# Patient Record
Sex: MALE | Race: BLACK OR AFRICAN AMERICAN | Employment: OTHER | URBAN - METROPOLITAN AREA
[De-identification: names, ages, dates, MRNs, and addresses within clinical notes are randomized per-mention and may not be internally consistent; named-entity substitution may affect disease eponyms.]

---

## 2017-03-23 ENCOUNTER — APPOINTMENT (OUTPATIENT)
Dept: CT IMAGING | Age: 57
DRG: 042 | End: 2017-03-23
Attending: EMERGENCY MEDICINE
Payer: COMMERCIAL

## 2017-03-23 ENCOUNTER — APPOINTMENT (OUTPATIENT)
Dept: GENERAL RADIOLOGY | Age: 57
DRG: 042 | End: 2017-03-23
Attending: EMERGENCY MEDICINE
Payer: COMMERCIAL

## 2017-03-23 ENCOUNTER — HOSPITAL ENCOUNTER (INPATIENT)
Age: 57
LOS: 4 days | Discharge: HOME | DRG: 042 | End: 2017-03-27
Attending: EMERGENCY MEDICINE | Admitting: HOSPITALIST
Payer: COMMERCIAL

## 2017-03-23 DIAGNOSIS — G45.9 TRANSIENT CEREBRAL ISCHEMIA, UNSPECIFIED TYPE: Primary | ICD-10-CM

## 2017-03-23 LAB
ANION GAP BLD CALC-SCNC: 10 MMOL/L (ref 3–18)
APPEARANCE UR: CLEAR
APTT PPP: 28.1 SEC (ref 23–36.4)
BASOPHILS # BLD AUTO: 0 K/UL (ref 0–0.06)
BASOPHILS # BLD: 1 % (ref 0–2)
BILIRUB UR QL: NEGATIVE
BUN SERPL-MCNC: 18 MG/DL (ref 7–18)
BUN/CREAT SERPL: 13 (ref 12–20)
CALCIUM SERPL-MCNC: 7.4 MG/DL (ref 8.5–10.1)
CHLORIDE SERPL-SCNC: 106 MMOL/L (ref 100–108)
CK MB CFR SERPL CALC: 0.7 % (ref 0–4)
CK MB SERPL-MCNC: 1.1 NG/ML (ref 5–25)
CK SERPL-CCNC: 162 U/L (ref 39–308)
CO2 SERPL-SCNC: 26 MMOL/L (ref 21–32)
COLOR UR: YELLOW
CREAT SERPL-MCNC: 1.4 MG/DL (ref 0.6–1.3)
DIFFERENTIAL METHOD BLD: ABNORMAL
EOSINOPHIL # BLD: 0.4 K/UL (ref 0–0.4)
EOSINOPHIL NFR BLD: 6 % (ref 0–5)
ERYTHROCYTE [DISTWIDTH] IN BLOOD BY AUTOMATED COUNT: 13.7 % (ref 11.6–14.5)
GLUCOSE BLD STRIP.AUTO-MCNC: 89 MG/DL (ref 70–110)
GLUCOSE SERPL-MCNC: 106 MG/DL (ref 74–99)
GLUCOSE UR STRIP.AUTO-MCNC: NEGATIVE MG/DL
HCT VFR BLD AUTO: 39.4 % (ref 36–48)
HGB BLD-MCNC: 12.4 G/DL (ref 13–16)
HGB UR QL STRIP: NEGATIVE
INR PPP: 1 (ref 0.8–1.2)
KETONES UR QL STRIP.AUTO: NEGATIVE MG/DL
LEUKOCYTE ESTERASE UR QL STRIP.AUTO: NEGATIVE
LYMPHOCYTES # BLD AUTO: 44 % (ref 21–52)
LYMPHOCYTES # BLD: 2.9 K/UL (ref 0.9–3.6)
MAGNESIUM SERPL-MCNC: 1.5 MG/DL (ref 1.8–2.4)
MCH RBC QN AUTO: 27.6 PG (ref 24–34)
MCHC RBC AUTO-ENTMCNC: 31.5 G/DL (ref 31–37)
MCV RBC AUTO: 87.6 FL (ref 74–97)
MONOCYTES # BLD: 0.5 K/UL (ref 0.05–1.2)
MONOCYTES NFR BLD AUTO: 8 % (ref 3–10)
NEUTS SEG # BLD: 2.7 K/UL (ref 1.8–8)
NEUTS SEG NFR BLD AUTO: 41 % (ref 40–73)
NITRITE UR QL STRIP.AUTO: NEGATIVE
PH UR STRIP: 5 [PH] (ref 5–8)
PLATELET # BLD AUTO: 205 K/UL (ref 135–420)
PMV BLD AUTO: 12.6 FL (ref 9.2–11.8)
POTASSIUM SERPL-SCNC: 3.8 MMOL/L (ref 3.5–5.5)
PROT UR STRIP-MCNC: NEGATIVE MG/DL
PROTHROMBIN TIME: 12.5 SEC (ref 11.5–15.2)
RBC # BLD AUTO: 4.5 M/UL (ref 4.7–5.5)
SODIUM SERPL-SCNC: 142 MMOL/L (ref 136–145)
SP GR UR REFRACTOMETRY: 1.02 (ref 1–1.03)
TROPONIN I SERPL-MCNC: <0.02 NG/ML (ref 0–0.06)
UROBILINOGEN UR QL STRIP.AUTO: 1 EU/DL (ref 0.2–1)
WBC # BLD AUTO: 6.4 K/UL (ref 4.6–13.2)

## 2017-03-23 PROCEDURE — 74011250637 HC RX REV CODE- 250/637: Performed by: EMERGENCY MEDICINE

## 2017-03-23 PROCEDURE — 99285 EMERGENCY DEPT VISIT HI MDM: CPT

## 2017-03-23 PROCEDURE — 80048 BASIC METABOLIC PNL TOTAL CA: CPT | Performed by: EMERGENCY MEDICINE

## 2017-03-23 PROCEDURE — 82550 ASSAY OF CK (CPK): CPT | Performed by: EMERGENCY MEDICINE

## 2017-03-23 PROCEDURE — 93005 ELECTROCARDIOGRAM TRACING: CPT

## 2017-03-23 PROCEDURE — 70450 CT HEAD/BRAIN W/O DYE: CPT

## 2017-03-23 PROCEDURE — 85025 COMPLETE CBC W/AUTO DIFF WBC: CPT | Performed by: EMERGENCY MEDICINE

## 2017-03-23 PROCEDURE — 82962 GLUCOSE BLOOD TEST: CPT

## 2017-03-23 PROCEDURE — 65660000000 HC RM CCU STEPDOWN

## 2017-03-23 PROCEDURE — 85730 THROMBOPLASTIN TIME PARTIAL: CPT | Performed by: EMERGENCY MEDICINE

## 2017-03-23 PROCEDURE — 81003 URINALYSIS AUTO W/O SCOPE: CPT | Performed by: EMERGENCY MEDICINE

## 2017-03-23 PROCEDURE — 85610 PROTHROMBIN TIME: CPT | Performed by: EMERGENCY MEDICINE

## 2017-03-23 PROCEDURE — 71010 XR CHEST SNGL V: CPT

## 2017-03-23 PROCEDURE — 83735 ASSAY OF MAGNESIUM: CPT | Performed by: EMERGENCY MEDICINE

## 2017-03-23 RX ORDER — ACETAMINOPHEN 325 MG/1
650 TABLET ORAL
Status: DISCONTINUED | OUTPATIENT
Start: 2017-03-23 | End: 2017-03-27 | Stop reason: HOSPADM

## 2017-03-23 RX ORDER — ATORVASTATIN CALCIUM 40 MG/1
40 TABLET, FILM COATED ORAL
Status: DISCONTINUED | OUTPATIENT
Start: 2017-03-23 | End: 2017-03-27 | Stop reason: HOSPADM

## 2017-03-23 RX ORDER — ENOXAPARIN SODIUM 100 MG/ML
40 INJECTION SUBCUTANEOUS EVERY 24 HOURS
Status: DISCONTINUED | OUTPATIENT
Start: 2017-03-23 | End: 2017-03-27 | Stop reason: HOSPADM

## 2017-03-23 RX ORDER — ASPIRIN 325 MG
325 TABLET ORAL
Status: COMPLETED | OUTPATIENT
Start: 2017-03-23 | End: 2017-03-23

## 2017-03-23 RX ORDER — GUAIFENESIN 100 MG/5ML
81 LIQUID (ML) ORAL DAILY
Status: DISCONTINUED | OUTPATIENT
Start: 2017-03-24 | End: 2017-03-27 | Stop reason: HOSPADM

## 2017-03-23 RX ADMIN — ASPIRIN 325 MG ORAL TABLET 325 MG: 325 PILL ORAL at 20:30

## 2017-03-23 NOTE — ED PROVIDER NOTES
HPI Comments: 5:10 PM Flakita Matias is a 61 y.o. male with a history of HTN, pulmonary nodule, head injury, and depression who presents to the emergency department complaining of right arm numbness onset at 10:30 AM. Patient also c/o weakness and dizziness. Patient states that he initially experienced an episode of right arm numbness similar to this episode four days ago while at the grocery store. He states that this episode lasted thirty minutes. Patient mentions that he experienced another episode of right arm numbness three days ago while sitting on the couch. He notes that when he got up, he was dizzy and fell onto his side. Patient denies LOC or any other complaints. There are no other concerns at this time. The history is provided by the patient. Past Medical History:   Diagnosis Date    Depression     Head injury     Hypertension     Pulmonary nodule        Past Surgical History:   Procedure Laterality Date    HX OTHER SURGICAL      Torn Cartiledge Left Knee    HX TONSILLECTOMY           Family History:   Problem Relation Age of Onset    Arthritis-osteo Mother        Social History     Social History    Marital status:      Spouse name: N/A    Number of children: N/A    Years of education: N/A     Occupational History    Not on file. Social History Main Topics    Smoking status: Never Smoker    Smokeless tobacco: Not on file    Alcohol use No    Drug use: No    Sexual activity: Not on file     Other Topics Concern    Not on file     Social History Narrative         ALLERGIES: Review of patient's allergies indicates no known allergies. Review of Systems   HENT: Negative. Eyes: Negative. Respiratory: Negative. Cardiovascular: Negative. Gastrointestinal: Negative. Endocrine: Negative. Genitourinary: Negative. Musculoskeletal: Negative. Skin: Negative. Allergic/Immunologic: Negative.     Neurological: Positive for dizziness, weakness and numbness (right arm). Negative for syncope. Hematological: Negative. Psychiatric/Behavioral: Negative. All other systems reviewed and are negative. Vitals:    03/23/17 1730 03/23/17 1850 03/23/17 1853 03/23/17 1900   BP: 135/88 (!) 138/95  (!) 133/93   Pulse: 76  73 71   Resp: 17  16 19   Temp:       SpO2: 100%  100% 99%   Weight:       Height:                Physical Exam   Constitutional: He is oriented to person, place, and time. He appears well-developed and well-nourished. No distress. HENT:   Head: Normocephalic. Mouth/Throat: Oropharynx is clear and moist.   Eyes: Conjunctivae and EOM are normal. Pupils are equal, round, and reactive to light. Neck: Normal range of motion. Neck supple. Cardiovascular: Normal rate, regular rhythm, normal heart sounds and intact distal pulses. No murmur heard. Pulmonary/Chest: Effort normal and breath sounds normal. No respiratory distress. He has no wheezes. He has no rales. He exhibits no tenderness. Abdominal: Soft. Bowel sounds are normal. He exhibits no distension. There is no tenderness. There is no rebound. Musculoskeletal: Normal range of motion. He exhibits no edema or tenderness. Neurological: He is alert and oriented to person, place, and time. No cranial nerve deficit. He exhibits normal muscle tone. Coordination normal.   Numbness in right arm. Normal strength. Skin: Skin is warm and dry. No rash noted. Psychiatric: He has a normal mood and affect. His behavior is normal. Judgment and thought content normal.   Nursing note and vitals reviewed.        MDM  Number of Diagnoses or Management Options     Amount and/or Complexity of Data Reviewed  Clinical lab tests: ordered and reviewed  Tests in the radiology section of CPT®: ordered and reviewed    Risk of Complications, Morbidity, and/or Mortality  Presenting problems: moderate  Diagnostic procedures: high  Management options: moderate      ED Course Procedures    Vitals:  Patient Vitals for the past 12 hrs:   Temp Pulse Resp BP SpO2   03/23/17 1900 - 71 19 (!) 133/93 99 %   03/23/17 1853 - 73 16 - 100 %   03/23/17 1850 - - - (!) 138/95 -   03/23/17 1730 - 76 17 135/88 100 %   03/23/17 1700 - 77 18 148/85 100 %   03/23/17 1630 - 84 19 140/82 99 %   03/23/17 1612 - 90 21 151/89 100 %   03/23/17 1611 98.3 °F (36.8 °C) 91 18 151/89 100 %   100% on RA, indicating adequate oxygenation. Medications ordered:   Medications - No data to display      Lab findings:  Recent Results (from the past 12 hour(s))   GLUCOSE, POC    Collection Time: 03/23/17  4:11 PM   Result Value Ref Range    Glucose (POC) 89 70 - 110 mg/dL   EKG, 12 LEAD, INITIAL    Collection Time: 03/23/17  4:13 PM   Result Value Ref Range    Ventricular Rate 88 BPM    Atrial Rate 88 BPM    P-R Interval 140 ms    QRS Duration 96 ms    Q-T Interval 380 ms    QTC Calculation (Bezet) 459 ms    Calculated P Axis 49 degrees    Calculated R Axis 51 degrees    Calculated T Axis 45 degrees    Diagnosis       Sinus rhythm with occasional premature ventricular complexes  Otherwise normal ECG  When compared with ECG of 28-FEB-2016 19:22,  T wave inversion no longer evident in Anterior leads     CBC WITH AUTOMATED DIFF    Collection Time: 03/23/17  4:15 PM   Result Value Ref Range    WBC 6.4 4.6 - 13.2 K/uL    RBC 4.50 (L) 4.70 - 5.50 M/uL    HGB 12.4 (L) 13.0 - 16.0 g/dL    HCT 39.4 36.0 - 48.0 %    MCV 87.6 74.0 - 97.0 FL    MCH 27.6 24.0 - 34.0 PG    MCHC 31.5 31.0 - 37.0 g/dL    RDW 13.7 11.6 - 14.5 %    PLATELET 208 611 - 430 K/uL    MPV 12.6 (H) 9.2 - 11.8 FL    NEUTROPHILS 41 40 - 73 %    LYMPHOCYTES 44 21 - 52 %    MONOCYTES 8 3 - 10 %    EOSINOPHILS 6 (H) 0 - 5 %    BASOPHILS 1 0 - 2 %    ABS. NEUTROPHILS 2.7 1.8 - 8.0 K/UL    ABS. LYMPHOCYTES 2.9 0.9 - 3.6 K/UL    ABS. MONOCYTES 0.5 0.05 - 1.2 K/UL    ABS. EOSINOPHILS 0.4 0.0 - 0.4 K/UL    ABS.  BASOPHILS 0.0 0.0 - 0.06 K/UL    DF AUTOMATED PROTHROMBIN TIME + INR    Collection Time: 03/23/17  4:15 PM   Result Value Ref Range    Prothrombin time 12.5 11.5 - 15.2 sec    INR 1.0 0.8 - 1.2     PTT    Collection Time: 03/23/17  4:15 PM   Result Value Ref Range    aPTT 28.1 23.0 - 34.0 SEC   METABOLIC PANEL, BASIC    Collection Time: 03/23/17  4:15 PM   Result Value Ref Range    Sodium 142 136 - 145 mmol/L    Potassium 3.8 3.5 - 5.5 mmol/L    Chloride 106 100 - 108 mmol/L    CO2 26 21 - 32 mmol/L    Anion gap 10 3.0 - 18 mmol/L    Glucose 106 (H) 74 - 99 mg/dL    BUN 18 7.0 - 18 MG/DL    Creatinine 1.40 (H) 0.6 - 1.3 MG/DL    BUN/Creatinine ratio 13 12 - 20      GFR est AA >60 >60 ml/min/1.73m2    GFR est non-AA 52 (L) >60 ml/min/1.73m2    Calcium 7.4 (L) 8.5 - 10.1 MG/DL   MAGNESIUM    Collection Time: 03/23/17  4:15 PM   Result Value Ref Range    Magnesium 1.5 (L) 1.8 - 2.4 mg/dL   CARDIAC PANEL,(CK, CKMB & TROPONIN)    Collection Time: 03/23/17  4:15 PM   Result Value Ref Range     39 - 308 U/L    CK - MB 1.1 <3.6 ng/ml    CK-MB Index 0.7 0.0 - 4.0 %    Troponin-I, Qt. <0.02 0.00 - 0.06 NG/ML   URINALYSIS W/ RFLX MICROSCOPIC    Collection Time: 03/23/17  6:51 PM   Result Value Ref Range    Color YELLOW      Appearance CLEAR      Specific gravity 1.024 1.005 - 1.030      pH (UA) 5.0 5.0 - 8.0      Protein NEGATIVE  NEG mg/dL    Glucose NEGATIVE  NEG mg/dL    Ketone NEGATIVE  NEG mg/dL    Bilirubin NEGATIVE  NEG      Blood NEGATIVE  NEG      Urobilinogen 1.0 0.2 - 1.0 EU/dL    Nitrites NEGATIVE  NEG      Leukocyte Esterase NEGATIVE  NEG         X-Ray, CT or other radiology findings or impressions:  XR CHEST SNGL V         6:23 PM No acute process. Interpreted by Dr. Jeronimo Vargas. CT HEAD WO CONT         7:00 PM Interpreted by radiologist.     IMPRESSION:     1. 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 There is no hemorrhage, mass,  nor acute infarct.     Progress notes, Consult notes or additional Procedure notes:     7:57 PM Consult: Discussed care with Dr. Joann Johnson, Neurology. Standard discussion; including history of patients chief complaint, available diagnostic results, and treatment course. Recommends admission for TIA.     8:19 PM Consult:  Discussed care with Dr. Ese Hernández, [facility]. Standard discussion; including history of patients chief complaint, available diagnostic results, and treatment course. Instructs to admit to resident. 8:22 PM Consult:  Discussed care with Dr. Richard Mckeon, resident. Standard discussion; including history of patients chief complaint, available diagnostic results, and treatment course. Agrees to admit. Disposition:  Diagnosis: TIA    Disposition: Admitted in stable condition. Follow-up Information     None           Patient's Medications   Start Taking    No medications on file   Continue Taking    No medications on file   These Medications have changed    No medications on file   Stop Taking    MAGNESIUM OXIDE (MAG-OX) 400 MG TABLET    Take 1 Tab by mouth daily. NAPROXEN (NAPROSYN) 375 MG TABLET    Take 1 Tab by mouth two (2) times daily (with meals). Scribe Attestation:    Nancy Peter, scribing for and in the presence of Giselle Gipson MD March 23, 2017 at 5:22 PM     Physician Attestation:   I personally performed the services described in this documentation, reviewed and edited the documentation which was dictated to the scribe in my presence, and it accurately records my words and actions.  Giselle Gipson MD  March 23, 2017 at 5:22 PM     Signed by: Nacho Welch, March 23, 2017,  5:22 PM

## 2017-03-23 NOTE — Clinical Note
Status[de-identified] Inpatient [101] Type of Bed: Telemetry [19] Inpatient Hospitalization Certified Necessary for the Following Reasons: 3. Patient receiving treatment that can only be provided in an inpatient setting (further clarification in H&P documentation) Admitting Diagnosis: TIA (transient ischemic attack) [[id number]] Admitting Physician: Mamie Tran [id number] Attending Physician: Tamie Bah Estimated Length of Stay: 3-4 Midnights Discharge Plan[de-identified] Home with Office Follow-up

## 2017-03-23 NOTE — IP AVS SNAPSHOT
[address] [address] Mary Acharya Patient: Caio Hemphill MRN: [id number] :1960 You are allergic to the following No active allergies Recent Documentation Height Weight BMI Smoking Status 1.829 m 95.5 kg 28.55 kg/m2 Never Smoker Emergency Contacts Name Discharge Info Relation Home Work Mobile Laly Altman DISCHARGE CAREGIVER [3] Spouse [3] [phone] About your hospitalization You were admitted on:  2017 You last received care in the:  [facility] [id number] [place] [address]. You were discharged on:  2017 Unit phone number:  [phone] Why you were hospitalized Your primary diagnosis was:  Weakness Due To Cerebrovascular Accident (Cva) (Hcc) Your diagnoses also included:  Tia (Transient Ischemic Attack), Hypertension, Hld (Hyperlipidemia) Providers Seen During Your Hospitalizations Provider Role Specialty Primary office phone Mary Jane Bah MD Attending Provider Emergency Medicine [phone] Ameya Mares MD Attending Provider Internal Medicine [phone] Louis Venegas MD Attending Provider [facility] [phone] Your Primary Care Physician (PCP) Primary Care Physician Office Phone Office Fax Rosaria Oppenheim [phone] Follow-up Information Follow up With Details Comments Contact Info Santana Duval MD Go on 3/31/2017 follow up @4:20pm with Joslyn Son 3 [facility] [address] 
[phone] Danyelanselmo Jones DO Go on 2017 follow up @9:26am 27 Yaneth Casas [facility] [address] [facility] 
[phone] Alexandru Rosas MD On 2017 @ 9:30 am [address] [facility] [address] [phone] I spoke with pt about his appt on 3/29 @ 12:45 [email or web address] Your Appointments Wednesday April 26, 2017  9:40 AM EDT Follow Up with Sabrina Redman MD  
[facility] ([facility]) [place] [address] [address] [address] [phone] Current Discharge Medication List  
  
START taking these medications Dose & Instructions Dispensing Information Comments Morning Noon Evening Bedtime  
 amLODIPine 5 mg tablet Commonly known as:  Wandra Lisa Your last dose was: Your next dose is:    
   
   
 Dose:  5 mg Take 1 Tab by mouth daily. Quantity:  30 Tab Refills:  0  
     
   
   
   
  
 aspirin 81 mg chewable tablet Your last dose was: Your next dose is:    
   
   
 Dose:  81 mg Take 1 Tab by mouth daily. Quantity:  30 Tab Refills:  0  
     
   
   
   
  
 atorvastatin 40 mg tablet Commonly known as:  LIPITOR Your last dose was: Your next dose is:    
   
   
 Dose:  40 mg Take 1 Tab by mouth nightly. Quantity:  30 Tab Refills:  0  
     
   
   
   
  
 clopidogrel 75 mg Tab Commonly known as:  PLAVIX Your last dose was: Your next dose is:    
   
   
 Dose:  75 mg Take 1 Tab by mouth daily. Quantity:  30 Tab Refills:  0  
     
   
   
   
  
 famotidine 20 mg tablet Commonly known as:  PEPCID Your last dose was: Your next dose is:    
   
   
 Dose:  20 mg Take 1 Tab by mouth two (2) times a day. Quantity:  60 Tab Refills:  0  
     
   
   
   
  
 metoprolol tartrate 25 mg tablet Commonly known as:  LOPRESSOR Your last dose was: Your next dose is:    
   
   
 Dose:  12.5 mg Take 0.5 Tabs by mouth every twelve (12) hours. Quantity:  60 Tab Refills:  0 Where to Get Your Medications Information on where to get these meds will be given to you by the nurse or doctor. ! Ask your nurse or doctor about these medications amLODIPine 5 mg tablet  
 aspirin 81 mg chewable tablet  
 atorvastatin 40 mg tablet  
 clopidogrel 75 mg Tab  
 famotidine 20 mg tablet  
 metoprolol tartrate 25 mg tablet Discharge Instructions Patient armband removed and shredded MyChart Activation Thank you for requesting access to [employer]. Please follow the instructions below to securely access and download your online medical record. [employer] allows you to send messages to your doctor, view your test results, renew your prescriptions, schedule appointments, and more. How Do I Sign Up? 1. In your internet browser, go to [email or web address] 
2. Click on the First Time User? Click Here link in the Sign In box. You will be redirect to the New Member Sign Up page. 3. Enter your [employer] Access Code exactly as it appears below. You will not need to use this code after youve completed the sign-up process. If you do not sign up before the expiration date, you must request a new code. [employer] Access Code: [email or web address] Expires: 2017  4:06 PM (This is the date your [employer] access code will ) 4. Enter the last four digits of your Social Security Number (xxxx) and Date of Birth (mm/dd/yyyy) as indicated and click Submit. You will be taken to the next sign-up page. 5. Create a Sat[employer] ID. This will be your [employer] login ID and cannot be changed, so think of one that is secure and easy to remember. 6. Create a [employer] password. You can change your password at any time. 7. Enter your Password Reset Question and Answer. This can be used at a later time if you forget your password. 8. Enter your e-mail address. You will receive e-mail notification when new information is available in [address] 9. Click Sign Up. You can now view and download portions of your medical record. 10. Click the Download Summary menu link to download a portable copy of your medical information. Additional Information If you have questions, please visit the Frequently Asked Questions section of the [email or web address] website at [email or web address] Remember, MyChart is NOT to be used for urgent needs. For medical emergencies, dial 911. DISCHARGE SUMMARY from Nurse The following personal items are in your possession at time of discharge: 
 
Dental Appliances: None Visual Aid: None Home Medications: None Jewelry: Jo-Ann Juan Clothing: At bedside, Footwear, Pajamas, Pants, Shirt, Shorts, Socks, Undergarments Other Valuables: Cell Phone, Other (comment) (battery charger) PATIENT INSTRUCTIONS: 
 
After general anesthesia or intravenous sedation, for 24 hours or while taking prescription Narcotics: · Limit your activities · Do not drive and operate hazardous machinery · Do not make important personal or business decisions · Do  not drink alcoholic beverages · If you have not urinated within 8 hours after discharge, please contact your surgeon on call. Report the following to your surgeon: 
· Excessive pain, swelling, redness or odor of or around the surgical area · Temperature over 100.5 · Nausea and vomiting lasting longer than 4 hours or if unable to take medications · Any signs of decreased circulation or nerve impairment to extremity: change in color, persistent  numbness, tingling, coldness or increase pain · Any questions What to do at Home: 
Recommended activity: Activity as tolerated, If you experience any of the following symptoms shortness of breath, chest pain, please follow up with ED or Primary MD. 
 
 
*  Please give a list of your current medications to your Primary Care Provider. *  Please update this list whenever your medications are discontinued, doses are 
    changed, or new medications (including over-the-counter products) are added. *  Please carry medication information at all times in case of emergency situations. These are general instructions for a healthy lifestyle: No smoking/ No tobacco products/ Avoid exposure to second hand smoke Surgeon General's Warning:  Quitting smoking now greatly reduces serious risk to your health. Obesity, smoking, and sedentary lifestyle greatly increases your risk for illness A healthy diet, regular physical exercise & weight monitoring are important for maintaining a healthy lifestyle You may be retaining fluid if you have a history of heart failure or if you experience any of the following symptoms:  Weight gain of 3 pounds or more overnight or 5 pounds in a week, increased swelling in our hands or feet or shortness of breath while lying flat in bed. Please call your doctor as soon as you notice any of these symptoms; do not wait until your next office visit. Recognize signs and symptoms of STROKE: 
 
F-face looks uneven A-arms unable to move or move unevenly S-speech slurred or non-existent T-time-call 911 as soon as signs and symptoms begin-DO NOT go Back to bed or wait to see if you get better-TIME IS BRAIN. Warning Signs of HEART ATTACK Call 911 if you have these symptoms: 
? Chest discomfort. Most heart attacks involve discomfort in the center of the chest that lasts more than a few minutes, or that goes away and comes back. It can feel like uncomfortable pressure, squeezing, fullness, or pain. ? Discomfort in other areas of the upper body. Symptoms can include pain or discomfort in one or both arms, the back, neck, jaw, or stomach. ? Shortness of breath with or without chest discomfort. ? Other signs may include breaking out in a cold sweat, nausea, or lightheadedness. Don't wait more than five minutes to call [address]! Fast action can save your life. Calling 911 is almost always the fastest way to get lifesaving treatment.  Emergency Medical Services staff can begin treatment when they arrive  up to an hour sooner than if someone gets to the hospital by car. The discharge information has been reviewed with the patient. The patient verbalized understanding. Discharge medications reviewed with the patient and appropriate educational materials and side effects teaching were provided. Taking Aspirin to Prevent Heart Attack and Stroke: Care Instructions Your Care Instructions Aspirin acts as a \"blood thinner. \" It prevents blood clots from forming. When taken during and after a heart attack, it can reduce your chance of dying. And it's used if you have a stent in your coronary artery. Also, aspirin helps certain people lower their risk of a heart attack or stroke. Be sure you know what dose of aspirin to take and how often to take it. Low-dose aspirin is typically 81 mg. But the dose for daily aspirin can range from 81 mg to 325 mg. Taking aspirin every day can cause bleeding. It may not be safe if you have stomach ulcers. And it may not be safe if you have high blood pressure that is not controlled. If you take aspirin pills every day, do not take ones that have other ingredients such as caffeine or sodium. Before you start to take aspirin, tell your doctor all the medicines, vitamins, herbal products, and supplements you take. Follow-up care is a key part of your treatment and safety. Be sure to make and go to all appointments, and call your doctor if you are having problems. It's also a good idea to know your test results and keep a list of the medicines you take. How can you care for yourself at home? · Take aspirin with a full glass of water unless your doctor tells you not to. Do not lie down right after you take it. · If you have a stent in your coronary artery, take your aspirin as your heart doctor says to. If another doctor says to stop taking the aspirin for any reason, talk to your heart doctor before you stop. · Do not chew or crush the coated or sustained-release forms of aspirin. · Ask your doctor if you can drink alcohol while you take aspirin. And ask how much you can drink. Too much alcohol with aspirin can cause stomach bleeding. · Do not take aspirin if you are pregnant, unless your doctor says it is okay. · Keep all aspirin out of children's reach. · Throw aspirin away if it starts to smell like vinegar. · Do not take aspirin if you have gout or if you take prescription blood thinners, unless your doctor has told you to. · Do not take prescription or over-the-counter medicines, vitamins, herbal products, or supplements without talking to your doctor first. Anna Lesch the label before you take another over-the-counter medicine. Many contain aspirin. So they could cause you to take too much aspirin. · Talk with your doctor before you take a pain medicine. Ask which type of medicine you can take and how to take it safely with aspirin. · Tell your doctor or dentist before a surgery or procedure that you take aspirin. He or she will tell you if you should stop taking aspirin before your surgery or procedure. Make sure that you understand exactly what your doctor wants you to do. Where can you learn more? Go to http://anayeli-ezequiel.info/. Enter Y919 in the search box to learn more about \"Taking Aspirin to Prevent Heart Attack and Stroke: Care Instructions. \" Current as of: January 27, 2016 Content Version: 11.1 © 4371-9589 Healthwise, Incorporated. Care instructions adapted under license by [employer] (which disclaims liability or warranty for this information). If you have questions about a medical condition or this instruction, always ask your healthcare professional. [address] [address] any warranty or liability for your use of this information. Learning About Antiplatelet Medicines After a Stroke Introduction If you have had a stroke, you may have concerns about having another one. You want to do all you can do to avoid this. If your stroke was caused by a blood clot, one of the best things you can do is to take antiplatelet medicines. They can help prevent another stroke. In most cases, you don't take them if you had a stroke caused by a leak in an artery. These medicines are often called blood thinners. But they don't thin your blood. They work to keep platelets from sticking together and forming blood clots. (A platelet is a type of blood cell.) Blood clots can cause a stroke if they block a blood vessel in the brain. So by preventing blood clots, you are helping to prevent a stroke. Examples · Aspirin (Ascencion, Bufferin, Ecotrin) · Aspirin with dipyridamole (Aggrenox) · Clopidogrel (Plavix) Possible side effects These medicines make your blood take longer than normal to clot. This can cause bleeding, and you may bruise easily. In rare cases, they can cause you to bleed inside your body without an injury. If you have an injury, you might have bleeding that is hard to control. These medicines may have other side effects. Depending on which one you take, you may: 
· Have diarrhea. · Feel sick to your stomach. · Have a headache. · Have some mild belly pain. You may have other side effects or reactions not listed here. Check the information that comes with your medicine. What to know about taking this medicine · Be sure you get instructions about how to take your medicine safely. Blood thinners can cause serious bleeding problems. · Be safe with medicines. Take your medicines exactly as prescribed. Call your doctor if you think you are having a problem with your medicine. · Check with your doctor or pharmacist before you use any other medicines, including over-the-counter medicines. Make sure your doctor knows all of the medicines, vitamins, herbal products, and supplements you take.  Taking some medicines together can cause problems. Where can you learn more? Go to http://anayeli-ezequiel.info/. Enter P220 in the search box to learn more about \"Learning About Antiplatelet Medicines After a Stroke. \" Current as of: January 27, 2016 Content Version: 11.1 © 8789-5293 [employer]. Care instructions adapted under license by PHARMAJET (which disclaims liability or warranty for this information). If you have questions about a medical condition or this instruction, always ask your healthcare professional. [address] [address] any warranty or liability for your use of this information. DASH Diet: Care Instructions Your Care Instructions The DASH diet is an eating plan that can help lower your blood pressure. DASH stands for Dietary Approaches to Stop Hypertension. Hypertension is high blood pressure. The DASH diet focuses on eating foods that are high in calcium, potassium, and magnesium. These nutrients can lower blood pressure. The foods that are highest in these nutrients are fruits, vegetables, low-fat dairy products, nuts, seeds, and legumes. But taking calcium, potassium, and magnesium supplements instead of eating foods that are high in those nutrients does not have the same effect. The DASH diet also includes whole grains, fish, and poultry. The DASH diet is one of several lifestyle changes your doctor may recommend to lower your high blood pressure. Your doctor may also want you to decrease the amount of sodium in your diet. Lowering sodium while following the DASH diet can lower blood pressure even further than just the DASH diet alone. Follow-up care is a key part of your treatment and safety. Be sure to make and go to all appointments, and call your doctor if you are having problems. It's also a good idea to know your test results and keep a list of the medicines you take. How can you care for yourself at home? Following the DASH diet · Eat 4 to 5 servings of fruit each day. A serving is 1 medium-sized piece of fruit, ½ cup chopped or canned fruit, 1/4 cup dried fruit, or 4 ounces (½ cup) of fruit juice. Choose fruit more often than fruit juice. · Eat 4 to 5 servings of vegetables each day. A serving is 1 cup of lettuce or raw leafy vegetables, ½ cup of chopped or cooked vegetables, or 4 ounces (½ cup) of vegetable juice. Choose vegetables more often than vegetable juice. · Get 2 to 3 servings of low-fat and fat-free dairy each day. A serving is 8 ounces of milk, 1 cup of yogurt, or 1 ½ ounces of cheese. · Eat 6 to 8 servings of grains each day. A serving is 1 slice of bread, 1 ounce of dry cereal, or ½ cup of cooked rice, pasta, or cooked cereal. Try to choose whole-grain products as much as possible. · Limit lean meat, poultry, and fish to 2 servings each day. A serving is 3 ounces, about the size of a deck of cards. · Eat 4 to 5 servings of nuts, seeds, and legumes (cooked dried beans, lentils, and split peas) each week. A serving is 1/3 cup of nuts, 2 tablespoons of seeds, or ½ cup of cooked beans or peas. · Limit fats and oils to 2 to 3 servings each day. A serving is 1 teaspoon of vegetable oil or 2 tablespoons of salad dressing. · Limit sweets and added sugars to 5 servings or less a week. A serving is 1 tablespoon jelly or jam, ½ cup sorbet, or 1 cup of lemonade. · Eat less than 2,300 milligrams (mg) of sodium a day. If you limit your sodium to 1,500 mg a day, you can lower your blood pressure even more. Tips for success · Start small. Do not try to make dramatic changes to your diet all at once. You might feel that you are missing out on your favorite foods and then be more likely to not follow the plan. Make small changes, and stick with them. Once those changes become habit, add a few more changes. · Try some of the following: ¨ Make it a goal to eat a fruit or vegetable at every meal and at snacks. This will make it easy to get the recommended amount of fruits and vegetables each day. ¨ Try yogurt topped with fruit and nuts for a snack or healthy dessert. ¨ Add lettuce, tomato, cucumber, and onion to sandwiches. ¨ Combine a ready-made pizza crust with low-fat mozzarella cheese and lots of vegetable toppings. Try using tomatoes, squash, spinach, broccoli, carrots, cauliflower, and onions. ¨ Have a variety of cut-up vegetables with a low-fat dip as an appetizer instead of chips and dip. ¨ Sprinkle sunflower seeds or chopped almonds over salads. Or try adding chopped walnuts or almonds to cooked vegetables. ¨ Try some vegetarian meals using beans and peas. Add garbanzo or kidney beans to salads. Make burritos and tacos with mashed durbin beans or black beans. Where can you learn more? Go to http://anayeli-ezequiel.info/. Enter U898 in the search box to learn more about \"DASH Diet: Care Instructions. \" Current as of: March 23, 2016 Content Version: 11.1 © 0124-0512 Cryptopay. Care instructions adapted under license by Optyn (which disclaims liability or warranty for this information). If you have questions about a medical condition or this instruction, always ask your healthcare professional. [address] [address] any warranty or liability for your use of this information. Taking Aspirin to Prevent Heart Attack and Stroke: Care Instructions Your Care Instructions Aspirin acts as a \"blood thinner. \" It prevents blood clots from forming. When taken during and after a heart attack, it can reduce your chance of dying. And it's used if you have a stent in your coronary artery. Also, aspirin helps certain people lower their risk of a heart attack or stroke. Be sure you know what dose of aspirin to take and how often to take it. Low-dose aspirin is typically 81 mg. But the dose for daily aspirin can range from 81 mg to 325 mg. Taking aspirin every day can cause bleeding. It may not be safe if you have stomach ulcers. And it may not be safe if you have high blood pressure that is not controlled. If you take aspirin pills every day, do not take ones that have other ingredients such as caffeine or sodium. Before you start to take aspirin, tell your doctor all the medicines, vitamins, herbal products, and supplements you take. Follow-up care is a key part of your treatment and safety. Be sure to make and go to all appointments, and call your doctor if you are having problems. It's also a good idea to know your test results and keep a list of the medicines you take. How can you care for yourself at home? · Take aspirin with a full glass of water unless your doctor tells you not to. Do not lie down right after you take it. · If you have a stent in your coronary artery, take your aspirin as your heart doctor says to. If another doctor says to stop taking the aspirin for any reason, talk to your heart doctor before you stop. · Do not chew or crush the coated or sustained-release forms of aspirin. · Ask your doctor if you can drink alcohol while you take aspirin. And ask how much you can drink. Too much alcohol with aspirin can cause stomach bleeding. · Do not take aspirin if you are pregnant, unless your doctor says it is okay. · Keep all aspirin out of children's reach. · Throw aspirin away if it starts to smell like vinegar. · Do not take aspirin if you have gout or if you take prescription blood thinners, unless your doctor has told you to. · Do not take prescription or over-the-counter medicines, vitamins, herbal products, or supplements without talking to your doctor first. Gilmar Ye the label before you take another over-the-counter medicine. Many contain aspirin. So they could cause you to take too much aspirin. · Talk with your doctor before you take a pain medicine.  Ask which type of medicine you can take and how to take it safely with aspirin. · Tell your doctor or dentist before a surgery or procedure that you take aspirin. He or she will tell you if you should stop taking aspirin before your surgery or procedure. Make sure that you understand exactly what your doctor wants you to do. Where can you learn more? Go to http://anayeli-ezequiel.info/. Enter U498 in the search box to learn more about \"Taking Aspirin to Prevent Heart Attack and Stroke: Care Instructions. \" Current as of: January 27, 2016 Content Version: 11.1 © 9858-2368 Language Learning Class. Care instructions adapted under license by [employer] (which disclaims liability or warranty for this information). If you have questions about a medical condition or this instruction, always ask your healthcare professional. [address] any warranty or liability for your use of this information. Discharge Instructions Attachments/References METOPROLOL (BY MOUTH) (ENGLISH) FAMOTIDINE (BY MOUTH) (ENGLISH) CLOPIDOGREL (BY MOUTH) (ENGLISH) AMLODIPINE (BY MOUTH) (ENGLISH) ATORVASTATIN (BY MOUTH) (ENGLISH) Discharge Orders None [employer] Announcement We are excited to announce that we are making your provider's discharge notes available to you in [employer]. You will see these notes when they are completed and signed by the physician that discharged you from your recent hospital stay. If you have any questions or concerns about any information you see in [employer], please call the Health Information Department where you were seen or reach out to your Primary Care Provider for more information about your plan of care. Introducing [facility]! Ros Miller introduces [employer] patient portal. Now you can access parts of your medical record, email your doctor's office, and request medication refills online.    
 
1. In your internet browser, go to [email or web address] 2. Click on the First Time User? Click Here link in the Sign In box. You will see the New Member Sign Up page. 3. Enter your [employer] Access Code exactly as it appears below. You will not need to use this code after youve completed the sign-up process. If you do not sign up before the expiration date, you must request a new code. · [employer] Access Code: [email or web address] Expires: 6/21/2017  4:06 PM 
 
4. Enter the last four digits of your Social Security Number (xxxx) and Date of Birth (mm/dd/yyyy) as indicated and click Submit. You will be taken to the next sign-up page. 5. Create a [employer] ID. This will be your [employer] login ID and cannot be changed, so think of one that is secure and easy to remember. 6. Create a [employer] password. You can change your password at any time. 7. Enter your Password Reset Question and Answer. This can be used at a later time if you forget your password. 8. Enter your e-mail address. You will receive e-mail notification when new information is available in [address] 9. Click Sign Up. You can now view and download portions of your medical record. 10. Click the Download Summary menu link to download a portable copy of your medical information. If you have questions, please visit the Frequently Asked Questions section of the [employer] website. Remember, [employer]. is NOT to be used for urgent needs. For medical emergencies, dial 911. Now available from your iPhone and Android! General Information Please provide this summary of care documentation to your next provider. Patient Signature:  ____________________________________________________________ Date:  ____________________________________________________________  
  
Althia Kras Provider Signature:  ____________________________________________________________ Date:  ____________________________________________________________ More Information Metoprolol (By mouth) Metoprolol (met-oh-PROE-lol) Treats high blood pressure, angina (chest pain), and heart failure. May lower the risk of death after a heart attack. This medicine is a beta-blocker. Brand Name(s):Lopressor, Toprol XL There may be other brand names for this medicine. When This Medicine Should Not Be Used: This medicine is not right for everyone. Do not use if you had an allergic reaction to metoprolol or similar medicines. Do not use this medicine if you have certain blood circulation or heart problems. Ask your doctor about these problems. How to Use This Medicine:  
Tablet, Long Acting Tablet · Take your medicine as directed. Your dose may need to be changed several times to find what works best for you. · Take this medicine with a meal or right after a meal. Take this medicine the same way every day, at the same time. · Swallow the tablet whole with a glass of water. You may break the extended-release tablet in half, but do not chew or crush it. · Missed dose: Take a dose as soon as you remember. If it is almost time for your next dose, wait until then and take a regular dose. Do not take extra medicine to make up for a missed dose. · Store the medicine in a closed container at room temperature, away from heat, moisture, and direct light. Drugs and Foods to Avoid: Ask your doctor or pharmacist before using any other medicine, including over-the-counter medicines, vitamins, and herbal products. · Some medicines can affect how metoprolol works. Tell your doctor if you are taking any of the following: ¨ Digoxin, dipyridamole, hydralazine, hydroxychloroquine, methyldopa, quinidine ¨ Medicine to treat depression (such as bupropion, clomipramine, desipramine, fluoxetine, fluvoxamine, paroxetine, sertraline), medicine to treat mental illness (such as chlorpromazine, fluphenazine, haloperidol, thioridazine), medicine for heart rhythm problems (such as propafenone), HIV/AIDS medicine (such as ritonavir), medicine to treat a fungus infection (such as terbinafine), a monoamine oxidase inhibitor (MAOI), an ergot medicine for headaches, a calcium channel blocker (such as amlodipine, diltiazem, verapamil), or an alpha blocker (such as clonidine, prazosin, reserpine, guanethidine) Warnings While Using This Medicine: · Tell your doctor if you are pregnant or breastfeeding, or if you have blood vessel, heart, or circulation problems (such as heart failure, rhythm problems, or slow heartbeat). Tell your doctor if you have kidney disease, liver disease, diabetes, lung disease (such as asthma), an overactive thyroid, or a history of allergies. · This medicine may cause worse symptoms of heart failure while the dose is being adjusted. · Do not stop using this medicine suddenly. Your doctor will need to slowly decrease your dose before you stop it completely. · Tell any doctor or dentist who treats you that you are using this medicine. You may need to stop using this medicine several days before you have surgery or medical tests. · This medicine could lower your blood pressure too much, especially when you first use it or if you are dehydrated. Stand or sit up slowly if you feel lightheaded or dizzy. · This medicine may make you dizzy or drowsy. Do not drive or do anything else that could be dangerous until you know how this medicine affects you. · Your doctor will check your progress and the effects of this medicine at regular visits. Keep all appointments. · Keep all medicine out of the reach of children. Never share your medicine with anyone. Possible Side Effects While Using This Medicine:  
Call your doctor right away if you notice any of these side effects: · Allergic reaction: Itching or hives, swelling in your face or hands, swelling or tingling in your mouth or throat, chest tightness, trouble breathing · Lightheadedness, dizziness, or fainting · Slow heartbeat · Swelling in your hands, ankles, or feet, trouble breathing, tiredness · Worsening chest pain If you notice these less serious side effects, talk with your doctor: · Diarrhea · Mild dizziness or tiredness If you notice other side effects that you think are caused by this medicine, tell your doctor. Call your doctor for medical advice about side effects. You may report side effects to FDA at 6-178-JRD-4833 © 2016 [address] is for End User's use only and may not be sold, redistributed or otherwise used for commercial purposes. The above information is an  only. It is not intended as medical advice for individual conditions or treatments. Talk to your doctor, nurse or pharmacist before following any medical regimen to see if it is safe and effective for you. Famotidine (By mouth) Famotidine (srd-QM-rl-jaqueline) Treats ulcers, gastroesophageal reflux disease (GERD), and conditions that cause the stomach to produce too much stomach acid. Also treats heartburn caused by acid indigestion. Brand Name(s):Acid Controller, Acid Reducer, [facility] Acid Reducer, Good Sense Acid Reducer, Heartburn Relief, Leader Acid Reducer, Pepcid, Pepcid AC, Quality Choice Acid Controller, [facility] Acid Reducer, [facility] Famotidine Acid Reducer, TopCare Acid Reducer There may be other brand names for this medicine. When This Medicine Should Not Be Used: You should not use this medicine if you have had an allergic reaction to famotidine or to similar medicines such as ranitidine (Zantac®), cimetidine (Tagamet®), or nizatidine (Axid®). How to Use This Medicine:  
Tablet, Chewable Tablet, Dissolving Tablet, Liquid · Your doctor will tell you how much medicine to use. Do not use more than directed.  
· Follow the instructions on the medicine label if you are using this medicine without a prescription. · The chewable tablet must be chewed completely before you swallow it. · If you are using the disintegrating tablet, make sure your hands are dry before you handle the tablet. Do not open the blister pack that contains the tablet until you are ready to take it. Remove the tablet from the blister pack by peeling back the foil, then taking the tablet out. Do not push the tablet through the foil. Place the tablet in your mouth. It should melt within 2 minutes. Swallow after the tablet has melted. · Shake the oral liquid medicine for 5 to 10 seconds before each use. Measure the medicine with a marked measuring spoon or medicine cup. If a dose is missed: · Take a dose as soon as you remember. If it is almost time for your next dose, wait until then and take a regular dose. Do not take extra medicine to make up for a missed dose. How to Store and Dispose of This Medicine: · Store the medicine in a closed container at room temperature, away from heat, moisture, and direct light. Do not freeze the oral liquid. · Ask your pharmacist, doctor, or health caregiver about the best way to dispose of any outdated medicine or medicine no longer needed. Throw away any unused oral liquid that is more than 3month old. · Keep all medicine out of the reach of children. Never share your medicine with anyone. Drugs and Foods to Avoid: Ask your doctor or pharmacist before using any other medicine, including over-the-counter medicines, vitamins, and herbal products. Warnings While Using This Medicine: · Make sure your doctor knows if you are pregnant or breastfeeding, or if you have kidney disease or liver disease. · This medicine might contain phenylalanine (aspartame). This is only a concern if you have a disorder called phenylketonuria (a problem with amino acids). If you have this condition, talk to your doctor before using this medicine. · Call your doctor if your symptoms do not improve or if they get worse. Possible Side Effects While Using This Medicine:  
Call your doctor right away if you notice any of these side effects: · Allergic reaction: Itching or hives, swelling in your face or hands, swelling or tingling in your mouth or throat, chest tightness, trouble breathing · Blistering, peeling, or red skin rash. · Dark-colored urine or pale stools. · Fast, pounding, or uneven heartbeat. · Fever, chills, cough, sore throat, and body aches. · Seizures. · Unusual bleeding, bruising, or weakness. · Yellowing of your skin or the whites of your eyes. If you notice these less serious side effects, talk with your doctor: · Constipation, diarrhea, or upset stomach. · Headache or dizziness. · Nausea or vomiting. If you notice other side effects that you think are caused by this medicine, tell your doctor. Call your doctor for medical advice about side effects. You may report side effects to FDA at 3-133-FDA-9901 © [address] is for End User's use only and may not be sold, redistributed or otherwise used for commercial purposes. The above information is an  only. It is not intended as medical advice for individual conditions or treatments. Talk to your doctor, nurse or pharmacist before following any medical regimen to see if it is safe and effective for you. Clopidogrel (By mouth) Clopidogrel (mnbs-XHL-yn-grel) Helps prevent stroke, heart attack, and other heart problems. This medicine is a platelet inhibitor. Brand Name(s):Plavix There may be other brand names for this medicine. When This Medicine Should Not Be Used: This medicine is not right for everyone. Do not use it if you had an allergic reaction to clopidogrel, or have current bleeding problems, such as a bleeding stomach ulcer. How to Use This Medicine:  
Tablet · Your doctor will tell you how much medicine to use. Do not use more than directed. · This medicine should come with a Medication Guide. Ask your pharmacist for a copy if you do not have one. · Missed dose: Take a dose as soon as you remember. If it is almost time for your next dose, wait until then and take a regular dose. Do not take extra medicine to make up for a missed dose. · Store the medicine in a closed container at room temperature, away from heat, moisture, and direct light. Drugs and Foods to Avoid: Ask your doctor or pharmacist before using any other medicine, including over-the-counter medicines, vitamins, and herbal products. · Some medicines can affect how clopidogrel works. Tell your doctor if you are using any of the following: ¨ Warfarin ¨ An NSAID medicine, such as celecoxib, diclofenac, ibuprofen, or naproxen ¨ Medicine to treat depression ¨ Stomach medicine, such as esomeprazole or omeprazole Warnings While Using This Medicine: · Tell your doctor if you are pregnant or breastfeeding, had a recent stroke, or have a history of bleeding problems. · This medicine can cause you to bleed and bruise more easily. Take precautions to avoid injury. Brush and floss your teeth gently, do not play rough sports, and be careful with sharp objects. Severe bleeding can be life-threatening. · This medicine may cause a rare but serious blood clotting condition called thrombotic thrombocytopenic purpura. · Make sure any doctor or dentist who treats you knows that you are using this medicine. Tell your doctor if you plan to have surgery or a dental procedure. · Do not stop using this medicine suddenly. Your doctor will need to slowly decrease your dose before you stop it completely. · Your doctor will check your progress and the effects of this medicine at regular visits. Keep all appointments. · Keep all medicine out of the reach of children. Never share your medicine with anyone. Possible Side Effects While Using This Medicine:  
Call your doctor right away if you notice any of these side effects: · Allergic reaction: Itching or hives, swelling in your face or hands, swelling or tingling in your mouth or throat, chest tightness, trouble breathing · Bloody or black, tarry stools · Nosebleeds · Pinpoint red or purple spots on your skin or in your mouth · Problems with vision, speech, or walking · Red or dark brown urine · Seizures · Severe stomach pain · Trouble breathing, tiredness, fast heartbeat, yellow skin or eyes · Unusual bleeding, bruising, or weakness · Vomiting of blood or vomit that looks like coffee grounds If you notice other side effects that you think are caused by this medicine, tell your doctor. Call your doctor for medical advice about side effects. You may report side effects to FDA at 1-865-FDA-4462 © [address] is for End User's use only and may not be sold, redistributed or otherwise used for commercial purposes. The above information is an  only. It is not intended as medical advice for individual conditions or treatments. Talk to your doctor, nurse or pharmacist before following any medical regimen to see if it is safe and effective for you. Amlodipine (By mouth) Amlodipine (si-NYG-bd-peen) Treats high blood pressure and angina (chest pain). This medicine is a calcium channel blocker. Brand Name(s):Norvasc There may be other brand names for this medicine. When This Medicine Should Not Be Used: This medicine is not right for everyone. Do not use it if you had an allergic reaction to amlodipine. How to Use This Medicine:  
Tablet, Dissolving Tablet · Take your medicine as directed. Your dose may need to be changed several times to find what works best for you. Take this medicine at the same time each day. · Read and follow the patient instructions that come with this medicine. Talk to your doctor or pharmacist if you have any questions. · Missed dose: Take a dose as soon as you remember. If it has been more than 12 hours since you were supposed to take your dose, skip the missed dose and take your next regular dose at the regular time. · Store the medicine in a closed container at room temperature, away from heat, moisture, and direct light. Drugs and Foods to Avoid: Ask your doctor or pharmacist before using any other medicine, including over-the-counter medicines, vitamins, and herbal products. · Some medicines can affect how amlodipine works. Tell your doctor if you are also using any of the following: ¨ Clarithromycin, cyclosporine, diltiazem, itraconazole, ritonavir, sildenafil, simvastatin, tacrolimus Warnings While Using This Medicine: · Tell your doctor if you are pregnant or breastfeeding, or if you have liver disease, heart disease, coronary artery disease, or aortic stenosis. · This medicine could lower your blood pressure too much, especially when you first use it or if you are dehydrated. Stand or sit up slowly if you feel lightheaded or dizzy. · Your doctor will check your progress and the effects of this medicine at regular visits. Keep all appointments. · Do not stop using this medicine without asking your doctor, even if you feel well. This medicine will not cure high blood pressure, but it will help keep it in normal range. You may have to take blood pressure medicine for the rest of your life. · Keep all medicine out of the reach of children. Never share your medicine with anyone. Possible Side Effects While Using This Medicine:  
Call your doctor right away if you notice any of these side effects: · Allergic reaction: Itching or hives, swelling in your face or hands, swelling or tingling in your mouth or throat, chest tightness, trouble breathing · Lightheadedness, dizziness · New or worsening chest pain · Swelling in your hands, ankles, or legs · Trouble breathing, nausea, unusual sweating, fainting If you notice other side effects that you think are caused by this medicine, tell your doctor. Call your doctor for medical advice about side effects. You may report side effects to FDA at 0-206-HWL-5622 © [address] is for End User's use only and may not be sold, redistributed or otherwise used for commercial purposes. The above information is an  only. It is not intended as medical advice for individual conditions or treatments. Talk to your doctor, nurse or pharmacist before following any medical regimen to see if it is safe and effective for you. Atorvastatin (By mouth) Atorvastatin (a-tor-va-STAT-in) Treats high cholesterol and triglyceride levels. Reduces the risk of angina, stroke, heart attack, or certain heart and blood vessel problems. This medicine is a statin. Brand Name(s):Lipitor There may be other brand names for this medicine. When This Medicine Should Not Be Used: This medicine is not right for everyone. Do not use it if you had an allergic reaction to atorvastatin, if you have active liver disease, or if you are pregnant or breastfeeding. How to Use This Medicine:  
Tablet · Take your medicine as directed. Your dose may need to be changed several times to find what works best for you. · Take this medicine at the same time each day. · Swallow the tablet whole. Do not break, crush, or chew it. · Missed dose: Take a dose as soon as you remember. If it is less than 12 hours until your next dose, skip the missed dose and take the next dose at the regular time. Do not take 2 doses of this medicine within 12 hours. · Read and follow the patient instructions that come with this medicine. Talk to your doctor or pharmacist if you have any questions. · Store the medicine in a closed container at room temperature, away from heat, moisture, and direct light. Drugs and Foods to Avoid: Ask your doctor or pharmacist before using any other medicine, including over-the-counter medicines, vitamins, and herbal products. · Some medicines can affect how atorvastatin works. Tell your doctor if you also use birth control pills, boceprevir, cimetidine, colchicine, cyclosporine, digoxin, niacin, rifampin, spironolactone, telaprevir, medicine to treat an infection, or medicine to treat HIV/AIDS. Warnings While Using This Medicine: · It is not safe to take this medicine during pregnancy. It could harm an unborn baby. Tell your doctor right away if you become pregnant. · Tell your doctor if you have kidney disease, diabetes, muscle pain or weakness, thyroid problems, have recently had a stroke or TIA (transient ischemic attack), or have a history of liver disease. Tell your doctor if you drink grapefruit juice or drink alcohol regularly. · This medicine can cause muscle problems, which can lead to kidney problems. · Tell any doctor or dentist who treats you that you use this medicine. You may need to stop using it if you have surgery, have an injury, or develop serious health problems. · Your doctor will do lab tests at regular visits to check on the effects of this medicine. Keep all appointments. · Keep all medicine out of the reach of children. Never share your medicine with anyone. Possible Side Effects While Using This Medicine:  
Call your doctor right away if you notice any of these side effects: · Allergic reaction: Itching or hives, swelling in your face or hands, swelling or tingling in your mouth or throat, chest tightness, trouble breathing · Blistering, peeling, red skin rash · Change in how much or how often you urinate · Dark urine or pale stools, nausea, vomiting, loss of appetite, stomach pain, yellow skin or eyes · Fever · Muscle pain, tenderness, or weakness · Unusual tiredness If you notice these less serious side effects, talk with your doctor: · Diarrhea · Joint pain If you notice other side effects that you think are caused by this medicine, tell your doctor. Call your doctor for medical advice about side effects. You may report side effects to FDA at 2-126-NLM-8422 © [address] is for End User's use only and may not be sold, redistributed or otherwise used for commercial purposes. The above information is an  only. It is not intended as medical advice for individual conditions or treatments. Talk to your doctor, nurse or pharmacist before following any medical regimen to see if it is safe and effective for you.

## 2017-03-24 ENCOUNTER — APPOINTMENT (OUTPATIENT)
Dept: MRI IMAGING | Age: 57
DRG: 042 | End: 2017-03-24
Attending: HOSPITALIST
Payer: COMMERCIAL

## 2017-03-24 ENCOUNTER — APPOINTMENT (OUTPATIENT)
Dept: CT IMAGING | Age: 57
DRG: 042 | End: 2017-03-24
Attending: FAMILY MEDICINE
Payer: COMMERCIAL

## 2017-03-24 LAB
ANION GAP BLD CALC-SCNC: 8 MMOL/L (ref 3–18)
ATRIAL RATE: 88 BPM
BASOPHILS # BLD AUTO: 0 K/UL (ref 0–0.1)
BASOPHILS # BLD: 0 % (ref 0–2)
BUN SERPL-MCNC: 14 MG/DL (ref 7–18)
BUN/CREAT SERPL: 10 (ref 12–20)
CALCIUM SERPL-MCNC: 6.9 MG/DL (ref 8.5–10.1)
CALCULATED P AXIS, ECG09: 49 DEGREES
CALCULATED R AXIS, ECG10: 51 DEGREES
CALCULATED T AXIS, ECG11: 45 DEGREES
CHLORIDE SERPL-SCNC: 109 MMOL/L (ref 100–108)
CHOLEST SERPL-MCNC: 152 MG/DL
CO2 SERPL-SCNC: 26 MMOL/L (ref 21–32)
CREAT SERPL-MCNC: 1.37 MG/DL (ref 0.6–1.3)
DIAGNOSIS, 93000: NORMAL
DIFFERENTIAL METHOD BLD: ABNORMAL
EOSINOPHIL # BLD: 0.4 K/UL (ref 0–0.4)
EOSINOPHIL NFR BLD: 8 % (ref 0–5)
ERYTHROCYTE [DISTWIDTH] IN BLOOD BY AUTOMATED COUNT: 13.5 % (ref 11.6–14.5)
GLUCOSE BLD STRIP.AUTO-MCNC: 104 MG/DL (ref 70–110)
GLUCOSE BLD STRIP.AUTO-MCNC: 88 MG/DL (ref 70–110)
GLUCOSE SERPL-MCNC: 96 MG/DL (ref 74–99)
HBA1C MFR BLD: 4.6 % (ref 4.2–5.6)
HCT VFR BLD AUTO: 34.3 % (ref 36–48)
HDLC SERPL-MCNC: 31 MG/DL (ref 40–60)
HDLC SERPL: 4.9 {RATIO} (ref 0–5)
HGB BLD-MCNC: 11.4 G/DL (ref 13–16)
LDLC SERPL CALC-MCNC: 87 MG/DL (ref 0–100)
LIPID PROFILE,FLP: ABNORMAL
LYMPHOCYTES # BLD AUTO: 49 % (ref 21–52)
LYMPHOCYTES # BLD: 2.7 K/UL (ref 0.9–3.6)
MCH RBC QN AUTO: 28.8 PG (ref 24–34)
MCHC RBC AUTO-ENTMCNC: 33.2 G/DL (ref 31–37)
MCV RBC AUTO: 86.6 FL (ref 74–97)
MONOCYTES # BLD: 0.4 K/UL (ref 0.05–1.2)
MONOCYTES NFR BLD AUTO: 8 % (ref 3–10)
NEUTS SEG # BLD: 1.9 K/UL (ref 1.8–8)
NEUTS SEG NFR BLD AUTO: 35 % (ref 40–73)
P-R INTERVAL, ECG05: 140 MS
PLATELET # BLD AUTO: 196 K/UL (ref 135–420)
PMV BLD AUTO: 13.2 FL (ref 9.2–11.8)
POTASSIUM SERPL-SCNC: 3.7 MMOL/L (ref 3.5–5.5)
Q-T INTERVAL, ECG07: 380 MS
QRS DURATION, ECG06: 96 MS
QTC CALCULATION (BEZET), ECG08: 459 MS
RBC # BLD AUTO: 3.96 M/UL (ref 4.7–5.5)
SODIUM SERPL-SCNC: 143 MMOL/L (ref 136–145)
TRIGL SERPL-MCNC: 170 MG/DL (ref ?–150)
VENTRICULAR RATE, ECG03: 88 BPM
VLDLC SERPL CALC-MCNC: 34 MG/DL
WBC # BLD AUTO: 5.5 K/UL (ref 4.6–13.2)

## 2017-03-24 PROCEDURE — 80061 LIPID PANEL: CPT | Performed by: FAMILY MEDICINE

## 2017-03-24 PROCEDURE — 65660000000 HC RM CCU STEPDOWN

## 2017-03-24 PROCEDURE — 74011000250 HC RX REV CODE- 250: Performed by: HOSPITALIST

## 2017-03-24 PROCEDURE — 70496 CT ANGIOGRAPHY HEAD: CPT

## 2017-03-24 PROCEDURE — 83036 HEMOGLOBIN GLYCOSYLATED A1C: CPT | Performed by: FAMILY MEDICINE

## 2017-03-24 PROCEDURE — 93306 TTE W/DOPPLER COMPLETE: CPT

## 2017-03-24 PROCEDURE — 74011250636 HC RX REV CODE- 250/636: Performed by: FAMILY MEDICINE

## 2017-03-24 PROCEDURE — 97161 PT EVAL LOW COMPLEX 20 MIN: CPT

## 2017-03-24 PROCEDURE — 92610 EVALUATE SWALLOWING FUNCTION: CPT

## 2017-03-24 PROCEDURE — 74011636320 HC RX REV CODE- 636/320: Performed by: HOSPITALIST

## 2017-03-24 PROCEDURE — 36415 COLL VENOUS BLD VENIPUNCTURE: CPT | Performed by: FAMILY MEDICINE

## 2017-03-24 PROCEDURE — 97165 OT EVAL LOW COMPLEX 30 MIN: CPT

## 2017-03-24 PROCEDURE — 70551 MRI BRAIN STEM W/O DYE: CPT

## 2017-03-24 PROCEDURE — 80048 BASIC METABOLIC PNL TOTAL CA: CPT | Performed by: FAMILY MEDICINE

## 2017-03-24 PROCEDURE — 74011250637 HC RX REV CODE- 250/637: Performed by: FAMILY MEDICINE

## 2017-03-24 PROCEDURE — 85025 COMPLETE CBC W/AUTO DIFF WBC: CPT | Performed by: FAMILY MEDICINE

## 2017-03-24 PROCEDURE — 82962 GLUCOSE BLOOD TEST: CPT

## 2017-03-24 PROCEDURE — 97112 NEUROMUSCULAR REEDUCATION: CPT

## 2017-03-24 RX ORDER — SODIUM CHLORIDE 0.9 % (FLUSH) 0.9 %
5-10 SYRINGE (ML) INJECTION EVERY 8 HOURS
Status: DISCONTINUED | OUTPATIENT
Start: 2017-03-24 | End: 2017-03-27 | Stop reason: HOSPADM

## 2017-03-24 RX ORDER — HYDRALAZINE HYDROCHLORIDE 20 MG/ML
10 INJECTION INTRAMUSCULAR; INTRAVENOUS
Status: DISCONTINUED | OUTPATIENT
Start: 2017-03-24 | End: 2017-03-27 | Stop reason: HOSPADM

## 2017-03-24 RX ORDER — SODIUM CHLORIDE 0.9 % (FLUSH) 0.9 %
5-10 SYRINGE (ML) INJECTION AS NEEDED
Status: DISCONTINUED | OUTPATIENT
Start: 2017-03-24 | End: 2017-03-27 | Stop reason: HOSPADM

## 2017-03-24 RX ORDER — SODIUM CHLORIDE 9 MG/ML
10 INJECTION INTRAMUSCULAR; INTRAVENOUS; SUBCUTANEOUS
Status: COMPLETED | OUTPATIENT
Start: 2017-03-24 | End: 2017-03-24

## 2017-03-24 RX ADMIN — ENOXAPARIN SODIUM 40 MG: 40 INJECTION SUBCUTANEOUS at 20:56

## 2017-03-24 RX ADMIN — ENOXAPARIN SODIUM 40 MG: 40 INJECTION SUBCUTANEOUS at 01:13

## 2017-03-24 RX ADMIN — ASPIRIN 81 MG CHEWABLE TABLET 81 MG: 81 TABLET CHEWABLE at 10:39

## 2017-03-24 RX ADMIN — SODIUM CHLORIDE 10 ML: 9 INJECTION INTRAMUSCULAR; INTRAVENOUS; SUBCUTANEOUS at 09:00

## 2017-03-24 RX ADMIN — ATORVASTATIN CALCIUM 40 MG: 40 TABLET, FILM COATED ORAL at 21:01

## 2017-03-24 RX ADMIN — IOPAMIDOL 96 ML: 755 INJECTION, SOLUTION INTRAVENOUS at 09:41

## 2017-03-24 RX ADMIN — ATORVASTATIN CALCIUM 40 MG: 40 TABLET, FILM COATED ORAL at 01:13

## 2017-03-24 RX ADMIN — Medication 10 ML: at 21:07
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
## 2017-03-24 NOTE — PROGRESS NOTES
Complete 2D echocardiogram study was performed on patient with bubble study. Report to follow. Patient went back to room with armband still in place.
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
## 2017-03-24 NOTE — ROUTINE PROCESS
Bedside and Verbal shift change report given to [employer] (oncoming nurse) by Kanchan Bahena (offgoing nurse). Report included the following information SBAR and Kardex.
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
## 2017-03-24 NOTE — ROUTINE PROCESS
TRANSFER - OUT REPORT:    Verbal report given to Kt Meade RN(name) on Yuni Zavala  being transferred to DR. [address](unit) for routine progression of care       Report consisted of patients Situation, Background, Assessment and   Recommendations(SBAR). Information from the following report(s) SBAR, Kardex, ED Summary, Procedure Summary, Intake/Output, MAR, Accordion, Recent Results, Med Rec Status, Cardiac Rhythm NSR and Alarm Parameters  was reviewed with the receiving nurse. Lines:   Peripheral IV 03/23/17 Left Wrist (Active)   Site Assessment Clean, dry, & intact 3/23/2017  4:24 PM   Phlebitis Assessment 0 3/23/2017  4:24 PM   Infiltration Assessment 0 3/23/2017  4:24 PM   Dressing Status Clean, dry, & intact 3/23/2017  4:24 PM   Dressing Type Transparent;Tape 3/23/2017  4:24 PM   Hub Color/Line Status Flushed 3/23/2017  4:24 PM        Opportunity for questions and clarification was provided.       Patient transported with:   Monitor
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
## 2017-03-24 NOTE — ROUTINE PROCESS
Stroke Education provided to patient and the following topics were discussed    1. Patients personal risk factors for stroke are none    2. Warning signs of Stroke:        * Sudden numbness or weakness of the face, arm or leg, especially on one side of          The body            * Sudden confusion, trouble speaking or understanding        * Sudden trouble seeing in one or both eyes        * Sudden trouble walking, dizziness, loss of balance or coordination        * Sudden severe headache with no known cause      3. Importance of activation Emergency Medical Services ( 9-1-1 ) immediately if experience any warning signs of stroke. 4. Be sure and schedule a follow-up appointment with your primary care doctor or any specialists as instructed. 5. You must take medicine every day to treat your risk factors for stroke. Be sure to take your medicines exactly as your doctor tells you: no more, no less. Know what your medicines are for , what they do. Anti-thrombotics /anticoagulants can help prevent strokes. You are taking the following medicine(s)  lovenox     6. Smoking and second-hand smoke greatly increase your risk of stroke, cardiovascular disease and death. Smoking history never    7. Information provided was [facility] Stroke Education Binder    8. Documentation of teaching completed in Patient Education Activity and on Care Plan with teaching response noted?   yes

## 2017-03-24 NOTE — PROGRESS NOTES
conducted an initial consultation and Spiritual Assessment for Wilver Pal, who is a 61 y. o.,male. Patients Primary Language is: [place]. According to the patients EMR Jew Affiliation is: [place]. Assessment:  When I entered the room the patient was watching a movie on his cell phone. The TV was loud, the room was bright and warm with the sun coming from the window. There was no sign of visitors. Patient appeared to be alert and aware of his surroundings. Patient talked about what he enjoys in life, hospitalization, and shobha life. Patient loves to cook, has a strong shobha and a  who knows about patient's hospitalization. Patient also talked about his wife several times, her name is Mor Lover [phone]. The patient spiritual coping is talking to God. Patient says, \"I believe in my father in heaven and I know I will be ok. \"      The  provided the following Interventions:  Initiated a relationship of care and support. Listened empathically and provided chaplaincy education. Patient asked for continued daily prayers. Chart reviewed. The following outcomes where achieved:  Patient shared limited information about both his medical narrative and spiritual beliefs; and his beliefs will not hinder his medical treatment. I sensed patient to be curious bout his prognosis because he mentioned he is waiting on tests to know if he can go to the mall tomorrow. I sensed patient to have a sweet and warm spirit as he shared about himself with laughs and smiles. Patient processed feeling about current hospitalization. Patient feels he is getting a good care, \"I just wanted to cook for myself. \"  Patient expressed gratitude for 's visit. Plan:  Chaplains will continue to follow and will provide pastoral care as needed.     [id number]les  [phone]
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
## 2017-03-24 NOTE — ROUTINE PROCESS
Primary Nurse Dariela Pathak RN and Alexy Finch RN performed a dual skin assessment on this patient No impairment noted  Glenn score is 23

## 2017-03-24 NOTE — ROUTINE PROCESS
TRANSFER - IN REPORT:    Verbal report received from uyen(name) on Sandra Castorena  being received from er(unit) for routine progression of care      Report consisted of patients Situation, Background, Assessment and   Recommendations(SBAR). Information from the following report(s) SBAR and Kardex was reviewed with the receiving nurse. Opportunity for questions and clarification was provided. Assessment completed upon patients arrival to unit and care assumed.

## 2017-03-25 LAB
ANION GAP BLD CALC-SCNC: 8 MMOL/L (ref 3–18)
BASOPHILS # BLD AUTO: 0 K/UL (ref 0–0.06)
BASOPHILS # BLD: 0 % (ref 0–2)
BUN SERPL-MCNC: 14 MG/DL (ref 7–18)
BUN/CREAT SERPL: 10 (ref 12–20)
CALCIUM SERPL-MCNC: 7.1 MG/DL (ref 8.5–10.1)
CHLORIDE SERPL-SCNC: 105 MMOL/L (ref 100–108)
CHOLEST SERPL-MCNC: 149 MG/DL
CO2 SERPL-SCNC: 27 MMOL/L (ref 21–32)
CREAT SERPL-MCNC: 1.47 MG/DL (ref 0.6–1.3)
DIFFERENTIAL METHOD BLD: ABNORMAL
EOSINOPHIL # BLD: 0.3 K/UL (ref 0–0.4)
EOSINOPHIL NFR BLD: 6 % (ref 0–5)
ERYTHROCYTE [DISTWIDTH] IN BLOOD BY AUTOMATED COUNT: 13.2 % (ref 11.6–14.5)
EST. AVERAGE GLUCOSE BLD GHB EST-MCNC: NORMAL MG/DL
GLUCOSE SERPL-MCNC: 91 MG/DL (ref 74–99)
HBA1C MFR BLD: 4.5 % (ref 4.2–5.6)
HCT VFR BLD AUTO: 36 % (ref 36–48)
HDLC SERPL-MCNC: 38 MG/DL (ref 40–60)
HDLC SERPL: 3.9 {RATIO} (ref 0–5)
HGB BLD-MCNC: 12.1 G/DL (ref 13–16)
LDLC SERPL CALC-MCNC: 88.4 MG/DL (ref 0–100)
LIPID PROFILE,FLP: ABNORMAL
LYMPHOCYTES # BLD AUTO: 48 % (ref 21–52)
LYMPHOCYTES # BLD: 2.5 K/UL (ref 0.9–3.6)
MAGNESIUM SERPL-MCNC: 1.9 MG/DL (ref 1.8–2.4)
MCH RBC QN AUTO: 29.1 PG (ref 24–34)
MCHC RBC AUTO-ENTMCNC: 33.6 G/DL (ref 31–37)
MCV RBC AUTO: 86.5 FL (ref 74–97)
MONOCYTES # BLD: 0.4 K/UL (ref 0.05–1.2)
MONOCYTES NFR BLD AUTO: 7 % (ref 3–10)
NEUTS SEG # BLD: 2.1 K/UL (ref 1.8–8)
NEUTS SEG NFR BLD AUTO: 39 % (ref 40–73)
PLATELET # BLD AUTO: 188 K/UL (ref 135–420)
PMV BLD AUTO: 13 FL (ref 9.2–11.8)
POTASSIUM SERPL-SCNC: 3.9 MMOL/L (ref 3.5–5.5)
RBC # BLD AUTO: 4.16 M/UL (ref 4.7–5.5)
SODIUM SERPL-SCNC: 140 MMOL/L (ref 136–145)
TRIGL SERPL-MCNC: 113 MG/DL (ref ?–150)
VLDLC SERPL CALC-MCNC: 22.6 MG/DL
WBC # BLD AUTO: 5.3 K/UL (ref 4.6–13.2)

## 2017-03-25 PROCEDURE — 65660000000 HC RM CCU STEPDOWN

## 2017-03-25 PROCEDURE — 80061 LIPID PANEL: CPT | Performed by: FAMILY MEDICINE

## 2017-03-25 PROCEDURE — 83036 HEMOGLOBIN GLYCOSYLATED A1C: CPT | Performed by: FAMILY MEDICINE

## 2017-03-25 PROCEDURE — 74011250637 HC RX REV CODE- 250/637: Performed by: FAMILY MEDICINE

## 2017-03-25 PROCEDURE — 36415 COLL VENOUS BLD VENIPUNCTURE: CPT | Performed by: FAMILY MEDICINE

## 2017-03-25 PROCEDURE — 80048 BASIC METABOLIC PNL TOTAL CA: CPT | Performed by: FAMILY MEDICINE

## 2017-03-25 PROCEDURE — 97530 THERAPEUTIC ACTIVITIES: CPT

## 2017-03-25 PROCEDURE — 85025 COMPLETE CBC W/AUTO DIFF WBC: CPT | Performed by: FAMILY MEDICINE

## 2017-03-25 PROCEDURE — 74011250636 HC RX REV CODE- 250/636: Performed by: FAMILY MEDICINE

## 2017-03-25 PROCEDURE — 83735 ASSAY OF MAGNESIUM: CPT | Performed by: INTERNAL MEDICINE

## 2017-03-25 RX ORDER — METOPROLOL TARTRATE 25 MG/1
12.5 TABLET, FILM COATED ORAL EVERY 12 HOURS
Status: DISCONTINUED | OUTPATIENT
Start: 2017-03-25 | End: 2017-03-27 | Stop reason: HOSPADM

## 2017-03-25 RX ADMIN — ENOXAPARIN SODIUM 40 MG: 40 INJECTION SUBCUTANEOUS at 21:38

## 2017-03-25 RX ADMIN — Medication 10 ML: at 05:53

## 2017-03-25 RX ADMIN — METOPROLOL TARTRATE 12.5 MG: 25 TABLET ORAL at 17:54

## 2017-03-25 RX ADMIN — ASPIRIN 81 MG CHEWABLE TABLET 81 MG: 81 TABLET CHEWABLE at 09:12

## 2017-03-25 RX ADMIN — ATORVASTATIN CALCIUM 40 MG: 40 TABLET, FILM COATED ORAL at 21:36

## 2017-03-25 RX ADMIN — Medication 10 ML: at 21:39
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
## 2017-03-25 NOTE — PROGRESS NOTES
Re:  Angela Zamorano up visit     3/25/2017 11:35 AM    SSN: [id number]    Subjective:   Loly Love is seen in follow up, in bed watching TV.       Medications:    Current Facility-Administered Medications   Medication Dose Route Frequency Provider Last Rate Last Dose    sodium chloride (NS) flush 5-10 mL  5-10 mL IntraVENous Q8H Philipp Ha MD   10 mL at 03/25/17 0553    sodium chloride (NS) flush 5-10 mL  5-10 mL IntraVENous PRN Philipp Ha MD        hydrALAZINE (APRESOLINE) 20 mg/mL injection 10 mg  10 mg IntraVENous Q4H PRN Philipp Ha MD        acetaminophen (TYLENOL) tablet 650 mg  650 mg Oral Q4H PRN Ml Nunez MD        enoxaparin (LOVENOX) injection 40 mg  40 mg SubCUTAneous Q24H Allegra Dinora Samuel MD   40 mg at 03/24/17 2056    aspirin chewable tablet 81 mg  81 mg Oral DAILY Ml Nunez MD   81 mg at 03/25/17 0912    atorvastatin (LIPITOR) tablet 40 mg  40 mg Oral QHS Ml Nunez MD   40 mg at 03/24/17 2101       Vital signs:    Visit Vitals    BP (!) 149/97 (BP 1 Location: Left arm, BP Patient Position: At rest)    Pulse 71    Temp 97.8 °F (36.6 °C)    Resp 20    Ht 6' (1.829 m)    Wt 95.5 kg (210 lb 8 oz)    SpO2 99%    BMI 28.55 kg/m2       Review of Systems:   As above otherwise 11 point review of systems negative including;   Constitutional no fever or chills  Skin denies rash or itching  HEENT  Denies tinnitus, hearing lose  Eyes denies diplopia vision lose  Respiratory denies sortness of breath  Cardiovascular denies chest pain, dyspnea on exertion  Gastrointestinal denies nausea, vomiting, diarrhea, constipation  Genitourinary denies incontinence  Musculoskeletal denies joint pain or swelling  Endocrine denies weight change  Hematology denies easy bruising or bleeding   Neurological as above in HPI      Patient Active Problem List   Diagnosis Code    Cough R05    Corn of foot L84    Hypomagnesemia E83.42    TIA (transient ischemic attack) G45.9    Hypertension I10    Pulmonary nodule R91.1    Head injury S09.90XA    Depression F32.9    SAH [facility]arachnoid hemorrhage) ([facility]) I60.9    HLD (hyperlipidemia) E78.5    Weakness due to cerebrovascular accident (CVA) ([facility]) I63.9, R53.1         Objective: The patient is awake, alert, and oriented x 4. Fund of knowledge is adequate. Speech is fluent and memory is intact. Cranial Nerves: II  Visual fields are full to confrontation. III, IV, VI  Extraocular movements are intact. There is no nystagmus. V  Facial sensation is intact to pinprick. VII  Face is symmetrical.  VIII - Hearing is present. IX, X, XII  Palate is symmetrical.   XI - Shoulder shrugging and head turning intact  Motor: The patient moves all four limbs fairly well and symmetrically. Tone is normal. Reflexes are 2+ and symmetrical. Plantars are down going. Gait is normal.    Final result (Exam End: 3/24/2017  4:55 PM) Open    Study Result   MR Brain Without Contrast     CPT CODE: 74867     HISTORY: cva. Right-sided numbness     COMPARISON: CT head 5/23/2017. No prior brain MRI.     TECHNIQUE: Brain scanned with axial and sagittal T1W scans, axial T2W scans,  GRE, axial FLAIR, axial diffusion weighted images.     FINDINGS:      Diffusion sequence shows scattered small hyperintense and restricted diffusion  signal foci within the left cerebral hemisphere. Multiple vascular distributions  raises possibility of an embolic process. There are 2 small infarcts in the left  occipital pole. Additional infarcts in the left caudate head, left caudate body,  adjacent corona radiata, and left frontal white matter. A small cluster at the  left frontoparietal vertex on DWI image 25. No associated hemorrhage. No right  hemisphere, brainstem, or cerebellar infarct. Grossly patent flow in the basilar  artery and both internal carotid arteries. No abnormal extra-axial fluid  collections over the convexities. Normal brain volumes.  No hydrocephalus. There is a small T2 hyperintense defect in the region of the genu of the right  internal capsule that appears to be a tiny chronic lacunar infarct. Some T2  signal along the right caudate body on axial T2 image 17 that could be related  to chronic infarct, but not clearly seen on other sequences to confirm. No mass  lesions. Some mild mucosal thickening and layering fluid in the right sphenoid sinus. Other sinuses are clear.     IMPRESSION  IMPRESSION:     1. Multiple acute/early subacute infarcts in the left cerebrum, involves  multiple vascular distributions suggesting an embolic process. -Infarcts in the left deep brain, left frontal white matter, and at the left  frontoparietal vertex. A few tiny foci in the left occipital lobe.     2. Rounded well-defined chronic appearing defect in the genu of the right  internal capsule. Favor a chronic lacunar infarct, but some uncertainty versus  dilated perivascular space given the lack of surrounding gliosis on T2 FLAIR.     3. Small amount of right sphenoid sinus fluid. Final result (Exam End: 3/24/2017  9:41 AM) Open    Study Result   CTA NECK AND BRAIN     CPT CODE: [id number],[id number]     CLINICAL INDICATIONS: Right-sided neuro deficits. Recurrent episodes of right  arm numbness . COMPARISON: CT head without contrast 3/23/2017      TECHNIQUE: Helical CT scan of the brain and neck were performed during rapid IV  bolus contrast administration. These data were reconstructed for vascular  analysis. 3-D postprocessed images, including surface shaded displays, were  produced on a dedicated workstation, permanently archived, and interpreted.      CONTRAST: 96 ml Isovue 370 iodinated contrast IV.        CTA SOFT TISSUE ANALYSIS: The mucosal surfaces of the aerodigestive tract are  unremarkable. The salivary glands are normal. The thyroid gland is  unremarkable. Superior mediastinum unremarkable. No adenopathy seen. Lung  apices are clear.  No focal osseous pathology seen. No significant cervical  central canal stenosis seen. Facet DJD at multiple levels in the cervical spine. [ Some mucosal thickening in the right maxillary sinus. Chronic dental disease  involving bilateral maxillary molar teeth.     Small defect in the left caudate head consistent with chronic lacunar infarct. Brain attenuations are otherwise normal. No enhancing brain lesions. No mass  effect or midline shift.      CTA NECK VASCULAR ANALYSIS: There is a moderate amount of artifact from residual  contrast in the right subclavian vein. And SVC. Projects over the upper aspect  of the mediastinum affecting images of the aortic arch, innominate artery, and  right CCA origin.     Aortic arch: Typical arch anatomy. No arch stenosis or aneurysm. Innominate artery: Patent, no stenosis. Right subclavian artery: Poorly seen secondary to the contrast in the subclavian  vein  Left subclavian artery: Patent, no stenosis.     Right carotid:  Right CCA origin: Poorly seen  Right CCA: Above the origin right CCA is patent  Right ICA origin: No stenosis. 0% diameter stenosis [place] by NASCET criteria. Right ICA: Patent to the skull base.     Left carotid:  Left CCA origin: No stenosis   Left CCA: No stenosis  Left ICA origin: No stenosis. 0% diameter stenosis LICA by NASCET criteria. Left ICA: Patent to the skull base.     Right vertebral artery origin: Poorly seen due to the contrast in the subclavian  vein and refluxing along the right side of the thoracic inlet. Right vertebral artery: Above the origin the right vertebral artery is patent to  the skull base     Left vertebral artery origin: No stenosis  Left vertebral artery: Patent. No stenosis.     CTA BRAIN VASCULAR ANALYSIS:      RIGHT ANTERIOR CIRCULATION: Patent   Right ICA through the skull base: No stenosis. Distal ICA: No stenosis. PCOMM: Patent  MCA: No stenosis.   KATE: No stenosis.     AComA: Patent     LEFT ANTERIOR CIRCULATION: Patent  Left ICA through the skull base: No stenosis. Distal ICA: No stenosis. At the posterior wall of the supraclinoid LICA there is  a 2 mm cone-shaped outpouching at the expected location of the posterior  communicating artery origin. On axial source image 362 there appears to be a  tiny vessel arising from the apex of this outpouching consistent with  infundibulum. The origin of the A1 segment left KATE is relatively lateral, appears to be a  developmental variant. The distal supraclinoid ICA shows mild to moderate focal  narrowing prior to the KATE origin. PCOMM: Tiny, appears patent  MCA: No stenosis. KATE: No stenosis.     POSTERIOR CIRCULATION:   RVA: Patent. No stenosis. LVA: Patent. No stenosis. Basilar: Patent. No stenosis. Major cerebellar branches: Normal  Right PCA:   P1 segment: Patent  Remainder right PCA: Patent. No stenosis. Left PCA:  P1 segment: Patent  Remainder left PCA: Patent. No stenosis.       IMPRESSION  IMPRESSION:      Neck Arteries:  No significant stenosis seen at the cervical ICA origins are 0% NASCET ICA  narrowing bilaterally. Significant artifact from residual contrast in the right subclavian vein and SVC  limits details at the right CCA origin and right vertebral artery origin. Otherwise, the vertebral arteries are normal.     Brain Arteries:     No large vessel occlusion. Mild to moderate narrowing in the distal supraclinoid left ICA otherwise normal  left anterior circulation caliber. 2 mm outpouching at the left P-comm origin is consistent with infundibulum with  tiny vessel arising from the apex.     Nonvascular findings:  Bilateral chronic appearing dental disease in the maxillary molar teeth. Cervical spine degenerative changes.               I have reviewed the above imagines myself.     CBC:   Lab Results   Component Value Date/Time    WBC 5.3 03/25/2017 02:55 AM    RBC 4.16 03/25/2017 02:55 AM    HGB 12.1 03/25/2017 02:55 AM    HCT 36.0 03/25/2017 02:55 AM    PLATELET 231 03/25/2017 02:55 AM     BMP:   Lab Results   Component Value Date/Time    Glucose 91 03/25/2017 02:55 AM    Sodium 140 03/25/2017 02:55 AM    Potassium 3.9 03/25/2017 02:55 AM    Chloride 105 03/25/2017 02:55 AM    CO2 27 03/25/2017 02:55 AM    BUN 14 03/25/2017 02:55 AM    Creatinine 1.47 03/25/2017 02:55 AM    Calcium 7.1 03/25/2017 02:55 AM     CMP:   Lab Results   Component Value Date/Time    Glucose 91 03/25/2017 02:55 AM    Sodium 140 03/25/2017 02:55 AM    Potassium 3.9 03/25/2017 02:55 AM    Chloride 105 03/25/2017 02:55 AM    CO2 27 03/25/2017 02:55 AM    BUN 14 03/25/2017 02:55 AM    Creatinine 1.47 03/25/2017 02:55 AM    Calcium 7.1 03/25/2017 02:55 AM    Anion gap 8 03/25/2017 02:55 AM    BUN/Creatinine ratio 10 03/25/2017 02:55 AM    Alk. phosphatase 90 08/21/2016 02:28 PM    Protein, total 6.8 08/21/2016 02:28 PM    Albumin 3.1 08/21/2016 02:28 PM    Globulin 3.7 08/21/2016 02:28 PM    A-G Ratio 0.8 08/21/2016 02:28 PM     Coagulation:   Lab Results   Component Value Date/Time    Prothrombin time 12.5 03/23/2017 04:15 PM    INR 1.0 03/23/2017 04:15 PM    aPTT 28.1 03/23/2017 04:15 PM     Cardiac markers:   Lab Results   Component Value Date/Time     03/23/2017 04:15 PM    CK-MB Index 0.7 03/23/2017 04:15 PM       Assessment:  New multiple vascualr distribution strokes in this man who has risk factors including mild hypertension. He's in NSR now but has had some PVCs. Was this a cardiac embolic event? Does he have intermittent A-Fib? Plan:  Needs both a ADRI and implantable loop recorder if the telemetry is negative for atrial fibrillation. Will follow. Sincerely,        Alecia Peñaloza.  Kiera Ryan M.D.

## 2017-03-25 NOTE — ROUTINE PROCESS
Cyrus from [facility] called to inform that Pt had 16 beats of Ventricular ectopy with a HR range of . Pt assesssed. Pt denies discomfort, denies SOB, denies lightheadedness and dizziness. Will continue to monitor. Dr Ml Negrete notified. Orders for Potassium to be drawn, which has been drawn in [facility] and orders for Mg+ to be drawn. Orders entered.
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
## 2017-03-25 NOTE — PROGRESS NOTES
Problem: Self Care Deficits Care Plan (Adult)  Goal: *Acute Goals and Plan of Care (Insert Text)  Occupational Therapy Goals  Initiated 3/24/2017 within 7 day(s). 1. Patient will perform self care routine using RUE efficiently and no c/o numbness  2. Patient will demonstrate independence with sensory stim/functional activity cycle in preparation for his efficient functional use of his RUE during his ADLs and IADLs  3. Patient will demonstrate independence with driving regulations in [facility] following a change in his physical status   Outcome: Resolved/Met Date Met:  03/25/17  OCCUPATIONAL THERAPY TREATMENT/DISCHARGE     Patient: Jess Hoyt (1 y.o. male)  Date: 3/25/2017  Diagnosis: TIA (transient ischemic attack)  TIA (transient ischemic attack)  TIA (transient ischemic attack) Weakness due to cerebrovascular accident (CVA) ([facility] [address]       Precautions:    Chart, occupational therapy assessment, plan of care, and goals were reviewed. ASSESSMENT:  Pt is up in the chair upon entry. Pt drawing pictures w/pencing on the paper w/RUE. Pt reports consistently following w/sensory stim/functional activity cycle throughout a day. Pt performed sensory stim/functional activity cycle w/Ind followed by ADl grooming task w/RUE w/Ind. Pt w/normal sensation in R hand/shd, reports tingling in R elbow w/touch, however no c/o numbness. Following education pt verbalizes understanding of driving regulations in [place] following change in physical status. Dr. Juana Randall is present at the end of the session, reviewed driving regulations w/pt. Pt verbalized understanding that he will need to be cleared for driving prior to returning to it. Pt has met all his goals. We will D/C from acute OT at this time.   Progression toward goals:  [X]          Improving appropriately and progressing toward goals  [ ]          Improving slowly and progressing toward goals  [ ]          Not making progress toward goals and plan of care will be adjusted       PLAN:  Patient will be discharged from occupational therapy at this time. Rationale for discharge:  [X] Goals Achieved  [ ] Zhou Sherman  [ ] Patient not participating in therapy  [ ] Other:  Discharge Recommendations:  None  Further Equipment Recommendations for Discharge:  N/A         G-CODES:      Self Care  Current  CH= 0%   Goal  CH= 0%   D/C  CH= 0%. The severity rating is based on the Other Levels of Assistance for ADLs and functional mobility      SUBJECTIVE:   Patient stated I thought I could go shopping to [place] today.       OBJECTIVE DATA SUMMARY:   Cognitive/Behavioral Status:  Neurologic State: Alert, Appropriate for age  Orientation Level: Oriented X4  Cognition: Follows commands, Appropriate decision making, Appropriate for age attention/concentration, Appropriate safety awareness      ADL Intervention:   Feeding  Feeding Assistance: Independent     Grooming  Grooming Assistance: Independent  Washing Face: Independent  Washing Hands: Independent  Brushing Teeth: Independent  Neuro Re-Education:    Pt reports consistently following w/sensory stim/functional activity cycle throughout a day. Pt performed sensory stim/functional activity cycle w/Ind followed by ADl grooming task w/RUE. Pain:  Pt reports 0/10 pain or discomfort prior to treatment. Pt reports 0/10 pain or discomfort post treatment. Activity Tolerance:    Good  Please refer to the flowsheet for vital signs taken during this treatment.   After treatment:   [X]  Patient left in no apparent distress sitting up in chair  [ ]  Patient left in no apparent distress in bed  [X]  Call bell left within reach  [X]  Nursing notified  [ ]  Caregiver present  [ ]  Bed alarm activated     Maximino Butts, ALBERT/L  Time Calculation: 11 mins
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
## 2017-03-26 LAB
ANION GAP BLD CALC-SCNC: 9 MMOL/L (ref 3–18)
BASOPHILS # BLD AUTO: 0 K/UL (ref 0–0.1)
BASOPHILS # BLD: 0 % (ref 0–2)
BUN SERPL-MCNC: 15 MG/DL (ref 7–18)
BUN/CREAT SERPL: 11 (ref 12–20)
CALCIUM SERPL-MCNC: 7.3 MG/DL (ref 8.5–10.1)
CHLORIDE SERPL-SCNC: 106 MMOL/L (ref 100–108)
CO2 SERPL-SCNC: 25 MMOL/L (ref 21–32)
CREAT SERPL-MCNC: 1.41 MG/DL (ref 0.6–1.3)
DIFFERENTIAL METHOD BLD: ABNORMAL
EOSINOPHIL # BLD: 0.3 K/UL (ref 0–0.4)
EOSINOPHIL NFR BLD: 5 % (ref 0–5)
ERYTHROCYTE [DISTWIDTH] IN BLOOD BY AUTOMATED COUNT: 13.2 % (ref 11.6–14.5)
GLUCOSE SERPL-MCNC: 96 MG/DL (ref 74–99)
HCT VFR BLD AUTO: 35.8 % (ref 36–48)
HGB BLD-MCNC: 12.1 G/DL (ref 13–16)
LYMPHOCYTES # BLD AUTO: 41 % (ref 21–52)
LYMPHOCYTES # BLD: 2.4 K/UL (ref 0.9–3.6)
MCH RBC QN AUTO: 28.8 PG (ref 24–34)
MCHC RBC AUTO-ENTMCNC: 33.8 G/DL (ref 31–37)
MCV RBC AUTO: 85.2 FL (ref 74–97)
MONOCYTES # BLD: 0.5 K/UL (ref 0.05–1.2)
MONOCYTES NFR BLD AUTO: 9 % (ref 3–10)
NEUTS SEG # BLD: 2.6 K/UL (ref 1.8–8)
NEUTS SEG NFR BLD AUTO: 45 % (ref 40–73)
PLATELET # BLD AUTO: 219 K/UL (ref 135–420)
PMV BLD AUTO: 12.6 FL (ref 9.2–11.8)
POTASSIUM SERPL-SCNC: 4 MMOL/L (ref 3.5–5.5)
RBC # BLD AUTO: 4.2 M/UL (ref 4.7–5.5)
SODIUM SERPL-SCNC: 140 MMOL/L (ref 136–145)
WBC # BLD AUTO: 5.7 K/UL (ref 4.6–13.2)

## 2017-03-26 PROCEDURE — 65660000000 HC RM CCU STEPDOWN

## 2017-03-26 PROCEDURE — 74011250636 HC RX REV CODE- 250/636: Performed by: FAMILY MEDICINE

## 2017-03-26 PROCEDURE — 74011250637 HC RX REV CODE- 250/637: Performed by: FAMILY MEDICINE

## 2017-03-26 PROCEDURE — 36415 COLL VENOUS BLD VENIPUNCTURE: CPT | Performed by: FAMILY MEDICINE

## 2017-03-26 PROCEDURE — 85025 COMPLETE CBC W/AUTO DIFF WBC: CPT | Performed by: FAMILY MEDICINE

## 2017-03-26 PROCEDURE — 80048 BASIC METABOLIC PNL TOTAL CA: CPT | Performed by: FAMILY MEDICINE

## 2017-03-26 RX ORDER — CLOPIDOGREL BISULFATE 75 MG/1
75 TABLET ORAL DAILY
Status: DISCONTINUED | OUTPATIENT
Start: 2017-03-26 | End: 2017-03-27 | Stop reason: HOSPADM

## 2017-03-26 RX ORDER — FAMOTIDINE 20 MG/1
20 TABLET, FILM COATED ORAL 2 TIMES DAILY
Status: DISCONTINUED | OUTPATIENT
Start: 2017-03-26 | End: 2017-03-27 | Stop reason: HOSPADM

## 2017-03-26 RX ADMIN — ATORVASTATIN CALCIUM 40 MG: 40 TABLET, FILM COATED ORAL at 22:00

## 2017-03-26 RX ADMIN — ASPIRIN 81 MG CHEWABLE TABLET 81 MG: 81 TABLET CHEWABLE at 09:06

## 2017-03-26 RX ADMIN — METOPROLOL TARTRATE 12.5 MG: 25 TABLET ORAL at 09:06

## 2017-03-26 RX ADMIN — CLOPIDOGREL BISULFATE 75 MG: 75 TABLET ORAL at 12:33

## 2017-03-26 RX ADMIN — FAMOTIDINE 20 MG: 20 TABLET ORAL at 17:13

## 2017-03-26 RX ADMIN — ENOXAPARIN SODIUM 40 MG: 40 INJECTION SUBCUTANEOUS at 21:54

## 2017-03-26 RX ADMIN — Medication 10 ML: at 09:06

## 2017-03-26 RX ADMIN — METOPROLOL TARTRATE 12.5 MG: 25 TABLET ORAL at 22:01

## 2017-03-26 RX ADMIN — Medication 10 ML: at 22:01

## 2017-03-26 NOTE — PROGRESS NOTES
Re:  Marc Card up visit     3/26/2017 11:35 AM    SSN: [id number]    Subjective:   Teja Motley is seen in follow up, in bed watching TV.       Medications:    Current Facility-Administered Medications   Medication Dose Route Frequency Provider Last Rate Last Dose    clopidogrel (PLAVIX) tablet 75 mg  75 mg Oral DAILY Rufino Polanco MD        metoprolol tartrate (LOPRESSOR) tablet 12.5 mg  12.5 mg Oral Q12H Dylan Aguirre MD   12.5 mg at 03/26/17 7036    sodium chloride (NS) flush 5-10 mL  5-10 mL IntraVENous Q8H Dylan Aguirre MD   10 mL at 03/26/17 0906    sodium chloride (NS) flush 5-10 mL  5-10 mL IntraVENous PRN Dylan Aguirre MD        hydrALAZINE (APRESOLINE) 20 mg/mL injection 10 mg  10 mg IntraVENous Q4H PRN Dylan Aguirre MD        acetaminophen (TYLENOL) tablet 650 mg  650 mg Oral Q4H PRN Jenna Hopkins MD        enoxaparin (LOVENOX) injection 40 mg  40 mg SubCUTAneous Q24H Kacy Samuel MD   40 mg at 03/25/17 2138    aspirin chewable tablet 81 mg  81 mg Oral DAILY Kacy Samuel MD   81 mg at 03/26/17 2491    atorvastatin (LIPITOR) tablet 40 mg  40 mg Oral QHS Jenna Hopkins MD   40 mg at 03/25/17 2136       Vital signs:    Visit Vitals    /87 (BP 1 Location: Left arm, BP Patient Position: Sitting)    Pulse 91    Temp 97.2 °F (36.2 °C)    Resp 18    Ht 6' (1.829 m)    Wt 95.5 kg (210 lb 8 oz)    SpO2 99%    BMI 28.55 kg/m2       Review of Systems:   As above otherwise 11 point review of systems negative including;   Constitutional no fever or chills  Skin denies rash or itching  HEENT  Denies tinnitus, hearing lose  Eyes denies diplopia vision lose  Respiratory denies sortness of breath  Cardiovascular denies chest pain, dyspnea on exertion  Gastrointestinal denies nausea, vomiting, diarrhea, constipation  Genitourinary denies incontinence  Musculoskeletal denies joint pain or swelling  Endocrine denies weight change  Hematology denies easy bruising or bleeding   Neurological as above in HPI      Patient Active Problem List   Diagnosis Code    Cough R05    Corn of foot L84    Hypomagnesemia E83.42    TIA (transient ischemic attack) G45.9    Hypertension I10    Pulmonary nodule R91.1    Head injury S09.90XA    Depression F32.9    SAH [facility]arach[facility]oid hemorrhage) ([facility]) I60.9    HLD (hyperlipidemia) E78.5    Weakness due to cerebrovascular accident (CVA) ([facility]) I63.9, R53.1         Objective: The patient is awake, alert, and oriented x 4. Fund of knowledge is adequate. Speech is fluent and memory is intact. Cranial Nerves: II  Visual fields are full to confrontation. III, IV, VI  Extraocular movements are intact. There is no nystagmus. V  Facial sensation is intact to pinprick. VII  Face is symmetrical.  VIII - Hearing is present. IX, X, XII  Palate is symmetrical.   XI - Shoulder shrugging and head turning intact  Motor: The patient moves all four limbs fairly well and symmetrically. Tone is normal. Reflexes are 2+ and symmetrical. Plantars are down going.  Gait is normal.    CBC:   Lab Results   Component Value Date/Time    WBC 5.7 03/26/2017 01:50 AM    RBC 4.20 03/26/2017 01:50 AM    HGB 12.1 03/26/2017 01:50 AM    HCT 35.8 03/26/2017 01:50 AM    PLATELET 433 42/47/0296 01:50 AM     BMP:   Lab Results   Component Value Date/Time    Glucose 96 03/26/2017 01:50 AM    Sodium 140 03/26/2017 01:50 AM    Potassium 4.0 03/26/2017 01:50 AM    Chloride 106 03/26/2017 01:50 AM    CO2 25 03/26/2017 01:50 AM    BUN 15 03/26/2017 01:50 AM    Creatinine 1.41 03/26/2017 01:50 AM    Calcium 7.3 03/26/2017 01:50 AM     CMP:   Lab Results   Component Value Date/Time    Glucose 96 03/26/2017 01:50 AM    Sodium 140 03/26/2017 01:50 AM    Potassium 4.0 03/26/2017 01:50 AM    Chloride 106 03/26/2017 01:50 AM    CO2 25 03/26/2017 01:50 AM    BUN 15 03/26/2017 01:50 AM    Creatinine 1.41 03/26/2017 01:50 AM    Calcium 7.3 03/26/2017 01:50 AM Anion gap 9 03/26/2017 01:50 AM    BUN/Creatinine ratio 11 03/26/2017 01:50 AM    Alk. phosphatase 90 08/21/2016 02:28 PM    Protein, total 6.8 08/21/2016 02:28 PM    Albumin 3.1 08/21/2016 02:28 PM    Globulin 3.7 08/21/2016 02:28 PM    A-G Ratio 0.8 08/21/2016 02:28 PM     Coagulation:   Lab Results   Component Value Date/Time    Prothrombin time 12.5 03/23/2017 04:15 PM    INR 1.0 03/23/2017 04:15 PM    aPTT 28.1 03/23/2017 04:15 PM     Cardiac markers:   Lab Results   Component Value Date/Time     03/23/2017 04:15 PM    CK-MB Index 0.7 03/23/2017 04:15 PM       Assessment:  New multiple vascualr distribution strokes in this man who has risk factors including mild hypertension. He's in NSR now but has had some PVCs. Was this a cardiac embolic event? Does he have intermittent A-Fib? Plan:  Needs both a ADRI and implantable loop recorder as the telemetry is negative so far for atrial fibrillation. Will follow. Sincerely,        Dahiana Morse.  Yris Bellamy M.D.
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
## 2017-03-27 ENCOUNTER — APPOINTMENT (OUTPATIENT)
Dept: NON INVASIVE DIAGNOSTICS | Age: 57
DRG: 042 | End: 2017-03-27
Payer: COMMERCIAL

## 2017-03-27 ENCOUNTER — ANESTHESIA EVENT (OUTPATIENT)
Dept: NON INVASIVE DIAGNOSTICS | Age: 57
DRG: 042 | End: 2017-03-27
Payer: COMMERCIAL

## 2017-03-27 ENCOUNTER — ANESTHESIA (OUTPATIENT)
Dept: NON INVASIVE DIAGNOSTICS | Age: 57
DRG: 042 | End: 2017-03-27
Payer: COMMERCIAL

## 2017-03-27 VITALS
RESPIRATION RATE: 20 BRPM | SYSTOLIC BLOOD PRESSURE: 174 MMHG | BODY MASS INDEX: 28.51 KG/M2 | DIASTOLIC BLOOD PRESSURE: 107 MMHG | WEIGHT: 210.5 LBS | HEIGHT: 72 IN | HEART RATE: 97 BPM | OXYGEN SATURATION: 100 % | TEMPERATURE: 97.6 F

## 2017-03-27 LAB
ANION GAP BLD CALC-SCNC: 8 MMOL/L (ref 3–18)
BASOPHILS # BLD AUTO: 0 K/UL (ref 0–0.1)
BASOPHILS # BLD: 0 % (ref 0–2)
BUN SERPL-MCNC: 11 MG/DL (ref 7–18)
BUN/CREAT SERPL: 8 (ref 12–20)
CALCIUM SERPL-MCNC: 7.2 MG/DL (ref 8.5–10.1)
CHLORIDE SERPL-SCNC: 106 MMOL/L (ref 100–108)
CO2 SERPL-SCNC: 26 MMOL/L (ref 21–32)
CREAT SERPL-MCNC: 1.45 MG/DL (ref 0.6–1.3)
DIFFERENTIAL METHOD BLD: ABNORMAL
EOSINOPHIL # BLD: 0.3 K/UL (ref 0–0.4)
EOSINOPHIL NFR BLD: 5 % (ref 0–5)
ERYTHROCYTE [DISTWIDTH] IN BLOOD BY AUTOMATED COUNT: 13.1 % (ref 11.6–14.5)
GLUCOSE SERPL-MCNC: 94 MG/DL (ref 74–99)
HCT VFR BLD AUTO: 36 % (ref 36–48)
HGB BLD-MCNC: 12.2 G/DL (ref 13–16)
LYMPHOCYTES # BLD AUTO: 43 % (ref 21–52)
LYMPHOCYTES # BLD: 2.6 K/UL (ref 0.9–3.6)
MCH RBC QN AUTO: 29.1 PG (ref 24–34)
MCHC RBC AUTO-ENTMCNC: 33.9 G/DL (ref 31–37)
MCV RBC AUTO: 85.9 FL (ref 74–97)
MONOCYTES # BLD: 0.5 K/UL (ref 0.05–1.2)
MONOCYTES NFR BLD AUTO: 9 % (ref 3–10)
NEUTS SEG # BLD: 2.6 K/UL (ref 1.8–8)
NEUTS SEG NFR BLD AUTO: 43 % (ref 40–73)
PLATELET # BLD AUTO: 212 K/UL (ref 135–420)
PMV BLD AUTO: 13 FL (ref 9.2–11.8)
POTASSIUM SERPL-SCNC: 3.7 MMOL/L (ref 3.5–5.5)
RBC # BLD AUTO: 4.19 M/UL (ref 4.7–5.5)
SODIUM SERPL-SCNC: 140 MMOL/L (ref 136–145)
WBC # BLD AUTO: 6 K/UL (ref 4.6–13.2)

## 2017-03-27 PROCEDURE — 85025 COMPLETE CBC W/AUTO DIFF WBC: CPT | Performed by: FAMILY MEDICINE

## 2017-03-27 PROCEDURE — 0JH602Z INSERTION OF MONITORING DEVICE INTO CHEST SUBCUTANEOUS TISSUE AND FASCIA, OPEN APPROACH: ICD-10-PCS | Performed by: INTERNAL MEDICINE

## 2017-03-27 PROCEDURE — 93312 ECHO TRANSESOPHAGEAL: CPT

## 2017-03-27 PROCEDURE — 74011250637 HC RX REV CODE- 250/637: Performed by: FAMILY MEDICINE

## 2017-03-27 PROCEDURE — 74011250636 HC RX REV CODE- 250/636

## 2017-03-27 PROCEDURE — B24BZZ4 ULTRASONOGRAPHY OF HEART WITH AORTA, TRANSESOPHAGEAL: ICD-10-PCS | Performed by: INTERNAL MEDICINE

## 2017-03-27 PROCEDURE — 76060000031 HC ANESTHESIA FIRST 0.5 HR

## 2017-03-27 PROCEDURE — 74011000250 HC RX REV CODE- 250

## 2017-03-27 PROCEDURE — 74011250636 HC RX REV CODE- 250/636: Performed by: INTERNAL MEDICINE

## 2017-03-27 PROCEDURE — 80048 BASIC METABOLIC PNL TOTAL CA: CPT | Performed by: FAMILY MEDICINE

## 2017-03-27 PROCEDURE — 74011000250 HC RX REV CODE- 250: Performed by: INTERNAL MEDICINE

## 2017-03-27 PROCEDURE — C1764 EVENT RECORDER, CARDIAC: HCPCS

## 2017-03-27 PROCEDURE — 36415 COLL VENOUS BLD VENIPUNCTURE: CPT | Performed by: FAMILY MEDICINE

## 2017-03-27 RX ORDER — SODIUM CHLORIDE 9 MG/ML
10 INJECTION INTRAMUSCULAR; INTRAVENOUS; SUBCUTANEOUS ONCE
Status: COMPLETED | OUTPATIENT
Start: 2017-03-27 | End: 2017-03-27

## 2017-03-27 RX ORDER — ATORVASTATIN CALCIUM 40 MG/1
40 TABLET, FILM COATED ORAL
Qty: 30 TAB | Refills: 0 | Status: SHIPPED | OUTPATIENT
Start: 2017-03-27 | End: 2018-02-15 | Stop reason: SDUPTHER

## 2017-03-27 RX ORDER — GUAIFENESIN 100 MG/5ML
81 LIQUID (ML) ORAL DAILY
Qty: 30 TAB | Refills: 0 | OUTPATIENT
Start: 2017-03-27 | End: 2017-03-27

## 2017-03-27 RX ORDER — SODIUM CHLORIDE 0.9 % (FLUSH) 0.9 %
5-10 SYRINGE (ML) INJECTION AS NEEDED
Status: CANCELLED | OUTPATIENT
Start: 2017-03-27

## 2017-03-27 RX ORDER — ONDANSETRON 2 MG/ML
4 INJECTION INTRAMUSCULAR; INTRAVENOUS ONCE
Status: CANCELLED | OUTPATIENT
Start: 2017-03-27 | End: 2017-03-27

## 2017-03-27 RX ORDER — CLOPIDOGREL BISULFATE 75 MG/1
75 TABLET ORAL DAILY
Qty: 30 TAB | Refills: 0 | OUTPATIENT
Start: 2017-03-27 | End: 2017-03-27

## 2017-03-27 RX ORDER — ATORVASTATIN CALCIUM 40 MG/1
40 TABLET, FILM COATED ORAL
Qty: 30 TAB | Refills: 0 | OUTPATIENT
Start: 2017-03-27 | End: 2017-03-27

## 2017-03-27 RX ORDER — AMLODIPINE BESYLATE 5 MG/1
5 TABLET ORAL DAILY
Qty: 30 TAB | Refills: 0 | Status: SHIPPED | OUTPATIENT
Start: 2017-03-27 | End: 2018-02-15 | Stop reason: SDUPTHER

## 2017-03-27 RX ORDER — SODIUM CHLORIDE 9 MG/ML
INJECTION, SOLUTION INTRAVENOUS
Status: DISCONTINUED | OUTPATIENT
Start: 2017-03-27 | End: 2017-03-27 | Stop reason: HOSPADM

## 2017-03-27 RX ORDER — CEFAZOLIN SODIUM 2 G/50ML
2 SOLUTION INTRAVENOUS ONCE
Status: COMPLETED | OUTPATIENT
Start: 2017-03-27 | End: 2017-03-27

## 2017-03-27 RX ORDER — LIDOCAINE HYDROCHLORIDE AND EPINEPHRINE 10; 10 MG/ML; UG/ML
INJECTION, SOLUTION INFILTRATION; PERINEURAL
Status: COMPLETED
Start: 2017-03-27 | End: 2017-03-27

## 2017-03-27 RX ORDER — PROPOFOL 10 MG/ML
INJECTION, EMULSION INTRAVENOUS AS NEEDED
Status: DISCONTINUED | OUTPATIENT
Start: 2017-03-27 | End: 2017-03-27 | Stop reason: HOSPADM

## 2017-03-27 RX ORDER — OXYCODONE AND ACETAMINOPHEN 5; 325 MG/1; MG/1
1 TABLET ORAL
Status: CANCELLED | OUTPATIENT
Start: 2017-03-27

## 2017-03-27 RX ORDER — GUAIFENESIN 100 MG/5ML
81 LIQUID (ML) ORAL DAILY
Qty: 30 TAB | Refills: 0 | Status: SHIPPED | OUTPATIENT
Start: 2017-03-27 | End: 2020-11-11

## 2017-03-27 RX ORDER — METOPROLOL TARTRATE 25 MG/1
12.5 TABLET, FILM COATED ORAL EVERY 12 HOURS
Qty: 60 TAB | Refills: 0 | Status: SHIPPED | OUTPATIENT
Start: 2017-03-27 | End: 2018-02-15 | Stop reason: SDUPTHER

## 2017-03-27 RX ORDER — FAMOTIDINE 20 MG/1
20 TABLET, FILM COATED ORAL 2 TIMES DAILY
Qty: 60 TAB | Refills: 0 | Status: SHIPPED | OUTPATIENT
Start: 2017-03-27 | End: 2020-11-11

## 2017-03-27 RX ORDER — AMLODIPINE BESYLATE 5 MG/1
5 TABLET ORAL DAILY
Qty: 30 TAB | Refills: 0 | OUTPATIENT
Start: 2017-03-27 | End: 2017-03-27

## 2017-03-27 RX ORDER — CLOPIDOGREL BISULFATE 75 MG/1
75 TABLET ORAL DAILY
Qty: 30 TAB | Refills: 0 | Status: SHIPPED | OUTPATIENT
Start: 2017-03-27 | End: 2018-02-15 | Stop reason: SDUPTHER

## 2017-03-27 RX ORDER — METOPROLOL TARTRATE 25 MG/1
12.5 TABLET, FILM COATED ORAL EVERY 12 HOURS
Qty: 60 TAB | Refills: 0 | OUTPATIENT
Start: 2017-03-27 | End: 2017-03-27

## 2017-03-27 RX ORDER — FAMOTIDINE 20 MG/1
20 TABLET, FILM COATED ORAL 2 TIMES DAILY
Qty: 60 TAB | Refills: 0 | OUTPATIENT
Start: 2017-03-27 | End: 2017-03-27

## 2017-03-27 RX ADMIN — PROPOFOL 20 MG: 10 INJECTION, EMULSION INTRAVENOUS at 14:22

## 2017-03-27 RX ADMIN — CLOPIDOGREL BISULFATE 75 MG: 75 TABLET ORAL at 10:25

## 2017-03-27 RX ADMIN — CEFAZOLIN SODIUM 2 G: 2 SOLUTION INTRAVENOUS at 14:27

## 2017-03-27 RX ADMIN — METOPROLOL TARTRATE 12.5 MG: 25 TABLET ORAL at 10:25

## 2017-03-27 RX ADMIN — SODIUM CHLORIDE 10 ML: 9 INJECTION INTRAMUSCULAR; INTRAVENOUS; SUBCUTANEOUS at 14:22

## 2017-03-27 RX ADMIN — PROPOFOL 30 MG: 10 INJECTION, EMULSION INTRAVENOUS at 14:18

## 2017-03-27 RX ADMIN — PROPOFOL 50 MG: 10 INJECTION, EMULSION INTRAVENOUS at 14:19

## 2017-03-27 RX ADMIN — FAMOTIDINE 20 MG: 20 TABLET ORAL at 10:25

## 2017-03-27 RX ADMIN — SODIUM CHLORIDE: 9 INJECTION, SOLUTION INTRAVENOUS at 14:16

## 2017-03-27 RX ADMIN — PROPOFOL 50 MG: 10 INJECTION, EMULSION INTRAVENOUS at 14:16

## 2017-03-27 RX ADMIN — ASPIRIN 81 MG CHEWABLE TABLET 81 MG: 81 TABLET CHEWABLE at 10:25

## 2017-03-27 RX ADMIN — LIDOCAINE HYDROCHLORIDE AND EPINEPHRINE 20 ML: 10; 10 INJECTION, SOLUTION INFILTRATION; PERINEURAL at 15:00

## 2017-03-27 NOTE — PROGRESS NOTES
Pt arrived to cath holding bed 19 via hospital bed by transporter; pt placed on cm, chart reviewed, room/pt prepped per protocol

## 2017-03-27 NOTE — DISCHARGE INSTRUCTIONS
Patient armband removed and shredded  MyChart Activation    Thank you for requesting access to [facility]. Please follow the instructions below to securely access and download your online medical record. [facility] allows you to send messages to your doctor, view your test results, renew your prescriptions, schedule appointments, and more. How Do I Sign Up? 1. In your internet browser, go to [email or web address]  2. Click on the First Time User? Click Here link in the Sign In box. You will be redirect to the New Member Sign Up page. 3. Enter your [facility] Access Code exactly as it appears below. You will not need to use this code after youve completed the sign-up process. If you do not sign up before the expiration date, you must request a new code. [facility] Access Code: [email or web address]  Expires: 2017  4:06 PM (This is the date your [facility] access code will )    4. Enter the last four digits of your Social Security Number (xxxx) and Date of Birth (mm/dd/yyyy) as indicated and click Submit. You will be taken to the next sign-up page. 5. Create a [facility] ID. This will be your [facility] login ID and cannot be changed, so think of one that is secure and easy to remember. 6. Create a [facility] password. You can change your password at any time. 7. Enter your Password Reset Question and Answer. This can be used at a later time if you forget your password. 8. Enter your e-mail address. You will receive e-mail notification when new information is available in [address]. 9. Click Sign Up. You can now view and download portions of your medical record. 10. Click the Download Summary menu link to download a portable copy of your medical information. Additional Information    If you have questions, please visit the Frequently Asked Questions section of the [facility] website at [email or web address] Remember, [facility] is NOT to be used for urgent needs.  For medical emergencies, dial 911.        DISCHARGE SUMMARY from Nurse    The following personal items are in your possession at time of discharge:    Dental Appliances: None  Visual Aid: None     Home Medications: None  Jewelry: Earrings, Ring  Clothing: At bedside, Footwear, Pajamas, Pants, Shirt, Shorts, Socks, Undergarments  Other Valuables: Cell Phone, Other (comment) (battery charger)             PATIENT INSTRUCTIONS:    After general anesthesia or intravenous sedation, for 24 hours or while taking prescription Narcotics:  · Limit your activities  · Do not drive and operate hazardous machinery  · Do not make important personal or business decisions  · Do  not drink alcoholic beverages  · If you have not urinated within 8 hours after discharge, please contact your surgeon on call. Report the following to your surgeon:  · Excessive pain, swelling, redness or odor of or around the surgical area  · Temperature over 100.5  · Nausea and vomiting lasting longer than 4 hours or if unable to take medications  · Any signs of decreased circulation or nerve impairment to extremity: change in color, persistent  numbness, tingling, coldness or increase pain  · Any questions        What to do at Home:  Recommended activity: Activity as tolerated,     If you experience any of the following symptoms shortness of breath, chest pain, please follow up with ED or Primary MD.      *  Please give a list of your current medications to your Primary Care Provider. *  Please update this list whenever your medications are discontinued, doses are      changed, or new medications (including over-the-counter products) are added. *  Please carry medication information at all times in case of emergency situations. These are general instructions for a healthy lifestyle:    No smoking/ No tobacco products/ Avoid exposure to second hand smoke    Surgeon General's Warning:  Quitting smoking now greatly reduces serious risk to your health.     Obesity, smoking, and sedentary lifestyle greatly increases your risk for illness    A healthy diet, regular physical exercise & weight monitoring are important for maintaining a healthy lifestyle    You may be retaining fluid if you have a history of heart failure or if you experience any of the following symptoms:  Weight gain of 3 pounds or more overnight or 5 pounds in a week, increased swelling in our hands or feet or shortness of breath while lying flat in bed. Please call your doctor as soon as you notice any of these symptoms; do not wait until your next office visit. Recognize signs and symptoms of STROKE:    F-face looks uneven    A-arms unable to move or move unevenly    S-speech slurred or non-existent    T-time-call 911 as soon as signs and symptoms begin-DO NOT go       Back to bed or wait to see if you get better-TIME IS BRAIN. Warning Signs of HEART ATTACK     Call 911 if you have these symptoms:   Chest discomfort. Most heart attacks involve discomfort in the center of the chest that lasts more than a few minutes, or that goes away and comes back. It can feel like uncomfortable pressure, squeezing, fullness, or pain.  Discomfort in other areas of the upper body. Symptoms can include pain or discomfort in one or both arms, the back, neck, jaw, or stomach.  Shortness of breath with or without chest discomfort.  Other signs may include breaking out in a cold sweat, nausea, or lightheadedness. Don't wait more than five minutes to call 911 - MINUTES MATTER! Fast action can save your life. Calling 911 is almost always the fastest way to get lifesaving treatment. Emergency Medical Services staff can begin treatment when they arrive -- up to an hour sooner than if someone gets to the hospital by car. The discharge information has been reviewed with the patient. The patient verbalized understanding.     Discharge medications reviewed with the patient and appropriate educational materials and side effects teaching were provided. Taking Aspirin to Prevent Heart Attack and Stroke: Care Instructions  Your Care Instructions  Aspirin acts as a \"blood thinner. \" It prevents blood clots from forming. When taken during and after a heart attack, it can reduce your chance of dying. And it's used if you have a stent in your coronary artery. Also, aspirin helps certain people lower their risk of a heart attack or stroke. Be sure you know what dose of aspirin to take and how often to take it. Low-dose aspirin is typically 81 mg. But the dose for daily aspirin can range from 81 mg to 325 mg. Taking aspirin every day can cause bleeding. It may not be safe if you have stomach ulcers. And it may not be safe if you have high blood pressure that is not controlled. If you take aspirin pills every day, do not take ones that have other ingredients such as caffeine or sodium. Before you start to take aspirin, tell your doctor all the medicines, vitamins, herbal products, and supplements you take. Follow-up care is a key part of your treatment and safety. Be sure to make and go to all appointments, and call your doctor if you are having problems. It's also a good idea to know your test results and keep a list of the medicines you take. How can you care for yourself at home? · Take aspirin with a full glass of water unless your doctor tells you not to. Do not lie down right after you take it. · If you have a stent in your coronary artery, take your aspirin as your heart doctor says to. If another doctor says to stop taking the aspirin for any reason, talk to your heart doctor before you stop. · Do not chew or crush the coated or sustained-release forms of aspirin. · Ask your doctor if you can drink alcohol while you take aspirin. And ask how much you can drink. Too much alcohol with aspirin can cause stomach bleeding. · Do not take aspirin if you are pregnant, unless your doctor says it is okay.   · Keep all aspirin out of children's reach. · Throw aspirin away if it starts to smell like vinegar. · Do not take aspirin if you have gout or if you take prescription blood thinners, unless your doctor has told you to. · Do not take prescription or over-the-counter medicines, vitamins, herbal products, or supplements without talking to your doctor first. Cornelia Archie the label before you take another over-the-counter medicine. Many contain aspirin. So they could cause you to take too much aspirin. · Talk with your doctor before you take a pain medicine. Ask which type of medicine you can take and how to take it safely with aspirin. · Tell your doctor or dentist before a surgery or procedure that you take aspirin. He or she will tell you if you should stop taking aspirin before your surgery or procedure. Make sure that you understand exactly what your doctor wants you to do. Where can you learn more? Go to http://anayeli-ezequiel.info/. Enter X673 in the search box to learn more about \"Taking Aspirin to Prevent Heart Attack and Stroke: Care Instructions. \"  Current as of: January 27, 2016  Content Version: 11.1  © 4388-9349 [employer]. Care instructions adapted under license by [employer] (which disclaims liability or warranty for this information). If you have questions about a medical condition or this instruction, always ask your healthcare professional. [address] any warranty or liability for your use of this information. Learning About Antiplatelet Medicines After a Stroke  Introduction  If you have had a stroke, you may have concerns about having another one. You want to do all you can do to avoid this. If your stroke was caused by a blood clot, one of the best things you can do is to take antiplatelet medicines. They can help prevent another stroke. In most cases, you don't take them if you had a stroke caused by a leak in an artery.   These medicines are often called blood thinners. But they don't thin your blood. They work to keep platelets from sticking together and forming blood clots. (A platelet is a type of blood cell.) Blood clots can cause a stroke if they block a blood vessel in the brain. So by preventing blood clots, you are helping to prevent a stroke. Examples  · Aspirin (Ascencion, Bufferin, Ecotrin)  · Aspirin with dipyridamole (Aggrenox)  · Clopidogrel (Plavix)  Possible side effects  These medicines make your blood take longer than normal to clot. This can cause bleeding, and you may bruise easily. In rare cases, they can cause you to bleed inside your body without an injury. If you have an injury, you might have bleeding that is hard to control. These medicines may have other side effects. Depending on which one you take, you may:  · Have diarrhea. · Feel sick to your stomach. · Have a headache. · Have some mild belly pain. You may have other side effects or reactions not listed here. Check the information that comes with your medicine. What to know about taking this medicine  · Be sure you get instructions about how to take your medicine safely. Blood thinners can cause serious bleeding problems. · Be safe with medicines. Take your medicines exactly as prescribed. Call your doctor if you think you are having a problem with your medicine. · Check with your doctor or pharmacist before you use any other medicines, including over-the-counter medicines. Make sure your doctor knows all of the medicines, vitamins, herbal products, and supplements you take. Taking some medicines together can cause problems. Where can you learn more? Go to http://anayeli-ezequiel.info/. Enter R439 in the search box to learn more about \"Learning About Antiplatelet Medicines After a Stroke. \"  Current as of: January 27, 2016  Content Version: 11.1  © 5899-6109 [employer].  Care instructions adapted under license by [facility] (which disclaims liability or warranty for this information). If you have questions about a medical condition or this instruction, always ask your healthcare professional. [address] any warranty or liability for your use of this information. DASH Diet: Care Instructions  Your Care Instructions  The DASH diet is an eating plan that can help lower your blood pressure. DASH stands for Dietary Approaches to Stop Hypertension. Hypertension is high blood pressure. The DASH diet focuses on eating foods that are high in calcium, potassium, and magnesium. These nutrients can lower blood pressure. The foods that are highest in these nutrients are fruits, vegetables, low-fat dairy products, nuts, seeds, and legumes. But taking calcium, potassium, and magnesium supplements instead of eating foods that are high in those nutrients does not have the same effect. The DASH diet also includes whole grains, fish, and poultry. The DASH diet is one of several lifestyle changes your doctor may recommend to lower your high blood pressure. Your doctor may also want you to decrease the amount of sodium in your diet. Lowering sodium while following the DASH diet can lower blood pressure even further than just the DASH diet alone. Follow-up care is a key part of your treatment and safety. Be sure to make and go to all appointments, and call your doctor if you are having problems. It's also a good idea to know your test results and keep a list of the medicines you take. How can you care for yourself at home? Following the DASH diet  · Eat 4 to 5 servings of fruit each day. A serving is 1 medium-sized piece of fruit, ½ cup chopped or canned fruit, 1/4 cup dried fruit, or 4 ounces (½ cup) of fruit juice. Choose fruit more often than fruit juice. · Eat 4 to 5 servings of vegetables each day.  A serving is 1 cup of lettuce or raw leafy vegetables, ½ cup of chopped or cooked vegetables, or 4 ounces (½ cup) of vegetable juice. Choose vegetables more often than vegetable juice. · Get 2 to 3 servings of low-fat and fat-free dairy each day. A serving is 8 ounces of milk, 1 cup of yogurt, or 1 ½ ounces of cheese. · Eat 6 to 8 servings of grains each day. A serving is 1 slice of bread, 1 ounce of dry cereal, or ½ cup of cooked rice, pasta, or cooked cereal. Try to choose whole-grain products as much as possible. · Limit lean meat, poultry, and fish to 2 servings each day. A serving is 3 ounces, about the size of a deck of cards. · Eat 4 to 5 servings of nuts, seeds, and legumes (cooked dried beans, lentils, and split peas) each week. A serving is 1/3 cup of nuts, 2 tablespoons of seeds, or ½ cup of cooked beans or peas. · Limit fats and oils to 2 to 3 servings each day. A serving is 1 teaspoon of vegetable oil or 2 tablespoons of salad dressing. · Limit sweets and added sugars to 5 servings or less a week. A serving is 1 tablespoon jelly or jam, ½ cup sorbet, or 1 cup of lemonade. · Eat less than 2,300 milligrams (mg) of sodium a day. If you limit your sodium to 1,500 mg a day, you can lower your blood pressure even more. Tips for success  · Start small. Do not try to make dramatic changes to your diet all at once. You might feel that you are missing out on your favorite foods and then be more likely to not follow the plan. Make small changes, and stick with them. Once those changes become habit, add a few more changes. · Try some of the following:  ¨ Make it a goal to eat a fruit or vegetable at every meal and at snacks. This will make it easy to get the recommended amount of fruits and vegetables each day. ¨ Try yogurt topped with fruit and nuts for a snack or healthy dessert. ¨ Add lettuce, tomato, cucumber, and onion to sandwiches. ¨ Combine a ready-made pizza crust with low-fat mozzarella cheese and lots of vegetable toppings.  Try using tomatoes, squash, spinach, broccoli, carrots, cauliflower, and onions. ¨ Have a variety of cut-up vegetables with a low-fat dip as an appetizer instead of chips and dip. ¨ Sprinkle sunflower seeds or chopped almonds over salads. Or try adding chopped walnuts or almonds to cooked vegetables. ¨ Try some vegetarian meals using beans and peas. Add garbanzo or kidney beans to salads. Make burritos and tacos with mashed durbin beans or black beans. Where can you learn more? Go to http://anayeli-ezequiel.info/. Enter B117 in the search box to learn more about \"DASH Diet: Care Instructions. \"  Current as of: March 23, 2016  Content Version: 11.1  © 6512-2796 [employer]. Care instructions adapted under license by [employer] (which disclaims liability or warranty for this information). If you have questions about a medical condition or this instruction, always ask your healthcare professional. [place] [address] any warranty or liability for your use of this information. Taking Aspirin to Prevent Heart Attack and Stroke: Care Instructions  Your Care Instructions  Aspirin acts as a \"blood thinner. \" It prevents blood clots from forming. When taken during and after a heart attack, it can reduce your chance of dying. And it's used if you have a stent in your coronary artery. Also, aspirin helps certain people lower their risk of a heart attack or stroke. Be sure you know what dose of aspirin to take and how often to take it. Low-dose aspirin is typically 81 mg. But the dose for daily aspirin can range from 81 mg to 325 mg. Taking aspirin every day can cause bleeding. It may not be safe if you have stomach ulcers. And it may not be safe if you have high blood pressure that is not controlled. If you take aspirin pills every day, do not take ones that have other ingredients such as caffeine or sodium.   Before you start to take aspirin, tell your doctor all the medicines, vitamins, herbal products, and supplements you take.  Follow-up care is a key part of your treatment and safety. Be sure to make and go to all appointments, and call your doctor if you are having problems. It's also a good idea to know your test results and keep a list of the medicines you take. How can you care for yourself at home? · Take aspirin with a full glass of water unless your doctor tells you not to. Do not lie down right after you take it. · If you have a stent in your coronary artery, take your aspirin as your heart doctor says to. If another doctor says to stop taking the aspirin for any reason, talk to your heart doctor before you stop. · Do not chew or crush the coated or sustained-release forms of aspirin. · Ask your doctor if you can drink alcohol while you take aspirin. And ask how much you can drink. Too much alcohol with aspirin can cause stomach bleeding. · Do not take aspirin if you are pregnant, unless your doctor says it is okay. · Keep all aspirin out of children's reach. · Throw aspirin away if it starts to smell like vinegar. · Do not take aspirin if you have gout or if you take prescription blood thinners, unless your doctor has told you to. · Do not take prescription or over-the-counter medicines, vitamins, herbal products, or supplements without talking to your doctor first. [employer] the label before you take another over-the-counter medicine. Many contain aspirin. So they could cause you to take too much aspirin. · Talk with your doctor before you take a pain medicine. Ask which type of medicine you can take and how to take it safely with aspirin. · Tell your doctor or dentist before a surgery or procedure that you take aspirin. He or she will tell you if you should stop taking aspirin before your surgery or procedure. Make sure that you understand exactly what your doctor wants you to do. Where can you learn more? Go to http://anayeli-ezequiel.info/.   Enter I376 in the search box to learn more about \"Taking Aspirin to Prevent Heart Attack and Stroke: Care Instructions. \"  Current as of: January 27, 2016  Content Version: 11.1  © 7244-0459 [employer]. Care instructions adapted under license by [employer] (which disclaims liability or warranty for this information). If you have questions about a medical condition or this instruction, always ask your healthcare professional. [address] any warranty or liability for your use of this information.
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
## 2017-03-27 NOTE — PROGRESS NOTES
Pt transferred back to room 403 post procedure, via bed by transporter; pt in nad, alert oriented, vss, no pain; dressing to left chest wall C/D/I, dated today; pt aware to report s/s of bleeding, swelling, pain, etc to nurse/staff members; equipment from [facility] x 2 boxes and pt chart sent with pt on the bed;

## 2017-03-27 NOTE — PROGRESS NOTES
Care Management Interventions  PCP Verified by CM: Yes  Transition of Care Consult (CM Consult): Discharge Planning  Physical Therapy Consult: Yes  Occupational Therapy Consult: Yes  Speech Therapy Consult: Yes  Current Support Network: Other, Relative's Home  Confirm Follow Up Transport: Family  Plan discussed with Pt/Family/Caregiver: Yes  Discharge Location  Discharge Placement: Home     Pt is a 61year old male admitted for TIA. Pt is alert and oriented and alone in room. Pt resides with his mother and family will be assisting him with transportation home. Pt is independent with ADLS, IADLs and ambulation prior to admission. Pt is being discharged home today. Pt declines any need for DMEs at this time. Pt has no questions or concerns.
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
## 2017-03-27 NOTE — PROGRESS NOTES
1417-time out for ADRI procedure, Dr. Mckeon Daily at bedside, pt consented; CRNA present, and Ralph Sher echo tech;   3886- ADRI procedure completed  1425-setting up for Loop recorder procedure  1427- Abx infusing  1430-pt awake and talkative, in nad, no pain, vss  1447-loop procedure completed, Jim with [employer] at bedside discussing loop recorder with pt

## 2017-03-27 NOTE — PROGRESS NOTES
PM Progress Note    S: Went to visit patient for post-procedure check. The patient was sitting in the chair comfortably and did not appear to be in pain. All questions were answered to the patient's satisfaction. On brief review of systems, the patient denied cp, sob, n/v, and headache. O:  Visit Vitals    /80    Pulse 62    Temp 97.6 °F (36.4 °C)    Resp 14    Ht 6' (1.829 m)    Wt 95.5 kg (210 lb 8 oz)    SpO2 97%    BMI 28.55 kg/m2       Gen: resting comfortably, NAD  CV: rrr, no mrg  Resp: CTAB, no wrr  Neuro: 5/5 strength bilateral upper and lower extremities. Minimally decreased sensation over right elbow. A/P:    -- Discussed importance of following up with PCP and specialist visits. Patient scheduled for appt with me on Friday at 4:20 PM.  -- Discussed with cardiology and neurology, who agreed patient stable for discharge.   See Discharge summary for medication list.     Dima Cardoza MD  03/27/17    4:45 PM

## 2017-03-27 NOTE — ANESTHESIA PREPROCEDURE EVALUATION
Anesthetic History   No history of anesthetic complications            Review of Systems / Medical History  Patient summary reviewed and pertinent labs reviewed    Pulmonary  Within defined limits                 Neuro/Psych         TIA and psychiatric history     Cardiovascular    Hypertension: well controlled              Exercise tolerance: >4 METS     GI/Hepatic/Renal  Within defined limits              Endo/Other  Within defined limits           Other Findings   Comments:   Risk Factors for Postoperative nausea/vomiting:       History of postoperative nausea/vomiting? NO       Female? NO       Motion sickness? NO       Intended opioid administration for postoperative analgesia?   NO         Physical Exam    Airway  Mallampati: I  TM Distance: > 6 cm  Neck ROM: normal range of motion   Mouth opening: Normal     Cardiovascular  Regular rate and rhythm,  S1 and S2 normal,  no murmur, click, rub, or gallop             Dental  No notable dental hx       Pulmonary  Breath sounds clear to auscultation               Abdominal  GI exam deferred       Other Findings            Anesthetic Plan    ASA: 3  Anesthesia type: MAC      Post-op pain plan if not by surgeon: IV PCA    Induction: Intravenous  Anesthetic plan and risks discussed with: Patient

## 2017-03-27 NOTE — ANESTHESIA POSTPROCEDURE EVALUATION
Post-Anesthesia Evaluation and Assessment    Patient: Julian Stein MRN: [id number]  SSN: [id number]    YOB: 1960  Age: 61 y.o. Sex: male       Cardiovascular Function/Vital Signs  Visit Vitals    /76    Pulse 64    Temp 36.4 °C (97.6 °F)    Resp 11    Ht 6' (1.829 m)    Wt 95.5 kg (210 lb 8 oz)    SpO2 97%    BMI 28.55 kg/m2       Patient is status post MAC anesthesia for * No procedures listed *. Nausea/Vomiting: None    Postoperative hydration reviewed and adequate. Pain:  Pain Scale 1: Numeric (0 - 10) (03/27/17 1345)  Pain Intensity 1: 0 (03/27/17 1345)   Managed    Neurological Status:   Neuro  Neurologic State: Alert (03/27/17 0727)  Orientation Level: Oriented X4 (03/27/17 0727)  Cognition: Follows commands (03/27/17 0727)  Speech: Clear (03/27/17 0727)  Assessment L Pupil: Brisk;Round (03/27/17 0727)  Size L Pupil (mm): 3 (03/27/17 0727)  Assessment R Pupil: Brisk;Round (03/27/17 0727)  Size R Pupil (mm): 3 (03/27/17 0727)  LUE Motor Response: Purposeful;Spontaneous  (03/27/17 1345)  LLE Motor Response: Purposeful (03/27/17 0727)  RUE Motor Response: Spontaneous ; Purposeful (03/27/17 1345)  RLE Motor Response: Purposeful (03/27/17 0727)   At baseline    Mental Status and Level of Consciousness: Arousable    Pulmonary Status:   O2 Device: Nasal cannula (03/27/17 1430)   Adequate oxygenation and airway patent    Complications related to anesthesia: None    Post-anesthesia assessment completed.  No concerns    Signed By: Sandra Tran MD     March 27, 2017
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
## 2017-04-11 ENCOUNTER — OFFICE VISIT (OUTPATIENT)
Dept: NEUROLOGY | Age: 57
End: 2017-04-11

## 2017-04-11 VITALS
TEMPERATURE: 98 F | WEIGHT: 209 LBS | BODY MASS INDEX: 28.31 KG/M2 | SYSTOLIC BLOOD PRESSURE: 130 MMHG | RESPIRATION RATE: 14 BRPM | HEIGHT: 72 IN | HEART RATE: 73 BPM | OXYGEN SATURATION: 98 % | DIASTOLIC BLOOD PRESSURE: 82 MMHG

## 2017-04-11 DIAGNOSIS — Z86.73 HISTORY OF CVA (CEREBROVASCULAR ACCIDENT): Primary | ICD-10-CM

## 2017-04-11 NOTE — PROGRESS NOTES
Re:  Irene Kwok up visit     4/11/2017 10:12 AM    SSN: [id number]    Subjective:   Torres Santos is seen in follow up, after the stroke he had affecting the right side. He does complain of residual weakness on the right, but not bad. He has no further numbness or tingling of the right side. he does complain of some headaches. He has the implantable loop recorder in at this time. He's due to be seen next week by cardiology. .      Medications:    Current Outpatient Prescriptions   Medication Sig Dispense Refill    amLODIPine (NORVASC) 5 mg tablet Take 1 Tab by mouth daily. 30 Tab 0    aspirin 81 mg chewable tablet Take 1 Tab by mouth daily. 30 Tab 0    atorvastatin (LIPITOR) 40 mg tablet Take 1 Tab by mouth nightly. 30 Tab 0    clopidogrel (PLAVIX) 75 mg tab Take 1 Tab by mouth daily. 30 Tab 0    metoprolol tartrate (LOPRESSOR) 25 mg tablet Take 0.5 Tabs by mouth every twelve (12) hours. 60 Tab 0    famotidine (PEPCID) 20 mg tablet Take 1 Tab by mouth two (2) times a day.  60 Tab 0       Vital signs:    Visit Vitals    /82 (BP 1 Location: Left arm, BP Patient Position: Sitting)    Pulse 73    Temp 98 °F (36.7 °C) (Oral)    Resp 14    Ht 6' (1.829 m)    Wt 94.8 kg (209 lb)    SpO2 98%    BMI 28.35 kg/m2       Review of Systems:   As above otherwise 11 point review of systems negative including;   Constitutional no fever or chills  Skin denies rash or itching  HEENT  Denies tinnitus, hearing lose  Eyes denies diplopia vision lose  Respiratory denies sortness of breath  Cardiovascular denies chest pain, dyspnea on exertion  Gastrointestinal denies nausea, vomiting, diarrhea, constipation  Genitourinary denies incontinence  Musculoskeletal denies joint pain or swelling  Endocrine denies weight change  Hematology denies easy bruising or bleeding   Neurological as above in HPI      Patient Active Problem List   Diagnosis Code    Cough R05    Corn of foot L84    Hypomagnesemia E83.42    TIA (transient ischemic attack) G45.9    Hypertension I10    Pulmonary nodule R91.1    Head injury S09.90XA    Depression F32.9    SAH (subarachnoid hemorrhage) ([facility]) I60.9    HLD (hyperlipidemia) E78.5    Weakness due to cerebrovascular accident (CVA) ([facility]) I63.9, R53.1         Objective: The patient is awake, alert, and oriented x 4. Fund of knowledge is adequate. Speech is fluent and memory is intact. Cranial Nerves: II - Visual fields are full to confrontation. III, IV, VI - Extraocular movements are intact. There is no nystagmus. V - Facial sensation is intact to pinprick. VII - Face is symmetrical.  VIII - Hearing is present. IX, X, XII - Palate is symmetrical.   XI - Shoulder shrugging and head turning intact  Motor: The patient moves all four limbs fairly well and symmetrically. Tone is normal. Reflexes are 2+ and symmetrical. Plantars are down going. Gait is mild limp off of the right foot. CBC:   Lab Results   Component Value Date/Time    WBC 6.0 03/27/2017 02:00 AM    RBC 4.19 03/27/2017 02:00 AM    HGB 12.2 03/27/2017 02:00 AM    HCT 36.0 03/27/2017 02:00 AM    PLATELET 562 25/66/1987 02:00 AM     BMP:   Lab Results   Component Value Date/Time    Glucose 94 03/27/2017 02:00 AM    Sodium 140 03/27/2017 02:00 AM    Potassium 3.7 03/27/2017 02:00 AM    Chloride 106 03/27/2017 02:00 AM    CO2 26 03/27/2017 02:00 AM    BUN 11 03/27/2017 02:00 AM    Creatinine 1.45 03/27/2017 02:00 AM    Calcium 7.2 03/27/2017 02:00 AM     CMP:   Lab Results   Component Value Date/Time    Glucose 94 03/27/2017 02:00 AM    Sodium 140 03/27/2017 02:00 AM    Potassium 3.7 03/27/2017 02:00 AM    Chloride 106 03/27/2017 02:00 AM    CO2 26 03/27/2017 02:00 AM    BUN 11 03/27/2017 02:00 AM    Creatinine 1.45 03/27/2017 02:00 AM    Calcium 7.2 03/27/2017 02:00 AM    Anion gap 8 03/27/2017 02:00 AM    BUN/Creatinine ratio 8 03/27/2017 02:00 AM    Alk.  phosphatase 90 08/21/2016 02:28 PM Protein, total 6.8 08/21/2016 02:28 PM    Albumin 3.1 08/21/2016 02:28 PM    Globulin 3.7 08/21/2016 02:28 PM    A-G Ratio 0.8 08/21/2016 02:28 PM     Coagulation:   Lab Results   Component Value Date/Time    Prothrombin time 12.5 03/23/2017 04:15 PM    INR 1.0 03/23/2017 04:15 PM    aPTT 28.1 03/23/2017 04:15 PM     Cardiac markers:   Lab Results   Component Value Date/Time     03/23/2017 04:15 PM    CK-MB Index 0.7 03/23/2017 04:15 PM       Assessment:  New multiple vascualr distribution strokes in this man who has risk factors including mild hypertension. Was this a cardiac embolic event? Does he have intermittent A-Fib? Plan:  Continue current medications. Await final word on loop recorder for potential atrial fibrillation. Will see back here in about 4 weeks. Sincerely,        Jermaine Rowland.  Guzman Villegas M.D.

## 2017-04-11 NOTE — COMMUNICATION BODY
Re:  Madalynn Rinne up visit     4/11/2017 10:12 AM    SSN: [id number]    Subjective:   Awa Long is seen in follow up, after the stroke he had affecting the right side. He does complain of residual weakness on the right, but not bad. He has no further numbness or tingling of the right side. he does complain of some headaches. He has the implantable loop recorder in at this time. He's due to be seen next week by cardiology. .      Medications:    Current Outpatient Prescriptions   Medication Sig Dispense Refill    amLODIPine (NORVASC) 5 mg tablet Take 1 Tab by mouth daily. 30 Tab 0    aspirin 81 mg chewable tablet Take 1 Tab by mouth daily. 30 Tab 0    atorvastatin (LIPITOR) 40 mg tablet Take 1 Tab by mouth nightly. 30 Tab 0    clopidogrel (PLAVIX) 75 mg tab Take 1 Tab by mouth daily. 30 Tab 0    metoprolol tartrate (LOPRESSOR) 25 mg tablet Take 0.5 Tabs by mouth every twelve (12) hours. 60 Tab 0    famotidine (PEPCID) 20 mg tablet Take 1 Tab by mouth two (2) times a day.  60 Tab 0       Vital signs:    Visit Vitals    /82 (BP 1 Location: Left arm, BP Patient Position: Sitting)    Pulse 73    Temp 98 °F (36.7 °C) (Oral)    Resp 14    Ht 6' (1.829 m)    Wt 94.8 kg (209 lb)    SpO2 98%    BMI 28.35 kg/m2       Review of Systems:   As above otherwise 11 point review of systems negative including;   Constitutional no fever or chills  Skin denies rash or itching  HEENT  Denies tinnitus, hearing lose  Eyes denies diplopia vision lose  Respiratory denies sortness of breath  Cardiovascular denies chest pain, dyspnea on exertion  Gastrointestinal denies nausea, vomiting, diarrhea, constipation  Genitourinary denies incontinence  Musculoskeletal denies joint pain or swelling  Endocrine denies weight change  Hematology denies easy bruising or bleeding   Neurological as above in HPI      Patient Active Problem List   Diagnosis Code    Cough R05    Corn of foot L84    Hypomagnesemia E83.42    TIA (transient ischemic attack) G45.9    Hypertension I10    Pulmonary nodule R91.1    Head injury S09.90XA    Depression F32.9    SAH (subarachnoid hemorrhage) ([facility]) I60.9    HLD (hyperlipidemia) E78.5    Weakness due to cerebrovascular accident (CVA) ([facility]) I63.9, R53.1         Objective: The patient is awake, alert, and oriented x 4. Fund of knowledge is adequate. Speech is fluent and memory is intact. Cranial Nerves: II - Visual fields are full to confrontation. III, IV, VI - Extraocular movements are intact. There is no nystagmus. V - Facial sensation is intact to pinprick. VII - Face is symmetrical.  VIII - Hearing is present. IX, X, XII - Palate is symmetrical.   XI - Shoulder shrugging and head turning intact  Motor: The patient moves all four limbs fairly well and symmetrically. Tone is normal. Reflexes are 2+ and symmetrical. Plantars are down going. Gait is mild limp off of the right foot. CBC:   Lab Results   Component Value Date/Time    WBC 6.0 03/27/2017 02:00 AM    RBC 4.19 03/27/2017 02:00 AM    HGB 12.2 03/27/2017 02:00 AM    HCT 36.0 03/27/2017 02:00 AM    PLATELET 172 22/16/2734 02:00 AM     BMP:   Lab Results   Component Value Date/Time    Glucose 94 03/27/2017 02:00 AM    Sodium 140 03/27/2017 02:00 AM    Potassium 3.7 03/27/2017 02:00 AM    Chloride 106 03/27/2017 02:00 AM    CO2 26 03/27/2017 02:00 AM    BUN 11 03/27/2017 02:00 AM    Creatinine 1.45 03/27/2017 02:00 AM    Calcium 7.2 03/27/2017 02:00 AM     CMP:   Lab Results   Component Value Date/Time    Glucose 94 03/27/2017 02:00 AM    Sodium 140 03/27/2017 02:00 AM    Potassium 3.7 03/27/2017 02:00 AM    Chloride 106 03/27/2017 02:00 AM    CO2 26 03/27/2017 02:00 AM    BUN 11 03/27/2017 02:00 AM    Creatinine 1.45 03/27/2017 02:00 AM    Calcium 7.2 03/27/2017 02:00 AM    Anion gap 8 03/27/2017 02:00 AM    BUN/Creatinine ratio 8 03/27/2017 02:00 AM    Alk.  phosphatase 90 08/21/2016 02:28 PM Protein, total 6.8 08/21/2016 02:28 PM    Albumin 3.1 08/21/2016 02:28 PM    Globulin 3.7 08/21/2016 02:28 PM    A-G Ratio 0.8 08/21/2016 02:28 PM     Coagulation:   Lab Results   Component Value Date/Time    Prothrombin time 12.5 03/23/2017 04:15 PM    INR 1.0 03/23/2017 04:15 PM    aPTT 28.1 03/23/2017 04:15 PM     Cardiac markers:   Lab Results   Component Value Date/Time     03/23/2017 04:15 PM    CK-MB Index 0.7 03/23/2017 04:15 PM       Assessment:  New multiple vascualr distribution strokes in this man who has risk factors including mild hypertension. Was this a cardiac embolic event? Does he have intermittent A-Fib? Plan:  Continue current medications. Await final word on loop recorder for potential atrial fibrillation. Will see back here in about 4 weeks. Sincerely,        Rolando Spaulding.  Quiana Lou M.D.

## 2017-04-11 NOTE — MR AVS SNAPSHOT
Visit Information Date & Time Provider Department Dept. Phone Encounter #  
 4/11/2017 10:00 AM [facility] [phone] [id number] Follow-up Instructions Return in about 4 weeks (around 5/9/2017). Follow-up and Disposition History Your Appointments 4/26/2017  9:40 AM  
Follow Up with Kerri Ny MD  
[facility] ([facility] Appt Note: Ph -4 week Dandy Stevens [id number] [phone] [address] Upcoming Health Maintenance Date Due Hepatitis C Screening 1960 DTaP/Tdap/Td series (1 - Tdap) 10/11/1980 FOBT Q 1 YEAR AGE 50-75 10/11/2009 INFLUENZA AGE 9 TO ADULT 8/1/2016 Allergies as of 4/11/2017  Review Complete On: 4/11/2017 By: Nikos Love LPN No Known Allergies Current Immunizations  Never Reviewed No immunizations on file. Not reviewed this visit You Were Diagnosed With   
  
 Codes Comments History of CVA (cerebrovascular accident)    -  Primary ICD-10-CM: Z86.73 
ICD-9-CM: V12.54 Vitals BP Pulse Temp Resp Height(growth percentile) Weight(growth percentile) 130/82 (BP 1 Location: Left arm, BP Patient Position: Sitting) 73 98 °F (36.7 °C) (Oral) 14 6' (1.829 m) 209 lb (94.8 kg) SpO2 BMI Smoking Status 98% 28.35 kg/m2 Never Smoker Vitals History BMI and BSA Data Body Mass Index Body Surface Area  
 28.35 kg/m 2 2.19 m 2 Preferred Pharmacy Pharmacy Name Phone [facility] [address] [facility] [address] [facility] [phone] Your Updated Medication List  
  
   
This list is accurate as of: 4/11/17 10:24 AM.  Always use your most recent med list. amLODIPine 5 mg tablet Commonly known as:  Olive Medici Take 1 Tab by mouth daily. aspirin 81 mg chewable tablet Take 1 Tab by mouth daily. atorvastatin 40 mg tablet Commonly known as:  LIPITOR Take 1 Tab by mouth nightly. clopidogrel 75 mg Tab Commonly known as:  PLAVIX Take 1 Tab by mouth daily. famotidine 20 mg tablet Commonly known as:  PEPCID Take 1 Tab by mouth two (2) times a day. metoprolol tartrate 25 mg tablet Commonly known as:  LOPRESSOR Take 0.5 Tabs by mouth every twelve (12) hours. Follow-up Instructions Return in about 4 weeks (around 5/9/2017). Introducing [facility]! [facility] introduces [employer] patient portal. Now you can access parts of your medical record, email your doctor's office, and request medication refills online. 1. In your internet browser, go to [email or web address] 2. Click on the First Time User? Click Here link in the Sign In box. You will see the New Member Sign Up page. 3. Enter your [employer] Access Code exactly as it appears below. You will not need to use this code after youve completed the sign-up process. If you do not sign up before the expiration date, you must request a new code. · [employer] Access Code: [email or web address] Expires: 6/21/2017  4:06 PM 
 
4. Enter the last four digits of your Social Security Number (xxxx) and Date of Birth (mm/dd/yyyy) as indicated and click Submit. You will be taken to the next sign-up page. 5. Create a [employer] ID. This will be your [employer] login ID and cannot be changed, so think of one that is secure and easy to remember. 6. Create a [employer] password. You can change your password at any time. 7. Enter your Password Reset Question and Answer. This can be used at a later time if you forget your password. 8. Enter your e-mail address. You will receive e-mail notification when new information is available in [address]. 9. Click Sign Up. You can now view and download portions of your medical record. 10. Click the Download Summary menu link to download a portable copy of your medical information. If you have questions, please visit the Frequently Asked Questions section of the [employer] website. Remember, [employer] is NOT to be used for urgent needs. For medical emergencies, dial 911. Now available from your iPhone and Android! Please provide this summary of care documentation to your next provider. Your primary care clinician is listed as Annalisa Gomes. If you have any questions after today's visit, please call [phone].

## 2017-04-11 NOTE — LETTER
4/11/2017 10:17 AM 
 
Patient:  Miguel Ángel Pina YOB: 1960 Date of Visit: 4/11/2017 Dear Doretha Messer MD 
[address] [address] VIA Facsimile: [phone] 
 : Thank you for referring Mr. Anne Mcnally to me for evaluation/treatment. Below are the relevant portions of my assessment and plan of care. Re:  Linard Charles up visit     4/11/2017 10:12 AM 
 
SSN: [id number] Subjective:   Miguel Ángel Pina is seen in follow up, after the stroke he had affecting the right side. He does complain of residual weakness on the right, but not bad. He has no further numbness or tingling of the right side. he does complain of some headaches. He has the implantable loop recorder in at this time. He's due to be seen next week by cardiology. .   
 
Medications:   
Current Outpatient Prescriptions Medication Sig Dispense Refill  amLODIPine (NORVASC) 5 mg tablet Take 1 Tab by mouth daily. 30 Tab 0  
 aspirin 81 mg chewable tablet Take 1 Tab by mouth daily. 30 Tab 0  
 atorvastatin (LIPITOR) 40 mg tablet Take 1 Tab by mouth nightly. 30 Tab 0  clopidogrel (PLAVIX) 75 mg tab Take 1 Tab by mouth daily. 30 Tab 0  
 metoprolol tartrate (LOPRESSOR) 25 mg tablet Take 0.5 Tabs by mouth every twelve (12) hours. 60 Tab 0  
 famotidine (PEPCID) 20 mg tablet Take 1 Tab by mouth two (2) times a day. 60 Tab 0 Vital signs:   
Visit Vitals  /82 (BP 1 Location: Left arm, BP Patient Position: Sitting)  Pulse 73  Temp 98 °F (36.7 °C) (Oral)  Resp 14  
 Ht 6' (1.829 m)  Wt 94.8 kg (209 lb)  SpO2 98%  BMI 28.35 kg/m2 Review of Systems: As above otherwise 11 point review of systems negative including;  
Constitutional no fever or chills Skin denies rash or itching HEENT  Denies tinnitus, hearing lose Eyes denies diplopia vision lose Respiratory denies sortness of breath Cardiovascular denies chest pain, dyspnea on exertion Gastrointestinal denies nausea, vomiting, diarrhea, constipation Genitourinary denies incontinence Musculoskeletal denies joint pain or swelling Endocrine denies weight change Hematology denies easy bruising or bleeding Neurological as above in HPI Patient Active Problem List  
Diagnosis Code  Cough R05  Corn of foot L84  
 Hypomagnesemia E83.42  
 TIA (transient ischemic attack) G45.9  Hypertension I10  
 Pulmonary nodule R91.1  Head injury S09.90XA  Depression F32.9  
 SAH (subarachnoid hemorrhage) (HCC) I60.9  
 HLD (hyperlipidemia) E78.5  Weakness due to cerebrovascular accident (CVA) ([facility] [address] I63.9, R53.1 Objective: The patient is awake, alert, and oriented x 4. Fund of knowledge is adequate. Speech is fluent and memory is intact. Cranial Nerves: II  Visual fields are full to confrontation. III, IV, VI  Extraocular movements are intact. There is no nystagmus. V  Facial sensation is intact to pinprick. VII  Face is symmetrical.  VIII - Hearing is present. IX, X, XII  Palate is symmetrical.   XI - Shoulder shrugging and head turning intact Motor: The patient moves all four limbs fairly well and symmetrically. Tone is normal. Reflexes are 2+ and symmetrical. Plantars are down going. Gait is mild limp off of the right foot. CBC:  
Lab Results Component Value Date/Time WBC 6.0 03/27/2017 02:00 AM  
 RBC 4.19 03/27/2017 02:00 AM  
 HGB 12.2 03/27/2017 02:00 AM  
 HCT 36.0 03/27/2017 02:00 AM  
 PLATELET 658 70/83/8707 02:00 AM  
 
BMP:  
Lab Results Component Value Date/Time Glucose 94 03/27/2017 02:00 AM  
 Sodium 140 03/27/2017 02:00 AM  
 Potassium 3.7 03/27/2017 02:00 AM  
 Chloride 106 03/27/2017 02:00 AM  
 CO2 26 03/27/2017 02:00 AM  
 BUN 11 03/27/2017 02:00 AM  
 Creatinine 1.45 03/27/2017 02:00 AM  
 Calcium 7.2 03/27/2017 02:00 AM  
 
CMP:  
Lab Results Component Value Date/Time Glucose 94 03/27/2017 02:00 AM  
 Sodium 140 03/27/2017 02:00 AM  
 Potassium 3.7 03/27/2017 02:00 AM  
 Chloride 106 03/27/2017 02:00 AM  
 CO2 26 03/27/2017 02:00 AM  
 BUN 11 03/27/2017 02:00 AM  
 Creatinine 1.45 03/27/2017 02:00 AM  
 Calcium 7.2 03/27/2017 02:00 AM  
 Anion gap 8 03/27/2017 02:00 AM  
 BUN/Creatinine ratio 8 03/27/2017 02:00 AM  
 Alk. phosphatase 90 08/21/2016 02:28 PM  
 Protein, total 6.8 08/21/2016 02:28 PM  
 Albumin 3.1 08/21/2016 02:28 PM  
 Globulin 3.7 08/21/2016 02:28 PM  
 A-G Ratio 0.8 08/21/2016 02:28 PM  
 
Coagulation:  
Lab Results Component Value Date/Time Prothrombin time 12.5 03/23/2017 04:15 PM  
 INR 1.0 03/23/2017 04:15 PM  
 aPTT 28.1 03/23/2017 04:15 PM  
 
Cardiac markers:  
Lab Results Component Value Date/Time  03/23/2017 04:15 PM  
 CK-MB Index 0.7 03/23/2017 04:15 PM  
 
 
Assessment:  New multiple vascualr distribution strokes in this man who has risk factors including mild hypertension. Was this a cardiac embolic event? Does he have intermittent A-Fib? Plan:  Continue current medications. Await final word on loop recorder for potential atrial fibrillation. Will see back here in about 4 weeks. Sincerely, 
 
 
 
Nas Conrad. Sedrick Metz M.D. If you have questions, please do not hesitate to call me. I look forward to following Mr. Simi Atkinson along with you.  
 
 
 
Sincerely, 
 
 
Booker Gomes MD

## 2017-05-11 ENCOUNTER — OFFICE VISIT (OUTPATIENT)
Dept: CARDIOLOGY CLINIC | Age: 57
End: 2017-05-11

## 2017-05-11 VITALS — HEIGHT: 72 IN

## 2017-08-02 ENCOUNTER — TELEPHONE (OUTPATIENT)
Dept: CARDIOLOGY CLINIC | Age: 57
End: 2017-08-02

## 2017-08-02 NOTE — TELEPHONE ENCOUNTER
Called patient to schedule appt since he has not been since since loop recorder was implanted in the hospital on 3/27/17. Patient was not willing to schedule because he states he has to call his wife so she can get off in time to bring him to the office. He states he will discuss with her and then call us to schedule.

## 2017-08-31 ENCOUNTER — OFFICE VISIT (OUTPATIENT)
Dept: CARDIOLOGY CLINIC | Age: 57
End: 2017-08-31

## 2017-08-31 DIAGNOSIS — G45.9 TRANSIENT CEREBRAL ISCHEMIA, UNSPECIFIED TYPE: Primary | ICD-10-CM

## 2017-08-31 DIAGNOSIS — Z45.09 ENCOUNTER FOR LOOP RECORDER CHECK: ICD-10-CM

## 2017-08-31 NOTE — MR AVS SNAPSHOT
Visit Information Date & Time Provider Department Dept. Phone Encounter #  
 8/31/2017  4:00  [facility] [phone] [id number] Your Appointments 9/19/2017  4:00 PM  
Follow Up with Fernanda Mascorro DO [facility] ([facility] Appt Note: ok'd per Jason Hamlin, Requested by Dr. Avelino Dorman [address] [address] [address]  
[phone] Kranthi Read  
  
    
 11/29/2017  1:20 PM  
CARELINK with Pacer Hv Csi [facility] ([facility]) Appt Note: 3 month carelink [facility] [address] [address] [phone] [facility] [address]  
  
    
 12/6/2017  2:40 PM  
CARELINK with Pacer Hv Csi [facility] ([facility]) Appt Note: 3 month carelink [facility] [address] [address] [phone] Upcoming Health Maintenance Date Due Hepatitis C Screening 1960 DTaP/Tdap/Td series (1 - Tdap) 10/11/1980 FOBT Q 1 YEAR AGE 50-75 10/11/2009 INFLUENZA AGE 9 TO ADULT 8/1/2017 Allergies as of 8/31/2017  Review Complete On: 4/11/2017 By: Mone Abrams LPN No Known Allergies Current Immunizations  Never Reviewed No immunizations on file. Not reviewed this visit Vitals Smoking Status Never Smoker Preferred Pharmacy Pharmacy Name Phone [facility] [address] [facility], [facility] [phone] Your Updated Medication List  
  
   
This list is accurate as of: 8/31/17  4:45 PM.  Always use your most recent med list. amLODIPine 5 mg tablet Commonly known as:  Carlotta Patel Take 1 Tab by mouth daily. aspirin 81 mg chewable tablet Take 1 Tab by mouth daily. atorvastatin 40 mg tablet Commonly known as:  LIPITOR Take 1 Tab by mouth nightly. clopidogrel 75 mg Tab Commonly known as:  PLAVIX Take 1 Tab by mouth daily. famotidine 20 mg tablet Commonly known as:  PEPCID Take 1 Tab by mouth two (2) times a day. metoprolol tartrate 25 mg tablet Commonly known as:  LOPRESSOR Take 0.5 Tabs by mouth every twelve (12) hours. Introducing [facility] & HEALTH SERVICES! William Mckeon introduces [employer] patient portal. Now you can access parts of your medical record, email your doctor's office, and request medication refills online. 1. In your internet browser, go to [email or web address] 2. Click on the First Time User? Click Here link in the Sign In box. You will see the New Member Sign Up page. 3. Enter your [employer] Access Code exactly as it appears below. You will not need to use this code after youve completed the sign-up process. If you do not sign up before the expiration date, you must request a new code. · [employer] Access Code: [email or web address] Expires: 11/29/2017  4:21 PM 
 
4. Enter the last four digits of your Social Security Number (xxxx) and Date of Birth (mm/dd/yyyy) as indicated and click Submit. You will be taken to the next sign-up page. 5. Create a [employer] ID. This will be your [employer] login ID and cannot be changed, so think of one that is secure and easy to remember. 6. Create a [employer] password. You can change your password at any time. 7. Enter your Password Reset Question and Answer. This can be used at a later time if you forget your password. 8. Enter your e-mail address. You will receive e-mail notification when new information is available in [address] 9. Click Sign Up. You can now view and download portions of your medical record. 10. Click the Download Summary menu link to download a portable copy of your medical information. If you have questions, please visit the Frequently Asked Questions section of the Byte[employer] website. Remember, [employer] is NOT to be used for urgent needs. For medical emergencies, dial 911. Now available from your iPhone and Android! Please provide this summary of care documentation to your next provider. Your primary care clinician is listed as Jeanie Dunham. If you have any questions after today's visit, please call [phone].

## 2017-09-01 NOTE — PROGRESS NOTES
I have personally seen and evaluated the device findings. Interrogation reviewed and I agree with assessment.     Jonelle Conley

## 2017-09-07 ENCOUNTER — TELEPHONE (OUTPATIENT)
Dept: CARDIOLOGY CLINIC | Age: 57
End: 2017-09-07

## 2017-09-07 NOTE — TELEPHONE ENCOUNTER
Patient is currently taking:  Metaxlone 800mg daily  Metoprolol 25mg daily  Atorvastation 40mg  Amlodipine 5mg  Aspirin 81 mg

## 2017-09-19 ENCOUNTER — OFFICE VISIT (OUTPATIENT)
Dept: CARDIOLOGY CLINIC | Age: 57
End: 2017-09-19

## 2017-09-19 VITALS
SYSTOLIC BLOOD PRESSURE: 172 MMHG | HEIGHT: 72 IN | DIASTOLIC BLOOD PRESSURE: 88 MMHG | HEART RATE: 111 BPM | BODY MASS INDEX: 26.14 KG/M2 | WEIGHT: 193 LBS | OXYGEN SATURATION: 98 %

## 2017-09-19 DIAGNOSIS — R00.2 PALPITATIONS: Primary | ICD-10-CM

## 2017-09-19 DIAGNOSIS — R00.0 SINUS TACHYCARDIA: ICD-10-CM

## 2017-09-19 DIAGNOSIS — I47.1 PSVT (PAROXYSMAL SUPRAVENTRICULAR TACHYCARDIA) (HCC): ICD-10-CM

## 2017-09-19 DIAGNOSIS — E78.5 HYPERLIPIDEMIA, UNSPECIFIED HYPERLIPIDEMIA TYPE: ICD-10-CM

## 2017-09-19 DIAGNOSIS — I10 ESSENTIAL HYPERTENSION: ICD-10-CM

## 2017-09-19 NOTE — MR AVS SNAPSHOT
Visit Information Date & Time Provider Department Dept. Phone Encounter #  
 9/19/2017  4:00 PM Aurora Dykes, [address] [facility] [address] [id number] Follow-up Instructions Return in about 6 months (around 3/19/2018), or if symptoms worsen or fail to improve. Your Appointments 12/6/2017  2:40 PM  
CARELINK with Deepthi Leija Csi [facility] ([address]) Appt Note: 3 month carelink Dandy Restrepo [id number]  
[phone] [address] Upcoming Health Maintenance Date Due Hepatitis C Screening 1960 DTaP/Tdap/Td series (1 - Tdap) 10/11/1980 FOBT Q 1 YEAR AGE 50-75 10/11/2009 INFLUENZA AGE 9 TO ADULT 8/1/2017 Allergies as of 9/19/2017  Review Complete On: 4/11/2017 By: Felisha Longoria LPN No Known Allergies Current Immunizations  Never Reviewed No immunizations on file. Not reviewed this visit You Were Diagnosed With   
  
 Codes Comments Weakness due to cerebrovascular accident (CVA) ([facility] [address]    -  Primary ICD-10-CM: I63.9, R53.1 ICD-9-CM: 434.91, 780.79 Hyperlipidemia, unspecified hyperlipidemia type     ICD-10-CM: E78.5 ICD-9-CM: 272.4 Secondary hypertension     ICD-10-CM: I15.9 ICD-9-CM: 405.99 Vitals BP Pulse Height(growth percentile) Weight(growth percentile) SpO2 BMI  
 172/88 (!) 111 6' (1.829 m) 193 lb (87.5 kg) 98% 26.18 kg/m2 Smoking Status Never Smoker Vitals History BMI and BSA Data Body Mass Index Body Surface Area  
 26.18 kg/m 2 2.11 m 2 Preferred Pharmacy Pharmacy Name Phone [facility] [address] [facility] [address], [address] [phone] Your Updated Medication List  
  
   
This list is accurate as of: 9/19/17  5:11 PM.  Always use your most recent med list.  
  
 amLODIPine 5 mg tablet Commonly known as:  Erenest Bobby Take 1 Tab by mouth daily. aspirin 81 mg chewable tablet Take 1 Tab by mouth daily. atorvastatin 40 mg tablet Commonly known as:  LIPITOR Take 1 Tab by mouth nightly. clopidogrel 75 mg Tab Commonly known as:  PLAVIX Take 1 Tab by mouth daily. famotidine 20 mg tablet Commonly known as:  PEPCID Take 1 Tab by mouth two (2) times a day. metoprolol tartrate 25 mg tablet Commonly known as:  LOPRESSOR Take 0.5 Tabs by mouth every twelve (12) hours. We Performed the Following AMB POC EKG ROUTINE W/ 12 LEADS, INTER & REP [93554 CPT(R)] Follow-up Instructions Return in about 6 months (around 3/19/2018), or if symptoms worsen or fail to improve. Introducing \[facility]\"" & HEALTH SERVICES! [facility] introduces [employer] patient portal. Now you can access parts of your medical record, email your doctor's office, and request medication refills online. 1. In your internet browser, go to [email or web address] 2. Click on the First Time User? Click Here link in the Sign In box. You will see the New Member Sign Up page. 3. Enter your [employer] Access Code exactly as it appears below. You will not need to use this code after youve completed the sign-up process. If you do not sign up before the expiration date, you must request a new code. · [employer] Access Code: [email or web address] Expires: 11/29/2017  4:21 PM 
 
4. Enter the last four digits of your Social Security Number (xxxx) and Date of Birth (mm/dd/yyyy) as indicated and click Submit. You will be taken to the next sign-up page. 5. Create a [employer] ID. This will be your [employer] login ID and cannot be changed, so think of one that is secure and easy to remember. 6. Create a [employer] password. You can change your password at any time. 7. Enter your Password Reset Question and Answer.  This can be used at a later time if you forget your password. 8. Enter your e-mail address. You will receive e-mail notification when new information is available in [address] 9. Click Sign Up. You can now view and download portions of your medical record. 10. Click the Download Summary menu link to download a portable copy of your medical information. If you have questions, please visit the Frequently Asked Questions section of the [employer] website. Remember, [employer] is NOT to be used for urgent needs. For medical emergencies, dial 911. Now available from your iPhone and Android! Please provide this summary of care documentation to your next provider. Your primary care clinician is listed as Marck Lynne. If you have any questions after today's visit, please call [phone].

## 2017-09-19 NOTE — PROGRESS NOTES
1. Have you been to the ER, urgent care clinic since your last visit? Hospitalized since your last visit? Yes, 2/23-2/27 CVA    2. Have you seen or consulted any other health care providers outside of the [address] [place] [address] since your last visit? Include any pap smears or colon screening.  no
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
## 2017-09-19 NOTE — PROGRESS NOTES
Review of Systems   Constitutional: Positive for diaphoresis. Negative for chills, fever, malaise/fatigue and weight loss. Respiratory: Negative. Cardiovascular: Positive for chest pain. Negative for palpitations, orthopnea, claudication, leg swelling and PND. Gastrointestinal: Negative. Musculoskeletal: Positive for back pain. Negative for falls, joint pain, myalgias and neck pain. Neurological: Negative for weakness.

## 2017-10-05 ENCOUNTER — TELEPHONE (OUTPATIENT)
Dept: CARDIOLOGY CLINIC | Age: 57
End: 2017-10-05

## 2017-10-05 RX ORDER — METAXALONE 800 MG/1
TABLET ORAL
COMMUNITY
End: 2018-07-28

## 2017-10-05 NOTE — TELEPHONE ENCOUNTER
Patient calling to report medication he is taking. He is taking metaxalone 800mg daily and metoprolol tartrate 25mg BID.    Dr Becky Rosado wanted to review his medications

## 2017-10-07 NOTE — TELEPHONE ENCOUNTER
This gentleman is taking (Skelaxin) metaxalone 800 mg daily and metoprolol tartrate 25 mg twice daily.  ES

## 2017-12-06 ENCOUNTER — OFFICE VISIT (OUTPATIENT)
Dept: CARDIOLOGY CLINIC | Age: 57
End: 2017-12-06

## 2017-12-06 DIAGNOSIS — Z45.09 ENCOUNTER FOR LOOP RECORDER CHECK: ICD-10-CM

## 2017-12-06 DIAGNOSIS — G45.9 TRANSIENT CEREBRAL ISCHEMIA, UNSPECIFIED TYPE: Primary | ICD-10-CM

## 2017-12-21 ENCOUNTER — TELEPHONE (OUTPATIENT)
Dept: CARDIOLOGY CLINIC | Age: 57
End: 2017-12-21

## 2017-12-21 NOTE — TELEPHONE ENCOUNTER
Phu Doll RN discussed form with Dr. Harrison Or. Form faxed. Confirmation received.   Samantha Urbano

## 2017-12-21 NOTE — TELEPHONE ENCOUNTER
Patient called states he faxed a form to our office yesterday needs Dr. Tom Bhandari to complete for to have respirator at work. Patient needs to start work at 8:00 am tomorrow morning and needs to  his badge today. Advised patient we did receive the form and Dr. Tom Bhandari will be in the office this afternoon to complete form fax back to fax # on form.   Leola Geller

## 2018-01-11 NOTE — PROGRESS NOTES
I have personally seen and evaluated the device findings. Interrogation reviewed and I agree with assessment.     Jaret Marie

## 2018-02-15 ENCOUNTER — CLINICAL SUPPORT (OUTPATIENT)
Dept: CARDIOLOGY CLINIC | Age: 58
End: 2018-02-15

## 2018-02-15 ENCOUNTER — TELEPHONE (OUTPATIENT)
Dept: CARDIOLOGY CLINIC | Age: 58
End: 2018-02-15

## 2018-02-15 ENCOUNTER — OFFICE VISIT (OUTPATIENT)
Dept: CARDIOLOGY CLINIC | Age: 58
End: 2018-02-15

## 2018-02-15 VITALS
SYSTOLIC BLOOD PRESSURE: 200 MMHG | OXYGEN SATURATION: 97 % | WEIGHT: 198 LBS | DIASTOLIC BLOOD PRESSURE: 90 MMHG | HEIGHT: 72 IN | HEART RATE: 126 BPM | BODY MASS INDEX: 26.82 KG/M2

## 2018-02-15 DIAGNOSIS — R07.89 CHEST PAIN, ATYPICAL: Primary | ICD-10-CM

## 2018-02-15 DIAGNOSIS — Z95.9 CARDIAC DEVICE IN SITU: Primary | ICD-10-CM

## 2018-02-15 DIAGNOSIS — R00.2 PALPITATIONS: ICD-10-CM

## 2018-02-15 DIAGNOSIS — I15.9 SECONDARY HYPERTENSION: ICD-10-CM

## 2018-02-15 DIAGNOSIS — I47.1 PSVT (PAROXYSMAL SUPRAVENTRICULAR TACHYCARDIA) (HCC): ICD-10-CM

## 2018-02-15 DIAGNOSIS — G45.9 TRANSIENT CEREBRAL ISCHEMIA, UNSPECIFIED TYPE: ICD-10-CM

## 2018-02-15 RX ORDER — CLOPIDOGREL BISULFATE 75 MG/1
75 TABLET ORAL DAILY
Qty: 90 TAB | Refills: 3 | Status: SHIPPED | OUTPATIENT
Start: 2018-02-15 | End: 2020-11-11

## 2018-02-15 RX ORDER — AMLODIPINE BESYLATE 5 MG/1
5 TABLET ORAL DAILY
Qty: 90 TAB | Refills: 3 | Status: SHIPPED | OUTPATIENT
Start: 2018-02-15 | End: 2020-11-11

## 2018-02-15 RX ORDER — ATORVASTATIN CALCIUM 40 MG/1
40 TABLET, FILM COATED ORAL
Qty: 90 TAB | Refills: 3 | Status: SHIPPED | OUTPATIENT
Start: 2018-02-15 | End: 2020-11-11

## 2018-02-15 RX ORDER — METOPROLOL TARTRATE 25 MG/1
12.5 TABLET, FILM COATED ORAL EVERY 12 HOURS
Qty: 45 TAB | Refills: 3 | Status: SHIPPED | OUTPATIENT
Start: 2018-02-15 | End: 2020-11-11
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
## 2018-02-15 NOTE — PROGRESS NOTES
1. Have you been to the ER, urgent care clinic since your last visit? Hospitalized since your last visit? No     2. Have you seen or consulted any other health care providers outside of the [address] [place] [address] since your last visit? Include any pap smears or colon screening.  No

## 2018-02-16 ENCOUNTER — TELEPHONE (OUTPATIENT)
Dept: CARDIOLOGY CLINIC | Age: 58
End: 2018-02-16

## 2018-02-16 NOTE — TELEPHONE ENCOUNTER
LMOM for patient to contact me . .. I need to get him scheduled for an EP Study/Possible SVT Ablation with Dr. Ananda Terry. Julianna Cherry A   Male, 61 y. o., 1960  Weight:   89.8 kg (198 lb)  Phone:   [phone]  PCP:   Yordan Hill MD  MRN:   [id number]  MyChart:   Pending  Next Appt (With Me)  None  Next Appt (Any Provider)  None    Patient Calls            Augusto Persaud routed conversation to You 16 hours ago (4:56 PM)                     Lisa Gale 16 hours ago (4:55 PM)                 Patient needs EP study with Possible SVT ablation, please call patient.                   Documentation

## 2018-02-16 NOTE — PROGRESS NOTES
I have personally seen and evaluated the device findings. Interrogation reviewed and I agree with assessment.     Jamaal Torres

## 2018-03-05 ENCOUNTER — OFFICE VISIT (OUTPATIENT)
Dept: CARDIOLOGY CLINIC | Age: 58
End: 2018-03-05

## 2018-03-05 NOTE — PATIENT INSTRUCTIONS
[facility]          Patient  EP Instructions                  1. You are scheduled to have a EP Study Possible SVT Ablation on  March 12, 2018 , at 0800 am.    Please check in at 0700 am.    2. Please go to [facility] and kaylene in the outpatient parking lot that is located around to the back of the hospital and enter through the [facility]. Once you enter through the [facility] check in with the  there. The  will either give you directions or assist you in getting to the cath holding area. 3.  [x]       You are not to eat or drink anything after midnight the morning of the               procedure. 4. Please continue to take your medications with a small sip of water on the morning of the procedure with the following exceptions:       5. If you are diabetic, do not take your insulin/sugar pill the morning of the procedure. 6. We encourage families to wait in the waiting room on the first floor while the procedure is being done. The Doctor will come out and talk with you as soon as the procedure is over. 7. There is the possibility that you may spend the night in the hospital, depending on the results of the procedure. This will be determined after the procedure is done. 8.   If you or your family have any questions, please call our office Monday-Friday 9:00am         -4:30 pm , at [phone], and ask to speak to one of the nurses.

## 2018-03-05 NOTE — MR AVS SNAPSHOT
[address] [place] [address] [address] [place] [address] 
[phone] Patient: Carson Dorantes MRN: [id number] :1960 Visit Information Date & Time Provider Department Dept. Phone Encounter #  
 3/5/2018  3:30  [facility] [address] [id number] Your Appointments 3/5/2018  3:30 PM  
PRE PROCEDURE with [facility] ([address]) Appt Note: EP Study/Possible SVT Ablation on 3/12  
 [address] [facility] [address] [facility] [place] [address]  
[phone] [address] [place] [address] [address] Upcoming Health Maintenance Date Due Hepatitis C Screening 1960 DTaP/Tdap/Td series (1 - Tdap) 10/11/1980 FOBT Q 1 YEAR AGE 50-75 10/11/2009 Influenza Age 5 to Adult 2017 Allergies as of 3/5/2018  Review Complete On: 2/15/2018 By: Alva Murray MD  
 No Known Allergies Current Immunizations  Never Reviewed No immunizations on file. Not reviewed this visit You Were Diagnosed With   
  
 Codes Comments Weakness due to cerebrovascular accident (CVA) ([facility] [address]    -  Primary ICD-10-CM: I63.9, R53.1 ICD-9-CM: 434.91, 780.79 Vitals Smoking Status Never Smoker Preferred Pharmacy Pharmacy Name Phone [address] [facility] [address], [address] Your Updated Medication List  
  
   
This list is accurate as of 3/5/18  7:27 AM.  Always use your most recent med list. amLODIPine 5 mg tablet Commonly known as:  Danae Dionte Take 1 Tab by mouth daily. aspirin 81 mg chewable tablet Take 1 Tab by mouth daily. atorvastatin 40 mg tablet Commonly known as:  LIPITOR Take 1 Tab by mouth nightly. clopidogrel 75 mg Tab Commonly known as:  PLAVIX Take 1 Tab by mouth daily. famotidine 20 mg tablet Commonly known as:  PEPCID Take 1 Tab by mouth two (2) times a day. metaxalone 800 mg tablet Commonly known as:  SKELAXIN Take  by mouth.  
  
 metoprolol tartrate 25 mg tablet Commonly known as:  LOPRESSOR Take 0.5 Tabs by mouth every twelve (12) hours. To-Do List   
 03/05/2018 Lab:  CBC WITH AUTOMATED DIFF   
  
 03/05/2018 Lab:  METABOLIC PANEL, COMPREHENSIVE   
  
 03/05/2018 Lab:  PROTHROMBIN TIME + INR   
  
 03/05/2018 Imaging:  XR CHEST PA LAT   
  
 03/12/2018 8:00 AM  
  Appointment with [facility] ANESTHESIA 2; [facility] EP LAB; [facility] CATH HOLDING at [address] [facility] LAB ([phone]) Patient Instructions [facility] Patient  EP Instructions 1. You are scheduled to have a EP Study Possible SVT Ablation on  March 12, 2018 , at 0800 am.    Please check in at 0700 am. 
 
2. Please go to [facility] and park in the outpatient parking lot that is located around to the back of the hospital and enter through the [employer]. Once you enter through the [facility] check in with the  there. The  will either give you directions or assist you in getting to the cath holding area. 3.  [x]       You are not to eat or drink anything after midnight the morning of the  
            procedure. 4. Please continue to take your medications with a small sip of water on the morning of the procedure with the following exceptions:    
 
5. If you are diabetic, do not take your insulin/sugar pill the morning of the procedure. 6. We encourage families to wait in the waiting room on the first floor while the procedure is being done. The Doctor will come out and talk with you as soon as the procedure is over.  
 
7. There is the possibility that you may spend the night in the hospital, depending on the results of the procedure. This will be determined after the procedure is done. 8.   If you or your family have any questions, please call our office Monday-Friday 9:00am  
      -4:30 pm , at [phone], and ask to speak to one of the nurses. Introducing [facility]! [employer] introduces [employer] patient portal. Now you can access parts of your medical record, email your doctor's office, and request medication refills online. 1. In your internet browser, go to [email or web address] 2. Click on the First Time User? Click Here link in the Sign In box. You will see the New Member Sign Up page. 3. Enter your [employer] Access Code exactly as it appears below. You will not need to use this code after youve completed the sign-up process. If you do not sign up before the expiration date, you must request a new code. · [employer] Access Code: [email or web address] Expires: 5/16/2018  4:49 PM 
 
4. Enter the last four digits of your Social Security Number (xxxx) and Date of Birth (mm/dd/yyyy) as indicated and click Submit. You will be taken to the next sign-up page. 5. Create a Un[employer]istry ID. This will be your Un[employer] login ID and cannot be changed, so think of one that is secure and easy to remember. 6. Create a [employer] password. You can change your password at any time. 7. Enter your Password Reset Question and Answer. This can be used at a later time if you forget your password. 8. Enter your e-mail address. You will receive e-mail notification when new information is available in [address] 9. Click Sign Up. You can now view and download portions of your medical record. 10. Click the Download Summary menu link to download a portable copy of your medical information. If you have questions, please visit the Frequently Asked Questions section of the [employer] website. Remember, [employer] is NOT to be used for urgent needs. For medical emergencies, dial 911. Now available from your iPhone and Android! Please provide this summary of care documentation to your next provider. Your primary care clinician is listed as Yordan Hill. If you have any questions after today's visit, please call [phone].

## 2018-03-06 ENCOUNTER — HOSPITAL ENCOUNTER (OUTPATIENT)
Dept: GENERAL RADIOLOGY | Age: 58
Discharge: HOME | End: 2018-03-06
Payer: COMMERCIAL

## 2018-03-06 ENCOUNTER — HOSPITAL ENCOUNTER (OUTPATIENT)
Dept: LAB | Age: 58
Discharge: HOME | End: 2018-03-06
Payer: COMMERCIAL

## 2018-03-06 LAB
ALBUMIN SERPL-MCNC: 3.5 G/DL (ref 3.4–5)
ALBUMIN/GLOB SERPL: 1 {RATIO} (ref 0.8–1.7)
ALP SERPL-CCNC: 88 U/L (ref 45–117)
ALT SERPL-CCNC: 17 U/L (ref 16–61)
ANION GAP SERPL CALC-SCNC: 8 MMOL/L (ref 3–18)
AST SERPL-CCNC: 16 U/L (ref 15–37)
BASOPHILS # BLD: 0 K/UL (ref 0–0.06)
BASOPHILS NFR BLD: 0 % (ref 0–2)
BILIRUB SERPL-MCNC: 1 MG/DL (ref 0.2–1)
BUN SERPL-MCNC: 16 MG/DL (ref 7–18)
BUN/CREAT SERPL: 11 (ref 12–20)
CALCIUM SERPL-MCNC: 7.6 MG/DL (ref 8.5–10.1)
CHLORIDE SERPL-SCNC: 102 MMOL/L (ref 100–108)
CO2 SERPL-SCNC: 27 MMOL/L (ref 21–32)
CREAT SERPL-MCNC: 1.42 MG/DL (ref 0.6–1.3)
DIFFERENTIAL METHOD BLD: ABNORMAL
EOSINOPHIL # BLD: 0.2 K/UL (ref 0–0.4)
EOSINOPHIL NFR BLD: 4 % (ref 0–5)
ERYTHROCYTE [DISTWIDTH] IN BLOOD BY AUTOMATED COUNT: 15.3 % (ref 11.6–14.5)
GLOBULIN SER CALC-MCNC: 3.4 G/DL (ref 2–4)
GLUCOSE SERPL-MCNC: 101 MG/DL (ref 74–99)
HCT VFR BLD AUTO: 36.9 % (ref 36–48)
HGB BLD-MCNC: 12.2 G/DL (ref 13–16)
INR PPP: 1.1 (ref 0.8–1.2)
LYMPHOCYTES # BLD: 2.6 K/UL (ref 0.9–3.6)
LYMPHOCYTES NFR BLD: 40 % (ref 21–52)
MCH RBC QN AUTO: 26.6 PG (ref 24–34)
MCHC RBC AUTO-ENTMCNC: 33.1 G/DL (ref 31–37)
MCV RBC AUTO: 80.6 FL (ref 74–97)
MONOCYTES # BLD: 0.6 K/UL (ref 0.05–1.2)
MONOCYTES NFR BLD: 9 % (ref 3–10)
NEUTS SEG # BLD: 3 K/UL (ref 1.8–8)
NEUTS SEG NFR BLD: 47 % (ref 40–73)
PLATELET # BLD AUTO: 252 K/UL (ref 135–420)
PMV BLD AUTO: 12.4 FL (ref 9.2–11.8)
POTASSIUM SERPL-SCNC: 3.8 MMOL/L (ref 3.5–5.5)
PROT SERPL-MCNC: 6.9 G/DL (ref 6.4–8.2)
PROTHROMBIN TIME: 13.3 SEC (ref 11.5–15.2)
RBC # BLD AUTO: 4.58 M/UL (ref 4.7–5.5)
SODIUM SERPL-SCNC: 137 MMOL/L (ref 136–145)
WBC # BLD AUTO: 6.4 K/UL (ref 4.6–13.2)

## 2018-03-06 PROCEDURE — 36415 COLL VENOUS BLD VENIPUNCTURE: CPT | Performed by: INTERNAL MEDICINE

## 2018-03-06 PROCEDURE — 85610 PROTHROMBIN TIME: CPT | Performed by: INTERNAL MEDICINE

## 2018-03-06 PROCEDURE — 71046 X-RAY EXAM CHEST 2 VIEWS: CPT

## 2018-03-06 PROCEDURE — 85025 COMPLETE CBC W/AUTO DIFF WBC: CPT | Performed by: INTERNAL MEDICINE

## 2018-03-06 PROCEDURE — 80053 COMPREHEN METABOLIC PANEL: CPT | Performed by: INTERNAL MEDICINE

## 2018-03-12 ENCOUNTER — ANESTHESIA EVENT (OUTPATIENT)
Dept: CARDIAC CATH/INVASIVE PROCEDURES | Age: 58
End: 2018-03-12
Payer: COMMERCIAL

## 2018-03-12 ENCOUNTER — HOSPITAL ENCOUNTER (OUTPATIENT)
Dept: CARDIAC CATH/INVASIVE PROCEDURES | Age: 58
Discharge: HOME | End: 2018-03-12
Attending: INTERNAL MEDICINE | Admitting: INTERNAL MEDICINE
Payer: COMMERCIAL

## 2018-03-12 ENCOUNTER — ANESTHESIA (OUTPATIENT)
Dept: CARDIAC CATH/INVASIVE PROCEDURES | Age: 58
End: 2018-03-12
Payer: COMMERCIAL

## 2018-03-12 VITALS
DIASTOLIC BLOOD PRESSURE: 100 MMHG | HEIGHT: 72 IN | OXYGEN SATURATION: 97 % | SYSTOLIC BLOOD PRESSURE: 170 MMHG | BODY MASS INDEX: 26.41 KG/M2 | WEIGHT: 195 LBS | HEART RATE: 58 BPM | RESPIRATION RATE: 12 BRPM | TEMPERATURE: 98.2 F

## 2018-03-12 DIAGNOSIS — Z98.890 S/P ABLATION OPERATION FOR ARRHYTHMIA: Primary | ICD-10-CM

## 2018-03-12 DIAGNOSIS — Z86.79 S/P ABLATION OPERATION FOR ARRHYTHMIA: Primary | ICD-10-CM

## 2018-03-12 PROCEDURE — 74011000250 HC RX REV CODE- 250: Performed by: INTERNAL MEDICINE

## 2018-03-12 PROCEDURE — 76060000034 HC ANESTHESIA 1.5 TO 2 HR

## 2018-03-12 PROCEDURE — 74011250636 HC RX REV CODE- 250/636: Performed by: INTERNAL MEDICINE

## 2018-03-12 PROCEDURE — 74011250636 HC RX REV CODE- 250/636

## 2018-03-12 PROCEDURE — 74011250637 HC RX REV CODE- 250/637: Performed by: INTERNAL MEDICINE

## 2018-03-12 PROCEDURE — C1730 CATH, EP, 19 OR FEW ELECT: HCPCS

## 2018-03-12 RX ORDER — PROPOFOL 10 MG/ML
INJECTION, EMULSION INTRAVENOUS
Status: DISCONTINUED | OUTPATIENT
Start: 2018-03-12 | End: 2018-03-12 | Stop reason: HOSPADM

## 2018-03-12 RX ORDER — AMLODIPINE BESYLATE 5 MG/1
5 TABLET ORAL DAILY
Status: DISCONTINUED | OUTPATIENT
Start: 2018-03-13 | End: 2018-03-12

## 2018-03-12 RX ORDER — METOPROLOL TARTRATE 25 MG/1
25 TABLET, FILM COATED ORAL ONCE
Status: COMPLETED | OUTPATIENT
Start: 2018-03-12 | End: 2018-03-12

## 2018-03-12 RX ORDER — MIDAZOLAM HYDROCHLORIDE 1 MG/ML
INJECTION, SOLUTION INTRAMUSCULAR; INTRAVENOUS AS NEEDED
Status: DISCONTINUED | OUTPATIENT
Start: 2018-03-12 | End: 2018-03-12 | Stop reason: HOSPADM

## 2018-03-12 RX ORDER — OXYCODONE AND ACETAMINOPHEN 5; 325 MG/1; MG/1
1 TABLET ORAL
Qty: 15 TAB | Refills: 0 | Status: SHIPPED | OUTPATIENT
Start: 2018-03-12 | End: 2018-07-28

## 2018-03-12 RX ORDER — HEPARIN SODIUM 200 [USP'U]/100ML
2 INJECTION, SOLUTION INTRAVENOUS ONCE
Status: COMPLETED | OUTPATIENT
Start: 2018-03-12 | End: 2018-03-12

## 2018-03-12 RX ORDER — FENTANYL CITRATE 50 UG/ML
INJECTION, SOLUTION INTRAMUSCULAR; INTRAVENOUS AS NEEDED
Status: DISCONTINUED | OUTPATIENT
Start: 2018-03-12 | End: 2018-03-12 | Stop reason: HOSPADM

## 2018-03-12 RX ORDER — ISOPROTERENOL HYDROCHLORIDE 0.2 MG/ML
0-100 INJECTION, SOLUTION INTRAMUSCULAR; INTRAVENOUS ONCE
Status: DISCONTINUED | OUTPATIENT
Start: 2018-03-12 | End: 2018-03-12 | Stop reason: HOSPADM

## 2018-03-12 RX ORDER — LIDOCAINE HYDROCHLORIDE 10 MG/ML
1-90 INJECTION, SOLUTION EPIDURAL; INFILTRATION; INTRACAUDAL; PERINEURAL
Status: DISCONTINUED | OUTPATIENT
Start: 2018-03-12 | End: 2018-03-12 | Stop reason: HOSPADM

## 2018-03-12 RX ORDER — AMLODIPINE BESYLATE 5 MG/1
5 TABLET ORAL
Status: DISCONTINUED | OUTPATIENT
Start: 2018-03-12 | End: 2018-03-12

## 2018-03-12 RX ORDER — AMLODIPINE BESYLATE 5 MG/1
5 TABLET ORAL
Status: COMPLETED | OUTPATIENT
Start: 2018-03-12 | End: 2018-03-12

## 2018-03-12 RX ADMIN — MIDAZOLAM HYDROCHLORIDE 2 MG: 1 INJECTION, SOLUTION INTRAMUSCULAR; INTRAVENOUS at 08:30

## 2018-03-12 RX ADMIN — LIDOCAINE HYDROCHLORIDE 20 ML: 10 INJECTION, SOLUTION EPIDURAL; INFILTRATION; INTRACAUDAL; PERINEURAL at 09:09

## 2018-03-12 RX ADMIN — HEPARIN SODIUM 2000 UNITS: 200 INJECTION, SOLUTION INTRAVENOUS at 09:09

## 2018-03-12 RX ADMIN — FENTANYL CITRATE 50 MCG: 50 INJECTION, SOLUTION INTRAMUSCULAR; INTRAVENOUS at 08:30

## 2018-03-12 RX ADMIN — AMLODIPINE BESYLATE 5 MG: 5 TABLET ORAL at 12:11

## 2018-03-12 RX ADMIN — PROPOFOL 50 MCG/KG/MIN: 10 INJECTION, EMULSION INTRAVENOUS at 08:30

## 2018-03-12 RX ADMIN — METOPROLOL TARTRATE 25 MG: 25 TABLET ORAL at 12:11

## 2018-03-12 RX ADMIN — FENTANYL CITRATE 50 MCG: 50 INJECTION, SOLUTION INTRAMUSCULAR; INTRAVENOUS at 08:59

## 2018-03-12 NOTE — ANESTHESIA PREPROCEDURE EVALUATION
Anesthetic History   No history of anesthetic complications            Review of Systems / Medical History  Patient summary reviewed and pertinent labs reviewed    Pulmonary                Comments: Lung noodules   Neuro/Psych       CVA  TIA and psychiatric history    Comments: H/o SAH Cardiovascular    Hypertension: poorly controlled        Dysrhythmias   Hyperlipidemia    Exercise tolerance: >4 METS     GI/Hepatic/Renal  Within defined limits              Endo/Other             Other Findings   Comments: Documentation of current medication  Current medications obtained, documented and obtained? YES      Risk Factors for Postoperative nausea/vomiting:       History of postoperative nausea/vomiting? NO       Female? NO       Motion sickness? NO       Intended opioid administration for postoperative analgesia? NO      Smoking Abstinence:  Current Smoker? NO  Elective Surgery? YES  Seen preoperatively by anesthesiologist or proxy prior to day of surgery? YES  Pt abstained from smoking 24 hours prior to anesthesia?  YES    Preventive care/screening for High Blood Pressure:  Aged 18 years and older: YES  Screened for high blood pressure: YES  Patients with high blood pressure referred to primary care provider   for BP management: YES                 Physical Exam    Airway  Mallampati: I  TM Distance: > 6 cm  Neck ROM: normal range of motion   Mouth opening: Normal     Cardiovascular  Regular rate and rhythm,  S1 and S2 normal,  no murmur, click, rub, or gallop             Dental  No notable dental hx       Pulmonary  Breath sounds clear to auscultation               Abdominal  GI exam deferred       Other Findings            Anesthetic Plan    ASA: 3  Anesthesia type: MAC          Induction: Intravenous  Anesthetic plan and risks discussed with: Patient

## 2018-03-12 NOTE — ROUTINE PROCESS
TRANSFER - OUT REPORT:    Verbal report given to GINA SANTOS RN (name) on Milton Backbone  being transferred to [facility] ([facility]) for routine progression of care       Report consisted of patients Situation, Background, Assessment and   Recommendations(SBAR). Information from the following report(s) SBAR, Kardex, Procedure Summary and MAR was reviewed with the receiving nurse. Lines:   Peripheral IV 03/12/18 Left Antecubital (Active)   Site Assessment Clean, dry, & intact 3/12/2018  7:00 AM   Phlebitis Assessment 0 3/12/2018  7:00 AM   Infiltration Assessment 0 3/12/2018  7:00 AM   Dressing Status Clean, dry, & intact 3/12/2018  7:00 AM   Dressing Type Transparent 3/12/2018  7:00 AM   Hub Color/Line Status Pink;Flushed 3/12/2018  7:00 AM       Peripheral IV 03/12/18 Right Forearm (Active)   Site Assessment Clean, dry, & intact 3/12/2018  7:00 AM   Phlebitis Assessment 0 3/12/2018  7:00 AM   Infiltration Assessment 0 3/12/2018  7:00 AM   Dressing Status Clean, dry, & intact 3/12/2018  7:00 AM   Dressing Type Transparent 3/12/2018  7:00 AM   Hub Color/Line Status Pink;Flushed 3/12/2018  7:00 AM        Opportunity for questions and clarification was provided.       Patient transported with:   [employer]
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
## 2018-03-12 NOTE — IP AVS SNAPSHOT
[address] [id number] [address] Patient: Gaudencio Michel MRN: [id number] :1960 About your hospitalization You were admitted on:  2018 You last received care in the:  [facility] 1 CATH HOLDING You were discharged on:  2018 Why you were hospitalized Your primary diagnosis was:  S/P Ablation Operation For Arrhythmia Follow-up Information Follow up With Details Comments Contact Info Brandt Joseph MD   [address] [facility] [address] [facility] [address] 
[phone] Sera Reynoso DO Schedule an appointment as soon as possible for a visit in 2 month   [facility] [address] [facility] 
[phone] Discharge Orders None A check berhane indicates which time of day the medication should be taken. My Medications START taking these medications Instructions Each Dose to Equal  
 Morning Noon Evening Bedtime  
 oxyCODONE-acetaminophen 5-325 mg per tablet Commonly known as:  PERCOCET Your last dose was: Your next dose is: Take 1 Tab by mouth every six (6) hours as needed for Pain. Max Daily Amount: 4 Tabs. 1 Tab CHANGE how you take these medications Instructions Each Dose to Equal  
 Morning Noon Evening Bedtime  
 metoprolol tartrate 25 mg tablet Commonly known as:  LOPRESSOR What changed:  how much to take Your last dose was: Your next dose is: Take 0.5 Tabs by mouth every twelve (12) hours. 12.5 mg  
    
   
   
   
  
  
CONTINUE taking these medications Instructions Each Dose to Equal  
 Morning Noon Evening Bedtime  
 amLODIPine 5 mg tablet Commonly known as:  Raven Yan Your last dose was: Your next dose is: Take 1 Tab by mouth daily. 5 mg  
    
   
   
   
  
 aspirin 81 mg chewable tablet Your last dose was: Your next dose is: Take 1 Tab by mouth daily. 81 mg  
    
   
   
   
  
 atorvastatin 40 mg tablet Commonly known as:  LIPITOR Your last dose was: Your next dose is: Take 1 Tab by mouth nightly. 40 mg  
    
   
   
   
  
 clopidogrel 75 mg Tab Commonly known as:  PLAVIX Your last dose was: Your next dose is: Take 1 Tab by mouth daily. 75 mg  
    
   
   
   
  
 famotidine 20 mg tablet Commonly known as:  PEPCID Your last dose was: Your next dose is: Take 1 Tab by mouth two (2) times a day. 20 mg  
    
   
   
   
  
 metaxalone 800 mg tablet Commonly known as:  SKELAXIN Your last dose was: Your next dose is: Take  by mouth. Where to Get Your Medications Information on where to get these meds will be given to you by the nurse or doctor. ! Ask your nurse or doctor about these medications  
  oxyCODONE-acetaminophen 5-325 mg per tablet Discharge Instructions Disposition: When discharged to home will need follow-up in my office in 4 weeks with Dr. Maribel Ng. Please call my office at [phone] if any questions or concerns. Restrictions: 
No driving for at least 24 hours after surgery. No heavy lifting > 10 lbs or prolonged standing, walking, or running x 1 week. OK to shower today over incision and remove dressing. Monitor groin for any signs of bleeding and please contact office at [phone] if any issues. There may be some mild bruising which is not unusual. 
If any new symptoms develop, such as chest pain, dyspnea, dizziness, please contact office at [phone] immediately. ABLATION DISCHARGE INSTRUCTIONS It is normal to feel tired the first couple days. Take it easy and follow the physicians instructions.    
 
CHECK THE CATHETER INSERTION SITE DAILY: 
 You may shower 24 hours after the procedure, remove the bandage during showering. Wash with soap and water and pat dry. Gentle cleaning of the site with soap and water is sufficient, cover with a dry clean dressing or bandage. Do not apply creams or powders to the area. Do not sit in a bathtub or pool of water for 7 days or until wound has completely healed. Temporary bruising and discomfort is normal and may last a few weeks. You may have a  formation of a small lump at the site which may last up to 6 weeks. CALL THE PHYSICIAN: If the site becomes red, swollen or feels warm to the touch If there is bleeding or drainage or if there is unusual pain at the groin or down the leg. If there is any bleeding, lie down, apply pressure or have someone apply pressure with a clean cloth until the bleeding stops. If the bleeding continues, call 911 to be transported to the hospital. 
DO  [facility] [address]. Activity: For the first 24-48 hours or as instructed by the physician: No lifting, pushing or pulling over 10 pounds and no straining the insertion site. Do not life grocery bags or the garbage can, do not run the vacuum  or  for 7 days. Start with short walks as in the hospital and gradually increase as tolerated each day. It is recommended to walk 30 minutes 5-7 days per week. Follow your physicians instructions on activity. Avoid walking outside in extremes of heat or cold. Walk inside when it is cold and windy or hot and humid. Things to keep in mind: 
No driving for at least 24 hours, or as designated by your physician. Limit the number of times you go up and down the stairs Take rests and pace yourself with activity. Be careful and do not strain with bowel movements. Medications: Take all medications as prescribed Call your physician if you have any questions Keep an updated list of your medications with you at all times and give a list to your physician and pharmacist 
 
Signs and Symptoms: 
Be cautious of symptoms of angina or recurrent symptoms such as chest discomfort, unusual shortness of breath or fatigue, palpitations. After Care: Follow up with your physician as instructed. Follow a heart healthy diet with proper portion control, daily stress management, daily exercise, blood pressure and cholesterol control , and smoking cessation. Introducing [facility]! Mao Mathis introduces TYMR patient portal. Now you can access parts of your medical record, email your doctor's office, and request medication refills online. 1. In your internet browser, go to [email or web address] 2. Click on the First Time User? Click Here link in the Sign In box. You will see the New Member Sign Up page. 3. Enter your TYMR Access Code exactly as it appears below. You will not need to use this code after youve completed the sign-up process. If you do not sign up before the expiration date, you must request a new code. · TYMR Access Code: [email or web address] Expires: 5/16/2018  5:49 PM 
 
4. Enter the last four digits of your Social Security Number (xxxx) and Date of Birth (mm/dd/yyyy) as indicated and click Submit. You will be taken to the next sign-up page. 5. Create a TYMR ID. This will be your TYMR login ID and cannot be changed, so think of one that is secure and easy to remember. 6. Create a TYMR password. You can change your password at any time. 7. Enter your Password Reset Question and Answer. This can be used at a later time if you forget your password. 8. Enter your e-mail address. You will receive e-mail notification when new information is available in [address] 9. Click Sign Up. You can now view and download portions of your medical record. 10. Click the Download Summary menu link to download a portable copy of your medical information. If you have questions, please visit the Frequently Asked Questions section of the [employer] website. Remember, [employer] is NOT to be used for urgent needs. For medical emergencies, dial 911. Now available from your iPhone and Android! Providers Seen During Your Hospitalization Provider Specialty Primary office phone Shiloh Keita MD Cardiology [phone] Your Primary Care Physician (PCP) Primary Care Physician Office Phone Office Fax Kaylah Wood [phone] You are allergic to the following No active allergies Recent Documentation Height Weight BMI Smoking Status 1.829 m 88.5 kg 26.45 kg/m2 Never Smoker Emergency Contacts Name Discharge Info Relation Home Work Mobile Laly Altman DISCHARGE CAREGIVER [3] Spouse [3] [phone] Patient Belongings The following personal items are in your possession at time of discharge: 
     Visual Aid: None Please provide this summary of care documentation to your next provider. Signatures-by signing, you are acknowledging that this After Visit Summary has been reviewed with you and you have received a copy. Patient Signature:  ____________________________________________________________ Date:  ____________________________________________________________  
  
Serge Officer Provider Signature:  ____________________________________________________________ Date:  ____________________________________________________________

## 2018-03-12 NOTE — PROGRESS NOTES
Cath holding summary    0745:Patient escorted to cath holding from waiting area ambulatory, alert and oriented x 4, voicing no complaints. Changed into gown and placed on monitor. NSR noted. NPO since MN. Lab results, med rec and H&P reviewed on chart. PIV x 2 inserted without difficulty. Family to bedside. 1030: Patient resting comfortably post procedure. Right groin intact with no bleeding or hematoma noted. Will monitor. 1300: Pt up to ambulate without difficulty. Remains asymptomatic with stable groin site after progressive ambulation. Dressed at bedside and PIV's removed in good condition. DC instructions reviewed with verbalized understanding and copy provided. Released ambulatory to daughter at vehicle in no pain.

## 2018-03-12 NOTE — DISCHARGE INSTRUCTIONS
Disposition:  When discharged to home will need follow-up in my office in 4 weeks with Dr. Monique Rowland. Please call my office at [phone] if any questions or concerns. Restrictions:  No driving for at least 24 hours after surgery. No heavy lifting > 10 lbs or prolonged standing, walking, or running x 1 week. OK to shower today over incision and remove dressing. Monitor groin for any signs of bleeding and please contact office at [phone] if any issues. There may be some mild bruising which is not unusual.  If any new symptoms develop, such as chest pain, dyspnea, dizziness, please contact office at [phone] immediately. ABLATION DISCHARGE INSTRUCTIONS    It is normal to feel tired the first couple days. Take it easy and follow the physicians instructions. CHECK THE CATHETER INSERTION SITE DAILY:  You may shower 24 hours after the procedure, remove the bandage during showering. Wash with soap and water and pat dry. Gentle cleaning of the site with soap and water is sufficient, cover with a dry clean dressing or bandage. Do not apply creams or powders to the area. Do not sit in a bathtub or pool of water for 7 days or until wound has completely healed. Temporary bruising and discomfort is normal and may last a few weeks. You may have a  formation of a small lump at the site which may last up to 6 weeks. CALL THE PHYSICIAN:  If the site becomes red, swollen or feels warm to the touch  If there is bleeding or drainage or if there is unusual pain at the groin or down the leg. If there is any bleeding, lie down, apply pressure or have someone apply pressure with a clean cloth until the bleeding stops. If the bleeding continues, call 911 to be transported to the hospital.  DO NOT DRIVE YOURSELF, [place] [address]. Activity:      For the first 24-48 hours or as instructed by the physician:  No lifting, pushing or pulling over 10 pounds and no straining the insertion site. Do not life grocery bags or the garbage can, do not run the vacuum  or  for 7 days. Start with short walks as in the hospital and gradually increase as tolerated each day. It is recommended to walk 30 minutes 5-7 days per week. Follow your physicians instructions on activity. Avoid walking outside in extremes of heat or cold. Walk inside when it is cold and windy or hot and humid. Things to keep in mind:  No driving for at least 24 hours, or as designated by your physician. Limit the number of times you go up and down the stairs  Take rests and pace yourself with activity. Be careful and do not strain with bowel movements. Medications: Take all medications as prescribed  Call your physician if you have any questions  Keep an updated list of your medications with you at all times and give a list to your physician and pharmacist    Signs and Symptoms:  Be cautious of symptoms of angina or recurrent symptoms such as chest discomfort, unusual shortness of breath or fatigue, palpitations. After Care: Follow up with your physician as instructed. Follow a heart healthy diet with proper portion control, daily stress management, daily exercise, blood pressure and cholesterol control , and smoking cessation.
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
## 2018-03-12 NOTE — ROUTINE PROCESS
TRANSFER - IN REPORT:    Verbal report received from Jerardo Rader RN (name) on Esteban Esqueda  being received from [facility] [place] of [place]      Report consisted of patients Situation, Background, Assessment and   Recommendations(SBAR). Information from the following report(s) SBAR, Kardex, Procedure Summary and MAR was reviewed with the receiving nurse. Opportunity for questions and clarification was provided. Assessment completed upon patients arrival to unit and care assumed.

## 2018-03-13 ENCOUNTER — TELEPHONE (OUTPATIENT)
Dept: CARDIOLOGY CLINIC | Age: 58
End: 2018-03-13

## 2018-03-13 NOTE — TELEPHONE ENCOUNTER
LMOM for patient to contact office to get appointment scheduled. He can see Maribell Tena in 4 weeks . John Will As Dr. Forte Sessions has nothing available. Also . John Will Remind patient to contact Bryant Marie about scheduling his testing . John Will She has already tried to reach him and left him a message. Hamzah Brewster A   Male, 61 y. o., 1960  Weight:   88.5 kg (195 lb)  Phone:   N:[phone]  PCP:   Simin Dawson MD  MRN:   [id number]  MyChart:   Pending  Next Appt (With Me)  None  Next Appt (Any Provider)  None    Message  Received: Yesterday       Alva Murray MD  [address]                     Patient s/p svt ablatin today.     Should see skillen 4 weeks. Also needs to schedule his echo and pharm nuc that were never done, as far as I could tell.    jesse Jean-Baptiste

## 2018-03-13 NOTE — TELEPHONE ENCOUNTER
Patient called back sched 4 week follow up w/ Lorenzo Mendoza on 4/9/18. Patient aware Edmund Johnson has tried to get in touch with him to schedule his pharm nuc & echo. I gave patient Amanda's # to get testing scheduled. Patient verbalized understanding.  Carla Walton

## 2018-06-27 ENCOUNTER — OFFICE VISIT (OUTPATIENT)
Dept: CARDIOLOGY CLINIC | Age: 58
End: 2018-06-27

## 2018-06-27 DIAGNOSIS — G45.9 TRANSIENT CEREBRAL ISCHEMIA, UNSPECIFIED TYPE: Primary | ICD-10-CM

## 2018-06-27 DIAGNOSIS — Z45.09 ENCOUNTER FOR LOOP RECORDER CHECK: ICD-10-CM

## 2018-07-02 NOTE — PROGRESS NOTES
I have personally seen and evaluated the device findings. Interrogation reviewed and I agree with assessment.     Ana Dunbar

## 2018-07-28 ENCOUNTER — HOSPITAL ENCOUNTER (EMERGENCY)
Age: 58
Discharge: HOME | End: 2018-07-28
Attending: EMERGENCY MEDICINE
Payer: COMMERCIAL

## 2018-07-28 VITALS
TEMPERATURE: 97.3 F | SYSTOLIC BLOOD PRESSURE: 174 MMHG | OXYGEN SATURATION: 100 % | WEIGHT: 185 LBS | DIASTOLIC BLOOD PRESSURE: 113 MMHG | HEIGHT: 72 IN | BODY MASS INDEX: 25.06 KG/M2 | HEART RATE: 78 BPM | RESPIRATION RATE: 16 BRPM

## 2018-07-28 DIAGNOSIS — I10 UNCONTROLLED HYPERTENSION: ICD-10-CM
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
## 2018-07-28 DIAGNOSIS — S16.1XXA STRAIN OF NECK MUSCLE, INITIAL ENCOUNTER: ICD-10-CM

## 2018-07-28 DIAGNOSIS — M54.50 ACUTE BILATERAL LOW BACK PAIN WITHOUT SCIATICA: ICD-10-CM

## 2018-07-28 PROCEDURE — 99282 EMERGENCY DEPT VISIT SF MDM: CPT

## 2018-07-28 RX ORDER — NAPROXEN 375 MG/1
375 TABLET ORAL 2 TIMES DAILY WITH MEALS
Qty: 20 TAB | Refills: 0 | OUTPATIENT
Start: 2018-07-28 | End: 2020-08-12

## 2018-07-28 RX ORDER — CYCLOBENZAPRINE HCL 5 MG
5 TABLET ORAL
Qty: 30 TAB | Refills: 0 | Status: SHIPPED | OUTPATIENT
Start: 2018-07-28 | End: 2018-09-24 | Stop reason: ALTCHOICE

## 2018-07-28 NOTE — DISCHARGE INSTRUCTIONS
Learning About How to Have a Healthy Back  What causes back pain? Back pain is often caused by overuse, strain, or injury. For example, people often hurt their backs playing sports or working in the yard, being jolted in a car accident, or lifting something too heavy. Aging plays a part too. Your bones and muscles tend to lose strength as you age, which makes injury more likely. The spongy discs between the bones of the spine (vertebrae) may suffer from wear and tear and no longer provide enough cushion between the bones. A disc that bulges or breaks open (herniated disc) can press on nerves, causing back pain. In some people, back pain is the result of arthritis, broken vertebrae caused by bone loss (osteoporosis), illness, or a spine problem. Although most people have back pain at one time or another, there are steps you can take to make it less likely. How can you have a healthy back? Reduce stress on your back through good posture  Slumping or slouching alone may not cause low back pain. But after the back has been strained or injured, bad posture can make pain worse. · Sleep in a position that maintains your back's normal curves and on a mattress that feels comfortable. Sleep on your side with a pillow between your knees, or sleep on your back with a pillow under your knees. These positions can reduce strain on your back. · Stand and sit up straight. \"Good posture\" generally means your ears, shoulders, and hips are in a straight line. · If you must stand for a long time, put one foot on a stool, ledge, or box. Switch feet every now and then. · Sit in a chair that is low enough to let you place both feet flat on the floor with both knees nearly level with your hips. If your chair or desk is too high, use a footrest to raise your knees. Place a small pillow, a rolled-up towel, or a lumbar roll in the curve of your back if you need extra support.   · Try a kneeling chair, which helps tilt your hips forward. This takes pressure off your lower back. · Try sitting on an exercise ball. It can rock from side to side, which helps keep your back loose. · When driving, keep your knees nearly level with your hips. Sit straight, and drive with both hands on the steering wheel. Your arms should be in a slightly bent position. Reduce stress on your back through careful lifting  · Squat down, bending at the hips and knees only. If you need to, put one knee to the floor and extend your other knee in front of you, bent at a right angle (half kneeling). · Press your chest straight forward. This helps keep your upper back straight while keeping a slight arch in your low back. · Hold the load as close to your body as possible, at the level of your belly button (navel). · Use your feet to change direction, taking small steps. · Lead with your hips as you change direction. Keep your shoulders in line with your hips as you move. · Set down your load carefully, squatting with your knees and hips only. Exercise and stretch your back  · Do some exercise on most days of the week, if your doctor says it is okay. You can walk, run, swim, or cycle. · Stretch your back muscles. Here are a few exercises to try:  Melvi Needle on your back, and gently pull one bent knee to your chest. Put that foot back on the floor, and then pull the other knee to your chest.  ¨ Do pelvic tilts. Lie on your back with your knees bent. Tighten your stomach muscles. Pull your belly button (navel) in and up toward your ribs. You should feel like your back is pressing to the floor and your hips and pelvis are slightly lifting off the floor. Hold for 6 seconds while breathing smoothly. ¨ Sit with your back flat against a wall. · Keep your core muscles strong. The muscles of your back, belly (abdomen), and buttocks support your spine. ¨ Pull in your belly and imagine pulling your navel toward your spine. Hold this for 6 seconds, then relax.  Remember to keep breathing normally as you tense your muscles. ¨ Do curl-ups. Always do them with your knees bent. Keep your low back on the floor, and curl your shoulders toward your knees using a smooth, slow motion. Keep your arms folded across your chest. If this bothers your neck, try putting your hands behind your neck (not your head), with your elbows spread apart. ¨ Lie on your back with your knees bent and your feet flat on the floor. Tighten your belly muscles, and then push with your feet and raise your buttocks up a few inches. Hold this position 6 seconds as you continue to breathe normally, then lower yourself slowly to the floor. Repeat 8 to 12 times. ¨ If you like group exercise, try Pilates or yoga. These classes have poses that strengthen the core muscles. Lead a healthy lifestyle  · Stay at a healthy weight to avoid strain on your back. · Do not smoke. Smoking increases the risk of osteoporosis, which weakens the spine. If you need help quitting, talk to your doctor about stop-smoking programs and medicines. These can increase your chances of quitting for good. Where can you learn more? Go to http://anayeliStruqezequiel.info/. Enter L315 in the search box to learn more about \"Learning About How to Have a Healthy Back. \"  Current as of: November 29, 2017  Content Version: 11.7  © 7608-5480 [employer]. Care instructions adapted under license by [employer] (which disclaims liability or warranty for this information). If you have questions about a medical condition or this instruction, always ask your healthcare professional. [place] [address] any warranty or liability for your use of this information. Neck Strain or Sprain: Rehab Exercises  Your Care Instructions  Here are some examples of typical rehabilitation exercises for your condition. Start each exercise slowly. Ease off the exercise if you start to have pain.   Your doctor or physical therapist will tell you when you can start these exercises and which ones will work best for you. How to do the exercises  Neck rotation    1. Sit in a firm chair, or stand up straight. 2. Keeping your chin level, turn your head to the right, and hold for 15 to 30 seconds. 3. Turn your head to the left and hold for 15 to 30 seconds. 4. Repeat 2 to 4 times to each side. Neck stretches    1. Look straight ahead, and tip your right ear to your right shoulder. Do not let your left shoulder rise up as you tip your head to the right. 2. Hold for 15 to 30 seconds. 3. Tilt your head to the left. Do not let your right shoulder rise up as you tip your head to the left. 4. Hold for 15 to 30 seconds. 5. Repeat 2 to 4 times to each side. Forward neck flexion    1. Sit in a firm chair, or stand up straight. 2. Bend your head forward. 3. Hold for 15 to 30 seconds. 4. Repeat 2 to 4 times. Lateral (side) bend strengthening    1. With your right hand, place your first two fingers on your right temple. 2. Start to bend your head to the side while using gentle pressure from your fingers to keep your head from bending. 3. Hold for about 6 seconds. 4. Repeat 8 to 12 times. 5. Switch hands and repeat the same exercise on your left side. Forward bend strengthening    1. Place your first two fingers of either hand on your forehead. 2. Start to bend your head forward while using gentle pressure from your fingers to keep your head from bending. 3. Hold for about 6 seconds. 4. Repeat 8 to 12 times. Neutral position strengthening    1. Using one hand, place your fingertips on the back of your head at the top of your neck. 2. Start to bend your head backward while using gentle pressure from your fingers to keep your head from bending. 3. Hold for about 6 seconds. 4. Repeat 8 to 12 times. Chin tuck    1. Lie on the floor with a rolled-up towel under your neck. Your head should be touching the floor.   2. Slowly bring your chin toward your chest.  3. Hold for a count of 6, and then relax for up to 10 seconds. 4. Repeat 8 to 12 times. Follow-up care is a key part of your treatment and safety. Be sure to make and go to all appointments, and call your doctor if you are having problems. It's also a good idea to know your test results and keep a list of the medicines you take. Where can you learn more? Go to http://anayeli-ezequiel.info/. Enter M679 in the search box to learn more about \"Neck Strain or Sprain: Rehab Exercises. \"  Current as of: November 29, 2017  Content Version: 11.7  © 4427-2400 [employer]. Care instructions adapted under license by [employer] (which disclaims liability or warranty for this information). If you have questions about a medical condition or this instruction, always ask your healthcare professional. [address] any warranty or liability for your use of this information.
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
## 2018-07-28 NOTE — Clinical Note
As discussed, follow up with your family doctor this week to see if you can get in to physical therapy which may help you get better faster.

## 2018-09-24 ENCOUNTER — APPOINTMENT (OUTPATIENT)
Dept: GENERAL RADIOLOGY | Age: 58
End: 2018-09-24
Attending: PHYSICIAN ASSISTANT
Payer: COMMERCIAL

## 2018-09-24 ENCOUNTER — HOSPITAL ENCOUNTER (EMERGENCY)
Age: 58
Discharge: HOME | End: 2018-09-24
Attending: EMERGENCY MEDICINE
Payer: COMMERCIAL

## 2018-09-24 VITALS
OXYGEN SATURATION: 100 % | HEART RATE: 81 BPM | BODY MASS INDEX: 25.6 KG/M2 | SYSTOLIC BLOOD PRESSURE: 165 MMHG | HEIGHT: 72 IN | DIASTOLIC BLOOD PRESSURE: 97 MMHG | TEMPERATURE: 98.7 F | WEIGHT: 189 LBS | RESPIRATION RATE: 22 BRPM

## 2018-09-24 DIAGNOSIS — M54.9 BACK PAIN, UNSPECIFIED BACK LOCATION, UNSPECIFIED BACK PAIN LATERALITY, UNSPECIFIED CHRONICITY: ICD-10-CM
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
## 2018-09-24 DIAGNOSIS — R03.0 ELEVATED BLOOD PRESSURE READING: ICD-10-CM

## 2018-09-24 DIAGNOSIS — M54.2 NECK PAIN: ICD-10-CM

## 2018-09-24 DIAGNOSIS — M25.562 ACUTE PAIN OF LEFT KNEE: ICD-10-CM

## 2018-09-24 PROCEDURE — 99283 EMERGENCY DEPT VISIT LOW MDM: CPT

## 2018-09-24 PROCEDURE — 73564 X-RAY EXAM KNEE 4 OR MORE: CPT

## 2018-09-24 PROCEDURE — 74011250637 HC RX REV CODE- 250/637: Performed by: PHYSICIAN ASSISTANT

## 2018-09-24 RX ORDER — ACETAMINOPHEN 325 MG/1
650 TABLET ORAL
Qty: 20 TAB | Refills: 0 | Status: SHIPPED | OUTPATIENT
Start: 2018-09-24 | End: 2020-11-11

## 2018-09-24 RX ORDER — LIDOCAINE 40 MG/G
CREAM TOPICAL
Qty: 15 G | Refills: 0 | Status: SHIPPED | OUTPATIENT
Start: 2018-09-24 | End: 2020-11-11

## 2018-09-24 RX ORDER — METHOCARBAMOL 500 MG/1
500 TABLET, FILM COATED ORAL 3 TIMES DAILY
Qty: 12 TAB | Refills: 0 | Status: SHIPPED | OUTPATIENT
Start: 2018-09-24 | End: 2020-11-11

## 2018-09-24 RX ORDER — IBUPROFEN 600 MG/1
600 TABLET ORAL
Status: COMPLETED | OUTPATIENT
Start: 2018-09-24 | End: 2018-09-24

## 2018-09-24 RX ADMIN — IBUPROFEN 600 MG: 600 TABLET ORAL at 21:19

## 2018-09-24 NOTE — LETTER
[address] [place] [address] [facility] 
[address] [facility] [id number] [phone] Work/School Note Date: 9/24/2018 To Whom It May concern: 
 
My Shields was seen and treated today in the emergency room by the following provider(s): 
Attending Provider: Guzman Valentine MD 
Physician Assistant: Anjum Skinner PA-C. My Shields may return to work on 9/26/18. Sincerely, Anjum Skinner PA-C

## 2018-09-25 NOTE — DISCHARGE INSTRUCTIONS
Back Pain: Care Instructions  Your Care Instructions    Back pain has many possible causes. It is often related to problems with muscles and ligaments of the back. It may also be related to problems with the nerves, discs, or bones of the back. Moving, lifting, standing, sitting, or sleeping in an awkward way can strain the back. Sometimes you don't notice the injury until later. Arthritis is another common cause of back pain. Although it may hurt a lot, back pain usually improves on its own within several weeks. Most people recover in 12 weeks or less. Using good home treatment and being careful not to stress your back can help you feel better sooner. Follow-up care is a key part of your treatment and safety. Be sure to make and go to all appointments, and call your doctor if you are having problems. It's also a good idea to know your test results and keep a list of the medicines you take. How can you care for yourself at home? · Sit or lie in positions that are most comfortable and reduce your pain. Try one of these positions when you lie down:  ¨ Lie on your back with your knees bent and supported by large pillows. ¨ Lie on the floor with your legs on the seat of a sofa or chair. Terrence Running on your side with your knees and hips bent and a pillow between your legs. ¨ Lie on your stomach if it does not make pain worse. · Do not sit up in bed, and avoid soft couches and twisted positions. Bed rest can help relieve pain at first, but it delays healing. Avoid bed rest after the first day of back pain. · Change positions every 30 minutes. If you must sit for long periods of time, take breaks from sitting. Get up and walk around, or lie in a comfortable position. · Try using a heating pad on a low or medium setting for 15 to 20 minutes every 2 or 3 hours. Try a warm shower in place of one session with the heating pad. · You can also try an ice pack for 10 to 15 minutes every 2 to 3 hours.  Put a thin cloth between the ice pack and your skin. · Take pain medicines exactly as directed. ¨ If the doctor gave you a prescription medicine for pain, take it as prescribed. ¨ If you are not taking a prescription pain medicine, ask your doctor if you can take an over-the-counter medicine. · Take short walks several times a day. You can start with 5 to 10 minutes, 3 or 4 times a day, and work up to longer walks. Walk on level surfaces and avoid hills and stairs until your back is better. · Return to work and other activities as soon as you can. Continued rest without activity is usually not good for your back. · To prevent future back pain, do exercises to stretch and strengthen your back and stomach. Learn how to use good posture, safe lifting techniques, and proper body mechanics. When should you call for help? Call your doctor now or seek immediate medical care if:    · You have new or worsening numbness in your legs.     · You have new or worsening weakness in your legs. (This could make it hard to stand up.)     · You lose control of your bladder or bowels.    Watch closely for changes in your health, and be sure to contact your doctor if:    · You have a fever, lose weight, or don't feel well.     · You do not get better as expected. Where can you learn more? Go to http://anayeli-ezequiel.info/. Enter F605 in the search box to learn more about \"Back Pain: Care Instructions. \"  Current as of: November 29, 2017  Content Version: 11.7  © 7636-6433 [employer]. Care instructions adapted under license by [employer] (which disclaims liability or warranty for this information). If you have questions about a medical condition or this instruction, always ask your healthcare professional. [place] [address] any warranty or liability for your use of this information.

## 2018-09-25 NOTE — ED NOTES
9:21 PM 
09/24/18 Discharge instructions given to Saroj Johnson (name) with verbalization of understanding. Patient accompanied by self. Patient discharged with the following prescriptions robaxin, tylenol, & lidocaine. Patient discharged to home (destination). Tess Howard
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
## 2018-10-03 ENCOUNTER — OFFICE VISIT (OUTPATIENT)
Dept: CARDIOLOGY CLINIC | Age: 58
End: 2018-10-03

## 2018-10-03 DIAGNOSIS — G45.9 TIA (TRANSIENT ISCHEMIC ATTACK): Primary | ICD-10-CM

## 2018-10-03 DIAGNOSIS — Z45.09 ENCOUNTER FOR LOOP RECORDER CHECK: ICD-10-CM

## 2018-10-10 ENCOUNTER — HOSPITAL ENCOUNTER (OUTPATIENT)
Dept: PHYSICAL THERAPY | Age: 58
Discharge: HOME | End: 2018-10-10
Payer: COMMERCIAL

## 2018-10-10 PROCEDURE — 97162 PT EVAL MOD COMPLEX 30 MIN: CPT

## 2018-10-10 PROCEDURE — 97530 THERAPEUTIC ACTIVITIES: CPT

## 2018-10-10 PROCEDURE — 97110 THERAPEUTIC EXERCISES: CPT
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
## 2018-10-16 NOTE — PROGRESS NOTES
I have personally seen and evaluated the device findings. Interrogation reviewed and I agree with assessment.     Chris Trinidad

## 2018-10-18 ENCOUNTER — TELEPHONE (OUTPATIENT)
Dept: CARDIOLOGY CLINIC | Age: 58
End: 2018-10-18

## 2018-10-18 NOTE — TELEPHONE ENCOUNTER
----- Message from Aleena Kenney MD sent at 10/16/2018  8:09 AM EDT -----  Patient needs to stop plavix, treat with asa and start eliquis 5 mg po bid for a.fib. Can call in if like or it looks like sees skillen usually and he can see or [place] to discuss if needed.  thx    ----- Message -----     From: Janessa Conway     Sent: 10/15/2018   3:49 PM       To: Aleena Kenney MD

## 2018-10-22 ENCOUNTER — APPOINTMENT (OUTPATIENT)
Dept: PHYSICAL THERAPY | Age: 58
End: 2018-10-22
Payer: COMMERCIAL

## 2018-10-31 NOTE — PROGRESS NOTES
[facility] [address] [facility] 
[phone] fax Physical Therapy Discharge Summary Patient name: Alejandro Briones Start of Care: 10/10/18 Referral source: Demi Perez MD : 1960 Medical/Treatment Diagnosis: Neck pain [M54.2] Back pain [M54.9] Right knee pain [M25.561] Payor: Purvi Lawson / Plan: [facility] PPO / Product Type: Commerical /  Onset Date:2 weeks ago Prior Hospitalization: see medical history Provider#: [id number] Medications: Verified on Patient Summary List   
Comorbidities: HTN, depression, head injury, CVA with right hemiparesis per subjective reports Prior Level of Function: lives with wife at times and with mother at times, , right-handed Visits from Start of Care: 1    Missed Visits: 5 Reporting Period : 10/10/18 to 10/10/18 Summary of Care: 
 
Short Term Goals: To be accomplished in 1 weeks: 1. Pt will be compliant with initial HEP to improve therapy outcomes Long Term Goals: To be accomplished in 6 weeks: 1. Pt will improve FOTO by 5 points in order to demonstrate functional improvement 2. Pt will perform full, pain-free lumbar AROM in order to perform movement requirements for job tasks 3. Pt will report 50% improvement in sx in order to improve QOL 
  
 
No goals met as pt did not return to therapy following initial evaluation ASSESSMENT/RECOMMENDATIONS: 
 
Pt did not return to therapy after initial evaluation. He has NS 5 appointments and is DC at this time d/t noncompliance and clinic's CX/NS policy. [x]Discontinue therapy: []Patient has reached or is progressing toward set goals [x]Patient is non-compliant or has abdicated 
    []Due to lack of appreciable progress towards set goals Monty Jon, PT 10/31/2018 2:50 PM

## 2018-11-23 ENCOUNTER — HOSPITAL ENCOUNTER (EMERGENCY)
Age: 58
Discharge: HOME | End: 2018-11-24
Attending: EMERGENCY MEDICINE
Payer: COMMERCIAL

## 2018-11-23 ENCOUNTER — APPOINTMENT (OUTPATIENT)
Dept: GENERAL RADIOLOGY | Age: 58
End: 2018-11-23
Attending: PHYSICIAN ASSISTANT
Payer: COMMERCIAL

## 2018-11-23 ENCOUNTER — APPOINTMENT (OUTPATIENT)
Dept: VASCULAR SURGERY | Age: 58
End: 2018-11-23
Attending: PHYSICIAN ASSISTANT
Payer: COMMERCIAL

## 2018-11-23 ENCOUNTER — APPOINTMENT (OUTPATIENT)
Dept: CT IMAGING | Age: 58
End: 2018-11-23
Attending: PHYSICIAN ASSISTANT
Payer: COMMERCIAL

## 2018-11-23 VITALS
HEART RATE: 68 BPM | SYSTOLIC BLOOD PRESSURE: 171 MMHG | RESPIRATION RATE: 28 BRPM | DIASTOLIC BLOOD PRESSURE: 98 MMHG | TEMPERATURE: 98.3 F | OXYGEN SATURATION: 100 %

## 2018-11-23 DIAGNOSIS — R79.89 ELEVATED LACTIC ACID LEVEL: ICD-10-CM

## 2018-11-23 DIAGNOSIS — M25.531 ACUTE PAIN OF RIGHT WRIST: Primary | ICD-10-CM

## 2018-11-23 LAB
ANION GAP BLD CALC-SCNC: 18 MMOL/L (ref 10–20)
ANION GAP SERPL CALC-SCNC: 8 MMOL/L (ref 3–18)
APTT PPP: 24.9 SEC (ref 23–36.4)
BASOPHILS # BLD: 0 K/UL (ref 0–0.1)
BASOPHILS NFR BLD: 0 % (ref 0–2)
BUN BLD-MCNC: 17 MG/DL (ref 7–18)
BUN SERPL-MCNC: 17 MG/DL (ref 7–18)
BUN/CREAT SERPL: 12 (ref 12–20)
CA-I BLD-MCNC: 0.94 MMOL/L (ref 1.12–1.32)
CALCIUM SERPL-MCNC: 7.3 MG/DL (ref 8.5–10.1)
CHLORIDE BLD-SCNC: 99 MMOL/L (ref 100–108)
CHLORIDE SERPL-SCNC: 104 MMOL/L (ref 100–108)
CO2 BLD-SCNC: 24 MMOL/L (ref 19–24)
CO2 SERPL-SCNC: 25 MMOL/L (ref 21–32)
CREAT SERPL-MCNC: 1.44 MG/DL (ref 0.6–1.3)
CREAT UR-MCNC: 1.3 MG/DL (ref 0.6–1.3)
DIFFERENTIAL METHOD BLD: ABNORMAL
EOSINOPHIL # BLD: 0.1 K/UL (ref 0–0.4)
EOSINOPHIL NFR BLD: 1 % (ref 0–5)
ERYTHROCYTE [DISTWIDTH] IN BLOOD BY AUTOMATED COUNT: 12.2 % (ref 11.6–14.5)
ERYTHROCYTE [SEDIMENTATION RATE] IN BLOOD: 5 MM/HR (ref 0–20)
GLUCOSE BLD STRIP.AUTO-MCNC: 125 MG/DL (ref 74–106)
GLUCOSE SERPL-MCNC: 114 MG/DL (ref 74–99)
HCT VFR BLD AUTO: 45.7 % (ref 36–48)
HCT VFR BLD CALC: 51 % (ref 36–49)
HGB BLD-MCNC: 15.5 G/DL (ref 13–16)
HGB BLD-MCNC: 17.3 G/DL (ref 12–16)
INR PPP: 1 (ref 0.8–1.2)
LACTATE BLD-SCNC: 3.22 MMOL/L (ref 0.4–2)
LYMPHOCYTES # BLD: 1.6 K/UL (ref 0.9–3.6)
LYMPHOCYTES NFR BLD: 17 % (ref 21–52)
MCH RBC QN AUTO: 30.3 PG (ref 24–34)
MCHC RBC AUTO-ENTMCNC: 33.9 G/DL (ref 31–37)
MCV RBC AUTO: 89.3 FL (ref 74–97)
MONOCYTES # BLD: 0.7 K/UL (ref 0.05–1.2)
MONOCYTES NFR BLD: 8 % (ref 3–10)
NEUTS SEG # BLD: 7 K/UL (ref 1.8–8)
NEUTS SEG NFR BLD: 74 % (ref 40–73)
PLATELET # BLD AUTO: 236 K/UL (ref 135–420)
PMV BLD AUTO: 12.9 FL (ref 9.2–11.8)
POTASSIUM BLD-SCNC: 3.9 MMOL/L (ref 3.5–5.5)
POTASSIUM SERPL-SCNC: 4.2 MMOL/L (ref 3.5–5.5)
PROTHROMBIN TIME: 12.4 SEC (ref 11.5–15.2)
RBC # BLD AUTO: 5.12 M/UL (ref 4.7–5.5)
SODIUM BLD-SCNC: 137 MMOL/L (ref 136–145)
SODIUM SERPL-SCNC: 137 MMOL/L (ref 136–145)
URATE SERPL-MCNC: 3.8 MG/DL (ref 2.6–7.2)
WBC # BLD AUTO: 9.4 K/UL (ref 4.6–13.2)

## 2018-11-23 PROCEDURE — 74011250637 HC RX REV CODE- 250/637: Performed by: PHYSICIAN ASSISTANT

## 2018-11-23 PROCEDURE — 80048 BASIC METABOLIC PNL TOTAL CA: CPT

## 2018-11-23 PROCEDURE — 99284 EMERGENCY DEPT VISIT MOD MDM: CPT

## 2018-11-23 PROCEDURE — 85610 PROTHROMBIN TIME: CPT

## 2018-11-23 PROCEDURE — 73701 CT LOWER EXTREMITY W/DYE: CPT

## 2018-11-23 PROCEDURE — 74011636637 HC RX REV CODE- 636/637: Performed by: PHYSICIAN ASSISTANT

## 2018-11-23 PROCEDURE — 83605 ASSAY OF LACTIC ACID: CPT

## 2018-11-23 PROCEDURE — 85652 RBC SED RATE AUTOMATED: CPT

## 2018-11-23 PROCEDURE — 74011636320 HC RX REV CODE- 636/320: Performed by: EMERGENCY MEDICINE

## 2018-11-23 PROCEDURE — 96374 THER/PROPH/DIAG INJ IV PUSH: CPT

## 2018-11-23 PROCEDURE — 80047 BASIC METABLC PNL IONIZED CA: CPT

## 2018-11-23 PROCEDURE — 96375 TX/PRO/DX INJ NEW DRUG ADDON: CPT

## 2018-11-23 PROCEDURE — 84550 ASSAY OF BLOOD/URIC ACID: CPT

## 2018-11-23 PROCEDURE — 74011250636 HC RX REV CODE- 250/636: Performed by: PHYSICIAN ASSISTANT

## 2018-11-23 PROCEDURE — 73201 CT UPPER EXTREMITY W/DYE: CPT

## 2018-11-23 PROCEDURE — 85025 COMPLETE CBC W/AUTO DIFF WBC: CPT

## 2018-11-23 PROCEDURE — 96361 HYDRATE IV INFUSION ADD-ON: CPT

## 2018-11-23 PROCEDURE — 96376 TX/PRO/DX INJ SAME DRUG ADON: CPT

## 2018-11-23 PROCEDURE — 73110 X-RAY EXAM OF WRIST: CPT

## 2018-11-23 PROCEDURE — 85730 THROMBOPLASTIN TIME PARTIAL: CPT

## 2018-11-23 RX ORDER — KETOROLAC TROMETHAMINE 30 MG/ML
15 INJECTION, SOLUTION INTRAMUSCULAR; INTRAVENOUS
Status: COMPLETED | OUTPATIENT
Start: 2018-11-23 | End: 2018-11-23

## 2018-11-23 RX ORDER — MORPHINE SULFATE 4 MG/ML
4 INJECTION INTRAVENOUS
Status: COMPLETED | OUTPATIENT
Start: 2018-11-23 | End: 2018-11-23

## 2018-11-23 RX ORDER — OXYCODONE AND ACETAMINOPHEN 5; 325 MG/1; MG/1
1 TABLET ORAL
Qty: 20 TAB | Refills: 0 | Status: SHIPPED | OUTPATIENT
Start: 2018-11-23 | End: 2021-04-29 | Stop reason: ALTCHOICE

## 2018-11-23 RX ORDER — ONDANSETRON 2 MG/ML
4 INJECTION INTRAMUSCULAR; INTRAVENOUS
Status: COMPLETED | OUTPATIENT
Start: 2018-11-23 | End: 2018-11-23

## 2018-11-23 RX ORDER — PREDNISONE 50 MG/1
50 TABLET ORAL DAILY
Qty: 5 TAB | Refills: 0 | Status: SHIPPED | OUTPATIENT
Start: 2018-11-23 | End: 2018-11-28

## 2018-11-23 RX ORDER — MORPHINE SULFATE 4 MG/ML
4 INJECTION INTRAVENOUS ONCE
Status: COMPLETED | OUTPATIENT
Start: 2018-11-23 | End: 2018-11-23

## 2018-11-23 RX ORDER — OXYCODONE AND ACETAMINOPHEN 5; 325 MG/1; MG/1
2 TABLET ORAL
Status: COMPLETED | OUTPATIENT
Start: 2018-11-23 | End: 2018-11-23

## 2018-11-23 RX ORDER — PREDNISONE 20 MG/1
60 TABLET ORAL
Status: COMPLETED | OUTPATIENT
Start: 2018-11-23 | End: 2018-11-23

## 2018-11-23 RX ADMIN — OXYCODONE HYDROCHLORIDE AND ACETAMINOPHEN 2 TABLET: 5; 325 TABLET ORAL at 21:50

## 2018-11-23 RX ADMIN — PREDNISONE 60 MG: 20 TABLET ORAL at 13:45

## 2018-11-23 RX ADMIN — KETOROLAC TROMETHAMINE 15 MG: 30 INJECTION, SOLUTION INTRAMUSCULAR at 17:16

## 2018-11-23 RX ADMIN — MORPHINE SULFATE 4 MG: 4 INJECTION INTRAVENOUS at 14:38

## 2018-11-23 RX ADMIN — MORPHINE SULFATE 4 MG: 4 INJECTION INTRAVENOUS at 18:56

## 2018-11-23 RX ADMIN — ONDANSETRON 4 MG: 2 INJECTION INTRAMUSCULAR; INTRAVENOUS at 13:45

## 2018-11-23 RX ADMIN — MORPHINE SULFATE 4 MG: 4 INJECTION INTRAVENOUS at 13:45

## 2018-11-23 RX ADMIN — IOPAMIDOL 80 ML: 612 INJECTION, SOLUTION INTRAVENOUS at 20:24

## 2018-11-23 RX ADMIN — SODIUM CHLORIDE 1000 ML: 900 INJECTION, SOLUTION INTRAVENOUS at 18:55

## 2018-11-23 NOTE — PROGRESS NOTES
1429 Verbal orders with readback from LEOBARDO Smith \"4mg IV push morphine one time now. \" Orders placed, see MAR.
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
## 2018-11-24 NOTE — DISCHARGE INSTRUCTIONS
Joint Pain: Care Instructions  Your Care Instructions    Many people have small aches and pains from overuse or injury to muscles and joints. Joint injuries often happen during sports or recreation, work tasks, or projects around the home. An overuse injury can happen when you put too much stress on a joint or when you do an activity that stresses the joint over and over, such as using the computer or rowing a boat. You can take action at home to help your muscles and joints get better. You should feel better in 1 to 2 weeks, but it can take 3 months or more to heal completely. Follow-up care is a key part of your treatment and safety. Be sure to make and go to all appointments, and call your doctor if you are having problems. It's also a good idea to know your test results and keep a list of the medicines you take. How can you care for yourself at home? · Do not put weight on the injured joint for at least a day or two. · For the first day or two after an injury, do not take hot showers or baths, and do not use hot packs. The heat could make swelling worse. · Put ice or a cold pack on the sore joint for 10 to 20 minutes at a time. Try to do this every 1 to 2 hours for the next 3 days (when you are awake) or until the swelling goes down. Put a thin cloth between the ice and your skin. · Wrap the injury in an elastic bandage. Do not wrap it too tightly because this can cause more swelling. · Prop up the sore joint on a pillow when you ice it or anytime you sit or lie down during the next 3 days. Try to keep it above the level of your heart. This will help reduce swelling. · Take an over-the-counter pain medicine, such as acetaminophen (Tylenol), ibuprofen (Advil, Motrin), or naproxen (Aleve). Read and follow all instructions on the label. · After 1 or 2 days of rest, begin moving the joint gently.  While the joint is still healing, you can begin to exercise using activities that do not strain or hurt the painful joint. When should you call for help? Call your doctor now or seek immediate medical care if:    · You have signs of infection, such as:  ? Increased pain, swelling, warmth, and redness. ? Red streaks leading from the joint. ? A fever.    Watch closely for changes in your health, and be sure to contact your doctor if:    · Your movement or symptoms are not getting better after 1 to 2 weeks of home treatment. Where can you learn more? Go to http://anayeli-ezequiel.info/. Enter P205 in the search box to learn more about \"Joint Pain: Care Instructions. \"  Current as of: November 29, 2017  Content Version: 11.8  © 6310-2874 [employer]. Care instructions adapted under license by [employer] (which disclaims liability or warranty for this information). If you have questions about a medical condition or this instruction, always ask your healthcare professional. [place] [address] any warranty or liability for your use of this information. Gout: Care Instructions  Your Care Instructions    Gout is a form of arthritis caused by a buildup of uric acid crystals in a joint. It causes sudden attacks of pain, swelling, redness, and stiffness, usually in one joint, especially the big toe. Gout usually comes on without a cause. But it can be brought on by drinking alcohol (especially beer) or eating seafood and red meat. Taking certain medicines, such as diuretics or aspirin, also can bring on an attack of gout. Taking your medicines as prescribed and following up with your doctor regularly can help you avoid gout attacks in the future. Follow-up care is a key part of your treatment and safety. Be sure to make and go to all appointments, and call your doctor if you are having problems. It's also a good idea to know your test results and keep a list of the medicines you take. How can you care for yourself at home?   · If the joint is swollen, put ice or a cold pack on the area for 10 to 20 minutes at a time. Put a thin cloth between the ice and your skin. · Prop up the sore limb on a pillow when you ice it or anytime you sit or lie down during the next 3 days. Try to keep it above the level of your heart. This will help reduce swelling. · Rest sore joints. Avoid activities that put weight or strain on the joints for a few days. Take short rest breaks from your regular activities during the day. · Take your medicines exactly as prescribed. Call your doctor if you think you are having a problem with your medicine. · Take pain medicines exactly as directed. ? If the doctor gave you a prescription medicine for pain, take it as prescribed. ? If you are not taking a prescription pain medicine, ask your doctor if you can take an over-the-counter medicine. · Eat less seafood and red meat. · Check with your doctor before drinking alcohol. · Losing weight, if you are overweight, may help reduce attacks of gout. But do not go on a [employer]. \" Losing a lot of weight in a short amount of time can cause a gout attack. When should you call for help? Call your doctor now or seek immediate medical care if:    · You have a fever.     · The joint is so painful you cannot use it.     · You have sudden, unexplained swelling, redness, warmth, or severe pain in one or more joints.    Watch closely for changes in your health, and be sure to contact your doctor if:    · You have joint pain.     · Your symptoms get worse or are not improving after 2 or 3 days. Where can you learn more? Go to http://anayeli-ezequiel.info/. Enter N192 in the search box to learn more about \"Gout: Care Instructions. \"  Current as of: June 11, 2018  Content Version: 11.8  © 3935-6449 [employer]. Care instructions adapted under license by [employer] (which disclaims liability or warranty for this information).  If you have questions about a medical condition or this instruction, always ask your healthcare professional. [place] [address] any warranty or liability for your use of this information.

## 2018-11-24 NOTE — ED NOTES
Patient laying in bed alert and oriented x3, patient is c/o of being in pain since last night. He states he doesn't know what he did. The pain is unbearable according to the patient.

## 2018-11-24 NOTE — ED NOTES
Patient laying in bed watching television, female visitor at bedside. No signs of acute distress noted at this time. Will continue to monitor.

## 2018-11-24 NOTE — ED NOTES
Patient tolerated medication administration without difficulty. He is alert and oriented x3, His pain appears to be somewhat under control compared to when he came in. Will continue to monitor.

## 2018-11-24 NOTE — ED TRIAGE NOTES
Per EMS, Patient is complaining of right wrist pain since last night.  Patient denies any trauma to the wrist.

## 2019-01-16 ENCOUNTER — OFFICE VISIT (OUTPATIENT)
Dept: CARDIOLOGY CLINIC | Age: 59
End: 2019-01-16

## 2019-01-16 DIAGNOSIS — G45.9 TIA (TRANSIENT ISCHEMIC ATTACK): Primary | ICD-10-CM

## 2019-01-16 DIAGNOSIS — Z95.9 CARDIAC DEVICE IN SITU: ICD-10-CM

## 2019-02-06 ENCOUNTER — TELEPHONE (OUTPATIENT)
Dept: CARDIOLOGY CLINIC | Age: 59
End: 2019-02-06

## 2019-02-06 NOTE — PROGRESS NOTES
I have personally seen and evaluated the device findings. Interrogation reviewed and I agree with assessment.     Davey Kim

## 2019-02-06 NOTE — TELEPHONE ENCOUNTER
Attempted to reach patient to schedule f/u for arrhythmia found on loop recorder. Both listed numbers are disconnected. Emergency contact number rings busy repeatedly. Finally reached patient at work number. Appt scheduled for tomorrow with Dr. Mirtha Jackson.

## 2020-08-12 ENCOUNTER — APPOINTMENT (OUTPATIENT)
Dept: GENERAL RADIOLOGY | Age: 60
End: 2020-08-12
Attending: NURSE PRACTITIONER
Payer: MEDICARE

## 2020-08-12 ENCOUNTER — APPOINTMENT (OUTPATIENT)
Dept: VASCULAR SURGERY | Age: 60
End: 2020-08-12
Attending: NURSE PRACTITIONER
Payer: MEDICARE

## 2020-08-12 ENCOUNTER — HOSPITAL ENCOUNTER (EMERGENCY)
Age: 60
Discharge: HOME | End: 2020-08-12
Attending: EMERGENCY MEDICINE
Payer: MEDICARE

## 2020-08-12 VITALS
HEART RATE: 94 BPM | RESPIRATION RATE: 12 BRPM | HEIGHT: 72 IN | SYSTOLIC BLOOD PRESSURE: 148 MMHG | OXYGEN SATURATION: 99 % | WEIGHT: 189 LBS | DIASTOLIC BLOOD PRESSURE: 104 MMHG | BODY MASS INDEX: 25.6 KG/M2 | TEMPERATURE: 99.1 F

## 2020-08-12 DIAGNOSIS — M62.838 MUSCLE SPASM: ICD-10-CM

## 2020-08-12 DIAGNOSIS — M79.605 LEG PAIN, ANTERIOR, LEFT: Primary | ICD-10-CM

## 2020-08-12 DIAGNOSIS — S76.212A GROIN STRAIN, LEFT, INITIAL ENCOUNTER: ICD-10-CM

## 2020-08-12 LAB
ALBUMIN SERPL-MCNC: 3.5 G/DL (ref 3.4–5)
ALBUMIN/GLOB SERPL: 0.8 {RATIO} (ref 0.8–1.7)
ALP SERPL-CCNC: 126 U/L (ref 45–117)
ALT SERPL-CCNC: 60 U/L (ref 16–61)
ANION GAP SERPL CALC-SCNC: 5 MMOL/L (ref 3–18)
AST SERPL-CCNC: 44 U/L (ref 10–38)
BASOPHILS # BLD: 0 K/UL (ref 0–0.1)
BASOPHILS NFR BLD: 0 % (ref 0–2)
BILIRUB SERPL-MCNC: 1.2 MG/DL (ref 0.2–1)
BUN SERPL-MCNC: 16 MG/DL (ref 7–18)
BUN/CREAT SERPL: 12 (ref 12–20)
CALCIUM SERPL-MCNC: 7.8 MG/DL (ref 8.5–10.1)
CHLORIDE SERPL-SCNC: 105 MMOL/L (ref 100–111)
CO2 SERPL-SCNC: 26 MMOL/L (ref 21–32)
CREAT SERPL-MCNC: 1.36 MG/DL (ref 0.6–1.3)
D DIMER PPP FEU-MCNC: 2.36 UG/ML(FEU)
DIFFERENTIAL METHOD BLD: ABNORMAL
EOSINOPHIL # BLD: 0.2 K/UL (ref 0–0.4)
EOSINOPHIL NFR BLD: 3 % (ref 0–5)
ERYTHROCYTE [DISTWIDTH] IN BLOOD BY AUTOMATED COUNT: 12.8 % (ref 11.6–14.5)
GLOBULIN SER CALC-MCNC: 4.3 G/DL (ref 2–4)
GLUCOSE SERPL-MCNC: 99 MG/DL (ref 74–99)
HCT VFR BLD AUTO: 46.9 % (ref 36–48)
HGB BLD-MCNC: 16.1 G/DL (ref 13–16)
INR PPP: 1 (ref 0.8–1.2)
LYMPHOCYTES # BLD: 1.3 K/UL (ref 0.9–3.6)
LYMPHOCYTES NFR BLD: 20 % (ref 21–52)
MCH RBC QN AUTO: 30.8 PG (ref 24–34)
MCHC RBC AUTO-ENTMCNC: 34.3 G/DL (ref 31–37)
MCV RBC AUTO: 89.8 FL (ref 74–97)
MONOCYTES # BLD: 0.5 K/UL (ref 0.05–1.2)
MONOCYTES NFR BLD: 7 % (ref 3–10)
NEUTS SEG # BLD: 4.5 K/UL (ref 1.8–8)
NEUTS SEG NFR BLD: 70 % (ref 40–73)
PLATELET # BLD AUTO: 229 K/UL (ref 135–420)
PLATELET COMMENTS,PCOM: ABNORMAL
PMV BLD AUTO: 14.5 FL (ref 9.2–11.8)
POTASSIUM SERPL-SCNC: 4.5 MMOL/L (ref 3.5–5.5)
PROT SERPL-MCNC: 7.8 G/DL (ref 6.4–8.2)
PROTHROMBIN TIME: 13.3 SEC (ref 11.5–15.2)
RBC # BLD AUTO: 5.22 M/UL (ref 4.7–5.5)
RBC MORPH BLD: ABNORMAL
SODIUM SERPL-SCNC: 136 MMOL/L (ref 136–145)
WBC # BLD AUTO: 6.5 K/UL (ref 4.6–13.2)

## 2020-08-12 PROCEDURE — 74011250636 HC RX REV CODE- 250/636: Performed by: NURSE PRACTITIONER

## 2020-08-12 PROCEDURE — 85610 PROTHROMBIN TIME: CPT

## 2020-08-12 PROCEDURE — 85025 COMPLETE CBC W/AUTO DIFF WBC: CPT

## 2020-08-12 PROCEDURE — 93971 EXTREMITY STUDY: CPT

## 2020-08-12 PROCEDURE — 80053 COMPREHEN METABOLIC PANEL: CPT

## 2020-08-12 PROCEDURE — 74011250637 HC RX REV CODE- 250/637: Performed by: NURSE PRACTITIONER

## 2020-08-12 PROCEDURE — 99283 EMERGENCY DEPT VISIT LOW MDM: CPT

## 2020-08-12 PROCEDURE — 73552 X-RAY EXAM OF FEMUR 2/>: CPT

## 2020-08-12 PROCEDURE — 73521 X-RAY EXAM HIPS BI 2 VIEWS: CPT

## 2020-08-12 PROCEDURE — 85379 FIBRIN DEGRADATION QUANT: CPT

## 2020-08-12 RX ORDER — DICLOFENAC SODIUM 10 MG/G
2 GEL TOPICAL 4 TIMES DAILY
Qty: 1 EACH | Refills: 0 | Status: SHIPPED | OUTPATIENT
Start: 2020-08-12 | End: 2020-08-19

## 2020-08-12 RX ORDER — DIAZEPAM 10 MG/2ML
5 INJECTION INTRAMUSCULAR ONCE
Status: DISCONTINUED | OUTPATIENT
Start: 2020-08-12 | End: 2020-08-12

## 2020-08-12 RX ORDER — ACETAMINOPHEN 500 MG
1000 TABLET ORAL ONCE
Status: COMPLETED | OUTPATIENT
Start: 2020-08-12 | End: 2020-08-12

## 2020-08-12 RX ORDER — CYCLOBENZAPRINE HCL 5 MG
5 TABLET ORAL
Qty: 7 TAB | Refills: 0 | Status: SHIPPED | OUTPATIENT
Start: 2020-08-12 | End: 2020-08-19

## 2020-08-12 RX ORDER — CYCLOBENZAPRINE HCL 10 MG
10 TABLET ORAL
Status: COMPLETED | OUTPATIENT
Start: 2020-08-12 | End: 2020-08-12

## 2020-08-12 RX ORDER — OXYCODONE AND ACETAMINOPHEN 5; 325 MG/1; MG/1
1 TABLET ORAL
Status: DISCONTINUED | OUTPATIENT
Start: 2020-08-12 | End: 2020-08-12

## 2020-08-12 RX ORDER — NAPROXEN 375 MG/1
375 TABLET ORAL 2 TIMES DAILY WITH MEALS
Qty: 14 TAB | Refills: 0 | Status: SHIPPED | OUTPATIENT
Start: 2020-08-12 | End: 2020-08-19

## 2020-08-12 RX ORDER — IBUPROFEN 400 MG/1
800 TABLET ORAL ONCE
Status: COMPLETED | OUTPATIENT
Start: 2020-08-12 | End: 2020-08-12

## 2020-08-12 RX ADMIN — IBUPROFEN 800 MG: 400 TABLET, FILM COATED ORAL at 10:08

## 2020-08-12 RX ADMIN — CYCLOBENZAPRINE 10 MG: 10 TABLET, FILM COATED ORAL at 10:08

## 2020-08-12 RX ADMIN — SODIUM CHLORIDE 1000 ML: 900 INJECTION, SOLUTION INTRAVENOUS at 13:30

## 2020-08-12 RX ADMIN — ACETAMINOPHEN 1000 MG: 500 TABLET ORAL at 10:08
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
## 2020-08-12 NOTE — DISCHARGE INSTRUCTIONS
Patient Education        Groin Strain: Care Instructions  Your Care Instructions  A groin strain is an injury that happens when you tear or overstretch (pull) a groin muscle. The groin muscles are in the area on either side of the body in the folds where the belly joins the legs. You can strain a groin muscle during exercise, such as running, skating, kicking in soccer, or playing basketball. It can happen when you lift, push, or pull heavy objects. You might pull a groin muscle when you fall. The injury can range from a minor pull to a more serious tear of the muscle. You may feel pain and tenderness that's worse when you squeeze your legs together. You may also have pain when you raise the knee of the injured side. There may be swelling or bruising in the groin area or inner thigh. If you have a bad strain, you may walk with a limp while it heals. Rest and other home care can help the muscle heal. Healing can take up to 3 weeks or more. Your doctor may want to see you again in 2 to 3 weeks. Follow-up care is a key part of your treatment and safety. Be sure to make and go to all appointments, and call your doctor if you are having problems. It's also a good idea to know your test results and keep a list of the medicines you take. How can you care for yourself at home? · Be safe with medicines. Read and follow all instructions on the label. ? If the doctor gave you a prescription medicine for pain, take it as prescribed. ? If you are not taking a prescription pain medicine, ask your doctor if you can take an over-the-counter medicine. · Rest and protect your injured or sore groin area for 1 to 2 weeks. Stop, change, or take a break from any activity that may be causing your pain or soreness. Do not do intense activities while you still have pain. · Put ice or a cold pack on your groin area for 10 to 20 minutes at a time.  Try to do this every 1 to 2 hours for the next 3 days (when you are awake) or until the swelling goes down. Put a thin cloth between the ice and your skin. · After 2 or 3 days, if your swelling is gone, apply heat. Put a warm water bottle, a heating pad set on low, or a warm cloth on your groin area. Do not go to sleep with a heating pad on your skin. · If your doctor gave you crutches, make sure you use them as directed. · Wear snug shorts or underwear that support the injured area. When should you call for help? Call your doctor now or seek immediate medical care if:  · You have new or severe pain or swelling in the groin area. · Your groin or upper thigh is cool or pale or changes color. · You have tingling, weakness, or numbness in your groin or leg. · You cannot move your leg. · You cannot put weight on your leg. Watch closely for changes in your health, and be sure to contact your doctor if:  · You do not get better as expected. Where can you learn more? Go to http://anayeli-ezequiel.info/  Enter U924 in the search box to learn more about \"Groin Strain: Care Instructions. \"  Current as of: March 2, 2020               Content Version: 12.5  © 2962-3614 [employer]. Care instructions adapted under license by [employer] (which disclaims liability or warranty for this information). If you have questions about a medical condition or this instruction, always ask your healthcare professional. [place] [address] any warranty or liability for your use of this information. Patient Education        Muscle Cramps: Care Instructions  Your Care Instructions     A muscle cramp occurs when a muscle tightens up suddenly. A cramp often happens in the legs. A muscle cramp is also called a muscle spasm or a charley horse. Muscle cramps usually last less than a minute. However, the pain may last for several minutes. Leg cramps that occur at night may wake you up.   Heavy exercise, dehydration, and being overweight can increase your risk of getting cramps. An imbalance of certain chemicals in your blood, called electrolytes, can also lead to muscle cramps. Pregnant women sometimes get muscle cramps during sleep. Muscle cramps can be treated by stretching and massaging the muscle. If cramps keep coming back, your doctor may prescribe medicine that relaxes your muscles. Follow-up care is a key part of your treatment and safety. Be sure to make and go to all appointments, and call your doctor if you are having problems. It's also a good idea to know your test results and keep a list of the medicines you take. How can you care for yourself at home? · Drink plenty of fluids to prevent dehydration. Choose water and other caffeine-free clear liquids until you feel better. If you have kidney, heart, or liver disease and have to limit fluids, talk with your doctor before you increase the amount of fluids you drink. · Stretch your muscles every day, especially before and after exercise and at bedtime. Regular stretching can relax your muscles and may prevent cramps. · Do not suddenly increase the amount of exercise you get. Increase your exercise a little each week. · When you get a cramp, stretch and massage the muscle. You can also take a warm shower or bath to relax the muscle. A heating pad placed on the muscle can also help. · Take a daily multivitamin supplement. · Ask your doctor if you can take an over-the-counter pain medicine, such as acetaminophen (Tylenol), ibuprofen (Advil, Motrin), or naproxen (Aleve). Be safe with medicines. Read and follow all instructions on the label. When should you call for help? Watch closely for changes in your health, and be sure to contact your doctor if:  · You get muscle cramps often that do not go away after home treatment. · Your muscle cramps often wake you up at night. · You do not get better as expected. Where can you learn more?   Go to http://anayeli-ezequiel.info/  Enter I565 in the search box to learn more about \"Muscle Cramps: Care Instructions. \"  Current as of: March 2, 2020               Content Version: 12.5  © 2006-2020 [employer]. Care instructions adapted under license by [employer] (which disclaims liability or warranty for this information). If you have questions about a medical condition or this instruction, always ask your healthcare professional. [address] any warranty or liability for your use of this information. Patient Education        Leg Pain in Children: Care Instructions  Your Care Instructions  Many things can cause leg pain. Too much exercise or overuse can cause a muscle cramp (or charley horse). Your child can get leg cramps from not eating a balanced diet that has enough potassium, calcium, and other minerals. If your child does not drink enough fluids or is taking certain medicines, he or she may get leg cramps. Other causes of leg pain include injuries, blood flow problems, and nerve damage. You can usually ease your child's pain at home. Your doctor may recommend that your child rest the leg and keep it elevated. Follow-up care is a key part of your child's treatment and safety. Be sure to make and go to all appointments, and call your doctor if your child is having problems. It's also a good idea to know your child's test results and keep a list of the medicines your child takes. How can you care for your child at home? · Give pain medicines exactly as directed. ? If the doctor prescribed medicine for your child's pain, use it as prescribed. ? If your child is not taking a prescription pain medicine, ask your doctor if he or she can take an over-the-counter medicine. · Give your child any other medicines exactly as prescribed. Call your doctor if you think your child is having a problem with his or her medicine. · Have your child rest the leg while he or she has pain.  Your child should not stand for long periods of time. · Prop up your child's leg at or above the level of his or her heart when possible. · Make sure your child is eating a balanced diet that is rich in calcium, potassium, and magnesium. · If directed by your doctor, put ice or a cold pack on the area for 10 to 20 minutes at a time. Put a thin cloth between the ice and your child's skin. · Your child's leg may be in a splint, a brace, or an elastic bandage, and he or she may have crutches to help with walking. Follow your doctor's directions about how long your child needs to wear supports and how to use the crutches. When should you call for help? [id number] anytime you think your child may need emergency care. For example, call if:  · Your child has sudden chest pain and shortness of breath, or your child coughs up blood. · Your child's leg is cool or pale or changes color. Call your doctor now or seek immediate medical care if:  · Your child has increasing or severe pain. · Your child's leg suddenly feels weak and he or she cannot move it. · Your child has signs of infection, such as:  ? Increased pain, swelling, warmth, or redness. ? Red streaks leading from the sore area. ? Pus draining from a place on the leg. ? A fever. · Your child cannot bear weight on the leg. Watch closely for changes in your child's health, and be sure to contact your doctor if:  · Your child does not get better as expected. Where can you learn more? Go to http://www.gray.com/  Enter M760 in the search box to learn more about \"Leg Pain in Children: Care Instructions. \"  Current as of: June 26, 2019               Content Version: 12.5  © 7629-7044 [employer]. Care instructions adapted under license by [employer] (which disclaims liability or warranty for this information).  If you have questions about a medical condition or this instruction, always ask your healthcare professional. [employer] disclaims any warranty or liability for your use of this information. Take medication as prescribed. Return to ED if symptoms worsen. Follow up with PCP in 2 days.

## 2020-08-16 ENCOUNTER — APPOINTMENT (OUTPATIENT)
Dept: VASCULAR SURGERY | Age: 60
End: 2020-08-16
Attending: NURSE PRACTITIONER
Payer: MEDICARE

## 2020-08-16 ENCOUNTER — HOSPITAL ENCOUNTER (EMERGENCY)
Age: 60
Discharge: HOME | End: 2020-08-16
Attending: EMERGENCY MEDICINE | Admitting: EMERGENCY MEDICINE
Payer: MEDICARE

## 2020-08-16 VITALS
OXYGEN SATURATION: 98 % | DIASTOLIC BLOOD PRESSURE: 97 MMHG | HEART RATE: 84 BPM | RESPIRATION RATE: 16 BRPM | BODY MASS INDEX: 25.6 KG/M2 | WEIGHT: 189 LBS | TEMPERATURE: 97.8 F | HEIGHT: 72 IN | SYSTOLIC BLOOD PRESSURE: 149 MMHG

## 2020-08-16 DIAGNOSIS — M79.662 PAIN OF LEFT CALF: Primary | ICD-10-CM

## 2020-08-16 PROCEDURE — 93971 EXTREMITY STUDY: CPT

## 2020-08-16 PROCEDURE — 99281 EMR DPT VST MAYX REQ PHY/QHP: CPT

## 2020-08-16 RX ORDER — CYCLOBENZAPRINE HCL 10 MG
5 TABLET ORAL
Status: DISCONTINUED | OUTPATIENT
Start: 2020-08-16 | End: 2020-08-16 | Stop reason: HOSPADM

## 2020-08-16 NOTE — DISCHARGE INSTRUCTIONS
Patient Education        Leg Pain: Care Instructions  Your Care Instructions  Many things can cause leg pain. Too much exercise or overuse can cause a muscle cramp (or charley horse). You can get leg cramps from not eating a balanced diet that has enough potassium, calcium, and other minerals. If you do not drink enough fluids or are taking certain medicines, you may develop leg cramps. Other causes of leg pain include injuries, blood flow problems, nerve damage, and twisted and enlarged veins (varicose veins). You can usually ease pain with self-care. Your doctor may recommend that you rest your leg and keep it elevated. Follow-up care is a key part of your treatment and safety. Be sure to make and go to all appointments, and call your doctor if you are having problems. It's also a good idea to know your test results and keep a list of the medicines you take. How can you care for yourself at home? · Take pain medicines exactly as directed. ? If the doctor gave you a prescription medicine for pain, take it as prescribed. ? If you are not taking a prescription pain medicine, ask your doctor if you can take an over-the-counter medicine. · Take any other medicines exactly as prescribed. Call your doctor if you think you are having a problem with your medicine. · Rest your leg while you have pain, and avoid standing for long periods of time. · Prop up your leg at or above the level of your heart when possible. · Make sure you are eating a balanced diet that is rich in calcium, potassium, and magnesium, especially if you are pregnant. · If directed by your doctor, put ice or a cold pack on the area for 10 to 20 minutes at a time. Put a thin cloth between the ice and your skin. · Your leg may be in a splint, a brace, or an elastic bandage, and you may have crutches to help you walk. Follow your doctor's directions about how long to wear supports and how to use the crutches. When should you call for help? [id number] anytime you think you may need emergency care. For example, call if:  · You have sudden chest pain and shortness of breath, or you cough up blood. · Your leg is cool or pale or changes color. Call your doctor now or seek immediate medical care if:  · You have increasing or severe pain. · Your leg suddenly feels weak and you cannot move it. · You have signs of a blood clot, such as:  ? Pain in your calf, back of the knee, thigh, or groin. ? Redness and swelling in your leg or groin. · You have signs of infection, such as:  ? Increased pain, swelling, warmth, or redness. ? Red streaks leading from the sore area. ? Pus draining from a place on your leg. ? A fever. · You cannot bear weight on your leg. Watch closely for changes in your health, and be sure to contact your doctor if:  · You do not get better as expected. Where can you learn more? Go to http://anayeli-ezequiel.info/  Enter M257 in the search box to learn more about \"Leg Pain: Care Instructions. \"  Current as of: June 26, 2019               Content Version: 12.5  © 3875-4555 [employer]. Care instructions adapted under license by [employer] (which disclaims liability or warranty for this information). If you have questions about a medical condition or this instruction, always ask your healthcare professional. [place] [address] any warranty or liability for your use of this information.
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
## 2020-11-11 ENCOUNTER — OFFICE VISIT (OUTPATIENT)
Dept: CARDIOLOGY CLINIC | Age: 60
End: 2020-11-11
Payer: MEDICARE

## 2020-11-11 VITALS
OXYGEN SATURATION: 96 % | BODY MASS INDEX: 23.3 KG/M2 | TEMPERATURE: 96.1 F | HEART RATE: 85 BPM | DIASTOLIC BLOOD PRESSURE: 59 MMHG | SYSTOLIC BLOOD PRESSURE: 98 MMHG | HEIGHT: 72 IN | WEIGHT: 172 LBS

## 2020-11-11 DIAGNOSIS — I25.10 CORONARY ARTERY DISEASE INVOLVING NATIVE CORONARY ARTERY OF NATIVE HEART WITHOUT ANGINA PECTORIS: ICD-10-CM

## 2020-11-11 DIAGNOSIS — I47.1 SVT (SUPRAVENTRICULAR TACHYCARDIA) (HCC): ICD-10-CM

## 2020-11-11 DIAGNOSIS — I50.22 SYSTOLIC CHF, CHRONIC (HCC): Primary | ICD-10-CM

## 2020-11-11 DIAGNOSIS — Z98.890 HISTORY OF ATRIOVENTRICULAR NODAL ABLATION: ICD-10-CM

## 2020-11-11 DIAGNOSIS — I42.9 CARDIOMYOPATHY, UNSPECIFIED TYPE (HCC): ICD-10-CM

## 2020-11-11 PROCEDURE — G8427 DOCREV CUR MEDS BY ELIG CLIN: HCPCS | Performed by: INTERNAL MEDICINE

## 2020-11-11 PROCEDURE — G8754 DIAS BP LESS 90: HCPCS | Performed by: INTERNAL MEDICINE

## 2020-11-11 PROCEDURE — G9717 DOC PT DX DEP/BP F/U NT REQ: HCPCS | Performed by: INTERNAL MEDICINE

## 2020-11-11 PROCEDURE — G8752 SYS BP LESS 140: HCPCS | Performed by: INTERNAL MEDICINE

## 2020-11-11 PROCEDURE — 99204 OFFICE O/P NEW MOD 45 MIN: CPT | Performed by: INTERNAL MEDICINE

## 2020-11-11 PROCEDURE — 3017F COLORECTAL CA SCREEN DOC REV: CPT | Performed by: INTERNAL MEDICINE

## 2020-11-11 PROCEDURE — G8420 CALC BMI NORM PARAMETERS: HCPCS | Performed by: INTERNAL MEDICINE

## 2020-11-11 RX ORDER — CARVEDILOL 12.5 MG/1
TABLET ORAL 2 TIMES DAILY WITH MEALS
COMMUNITY
End: 2021-04-29 | Stop reason: SDUPTHER

## 2020-11-11 RX ORDER — ROSUVASTATIN CALCIUM 20 MG/1
20 TABLET, COATED ORAL
COMMUNITY
End: 2021-04-29 | Stop reason: SDUPTHER

## 2020-11-11 RX ORDER — SACUBITRIL AND VALSARTAN 24; 26 MG/1; MG/1
1 TABLET, FILM COATED ORAL 2 TIMES DAILY
Qty: 60 TAB | Refills: 5 | Status: SHIPPED | OUTPATIENT
Start: 2020-11-11 | End: 2021-04-29 | Stop reason: SDUPTHER

## 2020-11-11 RX ORDER — SPIRONOLACTONE 25 MG/1
TABLET ORAL DAILY
COMMUNITY
End: 2021-04-29 | Stop reason: SDUPTHER

## 2020-11-11 RX ORDER — ASPIRIN 81 MG/1
81 TABLET ORAL DAILY
Qty: 100 TAB | Refills: 3 | Status: SHIPPED | OUTPATIENT
Start: 2020-11-11 | End: 2021-04-29 | Stop reason: SDUPTHER

## 2020-11-11 RX ORDER — NITROGLYCERIN 0.4 MG/1
0.4 TABLET SUBLINGUAL
COMMUNITY
End: 2021-12-01 | Stop reason: SDUPTHER

## 2020-11-11 RX ORDER — LOSARTAN POTASSIUM 25 MG/1
TABLET ORAL DAILY
COMMUNITY
End: 2020-11-11 | Stop reason: ALTCHOICE

## 2020-11-11 RX ORDER — FUROSEMIDE 20 MG/1
TABLET ORAL DAILY
COMMUNITY
End: 2020-11-11 | Stop reason: ALTCHOICE
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
## 2020-11-18 DIAGNOSIS — I50.22 SYSTOLIC CHF, CHRONIC (HCC): ICD-10-CM

## 2020-11-18 DIAGNOSIS — I42.9 CARDIOMYOPATHY, UNSPECIFIED TYPE (HCC): ICD-10-CM

## 2020-12-29 ENCOUNTER — APPOINTMENT (OUTPATIENT)
Dept: GENERAL RADIOLOGY | Age: 60
End: 2020-12-29
Attending: EMERGENCY MEDICINE
Payer: MEDICARE

## 2020-12-29 ENCOUNTER — HOSPITAL ENCOUNTER (EMERGENCY)
Age: 60
Discharge: HOME | End: 2020-12-30
Attending: EMERGENCY MEDICINE
Payer: MEDICARE

## 2020-12-29 DIAGNOSIS — R07.89 ATYPICAL CHEST PAIN: Primary | ICD-10-CM

## 2020-12-29 PROCEDURE — 85025 COMPLETE CBC W/AUTO DIFF WBC: CPT

## 2020-12-29 PROCEDURE — 71045 X-RAY EXAM CHEST 1 VIEW: CPT

## 2020-12-29 PROCEDURE — 82550 ASSAY OF CK (CPK): CPT

## 2020-12-29 PROCEDURE — 93005 ELECTROCARDIOGRAM TRACING: CPT

## 2020-12-29 PROCEDURE — 80053 COMPREHEN METABOLIC PANEL: CPT

## 2020-12-29 PROCEDURE — 99285 EMERGENCY DEPT VISIT HI MDM: CPT

## 2020-12-30 VITALS
RESPIRATION RATE: 17 BRPM | DIASTOLIC BLOOD PRESSURE: 67 MMHG | OXYGEN SATURATION: 97 % | HEART RATE: 68 BPM | SYSTOLIC BLOOD PRESSURE: 127 MMHG | TEMPERATURE: 98.2 F

## 2020-12-30 LAB
ALBUMIN SERPL-MCNC: 3.4 G/DL (ref 3.4–5)
ALBUMIN/GLOB SERPL: 0.9 {RATIO} (ref 0.8–1.7)
ALP SERPL-CCNC: 93 U/L (ref 45–117)
ALT SERPL-CCNC: 31 U/L (ref 16–61)
ANION GAP SERPL CALC-SCNC: 8 MMOL/L (ref 3–18)
AST SERPL-CCNC: 32 U/L (ref 10–38)
ATRIAL RATE: 75 BPM
ATRIAL RATE: 84 BPM
BASOPHILS # BLD: 0 K/UL (ref 0–0.1)
BASOPHILS NFR BLD: 0 % (ref 0–2)
BILIRUB SERPL-MCNC: 1.5 MG/DL (ref 0.2–1)
BUN SERPL-MCNC: 25 MG/DL (ref 7–18)
BUN/CREAT SERPL: 15 (ref 12–20)
CALCIUM SERPL-MCNC: 7.7 MG/DL (ref 8.5–10.1)
CALCULATED P AXIS, ECG09: 142 DEGREES
CALCULATED P AXIS, ECG09: 69 DEGREES
CALCULATED R AXIS, ECG10: 119 DEGREES
CALCULATED R AXIS, ECG10: 67 DEGREES
CALCULATED T AXIS, ECG11: 147 DEGREES
CALCULATED T AXIS, ECG11: 69 DEGREES
CHLORIDE SERPL-SCNC: 106 MMOL/L (ref 100–111)
CK MB CFR SERPL CALC: 1.2 % (ref 0–4)
CK MB CFR SERPL CALC: 1.4 % (ref 0–4)
CK MB SERPL-MCNC: 4.1 NG/ML (ref 5–25)
CK MB SERPL-MCNC: 4.4 NG/ML (ref 5–25)
CK SERPL-CCNC: 294 U/L (ref 39–308)
CK SERPL-CCNC: 364 U/L (ref 39–308)
CO2 SERPL-SCNC: 25 MMOL/L (ref 21–32)
CREAT SERPL-MCNC: 1.66 MG/DL (ref 0.6–1.3)
DIAGNOSIS, 93000: NORMAL
DIAGNOSIS, 93000: NORMAL
DIFFERENTIAL METHOD BLD: ABNORMAL
EOSINOPHIL # BLD: 0.2 K/UL (ref 0–0.4)
EOSINOPHIL NFR BLD: 2 % (ref 0–5)
ERYTHROCYTE [DISTWIDTH] IN BLOOD BY AUTOMATED COUNT: 12.9 % (ref 11.6–14.5)
GLOBULIN SER CALC-MCNC: 3.8 G/DL (ref 2–4)
GLUCOSE SERPL-MCNC: 87 MG/DL (ref 74–99)
HCT VFR BLD AUTO: 45.4 % (ref 36–48)
HGB BLD-MCNC: 15.5 G/DL (ref 13–16)
LYMPHOCYTES # BLD: 2.1 K/UL (ref 0.9–3.6)
LYMPHOCYTES NFR BLD: 27 % (ref 21–52)
MCH RBC QN AUTO: 30.9 PG (ref 24–34)
MCHC RBC AUTO-ENTMCNC: 34.1 G/DL (ref 31–37)
MCV RBC AUTO: 90.4 FL (ref 74–97)
MONOCYTES # BLD: 0.6 K/UL (ref 0.05–1.2)
MONOCYTES NFR BLD: 8 % (ref 3–10)
NEUTS SEG # BLD: 4.7 K/UL (ref 1.8–8)
NEUTS SEG NFR BLD: 63 % (ref 40–73)
P-R INTERVAL, ECG05: 158 MS
P-R INTERVAL, ECG05: 160 MS
PLATELET # BLD AUTO: 201 K/UL (ref 135–420)
PLATELET COMMENTS,PCOM: ABNORMAL
PMV BLD AUTO: 13.4 FL (ref 9.2–11.8)
POTASSIUM SERPL-SCNC: 4.3 MMOL/L (ref 3.5–5.5)
PROT SERPL-MCNC: 7.2 G/DL (ref 6.4–8.2)
Q-T INTERVAL, ECG07: 434 MS
Q-T INTERVAL, ECG07: 474 MS
QRS DURATION, ECG06: 82 MS
QRS DURATION, ECG06: 92 MS
QTC CALCULATION (BEZET), ECG08: 512 MS
QTC CALCULATION (BEZET), ECG08: 529 MS
RBC # BLD AUTO: 5.02 M/UL (ref 4.7–5.5)
RBC MORPH BLD: ABNORMAL
SODIUM SERPL-SCNC: 139 MMOL/L (ref 136–145)
TROPONIN I SERPL-MCNC: 0.02 NG/ML (ref 0–0.04)
TROPONIN I SERPL-MCNC: 0.02 NG/ML (ref 0–0.04)
VENTRICULAR RATE, ECG03: 75 BPM
VENTRICULAR RATE, ECG03: 84 BPM
WBC # BLD AUTO: 7.6 K/UL (ref 4.6–13.2)

## 2020-12-30 PROCEDURE — 82550 ASSAY OF CK (CPK): CPT

## 2020-12-30 PROCEDURE — 93005 ELECTROCARDIOGRAM TRACING: CPT
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
## 2020-12-30 NOTE — ED TRIAGE NOTES
Pt arrived from group home via EMS complaining of chest pain that started one hour previous. Pt states he has a headache, left arm pain, and chest pain. Pt A&Ox4. Pt states his pain is a 10/10. Pt is afebrile.

## 2020-12-30 NOTE — ED NOTES
I have reviewed discharge instructions with the patient. The patient verbalized understanding. Pt A&Ox4. Respirations even and unlabored. Discharge paperwork given to police. Pt released into police custody. No current signs or symptoms of distress.

## 2021-04-29 ENCOUNTER — OFFICE VISIT (OUTPATIENT)
Dept: CARDIOLOGY CLINIC | Age: 61
End: 2021-04-29
Payer: MEDICARE

## 2021-04-29 VITALS
DIASTOLIC BLOOD PRESSURE: 102 MMHG | OXYGEN SATURATION: 98 % | TEMPERATURE: 97 F | HEART RATE: 101 BPM | SYSTOLIC BLOOD PRESSURE: 156 MMHG | HEIGHT: 72 IN | WEIGHT: 176 LBS | BODY MASS INDEX: 23.84 KG/M2

## 2021-04-29 DIAGNOSIS — I25.10 CORONARY ARTERY DISEASE INVOLVING NATIVE CORONARY ARTERY OF NATIVE HEART WITHOUT ANGINA PECTORIS: ICD-10-CM

## 2021-04-29 DIAGNOSIS — I50.22 SYSTOLIC CHF, CHRONIC (HCC): ICD-10-CM

## 2021-04-29 DIAGNOSIS — Z98.890 HISTORY OF LOOP RECORDER: ICD-10-CM

## 2021-04-29 DIAGNOSIS — Z98.890 HISTORY OF ATRIOVENTRICULAR NODAL ABLATION: ICD-10-CM

## 2021-04-29 DIAGNOSIS — I47.1 SVT (SUPRAVENTRICULAR TACHYCARDIA) (HCC): ICD-10-CM

## 2021-04-29 DIAGNOSIS — I42.9 CARDIOMYOPATHY, UNSPECIFIED TYPE (HCC): Primary | ICD-10-CM

## 2021-04-29 PROCEDURE — G8420 CALC BMI NORM PARAMETERS: HCPCS | Performed by: NURSE PRACTITIONER

## 2021-04-29 PROCEDURE — 99214 OFFICE O/P EST MOD 30 MIN: CPT | Performed by: NURSE PRACTITIONER

## 2021-04-29 PROCEDURE — G9717 DOC PT DX DEP/BP F/U NT REQ: HCPCS | Performed by: NURSE PRACTITIONER

## 2021-04-29 PROCEDURE — G8755 DIAS BP > OR = 90: HCPCS | Performed by: NURSE PRACTITIONER

## 2021-04-29 PROCEDURE — G8753 SYS BP > OR = 140: HCPCS | Performed by: NURSE PRACTITIONER

## 2021-04-29 PROCEDURE — 3017F COLORECTAL CA SCREEN DOC REV: CPT | Performed by: NURSE PRACTITIONER

## 2021-04-29 PROCEDURE — G8427 DOCREV CUR MEDS BY ELIG CLIN: HCPCS | Performed by: NURSE PRACTITIONER

## 2021-04-29 RX ORDER — SACUBITRIL AND VALSARTAN 24; 26 MG/1; MG/1
1 TABLET, FILM COATED ORAL 2 TIMES DAILY
Qty: 60 TAB | Refills: 5 | Status: SHIPPED | OUTPATIENT
Start: 2021-04-29 | End: 2021-05-03

## 2021-04-29 RX ORDER — ASPIRIN 81 MG/1
81 TABLET ORAL DAILY
Qty: 100 TAB | Refills: 3 | Status: SHIPPED | OUTPATIENT
Start: 2021-04-29 | End: 2021-12-01 | Stop reason: SDUPTHER

## 2021-04-29 RX ORDER — SPIRONOLACTONE 25 MG/1
25 TABLET ORAL DAILY
Qty: 90 TAB | Refills: 2 | Status: SHIPPED | OUTPATIENT
Start: 2021-04-29 | End: 2021-05-17

## 2021-04-29 RX ORDER — CARVEDILOL 12.5 MG/1
12.5 TABLET ORAL 2 TIMES DAILY WITH MEALS
Qty: 180 TAB | Refills: 2 | Status: SHIPPED | OUTPATIENT
Start: 2021-04-29 | End: 2021-08-03 | Stop reason: SDUPTHER

## 2021-04-29 RX ORDER — ROSUVASTATIN CALCIUM 20 MG/1
20 TABLET, COATED ORAL
Qty: 90 TAB | Refills: 2 | Status: SHIPPED | OUTPATIENT
Start: 2021-04-29 | End: 2021-08-03 | Stop reason: SDUPTHER

## 2021-04-29 RX ORDER — LOSARTAN POTASSIUM 25 MG/1
TABLET ORAL
COMMUNITY
Start: 2020-10-21 | End: 2021-04-29

## 2021-04-29 RX ORDER — ACETAMINOPHEN 325 MG/1
650 TABLET ORAL
Status: ON HOLD | COMMUNITY
Start: 2020-10-21 | End: 2022-04-16 | Stop reason: SDUPTHER
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
## 2021-04-29 NOTE — PROGRESS NOTES
1. Have you been to the ER, urgent care clinic since your last visit? Hospitalized since your last visit? Yes When: 12/2020 Where: [facility] Reason for visit: Chest Pain    2. Have you seen or consulted any other health care providers outside of the [address] [place] [address] since your last visit? Include any pap smears or colon screening.  Yes Where: PCP

## 2021-05-03 RX ORDER — LOSARTAN POTASSIUM 25 MG/1
25 TABLET ORAL DAILY
Qty: 90 TAB | Refills: 3 | Status: SHIPPED | OUTPATIENT
Start: 2021-05-03 | End: 2021-05-17

## 2021-05-03 RX ORDER — LOSARTAN POTASSIUM 25 MG/1
TABLET ORAL DAILY
COMMUNITY
End: 2021-05-03 | Stop reason: SDUPTHER

## 2021-05-03 NOTE — TELEPHONE ENCOUNTER
Called patient and let know per Dr. Teddy Ruiz if cant afford [employer] then can go back on Losartan 25 mg a day. Patient verbalized understanding and had no further questions. Requested Prescriptions     Pending Prescriptions Disp Refills    losartan (COZAAR) 25 mg tablet 90 Tab 3     Sig: Take 1 Tab by mouth daily.

## 2021-05-06 DIAGNOSIS — I50.22 SYSTOLIC CHF, CHRONIC (HCC): ICD-10-CM

## 2021-05-06 DIAGNOSIS — I42.9 CARDIOMYOPATHY, UNSPECIFIED TYPE (HCC): ICD-10-CM

## 2021-05-11 ENCOUNTER — TELEPHONE (OUTPATIENT)
Dept: CARDIOLOGY CLINIC | Age: 61
End: 2021-05-11

## 2021-05-11 DIAGNOSIS — I50.22 SYSTOLIC CHF, CHRONIC (HCC): Primary | ICD-10-CM

## 2021-05-11 RX ORDER — TORSEMIDE 20 MG/1
20 TABLET ORAL 2 TIMES DAILY
Qty: 60 TAB | Refills: 3 | Status: SHIPPED | OUTPATIENT
Start: 2021-05-11 | End: 2021-08-03 | Stop reason: SDUPTHER

## 2021-05-11 NOTE — TELEPHONE ENCOUNTER
You saw pt on 4/29/21 in office. Started him on fluid pills for leg swelling. Pt called stating his legs are getting tighter, the swelling is now moving up his legs and he has no increase in urinating since starting the fluid pill.  Please advise

## 2021-05-11 NOTE — TELEPHONE ENCOUNTER
Called patient to let know Dr. Brian Looney has prescribed torsemide 20 mg BID and will need to get BMP in 1 wk. Is also to monitor BP. Patient did not answer, left message to call back when possible.

## 2021-05-13 ENCOUNTER — APPOINTMENT (OUTPATIENT)
Dept: ULTRASOUND IMAGING | Age: 61
DRG: 291 | End: 2021-05-13
Attending: STUDENT IN AN ORGANIZED HEALTH CARE EDUCATION/TRAINING PROGRAM
Payer: MEDICARE

## 2021-05-13 ENCOUNTER — APPOINTMENT (OUTPATIENT)
Dept: CT IMAGING | Age: 61
DRG: 291 | End: 2021-05-13
Attending: STUDENT IN AN ORGANIZED HEALTH CARE EDUCATION/TRAINING PROGRAM
Payer: MEDICARE

## 2021-05-13 ENCOUNTER — APPOINTMENT (OUTPATIENT)
Dept: GENERAL RADIOLOGY | Age: 61
DRG: 291 | End: 2021-05-13
Attending: STUDENT IN AN ORGANIZED HEALTH CARE EDUCATION/TRAINING PROGRAM
Payer: MEDICARE

## 2021-05-13 ENCOUNTER — HOSPITAL ENCOUNTER (INPATIENT)
Age: 61
LOS: 4 days | Discharge: HOME | DRG: 291 | End: 2021-05-17
Attending: EMERGENCY MEDICINE | Admitting: INTERNAL MEDICINE
Payer: MEDICARE

## 2021-05-13 DIAGNOSIS — I50.23 ACUTE ON CHRONIC SYSTOLIC CONGESTIVE HEART FAILURE (HCC): ICD-10-CM

## 2021-05-13 DIAGNOSIS — N50.812 PAIN IN LEFT TESTICLE: Primary | ICD-10-CM

## 2021-05-13 DIAGNOSIS — I42.9 CARDIOMYOPATHY, UNSPECIFIED TYPE (HCC): ICD-10-CM

## 2021-05-13 DIAGNOSIS — I50.22 SYSTOLIC CHF, CHRONIC (HCC): ICD-10-CM

## 2021-05-13 PROBLEM — I50.9 CHF EXACERBATION (HCC): Status: ACTIVE | Noted: 2021-05-13

## 2021-05-13 PROBLEM — N50.819 TESTICULAR PAIN: Status: ACTIVE | Noted: 2021-05-13

## 2021-05-13 LAB
ANION GAP SERPL CALC-SCNC: 5 MMOL/L (ref 3–18)
BASOPHILS # BLD: 0.1 K/UL (ref 0–0.1)
BASOPHILS NFR BLD: 0 % (ref 0–2)
BNP SERPL-MCNC: 3676 PG/ML (ref 0–900)
BUN SERPL-MCNC: 24 MG/DL (ref 7–18)
BUN/CREAT SERPL: 15 (ref 12–20)
CALCIUM SERPL-MCNC: 7.2 MG/DL (ref 8.5–10.1)
CHLORIDE SERPL-SCNC: 103 MMOL/L (ref 100–111)
CO2 SERPL-SCNC: 26 MMOL/L (ref 21–32)
CREAT SERPL-MCNC: 1.58 MG/DL (ref 0.6–1.3)
DIFFERENTIAL METHOD BLD: ABNORMAL
EOSINOPHIL # BLD: 0.1 K/UL (ref 0–0.4)
EOSINOPHIL NFR BLD: 1 % (ref 0–5)
ERYTHROCYTE [DISTWIDTH] IN BLOOD BY AUTOMATED COUNT: 13.5 % (ref 11.6–14.5)
GLUCOSE SERPL-MCNC: 104 MG/DL (ref 74–99)
HCT VFR BLD AUTO: 41.3 % (ref 36–48)
HGB BLD-MCNC: 13.3 G/DL (ref 13–16)
LACTATE BLD-SCNC: 1.33 MMOL/L (ref 0.4–2)
LYMPHOCYTES # BLD: 1 K/UL (ref 0.9–3.6)
LYMPHOCYTES NFR BLD: 7 % (ref 21–52)
MCH RBC QN AUTO: 30 PG (ref 24–34)
MCHC RBC AUTO-ENTMCNC: 32.2 G/DL (ref 31–37)
MCV RBC AUTO: 93 FL (ref 74–97)
MONOCYTES # BLD: 1.1 K/UL (ref 0.05–1.2)
MONOCYTES NFR BLD: 8 % (ref 3–10)
NEUTS SEG # BLD: 11.5 K/UL (ref 1.8–8)
NEUTS SEG NFR BLD: 84 % (ref 40–73)
PLATELET # BLD AUTO: 202 K/UL (ref 135–420)
PMV BLD AUTO: 13.3 FL (ref 9.2–11.8)
POTASSIUM SERPL-SCNC: 5.2 MMOL/L (ref 3.5–5.5)
RBC # BLD AUTO: 4.44 M/UL (ref 4.35–5.65)
SODIUM SERPL-SCNC: 134 MMOL/L (ref 136–145)
TROPONIN I SERPL-MCNC: 0.03 NG/ML (ref 0–0.04)
WBC # BLD AUTO: 13.7 K/UL (ref 4.6–13.2)

## 2021-05-13 PROCEDURE — 80048 BASIC METABOLIC PNL TOTAL CA: CPT

## 2021-05-13 PROCEDURE — 81003 URINALYSIS AUTO W/O SCOPE: CPT

## 2021-05-13 PROCEDURE — 83880 ASSAY OF NATRIURETIC PEPTIDE: CPT

## 2021-05-13 PROCEDURE — 74177 CT ABD & PELVIS W/CONTRAST: CPT

## 2021-05-13 PROCEDURE — 99284 EMERGENCY DEPT VISIT MOD MDM: CPT

## 2021-05-13 PROCEDURE — 93005 ELECTROCARDIOGRAM TRACING: CPT

## 2021-05-13 PROCEDURE — 93975 VASCULAR STUDY: CPT

## 2021-05-13 PROCEDURE — 96375 TX/PRO/DX INJ NEW DRUG ADDON: CPT

## 2021-05-13 PROCEDURE — 84484 ASSAY OF TROPONIN QUANT: CPT

## 2021-05-13 PROCEDURE — 74011250636 HC RX REV CODE- 250/636: Performed by: STUDENT IN AN ORGANIZED HEALTH CARE EDUCATION/TRAINING PROGRAM

## 2021-05-13 PROCEDURE — 85025 COMPLETE CBC W/AUTO DIFF WBC: CPT

## 2021-05-13 PROCEDURE — 71046 X-RAY EXAM CHEST 2 VIEWS: CPT

## 2021-05-13 PROCEDURE — 87491 CHLMYD TRACH DNA AMP PROBE: CPT

## 2021-05-13 PROCEDURE — 74011250637 HC RX REV CODE- 250/637: Performed by: STUDENT IN AN ORGANIZED HEALTH CARE EDUCATION/TRAINING PROGRAM

## 2021-05-13 PROCEDURE — 83605 ASSAY OF LACTIC ACID: CPT

## 2021-05-13 PROCEDURE — 96374 THER/PROPH/DIAG INJ IV PUSH: CPT

## 2021-05-13 PROCEDURE — 87086 URINE CULTURE/COLONY COUNT: CPT

## 2021-05-13 PROCEDURE — 65660000000 HC RM CCU STEPDOWN

## 2021-05-13 PROCEDURE — 74011000636 HC RX REV CODE- 636: Performed by: EMERGENCY MEDICINE

## 2021-05-13 RX ORDER — VANCOMYCIN HYDROCHLORIDE
1250
Status: DISCONTINUED | OUTPATIENT
Start: 2021-05-14 | End: 2021-05-13

## 2021-05-13 RX ORDER — NITROGLYCERIN 0.4 MG/1
0.4 TABLET SUBLINGUAL
Status: DISCONTINUED | OUTPATIENT
Start: 2021-05-13 | End: 2021-05-17 | Stop reason: HOSPADM

## 2021-05-13 RX ORDER — SODIUM CHLORIDE 0.9 % (FLUSH) 0.9 %
5-40 SYRINGE (ML) INJECTION AS NEEDED
Status: DISCONTINUED | OUTPATIENT
Start: 2021-05-13 | End: 2021-05-17 | Stop reason: HOSPADM

## 2021-05-13 RX ORDER — SODIUM CHLORIDE 0.9 % (FLUSH) 0.9 %
5-40 SYRINGE (ML) INJECTION EVERY 8 HOURS
Status: DISCONTINUED | OUTPATIENT
Start: 2021-05-13 | End: 2021-05-17 | Stop reason: HOSPADM

## 2021-05-13 RX ORDER — ASPIRIN 81 MG/1
81 TABLET ORAL DAILY
Status: DISCONTINUED | OUTPATIENT
Start: 2021-05-14 | End: 2021-05-17 | Stop reason: HOSPADM

## 2021-05-13 RX ORDER — SACUBITRIL AND VALSARTAN 24; 26 MG/1; MG/1
1 TABLET, FILM COATED ORAL 2 TIMES DAILY
COMMUNITY
End: 2021-06-02

## 2021-05-13 RX ORDER — LEVOFLOXACIN 5 MG/ML
500 INJECTION, SOLUTION INTRAVENOUS EVERY 24 HOURS
Status: DISCONTINUED | OUTPATIENT
Start: 2021-05-13 | End: 2021-05-17 | Stop reason: HOSPADM

## 2021-05-13 RX ORDER — ONDANSETRON 2 MG/ML
4 INJECTION INTRAMUSCULAR; INTRAVENOUS
Status: COMPLETED | OUTPATIENT
Start: 2021-05-13 | End: 2021-05-13

## 2021-05-13 RX ORDER — CARVEDILOL 12.5 MG/1
12.5 TABLET ORAL 2 TIMES DAILY WITH MEALS
Status: DISCONTINUED | OUTPATIENT
Start: 2021-05-13 | End: 2021-05-17 | Stop reason: HOSPADM

## 2021-05-13 RX ORDER — FUROSEMIDE 10 MG/ML
40 INJECTION INTRAMUSCULAR; INTRAVENOUS ONCE
Status: COMPLETED | OUTPATIENT
Start: 2021-05-13 | End: 2021-05-13

## 2021-05-13 RX ORDER — MORPHINE SULFATE 4 MG/ML
4 INJECTION INTRAVENOUS
Status: COMPLETED | OUTPATIENT
Start: 2021-05-13 | End: 2021-05-13

## 2021-05-13 RX ORDER — SPIRONOLACTONE 25 MG/1
25 TABLET ORAL DAILY
Status: DISCONTINUED | OUTPATIENT
Start: 2021-05-14 | End: 2021-05-17 | Stop reason: HOSPADM

## 2021-05-13 RX ORDER — MORPHINE SULFATE 2 MG/ML
2 INJECTION, SOLUTION INTRAMUSCULAR; INTRAVENOUS
Status: DISCONTINUED | OUTPATIENT
Start: 2021-05-13 | End: 2021-05-14

## 2021-05-13 RX ORDER — HEPARIN SODIUM 5000 [USP'U]/ML
5000 INJECTION, SOLUTION INTRAVENOUS; SUBCUTANEOUS EVERY 12 HOURS
Status: DISCONTINUED | OUTPATIENT
Start: 2021-05-13 | End: 2021-05-17 | Stop reason: HOSPADM

## 2021-05-13 RX ORDER — TORSEMIDE 20 MG/1
20 TABLET ORAL 2 TIMES DAILY
Status: DISCONTINUED | OUTPATIENT
Start: 2021-05-13 | End: 2021-05-13

## 2021-05-13 RX ORDER — MORPHINE SULFATE 4 MG/ML
4 INJECTION INTRAVENOUS
Status: ACTIVE | OUTPATIENT
Start: 2021-05-13 | End: 2021-05-14

## 2021-05-13 RX ORDER — ACETAMINOPHEN 500 MG
1000 TABLET ORAL 2 TIMES DAILY
Status: DISCONTINUED | OUTPATIENT
Start: 2021-05-13 | End: 2021-05-17 | Stop reason: HOSPADM

## 2021-05-13 RX ORDER — LOSARTAN POTASSIUM 25 MG/1
25 TABLET ORAL DAILY
Status: DISCONTINUED | OUTPATIENT
Start: 2021-05-14 | End: 2021-05-13

## 2021-05-13 RX ORDER — VANCOMYCIN/0.9 % SOD CHLORIDE 1.5G/250ML
1500 PLASTIC BAG, INJECTION (ML) INTRAVENOUS ONCE
Status: COMPLETED | OUTPATIENT
Start: 2021-05-13 | End: 2021-05-14

## 2021-05-13 RX ORDER — TORSEMIDE 20 MG/1
20 TABLET ORAL 2 TIMES DAILY
Status: DISCONTINUED | OUTPATIENT
Start: 2021-05-14 | End: 2021-05-14

## 2021-05-13 RX ORDER — VANCOMYCIN HYDROCHLORIDE
1250
Status: DISCONTINUED | OUTPATIENT
Start: 2021-05-14 | End: 2021-05-16

## 2021-05-13 RX ORDER — ROSUVASTATIN CALCIUM 20 MG/1
20 TABLET, COATED ORAL
Status: DISCONTINUED | OUTPATIENT
Start: 2021-05-13 | End: 2021-05-17 | Stop reason: HOSPADM

## 2021-05-13 RX ADMIN — MORPHINE SULFATE 4 MG: 4 INJECTION, SOLUTION INTRAMUSCULAR; INTRAVENOUS at 11:54

## 2021-05-13 RX ADMIN — SACUBITRIL AND VALSARTAN 1 TABLET: 24; 26 TABLET, FILM COATED ORAL at 22:37

## 2021-05-13 RX ADMIN — ONDANSETRON 4 MG: 2 INJECTION INTRAMUSCULAR; INTRAVENOUS at 11:54

## 2021-05-13 RX ADMIN — FUROSEMIDE 40 MG: 10 INJECTION, SOLUTION INTRAMUSCULAR; INTRAVENOUS at 22:37

## 2021-05-13 RX ADMIN — Medication 10 ML: at 22:37

## 2021-05-13 RX ADMIN — LEVOFLOXACIN 500 MG: 5 INJECTION, SOLUTION INTRAVENOUS at 15:17

## 2021-05-13 RX ADMIN — ACETAMINOPHEN 1000 MG: 500 TABLET, FILM COATED ORAL at 20:03

## 2021-05-13 RX ADMIN — IOPAMIDOL 80 ML: 612 INJECTION, SOLUTION INTRAVENOUS at 15:51

## 2021-05-13 RX ADMIN — ROSUVASTATIN CALCIUM 20 MG: 20 TABLET, COATED ORAL at 22:37

## 2021-05-13 RX ADMIN — CARVEDILOL 12.5 MG: 12.5 TABLET, FILM COATED ORAL at 22:37

## 2021-05-13 RX ADMIN — VANCOMYCIN HYDROCHLORIDE 1500 MG: 10 INJECTION, POWDER, LYOPHILIZED, FOR SOLUTION INTRAVENOUS at 23:06
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
## 2021-05-13 NOTE — Clinical Note
Status[de-identified] INPATIENT [101]   Type of Bed: Telemetry [19]   Cardiac Monitoring Required?: Yes   Inpatient Hospitalization Certified Necessary for the Following Reasons: 3.  Patient receiving treatment that can only be provided in an inpatient setting (further clarification in H&P documentation)   Admitting Diagnosis: CHF exacerbation [facility]) [[id number]]   Admitting Physician: Cherri Doherty [[id number]]   Attending Physician: Cherri Doherty [[id number]]   Estimated Length of Stay: 2 Midnights   Discharge Plan[de-identified] Home with Office Follow-up
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
## 2021-05-14 LAB
ANION GAP SERPL CALC-SCNC: 5 MMOL/L (ref 3–18)
ANION GAP SERPL CALC-SCNC: 7 MMOL/L (ref 3–18)
APPEARANCE UR: CLEAR
ATRIAL RATE: 71 BPM
BASOPHILS # BLD: 0.4 K/UL (ref 0–0.1)
BASOPHILS NFR BLD: 2 % (ref 0–2)
BILIRUB UR QL: NEGATIVE
BUN SERPL-MCNC: 21 MG/DL (ref 7–18)
BUN SERPL-MCNC: 22 MG/DL (ref 7–18)
BUN/CREAT SERPL: 12 (ref 12–20)
BUN/CREAT SERPL: 14 (ref 12–20)
CALCIUM SERPL-MCNC: 6.7 MG/DL (ref 8.5–10.1)
CALCIUM SERPL-MCNC: 7.3 MG/DL (ref 8.5–10.1)
CALCULATED P AXIS, ECG09: 59 DEGREES
CALCULATED R AXIS, ECG10: 75 DEGREES
CALCULATED T AXIS, ECG11: 94 DEGREES
CHLORIDE SERPL-SCNC: 105 MMOL/L (ref 100–111)
CHLORIDE SERPL-SCNC: 98 MMOL/L (ref 100–111)
CO2 SERPL-SCNC: 25 MMOL/L (ref 21–32)
CO2 SERPL-SCNC: 33 MMOL/L (ref 21–32)
COLOR UR: YELLOW
CREAT SERPL-MCNC: 1.49 MG/DL (ref 0.6–1.3)
CREAT SERPL-MCNC: 1.77 MG/DL (ref 0.6–1.3)
DIAGNOSIS, 93000: NORMAL
DIFFERENTIAL METHOD BLD: ABNORMAL
EOSINOPHIL # BLD: 0 K/UL (ref 0–0.4)
EOSINOPHIL NFR BLD: 0 % (ref 0–5)
ERYTHROCYTE [DISTWIDTH] IN BLOOD BY AUTOMATED COUNT: 13.4 % (ref 11.6–14.5)
GLUCOSE SERPL-MCNC: 101 MG/DL (ref 74–99)
GLUCOSE SERPL-MCNC: 84 MG/DL (ref 74–99)
GLUCOSE UR STRIP.AUTO-MCNC: NEGATIVE MG/DL
HCT VFR BLD AUTO: 39.9 % (ref 36–48)
HGB BLD-MCNC: 13.3 G/DL (ref 13–16)
HGB UR QL STRIP: NEGATIVE
KETONES UR QL STRIP.AUTO: NEGATIVE MG/DL
LEUKOCYTE ESTERASE UR QL STRIP.AUTO: NEGATIVE
LYMPHOCYTES # BLD: 2.5 K/UL (ref 0.9–3.6)
LYMPHOCYTES NFR BLD: 12 % (ref 21–52)
MAGNESIUM SERPL-MCNC: 1.6 MG/DL (ref 1.6–2.6)
MAGNESIUM SERPL-MCNC: 2 MG/DL (ref 1.6–2.6)
MCH RBC QN AUTO: 30.2 PG (ref 24–34)
MCHC RBC AUTO-ENTMCNC: 33.3 G/DL (ref 31–37)
MCV RBC AUTO: 90.5 FL (ref 74–97)
MONOCYTES # BLD: 0.4 K/UL (ref 0.05–1.2)
MONOCYTES NFR BLD: 2 % (ref 3–10)
NEUTS SEG # BLD: 17.4 K/UL (ref 1.8–8)
NEUTS SEG NFR BLD: 84 % (ref 40–73)
NITRITE UR QL STRIP.AUTO: NEGATIVE
P-R INTERVAL, ECG05: 160 MS
PH UR STRIP: 5 [PH] (ref 5–8)
PLATELET # BLD AUTO: 179 K/UL (ref 135–420)
PLATELET COMMENTS,PCOM: ABNORMAL
PMV BLD AUTO: 13.3 FL (ref 9.2–11.8)
POTASSIUM SERPL-SCNC: 3.4 MMOL/L (ref 3.5–5.5)
POTASSIUM SERPL-SCNC: 3.9 MMOL/L (ref 3.5–5.5)
PROT UR STRIP-MCNC: NEGATIVE MG/DL
Q-T INTERVAL, ECG07: 420 MS
QRS DURATION, ECG06: 86 MS
QTC CALCULATION (BEZET), ECG08: 456 MS
RBC # BLD AUTO: 4.41 M/UL (ref 4.35–5.65)
RBC MORPH BLD: ABNORMAL
SODIUM SERPL-SCNC: 136 MMOL/L (ref 136–145)
SODIUM SERPL-SCNC: 137 MMOL/L (ref 136–145)
SP GR UR REFRACTOMETRY: 1.01 (ref 1–1.03)
UROBILINOGEN UR QL STRIP.AUTO: 1 EU/DL (ref 0.2–1)
VENTRICULAR RATE, ECG03: 71 BPM
WBC # BLD AUTO: 20.7 K/UL (ref 4.6–13.2)

## 2021-05-14 PROCEDURE — 74011250636 HC RX REV CODE- 250/636: Performed by: STUDENT IN AN ORGANIZED HEALTH CARE EDUCATION/TRAINING PROGRAM

## 2021-05-14 PROCEDURE — 36415 COLL VENOUS BLD VENIPUNCTURE: CPT

## 2021-05-14 PROCEDURE — 74011250636 HC RX REV CODE- 250/636: Performed by: PHYSICIAN ASSISTANT

## 2021-05-14 PROCEDURE — 85025 COMPLETE CBC W/AUTO DIFF WBC: CPT

## 2021-05-14 PROCEDURE — 99223 1ST HOSP IP/OBS HIGH 75: CPT | Performed by: INTERNAL MEDICINE

## 2021-05-14 PROCEDURE — 83735 ASSAY OF MAGNESIUM: CPT

## 2021-05-14 PROCEDURE — 80048 BASIC METABOLIC PNL TOTAL CA: CPT

## 2021-05-14 PROCEDURE — 65660000000 HC RM CCU STEPDOWN

## 2021-05-14 PROCEDURE — 2709999900 HC NON-CHARGEABLE SUPPLY

## 2021-05-14 PROCEDURE — 97161 PT EVAL LOW COMPLEX 20 MIN: CPT

## 2021-05-14 PROCEDURE — 74011250637 HC RX REV CODE- 250/637: Performed by: STUDENT IN AN ORGANIZED HEALTH CARE EDUCATION/TRAINING PROGRAM

## 2021-05-14 PROCEDURE — 51798 US URINE CAPACITY MEASURE: CPT

## 2021-05-14 PROCEDURE — 97165 OT EVAL LOW COMPLEX 30 MIN: CPT

## 2021-05-14 RX ORDER — FUROSEMIDE 10 MG/ML
40 INJECTION INTRAMUSCULAR; INTRAVENOUS 2 TIMES DAILY
Status: DISCONTINUED | OUTPATIENT
Start: 2021-05-14 | End: 2021-05-15

## 2021-05-14 RX ORDER — MAGNESIUM SULFATE HEPTAHYDRATE 40 MG/ML
2 INJECTION, SOLUTION INTRAVENOUS ONCE
Status: COMPLETED | OUTPATIENT
Start: 2021-05-14 | End: 2021-05-14

## 2021-05-14 RX ADMIN — FUROSEMIDE 40 MG: 10 INJECTION, SOLUTION INTRAMUSCULAR; INTRAVENOUS at 13:12

## 2021-05-14 RX ADMIN — SACUBITRIL AND VALSARTAN 1 TABLET: 24; 26 TABLET, FILM COATED ORAL at 09:02

## 2021-05-14 RX ADMIN — SACUBITRIL AND VALSARTAN 1 TABLET: 24; 26 TABLET, FILM COATED ORAL at 22:31

## 2021-05-14 RX ADMIN — CARVEDILOL 12.5 MG: 12.5 TABLET, FILM COATED ORAL at 17:15

## 2021-05-14 RX ADMIN — ACETAMINOPHEN 1000 MG: 500 TABLET, FILM COATED ORAL at 09:02

## 2021-05-14 RX ADMIN — LEVOFLOXACIN 500 MG: 5 INJECTION, SOLUTION INTRAVENOUS at 15:44

## 2021-05-14 RX ADMIN — ROSUVASTATIN CALCIUM 20 MG: 20 TABLET, COATED ORAL at 22:31

## 2021-05-14 RX ADMIN — Medication 10 ML: at 22:00

## 2021-05-14 RX ADMIN — TORSEMIDE 20 MG: 20 TABLET ORAL at 09:01

## 2021-05-14 RX ADMIN — MAGNESIUM SULFATE HEPTAHYDRATE 2 G: 40 INJECTION, SOLUTION INTRAVENOUS at 13:13

## 2021-05-14 RX ADMIN — ACETAMINOPHEN 1000 MG: 500 TABLET, FILM COATED ORAL at 17:15

## 2021-05-14 RX ADMIN — HEPARIN SODIUM 5000 UNITS: 5000 INJECTION INTRAVENOUS; SUBCUTANEOUS at 05:49

## 2021-05-14 RX ADMIN — CARVEDILOL 12.5 MG: 12.5 TABLET, FILM COATED ORAL at 09:01

## 2021-05-14 RX ADMIN — SPIRONOLACTONE 25 MG: 25 TABLET, FILM COATED ORAL at 09:01

## 2021-05-14 RX ADMIN — Medication 10 ML: at 05:49

## 2021-05-14 RX ADMIN — VANCOMYCIN HYDROCHLORIDE 1250 MG: 10 INJECTION, POWDER, LYOPHILIZED, FOR SOLUTION INTRAVENOUS at 17:13

## 2021-05-14 RX ADMIN — HEPARIN SODIUM 5000 UNITS: 5000 INJECTION INTRAVENOUS; SUBCUTANEOUS at 17:16

## 2021-05-14 RX ADMIN — FUROSEMIDE 40 MG: 10 INJECTION, SOLUTION INTRAMUSCULAR; INTRAVENOUS at 17:15

## 2021-05-14 RX ADMIN — ASPIRIN 81 MG: 81 TABLET, COATED ORAL at 09:01

## 2021-05-14 RX ADMIN — Medication 10 ML: at 13:13
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
## 2021-05-14 NOTE — ROUTINE PROCESS
Bedside shift change report given to [facility] (oncoming nurse) by Jesica Henson (offgoing nurse). Report included the following information SBAR, Procedure Summary, MAR and Recent Results.

## 2021-05-14 NOTE — PROGRESS NOTES
Urology Progress Note        Assessment/Plan:     Patient Active Problem List   Diagnosis Code    Cough R05    Corn of foot L84    Hypomagnesemia E83.42    TIA (transient ischemic attack) G45.9    Hypertension I10    Pulmonary nodule R91.1    Head injury S09.90XA    Depression F32.9    SAH (subarachnoid hemorrhage) (HCC) I60.9    HLD (hyperlipidemia) E78.5    Weakness due to cerebrovascular accident (CVA) [id number]    S/P ablation operation for arrhythmia Z98.890, Z03.91    Systolic CHF, chronic (HCC) I50.22    Cardiomyopathy ([address] I42.9    Coronary artery disease involving native coronary artery of native heart without angina pectoris I25.10    Testicular pain N50.819    CHF exacerbation (HCC) I50.9       ASSESSMENT:   Left Epididymoorchitis on CT and US              WBC 20.7>13.7              Creat: 1.49<1.58   UA negative   UCX pending   UCX Gh/Chlamydia pending   Tmax 101. 2     Bilateral Hydroceles, L>R on US     CHF    BNP 3676  HTN     HLD        PLAN:    Appreciate overall management per medicine. Continue ABX per medicine, Levaquin and Vanc  Recommend scrotal support- elevation. Recommend NSAIDS PRN for pain. CX pending  Urine culture pending  Recommend PVR x1 obtained to make sure patient is not retaining. If PVR >350 cc please place elias catheter. Will see again Monday, please call with any questions prior     Follow up arranged? NO      VBrugh        Subjective:     Daily Progress Note: 2021 5:04 PM    Robin Guerra reports his scrotal pain is slightly improved this PM.  No nausea/vomiting. No fevers or chills. He denies voiding c/o and flank pain.   No urethral discharge    Objective:     Visit Vitals  /74   Pulse 67   Temp 98.2 °F (36.8 °C)   Resp 19   Ht 6' (1.829 m)   Wt 81.6 kg (179 lb 14.3 oz)   SpO2 98%   BMI 24.40 kg/m²        Temp (24hrs), Av °F (37.2 °C), Min:98.2 °F (36.8 °C), Max:101.2 °F (38.4 °C)      Intake and Output:  1901 -  0700  In: -   Out: 3500 [Urine:3500]  05/14 0701 - 05/14 1900  In: 240 [P.O.:240]  Out: 5450 [Urine:5450]    PHYSICAL EXAMINATION:   Visit Vitals  /74   Pulse 67   Temp 98.2 °F (36.8 °C)   Resp 19   Ht 6' (1.829 m)   Wt 81.6 kg (179 lb 14.3 oz)   SpO2 98%   BMI 24.40 kg/m²     Constitutional: Well developed, well nourished male. No acute distress. HEENT: Normocephalic, Atraumatic, EOM's intact   CV:  BLE, +1 pitting edema  Respiratory: No respiratory distress or difficulties breathing   Abdomen:  soft and non tender    Male:    SCROTUM:  No scrotal rash or lesions noticed. Left testicle and epididymis were indurated and TTP though not fixed to the scrotum. No crepitus or fluctuance noted. Right testicle lower than left, normal.  PENIS: Urethral meatus normal in location and size. No urethral discharge. Circumsized   Skin: No evidence of jaundice. Normal color  Neuro/Psych:  Alert and oriented. Affect appropriate. Lab/Data Review: All lab results for the last 24 hours reviewed. Labs:     Labs: Results:   Chemistry    Recent Labs     05/14/21  0453 05/13/21  1342   GLU 84 104*    134*   K 3.9 5.2    103   CO2 25 26   BUN 21* 24*   CREA 1.49* 1.58*   CA 6.7* 7.2*   AGAP 7 5   BUCR 14 15      CBC w/Diff Recent Labs     05/14/21  0453 05/13/21  1017   WBC 20.7* 13.7*   RBC 4.41 4.44   HGB 13.3 13.3   HCT 39.9 41.3    202   GRANS 84* 84*   LYMPH 12* 7*   EOS 0 1      Cultures No results for input(s): CULT in the last 72 hours.   All Micro Results     Procedure Component Value Units Date/Time    CULTURE, URINE [[id number]] Collected: 05/13/21 4085    Order Status: Completed Specimen: Urine from Clean catch Updated: 05/14/21 1512            Urinalysis Color   Date Value Ref Range Status   05/13/2021 YELLOW   Final     Appearance   Date Value Ref Range Status   05/13/2021 CLEAR   Final     Specific gravity   Date Value Ref Range Status   05/13/2021 1.012 1.005 - 1.030   Final     pH (UA)   Date Value Ref Range Status   05/13/2021 5.0 5.0 - 8.0   Final     Protein   Date Value Ref Range Status   05/13/2021 Negative NEG mg/dL Final     Ketone   Date Value Ref Range Status   05/13/2021 Negative NEG mg/dL Final     Bilirubin   Date Value Ref Range Status   05/13/2021 Negative NEG   Final     Blood   Date Value Ref Range Status   05/13/2021 Negative NEG   Final     Urobilinogen   Date Value Ref Range Status   05/13/2021 1.0 0.2 - 1.0 EU/dL Final     Nitrites   Date Value Ref Range Status   05/13/2021 Negative NEG   Final     Leukocyte Esterase   Date Value Ref Range Status   05/13/2021 Negative NEG   Final     Potassium   Date Value Ref Range Status   05/14/2021 3.9 3.5 - 5.5 mmol/L Final     Creatinine   Date Value Ref Range Status   05/14/2021 1.49 (H) 0.6 - 1.3 MG/DL Final     BUN   Date Value Ref Range Status   05/14/2021 21 (H) 7.0 - 18 MG/DL Final      PSA No results for input(s): PSA in the last 72 hours.    Coagulation Lab Results   Component Value Date/Time    Prothrombin time 13.3 08/12/2020 10:19 AM    Prothrombin time 12.4 11/23/2018 02:20 PM    INR 1.0 08/12/2020 10:19 AM    INR 1.0 11/23/2018 02:20 PM    aPTT 24.9 11/23/2018 02:20 PM    aPTT 28.1 03/23/2017 04:15 PM

## 2021-05-14 NOTE — PROGRESS NOTES
conducted an initial consultation and Spiritual Assessment for Kiet James, who is a 63 y.o.,male. Patients Primary Language is: [place]. According to the patients EMR Confucianism Affiliation is: [place]. The reason the Patient came to the hospital is:   Patient Active Problem List    Diagnosis Date Noted    Testicular pain 05/13/2021    CHF exacerbation ([address] 25/28/1332    Systolic CHF, chronic ([address] 11/11/2020    Cardiomyopathy ([facility] [address] 11/11/2020    Coronary artery disease involving native coronary artery of native heart without angina pectoris 11/11/2020    S/P ablation operation for arrhythmia 03/12/2018    Weakness due to cerebrovascular accident (CVA) 03/24/2017    TIA (transient ischemic attack) 03/23/2017    Hypertension     Pulmonary nodule     Head injury     Depression     SAH (subarachnoid hemorrhage) (HCC)     HLD (hyperlipidemia)     Corn of foot 08/21/2016    Hypomagnesemia 08/21/2016    Cough 04/01/2014        The  provided the following Interventions:  Initiated a relationship of care and support. Listened empathically. Provided information about Spiritual Care Services. Offered prayer and assurance of continued prayers on patient's behalf. Chart reviewed. The following outcomes where achieved:  Patient expressed gratitude for 's visit. Assessment:  Patient does not have any Tenriism/cultural needs that will affect patients preferences in health care. There are no spiritual or Tenriism issues which require intervention at this time. Plan:  Chaplains will continue to follow and will provide pastoral care on an as needed/requested basis.  recommends bedside caregivers page  on duty if patient shows signs of acute spiritual or emotional distress.       [address] [facility]   [phone]

## 2021-05-14 NOTE — PROGRESS NOTES
New OT orders received and chart reviewed. Unable to see pt for OT evaluation at this time due to:    Pt is refusing to participate in evaluation, reports he would like to sleep, requesting OT to return later. Will follow up later as pt's schedule allows.  Thank you for this referral.    Colleen Gutierrez MS, OTR/L

## 2021-05-14 NOTE — ROUTINE PROCESS
TRANSFER - OUT REPORT:    Verbal report given Yamini RN(name) on Georgi Heard  being transferred to [address] [place] [address](unit) for routine progression of care       Report consisted of patients Situation, Background, Assessment and   Recommendations(SBAR). Information from the following report(s) SBAR and ED Summary was reviewed with the receiving nurse. Lines:   Peripheral IV 05/13/21 (Active)        Opportunity for questions and clarification was provided.       Patient transported with:   [employer]

## 2021-05-14 NOTE — PROGRESS NOTES
Problem: Falls - Risk of  Goal: *Absence of Falls  Description: Document Jose Ball Fall Risk and appropriate interventions in the flowsheet. Outcome: Progressing Towards Goal  Note: Fall Risk Interventions:            Medication Interventions: Teach patient to arise slowly                   Problem: Pain  Goal: *Control of Pain  Outcome: Progressing Towards Goal     Problem: Pressure Injury - Risk of  Goal: *Prevention of pressure injury  Description: Document Glenn Scale and appropriate interventions in the flowsheet. Outcome: Progressing Towards Goal  Note: Pressure Injury Interventions:             Activity Interventions: Increase time out of bed, Pressure redistribution bed/mattress(bed type)         Nutrition Interventions: Document food/fluid/supplement intake                     Problem: Fluid Volume - Risk of, Imbalanced  Goal: *Balanced intake and output  Outcome: Progressing Towards Goal     Problem: Heart Failure: Day 1  Goal: Off Pathway (Use only if patient is Off Pathway)  Outcome: Progressing Towards Goal  Goal: Activity/Safety  Outcome: Progressing Towards Goal  Goal: Consults, if ordered  Outcome: Progressing Towards Goal  Goal: Diagnostic Test/Procedures  Outcome: Progressing Towards Goal  Goal: Nutrition/Diet  Outcome: Progressing Towards Goal  Goal: Discharge Planning  Outcome: Progressing Towards Goal  Goal: Medications  Outcome: Progressing Towards Goal  Goal: Respiratory  Outcome: Progressing Towards Goal  Goal: Treatments/Interventions/Procedures  Outcome: Progressing Towards Goal  Goal: Psychosocial  Outcome: Progressing Towards Goal  Goal: *Oxygen saturation within defined limits  Outcome: Progressing Towards Goal  Goal: *Hemodynamically stable  Outcome: Progressing Towards Goal  Goal: *Optimal pain control at patient's stated goal  Outcome: Progressing Towards Goal  Goal: *Anxiety reduced or absent  Outcome: Progressing Towards Goal

## 2021-05-14 NOTE — PROGRESS NOTES
[facility]  Progress Note    Patient: Ran Chen MRN: [id number]   SSN: [id number]  YOB: 1960   Age: 63 y.o. Sex: male      Admit Date: 5/13/2021    LOS: 1 day   Chief Complaint   Patient presents with    Testicle Swelling    Shortness of Breath       Subjective:     No acute events overnight. Patient slept well, has no complaints today. He states he has no testicular pain currently, but it is tender to touch still. Patient asked if he can eat food today. Denies SOB, CP, abdominal pain, nausea, fever, chills, headaches. ROS as above    Objective:     Visit Vitals  /70   Pulse 82   Temp 98.4 °F (36.9 °C)   Resp 19   Ht 6' (1.829 m)   Wt 81.6 kg (179 lb 14.3 oz)   SpO2 98%   BMI 24.40 kg/m²       Physical Exam:   General:  AAOx3, NAD   HEENT: Conjunctiva pink, sclera anicteric. PERRL. EOMI. Pharynx moist, nonerythematous. Moist mucous membranes. Thyroid not enlarged, no nodules. No cervical, supraclavicular, occipital or submandibular lymphadenopathy. No other gross abnormalities present. CV:  RRR, no murmurs. No visible pulsations or thrills. RESP:  Unlabored breathing. Lungs clear to auscultation without adventitious breath sounds. Equal expansion bilaterally. ABD:  Soft, nontender, nondistended. BS (+). No hepatosplenomegaly. No suprapubic tenderness. RECTAL:  Deferred. Genito-Urinary: Deferred today. MS:  No joint deformity or instability. No atrophy. Neuro:  CN II-XII grossly intact. 5/5 strength bilateral upper extremities and lower extremities. Ext:  2+ pitting edema in LE bilaterally up to above the ankles. 2+ radial and dp pulses bilaterally. Skin:  No rashes, lesions, or ulcers. Good turgor    Intake and Output:  Current Shift: No intake/output data recorded.   Last three shifts: 05/12 1901 - 05/14 0700  In: -   Out: 3500 [Urine:3500]    Lab/Data Review:  Recent Results (from the past 12 hour(s))   URINALYSIS W/ RFLX MICROSCOPIC Collection Time: 05/13/21 11:22 PM   Result Value Ref Range    Color YELLOW      Appearance CLEAR      Specific gravity 1.012 1.005 - 1.030      pH (UA) 5.0 5.0 - 8.0      Protein Negative NEG mg/dL    Glucose Negative NEG mg/dL    Ketone Negative NEG mg/dL    Bilirubin Negative NEG      Blood Negative NEG      Urobilinogen 1.0 0.2 - 1.0 EU/dL    Nitrites Negative NEG      Leukocyte Esterase Negative NEG     METABOLIC PANEL, BASIC    Collection Time: 05/14/21  4:53 AM   Result Value Ref Range    Sodium 137 136 - 145 mmol/L    Potassium 3.9 3.5 - 5.5 mmol/L    Chloride 105 100 - 111 mmol/L    CO2 25 21 - 32 mmol/L    Anion gap 7 3.0 - 18 mmol/L    Glucose 84 74 - 99 mg/dL    BUN 21 (H) 7.0 - 18 MG/DL    Creatinine 1.49 (H) 0.6 - 1.3 MG/DL    BUN/Creatinine ratio 14 12 - 20      GFR est AA 58 (L) >60 ml/min/1.73m2    GFR est non-AA 48 (L) >60 ml/min/1.73m2    Calcium 6.7 (L) 8.5 - 10.1 MG/DL   CBC WITH AUTOMATED DIFF    Collection Time: 05/14/21  4:53 AM   Result Value Ref Range    WBC 20.7 (H) 4.6 - 13.2 K/uL    RBC 4.41 4.35 - 5.65 M/uL    HGB 13.3 13.0 - 16.0 g/dL    HCT 39.9 36.0 - 48.0 %    MCV 90.5 74.0 - 97.0 FL    MCH 30.2 24.0 - 34.0 PG    MCHC 33.3 31.0 - 37.0 g/dL    RDW 13.4 11.6 - 14.5 %    PLATELET 360 292 - 780 K/uL    MPV 13.3 (H) 9.2 - 11.8 FL    NEUTROPHILS 84 (H) 40 - 73 %    LYMPHOCYTES 12 (L) 21 - 52 %    MONOCYTES 2 (L) 3 - 10 %    EOSINOPHILS 0 0 - 5 %    BASOPHILS 2 0 - 2 %    ABS. NEUTROPHILS 17.4 (H) 1.8 - 8.0 K/UL    ABS. LYMPHOCYTES 2.5 0.9 - 3.6 K/UL    ABS. MONOCYTES 0.4 0.05 - 1.2 K/UL    ABS. EOSINOPHILS 0.0 0.0 - 0.4 K/UL    ABS.  BASOPHILS 0.4 (H) 0.0 - 0.1 K/UL    DF MANUAL      PLATELET COMMENTS ADEQUATE PLATELETS      RBC COMMENTS NORMOCYTIC, NORMOCHROMIC     MAGNESIUM    Collection Time: 05/14/21  4:53 AM   Result Value Ref Range    Magnesium 1.6 1.6 - 2.6 mg/dL       RECENT RESULTS  MODALITY IMPRESSION   XR Results from [place] encounter on 05/13/21   XR CHEST PA LAT Narrative EXAM: XR CHEST PA LAT    INDICATION: CHF    COMPARISON: 12/29/2020. FINDINGS: PA and lateral radiographs of the chest demonstrate question minimal  pulmonary venous congestion and hazy left basilar opacity. . Mildly prominent  cardiac silhouette, decreased since prior exam. Loop recorder unchanged. . The  bones and soft tissues are within normal limits. Impression Mildly prominent cardiac silhouette, decreased since prior exam.  -question mild pulmonary venous congestion and hazy left basilar opacity. CT Results from [place] encounter on 05/13/21   CT ABD PELV W CONT    Narrative CT ABDOMEN AND PELVIS WITH CONTRAST    COMPARISON: Testicular ultrasound, plain films of the chest,. INDICATIONS: Scrotal swelling and pain, shortness of breath. TECHNIQUE:  Following the uneventful administration of 100cc of Isovue 300  intravenous contrast , volumetric data acquisition was performed of the abdomen  and pelvis on a multislice scanner and reconstructed in axial coronal and  sagittal planes    CT ABDOMEN FINDINGS:     Lung Bases: There is some mild groundglass density and smooth interlobular  septal thickening. There is cardiac enlargement. Liver/Gallbladder/Biliary: Fatty infiltration without focal lesion. Unremarkable  biliary tree. Spleen: Negative. Adrenal Glands:  Negative    Kidneys: Negative. Pancreas: Normal.    Stomach, Small Bowel,  and Colon: The stomach is unremarkable. There are a few  mildly dilated loops of small bowel with gas and fluid content in a  nonobstructed pattern. The appendix is normal. The colon is unremarkable. Lymph Nodes: Normal in size and number. The abdominal aorta is unremarkable. The IVC is unremarkable. Peritoneal Spaces: No free fluid or free air is present. Abdominal wall: No hernia or mass is evident    Bladder: Unremarkable.     There is edema of the left spermatic cord and significantly increased prominence  of the left scrotal vasculature. Correlation with ultrasound earlier same date  reveals left epididymal and testicular hyperemia correlates with this finding. Osseous Structures Of Abdomen And Pelvis: Unremarkable for age. Impression Left epididymoorchitis  Mild groundglass haziness in the lung bases with smooth interlobular septal  thickening-the findings suggest pulmonary edema likely from CHF versus fluid  overload. All CT scans at this facility are performed using dose optimization technique as  appropriate to the performed exam, to include automated exposure control,  adjustment of the mA and/or kV according to patient's size (Including  appropriate matching for site-specific examinations), or use of iterative  reconstruction technique. MRI Results from [place] encounter on 03/23/17   MRI BRAIN WO CONT    Narrative MR Brain Without Contrast    CPT CODE: 06321    HISTORY:  cva. Right-sided numbness    COMPARISON: CT head 5/23/2017. No prior brain MRI. TECHNIQUE: Brain scanned with axial and sagittal T1W scans, axial T2W scans,  GRE, axial FLAIR, axial diffusion weighted images. FINDINGS:     Diffusion sequence shows scattered small hyperintense and restricted diffusion  signal foci within the left cerebral hemisphere. Multiple vascular distributions  raises possibility of an embolic process. There are 2 small infarcts in the left  occipital pole. Additional infarcts in the left caudate head, left caudate body,  adjacent corona radiata, and left frontal white matter. A small cluster at the  left frontoparietal vertex on DWI image 25. No associated hemorrhage. No right  hemisphere, brainstem, or cerebellar infarct. Grossly patent flow in the basilar  artery and both internal carotid arteries. No abnormal extra-axial fluid  collections over the convexities. Normal brain volumes. No hydrocephalus.   There is a small T2 hyperintense defect in the region of the genu of the right  internal capsule that appears to be a tiny chronic lacunar infarct. Some T2  signal along the right caudate body on axial T2 image 17 that could be related  to chronic infarct, but not clearly seen on other sequences to confirm. No mass  lesions. Some mild mucosal thickening and layering fluid in the right sphenoid sinus. Other sinuses are clear. Impression IMPRESSION:    1. Multiple acute/early subacute infarcts in the left cerebrum, involves  multiple vascular distributions suggesting an embolic process. -Infarcts in the left deep brain, left frontal white matter, and at the left  frontoparietal vertex. A few tiny foci in the left occipital lobe. 2. Rounded well-defined chronic appearing defect in the genu of the right  internal capsule. Favor a chronic lacunar infarct, but some uncertainty versus  dilated perivascular space given the lack of surrounding gliosis on T2 FLAIR. 3. Small amount of right sphenoid sinus fluid. ULTRASOUND Results from Hospital Encounter encounter on 05/13/21   US SCROTUM/TESTICLES W DOPPLER    Impression Enlarged hypervascular left epididymis. This may reflect epididymitis. There is  an indeterminate complex nodule within the epididymis. Differential is broad. Infectious/inflammatory etiologies could be considered amongst others. Correlate  clinically. Follow-up can be obtained. Nonspecific bilateral hydroceles, left greater than right. Left scrotal wall thickening. Please see report for additional details.         Cardiology Procedures/Testing:  MODALITY RESULTS   EKG Results for orders placed or performed during the hospital encounter of 05/13/21   EKG, 12 LEAD, INITIAL   Result Value Ref Range    Ventricular Rate 71 BPM    Atrial Rate 71 BPM    P-R Interval 160 ms    QRS Duration 86 ms    Q-T Interval 420 ms    QTC Calculation (Bezet) 456 ms    Calculated P Axis 59 degrees    Calculated R Axis 75 degrees    Calculated T Axis 94 degrees    Diagnosis Normal sinus rhythm  Possible Left atrial enlargement  Septal infarct (cited on or before 29-DEC-2020)  T wave abnormality, consider lateral ischemia  Abnormal ECG  When compared with ECG of 30-DEC-2020 01:35,  Sinus rhythm has replaced Ectopic atrial rhythm  QRS axis shifted left  Nonspecific T wave abnormality has replaced inverted T waves in Anterior   leads  QT has shortened     Results for orders placed or performed in visit on 02/15/18   AMB POC EKG ROUTINE W/ 12 LEADS, INTER & REP    Impression    Sinus tachycardia at 126 bpm.  No acute changes. ECHO       Special Testing/Procedures:  MODALITY RESULTS   MICRO All Micro Results     Procedure Component Value Units Date/Time    CULTURE, URINE [[id number]] Collected: 05/13/21 2329    Order Status: Completed Specimen: Urine from Clean catch Updated: 05/14/21 0026         UA No results found for this or any previous visit. Assessment and Plan:   63 y.o. male with PMHx of CHF, HLD, HTN, Hypoparathyroidism, now admitted with Left Epididymoorchitis, CHF Exacerbation.     Left Epididymoorchitis  Acute onset testicular pain. Also found to have an indeterminate complex nodule within the epididymis. Nonspecific bilateral hydroceles, left greater than right. Patient with leukocytosis worsening. CT abdomen shows L epididymoorchitis. Mild groundglass haziness in lung bases w/smooth interlobular septal thickening-the findings suggest pulmonary edema likely from CHF versus fluid overload. UA was negative. - Admit to Tele-medicine  - Vitals per Unit Routine  - Urology following, appreciate recs  - Daily CBC, CMP, Mg  - FU GC/Chlamydia  - Post void residual today.  If PVR >350 cc, place Ta  - CM/PT/OT  - continue Levaquin 500mg IV qD  - Renal diet after confirming with Urology that no procedures are planned for today  - For Pain: Tylenol and Meloxicam for mild pain scheduled     CHF exacerbation with HTN, CAD, Pulm HTN  Home medications: Carvedilol 12.5 BID, Furosemide 20mg daily, Entresto 24-26 daily, Nitoglycerin 0.4 PRN, Spirinolactone 25mg daily, Aspirin  Hx of non compliance, Cardiology note states he ran out of his medications and did not take them for 1 month  Right heart Cath done 10/2020, showing RV 47/0mmHg, PA 49/20mmHG, PCWP 20mmHg. ECHO done 10/19/2020, showing EF 15%, Grade III (severe) diastolic dysfunction, mild mitral regurg, moderate tricuspid regurg, Pulm Artery Pressure 53mmHg. In the ED, Pro-BNP elevated to 3676, Trop-I neg. EKG showed NSR, with Septal infarct, T wave abnormality concerning for lateral ischemia   - Cardiology consulted, appreciate recs.  Consider repeat ECHO  - continue Carvedilol, Spirinolactone, Aspirin, Entresto 24-26, Nitroglycerin PRN  - strict I/Os  - Start Torsemide 20mg BID today     HLD  At home takes Rosuvastatin 20mg daily  - continue Rosuvastatin     Mild Elevation on on CKDIII  Mildly elevated Cr to 1.58, up from his baseline of 1.4, improving  - daily CMP    Diet Renal   DVT Prophylaxis SCDs, SQH   GI Prophylaxis Famotidine   Code status FULL   Disposition >2MNs, Home     Point of Contact [place]  Relationship: Sister  [phone]     Roman Lin MD, PGY-1   [address]   Intern Pager: [phone]   May 14, 2021, 7:49 AM

## 2021-05-14 NOTE — REMOTE MONITORING
Spoke with Tunde Romero regarding recommendation for sepsis orders: LA/BC/IVF    Thanks  Hakeem Gan, [address]  [phone]

## 2021-05-14 NOTE — ROUTINE PROCESS
0725  -- Bedside, Verbal and Written shift change report received by Cristofer Howell (oncoming nurse) by Gerhardt Salts (offgoing nurse). Allergy band placed on pt's wrist. Report included the following information SBAR, Kardex, Intake/Output, MAR and Recent Results. 0830-Assessment completed, call bell within reach, no distress noted. 0900  -- AM  medications administered, pt tolerated with ease, will continue to monitor. 1150 -- Shift reassessment, pt condition unchanged, will continue to monitor. Patient voided 300 ml of urine, PVR was 279 ml.    1600 --  Shift reassessment, pt condition unchanged, will continue to monitor. 1845-patient complained of severe cramps to bilat legs and hands, paged Doctor on call. 1920-informed Dr. Avel Angelucci of patients complaints, no orders given. 1930 -- Bedside, Verbal and Written shift change report given to Ahmet(oncoming nurse) by Cristofer Howell (offgoing nurse). Report included the following information SBAR, Kardex, Intake/Output, MAR and Recent Results. Skin assessment completed.

## 2021-05-14 NOTE — ROUTINE PROCESS
TRANSFER - IN REPORT:    Verbal report received from [address] (name) on Kiet James  being received from ED (unit) for routine progression of care      Report consisted of patients Situation, Background, Assessment and   Recommendations(SBAR). Information from the following report(s) SBAR, ED Summary, Procedure Summary, MAR and Recent Results was reviewed with the receiving nurse. Opportunity for questions and clarification was provided. Assessment completed upon patients arrival to unit and care assumed. Following concerns addressed during report:  Patient following medications not given: Vancomycin (due 1900), Coreg (Due 1700), Lasix (due 1713), Heparin (due 1800). As per Nursing supervisor Amparo Guaman, \"ED does not give scheduled medications, they only give STAT/NOW\". Patient has a MEWS score of 4 @ 1808 due to , Temp 101.2, /82 (later changed to 140/82). Tylenol given @ 2003. RN spoke with Kulwant Gaspar RN in regards to recommendation for sepsis orders. Lactic Acid is 1.33 @1947. No antibiotics or fluids given at this time. Upon arrival:  Patient AO x4, Alert, lethargic, cooperative,   9/10 pain in gonads. MEWS score of 1. IV patent.
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
## 2021-05-14 NOTE — PROGRESS NOTES
Problem: Falls - Risk of  Goal: *Absence of Falls  Description: Document Martha Fall Risk and appropriate interventions in the flowsheet. Outcome: Progressing Towards Goal  Note: Fall Risk Interventions:            Medication Interventions: Teach patient to arise slowly, Patient to call before getting OOB, Evaluate medications/consider consulting pharmacy, Bed/chair exit alarm                   Problem: Pain  Goal: *Control of Pain  Outcome: Progressing Towards Goal     Problem: Pressure Injury - Risk of  Goal: *Prevention of pressure injury  Description: Document Glenn Scale and appropriate interventions in the flowsheet. Outcome: Progressing Towards Goal  Note: Pressure Injury Interventions:             Activity Interventions: Increase time out of bed, Pressure redistribution bed/mattress(bed type)         Nutrition Interventions: Document food/fluid/supplement intake, Offer support with meals,snacks and hydration                     Problem: Injury - Risk of, Adverse Drug Event  Goal: *Absence of adverse drug events  Outcome: Progressing Towards Goal  Goal: *Absence of medication errors  Outcome: Progressing Towards Goal  Goal: *Knowledge of prescribed medications  Outcome: Progressing Towards Goal

## 2021-05-15 ENCOUNTER — APPOINTMENT (OUTPATIENT)
Dept: NON INVASIVE DIAGNOSTICS | Age: 61
DRG: 291 | End: 2021-05-15
Attending: PHYSICIAN ASSISTANT
Payer: MEDICARE

## 2021-05-15 LAB
ANION GAP SERPL CALC-SCNC: 9 MMOL/L (ref 3–18)
BACTERIA SPEC CULT: NORMAL
BASOPHILS # BLD: 0 K/UL (ref 0–0.1)
BASOPHILS NFR BLD: 0 % (ref 0–2)
BUN SERPL-MCNC: 22 MG/DL (ref 7–18)
BUN/CREAT SERPL: 14 (ref 12–20)
CALCIUM SERPL-MCNC: 7 MG/DL (ref 8.5–10.1)
CHLORIDE SERPL-SCNC: 97 MMOL/L (ref 100–111)
CO2 SERPL-SCNC: 30 MMOL/L (ref 21–32)
CREAT SERPL-MCNC: 1.56 MG/DL (ref 0.6–1.3)
DATE LAST DOSE: NORMAL
DIFFERENTIAL METHOD BLD: ABNORMAL
ECHO AO ASC DIAM: 3.24 CM
ECHO AO ROOT DIAM: 3.29 CM
ECHO LA AREA 4C: 23.31 CM2
ECHO LA MAJOR AXIS: 3.8 CM
ECHO LA MINOR AXIS: 1.87 CM
ECHO LA VOL 2C: 74.35 ML (ref 18–58)
ECHO LA VOL 4C: 78.87 ML (ref 18–58)
ECHO LA VOL BP: 80.67 ML (ref 18–58)
ECHO LA VOL/BSA BIPLANE: 39.69 ML/M2 (ref 16–28)
ECHO LA VOLUME INDEX A2C: 36.58 ML/M2 (ref 16–28)
ECHO LA VOLUME INDEX A4C: 38.81 ML/M2 (ref 16–28)
ECHO LV INTERNAL DIMENSION DIASTOLIC: 4.62 CM (ref 4.2–5.9)
ECHO LV INTERNAL DIMENSION SYSTOLIC: 4.48 CM
ECHO LV IVSD: 1.13 CM (ref 0.6–1)
ECHO LV MASS 2D: 230.4 G (ref 88–224)
ECHO LV MASS INDEX 2D: 113.4 G/M2 (ref 49–115)
ECHO LV POSTERIOR WALL DIASTOLIC: 1.46 CM (ref 0.6–1)
ECHO MV A VELOCITY: 82.22 CM/S
ECHO MV E DECELERATION TIME (DT): 278.02 MS
ECHO MV E VELOCITY: 66.84 CM/S
ECHO MV E/A RATIO: 0.81
EOSINOPHIL # BLD: 0 K/UL (ref 0–0.4)
EOSINOPHIL NFR BLD: 0 % (ref 0–5)
ERYTHROCYTE [DISTWIDTH] IN BLOOD BY AUTOMATED COUNT: 13.3 % (ref 11.6–14.5)
GLUCOSE SERPL-MCNC: 80 MG/DL (ref 74–99)
HCT VFR BLD AUTO: 41.9 % (ref 36–48)
HGB BLD-MCNC: 14 G/DL (ref 13–16)
LYMPHOCYTES # BLD: 1.2 K/UL (ref 0.9–3.6)
LYMPHOCYTES NFR BLD: 8 % (ref 21–52)
MCH RBC QN AUTO: 30 PG (ref 24–34)
MCHC RBC AUTO-ENTMCNC: 33.4 G/DL (ref 31–37)
MCV RBC AUTO: 89.7 FL (ref 74–97)
MONOCYTES # BLD: 1.2 K/UL (ref 0.05–1.2)
MONOCYTES NFR BLD: 8 % (ref 3–10)
NEUTS SEG # BLD: 12.2 K/UL (ref 1.8–8)
NEUTS SEG NFR BLD: 84 % (ref 40–73)
PLATELET # BLD AUTO: 212 K/UL (ref 135–420)
PMV BLD AUTO: 13.5 FL (ref 9.2–11.8)
POTASSIUM SERPL-SCNC: 3.3 MMOL/L (ref 3.5–5.5)
RBC # BLD AUTO: 4.67 M/UL (ref 4.35–5.65)
REPORTED DOSE,DOSE: NORMAL UNITS
REPORTED DOSE/TIME,TMG: 1700
SERVICE CMNT-IMP: NORMAL
SODIUM SERPL-SCNC: 136 MMOL/L (ref 136–145)
VANCOMYCIN TROUGH SERPL-MCNC: 10.6 UG/ML (ref 10–20)
WBC # BLD AUTO: 14.5 K/UL (ref 4.6–13.2)

## 2021-05-15 PROCEDURE — 74011250637 HC RX REV CODE- 250/637: Performed by: STUDENT IN AN ORGANIZED HEALTH CARE EDUCATION/TRAINING PROGRAM

## 2021-05-15 PROCEDURE — 36415 COLL VENOUS BLD VENIPUNCTURE: CPT

## 2021-05-15 PROCEDURE — 74011250636 HC RX REV CODE- 250/636: Performed by: STUDENT IN AN ORGANIZED HEALTH CARE EDUCATION/TRAINING PROGRAM

## 2021-05-15 PROCEDURE — 80048 BASIC METABOLIC PNL TOTAL CA: CPT

## 2021-05-15 PROCEDURE — 80202 ASSAY OF VANCOMYCIN: CPT

## 2021-05-15 PROCEDURE — 74011250636 HC RX REV CODE- 250/636: Performed by: INTERNAL MEDICINE

## 2021-05-15 PROCEDURE — 93321 DOPPLER ECHO F-UP/LMTD STD: CPT

## 2021-05-15 PROCEDURE — 74011250636 HC RX REV CODE- 250/636: Performed by: PHYSICIAN ASSISTANT

## 2021-05-15 PROCEDURE — 65660000000 HC RM CCU STEPDOWN

## 2021-05-15 PROCEDURE — 51798 US URINE CAPACITY MEASURE: CPT

## 2021-05-15 PROCEDURE — 74011250637 HC RX REV CODE- 250/637: Performed by: FAMILY MEDICINE

## 2021-05-15 PROCEDURE — 2709999900 HC NON-CHARGEABLE SUPPLY

## 2021-05-15 PROCEDURE — 85025 COMPLETE CBC W/AUTO DIFF WBC: CPT

## 2021-05-15 PROCEDURE — 99232 SBSQ HOSP IP/OBS MODERATE 35: CPT | Performed by: INTERNAL MEDICINE

## 2021-05-15 RX ORDER — FUROSEMIDE 10 MG/ML
20 INJECTION INTRAMUSCULAR; INTRAVENOUS 2 TIMES DAILY
Status: DISCONTINUED | OUTPATIENT
Start: 2021-05-15 | End: 2021-05-16

## 2021-05-15 RX ORDER — POTASSIUM CHLORIDE 20 MEQ/1
40 TABLET, EXTENDED RELEASE ORAL
Status: DISPENSED | OUTPATIENT
Start: 2021-05-15 | End: 2021-05-15

## 2021-05-15 RX ORDER — POTASSIUM CHLORIDE 20 MEQ/1
40 TABLET, EXTENDED RELEASE ORAL
Status: COMPLETED | OUTPATIENT
Start: 2021-05-15 | End: 2021-05-15

## 2021-05-15 RX ADMIN — Medication 10 ML: at 21:48

## 2021-05-15 RX ADMIN — Medication 10 ML: at 06:00

## 2021-05-15 RX ADMIN — ACETAMINOPHEN 1000 MG: 500 TABLET, FILM COATED ORAL at 17:42

## 2021-05-15 RX ADMIN — FUROSEMIDE 40 MG: 10 INJECTION, SOLUTION INTRAMUSCULAR; INTRAVENOUS at 09:10

## 2021-05-15 RX ADMIN — ASPIRIN 81 MG: 81 TABLET, COATED ORAL at 09:08

## 2021-05-15 RX ADMIN — FUROSEMIDE 20 MG: 10 INJECTION, SOLUTION INTRAMUSCULAR; INTRAVENOUS at 17:39

## 2021-05-15 RX ADMIN — LEVOFLOXACIN 500 MG: 5 INJECTION, SOLUTION INTRAVENOUS at 15:32

## 2021-05-15 RX ADMIN — ACETAMINOPHEN 1000 MG: 500 TABLET, FILM COATED ORAL at 09:07

## 2021-05-15 RX ADMIN — SACUBITRIL AND VALSARTAN 1 TABLET: 24; 26 TABLET, FILM COATED ORAL at 21:47

## 2021-05-15 RX ADMIN — CARVEDILOL 12.5 MG: 12.5 TABLET, FILM COATED ORAL at 17:42

## 2021-05-15 RX ADMIN — CARVEDILOL 12.5 MG: 12.5 TABLET, FILM COATED ORAL at 09:09

## 2021-05-15 RX ADMIN — HEPARIN SODIUM 5000 UNITS: 5000 INJECTION INTRAVENOUS; SUBCUTANEOUS at 06:44

## 2021-05-15 RX ADMIN — ROSUVASTATIN CALCIUM 20 MG: 20 TABLET, COATED ORAL at 21:47

## 2021-05-15 RX ADMIN — VANCOMYCIN HYDROCHLORIDE 1250 MG: 10 INJECTION, POWDER, LYOPHILIZED, FOR SOLUTION INTRAVENOUS at 11:40

## 2021-05-15 RX ADMIN — POTASSIUM CHLORIDE 40 MEQ: 1500 TABLET, EXTENDED RELEASE ORAL at 09:09

## 2021-05-15 RX ADMIN — HEPARIN SODIUM 5000 UNITS: 5000 INJECTION INTRAVENOUS; SUBCUTANEOUS at 17:39

## 2021-05-15 RX ADMIN — SACUBITRIL AND VALSARTAN 1 TABLET: 24; 26 TABLET, FILM COATED ORAL at 09:07

## 2021-05-15 RX ADMIN — Medication 10 ML: at 15:32

## 2021-05-15 RX ADMIN — POTASSIUM CHLORIDE 40 MEQ: 1500 TABLET, EXTENDED RELEASE ORAL at 11:39

## 2021-05-15 NOTE — ROUTINE PROCESS
0720  -- Bedside, Verbal and Written shift change report received by Sasha Eagle (oncoming nurse) by Ahmet(offgoing nurse). Allergy band placed on pt's wrist. Report included the following information SBAR, Kardex, Intake/Output, MAR and Recent Results. 0730-Assessment completed, call bell within reach, no distress noted. 0900-Am due medications given. 1200 -- Shift reassessment, pt condition unchanged, will continue to monitor. 1440-patient voided 300 ml of urine, PVR was 8 ml.    1600 --  Shift reassessment, pt condition unchanged, will continue to monitor. 1915 -- Bedside, Verbal and Written shift change report given to Juan Luis(oncoming nurse) by Sasha Eagle (offgoing nurse). Report included the following information SBAR, Kardex, Intake/Output, MAR and Recent Results. Skin assessment completed.

## 2021-05-15 NOTE — PROGRESS NOTES
Problem: Falls - Risk of  Goal: *Absence of Falls  Description: Document Aylin Crawford Fall Risk and appropriate interventions in the flowsheet. Outcome: Progressing Towards Goal  Note: Fall Risk Interventions:            Medication Interventions: Evaluate medications/consider consulting pharmacy         History of Falls Interventions: Evaluate medications/consider consulting pharmacy         Problem: Pain  Goal: *Control of Pain  Outcome: Progressing Towards Goal     Problem: Pressure Injury - Risk of  Goal: *Prevention of pressure injury  Description: Document Glenn Scale and appropriate interventions in the flowsheet. Outcome: Progressing Towards Goal  Note: Pressure Injury Interventions:             Activity Interventions: Increase time out of bed, Pressure redistribution bed/mattress(bed type)         Nutrition Interventions: Document food/fluid/supplement intake                     Problem: Fluid Volume - Risk of, Imbalanced  Goal: *Balanced intake and output  Outcome: Progressing Towards Goal     Problem: Patient Education: Go to Patient Education Activity  Goal: Patient/Family Education  Outcome: Progressing Towards Goal     Problem: Heart Failure: Day 2  Goal: Off Pathway (Use only if patient is Off Pathway)  Outcome: Progressing Towards Goal  Goal: Activity/Safety  Outcome: Progressing Towards Goal  Goal: Consults, if ordered  Outcome: Progressing Towards Goal  Goal: Diagnostic Test/Procedures  Outcome: Progressing Towards Goal  Goal: Nutrition/Diet  Outcome: Progressing Towards Goal  Goal: Discharge Planning  Outcome: Progressing Towards Goal  Goal: Medications  Outcome: Progressing Towards Goal  Goal: Respiratory  Outcome: Progressing Towards Goal  Goal: Treatments/Interventions/Procedures  Outcome: Progressing Towards Goal  Goal: Psychosocial  Outcome: Progressing Towards Goal  Goal: *Oxygen saturation within defined limits  Outcome: Progressing Towards Goal  Goal: *Hemodynamically stable  Outcome: Progressing Towards Goal  Goal: *Optimal pain control at patient's stated goal  Outcome: Progressing Towards Goal  Goal: *Anxiety reduced or absent  Outcome: Progressing Towards Goal  Goal: *Demonstrates progressive activity  Outcome: Progressing Towards Goal

## 2021-05-15 NOTE — PROGRESS NOTES
[facility]  Progress Note     Patient: Abdelrahman Owusu MRN: [id number]   SSN: [id number]  YOB: 1960   Age: 63 y.o. Sex: male       Admit Date: 5/13/2021    LOS: 1 day       Chief Complaint   Patient presents with    Testicle Swelling    Shortness of Breath         Subjective:      No acute events overnight. Patient slept well, has no complaints today. He states he has no testicular pain currently, but it is tender to touch still. Swelling has drastically improved.      Denies SOB, CP, abdominal pain, nausea, fever, chills, headaches.     ROS as above     Objective:      Visit Vitals  /70   Pulse 82   Temp 98.4 °F (36.9 °C)   Resp 19   Ht 6' (1.829 m)   Wt 81.6 kg (179 lb 14.3 oz)   SpO2 98%   BMI 24.40 kg/m²         Physical Exam:   General:  AAOx3, NAD   HEENT: Conjunctiva pink, sclera anicteric. PERRL. EOMI.  Pharynx moist, nonerythematous.  Moist mucous membranes.  Thyroid not enlarged, no nodules.  No cervical, supraclavicular, occipital or submandibular lymphadenopathy.  No other gross abnormalities present. CV:  RRR, no murmurs. No visible pulsations or thrills.    RESP:  Unlabored breathing.  Lungs clear to auscultation without adventitious breath sounds. Equal expansion bilaterally.    ABD:  Soft, nontender, nondistended. BS (+).  No hepatosplenomegaly.  No suprapubic tenderness. RECTAL:  Deferred. Genito-Urinary: L testicle larger that R, minimal pain to palpation, scrotal edema improved  MS:  No joint deformity or instability.  No atrophy. Neuro:  CN II-XII grossly intact. 5/5 strength bilateral upper extremities and lower extremities. Ext:  1+ pitting edema in LE bilaterally up to above the ankles.  2+ radial and dp pulses bilaterally. Skin:  No rashes, lesions, or ulcers.  Good turgor     Intake and Output:  Current Shift: No intake/output data recorded.   Last three shifts: 05/12 1901 - 05/14 0700  In: -   Out: 3500 [Urine:3500]     Lab/Data Review:  Recent Results         Recent Results (from the past 12 hour(s))   URINALYSIS W/ RFLX MICROSCOPIC     Collection Time: 05/13/21 11:22 PM   Result Value Ref Range     Color YELLOW       Appearance CLEAR       Specific gravity 1.012 1.005 - 1.030       pH (UA) 5.0 5.0 - 8.0       Protein Negative NEG mg/dL     Glucose Negative NEG mg/dL     Ketone Negative NEG mg/dL     Bilirubin Negative NEG       Blood Negative NEG       Urobilinogen 1.0 0.2 - 1.0 EU/dL     Nitrites Negative NEG       Leukocyte Esterase Negative NEG     METABOLIC PANEL, BASIC     Collection Time: 05/14/21  4:53 AM   Result Value Ref Range     Sodium 137 136 - 145 mmol/L     Potassium 3.9 3.5 - 5.5 mmol/L     Chloride 105 100 - 111 mmol/L     CO2 25 21 - 32 mmol/L     Anion gap 7 3.0 - 18 mmol/L     Glucose 84 74 - 99 mg/dL     BUN 21 (H) 7.0 - 18 MG/DL     Creatinine 1.49 (H) 0.6 - 1.3 MG/DL     BUN/Creatinine ratio 14 12 - 20       GFR est AA 58 (L) >60 ml/min/1.73m2     GFR est non-AA 48 (L) >60 ml/min/1.73m2     Calcium 6.7 (L) 8.5 - 10.1 MG/DL   CBC WITH AUTOMATED DIFF     Collection Time: 05/14/21  4:53 AM   Result Value Ref Range     WBC 20.7 (H) 4.6 - 13.2 K/uL     RBC 4.41 4.35 - 5.65 M/uL     HGB 13.3 13.0 - 16.0 g/dL     HCT 39.9 36.0 - 48.0 %     MCV 90.5 74.0 - 97.0 FL     MCH 30.2 24.0 - 34.0 PG     MCHC 33.3 31.0 - 37.0 g/dL     RDW 13.4 11.6 - 14.5 %     PLATELET 930 662 - 063 K/uL     MPV 13.3 (H) 9.2 - 11.8 FL     NEUTROPHILS 84 (H) 40 - 73 %     LYMPHOCYTES 12 (L) 21 - 52 %     MONOCYTES 2 (L) 3 - 10 %     EOSINOPHILS 0 0 - 5 %     BASOPHILS 2 0 - 2 %     ABS. NEUTROPHILS 17.4 (H) 1.8 - 8.0 K/UL     ABS. LYMPHOCYTES 2.5 0.9 - 3.6 K/UL     ABS. MONOCYTES 0.4 0.05 - 1.2 K/UL     ABS. EOSINOPHILS 0.0 0.0 - 0.4 K/UL     ABS.  BASOPHILS 0.4 (H) 0.0 - 0.1 K/UL     DF MANUAL       PLATELET COMMENTS ADEQUATE PLATELETS       RBC COMMENTS NORMOCYTIC, NORMOCHROMIC     MAGNESIUM     Collection Time: 05/14/21  4:53 AM   Result Value Ref Range     Magnesium 1.6 1.6 - 2.6 mg/dL            RECENT RESULTS  MODALITY IMPRESSION   XR      Results from [facility] encounter on 05/13/21   XR CHEST PA LAT     Narrative EXAM: XR CHEST PA LAT     INDICATION: CHF     COMPARISON: 12/29/2020.     FINDINGS: PA and lateral radiographs of the chest demonstrate question minimal  pulmonary venous congestion and hazy left basilar opacity. . Mildly prominent  cardiac silhouette, decreased since prior exam. Loop recorder unchanged. . The  bones and soft tissues are within normal limits.         Impression Mildly prominent cardiac silhouette, decreased since prior exam.  -question mild pulmonary venous congestion and hazy left basilar opacity.        CT      Results from Hospital Encounter encounter on 05/13/21   CT ABD PELV W CONT     Narrative CT ABDOMEN AND PELVIS WITH CONTRAST     COMPARISON: Testicular ultrasound, plain films of the chest,.     INDICATIONS: Scrotal swelling and pain, shortness of breath.     TECHNIQUE:  Following the uneventful administration of 100cc of Isovue 300  intravenous contrast , volumetric data acquisition was performed of the abdomen  and pelvis on a multislice scanner and reconstructed in axial coronal and  sagittal planes     CT ABDOMEN FINDINGS:      Lung Bases: There is some mild groundglass density and smooth interlobular  septal thickening. There is cardiac enlargement.     Liver/Gallbladder/Biliary: Fatty infiltration without focal lesion. Unremarkable  biliary tree.     Spleen: Negative.     Adrenal Glands:  Negative     Kidneys: Negative.     Pancreas: Normal.     Stomach, Small Bowel,  and Colon: The stomach is unremarkable. There are a few  mildly dilated loops of small bowel with gas and fluid content in a  nonobstructed pattern. The appendix is normal. The colon is unremarkable.     Lymph Nodes: Normal in size and number.      The abdominal aorta is unremarkable.  The IVC is unremarkable.     Peritoneal Spaces: No free fluid or free air is present.     Abdominal wall: No hernia or mass is evident     Bladder: Unremarkable.     There is edema of the left spermatic cord and significantly increased prominence  of the left scrotal vasculature. Correlation with ultrasound earlier same date  reveals left epididymal and testicular hyperemia correlates with this finding.        Osseous Structures Of Abdomen And Pelvis: Unremarkable for age.        Impression Left epididymoorchitis  Mild groundglass haziness in the lung bases with smooth interlobular septal  thickening-the findings suggest pulmonary edema likely from CHF versus fluid  overload.              All CT scans at this facility are performed using dose optimization technique as  appropriate to the performed exam, to include automated exposure control,  adjustment of the mA and/or kV according to patient's size (Including  appropriate matching for site-specific examinations), or use of iterative  reconstruction technique.       MRI      Results from [place] encounter on 03/23/17   MRI BRAIN WO CONT     Narrative MR Brain Without Contrast     CPT CODE: 36517     HISTORY:  cva. Right-sided numbness     COMPARISON: CT head 5/23/2017. No prior brain MRI.     TECHNIQUE: Brain scanned with axial and sagittal T1W scans, axial T2W scans,  GRE, axial FLAIR, axial diffusion weighted images.     FINDINGS:      Diffusion sequence shows scattered small hyperintense and restricted diffusion  signal foci within the left cerebral hemisphere. Multiple vascular distributions  raises possibility of an embolic process. There are 2 small infarcts in the left  occipital pole. Additional infarcts in the left caudate head, left caudate body,  adjacent corona radiata, and left frontal white matter. A small cluster at the  left frontoparietal vertex on DWI image 25. No associated hemorrhage. No right  hemisphere, brainstem, or cerebellar infarct.  Grossly patent flow in the basilar  artery and both internal carotid arteries. No abnormal extra-axial fluid  collections over the convexities. Normal brain volumes. No hydrocephalus. There is a small T2 hyperintense defect in the region of the genu of the right  internal capsule that appears to be a tiny chronic lacunar infarct. Some T2  signal along the right caudate body on axial T2 image 17 that could be related  to chronic infarct, but not clearly seen on other sequences to confirm. No mass  lesions. Some mild mucosal thickening and layering fluid in the right sphenoid sinus. Other sinuses are clear.        Impression IMPRESSION:     1. Multiple acute/early subacute infarcts in the left cerebrum, involves  multiple vascular distributions suggesting an embolic process. -Infarcts in the left deep brain, left frontal white matter, and at the left  frontoparietal vertex. A few tiny foci in the left occipital lobe.     2. Rounded well-defined chronic appearing defect in the genu of the right  internal capsule. Favor a chronic lacunar infarct, but some uncertainty versus  dilated perivascular space given the lack of surrounding gliosis on T2 FLAIR.     3. Small amount of right sphenoid sinus fluid.       ULTRASOUND      Results from Hospital Encounter encounter on 05/13/21   US SCROTUM/TESTICLES W DOPPLER     Impression Enlarged hypervascular left epididymis. This may reflect epididymitis. There is  an indeterminate complex nodule within the epididymis. Differential is broad. Infectious/inflammatory etiologies could be considered amongst others. Correlate  clinically.  Follow-up can be obtained.     Nonspecific bilateral hydroceles, left greater than right.     Left scrotal wall thickening.     Please see report for additional details.          Cardiology Procedures/Testing:  MODALITY RESULTS   EKG        Results for orders placed or performed during the hospital encounter of 05/13/21   EKG, 12 LEAD, INITIAL   Result Value Ref Range     Ventricular Rate 71 BPM     Atrial Rate 71 BPM     P-R Interval 160 ms     QRS Duration 86 ms     Q-T Interval 420 ms     QTC Calculation (Bezet) 456 ms     Calculated P Axis 59 degrees     Calculated R Axis 75 degrees     Calculated T Axis 94 degrees     Diagnosis           Normal sinus rhythm  Possible Left atrial enlargement  Septal infarct (cited on or before 29-DEC-2020)  T wave abnormality, consider lateral ischemia  Abnormal ECG  When compared with ECG of 30-DEC-2020 01:35,  Sinus rhythm has replaced Ectopic atrial rhythm  QRS axis shifted left  Nonspecific T wave abnormality has replaced inverted T waves in Anterior   leads  QT has shortened      Results for orders placed or performed in visit on 02/15/18   AMB POC EKG ROUTINE W/ 12 LEADS, INTER & REP     Impression     Sinus tachycardia at 126 bpm.  No acute changes. ECHO        Special Testing/Procedures:  MODALITY RESULTS   MICRO         All Micro Results      Procedure Component Value Units Date/Time     CULTURE, URINE [[id number]] Collected: 05/13/21 2322     Order Status: Completed Specimen: Urine from Clean catch Updated: 05/14/21 0026           UA No results found for this or any previous visit.         Assessment and Plan:   63 y. o. male with PMHx of CHF, HLD, HTN, Hypoparathyroidism, now admitted with Left Epididymoorchitis, CHF Exacerbation.     Left Epididymoorchitis  Acute onset testicular pain. Also found to have an indeterminate complex nodule within the epididymis. Nonspecific bilateral hydroceles, left greater than right. Patient with leukocytosis worsening. CT abdomen shows L epididymoorchitis.  Mild groundglass haziness in lung bases w/smooth interlobular septal thickening-the findings suggest pulmonary edema likely from CHF versus fluid overload. UA was negative. - Admit to Tele-medicine  - Vitals per Unit Routine  - Urology following, appreciate recs  - Daily CBC, CMP, Mg  - FU GC/Chlamydia  - Post void residual today.  If PVR >350 cc, place Ta  - CM/PT/OT  - continue Levaquin 500mg IV qD  - For Pain: Tylenol      CHF exacerbation with HTN, CAD, Pulm HTN  Home medications: Carvedilol 12.5 BID, Furosemide 20mg daily, Entresto 24-26 daily, Nitoglycerin 0.4 PRN, Spirinolactone 25mg daily, Aspirin  Hx of non compliance, Cardiology note states he ran out of his medications and did not take them for 1 month  Right heart Cath done 10/2020, showing RV 47/0mmHg, PA 49/20mmHG, PCWP 20mmHg. ECHO done 10/19/2020, showing EF 15%, Grade III (severe) diastolic dysfunction, mild mitral regurg, moderate tricuspid regurg, Pulm Artery Pressure 53mmHg. In the ED, Pro-BNP elevated to 3676, Trop-I neg.  EKG showed NSR, with Septal infarct, T wave abnormality concerning for lateral ischemia   Repeat ECHO: EF 15-20%  - Cardiology consulted, appreciate recs  - continue Carvedilol, Spirinolactone, Aspirin, Entresto 24-26, Nitroglycerin PRN  - Patient will need medication other than Entresto on DC if cost-prohibitive  -Patient will likely need lifevest on dc  - strict I/Os  - Start Torsemide 20mg BID today     HLD  At home takes Rosuvastatin 20mg daily  - continue Rosuvastatin     Mild Elevation on on CKDIII  Mildly elevated Cr to 1.56, up from his baseline of 1.4, improving  - daily CMP, may worsen in the setting of increased lasix     Diet Renal   DVT Prophylaxis SCDs, SQH   GI Prophylaxis Famotidine   Code status FULL   Disposition >2MNs, Home      Point of Contact Yareli Patel  Relationship: Sister  [phone]     Signed By: Stef Gan,      May 15, 2021

## 2021-05-15 NOTE — PROGRESS NOTES
1935: Assumed patient care from [address] [place] [address]. Patient is alert and oriented to person, place, time and situation. Respiratory status is stable on room air. Vital signs are stable. MEWS score is a one. Patient denies any pain, discomfort, nausea vomiting dizziness or anxiety. White board and fall card is updated. Bed is locked and in lowest position. Call bell, water and personal belongings are within reach. Patient has no questions, comments or concerns after bedside shift report. 0700: Patient had an uneventful shift. Respiratory status, vital signs and MEWS score remained stable. Patient was resting quietly with no signs of distress noted. Bed locked and in lowest position. Call bell water and personal belongings were within reach. Patient  had no questions, comments or concerns after bedside shift report.  Bedside report given to [facility].

## 2021-05-15 NOTE — PROGRESS NOTES
Cardiology Progress Note    Admit Date: 5/13/2021  Attending Cardiologist: Dr. Fausto Peters:     -Acute on chronic HFrEF, admitted with scrotal swelling and B/L LE, in the setting of increased fluid intake, elevated pro-BNP (3676). CXR mild pulmonary venous congestion   -Non-Ischemic Cardiomyopathy, ECHO (10/2020) EF 15%, Grade III LV DD, on entresto, aldactone and coreg as outpatient  -Testcular swelling/pain, likely multifactorial with CHF and Left Epididymoorchitis, as seen on CT.  -Moderate TR by ECHO (10/2020)   -Moderate Pulmonary HTN by ECHO (10/2020) PASP 53 mmHg   -Implantable loop recorder placement  -CAD, s/p LHC (10/2020)   ? 1) Left main coronary artery:    40-50 % distal left MAIN  ? 2) Left anterior descending coronary artery:  50-60 mid LAD following large septal, 40 % distal, long bridging segment as well in distal LAD   ? 3) Left circumflex coronary artery:  non dominant, large obtuse marginal branches, Mild-moderate diffuse disease  ? 4) Right coronary artery:  dominant, feeds a posterior descending artery and smaller posterolateral branch  20-30 % mid vessel  ? 5) Ramus intermediate:   NA  -Hx SVT  -Leukocytosis   -Non-compliance with medications         Primary Cardiologist: Dr. Lawerence Sicard:     Stable and making improvement with less scrotal swelling   Will continue with an additional day of IV Lasix and monitor  Have discussed with patient about LIfeVest and possible ICD placement. Adherence has been an issue in the past.  Continue with ASA, BB, magnesium, Entresto. Will consider restarting aldactone tomorrow. Staff addendum:  Excellent diuresis with IV lasix, decrease from 40 to 20 BID. Replace K for cramps. Noted to have epididymitis by CT, on antbx. I will followup echo, pending EF, will discuss possible Lifevest vs AICD. I saw, examined, and evaluated the patient.   I personally reviewed the patient's labs, tests, vitals, orders, medications, updated history, and other providers assessments. I personally agree with the findings as stated and the plan as documented. Janie Mckeon MD      Subjective:     No new complaints. He is feeling slowly better.      Objective:      Patient Vitals for the past 8 hrs:   Temp Pulse Resp BP SpO2   05/15/21 0849 98 °F (36.7 °C) 68 21 124/72 97 %   05/15/21 0725    (!) 144/89    05/15/21 0400 98.4 °F (36.9 °C) 74 19 (!) 144/89 95 %         Patient Vitals for the past 96 hrs:   Weight   05/15/21 0725 81.2 kg (179 lb)   05/14/21 1935 81.6 kg (179 lb 14.3 oz)   05/13/21 2052 81.6 kg (179 lb 14.3 oz)   05/13/21 1008 81.6 kg (180 lb)       TELE: normal sinus rhythm               Current Facility-Administered Medications   Medication Dose Route Frequency Last Admin    furosemide (LASIX) injection 40 mg  40 mg IntraVENous BID 40 mg at 05/15/21 0910    VANCOMYCIN TROUGH DUE   Other Daily      levoFLOXacin (LEVAQUIN) 500 mg in D5W IVPB  500 mg IntraVENous Q24H 500 mg at 05/14/21 1544    sodium chloride (NS) flush 5-40 mL  5-40 mL IntraVENous Q8H 10 mL at 05/15/21 0600    sodium chloride (NS) flush 5-40 mL  5-40 mL IntraVENous PRN      aspirin delayed-release tablet 81 mg  81 mg Oral DAILY 81 mg at 05/15/21 0908    carvediloL (COREG) tablet 12.5 mg  12.5 mg Oral BID WITH MEALS 12.5 mg at 05/15/21 0909    nitroglycerin (NITROSTAT) tablet 0.4 mg  0.4 mg SubLINGual Q5MIN PRN      rosuvastatin (CRESTOR) tablet 20 mg  20 mg Oral QHS 20 mg at 05/14/21 2231    [Held by provider] spironolactone (ALDACTONE) tablet 25 mg  25 mg Oral DAILY 25 mg at 05/14/21 0901    sacubitriL-valsartan (ENTRESTO) 24-26 mg tablet 1 Tab  1 Tab Oral BID 1 Tab at 05/15/21 0357    acetaminophen (TYLENOL) tablet 1,000 mg  1,000 mg Oral BID 1,000 mg at 05/15/21 0907    heparin (porcine) injection 5,000 Units  5,000 Units SubCUTAneous Q12H 5,000 Units at 05/15/21 0644    vancomycin (VANCOCIN) 1250 mg in  ml infusion  1,250 mg IntraVENous Q18H 1,250 mg at 05/14/21 1713         Intake/Output Summary (Last 24 hours) at 5/15/2021 0932  Last data filed at 5/15/2021 0850  Gross per 24 hour   Intake 1580 ml   Output 8350 ml   Net -6770 ml       Physical Exam:  General:  alert, cooperative, no distress, appears stated age  Neck:  nontender, no JVD  Lungs:  clear to auscultation bilaterally  Heart:  regular rate and rhythm, S1, S2 normal, no murmur, click, rub or gallop  Abdomen:  abdomen is soft without significant tenderness, masses, organomegaly or guarding  Extremities:  edema 2-3+ of lower legs, thighs normal and no scrotal swelling noted    Visit Vitals  /72   Pulse 68   Temp 98 °F (36.7 °C)   Resp 21   Ht 6' (1.829 m)   Wt 81.2 kg (179 lb)   SpO2 97%   BMI 24.28 kg/m²       Data Review:     Labs: Results:       Chemistry Recent Labs     05/15/21  0218 05/14/21  2058 05/14/21  0453   GLU 80 101* 84    136 137   K 3.3* 3.4* 3.9   CL 97* 98* 105   CO2 30 33* 25   BUN 22* 22* 21*   CREA 1.56* 1.77* 1.49*   CA 7.0* 7.3* 6.7*   MG  --  2.0 1.6   AGAP 9 5 7   BUCR 14 12 14      CBC w/Diff Recent Labs     05/15/21  0218 05/14/21  0453 05/13/21  1017   WBC 14.5* 20.7* 13.7*   RBC 4.67 4.41 4.44   HGB 14.0 13.3 13.3   HCT 41.9 39.9 41.3    179 202   GRANS 84* 84* 84*   LYMPH 8* 12* 7*   EOS 0 0 1      Cardiac Enzymes No results found for: CPK, CK, CKMMB, CKMB, RCK3, CKMBT, CKNDX, CKND1, SEBASTIÁN, TROPT, TROIQ, YANETH, TROPT, TNIPOC, BNP, BNPP   Coagulation No results for input(s): PTP, INR, APTT, INREXT in the last 72 hours.     Lipid Panel Lab Results   Component Value Date/Time    Cholesterol, total 149 03/25/2017 02:55 AM    HDL Cholesterol 38 (L) 03/25/2017 02:55 AM    LDL, calculated 88.4 03/25/2017 02:55 AM    VLDL, calculated 22.6 03/25/2017 02:55 AM    Triglyceride 113 03/25/2017 02:55 AM    CHOL/HDL Ratio 3.9 03/25/2017 02:55 AM      BNP No results found for: BNP, BNPP, XBNPT   Liver Enzymes No results for input(s): TP, ALB, TBIL, AP in the last 72 hours.    No lab exists for component: SGOT, GPT, DBIL   Thyroid Studies Lab Results   Component Value Date/Time    TSH 0.72 02/28/2016 07:15 PM          Signed By: LEOBARDO Montoya     May 15, 2021

## 2021-05-16 LAB
ANION GAP SERPL CALC-SCNC: 7 MMOL/L (ref 3–18)
BASOPHILS # BLD: 0 K/UL (ref 0–0.1)
BASOPHILS NFR BLD: 0 % (ref 0–2)
BUN SERPL-MCNC: 19 MG/DL (ref 7–18)
BUN/CREAT SERPL: 13 (ref 12–20)
C TRACH RRNA SPEC QL NAA+PROBE: NEGATIVE
CALCIUM SERPL-MCNC: 7.1 MG/DL (ref 8.5–10.1)
CHLORIDE SERPL-SCNC: 102 MMOL/L (ref 100–111)
CO2 SERPL-SCNC: 28 MMOL/L (ref 21–32)
CREAT SERPL-MCNC: 1.42 MG/DL (ref 0.6–1.3)
DIFFERENTIAL METHOD BLD: ABNORMAL
EOSINOPHIL # BLD: 0.1 K/UL (ref 0–0.4)
EOSINOPHIL NFR BLD: 1 % (ref 0–5)
ERYTHROCYTE [DISTWIDTH] IN BLOOD BY AUTOMATED COUNT: 12.9 % (ref 11.6–14.5)
GLUCOSE SERPL-MCNC: 81 MG/DL (ref 74–99)
HCT VFR BLD AUTO: 44.9 % (ref 36–48)
HGB BLD-MCNC: 14.5 G/DL (ref 13–16)
LYMPHOCYTES # BLD: 1.9 K/UL (ref 0.9–3.6)
LYMPHOCYTES NFR BLD: 20 % (ref 21–52)
MAGNESIUM SERPL-MCNC: 1.7 MG/DL (ref 1.6–2.6)
MCH RBC QN AUTO: 29.3 PG (ref 24–34)
MCHC RBC AUTO-ENTMCNC: 32.3 G/DL (ref 31–37)
MCV RBC AUTO: 90.7 FL (ref 74–97)
MONOCYTES # BLD: 0.8 K/UL (ref 0.05–1.2)
MONOCYTES NFR BLD: 8 % (ref 3–10)
N GONORRHOEA RRNA SPEC QL NAA+PROBE: NEGATIVE
NEUTS SEG # BLD: 6.7 K/UL (ref 1.8–8)
NEUTS SEG NFR BLD: 71 % (ref 40–73)
PLATELET # BLD AUTO: 222 K/UL (ref 135–420)
PLATELET COMMENTS,PCOM: ABNORMAL
PLEASE NOTE:, 188601: NORMAL
PMV BLD AUTO: 13.3 FL (ref 9.2–11.8)
POTASSIUM SERPL-SCNC: 3.7 MMOL/L (ref 3.5–5.5)
RBC # BLD AUTO: 4.95 M/UL (ref 4.35–5.65)
RBC MORPH BLD: ABNORMAL
SODIUM SERPL-SCNC: 137 MMOL/L (ref 136–145)
SPECIMEN SOURCE: NORMAL
WBC # BLD AUTO: 9.5 K/UL (ref 4.6–13.2)

## 2021-05-16 PROCEDURE — 85025 COMPLETE CBC W/AUTO DIFF WBC: CPT

## 2021-05-16 PROCEDURE — 74011250636 HC RX REV CODE- 250/636: Performed by: STUDENT IN AN ORGANIZED HEALTH CARE EDUCATION/TRAINING PROGRAM

## 2021-05-16 PROCEDURE — 99232 SBSQ HOSP IP/OBS MODERATE 35: CPT | Performed by: INTERNAL MEDICINE

## 2021-05-16 PROCEDURE — 36415 COLL VENOUS BLD VENIPUNCTURE: CPT

## 2021-05-16 PROCEDURE — 2709999900 HC NON-CHARGEABLE SUPPLY

## 2021-05-16 PROCEDURE — 80048 BASIC METABOLIC PNL TOTAL CA: CPT

## 2021-05-16 PROCEDURE — 65660000000 HC RM CCU STEPDOWN

## 2021-05-16 PROCEDURE — 83735 ASSAY OF MAGNESIUM: CPT

## 2021-05-16 PROCEDURE — 74011250636 HC RX REV CODE- 250/636: Performed by: INTERNAL MEDICINE

## 2021-05-16 PROCEDURE — 74011250637 HC RX REV CODE- 250/637: Performed by: STUDENT IN AN ORGANIZED HEALTH CARE EDUCATION/TRAINING PROGRAM

## 2021-05-16 RX ORDER — FUROSEMIDE 40 MG/1
40 TABLET ORAL DAILY
Status: DISCONTINUED | OUTPATIENT
Start: 2021-05-17 | End: 2021-05-17

## 2021-05-16 RX ADMIN — HEPARIN SODIUM 5000 UNITS: 5000 INJECTION INTRAVENOUS; SUBCUTANEOUS at 05:38

## 2021-05-16 RX ADMIN — ASPIRIN 81 MG: 81 TABLET, COATED ORAL at 09:37

## 2021-05-16 RX ADMIN — SACUBITRIL AND VALSARTAN 1 TABLET: 24; 26 TABLET, FILM COATED ORAL at 09:37

## 2021-05-16 RX ADMIN — ROSUVASTATIN CALCIUM 20 MG: 20 TABLET, COATED ORAL at 22:24

## 2021-05-16 RX ADMIN — VANCOMYCIN HYDROCHLORIDE 1250 MG: 10 INJECTION, POWDER, LYOPHILIZED, FOR SOLUTION INTRAVENOUS at 04:29

## 2021-05-16 RX ADMIN — HEPARIN SODIUM 5000 UNITS: 5000 INJECTION INTRAVENOUS; SUBCUTANEOUS at 18:04

## 2021-05-16 RX ADMIN — Medication 10 ML: at 05:39

## 2021-05-16 RX ADMIN — Medication 10 ML: at 22:24

## 2021-05-16 RX ADMIN — FUROSEMIDE 20 MG: 10 INJECTION, SOLUTION INTRAMUSCULAR; INTRAVENOUS at 09:37

## 2021-05-16 RX ADMIN — SACUBITRIL AND VALSARTAN 1 TABLET: 24; 26 TABLET, FILM COATED ORAL at 22:24

## 2021-05-16 RX ADMIN — CARVEDILOL 12.5 MG: 12.5 TABLET, FILM COATED ORAL at 18:04

## 2021-05-16 RX ADMIN — ACETAMINOPHEN 1000 MG: 500 TABLET, FILM COATED ORAL at 09:37

## 2021-05-16 RX ADMIN — ACETAMINOPHEN 1000 MG: 500 TABLET, FILM COATED ORAL at 18:04

## 2021-05-16 RX ADMIN — LEVOFLOXACIN 500 MG: 5 INJECTION, SOLUTION INTRAVENOUS at 15:01

## 2021-05-16 RX ADMIN — Medication 10 ML: at 15:02

## 2021-05-16 RX ADMIN — CARVEDILOL 12.5 MG: 12.5 TABLET, FILM COATED ORAL at 09:37

## 2021-05-16 NOTE — PROGRESS NOTES
[facility]  Progress Note     Patient: Ran Chen MRN: [id number]   SSN: [id number]  YOB: 1960   Age: 63 y.o. Sex: male       Admit Date: 5/13/2021    LOS: 1 day       Chief Complaint   Patient presents with    Testicle Swelling    Shortness of Breath         Subjective:      No acute events overnight. PVR done yesterday, 8mL. Patient slept well, has no complaints today. He states he has no testicular pain currently, but it is tender to touch still. Swelling continues to improve.      Denies SOB, CP, abdominal pain, nausea, fever, chills, headaches.     ROS as above     Objective:      Visit Vitals  /70   Pulse 82   Temp 98.4 °F (36.9 °C)   Resp 19   Ht 6' (1.829 m)   Wt 81.6 kg (179 lb 14.3 oz)   SpO2 98%   BMI 24.40 kg/m²         Physical Exam:   General:  AAOx3, NAD   HEENT: Conjunctiva pink, sclera anicteric. PERRL. EOMI.  Pharynx moist, nonerythematous.  Moist mucous membranes.  Thyroid not enlarged, no nodules.  No cervical, supraclavicular, occipital or submandibular lymphadenopathy.  No other gross abnormalities present. CV:  RRR, no murmurs. No visible pulsations or thrills.    RESP:  Unlabored breathing.  Lungs clear to auscultation without adventitious breath sounds. Equal expansion bilaterally.    ABD:  Soft, nontender, nondistended. BS (+).  No hepatosplenomegaly.  No suprapubic tenderness. RECTAL:  Deferred. Genito-Urinary: L testicle larger that R, minimal pain to palpation, scrotal edema improved. MS:  No joint deformity or instability.  No atrophy. Neuro:  CN II-XII grossly intact. 5/5 strength bilateral upper extremities and lower extremities. Ext:  1+ pitting edema in LE bilaterally up to above the ankles.  2+ radial and dp pulses bilaterally. Skin:  No rashes, lesions, or ulcers.  Good turgor.     Intake and Output:  Current Shift: No intake/output data recorded.   Last three shifts: 05/12 1901 - 05/14 0700  In: -   Out: 3500 [Urine:3500]     Lab/Data Review:  Recent Results         Recent Results (from the past 12 hour(s))   URINALYSIS W/ RFLX MICROSCOPIC     Collection Time: 05/13/21 11:22 PM   Result Value Ref Range     Color YELLOW       Appearance CLEAR       Specific gravity 1.012 1.005 - 1.030       pH (UA) 5.0 5.0 - 8.0       Protein Negative NEG mg/dL     Glucose Negative NEG mg/dL     Ketone Negative NEG mg/dL     Bilirubin Negative NEG       Blood Negative NEG       Urobilinogen 1.0 0.2 - 1.0 EU/dL     Nitrites Negative NEG       Leukocyte Esterase Negative NEG     METABOLIC PANEL, BASIC     Collection Time: 05/14/21  4:53 AM   Result Value Ref Range     Sodium 137 136 - 145 mmol/L     Potassium 3.9 3.5 - 5.5 mmol/L     Chloride 105 100 - 111 mmol/L     CO2 25 21 - 32 mmol/L     Anion gap 7 3.0 - 18 mmol/L     Glucose 84 74 - 99 mg/dL     BUN 21 (H) 7.0 - 18 MG/DL     Creatinine 1.49 (H) 0.6 - 1.3 MG/DL     BUN/Creatinine ratio 14 12 - 20       GFR est AA 58 (L) >60 ml/min/1.73m2     GFR est non-AA 48 (L) >60 ml/min/1.73m2     Calcium 6.7 (L) 8.5 - 10.1 MG/DL   CBC WITH AUTOMATED DIFF     Collection Time: 05/14/21  4:53 AM   Result Value Ref Range     WBC 20.7 (H) 4.6 - 13.2 K/uL     RBC 4.41 4.35 - 5.65 M/uL     HGB 13.3 13.0 - 16.0 g/dL     HCT 39.9 36.0 - 48.0 %     MCV 90.5 74.0 - 97.0 FL     MCH 30.2 24.0 - 34.0 PG     MCHC 33.3 31.0 - 37.0 g/dL     RDW 13.4 11.6 - 14.5 %     PLATELET 166 048 - 853 K/uL     MPV 13.3 (H) 9.2 - 11.8 FL     NEUTROPHILS 84 (H) 40 - 73 %     LYMPHOCYTES 12 (L) 21 - 52 %     MONOCYTES 2 (L) 3 - 10 %     EOSINOPHILS 0 0 - 5 %     BASOPHILS 2 0 - 2 %     ABS. NEUTROPHILS 17.4 (H) 1.8 - 8.0 K/UL     ABS. LYMPHOCYTES 2.5 0.9 - 3.6 K/UL     ABS. MONOCYTES 0.4 0.05 - 1.2 K/UL     ABS. EOSINOPHILS 0.0 0.0 - 0.4 K/UL     ABS.  BASOPHILS 0.4 (H) 0.0 - 0.1 K/UL     DF MANUAL       PLATELET COMMENTS ADEQUATE PLATELETS       RBC COMMENTS NORMOCYTIC, NORMOCHROMIC     MAGNESIUM     Collection Time: 05/14/21 4:53 AM   Result Value Ref Range     Magnesium 1.6 1.6 - 2.6 mg/dL            RECENT RESULTS  MODALITY IMPRESSION   XR      Results from Hospital Encounter encounter on 05/13/21   XR CHEST PA LAT     Narrative EXAM: XR CHEST PA LAT     INDICATION: CHF     COMPARISON: 12/29/2020.     FINDINGS: PA and lateral radiographs of the chest demonstrate question minimal  pulmonary venous congestion and hazy left basilar opacity. . Mildly prominent  cardiac silhouette, decreased since prior exam. Loop recorder unchanged. . The  bones and soft tissues are within normal limits.         Impression Mildly prominent cardiac silhouette, decreased since prior exam.  -question mild pulmonary venous congestion and hazy left basilar opacity.        CT      Results from Hospital Encounter encounter on 05/13/21   CT ABD PELV W CONT     Narrative CT ABDOMEN AND PELVIS WITH CONTRAST     COMPARISON: Testicular ultrasound, plain films of the chest,.     INDICATIONS: Scrotal swelling and pain, shortness of breath.     TECHNIQUE:  Following the uneventful administration of 100cc of Isovue 300  intravenous contrast , volumetric data acquisition was performed of the abdomen  and pelvis on a multislice scanner and reconstructed in axial coronal and  sagittal planes     CT ABDOMEN FINDINGS:      Lung Bases: There is some mild groundglass density and smooth interlobular  septal thickening. There is cardiac enlargement.     Liver/Gallbladder/Biliary: Fatty infiltration without focal lesion. Unremarkable  biliary tree.     Spleen: Negative.     Adrenal Glands:  Negative     Kidneys: Negative.     Pancreas: Normal.     Stomach, Small Bowel,  and Colon: The stomach is unremarkable. There are a few  mildly dilated loops of small bowel with gas and fluid content in a  nonobstructed pattern. The appendix is normal. The colon is unremarkable.     Lymph Nodes: Normal in size and number.      The abdominal aorta is unremarkable.  The IVC is unremarkable.     Peritoneal Spaces: No free fluid or free air is present.     Abdominal wall: No hernia or mass is evident     Bladder: Unremarkable.     There is edema of the left spermatic cord and significantly increased prominence  of the left scrotal vasculature. Correlation with ultrasound earlier same date  reveals left epididymal and testicular hyperemia correlates with this finding.        Osseous Structures Of Abdomen And Pelvis: Unremarkable for age.        Impression Left epididymoorchitis  Mild groundglass haziness in the lung bases with smooth interlobular septal  thickening-the findings suggest pulmonary edema likely from CHF versus fluid  overload.              All CT scans at this facility are performed using dose optimization technique as  appropriate to the performed exam, to include automated exposure control,  adjustment of the mA and/or kV according to patient's size (Including  appropriate matching for site-specific examinations), or use of iterative  reconstruction technique.       MRI      Results from [place] encounter on 03/23/17   MRI BRAIN WO CONT     Narrative MR Brain Without Contrast     CPT CODE: 44497     HISTORY:  cva. Right-sided numbness     COMPARISON: CT head 5/23/2017. No prior brain MRI.     TECHNIQUE: Brain scanned with axial and sagittal T1W scans, axial T2W scans,  GRE, axial FLAIR, axial diffusion weighted images.     FINDINGS:      Diffusion sequence shows scattered small hyperintense and restricted diffusion  signal foci within the left cerebral hemisphere. Multiple vascular distributions  raises possibility of an embolic process. There are 2 small infarcts in the left  occipital pole. Additional infarcts in the left caudate head, left caudate body,  adjacent corona radiata, and left frontal white matter. A small cluster at the  left frontoparietal vertex on DWI image 25. No associated hemorrhage. No right  hemisphere, brainstem, or cerebellar infarct. Grossly patent flow in the basilar  artery and both internal carotid arteries. No abnormal extra-axial fluid  collections over the convexities. Normal brain volumes. No hydrocephalus. There is a small T2 hyperintense defect in the region of the genu of the right  internal capsule that appears to be a tiny chronic lacunar infarct. Some T2  signal along the right caudate body on axial T2 image 17 that could be related  to chronic infarct, but not clearly seen on other sequences to confirm. No mass  lesions. Some mild mucosal thickening and layering fluid in the right sphenoid sinus. Other sinuses are clear.        Impression IMPRESSION:     1. Multiple acute/early subacute infarcts in the left cerebrum, involves  multiple vascular distributions suggesting an embolic process. -Infarcts in the left deep brain, left frontal white matter, and at the left  frontoparietal vertex. A few tiny foci in the left occipital lobe.     2. Rounded well-defined chronic appearing defect in the genu of the right  internal capsule. Favor a chronic lacunar infarct, but some uncertainty versus  dilated perivascular space given the lack of surrounding gliosis on T2 FLAIR.     3. Small amount of right sphenoid sinus fluid.       ULTRASOUND      Results from Hospital Encounter encounter on 05/13/21   US SCROTUM/TESTICLES W DOPPLER     Impression Enlarged hypervascular left epididymis. This may reflect epididymitis. There is  an indeterminate complex nodule within the epididymis. Differential is broad. Infectious/inflammatory etiologies could be considered amongst others. Correlate  clinically.  Follow-up can be obtained.     Nonspecific bilateral hydroceles, left greater than right.     Left scrotal wall thickening.     Please see report for additional details.          Cardiology Procedures/Testing:  MODALITY RESULTS   EKG        Results for orders placed or performed during the hospital encounter of 05/13/21   EKG, 12 LEAD, INITIAL Result Value Ref Range     Ventricular Rate 71 BPM     Atrial Rate 71 BPM     P-R Interval 160 ms     QRS Duration 86 ms     Q-T Interval 420 ms     QTC Calculation (Bezet) 456 ms     Calculated P Axis 59 degrees     Calculated R Axis 75 degrees     Calculated T Axis 94 degrees     Diagnosis           Normal sinus rhythm  Possible Left atrial enlargement  Septal infarct (cited on or before 29-DEC-2020)  T wave abnormality, consider lateral ischemia  Abnormal ECG  When compared with ECG of 30-DEC-2020 01:35,  Sinus rhythm has replaced Ectopic atrial rhythm  QRS axis shifted left  Nonspecific T wave abnormality has replaced inverted T waves in Anterior   leads  QT has shortened      Results for orders placed or performed in visit on 02/15/18   AMB POC EKG ROUTINE W/ 12 LEADS, INTER & REP     Impression     Sinus tachycardia at 126 bpm.  No acute changes. ECHO        Special Testing/Procedures:  MODALITY RESULTS   MICRO         All Micro Results      Procedure Component Value Units Date/Time     CULTURE, URINE [[id number]] Collected: 05/13/21 2322     Order Status: Completed Specimen: Urine from Clean catch Updated: 05/14/21 0026           UA No results found for this or any previous visit.         Assessment and Plan:   63 y. o. male with PMHx of CHF, HLD, HTN, Hypoparathyroidism, now admitted with Left Epididymoorchitis, CHF Exacerbation.     Left Epididymoorchitis  Acute onset testicular pain. Also found to have an indeterminate complex nodule within the epididymis. Nonspecific bilateral hydroceles, left greater than right. Patient with leukocytosis worsening. CT abdomen shows L epididymoorchitis.  Mild groundglass haziness in lung bases w/smooth interlobular septal thickening-the findings suggest pulmonary edema likely from CHF versus fluid overload. UA was negative. GC/Chlam negative.   - Admit to Tele-medicine  - Vitals per Unit Routine  - Urology following, appreciate recs  - Daily CBC, CMP, Mg  - CM/PT/OT  - continue Levaquin 500mg IV qD  - For Pain: Tylenol scheduled     CHF exacerbation with HTN, CAD, Pulm HTN  Home medications: Carvedilol 12.5 BID, Furosemide 20mg daily, Entresto 24-26 daily, Nitoglycerin 0.4 PRN, Spirinolactone 25mg daily, Aspirin  Hx of non compliance, Cardiology note states he ran out of his medications and did not take them for 1 month  Right heart Cath done 10/2020, showing RV 47/0mmHg, PA 49/20mmHG, PCWP 20mmHg. ECHO done 10/19/2020, showing EF 15%, Grade III (severe) diastolic dysfunction, mild mitral regurg, moderate tricuspid regurg, Pulm Artery Pressure 53mmHg. In the ED, Pro-BNP elevated to 3676, Trop-I neg. EKG showed NSR, with Septal infarct, T wave abnormality concerning for lateral ischemia   Repeat ECHO: EF 15-20%, overall improvement: TV regurgitation decreased. Pulmonary hypertension has decreased.  Moderate TR previously seen no longer appreciated  - Cardiology following, appreciate recs  - continue Carvedilol, Spirinolactone, Aspirin, Entresto 24-26, Nitroglycerin PRN  - Patient will need medication other than Entresto on DC if cost-prohibitive  -Patient will likely need lifevest on dc  - strict I/Os  - changed IV Lasix      HLD  At home takes Rosuvastatin 20mg daily  - continue Rosuvastatin     Mild Elevation on on CKDIII  Mildly elevated Cr to 1.56, up from his baseline of 1.4, improving  - daily CMP     Diet Renal   DVT Prophylaxis SCDs, SQH   GI Prophylaxis Famotidine   Code status FULL   Disposition >2MNs, Home      Point of Contact Carrie Lawman  Relationship: Sister  [phone]     Signed By: Talat Escoto MD     May 16, 2021

## 2021-05-16 NOTE — PROGRESS NOTES
Cardiology Progress Note    Admit Date: 5/13/2021  Attending Cardiologist: Dr. Flor Jackson:     -Acute on chronic HFrEF, admitted with scrotal swelling and B/L LE, in the setting of increased fluid intake, elevated pro-BNP (3676). CXR mild pulmonary venous congestion   -Non-Ischemic Cardiomyopathy, ECHO (10/2020) EF 15%, Grade III LV DD, on entresto, aldactone and coreg as outpatient  -Testcular swelling/pain, likely multifactorial with CHF and Left Epididymoorchitis, as seen on CT.  -Moderate TR by ECHO (10/2020)   -Moderate Pulmonary HTN by ECHO (10/2020) PASP 53 mmHg   -Implantable loop recorder placement  -CAD, s/p LHC (10/2020)   ? 1) Left main coronary artery:    40-50 % distal left MAIN  ? 2) Left anterior descending coronary artery:  50-60 mid LAD following large septal, 40 % distal, long bridging segment as well in distal LAD   ? 3) Left circumflex coronary artery:  non dominant, large obtuse marginal branches, Mild-moderate diffuse disease  ? 4) Right coronary artery:  dominant, feeds a posterior descending artery and smaller posterolateral branch  20-30 % mid vessel  ? 5) Ramus intermediate:   NA  -Hx SVT  -Leukocytosis   -Non-compliance with medications       Primary Cardiologist: Dr. Tonie Kuhn:     His volume status looks improved, and he is without symptoms. Will take him off IV Lasix and switch to once daily oral Lasix. Have ordered LifeVest and discussed at length with patient about importance of wearing as indicated. He understands and wishes to proceed. All questions answered. Would continue with all other meds and monitor. Hopefully home soon with close office follow up. Staff addendum:  Doing well, switch to oral lasix today, LIfevest ordered as EF still 20% by echo yesterday. Given recent infection, I would prefer permanent AICD as outpatient in 2-3 weeks after infection clear. He will follow-up with Dr. Anabell Vega and see him as outpatient.   If stable, we could schedule directly as outpatient since I have seen patient already. I saw, examined, and evaluated the patient. I personally reviewed the patient's labs, tests, vitals, orders, medications, updated history, and other providers assessments. I personally agree with the findings as stated and the plan as documented. Dickson Brown MD      Subjective:     No new complaints.  States he is feeling well    Objective:      Patient Vitals for the past 8 hrs:   Temp Pulse Resp BP SpO2   05/16/21 0934 98.7 °F (37.1 °C) 81 20 107/60 95 %   05/16/21 0418 98.5 °F (36.9 °C) 70 20 128/76 99 %   05/16/21 0400  79            Patient Vitals for the past 96 hrs:   Weight   05/16/21 0014 79.4 kg (175 lb)   05/15/21 0725 81.2 kg (179 lb)   05/14/21 1935 81.6 kg (179 lb 14.3 oz)   05/13/21 2052 81.6 kg (179 lb 14.3 oz)   05/13/21 1008 81.6 kg (180 lb)       TELE: normal sinus rhythm               Current Facility-Administered Medications   Medication Dose Route Frequency Last Admin    VANCOMYCIN TROUGH DUE  1 Each Other Once per day on Sun      [START ON 5/17/2021] furosemide (LASIX) tablet 40 mg  40 mg Oral DAILY      levoFLOXacin (LEVAQUIN) 500 mg in D5W IVPB  500 mg IntraVENous Q24H 500 mg at 05/15/21 1532    sodium chloride (NS) flush 5-40 mL  5-40 mL IntraVENous Q8H 10 mL at 05/16/21 0539    sodium chloride (NS) flush 5-40 mL  5-40 mL IntraVENous PRN      aspirin delayed-release tablet 81 mg  81 mg Oral DAILY 81 mg at 05/16/21 0937    carvediloL (COREG) tablet 12.5 mg  12.5 mg Oral BID WITH MEALS 12.5 mg at 05/16/21 7660    nitroglycerin (NITROSTAT) tablet 0.4 mg  0.4 mg SubLINGual Q5MIN PRN      rosuvastatin (CRESTOR) tablet 20 mg  20 mg Oral QHS 20 mg at 05/15/21 2147    [Held by provider] spironolactone (ALDACTONE) tablet 25 mg  25 mg Oral DAILY 25 mg at 05/14/21 0901    sacubitriL-valsartan (ENTRESTO) 24-26 mg tablet 1 Tab  1 Tab Oral BID 1 Tab at 05/16/21 1088    acetaminophen (TYLENOL) tablet 1,000 mg 1,000 mg Oral BID 1,000 mg at 05/16/21 3077    heparin (porcine) injection 5,000 Units  5,000 Units SubCUTAneous Q12H 5,000 Units at 05/16/21 0538    vancomycin (VANCOCIN) 1250 mg in  ml infusion  1,250 mg IntraVENous Q18H 1,250 mg at 05/16/21 0429         Intake/Output Summary (Last 24 hours) at 5/16/2021 1016  Last data filed at 5/16/2021 4682  Gross per 24 hour   Intake 1670 ml   Output 5175 ml   Net -3505 ml       Physical Exam:  General:  alert, cooperative, no distress, appears stated age  Neck:  nontender, no JVD  Lungs:  clear to auscultation bilaterally  Heart:  regular rate and rhythm, S1, S2 normal, no murmur, click, rub or gallop  Abdomen:  abdomen is soft without significant tenderness, masses, organomegaly or guarding  Extremities:  extremities normal, atraumatic, no cyanosis, 1-2+    Visit Vitals  /60 (BP 1 Location: Right upper arm, BP Patient Position: At rest)   Pulse 81   Temp 98.7 °F (37.1 °C)   Resp 20   Ht 6' (1.829 m)   Wt 79.4 kg (175 lb)   SpO2 95%   BMI 23.73 kg/m²       Data Review:     Labs: Results:       Chemistry Recent Labs     05/16/21  0126 05/15/21  0218 05/14/21  2058 05/14/21  0453   GLU 81 80 101* 84    136 136 137   K 3.7 3.3* 3.4* 3.9    97* 98* 105   CO2 28 30 33* 25   BUN 19* 22* 22* 21*   CREA 1.42* 1.56* 1.77* 1.49*   CA 7.1* 7.0* 7.3* 6.7*   MG 1.7  --  2.0 1.6   AGAP 7 9 5 7   BUCR 13 14 12 14      CBC w/Diff Recent Labs     05/16/21  0126 05/15/21  0218 05/14/21  0453   WBC 9.5 14.5* 20.7*   RBC 4.95 4.67 4.41   HGB 14.5 14.0 13.3   HCT 44.9 41.9 39.9    212 179   GRANS 71 84* 84*   LYMPH 20* 8* 12*   EOS 1 0 0      Cardiac Enzymes No results found for: CPK, CK, CKMMB, CKMB, RCK3, CKMBT, CKNDX, CKND1, SEBASTIÁN, TROPT, TROIQ, YANETH, TROPT, TNIPOC, BNP, BNPP   Coagulation No results for input(s): PTP, INR, APTT, INREXT in the last 72 hours.     Lipid Panel Lab Results   Component Value Date/Time    Cholesterol, total 149 03/25/2017 02:55 AM HDL Cholesterol 38 (L) 03/25/2017 02:55 AM    LDL, calculated 88.4 03/25/2017 02:55 AM    VLDL, calculated 22.6 03/25/2017 02:55 AM    Triglyceride 113 03/25/2017 02:55 AM    CHOL/HDL Ratio 3.9 03/25/2017 02:55 AM      BNP No results found for: BNP, BNPP, XBNPT   Liver Enzymes No results for input(s): TP, ALB, TBIL, AP in the last 72 hours.     No lab exists for component: SGOT, GPT, DBIL   Thyroid Studies Lab Results   Component Value Date/Time    TSH 0.72 02/28/2016 07:15 PM          Signed By: LEOBARDO Maldonado     May 16, 2021

## 2021-05-16 NOTE — PROGRESS NOTES
Urology Progress Note        Assessment/Plan:     Patient Active Problem List   Diagnosis Code    Cough R05    Corn of foot L84    Hypomagnesemia E83.42    TIA (transient ischemic attack) G45.9    Hypertension I10    Pulmonary nodule R91.1    Head injury S09.90XA    Depression F32.9    SAH (subarachnoid hemorrhage) (HCC) I60.9    HLD (hyperlipidemia) E78.5    Weakness due to cerebrovascular accident (CVA) [id number]    S/P ablation operation for arrhythmia Z98.890, O03.71    Systolic CHF, chronic (HCC) I50.22    Cardiomyopathy ([address] I42.9    Coronary artery disease involving native coronary artery of native heart without angina pectoris I25.10    Testicular pain N50.819    CHF exacerbation (HCC) I50.9     Left Epididymoorchitis on CT and US              WBC 13.7              Creat:1.58   Urine culture no growth     Bilateral Hydroceles, L>R on US     CHF     HTN     HLD       Plan:  Patient much improved  Antibiotics per primary  Will sign off. Please reconsult with any concerns. Follow up arranged? YES  Note sent to Melecio Leon MD    [phone]      Subjective:     Daily Progress Note: 2021 9:18 AM    Ricki Grayson is doing good. He reports pain is well controlled. He has no complaints. He is tolerating a solid diet and ambulating without assistance. Patient is voiding without difficulty. Objective:     Visit Vitals  /76 (BP 1 Location: Right arm, BP Patient Position: At rest)   Pulse 70   Temp 98.5 °F (36.9 °C)   Resp 20   Ht 6' (1.829 m)   Wt 175 lb (79.4 kg)   SpO2 99%   BMI 23.73 kg/m²        Temp (24hrs), Av.2 °F (36.8 °C), Min:97.5 °F (36.4 °C), Max:98.5 °F (36.9 °C)      Intake and Output:   1901 -  0700  In: 5977 [P.O.:2140; I.V.:350]  Out: 6225 [Urine:6225]  No intake/output data recorded.     PHYSICAL EXAMINATION:   Visit Vitals  /76 (BP 1 Location: Right arm, BP Patient Position: At rest)   Pulse 70   Temp 98.5 °F (36.9 °C)   Resp 20   Ht 6' (1.829 m)   Wt 175 lb (79.4 kg)   SpO2 99%   BMI 23.73 kg/m²     Constitutional: Well developed, well nourished. No acute distress. HEENT: Normocephalic, Atraumatic, EOM's intact   CV:  no edema  Respiratory: No respiratory distress or difficulties breathing   Abdomen:  Soft and non-tender   Male:   CVA tenderness: none   Skin: No evidence of jaundice. Normal color  Neuro/Psych:  Alert and oriented. Affect appropriate. Lymphatic:   No enlarged inguinal lymph nodes. Much improved edema    Lab/Data Review: All lab results for the last 24 hours reviewed.     Labs:     Labs: Results:   Chemistry    Recent Labs     05/16/21  0126 05/15/21  0218 05/14/21  2058   GLU 81 80 101*    136 136   K 3.7 3.3* 3.4*    97* 98*   CO2 28 30 33*   BUN 19* 22* 22*   CREA 1.42* 1.56* 1.77*   CA 7.1* 7.0* 7.3*   AGAP 7 9 5   BUCR 13 14 12      CBC w/Diff Recent Labs     05/16/21  0126 05/15/21  0218 05/14/21  0453   WBC 9.5 14.5* 20.7*   RBC 4.95 4.67 4.41   HGB 14.5 14.0 13.3   HCT 44.9 41.9 39.9    212 179   GRANS 71 84* 84*   LYMPH 20* 8* 12*   EOS 1 0 0      Cultures Recent Labs     05/13/21 2322   CULT No growth (<1,000 CFU/ML)     All Micro Results     Procedure Component Value Units Date/Time    CULTURE, URINE [[id number]] Collected: 05/13/21 2322    Order Status: Completed Specimen: Urine from Clean catch Updated: 05/15/21 8392     Special Requests: NO SPECIAL REQUESTS        Culture result: No growth (<1,000 CFU/ML)               Urinalysis Color   Date Value Ref Range Status   05/13/2021 YELLOW   Final     Appearance   Date Value Ref Range Status   05/13/2021 CLEAR   Final     Specific gravity   Date Value Ref Range Status   05/13/2021 1.012 1.005 - 1.030   Final     pH (UA)   Date Value Ref Range Status   05/13/2021 5.0 5.0 - 8.0   Final     Protein   Date Value Ref Range Status   05/13/2021 Negative NEG mg/dL Final     Ketone   Date Value Ref Range Status   05/13/2021 Negative NEG mg/dL Final     Bilirubin   Date Value Ref Range Status   05/13/2021 Negative NEG   Final     Blood   Date Value Ref Range Status   05/13/2021 Negative NEG   Final     Urobilinogen   Date Value Ref Range Status   05/13/2021 1.0 0.2 - 1.0 EU/dL Final     Nitrites   Date Value Ref Range Status   05/13/2021 Negative NEG   Final     Leukocyte Esterase   Date Value Ref Range Status   05/13/2021 Negative NEG   Final     Potassium   Date Value Ref Range Status   05/16/2021 3.7 3.5 - 5.5 mmol/L Final     Creatinine   Date Value Ref Range Status   05/16/2021 1.42 (H) 0.6 - 1.3 MG/DL Final     BUN   Date Value Ref Range Status   05/16/2021 19 (H) 7.0 - 18 MG/DL Final      PSA No results for input(s): PSA in the last 72 hours.    Coagulation Lab Results   Component Value Date/Time    Prothrombin time 13.3 08/12/2020 10:19 AM    Prothrombin time 12.4 11/23/2018 02:20 PM    INR 1.0 08/12/2020 10:19 AM    INR 1.0 11/23/2018 02:20 PM    aPTT 24.9 11/23/2018 02:20 PM    aPTT 28.1 03/23/2017 04:15 PM

## 2021-05-16 NOTE — PROGRESS NOTES
Problem: Falls - Risk of  Goal: *Absence of Falls  Description: Document Brendan Diandra Fall Risk and appropriate interventions in the flowsheet. Outcome: Progressing Towards Goal  Note: Fall Risk Interventions:            Medication Interventions: Assess postural VS orthostatic hypotension, Bed/chair exit alarm, Evaluate medications/consider consulting pharmacy, Patient to call before getting OOB, Teach patient to arise slowly         History of Falls Interventions: Bed/chair exit alarm         Problem: Patient Education: Go to Patient Education Activity  Goal: Patient/Family Education  Outcome: Progressing Towards Goal     Problem: Pain  Goal: *Control of Pain  Outcome: Progressing Towards Goal     Problem: Patient Education: Go to Patient Education Activity  Goal: Patient/Family Education  Outcome: Progressing Towards Goal     Problem: Pressure Injury - Risk of  Goal: *Prevention of pressure injury  Description: Document Glenn Scale and appropriate interventions in the flowsheet. Outcome: Progressing Towards Goal  Note: Pressure Injury Interventions:             Activity Interventions: Assess need for specialty bed, Increase time out of bed, Chair cushion, Pressure redistribution bed/mattress(bed type)         Nutrition Interventions: Document food/fluid/supplement intake                     Problem: Patient Education: Go to Patient Education Activity  Goal: Patient/Family Education  Outcome: Progressing Towards Goal     Problem: Injury - Risk of, Adverse Drug Event  Goal: *Absence of adverse drug events  Outcome: Progressing Towards Goal  Goal: *Absence of medication errors  Outcome: Progressing Towards Goal  Goal: *Knowledge of prescribed medications  Outcome: Progressing Towards Goal     Problem: Patient Education: Go to Patient Education Activity  Goal: Patient/Family Education  Outcome: Progressing Towards Goal     Problem: Fluid Volume - Risk of, Imbalanced  Goal: *Balanced intake and output  Outcome: Progressing Towards Goal     Problem: Patient Education: Go to Patient Education Activity  Goal: Patient/Family Education  Outcome: Progressing Towards Goal     Problem: Patient Education: Go to Patient Education Activity  Goal: Patient/Family Education  Outcome: Progressing Towards Goal     Problem: Heart Failure: Day 2  Goal: Off Pathway (Use only if patient is Off Pathway)  Outcome: Progressing Towards Goal  Goal: Activity/Safety  Outcome: Progressing Towards Goal  Goal: Consults, if ordered  Outcome: Progressing Towards Goal  Goal: Diagnostic Test/Procedures  Outcome: Progressing Towards Goal  Goal: Nutrition/Diet  Outcome: Progressing Towards Goal  Goal: Discharge Planning  Outcome: Progressing Towards Goal  Goal: Medications  Outcome: Progressing Towards Goal  Goal: Respiratory  Outcome: Progressing Towards Goal  Goal: Treatments/Interventions/Procedures  Outcome: Progressing Towards Goal  Goal: Psychosocial  Outcome: Progressing Towards Goal  Goal: *Oxygen saturation within defined limits  Outcome: Progressing Towards Goal  Goal: *Hemodynamically stable  Outcome: Progressing Towards Goal  Goal: *Optimal pain control at patient's stated goal  Outcome: Progressing Towards Goal  Goal: *Anxiety reduced or absent  Outcome: Progressing Towards Goal  Goal: *Demonstrates progressive activity  Outcome: Progressing Towards Goal     Problem: Heart Failure: Day 3  Goal: Off Pathway (Use only if patient is Off Pathway)  Outcome: Progressing Towards Goal  Goal: Activity/Safety  Outcome: Progressing Towards Goal  Goal: Diagnostic Test/Procedures  Outcome: Progressing Towards Goal  Goal: Nutrition/Diet  Outcome: Progressing Towards Goal  Goal: Discharge Planning  Outcome: Progressing Towards Goal  Goal: Medications  Outcome: Progressing Towards Goal  Goal: Respiratory  Outcome: Progressing Towards Goal  Goal: Treatments/Interventions/Procedures  Outcome: Progressing Towards Goal  Goal: Psychosocial  Outcome: Progressing Towards Goal  Goal: *Oxygen saturation within defined limits  Outcome: Progressing Towards Goal  Goal: *Hemodynamically stable  Outcome: Progressing Towards Goal  Goal: *Optimal pain control at patient's stated goal  Outcome: Progressing Towards Goal  Goal: *Anxiety reduced or absent  Outcome: Progressing Towards Goal  Goal: *Demonstrates progressive activity  Outcome: Progressing Towards Goal     Problem: Heart Failure: Day 4  Goal: Off Pathway (Use only if patient is Off Pathway)  Outcome: Progressing Towards Goal  Goal: Activity/Safety  Outcome: Progressing Towards Goal  Goal: Diagnostic Test/Procedures  Outcome: Progressing Towards Goal  Goal: Nutrition/Diet  Outcome: Progressing Towards Goal  Goal: Discharge Planning  Outcome: Progressing Towards Goal  Goal: Medications  Outcome: Progressing Towards Goal  Goal: Respiratory  Outcome: Progressing Towards Goal  Goal: Treatments/Interventions/Procedures  Outcome: Progressing Towards Goal  Goal: Psychosocial  Outcome: Progressing Towards Goal  Goal: *Oxygen saturation within defined limits  Outcome: Progressing Towards Goal  Goal: *Hemodynamically stable  Outcome: Progressing Towards Goal  Goal: *Optimal pain control at patient's stated goal  Outcome: Progressing Towards Goal  Goal: *Anxiety reduced or absent  Outcome: Progressing Towards Goal  Goal: *Demonstrates progressive activity  Outcome: Progressing Towards Goal     Problem: Heart Failure: Day 5  Goal: Off Pathway (Use only if patient is Off Pathway)  Outcome: Progressing Towards Goal  Goal: Activity/Safety  Outcome: Progressing Towards Goal  Goal: Diagnostic Test/Procedures  Outcome: Progressing Towards Goal  Goal: Nutrition/Diet  Outcome: Progressing Towards Goal  Goal: Discharge Planning  Outcome: Progressing Towards Goal  Goal: Medications  Outcome: Progressing Towards Goal  Goal: Respiratory  Outcome: Progressing Towards Goal  Goal: Treatments/Interventions/Procedures  Outcome: Progressing Towards Goal  Goal: Psychosocial  Outcome: Progressing Towards Goal     Problem: Heart Failure: Discharge Outcomes  Goal: *Demonstrates ability to perform prescribed activity without shortness of breath or discomfort  Outcome: Progressing Towards Goal  Goal: *Left ventricular function assessment completed prior to or during stay, or planned for post-discharge  Outcome: Progressing Towards Goal  Goal: *ACEI prescribed if LVEF less than 40% and no contraindications or ARB prescribed  Outcome: Progressing Towards Goal  Goal: *Verbalizes understanding and describes prescribed diet  Outcome: Progressing Towards Goal  Goal: *Verbalizes understanding/describes prescribed medications  Outcome: Progressing Towards Goal  Goal: *Describes available resources and support systems  Description: (eg: Home Health, Palliative Care, Advanced Medical Directive)  Outcome: Progressing Towards Goal  Goal: *Describes smoking cessation resources  Outcome: Progressing Towards Goal  Goal: *Understands and describes signs and symptoms to report to providers(Stroke Metric)  Outcome: Progressing Towards Goal  Goal: *Describes/verbalizes understanding of follow-up/return appt  Description: (eg: to physicians, diabetes treatment coordinator, and other resources  Outcome: Progressing Towards Goal  Goal: *Describes importance of continuing daily weights and changes to report to physician  Outcome: Progressing Towards Goal
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
## 2021-05-17 VITALS
SYSTOLIC BLOOD PRESSURE: 131 MMHG | WEIGHT: 178 LBS | TEMPERATURE: 98.1 F | RESPIRATION RATE: 19 BRPM | OXYGEN SATURATION: 98 % | DIASTOLIC BLOOD PRESSURE: 85 MMHG | HEIGHT: 72 IN | BODY MASS INDEX: 24.11 KG/M2 | HEART RATE: 78 BPM

## 2021-05-17 LAB
ANION GAP SERPL CALC-SCNC: 7 MMOL/L (ref 3–18)
BASOPHILS # BLD: 0.1 K/UL (ref 0–0.1)
BASOPHILS NFR BLD: 1 % (ref 0–2)
BUN SERPL-MCNC: 19 MG/DL (ref 7–18)
BUN/CREAT SERPL: 14 (ref 12–20)
CALCIUM SERPL-MCNC: 7.1 MG/DL (ref 8.5–10.1)
CHLORIDE SERPL-SCNC: 103 MMOL/L (ref 100–111)
CO2 SERPL-SCNC: 26 MMOL/L (ref 21–32)
CREAT SERPL-MCNC: 1.38 MG/DL (ref 0.6–1.3)
DIFFERENTIAL METHOD BLD: ABNORMAL
EOSINOPHIL # BLD: 0.1 K/UL (ref 0–0.4)
EOSINOPHIL NFR BLD: 2 % (ref 0–5)
ERYTHROCYTE [DISTWIDTH] IN BLOOD BY AUTOMATED COUNT: 12.7 % (ref 11.6–14.5)
GLUCOSE SERPL-MCNC: 74 MG/DL (ref 74–99)
HCT VFR BLD AUTO: 44 % (ref 36–48)
HGB BLD-MCNC: 14.4 G/DL (ref 13–16)
LYMPHOCYTES # BLD: 1.8 K/UL (ref 0.9–3.6)
LYMPHOCYTES NFR BLD: 27 % (ref 21–52)
MAGNESIUM SERPL-MCNC: 1.7 MG/DL (ref 1.6–2.6)
MCH RBC QN AUTO: 30 PG (ref 24–34)
MCHC RBC AUTO-ENTMCNC: 32.7 G/DL (ref 31–37)
MCV RBC AUTO: 91.7 FL (ref 74–97)
MONOCYTES # BLD: 0.6 K/UL (ref 0.05–1.2)
MONOCYTES NFR BLD: 9 % (ref 3–10)
NEUTS SEG # BLD: 4.1 K/UL (ref 1.8–8)
NEUTS SEG NFR BLD: 61 % (ref 40–73)
PLATELET # BLD AUTO: 255 K/UL (ref 135–420)
PMV BLD AUTO: 12.7 FL (ref 9.2–11.8)
POTASSIUM SERPL-SCNC: 4 MMOL/L (ref 3.5–5.5)
RBC # BLD AUTO: 4.8 M/UL (ref 4.35–5.65)
SODIUM SERPL-SCNC: 136 MMOL/L (ref 136–145)
WBC # BLD AUTO: 6.7 K/UL (ref 4.6–13.2)

## 2021-05-17 PROCEDURE — 83735 ASSAY OF MAGNESIUM: CPT

## 2021-05-17 PROCEDURE — 74011250637 HC RX REV CODE- 250/637: Performed by: PHYSICIAN ASSISTANT

## 2021-05-17 PROCEDURE — 80048 BASIC METABOLIC PNL TOTAL CA: CPT

## 2021-05-17 PROCEDURE — 85025 COMPLETE CBC W/AUTO DIFF WBC: CPT

## 2021-05-17 PROCEDURE — 36415 COLL VENOUS BLD VENIPUNCTURE: CPT

## 2021-05-17 PROCEDURE — 74011250637 HC RX REV CODE- 250/637: Performed by: STUDENT IN AN ORGANIZED HEALTH CARE EDUCATION/TRAINING PROGRAM

## 2021-05-17 PROCEDURE — 74011250636 HC RX REV CODE- 250/636: Performed by: STUDENT IN AN ORGANIZED HEALTH CARE EDUCATION/TRAINING PROGRAM

## 2021-05-17 RX ORDER — TORSEMIDE 20 MG/1
20 TABLET ORAL 2 TIMES DAILY
Status: DISCONTINUED | OUTPATIENT
Start: 2021-05-17 | End: 2021-05-17 | Stop reason: HOSPADM

## 2021-05-17 RX ORDER — LEVOFLOXACIN 500 MG/1
500 TABLET, FILM COATED ORAL DAILY
Qty: 6 TAB | Refills: 0 | Status: SHIPPED | OUTPATIENT
Start: 2021-05-18 | End: 2021-05-24

## 2021-05-17 RX ADMIN — CARVEDILOL 12.5 MG: 12.5 TABLET, FILM COATED ORAL at 18:24

## 2021-05-17 RX ADMIN — Medication 10 ML: at 06:33

## 2021-05-17 RX ADMIN — TORSEMIDE 20 MG: 20 TABLET ORAL at 18:24

## 2021-05-17 RX ADMIN — HEPARIN SODIUM 5000 UNITS: 5000 INJECTION INTRAVENOUS; SUBCUTANEOUS at 06:32

## 2021-05-17 RX ADMIN — ACETAMINOPHEN 1000 MG: 500 TABLET, FILM COATED ORAL at 09:12

## 2021-05-17 RX ADMIN — ACETAMINOPHEN 1000 MG: 500 TABLET, FILM COATED ORAL at 18:24

## 2021-05-17 RX ADMIN — ASPIRIN 81 MG: 81 TABLET, COATED ORAL at 09:12

## 2021-05-17 RX ADMIN — SACUBITRIL AND VALSARTAN 1 TABLET: 24; 26 TABLET, FILM COATED ORAL at 09:12

## 2021-05-17 RX ADMIN — FUROSEMIDE 40 MG: 40 TABLET ORAL at 09:12

## 2021-05-17 RX ADMIN — CARVEDILOL 12.5 MG: 12.5 TABLET, FILM COATED ORAL at 09:12

## 2021-05-17 RX ADMIN — LEVOFLOXACIN 500 MG: 5 INJECTION, SOLUTION INTRAVENOUS at 15:09

## 2021-05-17 RX ADMIN — Medication 10 ML: at 15:18

## 2021-05-17 NOTE — PROGRESS NOTES
Problem: Falls - Risk of  Goal: *Absence of Falls  Description: Document Jennifer Patino Fall Risk and appropriate interventions in the flowsheet. Outcome: Progressing Towards Goal  Note: Fall Risk Interventions:            Medication Interventions: Teach patient to arise slowly         History of Falls Interventions: Door open when patient unattended, Room close to nurse's station         Problem: Patient Education: Go to Patient Education Activity  Goal: Patient/Family Education  Outcome: Progressing Towards Goal     Problem: Pain  Goal: *Control of Pain  Outcome: Progressing Towards Goal     Problem: Patient Education: Go to Patient Education Activity  Goal: Patient/Family Education  Outcome: Progressing Towards Goal     Problem: Pressure Injury - Risk of  Goal: *Prevention of pressure injury  Description: Document Glenn Scale and appropriate interventions in the flowsheet. Outcome: Progressing Towards Goal  Note: Pressure Injury Interventions:             Activity Interventions: Pressure redistribution bed/mattress(bed type)         Nutrition Interventions: Document food/fluid/supplement intake                     Problem: Patient Education: Go to Patient Education Activity  Goal: Patient/Family Education  Outcome: Progressing Towards Goal     Problem: Fluid Volume - Risk of, Imbalanced  Goal: *Balanced intake and output  Outcome: Progressing Towards Goal     Problem: Patient Education: Go to Patient Education Activity  Goal: Patient/Family Education  Outcome: Progressing Towards Goal     Problem: Heart Failure: Day 4  Goal: Activity/Safety  Outcome: Progressing Towards Goal  Goal: Diagnostic Test/Procedures  Outcome: Progressing Towards Goal  Goal: Nutrition/Diet  Outcome: Progressing Towards Goal  Goal: Discharge Planning  Outcome: Progressing Towards Goal  Goal: Medications  Outcome: Progressing Towards Goal  Goal: Respiratory  Outcome: Progressing Towards Goal  Goal: Treatments/Interventions/Procedures  Outcome: Progressing Towards Goal  Goal: Psychosocial  Outcome: Progressing Towards Goal  Goal: *Oxygen saturation within defined limits  Outcome: Progressing Towards Goal  Goal: *Hemodynamically stable  Outcome: Progressing Towards Goal  Goal: *Optimal pain control at patient's stated goal  Outcome: Progressing Towards Goal  Goal: *Anxiety reduced or absent  Outcome: Progressing Towards Goal  Goal: *Demonstrates progressive activity  Outcome: Progressing Towards Goal

## 2021-05-17 NOTE — PROGRESS NOTES
Cardiology Progress Note    Admit Date: 5/13/2021  Attending Cardiologist: Dr. Hardy Economy:     -Acute on chronic HFrEF, admitted with scrotal swelling and B/L LE, in the setting of increased fluid intake, elevated pro-BNP (3676). CXR mild pulmonary venous congestion   -Non-Ischemic Cardiomyopathy, ECHO (10/2020) EF 15%, Grade III LV DD, on entresto, aldactone and coreg as outpatient  -Testcular swelling/pain, likely multifactorial with CHF and Left Epididymoorchitis, as seen on CT.  -Moderate TR by ECHO (10/2020)   -Moderate Pulmonary HTN by ECHO (10/2020) PASP 53 mmHg   -Implantable loop recorder placement  -CAD, s/p LHC (10/2020)   ? 1) Left main coronary artery:    40-50 % distal left MAIN  ? 2) Left anterior descending coronary artery:  50-60 mid LAD following large septal, 40 % distal, long bridging segment as well in distal LAD   ? 3) Left circumflex coronary artery:  non dominant, large obtuse marginal branches, Mild-moderate diffuse disease  ? 4) Right coronary artery:  dominant, feeds a posterior descending artery and smaller posterolateral branch  20-30 % mid vessel  ? 5) Ramus intermediate:   NA  -Hx SVT  -Leukocytosis   -Non-compliance with medications       Primary Cardiologist: Dr. Alline Lefort:     -LifeVest ordered, fitting pending. LifeVest rep to reach out to  today. -NSVT noted on tele, patient asymptomatic, recommend K+ maintained at 4.0 and Mg at 2.0. Will check serum Mg.     -Continue ASA, coreg, Entresto   -Patient recently started on Torsemide by his primary cardiologist. Will d/c po lasix and restart outpatient Torsemide. Will restart outpatient aldactone.   -Plan to follow up with primary cardiologist Dr. Kisha Manzo in the office in 2-3 weeks. Staff [address] today, then discharge and followup Dr. Bridgett Corbin. Once infection clears, can setup outpatient AICD, can be scheduled directly. I saw, examined, and evaluated the patient.   I personally reviewed the patient's labs, tests, vitals, orders, medications, updated history, and other providers assessments. I personally agree with the findings as stated and the plan as documented. Sandra Cheatham MD      Subjective:     Edema improved. No events overnight. Ready to go home.      Objective:      Patient Vitals for the past 8 hrs:   Temp Pulse Resp BP SpO2   05/17/21 0813 98.4 °F (36.9 °C) 82 19 112/71 99 %   05/17/21 0257 98.1 °F (36.7 °C) 68 19 118/78 97 %         Patient Vitals for the past 96 hrs:   Weight   05/17/21 0012 80.7 kg (178 lb)   05/16/21 0014 79.4 kg (175 lb)   05/15/21 0725 81.2 kg (179 lb)   05/14/21 1935 81.6 kg (179 lb 14.3 oz)   05/13/21 2052 81.6 kg (179 lb 14.3 oz)       TELE: normal sinus rhythm               Current Facility-Administered Medications   Medication Dose Route Frequency Last Admin    furosemide (LASIX) tablet 40 mg  40 mg Oral DAILY 40 mg at 05/17/21 0912    levoFLOXacin (LEVAQUIN) 500 mg in D5W IVPB  500 mg IntraVENous Q24H 500 mg at 05/16/21 1501    sodium chloride (NS) flush 5-40 mL  5-40 mL IntraVENous Q8H 10 mL at 05/17/21 2148    sodium chloride (NS) flush 5-40 mL  5-40 mL IntraVENous PRN      aspirin delayed-release tablet 81 mg  81 mg Oral DAILY 81 mg at 05/17/21 0912    carvediloL (COREG) tablet 12.5 mg  12.5 mg Oral BID WITH MEALS 12.5 mg at 05/17/21 0912    nitroglycerin (NITROSTAT) tablet 0.4 mg  0.4 mg SubLINGual Q5MIN PRN      rosuvastatin (CRESTOR) tablet 20 mg  20 mg Oral QHS 20 mg at 05/16/21 2224    [Held by provider] spironolactone (ALDACTONE) tablet 25 mg  25 mg Oral DAILY 25 mg at 05/14/21 0901    sacubitriL-valsartan (ENTRESTO) 24-26 mg tablet 1 Tab  1 Tab Oral BID 1 Tab at 05/17/21 0912    acetaminophen (TYLENOL) tablet 1,000 mg  1,000 mg Oral BID 1,000 mg at 05/17/21 0912    heparin (porcine) injection 5,000 Units  5,000 Units SubCUTAneous Q12H 5,000 Units at 05/17/21 6818         Intake/Output Summary (Last 24 hours) at 5/17/2021 1022  Last data filed at 5/17/2021 0858  Gross per 24 hour   Intake 1380 ml   Output 2775 ml   Net -1395 ml       Physical Exam:  General:  alert, cooperative, no distress, appears stated age  Neck:  nontender, no JVD  Lungs:  clear to auscultation bilaterally  Heart:  regular rate and rhythm, S1, S2 normal, no murmur, click, rub or gallop  Abdomen:  abdomen is soft without significant tenderness, masses, organomegaly or guarding  Extremities:  extremities normal, atraumatic, no cyanosis,  No LE edema B/L     Visit Vitals  /71 (BP 1 Location: Left lower arm, BP Patient Position: At rest)   Pulse 82   Temp 98.4 °F (36.9 °C)   Resp 19   Ht 6' (1.829 m)   Wt 80.7 kg (178 lb)   SpO2 99%   BMI 24.14 kg/m²       Data Review:     Labs: Results:       Chemistry Recent Labs     05/17/21  0418 05/16/21  0126 05/15/21  0218 05/14/21  2058   GLU 74 81 80 101*    137 136 136   K 4.0 3.7 3.3* 3.4*    102 97* 98*   CO2 26 28 30 33*   BUN 19* 19* 22* 22*   CREA 1.38* 1.42* 1.56* 1.77*   CA 7.1* 7.1* 7.0* 7.3*   MG  --  1.7  --  2.0   AGAP 7 7 9 5   BUCR 14 13 14 12      CBC w/Diff Recent Labs     05/17/21 0418 05/16/21  0126 05/15/21  0218   WBC 6.7 9.5 14.5*   RBC 4.80 4.95 4.67   HGB 14.4 14.5 14.0   HCT 44.0 44.9 41.9    222 212   GRANS 61 71 84*   LYMPH 27 20* 8*   EOS 2 1 0      Cardiac Enzymes No results found for: CPK, CK, CKMMB, CKMB, RCK3, CKMBT, CKNDX, CKND1, SEBASTIÁN, TROPT, TROIQ, YANETH, TROPT, TNIPOC, BNP, BNPP   Coagulation No results for input(s): PTP, INR, APTT, INREXT, INREXT in the last 72 hours.     Lipid Panel Lab Results   Component Value Date/Time    Cholesterol, total 149 03/25/2017 02:55 AM    HDL Cholesterol 38 (L) 03/25/2017 02:55 AM    LDL, calculated 88.4 03/25/2017 02:55 AM    VLDL, calculated 22.6 03/25/2017 02:55 AM    Triglyceride 113 03/25/2017 02:55 AM    CHOL/HDL Ratio 3.9 03/25/2017 02:55 AM      BNP No results found for: BNP, BNPP, XBNPT   Liver Enzymes No results for input(s): TP, ALB, TBIL, AP in the last 72 hours.     No lab exists for component: SGOT, GPT, DBIL   Thyroid Studies Lab Results   Component Value Date/Time    TSH 0.72 02/28/2016 07:15 PM          Signed By: Simón Burkett PA-C     May 17, 2021

## 2021-05-17 NOTE — PROGRESS NOTES
Discharge instructions and prescriptions given to patient. IV, armband and telebox removed. Cardiac monitoring device fitted to patient, educated on use prior to discharge.

## 2021-05-17 NOTE — PROGRESS NOTES
Spoke with Khalif with [facility]. Sahnika Marie has been approved and fitted to pt later this evening between 5:30-6:30. Updated PFM resident, Dr. Demi Zhong.  Susie Haley, MSW, [facility]- [phone]
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
## 2021-05-17 NOTE — DISCHARGE INSTRUCTIONS
Patient armband removed and shredded  MyChart Activation    Thank you for requesting access to [employer]. Please follow the instructions below to securely access and download your online medical record. [employer] allows you to send messages to your doctor, view your test results, renew your prescriptions, schedule appointments, and more. How Do I Sign Up? 1. In your internet browser, go to [email or web address]  2. Click on the First Time User? Click Here link in the Sign In box. You will be redirect to the New Member Sign Up page. 3. Enter your [employer] Access Code exactly as it appears below. You will not need to use this code after youve completed the sign-up process. If you do not sign up before the expiration date, you must request a new code. [employer] Access Code: [email or web address]  Expires: 6/10/2021  2:35 PM (This is the date your M[employer]y access code will )    4. Enter the last four digits of your Social Security Number (xxxx) and Date of Birth (mm/dd/yyyy) as indicated and click Submit. You will be taken to the next sign-up page. 5. Create a Mpayy ID. This will be your M[employer] login ID and cannot be changed, so think of one that is secure and easy to remember. 6. Create a M[employer]y password. You can change your password at any time. 7. Enter your Password Reset Question and Answer. This can be used at a later time if you forget your password. 8. Enter your e-mail address. You will receive e-mail notification when new information is available in [address]. 9. Click Sign Up. You can now view and download portions of your medical record. 10. Click the Download Summary menu link to download a portable copy of your medical information. Additional Information    If you have questions, please visit the Frequently Asked Questions section of the [employer] website at [email or web address] Remember, [employer] is NOT to be used for urgent needs.  For medical emergencies, dial [address] from Nurse    PATIENT INSTRUCTIONS:    After general anesthesia or intravenous sedation, for 24 hours or while taking prescription Narcotics:  · Limit your activities  · Do not drive and operate hazardous machinery  · Do not make important personal or business decisions  · Do  not drink alcoholic beverages  · If you have not urinated within 8 hours after discharge, please contact your surgeon on call. Report the following to your surgeon:  · Excessive pain, swelling, redness or odor of or around the surgical area  · Temperature over 100.5  · Nausea and vomiting lasting longer than 4 hours or if unable to take medications  · Any signs of decreased circulation or nerve impairment to extremity: change in color, persistent  numbness, tingling, coldness or increase pain  · Any questions    What to do at Home:  Recommended activity: Activity as tolerated, life vest as instructed    If you experience any of the following symptoms shortness of breath, chest pain, weight gain of more than 3 lbs overnight or 5 lbs in 3 days, please follow up with primary care physician or nearest emergency room. *  Please give a list of your current medications to your Primary Care Provider. *  Please update this list whenever your medications are discontinued, doses are      changed, or new medications (including over-the-counter products) are added. *  Please carry medication information at all times in case of emergency situations. These are general instructions for a healthy lifestyle:    No smoking/ No tobacco products/ Avoid exposure to second hand smoke  Surgeon General's Warning:  Quitting smoking now greatly reduces serious risk to your health.     Obesity, smoking, and sedentary lifestyle greatly increases your risk for illness    A healthy diet, regular physical exercise & weight monitoring are important for maintaining a healthy lifestyle    You may be retaining fluid if you have a history of heart failure or if you experience any of the following symptoms:  Weight gain of 3 pounds or more overnight or 5 pounds in a week, increased swelling in our hands or feet or shortness of breath while lying flat in bed. Please call your doctor as soon as you notice any of these symptoms; do not wait until your next office visit. The discharge information has been reviewed with the patient. The patient verbalized understanding. Discharge medications reviewed with the patient and appropriate educational materials and side effects teaching were provided.   ___________________________________________________________________________________________________________________________________

## 2021-05-17 NOTE — PROGRESS NOTES
Cardiopulmonary Navigator Recommendations    The following indications were identified in this patient chart:    -Acute on chronic HFrEF, admitted with scrotal swelling and B/L LE, in the setting of increased fluid intake, elevated pro-BNP (3676). CXR mild pulmonary venous congestion   -Non-Ischemic Cardiomyopathy, ECHO (10/2020) EF 15%, Grade III LV DD, on entresto, aldactone and coreg as outpatient  -Testcular swelling/pain, likely multifactorial with CHF and Left Epididymoorchitis, as seen on CT.  -Moderate TR by ECHO (10/2020)   -Moderate Pulmonary HTN by ECHO (10/2020) PASP 53 mmHg   -Implantable loop recorder placement  -CAD, s/p LHC (10/2020)   ? 1) Left main coronary artery:    40-50 % distal left MAIN  ? 2) Left anterior descending coronary artery:  50-60 mid LAD following large septal, 40 % distal, long bridging segment as well in distal LAD   ? 3) Left circumflex coronary artery:  non dominant, large obtuse marginal branches, Mild-moderate diffuse disease  ? 4) Right coronary artery:  dominant, feeds a posterior descending artery and smaller posterolateral branch  20-30 % mid vessel      The following recommendations are based on chart review and patient interview. They are intended to help patient transition home when appropriate, and to help reduce risk of re-admission. Discussed with patient benefits of Cardiac/Pulmonary Rehabilitation after discharge. Educational Material provided. Reinforced importance of good nutrition, low sodium diet and monitoring fluid intake, strict medication compliance, daily weights and blood pressure check. Rehabilitation:  Please consider referring patient for evaluation at [address] [place] [address] for:    Cardiac Rehabilitation      Will continue to follow patient during this admission, and update recommendations as pt progresses.

## 2021-05-17 NOTE — PROGRESS NOTES
Reason for Admission:  CHF exacerbation ([facility] [address] [I50.9]                 RUR Score:    14%            Plan for utilizing home health:    No, pt istill owkring not home bound                      Likelihood of Readmission:   LOW                         Transition of Care Plan:              Initial assessment completed with patient. Cognitive status of patient: oriented to time, place, person and situation. Face sheet information confirmed:  yes. The patient designates sister, Kalani Watts,  to participate in his discharge plan and to receive any needed information. This patient lives in a single family home with patient and mother. Patient is able to navigate steps as needed. Prior to hospitalization, patient was considered to be independent with ADLs/IADLS : yes . Patient has a current ACP document on file: no      Healthcare Decision Maker:     Click here to complete [address] including selection of the Healthcare Decision Maker Relationship (ie \"Primary\")    The patient and sister will be available to transport patient home upon discharge. The patient already has none reported, and  medical equipment available in the home. Patient is not currently active with home health. Patient has not stayed in a skilled nursing facility or rehab. This patient is on dialysis :no    Freedom of choice signed: no. Currently, the discharge plan is Home. Pt still working and is not home bound    The patient states that he can obtain his medications from the pharmacy, and take his medications as directed.     Patient's current insurance is [employer] medicare       Care Management Interventions  PCP Verified by CM: Yes([facility])  Mode of Transport at Discharge: Self  Transition of Care Consult (CM Consult): Discharge Planning  Discharge Durable Medical Equipment: No  Physical Therapy Consult: No  Occupational Therapy Consult: No  Current Support Network: Lives with Caregiver, Family Lives Nearby  Confirm Follow Up Transport: Family  Discharge Location  Discharge Placement: [facility], [place], [facility] [phone]

## 2021-05-17 NOTE — ROUTINE PROCESS
Bedside shift change report given to Shiela Post RN (oncoming nurse) by [address], RN (offgoing nurse). Report included the following information SBAR, Kardex, MAR and Cardiac Rhythm NSR.

## 2021-05-17 NOTE — DISCHARGE SUMMARY
[address] Medicine  Discharge Summary    Patient: Georgi Heard MRN: [id number]  CSN: [id number]    YOB: 1960  Age: 63 y.o. Sex: male      Admission Date: 5/13/2021 Discharge Date: 5/17/2021   Attending: Rani Rebolledo MD PCP: Vito Holly MD     ===================================================================    Reason for Admission: CHF exacerbation [facility]) [I50.9]    Discharge Diagnoses:   Acute on Chronic HFrEF  Non ischemic Cardiomyopathy  Left Epididymoorchitis  Mod Pulm HTN  Hx of SVT  CAD      Important notes to PCP/ follow-up studies and evaluations   Patient needs to follow up with Urology for Epididymoorchitis, Urology is aware. Patient on 10 day course of Levaquin, sent home on 6 days (4 days received in hospital). Check BMP on follow up, K goal of 4.0, Mg of 2.0. Patient needs to follow up with Cardiology in 3 weeks. He will need to start Torsemide as instructed by his primary cardiologist. He is to wear the life vest as instructed on discharge. Pending labs and studies:  None    Operative Procedures:   None    Discharge Medications:     Current Discharge Medication List      START taking these medications    Details   levoFLOXacin (LEVAQUIN) 500 mg tablet Take 1 Tab by mouth daily for 6 days. Qty: 6 Tab, Refills: 0  Start date: 5/18/2021, End date: 5/24/2021         CONTINUE these medications which have NOT CHANGED    Details   sacubitriL-valsartan (Entresto) 24-26 mg tablet Take 1 Tab by mouth two (2) times a day. torsemide (DEMADEX) 20 mg tablet Take 1 Tab by mouth two (2) times a day. Qty: 60 Tab, Refills: 3      carvediloL (COREG) 12.5 mg tablet Take 1 Tab by mouth two (2) times daily (with meals). Qty: 180 Tab, Refills: 2    Associated Diagnoses: Cardiomyopathy, unspecified type ([address] Systolic CHF, chronic (HCC)      aspirin delayed-release 81 mg tablet Take 1 Tab by mouth daily.   Qty: 100 Tab, Refills: 3    Associated Diagnoses: Coronary artery disease involving native coronary artery of native heart without angina pectoris      rosuvastatin (CRESTOR) 20 mg tablet Take 1 Tab by mouth nightly. Qty: 80 Tab, Refills: 2    Associated Diagnoses: Coronary artery disease involving native coronary artery of native heart without angina pectoris      acetaminophen (TYLENOL) 325 mg tablet Take 650 mg by mouth every four (4) hours as needed. nitroglycerin (NITROSTAT) 0.4 mg SL tablet 0.4 mg by SubLINGual route every five (5) minutes as needed for Chest Pain. Up to 3 doses. STOP taking these medications       spironolactone (ALDACTONE) 25 mg tablet Comments:   Reason for Stopping:         losartan (COZAAR) 25 mg tablet Comments:   Reason for Stopping:               Disposition: Home    Consultants:    Cardiology, Urology    Brief Hospital Course (including pertinent history and physical findings)  63year old  with PMHx of CHF, HLD, HTN, Hypoparathyroidism who presented with worsening leg swelling and acute left testicular pain and swelling. No hx of STDs. Patient sees Dr. Shelly Vazquez of Cardiology, last seen 4/29/2021. At baseline he is able to exercise and go for runs and has pitting edema in his legs. For the past week prior to ED visit, he noticed worsening swelling in his legs up to above the knees bilaterally and he would easily become out of breath with daily tasks. He was getting short of breath with a few steps around the house. Patient contacted Dr. Shiloh Laboy 5/11, plan was to change his Lasix to Torsemide 20mg BID, but the patient never had a chance to  the medication. Last ECHO was done 10/19/2020, showing EF 15%, Grade III (severe) diastolic dysfunction, mild mitral regurg, moderate tricuspid regurg. For his leg swelling, patient was put on IV lasix with excellent diuresis response, his swelling improved. Patient is being discharged on Torsemide PO BID and will follow up with Dr. Shiloh Laboy of Cardiology in 3 weeks. Patient was given a life vest prior to discharge with instructions, Cardiology will setup outpatient AICD. Of note, NSVT was intermittently noted on tele, patient has been asymptomatic though. Cardiology is aware, recommended K+ to be maintained at 4.0 and Mg at 2.0. For his Left testicular swelling, he was found to have Epididymoorchitis on imaging. He was started on 10 day course of antibiotics and his pain and swelling have both improved. Patient to complete his antibiotics in outpatient setting, and follow with Urology (who sent note to their , Bob Chuckie). CURRENT ADMISSION IMAGING RESULTS   Xr Chest Pa Lat    Result Date: 5/13/2021  Mildly prominent cardiac silhouette, decreased since prior exam. -question mild pulmonary venous congestion and hazy left basilar opacity. Ct Abd Pelv W Cont    Result Date: 5/13/2021  Left epididymoorchitis Mild groundglass haziness in the lung bases with smooth interlobular septal thickening-the findings suggest pulmonary edema likely from CHF versus fluid overload. All CT scans at this facility are performed using dose optimization technique as appropriate to the performed exam, to include automated exposure control, adjustment of the mA and/or kV according to patient's size (Including appropriate matching for site-specific examinations), or use of iterative reconstruction technique. Us Scrotum/testicles W Doppler    Result Date: 5/13/2021  Enlarged hypervascular left epididymis. This may reflect epididymitis. There is an indeterminate complex nodule within the epididymis. Differential is broad. Infectious/inflammatory etiologies could be considered amongst others. Correlate clinically. Follow-up can be obtained. Nonspecific bilateral hydroceles, left greater than right. Left scrotal wall thickening. Please see report for additional details.         Cardiology Procedures/Testing:  MODALITY RESULTS   EKG Results for orders placed or performed during the hospital encounter of 05/13/21   EKG, 12 LEAD, INITIAL   Result Value Ref Range    Ventricular Rate 71 BPM    Atrial Rate 71 BPM    P-R Interval 160 ms    QRS Duration 86 ms    Q-T Interval 420 ms    QTC Calculation (Bezet) 456 ms    Calculated P Axis 59 degrees    Calculated R Axis 75 degrees    Calculated T Axis 94 degrees    Diagnosis       Normal sinus rhythm  Possible Left atrial enlargement  Septal infarct (cited on or before 29-DEC-2020)  T wave abnormality, consider lateral ischemia  Abnormal ECG  When compared with ECG of 30-DEC-2020 01:35,  Sinus rhythm has replaced Ectopic atrial rhythm  QRS axis shifted left  Nonspecific T wave abnormality has replaced inverted T waves in Anterior   leads  QT has shortened  Confirmed by Chris Tomas ([phone]) on 5/14/2021 8:31:39 AM     Results for orders placed or performed in visit on 02/15/18   AMB POC EKG ROUTINE W/ 12 LEADS, INTER & REP    Impression    Sinus tachycardia at 126 bpm.  No acute changes. ECHO 05/13/21   ECHO ADULT FOLLOW-UP OR LIMITED 05/15/2021 5/15/2021    Narrative · LV: Estimated LVEF is 15 - 20%. Visually measured ejection fraction. Normal cavity size. Mild concentric hypertrophy. Severely and globally   reduced systolic function. Mild (grade 1) left ventricular diastolic   dysfunction. · LA: Mildly dilated left atrium. Left Atrium volume index is 40 mL/m2. · Normal right ventricular size and function. · No significant valvular pathology. Signed by: Leola Salas MD      Nuclear Medicine No results found for this or any previous visit. IR No results found for this or any previous visit.    CATH       Special Testing/Procedures:  MODALITY RESULTS   MICRO All Micro Results     Procedure Component Value Units Date/Time    CULTURE, URINE [[id number]] Collected: 05/13/21 2322    Order Status: Completed Specimen: Urine from Clean catch Updated: 05/15/21 2934     Special Requests: NO SPECIAL REQUESTS        Culture result: No growth (<1,000 CFU/ML)            ABG No results found for: PH, PHI, PCO2, PCO2I, PO2, PO2I, HCO3, HCO3I, FIO2, FIO2I   UA No results found for this or any previous visit. Laboratory Results:  LABORATORY RESULTS   HEMATOLOGY Lab Results   Component Value Date/Time    WBC 6.7 05/17/2021 04:18 AM    Hemoglobin, POC 17.3 (H) 11/23/2018 06:26 PM    HGB 14.4 05/17/2021 04:18 AM    Hematocrit, POC 51 (H) 11/23/2018 06:26 PM    HCT 44.0 05/17/2021 04:18 AM    PLATELET 716 70/24/6794 04:18 AM    MCV 91.7 05/17/2021 04:18 AM       CHEMISTRIES Lab Results   Component Value Date/Time    Sodium 136 05/17/2021 04:18 AM    Potassium 4.0 05/17/2021 04:18 AM    Chloride 103 05/17/2021 04:18 AM    CO2 26 05/17/2021 04:18 AM    Anion gap 7 05/17/2021 04:18 AM    Glucose 74 05/17/2021 04:18 AM    BUN 19 (H) 05/17/2021 04:18 AM    Creatinine 1.38 (H) 05/17/2021 04:18 AM    BUN/Creatinine ratio 14 05/17/2021 04:18 AM    GFR est AA >60 05/17/2021 04:18 AM    GFR est non-AA 52 (L) 05/17/2021 04:18 AM    Calcium 7.1 (L) 05/17/2021 04:18 AM      HEPATIC FUNCTION Lab Results   Component Value Date/Time    Albumin 3.4 12/29/2020 11:45 PM    Bilirubin, total 1.5 (H) 12/29/2020 11:45 PM    Protein, total 7.2 12/29/2020 11:45 PM    Globulin 3.8 12/29/2020 11:45 PM    A-G Ratio 0.9 12/29/2020 11:45 PM    ALT (SGPT) 31 12/29/2020 11:45 PM    Alk.  phosphatase 93 12/29/2020 11:45 PM       LACTIC ACID Lab Results   Component Value Date/Time    Lactic acid 0.7 02/29/2016 12:56 AM    Lactic acid 2.2 (HH) 02/28/2016 09:45 PM    Lactic acid 2.1 (HH) 02/28/2016 07:37 PM      CARDIAC PANEL Lab Results   Component Value Date/Time     12/30/2020 03:30 AM    CK - MB 4.1 (H) 12/30/2020 03:30 AM    CK-MB Index 1.4 12/30/2020 03:30 AM    Troponin-I, QT 0.03 05/13/2021 01:42 PM      NT-proBNP Lab Results   Component Value Date/Time    NT pro-BNP 3,676 (H) 05/13/2021 01:42 PM      THYROID Lab Results   Component Value Date/Time    TSH 0.72 02/28/2016 07:15 PM      LIPID PANEL Lab Results   Component Value Date/Time    Cholesterol, total 149 03/25/2017 02:55 AM    HDL Cholesterol 38 (L) 03/25/2017 02:55 AM    LDL, calculated 88.4 03/25/2017 02:55 AM    VLDL, calculated 22.6 03/25/2017 02:55 AM    Triglyceride 113 03/25/2017 02:55 AM    CHOL/HDL Ratio 3.9 03/25/2017 02:55 AM         RISK CALCULATORS:  SCORE RESULT   ASCVD The ASCVD Risk score (Geeta Correia, et al., 2013) failed to calculate for the following reasons: The patient has a prior MI or stroke diagnosis    [id number]     HAS-BLED    READMISSION RISK SCORE Low Risk            11       Total Score        5 Pt. Coverage (Medicare=5 , Medicaid, or Self-Pay=4)    6 Charlson Comorbidity Score (Age + Comorbid Conditions)        Criteria that do not apply:    Has Seen PCP in Last 6 Months (Yes=3, No=0)    . Living with Significant Other. Assisted Living. LTAC. SNF. or   Rehab    Patient Length of Stay (>5 days = 3)    IP Visits Last 12 Months (1-3=4, 4=9, >4=11)           Functional status and cognitive function:    Ambulates with:  Independently  Status: alert, cooperative, no distress, appears stated age  Condition: STABLE  Disposition: Home    Diet: Cardiac Diet    Code status and advanced care plan: Full    Point of Contact Janet Yanes  [phone]     Patient Education:  Patient was educated on the following topics prior to discharge: Epididymitis, Orchitis    Follow-up:   Follow-up Information     Follow up With Specialties Details Why Contact Kimani Valentine  On 5/21/2021  [address] [place] [id number]      [facility]  Schedule an appointment as soon as possible for a visit in 2 weeks  [address] [place] [address]    Sienna Ortega MD Cardiology, Internal Medicine Schedule an appointment as soon as possible for a visit in 3 weeks Hospital follow up Jennifer [address]man [place] [phone]  [phone]            =================================================================  Piedad Cowden MD, PGY-1   [facility] Medicine   Intern Pager: [phone]   May 17, 2021, 6:26 PM

## 2021-05-17 NOTE — PROGRESS NOTES
[facility]  Progress Note     Patient: Chencho Rain MRN: [id number]   SSN: [id number]  YOB: 1960   Age: 63 y.o. Sex: male       Admit Date: 5/13/2021    LOS: 1 day       Chief Complaint   Patient presents with    Testicle Swelling    Shortness of Breath         Subjective:      No acute events overnight. Patient slept well, has no complaints today. He states he has no testicular pain currently, Tenderness improved. Swelling continues to improve.      Denies SOB, CP, abdominal pain, nausea, fever, chills, headaches.     ROS as above     Objective:      Visit Vitals  /70   Pulse 82   Temp 98.4 °F (36.9 °C)   Resp 19   Ht 6' (1.829 m)   Wt 81.6 kg (179 lb 14.3 oz)   SpO2 98%   BMI 24.40 kg/m²         Physical Exam:   General:  AAOx3, NAD   HEENT: Conjunctiva pink, sclera anicteric. PERRL. EOMI.  Pharynx moist, nonerythematous.  Moist mucous membranes.  Thyroid not enlarged, no nodules.  No cervical, supraclavicular, occipital or submandibular lymphadenopathy.  No other gross abnormalities present. CV:  RRR, no murmurs. No visible pulsations or thrills.    RESP:  Unlabored breathing.  Lungs clear to auscultation without adventitious breath sounds. Equal expansion bilaterally.    ABD:  Soft, nontender, nondistended. BS (+).  No hepatosplenomegaly.  No suprapubic tenderness. RECTAL:  Deferred. Genito-Urinary: L testicle larger that R, minimal pain to palpation, scrotal edema improved. MS:  No joint deformity or instability.  No atrophy. Neuro:  CN II-XII grossly intact. 5/5 strength bilateral upper extremities and lower extremities. Ext:  1+ pitting edema in LE bilaterally up to above the ankles.  2+ radial and dp pulses bilaterally. Skin:  No rashes, lesions, or ulcers.  Good turgor.     Intake and Output:  Current Shift: No intake/output data recorded.   Last three shifts: 05/12 1901 - 05/14 0700  In: -   Out: 3500 [Urine:3500]     Lab/Data Review:  Recent Results         Recent Results (from the past 12 hour(s))   URINALYSIS W/ RFLX MICROSCOPIC     Collection Time: 05/13/21 11:22 PM   Result Value Ref Range     Color YELLOW       Appearance CLEAR       Specific gravity 1.012 1.005 - 1.030       pH (UA) 5.0 5.0 - 8.0       Protein Negative NEG mg/dL     Glucose Negative NEG mg/dL     Ketone Negative NEG mg/dL     Bilirubin Negative NEG       Blood Negative NEG       Urobilinogen 1.0 0.2 - 1.0 EU/dL     Nitrites Negative NEG       Leukocyte Esterase Negative NEG     METABOLIC PANEL, BASIC     Collection Time: 05/14/21  4:53 AM   Result Value Ref Range     Sodium 137 136 - 145 mmol/L     Potassium 3.9 3.5 - 5.5 mmol/L     Chloride 105 100 - 111 mmol/L     CO2 25 21 - 32 mmol/L     Anion gap 7 3.0 - 18 mmol/L     Glucose 84 74 - 99 mg/dL     BUN 21 (H) 7.0 - 18 MG/DL     Creatinine 1.49 (H) 0.6 - 1.3 MG/DL     BUN/Creatinine ratio 14 12 - 20       GFR est AA 58 (L) >60 ml/min/1.73m2     GFR est non-AA 48 (L) >60 ml/min/1.73m2     Calcium 6.7 (L) 8.5 - 10.1 MG/DL   CBC WITH AUTOMATED DIFF     Collection Time: 05/14/21  4:53 AM   Result Value Ref Range     WBC 20.7 (H) 4.6 - 13.2 K/uL     RBC 4.41 4.35 - 5.65 M/uL     HGB 13.3 13.0 - 16.0 g/dL     HCT 39.9 36.0 - 48.0 %     MCV 90.5 74.0 - 97.0 FL     MCH 30.2 24.0 - 34.0 PG     MCHC 33.3 31.0 - 37.0 g/dL     RDW 13.4 11.6 - 14.5 %     PLATELET 037 945 - 072 K/uL     MPV 13.3 (H) 9.2 - 11.8 FL     NEUTROPHILS 84 (H) 40 - 73 %     LYMPHOCYTES 12 (L) 21 - 52 %     MONOCYTES 2 (L) 3 - 10 %     EOSINOPHILS 0 0 - 5 %     BASOPHILS 2 0 - 2 %     ABS. NEUTROPHILS 17.4 (H) 1.8 - 8.0 K/UL     ABS. LYMPHOCYTES 2.5 0.9 - 3.6 K/UL     ABS. MONOCYTES 0.4 0.05 - 1.2 K/UL     ABS. EOSINOPHILS 0.0 0.0 - 0.4 K/UL     ABS.  BASOPHILS 0.4 (H) 0.0 - 0.1 K/UL     DF MANUAL       PLATELET COMMENTS ADEQUATE PLATELETS       RBC COMMENTS NORMOCYTIC, NORMOCHROMIC     MAGNESIUM     Collection Time: 05/14/21  4:53 AM   Result Value Ref Range     Magnesium 1.6 1.6 - 2.6 mg/dL            RECENT RESULTS  MODALITY IMPRESSION   XR      Results from [place] encounter on 05/13/21   XR CHEST PA LAT     Narrative EXAM: XR CHEST PA LAT     INDICATION: CHF     COMPARISON: 12/29/2020.     FINDINGS: PA and lateral radiographs of the chest demonstrate question minimal  pulmonary venous congestion and hazy left basilar opacity. . Mildly prominent  cardiac silhouette, decreased since prior exam. Loop recorder unchanged. . The  bones and soft tissues are within normal limits.         Impression Mildly prominent cardiac silhouette, decreased since prior exam.  -question mild pulmonary venous congestion and hazy left basilar opacity.        CT      Results from Hospital Encounter encounter on 05/13/21   CT ABD PELV W CONT     Narrative CT ABDOMEN AND PELVIS WITH CONTRAST     COMPARISON: Testicular ultrasound, plain films of the chest,.     INDICATIONS: Scrotal swelling and pain, shortness of breath.     TECHNIQUE:  Following the uneventful administration of 100cc of Isovue 300  intravenous contrast , volumetric data acquisition was performed of the abdomen  and pelvis on a multislice scanner and reconstructed in axial coronal and  sagittal planes     CT ABDOMEN FINDINGS:      Lung Bases: There is some mild groundglass density and smooth interlobular  septal thickening. There is cardiac enlargement.     Liver/Gallbladder/Biliary: Fatty infiltration without focal lesion. Unremarkable  biliary tree.     Spleen: Negative.     Adrenal Glands:  Negative     Kidneys: Negative.     Pancreas: Normal.     Stomach, Small Bowel,  and Colon: The stomach is unremarkable. There are a few  mildly dilated loops of small bowel with gas and fluid content in a  nonobstructed pattern. The appendix is normal. The colon is unremarkable.     Lymph Nodes: Normal in size and number.      The abdominal aorta is unremarkable.  The IVC is unremarkable.     Peritoneal Spaces: No free fluid or free air is present.     Abdominal wall: No hernia or mass is evident     Bladder: Unremarkable.     There is edema of the left spermatic cord and significantly increased prominence  of the left scrotal vasculature. Correlation with ultrasound earlier same date  reveals left epididymal and testicular hyperemia correlates with this finding.        Osseous Structures Of Abdomen And Pelvis: Unremarkable for age.        Impression Left epididymoorchitis  Mild groundglass haziness in the lung bases with smooth interlobular septal  thickening-the findings suggest pulmonary edema likely from CHF versus fluid  overload.              All CT scans at this facility are performed using dose optimization technique as  appropriate to the performed exam, to include automated exposure control,  adjustment of the mA and/or kV according to patient's size (Including  appropriate matching for site-specific examinations), or use of iterative  reconstruction technique.       MRI      Results from [place] encounter on 03/23/17   MRI BRAIN WO CONT     Narrative MR Brain Without Contrast     CPT CODE: 51474     HISTORY:  cva. Right-sided numbness     COMPARISON: CT head 5/23/2017. No prior brain MRI.     TECHNIQUE: Brain scanned with axial and sagittal T1W scans, axial T2W scans,  GRE, axial FLAIR, axial diffusion weighted images.     FINDINGS:      Diffusion sequence shows scattered small hyperintense and restricted diffusion  signal foci within the left cerebral hemisphere. Multiple vascular distributions  raises possibility of an embolic process. There are 2 small infarcts in the left  occipital pole. Additional infarcts in the left caudate head, left caudate body,  adjacent corona radiata, and left frontal white matter. A small cluster at the  left frontoparietal vertex on DWI image 25. No associated hemorrhage. No right  hemisphere, brainstem, or cerebellar infarct.  Grossly patent flow in the basilar  artery and both internal carotid arteries. No abnormal extra-axial fluid  collections over the convexities. Normal brain volumes. No hydrocephalus. There is a small T2 hyperintense defect in the region of the genu of the right  internal capsule that appears to be a tiny chronic lacunar infarct. Some T2  signal along the right caudate body on axial T2 image 17 that could be related  to chronic infarct, but not clearly seen on other sequences to confirm. No mass  lesions. Some mild mucosal thickening and layering fluid in the right sphenoid sinus. Other sinuses are clear.        Impression IMPRESSION:     1. Multiple acute/early subacute infarcts in the left cerebrum, involves  multiple vascular distributions suggesting an embolic process. -Infarcts in the left deep brain, left frontal white matter, and at the left  frontoparietal vertex. A few tiny foci in the left occipital lobe.     2. Rounded well-defined chronic appearing defect in the genu of the right  internal capsule. Favor a chronic lacunar infarct, but some uncertainty versus  dilated perivascular space given the lack of surrounding gliosis on T2 FLAIR.     3. Small amount of right sphenoid sinus fluid.       ULTRASOUND      Results from Hospital Encounter encounter on 05/13/21   US SCROTUM/TESTICLES W DOPPLER     Impression Enlarged hypervascular left epididymis. This may reflect epididymitis. There is  an indeterminate complex nodule within the epididymis. Differential is broad. Infectious/inflammatory etiologies could be considered amongst others. Correlate  clinically.  Follow-up can be obtained.     Nonspecific bilateral hydroceles, left greater than right.     Left scrotal wall thickening.     Please see report for additional details.          Cardiology Procedures/Testing:  MODALITY RESULTS   EKG        Results for orders placed or performed during the hospital encounter of 05/13/21   EKG, 12 LEAD, INITIAL   Result Value Ref Range     Ventricular Rate 71 BPM     Atrial Rate 71 BPM     P-R Interval 160 ms     QRS Duration 86 ms     Q-T Interval 420 ms     QTC Calculation (Bezet) 456 ms     Calculated P Axis 59 degrees     Calculated R Axis 75 degrees     Calculated T Axis 94 degrees     Diagnosis           Normal sinus rhythm  Possible Left atrial enlargement  Septal infarct (cited on or before 29-DEC-2020)  T wave abnormality, consider lateral ischemia  Abnormal ECG  When compared with ECG of 30-DEC-2020 01:35,  Sinus rhythm has replaced Ectopic atrial rhythm  QRS axis shifted left  Nonspecific T wave abnormality has replaced inverted T waves in Anterior   leads  QT has shortened      Results for orders placed or performed in visit on 02/15/18   AMB POC EKG ROUTINE W/ 12 LEADS, INTER & REP     Impression     Sinus tachycardia at 126 bpm.  No acute changes. ECHO        Special Testing/Procedures:  MODALITY RESULTS   MICRO         All Micro Results      Procedure Component Value Units Date/Time     CULTURE, URINE [[id number]] Collected: 05/13/21 2322     Order Status: Completed Specimen: Urine from Clean catch Updated: 05/14/21 0026           UA No results found for this or any previous visit.         Assessment and Plan:   63 y. o. male with PMHx of CHF, HLD, HTN, Hypoparathyroidism, now admitted with Left Epididymoorchitis, CHF Exacerbation.     Left Epididymoorchitis  Acute onset testicular pain. Also found to have an indeterminate complex nodule within the epididymis. Nonspecific bilateral hydroceles, left greater than right. Patient with leukocytosis worsening. CT abdomen shows L epididymoorchitis.  Mild groundglass haziness in lung bases w/smooth interlobular septal thickening-the findings suggest pulmonary edema likely from CHF versus fluid overload. UA was negative. GC/Chlam negative.   - Admit to Tele-medicine  - Vitals per Unit Routine  - Urology following, appreciate recs  - Daily CBC, CMP, Mg  - CM/PT/OT  - continue Levaquin 500mg IV qD  - For Pain: Tylenol scheduled     CHF exacerbation with HTN, CAD, Pulm HTN  Home medications: Carvedilol 12.5 BID, Furosemide 20mg daily, Entresto 24-26 daily, Nitoglycerin 0.4 PRN, Spirinolactone 25mg daily, Aspirin  Hx of non compliance, Cardiology note states he ran out of his medications and did not take them for 1 month  Right heart Cath done 10/2020, showing RV 47/0mmHg, PA 49/20mmHG, PCWP 20mmHg. ECHO done 10/19/2020, showing EF 15%, Grade III (severe) diastolic dysfunction, mild mitral regurg, moderate tricuspid regurg, Pulm Artery Pressure 53mmHg. In the ED, Pro-BNP elevated to 3676, Trop-I neg. EKG showed NSR, with Septal infarct, T wave abnormality concerning for lateral ischemia   Repeat ECHO: EF 15-20%, overall improvement: TV regurgitation decreased. Pulmonary hypertension has decreased. Moderate TR previously seen no longer appreciated  - Cardiology following, appreciate recs.  Needs Life Vest prior to discharge  - continue Carvedilol, Spirinolactone, Aspirin, Entresto 24-26, Nitroglycerin PRN  - Patient will need medication other than Entresto on DC if cost-prohibitive  -Patient will likely need lifevest on dc  - strict I/Os  - Continue PO Lasix      HLD  At home takes Rosuvastatin 20mg daily  - continue Rosuvastatin     Mild Elevation on on CKDIII  Mildly elevated Cr to 1.56, up from his baseline of 1.4, resolved  - daily CMP     Diet Renal   DVT Prophylaxis SCDs, SQH   GI Prophylaxis Famotidine   Code status FULL   Disposition >2MNs, Home      Point of Contact [place] Tran  Relationship: Sister  [phone]     Signed By: Garcia Schultz MD     May 17, 2021

## 2021-05-17 NOTE — ROUTINE PROCESS
2006: Assumed care. Quietly resting in bed. No SOB on RA. Denies any pain or discomfort at this time. Call light within reach. 2224: Due meds given. HS snack provided per patient request.     0012: No change from previous assessment. 0257: No change from previous assessment. 0500: Comfortably sleeping.     0623: Slept good thru night. Needs attended. Due meds given. 1882: Bedside and Verbal shift change report given to Desire Daley (oncoming nurse) by me (offgoing nurse).  Report included the following information SBAR, Kardex, Intake/Output, MAR, Recent Results and Cardiac Rhythm SR.

## 2021-05-18 DIAGNOSIS — I50.22 SYSTOLIC CHF, CHRONIC (HCC): ICD-10-CM

## 2021-05-29 DIAGNOSIS — I25.10 CORONARY ARTERY DISEASE INVOLVING NATIVE CORONARY ARTERY OF NATIVE HEART WITHOUT ANGINA PECTORIS: ICD-10-CM

## 2021-06-02 ENCOUNTER — OFFICE VISIT (OUTPATIENT)
Dept: CARDIOLOGY CLINIC | Age: 61
End: 2021-06-02
Payer: MEDICARE

## 2021-06-02 VITALS
HEART RATE: 96 BPM | BODY MASS INDEX: 24.52 KG/M2 | DIASTOLIC BLOOD PRESSURE: 62 MMHG | HEIGHT: 72 IN | OXYGEN SATURATION: 96 % | WEIGHT: 181 LBS | SYSTOLIC BLOOD PRESSURE: 118 MMHG

## 2021-06-02 DIAGNOSIS — Z98.890 HISTORY OF ATRIOVENTRICULAR NODAL ABLATION: ICD-10-CM

## 2021-06-02 DIAGNOSIS — I42.9 CARDIOMYOPATHY, UNSPECIFIED TYPE (HCC): ICD-10-CM

## 2021-06-02 DIAGNOSIS — I25.10 CORONARY ARTERY DISEASE INVOLVING NATIVE CORONARY ARTERY OF NATIVE HEART WITHOUT ANGINA PECTORIS: ICD-10-CM

## 2021-06-02 DIAGNOSIS — Z98.890 HISTORY OF LOOP RECORDER: ICD-10-CM

## 2021-06-02 DIAGNOSIS — Z09 HOSPITAL DISCHARGE FOLLOW-UP: ICD-10-CM

## 2021-06-02 DIAGNOSIS — I50.22 SYSTOLIC CHF, CHRONIC (HCC): Primary | ICD-10-CM

## 2021-06-02 DIAGNOSIS — I47.1 SVT (SUPRAVENTRICULAR TACHYCARDIA) (HCC): ICD-10-CM

## 2021-06-02 PROCEDURE — 99214 OFFICE O/P EST MOD 30 MIN: CPT | Performed by: NURSE PRACTITIONER

## 2021-06-02 PROCEDURE — 1111F DSCHRG MED/CURRENT MED MERGE: CPT | Performed by: NURSE PRACTITIONER

## 2021-06-02 RX ORDER — SPIRONOLACTONE 25 MG/1
25 TABLET ORAL DAILY
Qty: 30 TABLET | Refills: 1 | Status: SHIPPED | OUTPATIENT
Start: 2021-06-02 | End: 2021-08-03 | Stop reason: SDUPTHER

## 2021-06-02 RX ORDER — LISINOPRIL 5 MG/1
5 TABLET ORAL DAILY
Qty: 30 TABLET | Refills: 1 | Status: SHIPPED | OUTPATIENT
Start: 2021-06-02 | End: 2021-08-03 | Stop reason: SDUPTHER

## 2021-06-02 NOTE — PATIENT INSTRUCTIONS
After the recommended changes have been made in blood pressure medicines, patient advised to keep BP/HR(pulse rate) chart twice daily and bring us results in next 4 to 5 days. Patient may send the results via \"My Chart\" if desired. Please rest for 5-10 minutes before checking blood pressure. Sit on a comfortable chair without crossing the legs and put your arm on a table. We recommend that you use an upper arm cuff. Check the blood pressure 3 times each time you check the blood pressure and record the lowest reading. If you check the blood pressure in both arms, use the higher reading. Have blood work drawn in 1 week Begin aldactone 25 mg / day and lisinopril 5 mg / day Low Sodium Diet (2,000 Milligram): Care Instructions Overview Limiting sodium can be an important part of managing some health problems. The most common source of sodium is salt. People get most of the salt in their diet from canned, prepared, and packaged foods. Fast food and restaurant meals also are very high in sodium. Your doctor will probably limit your sodium to less than 2,000 milligrams (mg) a day. This limit counts all the sodium in prepared and packaged foods and any salt you add to your food. Follow-up care is a key part of your treatment and safety. Be sure to make and go to all appointments, and call your doctor if you are having problems. It's also a good idea to know your test results and keep a list of the medicines you take. How can you care for yourself at home? Read food labels · Read labels on cans and food packages. The labels tell you how much sodium is in each serving. Make sure that you look at the serving size. If you eat more than the serving size, you have eaten more sodium. · Food labels also tell you the Percent Daily Value for sodium. Choose products with low Percent Daily Values for sodium.  
· Be aware that sodium can come in forms other than salt, including monosodium glutamate (MSG), sodium citrate, and sodium bicarbonate (baking soda). MSG is often added to Asian food. When you eat out, you can sometimes ask for food without MSG or added salt. Buy low-sodium foods · Buy foods that are labeled \"unsalted\" (no salt added), \"sodium-free\" (less than 5 mg of sodium per serving), or \"low-sodium\" (140 mg or less of sodium per serving). Foods labeled \"reduced-sodium\" and \"light sodium\" may still have too much sodium. Be sure to read the label to see how much sodium you are getting. · Buy fresh vegetables, or frozen vegetables without added sauces. Buy low-sodium versions of canned vegetables, soups, and other canned goods. Prepare low-sodium meals · Cut back on the amount of salt you use in cooking. This will help you adjust to the taste. Do not add salt after cooking. One teaspoon of salt has about 2,300 mg of sodium. · Take the salt shaker off the table. · Flavor your food with garlic, lemon juice, onion, vinegar, herbs, and spices. Do not use soy sauce, lite soy sauce, steak sauce, onion salt, garlic salt, celery salt, or ketchup on your food. · Use low-sodium salad dressings, sauces, and ketchup. Or make your own salad dressings and sauces without adding salt. · Use less salt (or none) when recipes call for it. You can often use half the salt a recipe calls for without losing flavor. Other foods such as rice, pasta, and grains do not need added salt. · Rinse canned vegetables, and cook them in fresh water. This removes somebut not allof the salt. · Avoid water that is naturally high in sodium or that has been treated with water softeners, which add sodium. If you buy bottled water, read the label and choose a sodium-free brand. Avoid high-sodium foods · Avoid eating: 
? Smoked, cured, salted, and canned meat, fish, and poultry. ? Ham, mann, hot dogs, and luncheon meats. ? Regular, hard, and processed cheese and regular peanut butter.  
? Crackers with salted tops, and other salted snack foods such as pretzels, chips, and salted popcorn. ? Frozen prepared meals, unless labeled low-sodium. ? Canned and dried soups, broths, and bouillon, unless labeled sodium-free or low-sodium. ? Canned vegetables, unless labeled sodium-free or low-sodium. ? [place] fries, pizza, tacos, and other fast foods. ? Pickles, olives, ketchup, and other condiments, especially soy sauce, unless labeled sodium-free or low-sodium. Where can you learn more? Go to http://www.gray.com/ Enter Y674 in the search box to learn more about \"Low Sodium Diet (2,000 Milligram): Care Instructions. \" Current as of: December 17, 2020               Content Version: 12.8 © 6740-4418 [employer]. Care instructions adapted under license by [employer] (which disclaims liability or warranty for this information). If you have questions about a medical condition or this instruction, always ask your healthcare professional. [place] [address] any warranty or liability for your use of this information.
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
## 2021-06-09 DIAGNOSIS — I50.22 SYSTOLIC CHF, CHRONIC (HCC): ICD-10-CM

## 2021-08-03 DIAGNOSIS — I50.22 SYSTOLIC CHF, CHRONIC (HCC): ICD-10-CM

## 2021-08-03 DIAGNOSIS — I25.10 CORONARY ARTERY DISEASE INVOLVING NATIVE CORONARY ARTERY OF NATIVE HEART WITHOUT ANGINA PECTORIS: ICD-10-CM

## 2021-08-03 DIAGNOSIS — I42.9 CARDIOMYOPATHY, UNSPECIFIED TYPE (HCC): ICD-10-CM

## 2021-08-03 RX ORDER — TORSEMIDE 20 MG/1
20 TABLET ORAL 2 TIMES DAILY
Qty: 60 TABLET | Refills: 3 | Status: SHIPPED | OUTPATIENT
Start: 2021-08-03 | End: 2021-12-01 | Stop reason: SDUPTHER

## 2021-08-03 RX ORDER — SPIRONOLACTONE 25 MG/1
25 TABLET ORAL DAILY
Qty: 30 TABLET | Refills: 1 | Status: SHIPPED | OUTPATIENT
Start: 2021-08-03 | End: 2021-11-23

## 2021-08-03 RX ORDER — CARVEDILOL 12.5 MG/1
12.5 TABLET ORAL 2 TIMES DAILY WITH MEALS
Qty: 180 TABLET | Refills: 2 | Status: SHIPPED | OUTPATIENT
Start: 2021-08-03 | End: 2021-12-01 | Stop reason: SDUPTHER

## 2021-08-03 RX ORDER — LISINOPRIL 5 MG/1
5 TABLET ORAL DAILY
Qty: 30 TABLET | Refills: 1 | Status: SHIPPED | OUTPATIENT
Start: 2021-08-03 | End: 2021-12-01 | Stop reason: SINTOL

## 2021-08-03 RX ORDER — ROSUVASTATIN CALCIUM 20 MG/1
20 TABLET, COATED ORAL
Qty: 90 TABLET | Refills: 2 | Status: SHIPPED | OUTPATIENT
Start: 2021-08-03 | End: 2021-12-01 | Stop reason: SDUPTHER

## 2021-08-03 NOTE — TELEPHONE ENCOUNTER
Refills coreg 12.5mg twice daily # 60, lisinopril 5 mg one daily # 30, crestor 20 mg daily # 30, spironolactone 25 mg one daily # 30, torsemide 20 mg one twice daily # 60 to marta

## 2021-08-03 NOTE — TELEPHONE ENCOUNTER
Requested Prescriptions     Pending Prescriptions Disp Refills    carvediloL (COREG) 12.5 mg tablet 180 Tablet 2     Sig: Take 1 Tablet by mouth two (2) times daily (with meals).  lisinopriL (PRINIVIL, ZESTRIL) 5 mg tablet 30 Tablet 1     Sig: Take 1 Tablet by mouth daily.  rosuvastatin (CRESTOR) 20 mg tablet 90 Tablet 2     Sig: Take 1 Tablet by mouth nightly.  spironolactone (ALDACTONE) 25 mg tablet 30 Tablet 1     Sig: Take 1 Tablet by mouth daily.  torsemide (DEMADEX) 20 mg tablet 60 Tablet 3     Sig: Take 1 Tablet by mouth two (2) times a day.

## 2021-11-17 ENCOUNTER — APPOINTMENT (OUTPATIENT)
Dept: GENERAL RADIOLOGY | Age: 61
DRG: 226 | End: 2021-11-17
Attending: PHYSICIAN ASSISTANT
Payer: MEDICARE

## 2021-11-17 ENCOUNTER — HOSPITAL ENCOUNTER (INPATIENT)
Age: 61
LOS: 6 days | Discharge: HOME | DRG: 226 | End: 2021-11-23
Attending: STUDENT IN AN ORGANIZED HEALTH CARE EDUCATION/TRAINING PROGRAM | Admitting: FAMILY MEDICINE
Payer: MEDICARE

## 2021-11-17 DIAGNOSIS — Z86.79 S/P ABLATION OPERATION FOR ARRHYTHMIA: ICD-10-CM

## 2021-11-17 DIAGNOSIS — I42.9 CARDIOMYOPATHY, UNSPECIFIED TYPE (HCC): ICD-10-CM

## 2021-11-17 DIAGNOSIS — I42.8 NON-ISCHEMIC CARDIOMYOPATHY (HCC): ICD-10-CM

## 2021-11-17 DIAGNOSIS — I21.4 NSTEMI (NON-ST ELEVATED MYOCARDIAL INFARCTION) (HCC): ICD-10-CM

## 2021-11-17 DIAGNOSIS — Z98.890 S/P ABLATION OPERATION FOR ARRHYTHMIA: ICD-10-CM

## 2021-11-17 DIAGNOSIS — I50.23 ACUTE ON CHRONIC SYSTOLIC CONGESTIVE HEART FAILURE (HCC): Primary | ICD-10-CM

## 2021-11-17 DIAGNOSIS — I42.0 DILATED CARDIOMYOPATHY (HCC): ICD-10-CM

## 2021-11-17 PROBLEM — I50.9 CHF (CONGESTIVE HEART FAILURE) (HCC): Status: ACTIVE | Noted: 2021-11-17

## 2021-11-17 PROBLEM — R06.02 SHORTNESS OF BREATH: Status: ACTIVE | Noted: 2021-11-17

## 2021-11-17 LAB
ALBUMIN SERPL-MCNC: 2.8 G/DL (ref 3.4–5)
ALBUMIN/GLOB SERPL: 0.7 {RATIO} (ref 0.8–1.7)
ALP SERPL-CCNC: 77 U/L (ref 45–117)
ALT SERPL-CCNC: 27 U/L (ref 16–61)
AMPHET UR QL SCN: NEGATIVE
ANION GAP SERPL CALC-SCNC: 8 MMOL/L (ref 3–18)
APTT PPP: 27.2 SEC (ref 23–36.4)
ARTERIAL PATENCY WRIST A: POSITIVE
AST SERPL-CCNC: 30 U/L (ref 10–38)
BARBITURATES UR QL SCN: NEGATIVE
BASE EXCESS BLD CALC-SCNC: 1.6 MMOL/L
BASOPHILS # BLD: 0 K/UL (ref 0–0.1)
BASOPHILS # BLD: 0 K/UL (ref 0–0.1)
BASOPHILS NFR BLD: 0 % (ref 0–2)
BASOPHILS NFR BLD: 0 % (ref 0–2)
BDY SITE: ABNORMAL
BENZODIAZ UR QL: NEGATIVE
BILIRUB SERPL-MCNC: 0.6 MG/DL (ref 0.2–1)
BUN SERPL-MCNC: 22 MG/DL (ref 7–18)
BUN/CREAT SERPL: 13 (ref 12–20)
CALCIUM SERPL-MCNC: 7.2 MG/DL (ref 8.5–10.1)
CANNABINOIDS UR QL SCN: NEGATIVE
CHLORIDE SERPL-SCNC: 103 MMOL/L (ref 100–111)
CK MB CFR SERPL CALC: 1 % (ref 0–4)
CK MB SERPL-MCNC: 2.1 NG/ML (ref 5–25)
CK SERPL-CCNC: 212 U/L (ref 39–308)
CO2 SERPL-SCNC: 28 MMOL/L (ref 21–32)
COCAINE UR QL SCN: POSITIVE
CREAT SERPL-MCNC: 1.73 MG/DL (ref 0.6–1.3)
DIFFERENTIAL METHOD BLD: ABNORMAL
DIFFERENTIAL METHOD BLD: ABNORMAL
EOSINOPHIL # BLD: 0.1 K/UL (ref 0–0.4)
EOSINOPHIL # BLD: 0.1 K/UL (ref 0–0.4)
EOSINOPHIL NFR BLD: 3 % (ref 0–5)
EOSINOPHIL NFR BLD: 3 % (ref 0–5)
ERYTHROCYTE [DISTWIDTH] IN BLOOD BY AUTOMATED COUNT: 11.9 % (ref 11.6–14.5)
ERYTHROCYTE [DISTWIDTH] IN BLOOD BY AUTOMATED COUNT: 12 % (ref 11.6–14.5)
GAS FLOW.O2 O2 DELIVERY SYS: ABNORMAL L/MIN
GLOBULIN SER CALC-MCNC: 3.8 G/DL (ref 2–4)
GLUCOSE SERPL-MCNC: 128 MG/DL (ref 74–99)
HCO3 BLD-SCNC: 26.2 MMOL/L (ref 22–26)
HCT VFR BLD AUTO: 43.8 % (ref 36–48)
HCT VFR BLD AUTO: 44.3 % (ref 36–48)
HDSCOM,HDSCOM: ABNORMAL
HGB BLD-MCNC: 14.1 G/DL (ref 13–16)
HGB BLD-MCNC: 14.4 G/DL (ref 13–16)
IMM GRANULOCYTES # BLD AUTO: 0 K/UL
IMM GRANULOCYTES # BLD AUTO: 0 K/UL (ref 0–0.04)
IMM GRANULOCYTES NFR BLD AUTO: 0 %
IMM GRANULOCYTES NFR BLD AUTO: 0 %
INR PPP: 1.2 (ref 0.8–1.2)
LYMPHOCYTES # BLD: 1.1 K/UL (ref 0.9–3.6)
LYMPHOCYTES # BLD: 1.3 K/UL (ref 0.9–3.6)
LYMPHOCYTES NFR BLD: 34 % (ref 21–52)
LYMPHOCYTES NFR BLD: 36 % (ref 21–52)
MCH RBC QN AUTO: 29.7 PG (ref 24–34)
MCH RBC QN AUTO: 29.9 PG (ref 24–34)
MCHC RBC AUTO-ENTMCNC: 32.2 G/DL (ref 31–37)
MCHC RBC AUTO-ENTMCNC: 32.5 G/DL (ref 31–37)
MCV RBC AUTO: 91.9 FL (ref 78–100)
MCV RBC AUTO: 92.2 FL (ref 78–100)
METHADONE UR QL: NEGATIVE
MONOCYTES # BLD: 0.2 K/UL (ref 0.05–1.2)
MONOCYTES # BLD: 0.5 K/UL (ref 0.05–1.2)
MONOCYTES NFR BLD: 14 % (ref 3–10)
MONOCYTES NFR BLD: 5 % (ref 3–10)
NEUTS SEG # BLD: 1.6 K/UL (ref 1.8–8)
NEUTS SEG # BLD: 2 K/UL (ref 1.8–8)
NEUTS SEG NFR BLD: 49 % (ref 40–73)
NEUTS SEG NFR BLD: 56 % (ref 40–73)
NRBC # BLD: 0 K/UL (ref 0–0.01)
NRBC # BLD: 0 K/UL (ref 0–0.01)
NRBC BLD-RTO: 0 PER 100 WBC
NRBC BLD-RTO: 0 PER 100 WBC
OPIATES UR QL: NEGATIVE
PCO2 BLD: 40.4 MMHG (ref 35–45)
PCP UR QL: NEGATIVE
PH BLD: 7.42 [PH] (ref 7.35–7.45)
PLATELET # BLD AUTO: 149 K/UL (ref 135–420)
PLATELET # BLD AUTO: 162 K/UL (ref 135–420)
PLATELET COMMENTS,PCOM: ABNORMAL
PMV BLD AUTO: 13.2 FL (ref 9.2–11.8)
PMV BLD AUTO: 13.3 FL (ref 9.2–11.8)
PO2 BLD: 87 MMHG (ref 80–100)
POTASSIUM SERPL-SCNC: 3.8 MMOL/L (ref 3.5–5.5)
PROT SERPL-MCNC: 6.6 G/DL (ref 6.4–8.2)
PROTHROMBIN TIME: 15.4 SEC (ref 11.5–15.2)
RBC # BLD AUTO: 4.75 M/UL (ref 4.35–5.65)
RBC # BLD AUTO: 4.82 M/UL (ref 4.35–5.65)
RBC MORPH BLD: ABNORMAL
SAO2 % BLD: 96.8 % (ref 92–97)
SERVICE CMNT-IMP: ABNORMAL
SODIUM SERPL-SCNC: 139 MMOL/L (ref 136–145)
SPECIMEN TYPE: ABNORMAL
TOTAL RESP. RATE, ITRR: 18
TROPONIN I SERPL-MCNC: 0.38 NG/ML (ref 0–0.04)
WBC # BLD AUTO: 3.3 K/UL (ref 4.6–13.2)
WBC # BLD AUTO: 3.6 K/UL (ref 4.6–13.2)

## 2021-11-17 PROCEDURE — 84484 ASSAY OF TROPONIN QUANT: CPT

## 2021-11-17 PROCEDURE — 93005 ELECTROCARDIOGRAM TRACING: CPT

## 2021-11-17 PROCEDURE — 71045 X-RAY EXAM CHEST 1 VIEW: CPT

## 2021-11-17 PROCEDURE — 80307 DRUG TEST PRSMV CHEM ANLYZR: CPT

## 2021-11-17 PROCEDURE — 99285 EMERGENCY DEPT VISIT HI MDM: CPT

## 2021-11-17 PROCEDURE — 85025 COMPLETE CBC W/AUTO DIFF WBC: CPT

## 2021-11-17 PROCEDURE — 74011250636 HC RX REV CODE- 250/636: Performed by: STUDENT IN AN ORGANIZED HEALTH CARE EDUCATION/TRAINING PROGRAM

## 2021-11-17 PROCEDURE — 80053 COMPREHEN METABOLIC PANEL: CPT

## 2021-11-17 PROCEDURE — 85730 THROMBOPLASTIN TIME PARTIAL: CPT

## 2021-11-17 PROCEDURE — 36600 WITHDRAWAL OF ARTERIAL BLOOD: CPT

## 2021-11-17 PROCEDURE — 82553 CREATINE MB FRACTION: CPT

## 2021-11-17 PROCEDURE — 96374 THER/PROPH/DIAG INJ IV PUSH: CPT

## 2021-11-17 PROCEDURE — 82550 ASSAY OF CK (CPK): CPT

## 2021-11-17 PROCEDURE — 87636 SARSCOV2 & INF A&B AMP PRB: CPT

## 2021-11-17 PROCEDURE — 65660000000 HC RM CCU STEPDOWN

## 2021-11-17 PROCEDURE — 80061 LIPID PANEL: CPT

## 2021-11-17 PROCEDURE — 85610 PROTHROMBIN TIME: CPT

## 2021-11-17 PROCEDURE — 83735 ASSAY OF MAGNESIUM: CPT

## 2021-11-17 PROCEDURE — 82803 BLOOD GASES ANY COMBINATION: CPT

## 2021-11-17 PROCEDURE — 83880 ASSAY OF NATRIURETIC PEPTIDE: CPT

## 2021-11-17 RX ORDER — ACETAMINOPHEN 325 MG/1
650 TABLET ORAL
Status: DISCONTINUED | OUTPATIENT
Start: 2021-11-17 | End: 2021-11-23 | Stop reason: HOSPADM

## 2021-11-17 RX ORDER — SODIUM CHLORIDE 0.9 % (FLUSH) 0.9 %
5-40 SYRINGE (ML) INJECTION EVERY 8 HOURS
Status: DISCONTINUED | OUTPATIENT
Start: 2021-11-17 | End: 2021-11-23 | Stop reason: HOSPADM

## 2021-11-17 RX ORDER — HEPARIN SODIUM 10000 [USP'U]/100ML
12-25 INJECTION, SOLUTION INTRAVENOUS
Status: DISCONTINUED | OUTPATIENT
Start: 2021-11-17 | End: 2021-11-19

## 2021-11-17 RX ORDER — HEPARIN SODIUM 1000 [USP'U]/ML
4000 INJECTION, SOLUTION INTRAVENOUS; SUBCUTANEOUS ONCE
Status: COMPLETED | OUTPATIENT
Start: 2021-11-17 | End: 2021-11-17

## 2021-11-17 RX ORDER — POLYETHYLENE GLYCOL 3350 17 G/17G
17 POWDER, FOR SOLUTION ORAL DAILY PRN
Status: DISCONTINUED | OUTPATIENT
Start: 2021-11-17 | End: 2021-11-23 | Stop reason: HOSPADM

## 2021-11-17 RX ORDER — ASPIRIN 325 MG
325 TABLET ORAL DAILY
Status: COMPLETED | OUTPATIENT
Start: 2021-11-18 | End: 2021-11-18

## 2021-11-17 RX ORDER — SODIUM CHLORIDE 0.9 % (FLUSH) 0.9 %
5-40 SYRINGE (ML) INJECTION AS NEEDED
Status: DISCONTINUED | OUTPATIENT
Start: 2021-11-17 | End: 2021-11-23 | Stop reason: HOSPADM

## 2021-11-17 RX ORDER — ACETAMINOPHEN 650 MG/1
650 SUPPOSITORY RECTAL
Status: DISCONTINUED | OUTPATIENT
Start: 2021-11-17 | End: 2021-11-23 | Stop reason: HOSPADM

## 2021-11-17 RX ADMIN — HEPARIN SODIUM 4000 UNITS: 1000 INJECTION, SOLUTION INTRAVENOUS; SUBCUTANEOUS at 22:09

## 2021-11-17 RX ADMIN — Medication 10 ML: at 23:16

## 2021-11-17 RX ADMIN — SODIUM CHLORIDE 1000 ML: 900 INJECTION, SOLUTION INTRAVENOUS at 20:16

## 2021-11-17 RX ADMIN — HEPARIN SODIUM 12 UNITS/KG/HR: 10000 INJECTION, SOLUTION INTRAVENOUS at 22:10

## 2021-11-17 NOTE — Clinical Note
Status[de-identified] INPATIENT [101]   Type of Bed: Telemetry [19]   Cardiac Monitoring Required?: Yes   Inpatient Hospitalization Certified Necessary for the Following Reasons: 3.  Patient receiving treatment that can only be provided in an inpatient setting (further clarification in H&P documentation)   Admitting Diagnosis: NSTEMI (non-ST elevated myocardial infarction) [facility]) [[id number]]   Admitting Diagnosis: CHF (congestive heart failure) [facility]) [[id number]]   Admitting Physician: Juno Arzola   Attending Physician: Benito Myers [[id number]]   Estimated Length of Stay: 2 Midnights   Discharge Plan[de-identified] Home with Office Follow-up

## 2021-11-17 NOTE — Clinical Note
TRANSFER - IN REPORT:     Verbal report received from: Lisbet Black. Report consisted of patient's Situation, Background, Assessment and   Recommendations(SBAR). Opportunity for questions and clarification was provided. Assessment completed upon patient's arrival to unit and care assumed. Patient transported with a Cardiac Cath Tech / Patient Care Tech.

## 2021-11-17 NOTE — ED NOTES
C/o SOB, BLE edema. Compliant w/meds. Symptoms x 3d, getting worse. I performed a brief evaluation, including history and physical, of the patient here in triage and I have determined that pt will need further treatment and evaluation from the main side ER physician. I have placed initial orders to help in expediting patients care. November 17, 2021 at 6:28 PM - LEOBARDO Benítez        There were no vitals taken for this visit.

## 2021-11-17 NOTE — Clinical Note
TRANSFER - OUT REPORT:     Verbal report given to: Anish Álvarez RN. Report consisted of patient's Situation, Background, Assessment and   Recommendations(SBAR). Opportunity for questions and clarification was provided. Patient transported with a Cardiac Cath Tech / Patient Care Tech. Patient transported to: holding area.

## 2021-11-17 NOTE — ED TRIAGE NOTES
Pt reports feeling lightheaded, shortness of breath, leg cramping  and dizzy times 3 days. Pt states the shortness of breath is worse on exertion.

## 2021-11-17 NOTE — Clinical Note
A Bovie was used. Blend setting: pure. Mode: bipolar. Coagulation Settin.  Cut Settin. Site (pad location): anterior thigh. Laterality: right.

## 2021-11-18 ENCOUNTER — APPOINTMENT (OUTPATIENT)
Dept: NON INVASIVE DIAGNOSTICS | Age: 61
DRG: 226 | End: 2021-11-18
Attending: STUDENT IN AN ORGANIZED HEALTH CARE EDUCATION/TRAINING PROGRAM
Payer: MEDICARE

## 2021-11-18 ENCOUNTER — APPOINTMENT (OUTPATIENT)
Dept: GENERAL RADIOLOGY | Age: 61
DRG: 226 | End: 2021-11-18
Attending: STUDENT IN AN ORGANIZED HEALTH CARE EDUCATION/TRAINING PROGRAM
Payer: MEDICARE

## 2021-11-18 LAB
ALBUMIN SERPL-MCNC: 2.6 G/DL (ref 3.4–5)
ALBUMIN/GLOB SERPL: 0.7 {RATIO} (ref 0.8–1.7)
ALP SERPL-CCNC: 75 U/L (ref 45–117)
ALT SERPL-CCNC: 28 U/L (ref 16–61)
ANION GAP SERPL CALC-SCNC: 8 MMOL/L (ref 3–18)
APTT PPP: 74.1 SEC (ref 23–36.4)
APTT PPP: 81.7 SEC (ref 23–36.4)
AST SERPL-CCNC: 32 U/L (ref 10–38)
ATRIAL RATE: 66 BPM
ATRIAL RATE: 66 BPM
BASOPHILS # BLD: 0 K/UL (ref 0–0.1)
BASOPHILS NFR BLD: 0 % (ref 0–2)
BILIRUB SERPL-MCNC: 0.8 MG/DL (ref 0.2–1)
BNP SERPL-MCNC: 1359 PG/ML (ref 0–900)
BUN SERPL-MCNC: 22 MG/DL (ref 7–18)
BUN/CREAT SERPL: 14 (ref 12–20)
CA-I SERPL-SCNC: 0.94 MMOL/L (ref 1.15–1.33)
CALCIUM SERPL-MCNC: 7.4 MG/DL (ref 8.5–10.1)
CALCULATED P AXIS, ECG09: 74 DEGREES
CALCULATED P AXIS, ECG09: 75 DEGREES
CALCULATED R AXIS, ECG10: 61 DEGREES
CALCULATED R AXIS, ECG10: 65 DEGREES
CALCULATED T AXIS, ECG11: 94 DEGREES
CALCULATED T AXIS, ECG11: 97 DEGREES
CHLORIDE SERPL-SCNC: 103 MMOL/L (ref 100–111)
CHOLEST SERPL-MCNC: 159 MG/DL
CO2 SERPL-SCNC: 27 MMOL/L (ref 21–32)
CREAT SERPL-MCNC: 1.54 MG/DL (ref 0.6–1.3)
DIAGNOSIS, 93000: NORMAL
DIAGNOSIS, 93000: NORMAL
DIFFERENTIAL METHOD BLD: ABNORMAL
ECHO LV INTERNAL DIMENSION DIASTOLIC: 5.73 CM (ref 4.2–5.9)
ECHO LV INTERNAL DIMENSION SYSTOLIC: 4.93 CM
ECHO LV IVSD: 0.74 CM (ref 0.6–1)
ECHO LV MASS 2D: 210.7 G (ref 88–224)
ECHO LV MASS INDEX 2D: 103.8 G/M2 (ref 49–115)
ECHO LV POSTERIOR WALL DIASTOLIC: 1.14 CM (ref 0.6–1)
ECHO TV REGURGITANT MAX VELOCITY: 208.95 CM/S
ECHO TV REGURGITANT PEAK GRADIENT: 17.46 MMHG
EOSINOPHIL # BLD: 0.1 K/UL (ref 0–0.4)
EOSINOPHIL NFR BLD: 3 % (ref 0–5)
ERYTHROCYTE [DISTWIDTH] IN BLOOD BY AUTOMATED COUNT: 11.9 % (ref 11.6–14.5)
FLUAV RNA SPEC QL NAA+PROBE: NOT DETECTED
FLUBV RNA SPEC QL NAA+PROBE: NOT DETECTED
GLOBULIN SER CALC-MCNC: 3.9 G/DL (ref 2–4)
GLUCOSE SERPL-MCNC: 106 MG/DL (ref 74–99)
HCT VFR BLD AUTO: 40.7 % (ref 36–48)
HDLC SERPL-MCNC: 29 MG/DL (ref 40–60)
HDLC SERPL: 5.5 {RATIO} (ref 0–5)
HGB BLD-MCNC: 13.5 G/DL (ref 13–16)
IMM GRANULOCYTES # BLD AUTO: 0 K/UL (ref 0–0.04)
IMM GRANULOCYTES NFR BLD AUTO: 0 % (ref 0–0.5)
LDLC SERPL CALC-MCNC: 105.2 MG/DL (ref 0–100)
LIPID PROFILE,FLP: ABNORMAL
LYMPHOCYTES # BLD: 1.8 K/UL (ref 0.9–3.6)
LYMPHOCYTES NFR BLD: 50 % (ref 21–52)
MAGNESIUM SERPL-MCNC: 1.4 MG/DL (ref 1.6–2.6)
MCH RBC QN AUTO: 30.3 PG (ref 24–34)
MCHC RBC AUTO-ENTMCNC: 33.2 G/DL (ref 31–37)
MCV RBC AUTO: 91.5 FL (ref 78–100)
MONOCYTES # BLD: 0.5 K/UL (ref 0.05–1.2)
MONOCYTES NFR BLD: 13 % (ref 3–10)
NEUTS SEG # BLD: 1.2 K/UL (ref 1.8–8)
NEUTS SEG NFR BLD: 32 % (ref 40–73)
NRBC # BLD: 0 K/UL (ref 0–0.01)
NRBC BLD-RTO: 0 PER 100 WBC
P-R INTERVAL, ECG05: 144 MS
P-R INTERVAL, ECG05: 146 MS
PLATELET # BLD AUTO: 144 K/UL (ref 135–420)
PMV BLD AUTO: 12.9 FL (ref 9.2–11.8)
POTASSIUM SERPL-SCNC: 3.4 MMOL/L (ref 3.5–5.5)
PROT SERPL-MCNC: 6.5 G/DL (ref 6.4–8.2)
Q-T INTERVAL, ECG07: 466 MS
Q-T INTERVAL, ECG07: 486 MS
QRS DURATION, ECG06: 82 MS
QRS DURATION, ECG06: 86 MS
QTC CALCULATION (BEZET), ECG08: 488 MS
QTC CALCULATION (BEZET), ECG08: 509 MS
RBC # BLD AUTO: 4.45 M/UL (ref 4.35–5.65)
SARS-COV-2, COV2: NOT DETECTED
SODIUM SERPL-SCNC: 138 MMOL/L (ref 136–145)
TRIGL SERPL-MCNC: 124 MG/DL (ref ?–150)
TROPONIN I SERPL-MCNC: 0.34 NG/ML (ref 0–0.04)
VENTRICULAR RATE, ECG03: 66 BPM
VENTRICULAR RATE, ECG03: 66 BPM
VLDLC SERPL CALC-MCNC: 24.8 MG/DL
WBC # BLD AUTO: 3.6 K/UL (ref 4.6–13.2)

## 2021-11-18 PROCEDURE — 97161 PT EVAL LOW COMPLEX 20 MIN: CPT

## 2021-11-18 PROCEDURE — 85730 THROMBOPLASTIN TIME PARTIAL: CPT

## 2021-11-18 PROCEDURE — 99223 1ST HOSP IP/OBS HIGH 75: CPT | Performed by: INTERNAL MEDICINE

## 2021-11-18 PROCEDURE — 83735 ASSAY OF MAGNESIUM: CPT

## 2021-11-18 PROCEDURE — 93321 DOPPLER ECHO F-UP/LMTD STD: CPT

## 2021-11-18 PROCEDURE — 71046 X-RAY EXAM CHEST 2 VIEWS: CPT

## 2021-11-18 PROCEDURE — 74011250637 HC RX REV CODE- 250/637: Performed by: STUDENT IN AN ORGANIZED HEALTH CARE EDUCATION/TRAINING PROGRAM

## 2021-11-18 PROCEDURE — 36415 COLL VENOUS BLD VENIPUNCTURE: CPT

## 2021-11-18 PROCEDURE — 74011250637 HC RX REV CODE- 250/637

## 2021-11-18 PROCEDURE — 65660000000 HC RM CCU STEPDOWN

## 2021-11-18 PROCEDURE — 80053 COMPREHEN METABOLIC PANEL: CPT

## 2021-11-18 PROCEDURE — 82330 ASSAY OF CALCIUM: CPT

## 2021-11-18 PROCEDURE — 84484 ASSAY OF TROPONIN QUANT: CPT

## 2021-11-18 PROCEDURE — 74011250636 HC RX REV CODE- 250/636: Performed by: PHYSICIAN ASSISTANT

## 2021-11-18 PROCEDURE — 85025 COMPLETE CBC W/AUTO DIFF WBC: CPT

## 2021-11-18 RX ORDER — ROSUVASTATIN CALCIUM 20 MG/1
20 TABLET, COATED ORAL
Status: DISCONTINUED | OUTPATIENT
Start: 2021-11-18 | End: 2021-11-23 | Stop reason: HOSPADM

## 2021-11-18 RX ORDER — LISINOPRIL 5 MG/1
5 TABLET ORAL DAILY
Status: DISCONTINUED | OUTPATIENT
Start: 2021-11-18 | End: 2021-11-23 | Stop reason: HOSPADM

## 2021-11-18 RX ORDER — ASPIRIN 325 MG
TABLET ORAL
Status: COMPLETED
Start: 2021-11-18 | End: 2021-11-18

## 2021-11-18 RX ORDER — ASPIRIN 81 MG/1
81 TABLET ORAL DAILY
Status: DISCONTINUED | OUTPATIENT
Start: 2021-11-18 | End: 2021-11-18

## 2021-11-18 RX ORDER — MAGNESIUM SULFATE HEPTAHYDRATE 40 MG/ML
2 INJECTION, SOLUTION INTRAVENOUS ONCE
Status: COMPLETED | OUTPATIENT
Start: 2021-11-18 | End: 2021-11-18

## 2021-11-18 RX ORDER — GUAIFENESIN 100 MG/5ML
81 LIQUID (ML) ORAL DAILY
Status: DISCONTINUED | OUTPATIENT
Start: 2021-11-19 | End: 2021-11-23 | Stop reason: HOSPADM

## 2021-11-18 RX ORDER — SPIRONOLACTONE 25 MG/1
25 TABLET ORAL DAILY
Status: DISCONTINUED | OUTPATIENT
Start: 2021-11-18 | End: 2021-11-23

## 2021-11-18 RX ORDER — TORSEMIDE 20 MG/1
20 TABLET ORAL 2 TIMES DAILY
Status: DISCONTINUED | OUTPATIENT
Start: 2021-11-18 | End: 2021-11-23 | Stop reason: HOSPADM

## 2021-11-18 RX ORDER — BUMETANIDE 0.25 MG/ML
1 INJECTION INTRAMUSCULAR; INTRAVENOUS ONCE
Status: ACTIVE | OUTPATIENT
Start: 2021-11-18 | End: 2021-11-19

## 2021-11-18 RX ORDER — CARVEDILOL 12.5 MG/1
12.5 TABLET ORAL 2 TIMES DAILY WITH MEALS
Status: DISCONTINUED | OUTPATIENT
Start: 2021-11-18 | End: 2021-11-23 | Stop reason: HOSPADM

## 2021-11-18 RX ORDER — FAMOTIDINE 20 MG/1
20 TABLET, FILM COATED ORAL DAILY
Status: DISCONTINUED | OUTPATIENT
Start: 2021-11-18 | End: 2021-11-23 | Stop reason: HOSPADM

## 2021-11-18 RX ADMIN — Medication 10 ML: at 14:00

## 2021-11-18 RX ADMIN — ASPIRIN 325 MG ORAL TABLET 325 MG: 325 PILL ORAL at 08:13

## 2021-11-18 RX ADMIN — POTASSIUM BICARBONATE 20 MEQ: 391 TABLET, EFFERVESCENT ORAL at 06:53

## 2021-11-18 RX ADMIN — FAMOTIDINE 20 MG: 20 TABLET ORAL at 08:15

## 2021-11-18 RX ADMIN — Medication 10 ML: at 23:49

## 2021-11-18 RX ADMIN — MAGNESIUM SULFATE 2 G: 2 INJECTION INTRAVENOUS at 09:55

## 2021-11-18 RX ADMIN — LISINOPRIL 5 MG: 10 TABLET ORAL at 08:14

## 2021-11-18 RX ADMIN — Medication 325 MG: at 08:13

## 2021-11-18 RX ADMIN — ROSUVASTATIN CALCIUM 20 MG: 10 TABLET, FILM COATED ORAL at 23:48

## 2021-11-18 RX ADMIN — SPIRONOLACTONE 25 MG: 25 TABLET ORAL at 08:15

## 2021-11-18 RX ADMIN — ROSUVASTATIN CALCIUM 20 MG: 10 TABLET, FILM COATED ORAL at 00:59

## 2021-11-18 RX ADMIN — CARVEDILOL 12.5 MG: 12.5 TABLET, FILM COATED ORAL at 19:03

## 2021-11-18 NOTE — ROUTINE PROCESS
Pt sleeping in POC at this time. NAD noted. Cardiac monitor in place. Waiting for bed assignment. Will continue to monitor.
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
## 2021-11-18 NOTE — ROUTINE PROCESS
Bedside shift change report given to Talbot Runner (oncoming nurse) by Gilford Reason (offgoing nurse). Report included the following information SBAR, Kardex, ED Summary, MAR, Recent Results and Cardiac Rhythm notes.
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
## 2021-11-18 NOTE — ROUTINE PROCESS
Assumed care of pt at this time. Pt lying in bed watching tv at this time. NAD noted. Cardiac monitor in place. Will continue to monitor.
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
## 2021-11-18 NOTE — PROGRESS NOTES
conducted an initial consultation and Spiritual Assessment for Gorge Vasquez, who is a 57 y. o.,male. Patients Primary Language is: [place]. According to the patients EMR Moravian Affiliation is: Blanca Puente. The reason the Patient came to the hospital is:   Patient Active Problem List    Diagnosis Date Noted    CHF [facility]congestive heart failure) ([facility] 11/17/2021    NSTEMI (non-ST elevated myocardial infarction) ([facility] 11/17/2021    Shortness of breath 11/17/2021    Testicular pain 05/13/2021    CHF exacerbation ([facility] 17/32/6814    [facility], [facility] 11[facility]11/2020    [facility] 11/11/2020    Coronary artery disease involving native coronary artery of native heart without angina pectoris 11/11/2020    S/P ablation operation for arrhythmia 03/12/2018    Weakness due to cerebrovascular accident (CVA) 03/24/2017    TIA (transient ischemic attack) 03/23/2017    Hypertension     Pulmonary nodule     Head injury     Depression     SAH (subarachnoid hemorrhage) (HCC)     HLD (hyperlipidemia)     Corn of foot 08/21/2016    Hypomagnesemia 08/21/2016    Cough 04/01/2014        The  provided the following Interventions:  Initiated a relationship of care and support with patient in room 13 of the emergency room who has now been here 16 hours. Listened empathically as he talked about his being here briefly and how he needed some help to get his shirt of but first he needed that nurse. Patient does not have an advance directive on file here at this time. Provided information about Spiritual Care Services. Offered prayer and assurance of continued prayers on patients behalf. The following outcomes were achieved:  Patient shared limited information about his medical narrative and spiritual journey/beliefs. Patient processed feeling about current hospitalization. Patient expressed gratitude for pastoral care visit.     Assessment:  Patient does not have any Mormon/cultural needs that will affect patients preferences in health care. There are no further spiritual or Mormon issues which require Spiritual Care Services interventions at this time. Plan:  Chaplains will continue to follow and will provide pastoral care on an as needed/requested basis    . Swati Bronson   Spiritual Care   [phone]
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
## 2021-11-18 NOTE — ROUTINE PROCESS
TRANSFER - IN REPORT:    Verbal report received from Glory Ibanez RN(name) on [employer]   being received from ANDRE Graham RN(unit) for routine progression of care      Report consisted of patients Situation, Background, Assessment and   Recommendations(SBAR). Information from the following report(s) SBAR, K[facility], [facility] and Recent Results was reviewed with the receiving nurse. Opportunity for questions and clarification was provided. Assessment completed upon patients arrival to unit and care assumed.

## 2021-11-19 LAB
ALBUMIN SERPL-MCNC: 2.7 G/DL (ref 3.4–5)
ALBUMIN/GLOB SERPL: 0.8 {RATIO} (ref 0.8–1.7)
ALP SERPL-CCNC: 74 U/L (ref 45–117)
ALT SERPL-CCNC: 29 U/L (ref 16–61)
ANION GAP SERPL CALC-SCNC: 6 MMOL/L (ref 3–18)
ANION GAP SERPL CALC-SCNC: 6 MMOL/L (ref 3–18)
APTT PPP: 90.6 SEC (ref 23–36.4)
AST SERPL-CCNC: 32 U/L (ref 10–38)
BASOPHILS # BLD: 0 K/UL (ref 0–0.1)
BASOPHILS NFR BLD: 0 % (ref 0–2)
BILIRUB SERPL-MCNC: 0.8 MG/DL (ref 0.2–1)
BUN SERPL-MCNC: 19 MG/DL (ref 7–18)
BUN SERPL-MCNC: 20 MG/DL (ref 7–18)
BUN/CREAT SERPL: 11 (ref 12–20)
BUN/CREAT SERPL: 13 (ref 12–20)
CALCIUM SERPL-MCNC: 7.3 MG/DL (ref 8.5–10.1)
CALCIUM SERPL-MCNC: 7.4 MG/DL (ref 8.5–10.1)
CHLORIDE SERPL-SCNC: 101 MMOL/L (ref 100–111)
CHLORIDE SERPL-SCNC: 104 MMOL/L (ref 100–111)
CO2 SERPL-SCNC: 28 MMOL/L (ref 21–32)
CO2 SERPL-SCNC: 28 MMOL/L (ref 21–32)
CREAT SERPL-MCNC: 1.55 MG/DL (ref 0.6–1.3)
CREAT SERPL-MCNC: 1.77 MG/DL (ref 0.6–1.3)
DIFFERENTIAL METHOD BLD: ABNORMAL
EOSINOPHIL # BLD: 0.2 K/UL (ref 0–0.4)
EOSINOPHIL NFR BLD: 5 % (ref 0–5)
ERYTHROCYTE [DISTWIDTH] IN BLOOD BY AUTOMATED COUNT: 11.9 % (ref 11.6–14.5)
GLOBULIN SER CALC-MCNC: 3.5 G/DL (ref 2–4)
GLUCOSE SERPL-MCNC: 107 MG/DL (ref 74–99)
GLUCOSE SERPL-MCNC: 161 MG/DL (ref 74–99)
HCT VFR BLD AUTO: 42.4 % (ref 36–48)
HGB BLD-MCNC: 13.6 G/DL (ref 13–16)
IMM GRANULOCYTES # BLD AUTO: 0 K/UL (ref 0–0.04)
IMM GRANULOCYTES NFR BLD AUTO: 0 % (ref 0–0.5)
LYMPHOCYTES # BLD: 2.1 K/UL (ref 0.9–3.6)
LYMPHOCYTES NFR BLD: 50 % (ref 21–52)
MAGNESIUM SERPL-MCNC: 1.9 MG/DL (ref 1.6–2.6)
MCH RBC QN AUTO: 29.8 PG (ref 24–34)
MCHC RBC AUTO-ENTMCNC: 32.1 G/DL (ref 31–37)
MCV RBC AUTO: 93 FL (ref 78–100)
MONOCYTES # BLD: 0.3 K/UL (ref 0.05–1.2)
MONOCYTES NFR BLD: 8 % (ref 3–10)
NEUTS SEG # BLD: 1.6 K/UL (ref 1.8–8)
NEUTS SEG NFR BLD: 37 % (ref 40–73)
NRBC # BLD: 0 K/UL (ref 0–0.01)
NRBC BLD-RTO: 0 PER 100 WBC
PLATELET # BLD AUTO: 153 K/UL (ref 135–420)
PMV BLD AUTO: 13.7 FL (ref 9.2–11.8)
POTASSIUM SERPL-SCNC: 3.7 MMOL/L (ref 3.5–5.5)
POTASSIUM SERPL-SCNC: 4.2 MMOL/L (ref 3.5–5.5)
PROT SERPL-MCNC: 6.2 G/DL (ref 6.4–8.2)
RBC # BLD AUTO: 4.56 M/UL (ref 4.35–5.65)
SODIUM SERPL-SCNC: 135 MMOL/L (ref 136–145)
SODIUM SERPL-SCNC: 138 MMOL/L (ref 136–145)
TROPONIN I SERPL-MCNC: 0.25 NG/ML (ref 0–0.04)
WBC # BLD AUTO: 4.3 K/UL (ref 4.6–13.2)

## 2021-11-19 PROCEDURE — 74011250636 HC RX REV CODE- 250/636: Performed by: STUDENT IN AN ORGANIZED HEALTH CARE EDUCATION/TRAINING PROGRAM

## 2021-11-19 PROCEDURE — 74011250637 HC RX REV CODE- 250/637: Performed by: STUDENT IN AN ORGANIZED HEALTH CARE EDUCATION/TRAINING PROGRAM

## 2021-11-19 PROCEDURE — 36415 COLL VENOUS BLD VENIPUNCTURE: CPT

## 2021-11-19 PROCEDURE — 97535 SELF CARE MNGMENT TRAINING: CPT

## 2021-11-19 PROCEDURE — 2709999900 HC NON-CHARGEABLE SUPPLY

## 2021-11-19 PROCEDURE — 85025 COMPLETE CBC W/AUTO DIFF WBC: CPT

## 2021-11-19 PROCEDURE — 65660000000 HC RM CCU STEPDOWN

## 2021-11-19 PROCEDURE — 85730 THROMBOPLASTIN TIME PARTIAL: CPT

## 2021-11-19 PROCEDURE — 99232 SBSQ HOSP IP/OBS MODERATE 35: CPT | Performed by: INTERNAL MEDICINE

## 2021-11-19 PROCEDURE — 97165 OT EVAL LOW COMPLEX 30 MIN: CPT

## 2021-11-19 PROCEDURE — 74011250637 HC RX REV CODE- 250/637: Performed by: PHYSICIAN ASSISTANT

## 2021-11-19 PROCEDURE — 84484 ASSAY OF TROPONIN QUANT: CPT

## 2021-11-19 PROCEDURE — 80053 COMPREHEN METABOLIC PANEL: CPT

## 2021-11-19 RX ORDER — POTASSIUM CHLORIDE 7.45 MG/ML
10 INJECTION INTRAVENOUS
Status: DISPENSED | OUTPATIENT
Start: 2021-11-19 | End: 2021-11-19

## 2021-11-19 RX ORDER — MAGNESIUM SULFATE HEPTAHYDRATE 40 MG/ML
2 INJECTION, SOLUTION INTRAVENOUS ONCE
Status: COMPLETED | OUTPATIENT
Start: 2021-11-19 | End: 2021-11-19

## 2021-11-19 RX ADMIN — ACETAMINOPHEN 650 MG: 325 TABLET ORAL at 04:15

## 2021-11-19 RX ADMIN — HEPARIN SODIUM 12 UNITS/KG/HR: 10000 INJECTION, SOLUTION INTRAVENOUS at 02:07

## 2021-11-19 RX ADMIN — CARVEDILOL 12.5 MG: 12.5 TABLET, FILM COATED ORAL at 18:02

## 2021-11-19 RX ADMIN — MAGNESIUM SULFATE HEPTAHYDRATE 2 G: 40 INJECTION, SOLUTION INTRAVENOUS at 02:11

## 2021-11-19 RX ADMIN — FAMOTIDINE 20 MG: 20 TABLET ORAL at 09:43

## 2021-11-19 RX ADMIN — LISINOPRIL 5 MG: 10 TABLET ORAL at 09:43

## 2021-11-19 RX ADMIN — CARVEDILOL 12.5 MG: 12.5 TABLET, FILM COATED ORAL at 09:43

## 2021-11-19 RX ADMIN — ACETAMINOPHEN 650 MG: 325 TABLET ORAL at 09:43

## 2021-11-19 RX ADMIN — ASPIRIN 81 MG CHEWABLE TABLET 81 MG: 81 TABLET CHEWABLE at 09:43

## 2021-11-19 RX ADMIN — Medication 10 ML: at 06:53

## 2021-11-19 RX ADMIN — ROSUVASTATIN CALCIUM 20 MG: 10 TABLET, FILM COATED ORAL at 22:09

## 2021-11-19 RX ADMIN — Medication 10 ML: at 13:18

## 2021-11-19 RX ADMIN — SPIRONOLACTONE 25 MG: 25 TABLET ORAL at 09:43

## 2021-11-19 RX ADMIN — POTASSIUM CHLORIDE 10 MEQ: 7.46 INJECTION, SOLUTION INTRAVENOUS at 02:12

## 2021-11-19 RX ADMIN — Medication 10 ML: at 22:09

## 2021-11-19 RX ADMIN — TORSEMIDE 20 MG: 20 TABLET ORAL at 18:02

## 2021-11-19 NOTE — PROGRESS NOTES
Cardiology Progress Note    Admit Date: 11/17/2021  Attending Cardiologist: Dr. Ingrid Aguero:     -Acute on chronic HFrEF. Presented with progressively worsening SOB x 5 days, PND and various other complaints. ECHO this admission EF 20-25%. -NSTEMI, Type II. Not c/w ACS, suspect demand in the setting of CHF, STANLEY and cocaine use. Troponin peaked at 0.38. ECG with chronic anterolateral T wave changes. No significant CAD seen on heart cath from 10/2020.   -STANLEY on CKD. -Hypokalemia. -Hypomagnesia   -NICMY ECHO (05/2021) EF 15-20%, Grade 1 LV DD,   ? on Lisinopril, aldactone and coreg as outpatient. ? No longer on Entresto d/t affordability. ? Non-compliant with Life Vest  -Non-Significant CAD, s/p LHC (10/2020)   ? Left MAIN CAD 40-50%   ? non obstructive LAD and RCA disease   -s/p RHC (10/2020)   -HLD, on statin.   -Hx SVT, s/p Implantable loop recorder placement (2018)   -s/p AVNRT ablation (2018)   -Hx Non-compliance with medications   -Hx embolic stroke (1747)   -Cocaine Abuse, UDS + this admission.         Primary Cardiologist: Dr. Allegra Colmenares:       I saw, evaluated, interviewed and examined the patient personally. I agree with the findings and plan of care as documented below with PA-C note  Denies any chest pain. Dyspnea improved. Echo with LV dysfunction. Cocaine positive however patient denies any recent use  Concern with possible noncompliance however patient states that he is taking his medication as prescribed  Patient has LifeVest at home. Telemetry monitor suggested brief episode of likely SVT with aberrancy. Reviewed with EP colleague  Continue current medication for LV dysfunction. Recent cardiac catheterization finding noted  Eventually will need to consider for AICD if no improvement in EF. Cody Rosario MD       -Will d/c Heparin gtt.    -burst of atrial tach/SVT with abbarency noted on tele overnight.   -Unable to interrogate loop recorder this am because of battery depletion.    -Continue coreg, lisinopril, aldactone and statin.   -Will give additional dose of IV Bumex now and transition to po diuretics. -given patient's LV function has not improved, recommend AICD placement. Subjective:     Denies CP or SOB.       Objective:      Patient Vitals for the past 8 hrs:   Temp Pulse Resp BP SpO2   11/19/21 0744 97.9 °F (36.6 °C) 95 18 (!) 155/98 100 %   11/19/21 0435 98.3 °F (36.8 °C) 62 18 123/73 99 %         Patient Vitals for the past 96 hrs:   Weight   11/18/21 1344 80.7 kg (178 lb)   11/17/21 1829 80.7 kg (178 lb)       TELE: normal sinus rhythm               Current Facility-Administered Medications   Medication Dose Route Frequency Last Admin    carvediloL (COREG) tablet 12.5 mg  12.5 mg Oral BID WITH MEALS 12.5 mg at 11/19/21 0943    lisinopriL (PRINIVIL, ZESTRIL) tablet 5 mg  5 mg Oral DAILY 5 mg at 11/19/21 0943    rosuvastatin (CRESTOR) tablet 20 mg  20 mg Oral QHS 20 mg at 11/18/21 2348    spironolactone (ALDACTONE) tablet 25 mg  25 mg Oral DAILY 25 mg at 11/19/21 0943    [Held by provider] torsemide (DEMADEX) tablet 20 mg  20 mg Oral BID      famotidine (PEPCID) tablet 20 mg  20 mg Oral DAILY 20 mg at 11/19/21 0943    aspirin chewable tablet 81 mg  81 mg Oral DAILY 81 mg at 11/19/21 0943    heparin (porcine) 25,000 units in 0.45% saline 250 ml infusion  12-25 Units/kg/hr IntraVENous TITRATE 12 Units/kg/hr at 11/19/21 0207    sodium chloride (NS) flush 5-40 mL  5-40 mL IntraVENous Q8H 10 mL at 11/19/21 0653    sodium chloride (NS) flush 5-40 mL  5-40 mL IntraVENous PRN      acetaminophen (TYLENOL) tablet 650 mg  650 mg Oral Q6H  mg at 11/19/21 7898    Or    acetaminophen (TYLENOL) suppository 650 mg  650 mg Rectal Q6H PRN      polyethylene glycol (MIRALAX) packet 17 g  17 g Oral DAILY PRN         No intake or output data in the 24 hours ending 11/19/21 1018    Physical Exam:  General:  alert, cooperative, no distress, appears stated age  Neck:  no JVD  Lungs:  clear to auscultation bilaterally, diminished bases B/L   Heart:  regular rate and rhythm, S1, S2 normal, no murmur, click, rub or gallop  Abdomen:  abdomen is soft without significant tenderness, masses, organomegaly or guarding  Extremities:  extremities normal, atraumatic, no cyanosis or edema    Visit Vitals  BP (!) 155/98 (BP 1 Location: Left upper arm, BP Patient Position: At rest;Lying right side)   Pulse 95   Temp 97.9 °F (36.6 °C)   Resp 18   Ht 6' (1.829 m)   Wt 80.7 kg (178 lb)   SpO2 100%   BMI 24.14 kg/m²       Data Review:     Labs: Results:       Chemistry Recent Labs     11/19/21 0415 11/18/21 2119 11/18/21 0330 11/17/21 2124 11/17/21 2124   * 161* 106*   < > 128*   * 138 138   < > 139   K 4.2 3.7 3.4*   < > 3.8    104 103   < > 103   CO2 28 28 27   < > 28   BUN 20* 19* 22*   < > 22*   CREA 1.55* 1.77* 1.54*   < > 1.73*   CA 7.3* 7.4* 7.4*   < > 7.2*   MG  --  1.9  --   --  1.4*   AGAP 6 6 8   < > 8   BUCR 13 11* 14   < > 13   AP 74  --  75  --  77   TP 6.2*  --  6.5  --  6.6   ALB 2.7*  --  2.6*  --  2.8*   GLOB 3.5  --  3.9  --  3.8   AGRAT 0.8  --  0.7*  --  0.7*    < > = values in this interval not displayed. CBC w/Diff Recent Labs     11/19/21 0415 11/18/21 0335 11/17/21 2150   WBC 4.3* 3.6* 3.3*   RBC 4.56 4.45 4.75   HGB 13.6 13.5 14.1   HCT 42.4 40.7 43.8    144 149   GRANS 37* 32* 49   LYMPH 50 50 34   EOS 5 3 3      Cardiac Enzymes No results found for: CPK, CK, CKMMB, CKMB, RCK3, CKMBT, CKNDX, CKND1, SEBASTIÁN, TROPT, TROIQ, YANETH, TROPT, TNIPOC, BNP, BNPP   Coagulation Recent Labs     11/19/21  0415 11/18/21 2119 11/17/21  2150 11/17/21  1840   PTP  --   --   --  15.4*   INR  --   --   --  1.2   APTT 90.6* 74.1*   < >  --     < > = values in this interval not displayed.        Lipid Panel Lab Results   Component Value Date/Time    Cholesterol, total 159 11/17/2021 09:24 PM    HDL Cholesterol 29 (L) 11/17/2021 09:24 PM    LDL, calculated 105.2 (H) 11/17/2021 09:24 PM    VLDL, calculated 24.8 11/17/2021 09:24 PM    Triglyceride 124 11/17/2021 09:24 PM    CHOL/HDL Ratio 5.5 (H) 11/17/2021 09:24 PM      BNP No results found for: BNP, BNPP, XBNPT   Liver Enzymes Recent Labs     11/19/21  0415   TP 6.2*   ALB 2.7*   AP 74      Thyroid Studies Lab Results   Component Value Date/Time    TSH 0.72 02/28/2016 07:15 PM          Signed By: Charley Spurling, PA-C     November 19, 2021

## 2021-11-19 NOTE — MED STUDENT NOTES
*ATTENTION:  This note has been created by a medical student for educational purposes only. Please do not refer to the content of this note for clinical decision-making, billing, or other purposes. Please see attending physicians note to obtain clinical information on this patient. *         [facility]  [facility]    THIS IS A MEDICAL STUDENT NOTE AND IS FOR EDUCATIONAL PURPOSES ONLY. PLEASE REFER TO ATTENDING NOTE. Patient: Raphael Espinosa MRN: [id number]  CSN: [id number]    YOB: 1960  Age: 57 y.o. Sex: male    DOA: 11/17/2021 LOS:  LOS: 2 days                   57 y.o. male w/ PMH HFrEF (15%), HLD, HTN, hypoparathyroidism, CKD3, admitted with NSTEMI and HF exacerbation. Pt presented to ED on 11/18 with weakness and SOB at rest x several days. In addition pt reported associated diaphoresis. Endorsed dry cough x 1 month which worsened when pt became supine although pt noted it was present throughout the day as well. Subjective:      Acute events: Yesterday evening (11/19) at around 9:45PM pt had a 21-beat run of Ventricular tachycardia. Cardiology saw him yesterday and gave him a bolus of IV Bumex, ordered replacement K+, Mg+, and Ca++. Today,  Ppt reports mild SOB today although notes its improved since admission. Pt's main complaint was burning in his R arm while he was receiving Potassium Chloride overnight. Reports it was severe to the point that he began trembling. In addition, pt endorses sinus pain/headache, productive cough with yellow sputum, and rhinorrhea. Pt denies diarrhea.       ROS:  Constitutional: Headache  HEENT: Sinus pressure, rhinorrhea  Respiratory: Productive cough with sputum production  MSK: arm-burning extending from IV site to R shoulder with K+ infusion    Objective:     Patient Vitals for the past 24 hrs:   Temp Pulse Resp BP SpO2   11/19/21 0435 98.3 °F (36.8 °C) 62 18 123/73 99 %   11/18/21 2330 98.2 °F (36.8 °C) 73 20 109/71 97 %   11/18/21 2030 98 °F (36.7 °C) 71 20 (!) 148/79 100 %   11/18/21 1859 97.8 °F (36.6 °C) 67 20 (!) 156/127 100 %   11/18/21 1430  73 23 115/71 99 %   11/18/21 1344    130/80    11/18/21 1300  69 15 139/86 100 %   11/18/21 1100  66 16 132/80 99 %   11/18/21 0957  64 19 (!) 139/100 99 %   11/18/21 0816  70 20 (!) 150/98 95 %     Vitals:  Few hypertensive episodes overnight, otherwise unremarkable    PE:  Sleepy-appearing, mildly SOB  Cardiovascular: RRR, no mumurs, rubs, or gallops. No pitting pedal edema  Respiratory: Lungs clear to anterior lung fields  GI: Normal bowel sounds. Abdomen soft, non-tender, and non-distended      Labs:  K+ = 4.2  Mg = 1.9  Ca++ = 7.3  Troponin = 0.25 (prev 0.34, 0.38)    Imaging:     Echo:    Left Ventricle Normal cavity size and wall thickness. The estimated EF is 20 - 25%. Visually measured ejection fraction. Severely and globally reduced systolic function. Aortic Valve No regurgitation. Mitral Valve Trace regurgitation. Tricuspid Valve Trace regurgitation. Pulmonary Artery Pulmonary arterial systolic pressure (PASP) is 20 mmHg. Pulmonary hypertension not suggested by Doppler findings. IVC/Hepatic Veins Normal structure. Normal central venous pressure (3 mmHg); IVC diameter is less than 21 mm and collapses more than 50% with respiration. Pericardium No evidence of pericardial effusion.              No intake or output data in the 24 hours ending 11/19/21 0728      Lab/Data Reviewed:  Recent Results (from the past 24 hour(s))   ECHO ADULT FOLLOW-UP OR LIMITED    Collection Time: 11/18/21  2:21 PM   Result Value Ref Range    IVSd 0.74 0.6 - 1.0 cm    LVIDd 5.73 4.2 - 5.9 cm    LVIDs 4.93 cm    LVPWd 1.14 (A) 0.6 - 1.0 cm    Triscuspid Valve Regurgitation Peak Gradient 17.46 mmHg    TR Max Velocity 208.95 cm/s    LV Mass .7 88 - 224 g    LV Mass AL Index 103.8 49 - 115 g/m2   PTT    Collection Time: 11/18/21  9:19 PM   Result Value Ref Range    aPTT 74.1 (H) 23.0 - 20.5 SEC   METABOLIC PANEL, BASIC    Collection Time: 11/18/21  9:19 PM   Result Value Ref Range    Sodium 138 136 - 145 mmol/L    Potassium 3.7 3.5 - 5.5 mmol/L    Chloride 104 100 - 111 mmol/L    CO2 28 21 - 32 mmol/L    Anion gap 6 3.0 - 18 mmol/L    Glucose 161 (H) 74 - 99 mg/dL    BUN 19 (H) 7.0 - 18 MG/DL    Creatinine 1.77 (H) 0.6 - 1.3 MG/DL    BUN/Creatinine ratio 11 (L) 12 - 20      GFR est AA 47 (L) >60 ml/min/1.73m2    GFR est non-AA 39 (L) >60 ml/min/1.73m2    Calcium 7.4 (L) 8.5 - 10.1 MG/DL   MAGNESIUM    Collection Time: 11/18/21  9:19 PM   Result Value Ref Range    Magnesium 1.9 1.6 - 2.6 mg/dL   PTT    Collection Time: 11/19/21  4:15 AM   Result Value Ref Range    aPTT 90.6 (H) 23.0 - 33.5 SEC   METABOLIC PANEL, COMPREHENSIVE    Collection Time: 11/19/21  4:15 AM   Result Value Ref Range    Sodium 135 (L) 136 - 145 mmol/L    Potassium 4.2 3.5 - 5.5 mmol/L    Chloride 101 100 - 111 mmol/L    CO2 28 21 - 32 mmol/L    Anion gap 6 3.0 - 18 mmol/L    Glucose 107 (H) 74 - 99 mg/dL    BUN 20 (H) 7.0 - 18 MG/DL    Creatinine 1.55 (H) 0.6 - 1.3 MG/DL    BUN/Creatinine ratio 13 12 - 20      GFR est AA 55 (L) >60 ml/min/1.73m2    GFR est non-AA 46 (L) >60 ml/min/1.73m2    Calcium 7.3 (L) 8.5 - 10.1 MG/DL    Bilirubin, total 0.8 0.2 - 1.0 MG/DL    ALT (SGPT) 29 16 - 61 U/L    AST (SGOT) 32 10 - 38 U/L    Alk.  phosphatase 74 45 - 117 U/L    Protein, total 6.2 (L) 6.4 - 8.2 g/dL    Albumin 2.7 (L) 3.4 - 5.0 g/dL    Globulin 3.5 2.0 - 4.0 g/dL    A-G Ratio 0.8 0.8 - 1.7     CBC WITH AUTOMATED DIFF    Collection Time: 11/19/21  4:15 AM   Result Value Ref Range    WBC 4.3 (L) 4.6 - 13.2 K/uL    RBC 4.56 4.35 - 5.65 M/uL    HGB 13.6 13.0 - 16.0 g/dL    HCT 42.4 36.0 - 48.0 %    MCV 93.0 78.0 - 100.0 FL    MCH 29.8 24.0 - 34.0 PG    MCHC 32.1 31.0 - 37.0 g/dL    RDW 11.9 11.6 - 14.5 %    PLATELET 032 667 - 557 K/uL    MPV 13.7 (H) 9.2 - 11.8 FL    NRBC 0.0 0  WBC    ABSOLUTE NRBC 0.00 0.00 - 0.01 K/uL    NEUTROPHILS 37 (L) 40 - 73 %    LYMPHOCYTES 50 21 - 52 %    MONOCYTES 8 3 - 10 %    EOSINOPHILS 5 0 - 5 %    BASOPHILS 0 0 - 2 %    IMMATURE GRANULOCYTES 0 0.0 - 0.5 %    ABS. NEUTROPHILS 1.6 (L) 1.8 - 8.0 K/UL    ABS. LYMPHOCYTES 2.1 0.9 - 3.6 K/UL    ABS. MONOCYTES 0.3 0.05 - 1.2 K/UL    ABS. EOSINOPHILS 0.2 0.0 - 0.4 K/UL    ABS. BASOPHILS 0.0 0.0 - 0.1 K/UL    ABS. IMM. GRANS. 0.0 0.00 - 0.04 K/UL    DF AUTOMATED         Assessment & Plan:   57 y.o. male w/ PMH HFrEF (20-25%), HLD, HTN, hypoparathyroidism, CKD3, admitted with NSTEMI and HF exacerbation. NSTEMI with acute exacerbation of HF (EF = 20-25%), Cardiomyopathy, HTN, CAD, pulmonary HTN, and hx of SVT s/p alona ablation in 2018  - Pt takes Carvedilol 12.5mg BID, Torsemide 20mg BID, Lisinopril 5mg every day, NTG 0.4g PRN, Spironolactone 25mg daily, and ASA 81mg daily. Unsure about medication compliance. - per Cardiology, IV Bumex bolus given yesterday and will transition to PO diuretics today. Plan: Maintain K+ at level of 4 as per recommended by Cards. Continue ASA and Statin, Coreg. Pt will attempt to bring his life vest. If pt needs another K+ replacement then we can consider mixing fluid with Lidocaine before injection or will administer PO replacement. Pts cardiac enzymes will continue to be trended and another EKG will be performed today. Pt to receive a daily CBC, BMP, and Mg. I's & O's to be monitored. Pt was counseled on cessation of cocaine/stimulants.      HLD  - Continue home med Rosuvastatin 20mg daily    STANLEY on CKD stage 3  - Most recent Cr = 1.55 in setting of elevated Cr readings  - Will obtain daily BMP for this    Hx Stroke  - Pt to continue receiving heparin ppx    Hx Hypoparathyroidism/Hypocalcemia  - Will monitor Ca++    Diet NPO   DVT Prophylaxis heparin   GI Prophylaxis Fomotidine   Code status Full   Disposition      Yordan Dalton , MS-3   [facility]   November 19, 2021, 7:28 AM

## 2021-11-19 NOTE — PROGRESS NOTES
Comprehensive Nutrition Assessment    Type and Reason for Visit: Initial, Positive nutrition screen    Nutrition Recommendations/Plan:   - Add supplement: Ensure enlive once daily  - Add double vegetables BID; providing additional food due to pt c/o still being hungry after meals  - Continue all other nutrition interventions. Nutrition Assessment:  Pt reported good appetite and meal intake PTA and since admission. Eating 100% of meals; stated still hungry after meals. Noted wt loss PTA per chart hx; pt could not recall wt trends at time of visit. Per nutrition screen, noted pt reported experiencing unplanned wt loss PTA and decreased po intake PTA. Malnutrition Assessment:  Malnutrition Status: At risk for malnutrition (specify) (wt loss PTA, unplanned per nutrition screen report)      Nutrition History and Allergies: Past medical hx:  Depression, HLD, HTN, CKD stage 3. Pt unsure of UBW and wt trends PTA. Per chart hx, noted pt weighing 215 lb on 3/10/2013 with gradual wt loss since then; net wt loss of 37 lb, 17.2% x 8 years and 8 month PTA. Good appetite/ meal intake PTA. No known food allergies. Estimated Daily Nutrient Needs:  Energy (kcal): 4635-0857; Weight Used for Energy Requirements: Admission (80.7 kg)  Protein (g): 89-97; Weight Used for Protein Requirements: Admission (x1.1-1.2)  Fluid (ml/day): 0218-3618; Method Used for Fluid Requirements: 1 ml/kcal      Nutrition Related Findings:  BM 11/16.    + edema. Wounds:    None       Current Nutrition Therapies:  ADULT DIET Regular    Anthropometric Measures:  · Height:  6' (182.9 cm)  · Current Body Wt:  80.7 kg (177 lb 14.6 oz)   · Admission Body Wt:  177 lb 14.6 oz    · Usual Body Wt:   (pt unsure. noted wt loss since 2013, pt was weighing 215 lb)     · Ideal Body Wt:  178 lbs:  100 %   · Adjusted Body Weight:   ; Weight Adjustment for: No adjustment   · BMI Category:  Normal weight (BMI 18.5-24. 9)       Nutrition Diagnosis: · Unintended weight loss related to inadequate protein-energy intake as evidenced by weight loss (per nutrition screen report)      Nutrition Interventions:   Food and/or Nutrient Delivery: Continue current diet, Start oral nutrition supplement  Nutrition Education and Counseling: No recommendations at this time, Education not indicated  Coordination of Nutrition Care: Continue to monitor while inpatient    Goals:  PO nutrition intake will continue to meet >75% of patient's estimated nutrition needs within the next 10 days       Nutrition Monitoring and Evaluation:   Behavioral-Environmental Outcomes: None identified  Food/Nutrient Intake Outcomes: Food and nutrient intake, Supplement intake  Physical Signs/Symptoms Outcomes: Biochemical data, Meal time behavior, Nutrition focused physical findings    Discharge Planning:     Too soon to determine     Electronically signed by Karina Ortez RD on 11/19/2021 at 2:24 PM    Contact: [phone]

## 2021-11-19 NOTE — PROGRESS NOTES
Reason for Admission:   NSTEMI                    RUR Score:    18              PCP: First and Last name:   Torey Ozuna MD     Name of Practice:    Are you a current patient: Yes/No: yes   Approximate date of last visit: October 2021   Can you participate in a virtual visit if needed: yes    Do you (patient/family) have any concerns for transition/discharge? No              Plan for utilizing home health:   No  Current Advanced Directive/Advance Care Plan:  Full Code      Healthcare Decision Maker:   Click here to complete [address] including selection of the Healthcare Decision Maker Relationship (ie \"Primary\")          Lizeth Schwartz (sister) [phone]    Transition of Care Plan:     Home with outpatient follow up. Initial assessment completed with pt at the bedside. Verified demographic, PCP, and insurance information. Prior to admission pt lived with his mother in a 1 story home with 3 steps to enter. Pt was independent with ambulation and ADL's. Pt denies any prior home health services or DME. Explained to pt the role of case management and the services provided. Pt denies having any case management needs at this time.          Azael Montiel, MSN, RN, ACM-RN     [phone]- pager  [phone]- main office

## 2021-11-19 NOTE — ROUTINE PROCESS
Bedside and Verbal shift change report given to Jocelyn RN (oncoming nurse) by Aurora Dial nurse). Report included the following information SBAR.

## 2021-11-19 NOTE — PROGRESS NOTES
[facility]  Intern Progress Note    Patient: Jerri Tucker MRN: [id number]   SSN: [id number]  YOB: 1960   Age: 57 y.o. Sex: male      Admit Date: 11/17/2021    LOS: 2 days   Chief Complaint   Patient presents with    Dizziness    Shortness of Breath       Subjective:     Patient was seen at bedside today and was alert and talking on the phone. He had no concerns or complaints. Patient denies chest pain, shortness of breath, abdominal pain. Objective:     Visit Vitals  BP (!) 155/98 (BP 1 Location: Left upper arm, BP Patient Position: At rest;Lying right side)   Pulse 95   Temp 97.9 °F (36.6 °C)   Resp 18   Ht 6' (1.829 m)   Wt 80.7 kg (178 lb)   SpO2 100%   BMI 24.14 kg/m²     Physical Exam:   General:  AAOx3, NAD,   HEENT: Conjunctiva pink, sclera anicteric. PERRL. EOMI. Pharynx moist, nonerythematous. CV:  RRR, no murmurs. No visible pulsations or thrills. RESP:  Unlabored breathing. Lungs clear to auscultation without adventitious breath sounds. Equal expansion bilaterally. ABD:  Soft, nontender, nondistended. BS (+). No suprapubic tenderness. MS:  No joint deformity or instability. No atrophy. Neuro:  CN II-XII grossly intact. 5/5 strength bilateral upper extremities and lower extremities. Ext:  No edema. Skin:  No rashes, lesions, or ulcers. Intake and Output:  Current Shift: No intake/output data recorded. Last three shifts: No intake/output data recorded.     Lab/Data Review:  Recent Results (from the past 12 hour(s))   PTT    Collection Time: 11/18/21  9:19 PM   Result Value Ref Range    aPTT 74.1 (H) 23.0 - 98.1 SEC   METABOLIC PANEL, BASIC    Collection Time: 11/18/21  9:19 PM   Result Value Ref Range    Sodium 138 136 - 145 mmol/L    Potassium 3.7 3.5 - 5.5 mmol/L    Chloride 104 100 - 111 mmol/L    CO2 28 21 - 32 mmol/L    Anion gap 6 3.0 - 18 mmol/L    Glucose 161 (H) 74 - 99 mg/dL    BUN 19 (H) 7.0 - 18 MG/DL    Creatinine 1.77 (H) 0.6 - 1.3 MG/DL    BUN/Creatinine ratio 11 (L) 12 - 20      GFR est AA 47 (L) >60 ml/min/1.73m2    GFR est non-AA 39 (L) >60 ml/min/1.73m2    Calcium 7.4 (L) 8.5 - 10.1 MG/DL   MAGNESIUM    Collection Time: 11/18/21  9:19 PM   Result Value Ref Range    Magnesium 1.9 1.6 - 2.6 mg/dL   PTT    Collection Time: 11/19/21  4:15 AM   Result Value Ref Range    aPTT 90.6 (H) 23.0 - 34.1 SEC   METABOLIC PANEL, COMPREHENSIVE    Collection Time: 11/19/21  4:15 AM   Result Value Ref Range    Sodium 135 (L) 136 - 145 mmol/L    Potassium 4.2 3.5 - 5.5 mmol/L    Chloride 101 100 - 111 mmol/L    CO2 28 21 - 32 mmol/L    Anion gap 6 3.0 - 18 mmol/L    Glucose 107 (H) 74 - 99 mg/dL    BUN 20 (H) 7.0 - 18 MG/DL    Creatinine 1.55 (H) 0.6 - 1.3 MG/DL    BUN/Creatinine ratio 13 12 - 20      GFR est AA 55 (L) >60 ml/min/1.73m2    GFR est non-AA 46 (L) >60 ml/min/1.73m2    Calcium 7.3 (L) 8.5 - 10.1 MG/DL    Bilirubin, total 0.8 0.2 - 1.0 MG/DL    ALT (SGPT) 29 16 - 61 U/L    AST (SGOT) 32 10 - 38 U/L    Alk. phosphatase 74 45 - 117 U/L    Protein, total 6.2 (L) 6.4 - 8.2 g/dL    Albumin 2.7 (L) 3.4 - 5.0 g/dL    Globulin 3.5 2.0 - 4.0 g/dL    A-G Ratio 0.8 0.8 - 1.7     CBC WITH AUTOMATED DIFF    Collection Time: 11/19/21  4:15 AM   Result Value Ref Range    WBC 4.3 (L) 4.6 - 13.2 K/uL    RBC 4.56 4.35 - 5.65 M/uL    HGB 13.6 13.0 - 16.0 g/dL    HCT 42.4 36.0 - 48.0 %    MCV 93.0 78.0 - 100.0 FL    MCH 29.8 24.0 - 34.0 PG    MCHC 32.1 31.0 - 37.0 g/dL    RDW 11.9 11.6 - 14.5 %    PLATELET 458 696 - 503 K/uL    MPV 13.7 (H) 9.2 - 11.8 FL    NRBC 0.0 0  WBC    ABSOLUTE NRBC 0.00 0.00 - 0.01 K/uL    NEUTROPHILS 37 (L) 40 - 73 %    LYMPHOCYTES 50 21 - 52 %    MONOCYTES 8 3 - 10 %    EOSINOPHILS 5 0 - 5 %    BASOPHILS 0 0 - 2 %    IMMATURE GRANULOCYTES 0 0.0 - 0.5 %    ABS. NEUTROPHILS 1.6 (L) 1.8 - 8.0 K/UL    ABS. LYMPHOCYTES 2.1 0.9 - 3.6 K/UL    ABS. MONOCYTES 0.3 0.05 - 1.2 K/UL    ABS. EOSINOPHILS 0.2 0.0 - 0.4 K/UL    ABS. BASOPHILS 0.0 0.0 - 0.1 K/UL    ABS. IMM. GRANS. 0.0 0.00 - 0.04 K/UL    DF AUTOMATED             Assessment and Plan:   57 y.o. male with PMH significant for HFrEF (92% 47/7845, systolic and diastolic), HLD, HTN, Hypoparathyroidism, CKD3, now admitted with acute on chronic CHF.     NSTEMI and CHF exacerbation in the setting of HFrEF (15% 05/2021), non-ischemic CMO,  HTN, CAD, Pulm HTN (53 mmHg), and history of SVT s/p alona ablation in 2018:   - Home medications: Carvedilol 12.5 BID, torsemide 20mg BID, lisinopril 5 mg QD, Nitoglycerin 0.4 mg PRN, Spirinolactone 25mg daily, Aspirin 81 mg every day   - Hx of non-compliance   - Most recently admitted in 05/2021 with concern CHF exacerbation and scrotal swelling; follow up with LifeVest ordered but patient has not been wearing it.   PLAN:  - Cardiology consulted, appreciate assistance  - Continuous telemetry  - Continue lisinopril,spirinolactone, aspirin, torsemide, coreg   - Strict I/Os, daily weights  - Will repeat echocardiogram, also repeat CXR tomorrow AM  - Continue to trend cardiac enzymes x3, will repeat EKG in the AM  - Daily CBC, BMP, Mg     STANLEY on CKDIII  - Cr 1.73>1.54>1.77>1.55  PLAN:  - Daily BMP     HLD   - At home takes Rosuvastatin 20mg daily  PLAN:  - Continue Rosuvastatin QHS     Hypoparathyroidism   - Noted per history, most recent PTH level in 2018 was wnl   - History of hypocalcemia  PLAN:   - Continue to trend   - Doubt this is contributing to current symptoms     History of embolic stroke   - Per [employer] records, in 2017 (multiple acute and subacute infarcts in the left cerebrum)   -continue heparin without acute concern     Pulmonary nodules   - Noted in discharge summary in 2017, CXR today without any note of similar nodules   - denies history of smoking        Diet NPO   DVT Prophylaxis Heparin   GI Prophylaxis Famotidine, Miralax   Code status FULL   Disposition >2MNs, Home      Point of Contact [address] [place] [address] Eduard Harper  [phone]       Reese Sage MD, PGY-1   [address] Medicine   Intern Pager: [phone]   November 19, 2021, 8:49 AM
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
## 2021-11-19 NOTE — ROUTINE PROCESS
0750-/Bedside and Verbal shift change report given to Jocelyn (oncoming nurse) by Geo Will (offgoing nurse). Report included the following information SBAR, MAR and Recent Results. 2023-/Bedside and Written shift change report given to Vincent (oncoming nurse) by Robson Randle (offgoing nurse). Report included the following information SBAR, MAR and Recent Results.

## 2021-11-19 NOTE — ROUTINE PROCESS
Bedside and Verbal shift change report given to Radha Reece RN (oncoming nurse) by ANDRE Graham RN (offgoing nurse). Report included the following information SBAR, Kardex, MAR and Recent Results.

## 2021-11-19 NOTE — PROGRESS NOTES
Brief Progress Note  [facility]      Subjective:      Notified of 21-beat run of ventricular tachycardia at approximately 9:45 PM. Patient noted to be asymptomatic at the time, sleeping comfortably. Went to patient's room to confirm asymptomatic. Objective:     Visit Vitals  /71 (BP 1 Location: Left upper arm, BP Patient Position: Semi fowlers; At rest)   Pulse 73   Temp 98.2 °F (36.8 °C)   Resp 20   Ht 6' (1.829 m)   Wt 80.7 kg (178 lb)   SpO2 97%   BMI 24.14 kg/m²       Physical Exam:  General: Well-appearing, NAD. Sleeping on entrance to room, though easily arousible. HEENT: Conjunctiva pink, sclera anicteric. PERRL. EOMI   CV:  RRR, no M/G/R  RESP: Mild coarse lung sounds in b/l bases. No increased work of breathing. ABD:  Soft, nontender, nondistended. Ext:  No edema. 2+ radial and dp pulses bilaterally. Psych: normal mood and affect. AAOx3. Assessment & Plan:     Ventricular tachycardia - Run of 21-beats, not sustained or repetitive at this time. Patient does have a significant cardiac history to include HFrEF, previously prescribed a LifeVest, but not worn. - On exam, confirmed that patient is asymptomatic. He specifically denied to me that he is without chest pain, shortness of breath. BP is softer than previous, though stable, shortly after this event. - Will continue to monitor for recurrence. - Remains on heparin, restarted Coreg   - Concern for hypokalemia; ordered stat BMP and Mg, will replete as necessary   - Given patient is asymptomatic, will not obtain cardiac panel at this time though will obtain EKG in the AM.    - Will discuss interrogation of loop recorder with Cardiology prior to discharge. For full assessment and plan, please see daily progress note.      Andres Robledo DO, PGY-1   [facility] Medicine   November 18, 2021, 11:44 PM
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
## 2021-11-19 NOTE — PROGRESS NOTES
4782: Attempted PT treatment. Declines d/t \"just waking up\". Educated on role of PT. Will follow up. 1125: 2nd PT attempt. Seated in bed. Reports he just worked with OT. Continues to decline skilled PT treatment. Denies further PT needs and request discharge. Denies mobility concerns with return home.  Will discontinue per patient request. Recorded as Task  Date: 05/19/2017 12:52 PM, Created By: Cassia Rahman  Task Name: 1. Rx Request  Assigned To: RO BRAUN  Regarding Patient: CATHY JARAMILLO, Status: In Progress  Comment:   Cassia Rahman - 19 May 2017 12:52 PM    Rx Refill  Out of Active Medication.    Dexmethylphenidate HCl - 5 MG Oral Tablet #30 Tablet, 1every afternoon between 11:30am -12PM, NO REFILLS, Evaluate: 12-Apr-2017  VERIFIED MEDICATION NAME, DOSAGE, INSTRUCTIONS ARE UNCHANGED: No   PRESCRIBING PROVIDER: RO BRAUN    Pharmacy:  IS THIS A MAIL ORDER PHARMACY?: No   PHARMACY NAME: N/A  NEW PHARMACY LOCATION: N/A  PHARMACY PHONE NUMBER: Please call mom when ready for  patient is out of medication.    CALLER'S RELATIONSHIP TO PATIENT: Nancy   IF OTHER, NAME AND RELATIONSHIP: Mom     BEST NUMBER TO BE CONTACTED: [phone]  ALTERNATIVE PHONE NUMBER: ___    Turnaround time given to caller:  \"THIS MESSAGE WILL BE SENT TO YOUR PROVIDER; THE CLINICAL TEAM WILL RETURN YOUR CALL BY THE END OF THE DAY.\"      READ BACK MESSAGE TO PATIENT  Beatriz Dos Santos - 19 May 2017 2:12 PM    TASK IN PROGRESS  Beatriz Dos Santos - 19 May 2017 2:32 PM    TASK EDITED  informed mom can  later this afternoon.      Electronically signed by:Beatriz Dos Santos CMA  May 19 2017  2:33PM CST

## 2021-11-20 LAB
ALBUMIN SERPL-MCNC: 2.9 G/DL (ref 3.4–5)
ALBUMIN/GLOB SERPL: 0.8 {RATIO} (ref 0.8–1.7)
ALP SERPL-CCNC: 81 U/L (ref 45–117)
ALT SERPL-CCNC: 27 U/L (ref 16–61)
ANION GAP SERPL CALC-SCNC: 7 MMOL/L (ref 3–18)
AST SERPL-CCNC: 24 U/L (ref 10–38)
BASOPHILS # BLD: 0 K/UL (ref 0–0.1)
BASOPHILS NFR BLD: 1 % (ref 0–2)
BILIRUB SERPL-MCNC: 0.5 MG/DL (ref 0.2–1)
BUN SERPL-MCNC: 19 MG/DL (ref 7–18)
BUN/CREAT SERPL: 11 (ref 12–20)
CALCIUM SERPL-MCNC: 7.7 MG/DL (ref 8.5–10.1)
CHLORIDE SERPL-SCNC: 101 MMOL/L (ref 100–111)
CO2 SERPL-SCNC: 28 MMOL/L (ref 21–32)
CREAT SERPL-MCNC: 1.77 MG/DL (ref 0.6–1.3)
DIFFERENTIAL METHOD BLD: ABNORMAL
EOSINOPHIL # BLD: 0.2 K/UL (ref 0–0.4)
EOSINOPHIL NFR BLD: 5 % (ref 0–5)
ERYTHROCYTE [DISTWIDTH] IN BLOOD BY AUTOMATED COUNT: 11.8 % (ref 11.6–14.5)
GLOBULIN SER CALC-MCNC: 3.7 G/DL (ref 2–4)
GLUCOSE BLD STRIP.AUTO-MCNC: 136 MG/DL (ref 70–110)
GLUCOSE BLD STRIP.AUTO-MCNC: 428 MG/DL (ref 70–110)
GLUCOSE SERPL-MCNC: 106 MG/DL (ref 74–99)
HCT VFR BLD AUTO: 44.7 % (ref 36–48)
HGB BLD-MCNC: 14.3 G/DL (ref 13–16)
IMM GRANULOCYTES # BLD AUTO: 0 K/UL (ref 0–0.04)
IMM GRANULOCYTES NFR BLD AUTO: 0 % (ref 0–0.5)
LYMPHOCYTES # BLD: 1.8 K/UL (ref 0.9–3.6)
LYMPHOCYTES NFR BLD: 45 % (ref 21–52)
MAGNESIUM SERPL-MCNC: 1.8 MG/DL (ref 1.6–2.6)
MCH RBC QN AUTO: 29.5 PG (ref 24–34)
MCHC RBC AUTO-ENTMCNC: 32 G/DL (ref 31–37)
MCV RBC AUTO: 92.4 FL (ref 78–100)
MONOCYTES # BLD: 0.4 K/UL (ref 0.05–1.2)
MONOCYTES NFR BLD: 9 % (ref 3–10)
NEUTS SEG # BLD: 1.6 K/UL (ref 1.8–8)
NEUTS SEG NFR BLD: 40 % (ref 40–73)
NRBC # BLD: 0 K/UL (ref 0–0.01)
NRBC BLD-RTO: 0 PER 100 WBC
PLATELET # BLD AUTO: 160 K/UL (ref 135–420)
PMV BLD AUTO: 13.8 FL (ref 9.2–11.8)
POTASSIUM SERPL-SCNC: 4 MMOL/L (ref 3.5–5.5)
PROT SERPL-MCNC: 6.6 G/DL (ref 6.4–8.2)
RBC # BLD AUTO: 4.84 M/UL (ref 4.35–5.65)
SODIUM SERPL-SCNC: 136 MMOL/L (ref 136–145)
WBC # BLD AUTO: 4 K/UL (ref 4.6–13.2)

## 2021-11-20 PROCEDURE — 83735 ASSAY OF MAGNESIUM: CPT

## 2021-11-20 PROCEDURE — 74011250636 HC RX REV CODE- 250/636: Performed by: STUDENT IN AN ORGANIZED HEALTH CARE EDUCATION/TRAINING PROGRAM

## 2021-11-20 PROCEDURE — 65660000000 HC RM CCU STEPDOWN

## 2021-11-20 PROCEDURE — 80053 COMPREHEN METABOLIC PANEL: CPT

## 2021-11-20 PROCEDURE — 85025 COMPLETE CBC W/AUTO DIFF WBC: CPT

## 2021-11-20 PROCEDURE — 74011250637 HC RX REV CODE- 250/637: Performed by: STUDENT IN AN ORGANIZED HEALTH CARE EDUCATION/TRAINING PROGRAM

## 2021-11-20 PROCEDURE — 36415 COLL VENOUS BLD VENIPUNCTURE: CPT

## 2021-11-20 PROCEDURE — 82962 GLUCOSE BLOOD TEST: CPT

## 2021-11-20 PROCEDURE — 74011250637 HC RX REV CODE- 250/637: Performed by: PHYSICIAN ASSISTANT

## 2021-11-20 PROCEDURE — 99232 SBSQ HOSP IP/OBS MODERATE 35: CPT | Performed by: INTERNAL MEDICINE

## 2021-11-20 RX ORDER — FLUTICASONE PROPIONATE 50 MCG
2 SPRAY, SUSPENSION (ML) NASAL DAILY
Status: DISCONTINUED | OUTPATIENT
Start: 2021-11-21 | End: 2021-11-23 | Stop reason: HOSPADM

## 2021-11-20 RX ORDER — MAGNESIUM SULFATE HEPTAHYDRATE 40 MG/ML
2 INJECTION, SOLUTION INTRAVENOUS ONCE
Status: COMPLETED | OUTPATIENT
Start: 2021-11-20 | End: 2021-11-20

## 2021-11-20 RX ADMIN — ASPIRIN 81 MG CHEWABLE TABLET 81 MG: 81 TABLET CHEWABLE at 08:28

## 2021-11-20 RX ADMIN — TORSEMIDE 20 MG: 20 TABLET ORAL at 17:37

## 2021-11-20 RX ADMIN — MAGNESIUM SULFATE HEPTAHYDRATE 2 G: 2 INJECTION, SOLUTION INTRAVENOUS at 11:17

## 2021-11-20 RX ADMIN — CARVEDILOL 12.5 MG: 12.5 TABLET, FILM COATED ORAL at 17:37

## 2021-11-20 RX ADMIN — Medication 10 ML: at 06:03

## 2021-11-20 RX ADMIN — FAMOTIDINE 20 MG: 20 TABLET ORAL at 08:27

## 2021-11-20 RX ADMIN — SPIRONOLACTONE 25 MG: 25 TABLET ORAL at 08:28

## 2021-11-20 RX ADMIN — ROSUVASTATIN CALCIUM 20 MG: 10 TABLET, FILM COATED ORAL at 22:19

## 2021-11-20 RX ADMIN — LISINOPRIL 5 MG: 10 TABLET ORAL at 08:28

## 2021-11-20 RX ADMIN — TORSEMIDE 20 MG: 20 TABLET ORAL at 08:28

## 2021-11-20 RX ADMIN — Medication 10 ML: at 22:19

## 2021-11-20 RX ADMIN — CARVEDILOL 12.5 MG: 12.5 TABLET, FILM COATED ORAL at 08:28

## 2021-11-20 RX ADMIN — Medication 10 ML: at 17:37

## 2021-11-20 NOTE — PROGRESS NOTES
[address] [facility]  Intern Progress Note    Patient: Melissa Ramos MRN: [id number]   SSN: [id number]  YOB: 1960   Age: 57 y.o. Sex: male      Admit Date: 11/17/2021    LOS: 3 days   Chief Complaint   Patient presents with    Dizziness    Shortness of Breath       Subjective:   Patient was seen at bedside this morning and had no complaints or concerns. ROS    Denies shortness of breath, chest pain, abdominal pain. Objective:     Visit Vitals  /70 (BP 1 Location: Left arm, BP Patient Position: At rest)   Pulse 69   Temp 98 °F (36.7 °C)   Resp 18   Ht 6' (1.829 m)   Wt 77.7 kg (171 lb 4.8 oz)   SpO2 97%   BMI 23.23 kg/m²       Physical Exam:   General:  AAOx3, NAD,   HEENT: Conjunctiva pink, sclera anicteric.  Pharynx moist, nonerythematous.    CV:  RRR, no murmurs. No visible pulsations or thrills.    RESP:  Unlabored breathing.  Lungs clear to auscultation breath sounds. Equal expansion bilaterally.    ABD:  Soft, nontender, nondistended. BS (+).   No suprapubic tenderness. MS:  No joint deformity or instability.  No atrophy. Neuro:  CN II-XII grossly intact. 5/5 strength bilateral upper extremities and lower extremities. Ext:  No edema.    Skin:  No rashes, lesions, or ulcers. Intake and Output:  Current Shift: No intake/output data recorded.   Last three shifts: 11/18 1901 - 11/20 0700  In: -   Out: 1150 [Urine:1150]    Lab/Data Review:  Recent Results (from the past 12 hour(s))   METABOLIC PANEL, COMPREHENSIVE    Collection Time: 11/20/21  2:03 AM   Result Value Ref Range    Sodium 136 136 - 145 mmol/L    Potassium 4.0 3.5 - 5.5 mmol/L    Chloride 101 100 - 111 mmol/L    CO2 28 21 - 32 mmol/L    Anion gap 7 3.0 - 18 mmol/L    Glucose 106 (H) 74 - 99 mg/dL    BUN 19 (H) 7.0 - 18 MG/DL    Creatinine 1.77 (H) 0.6 - 1.3 MG/DL    BUN/Creatinine ratio 11 (L) 12 - 20      GFR est AA 47 (L) >60 ml/min/1.73m2    GFR est non-AA 39 (L) >60 ml/min/1.73m2    Calcium 7.7 (L) 8.5 - 10.1 MG/DL    Bilirubin, total 0.5 0.2 - 1.0 MG/DL    ALT (SGPT) 27 16 - 61 U/L    AST (SGOT) 24 10 - 38 U/L    Alk. phosphatase 81 45 - 117 U/L    Protein, total 6.6 6.4 - 8.2 g/dL    Albumin 2.9 (L) 3.4 - 5.0 g/dL    Globulin 3.7 2.0 - 4.0 g/dL    A-G Ratio 0.8 0.8 - 1.7     CBC WITH AUTOMATED DIFF    Collection Time: 11/20/21  2:03 AM   Result Value Ref Range    WBC 4.0 (L) 4.6 - 13.2 K/uL    RBC 4.84 4.35 - 5.65 M/uL    HGB 14.3 13.0 - 16.0 g/dL    HCT 44.7 36.0 - 48.0 %    MCV 92.4 78.0 - 100.0 FL    MCH 29.5 24.0 - 34.0 PG    MCHC 32.0 31.0 - 37.0 g/dL    RDW 11.8 11.6 - 14.5 %    PLATELET 349 497 - 319 K/uL    MPV 13.8 (H) 9.2 - 11.8 FL    NRBC 0.0 0  WBC    ABSOLUTE NRBC 0.00 0.00 - 0.01 K/uL    NEUTROPHILS 40 40 - 73 %    LYMPHOCYTES 45 21 - 52 %    MONOCYTES 9 3 - 10 %    EOSINOPHILS 5 0 - 5 %    BASOPHILS 1 0 - 2 %    IMMATURE GRANULOCYTES 0 0.0 - 0.5 %    ABS. NEUTROPHILS 1.6 (L) 1.8 - 8.0 K/UL    ABS. LYMPHOCYTES 1.8 0.9 - 3.6 K/UL    ABS. MONOCYTES 0.4 0.05 - 1.2 K/UL    ABS. EOSINOPHILS 0.2 0.0 - 0.4 K/UL    ABS. BASOPHILS 0.0 0.0 - 0.1 K/UL    ABS. IMM. GRANS. 0.0 0.00 - 0.04 K/UL    DF AUTOMATED     MAGNESIUM    Collection Time: 11/20/21  2:03 AM   Result Value Ref Range    Magnesium 1.8 1.6 - 2.6 mg/dL           Assessment and Plan:     57 y. o. male with PMH significant for HFrEF (30% 28/2547, systolic and diastolic), HLD, HTN, Hypoparathyroidism, CKD3, now admitted with acute on chronic CHF.     NSTEMI and CHF exacerbation in the setting of HFrEF (15% 05/2021), non-ischemic CMO,  HTN, CAD, Pulm HTN (53 mmHg), and history of SVT s/p alona ablation in 2018:   - Home medications: Carvedilol 12.5 BID, torsemide 20mg BID, lisinopril 5 mg QD, Nitoglycerin 0.4 mg PRN, Spirinolactone 25mg daily, Aspirin 81 mg every day   - Hx of non-compliance   - Most recently admitted in 05/2021 with concern CHF exacerbation and scrotal swelling; follow up with LifeVest ordered but patient has not been wearing it.   PLAN:  - Cardiology consulted, appreciate assistance  - Continuous telemetry  - Continue lisinopril,spirinolactone, aspirin, torsemide, coreg   - Strict I/Os, daily weights  - Will repeat echocardiogram, also repeat CXR tomorrow AM  - Continue to trend cardiac enzymes x3,  - Daily CBC, BMP, Mg     STANLEY on CKDIII  - Cr 1.73>1.54>1.77>1.55>1.77  PLAN:  - Daily BMP     HLD   - At home takes Rosuvastatin 20mg daily  PLAN:  - Continue Rosuvastatin QHS     Hypoparathyroidism   - Noted per history, most recent PTH level in 2018 was wnl   - History of hypocalcemia  PLAN:   - Continue to trend   - Doubt this is contributing to current symptoms     History of embolic stroke   - Per [employer] records, in 2017 (multiple acute and subacute infarcts in the left cerebrum)   -continue heparin without acute concern     Pulmonary nodules   - Noted in discharge summary in 2017, CXR without any note of similar nodules   - denies history of smoking        Diet NPO   DVT Prophylaxis Heparin   GI Prophylaxis Famotidine, Miralax   Code status FULL   Disposition >2MNs, Home      Point of Contact Johnathon Mood  Relationship: Sister  [phone]       Adriel Mast MD, PGY-1   [address] Medicine   Intern Pager: [phone]   November 20, 2021, 9:16 AM

## 2021-11-20 NOTE — ROUTINE PROCESS
0715  -- Bedside, Verbal and Written shift change report received by Helyn Leyden (oncoming nurse) by Heather(offgoing nurse). Allergy band placed on pt's wrist. Report included the following information SBAR, Kardex, Intake/Output, MAR and Recent Results. 6085- AM  medications administered, pt tolerated with ease, will continue to monitor. 1200 -- Shift reassessment, pt condition unchanged, will continue to monitor. 1600--  Shift reassessment, pt condition unchanged, will continue to monitor. 1920- Bedside, Verbal and Written shift change report given to Heather(oncoming nurse) by Helyn Leyden (offgoing nurse). Report included the following information SBAR, Kardex, Intake/Output, MAR and Recent Results. Skin assessment completed.

## 2021-11-20 NOTE — ROUTINE PROCESS
Bedside shift change report given to [facility] (oncoming nurse) by Naty Acuña LPN (offgoing nurse). Report included the following information SBAR.

## 2021-11-20 NOTE — PROGRESS NOTES
Cardiology Progress Note    Admit Date: 11/17/2021  Attending Cardiologist: Dr. Adrianna Cardona:     Hospital Problems  Date Reviewed: 2/15/2018          Codes Class Noted POA    CHF (congestive heart failure) ([facility] [address] ICD-10-CM: I50.9  ICD-9-CM: 428.0  11/17/2021 Unknown        * (Principal) NSTEMI (non-ST elevated myocardial infarction) ([facility] [address] ICD-10-CM: I21.4  ICD-9-CM: 410.70  11/17/2021 Unknown        Shortness of breath ICD-10-CM: R06.02  ICD-9-CM: 786.05  11/17/2021 Unknown            -Acute on chronic HFrEF. Presented with progressively worsening SOB x 5 days, PND and various other complaints. ECHO this admission EF 20-25%. -NSTEMI, Type II. Not c/w ACS, suspect demand in the setting of CHF, STANLEY and cocaine use. Troponin peaked at 0.38. ECG with chronic anterolateral T wave changes. No significant CAD seen on heart cath from 10/2020.   -STANLEY on CKD.   -Hypokalemia.    -Hypomagnesia   -NICMY ECHO (05/2021) EF 15-20%, Grade 1 LV DD,   ? on Lisinopril, aldactone and coreg as outpatient. ?  No longer on Entresto d/t affordability. ? Non-compliant with Life Vest  -Non-Significant CAD, s/p LHC (10/2020)   ? Left MAIN CAD 40-50%   ? non obstructive LAD and RCA disease   -s/p RHC (10/2020)   -HLD, on statin.   -Hx SVT, s/p Implantable loop recorder placement (2018)   -s/p AVNRT ablation (2018)   -Hx Non-compliance with medications   -Hx embolic stroke (0208)   -Cocaine Abuse, UDS + this admission.         Primary Cardiologist: Dr. Erickson Esparza:      Addendum: Independently seen and evaluated. Agree with below. We will continue optimal medical regimen for dilated cardiomyopathy. Would consider AICD implantation for primary prevention of SCD.     -Burst of atrial tach/SVT with abbarency noted on tele overnight.   -Unable to interrogate loop recorder because of battery depletion.    -Continue coreg, lisinopril, aldactone and statin.   -Rising Creat - follow renal function.  -Patient's LV function has not improved, recommend AICD placement. Subjective:     No new complaints. Denies chest pain or shortness of breath. Edema improved.     Objective:      Patient Vitals for the past 8 hrs:   Temp Pulse Resp BP SpO2   11/20/21 0855 98 °F (36.7 °C) 69 18 116/70 97 %   11/20/21 0405 98.4 °F (36.9 °C) 73 19 130/88 97 %         Patient Vitals for the past 96 hrs:   Weight   11/20/21 0110 77.7 kg (171 lb 4.8 oz)   11/18/21 1344 80.7 kg (178 lb)   11/17/21 1829 80.7 kg (178 lb)       TELE: normal sinus rhythm               Current Facility-Administered Medications   Medication Dose Route Frequency Last Admin    magnesium sulfate 2 g/50 ml IVPB (premix or compounded)  2 g IntraVENous ONCE      carvediloL (COREG) tablet 12.5 mg  12.5 mg Oral BID WITH MEALS 12.5 mg at 11/20/21 0828    lisinopriL (PRINIVIL, ZESTRIL) tablet 5 mg  5 mg Oral DAILY 5 mg at 11/20/21 0828    rosuvastatin (CRESTOR) tablet 20 mg  20 mg Oral QHS 20 mg at 11/19/21 2209    spironolactone (ALDACTONE) tablet 25 mg  25 mg Oral DAILY 25 mg at 11/20/21 0828    torsemide (DEMADEX) tablet 20 mg  20 mg Oral BID 20 mg at 11/20/21 0828    famotidine (PEPCID) tablet 20 mg  20 mg Oral DAILY 20 mg at 11/20/21 0827    aspirin chewable tablet 81 mg  81 mg Oral DAILY 81 mg at 11/20/21 0828    sodium chloride (NS) flush 5-40 mL  5-40 mL IntraVENous Q8H 10 mL at 11/20/21 0603    sodium chloride (NS) flush 5-40 mL  5-40 mL IntraVENous PRN      acetaminophen (TYLENOL) tablet 650 mg  650 mg Oral Q6H  mg at 11/19/21 5911    Or    acetaminophen (TYLENOL) suppository 650 mg  650 mg Rectal Q6H PRN      polyethylene glycol (MIRALAX) packet 17 g  17 g Oral DAILY PRN           Intake/Output Summary (Last 24 hours) at 11/20/2021 1057  Last data filed at 11/20/2021 0404  Gross per 24 hour   Intake --   Output 1150 ml   Net -1150 ml       Physical Exam:  General:  alert, cooperative, no distress, appears stated age  Neck:  no JVD  Lungs:  clear to auscultation bilaterally  Heart:  regular rate and rhythm, S1, S2 normal, no murmur, click, rub or gallop  Abdomen:  abdomen is soft without significant tenderness, masses, organomegaly or guarding  Extremities:  extremities normal, atraumatic, no cyanosis or edema    Visit Vitals  /70 (BP 1 Location: Left arm, BP Patient Position: At rest)   Pulse 69   Temp 98 °F (36.7 °C)   Resp 18   Ht 6' (1.829 m)   Wt 77.7 kg (171 lb 4.8 oz)   SpO2 97%   BMI 23.23 kg/m²       Data Review:     Labs: Results:       Chemistry Recent Labs     11/20/21 0203 11/19/21 0415 11/18/21 2119 11/18/21 0330 11/18/21 0330 11/17/21 2124 11/17/21 2124   * 107* 161*   < > 106*   < > 128*    135* 138   < > 138   < > 139   K 4.0 4.2 3.7   < > 3.4*   < > 3.8    101 104   < > 103   < > 103   CO2 28 28 28   < > 27   < > 28   BUN 19* 20* 19*   < > 22*   < > 22*   CREA 1.77* 1.55* 1.77*   < > 1.54*   < > 1.73*   CA 7.7* 7.3* 7.4*   < > 7.4*   < > 7.2*   MG 1.8  --  1.9  --   --   --  1.4*   AGAP 7 6 6   < > 8   < > 8   BUCR 11* 13 11*   < > 14   < > 13   AP 81 74  --   --  75   < > 77   TP 6.6 6.2*  --   --  6.5   < > 6.6   ALB 2.9* 2.7*  --   --  2.6*   < > 2.8*   GLOB 3.7 3.5  --   --  3.9   < > 3.8   AGRAT 0.8 0.8  --   --  0.7*   < > 0.7*    < > = values in this interval not displayed. CBC w/Diff Recent Labs     11/20/21 0203 11/19/21 0415 11/18/21 0335   WBC 4.0* 4.3* 3.6*   RBC 4.84 4.56 4.45   HGB 14.3 13.6 13.5   HCT 44.7 42.4 40.7    153 144   GRANS 40 37* 32*   LYMPH 45 50 50   EOS 5 5 3      Cardiac Enzymes No results found for: CPK, CK, CKMMB, CKMB, RCK3, CKMBT, CKNDX, CKND1, SEBASTIÁN, TROPT, TROIQ, YANETH, TROPT, TNIPOC, BNP, BNPP   Coagulation Recent Labs     11/19/21  0415 11/18/21  2119 11/17/21  2150 11/17/21  1840   PTP  --   --   --  15.4*   INR  --   --   --  1.2   APTT 90.6* 74.1*   < >  --     < > = values in this interval not displayed.        Lipid Panel Lab Results   Component Value Date/Time Cholesterol, total 159 11/17/2021 09:24 PM    HDL Cholesterol 29 (L) 11/17/2021 09:24 PM    LDL, calculated 105.2 (H) 11/17/2021 09:24 PM    VLDL, calculated 24.8 11/17/2021 09:24 PM    Triglyceride 124 11/17/2021 09:24 PM    CHOL/HDL Ratio 5.5 (H) 11/17/2021 09:24 PM      BNP No results found for: BNP, BNPP, XBNPT   Liver Enzymes Recent Labs     11/20/21  0203   TP 6.6   ALB 2.9*   AP 81      Thyroid Studies Lab Results   Component Value Date/Time    TSH 0.72 02/28/2016 07:15 PM          Signed By: Nguyen Eid NP     November 20, 2021

## 2021-11-20 NOTE — MED STUDENT NOTES
*ATTENTION:  This note has been created by a medical student for educational purposes only. Please do not refer to the content of this note for clinical decision-making, billing, or other purposes. Please see attending physicians note to obtain clinical information on this patient. *         Progress Note  [facility]    THIS IS A MEDICAL STUDENT NOTE AND IS FOR EDUCATIONAL PURPOSES ONLY. PLEASE REFER TO ATTENDING NOTE. Patient: Drema Hodgkins MRN: [id number]  CSN: [id number]    YOB: 1960  Age: 57 y.o. Sex: male    DOA: 11/17/2021 LOS:  LOS: 3 days                   57 y. o. male w/ PMH HFrEF (20-25%), HLD, HTN, hypoparathyroidism, CKD3, admitted with possible NSTEMI and HF exacerbation. Subjective:     Pt is sleeping when I saw him this AM. Reports he didn't sleep well last night due to having a persistent cough. States his cough has lasted 1.5 months and is productive for yellow sputum. Endorses associated rhinorrhea. Upon further questioning pt reminded me that he was trying to sleep when I walked in and he's tired. Pt denies SOB, CP. Denies diarrhea, constipation, N, or V. I asked the pt if he feels motivated to resume wearing his life vest after this hospitalization and he responded, \"no,\" and mentioned again he was trying to sleep.       ROS:  HEENT: Productive ough, Rhinorrhea  Cardiovascular: No CP, SOB  GI: No N, V, constipation, diarrhea      Objective:     Patient Vitals for the past 24 hrs:   Temp Pulse Resp BP SpO2   11/20/21 0405 98.4 °F (36.9 °C) 73 19 130/88 97 %   11/20/21 0006 98.2 °F (36.8 °C) 71 19 126/79 97 %   11/19/21 1954 98.1 °F (36.7 °C) 73 19 (!) 153/91 100 %   11/19/21 1714 98.1 °F (36.7 °C) 72 18 133/76 100 %   11/19/21 1236 97.8 °F (36.6 °C) 64 20 129/85 99 %         Intake/Output Summary (Last 24 hours) at 11/20/2021 0835  Last data filed at 11/20/2021 0404  Gross per 24 hour   Intake    Output 1150 ml   Net -1150 ml Vitals:  Vitals reviewed and are stable      Labs:  - Troponin = 0.25 (previously 0.34, it has continued to trend downwards)  - Calcium (corrected for low albumin) - 8.6  - Creatinine = 1.77          Physical Exam:     Constitutional: Sleepy-appearing, eyes closed  Cardiovascular: RRR, no mumurs, rubs, or gallops. Respiratory: Lungs clear to anterior lung fields  GI: Normal bowel sounds. Abdomen soft, non-tender, and non-distended    Lab/Data Reviewed:  Recent Results (from the past 24 hour(s))   METABOLIC PANEL, COMPREHENSIVE    Collection Time: 11/20/21  2:03 AM   Result Value Ref Range    Sodium 136 136 - 145 mmol/L    Potassium 4.0 3.5 - 5.5 mmol/L    Chloride 101 100 - 111 mmol/L    CO2 28 21 - 32 mmol/L    Anion gap 7 3.0 - 18 mmol/L    Glucose 106 (H) 74 - 99 mg/dL    BUN 19 (H) 7.0 - 18 MG/DL    Creatinine 1.77 (H) 0.6 - 1.3 MG/DL    BUN/Creatinine ratio 11 (L) 12 - 20      GFR est AA 47 (L) >60 ml/min/1.73m2    GFR est non-AA 39 (L) >60 ml/min/1.73m2    Calcium 7.7 (L) 8.5 - 10.1 MG/DL    Bilirubin, total 0.5 0.2 - 1.0 MG/DL    ALT (SGPT) 27 16 - 61 U/L    AST (SGOT) 24 10 - 38 U/L    Alk. phosphatase 81 45 - 117 U/L    Protein, total 6.6 6.4 - 8.2 g/dL    Albumin 2.9 (L) 3.4 - 5.0 g/dL    Globulin 3.7 2.0 - 4.0 g/dL    A-G Ratio 0.8 0.8 - 1.7     CBC WITH AUTOMATED DIFF    Collection Time: 11/20/21  2:03 AM   Result Value Ref Range    WBC 4.0 (L) 4.6 - 13.2 K/uL    RBC 4.84 4.35 - 5.65 M/uL    HGB 14.3 13.0 - 16.0 g/dL    HCT 44.7 36.0 - 48.0 %    MCV 92.4 78.0 - 100.0 FL    MCH 29.5 24.0 - 34.0 PG    MCHC 32.0 31.0 - 37.0 g/dL    RDW 11.8 11.6 - 14.5 %    PLATELET 503 417 - 913 K/uL    MPV 13.8 (H) 9.2 - 11.8 FL    NRBC 0.0 0  WBC    ABSOLUTE NRBC 0.00 0.00 - 0.01 K/uL    NEUTROPHILS 40 40 - 73 %    LYMPHOCYTES 45 21 - 52 %    MONOCYTES 9 3 - 10 %    EOSINOPHILS 5 0 - 5 %    BASOPHILS 1 0 - 2 %    IMMATURE GRANULOCYTES 0 0.0 - 0.5 %    ABS. NEUTROPHILS 1.6 (L) 1.8 - 8.0 K/UL    ABS. LYMPHOCYTES 1.8 0.9 - 3.6 K/UL    ABS. MONOCYTES 0.4 0.05 - 1.2 K/UL    ABS. EOSINOPHILS 0.2 0.0 - 0.4 K/UL    ABS. BASOPHILS 0.0 0.0 - 0.1 K/UL    ABS. IMM. GRANS. 0.0 0.00 - 0.04 K/UL    DF AUTOMATED     MAGNESIUM    Collection Time: 11/20/21  2:03 AM   Result Value Ref Range    Magnesium 1.8 1.6 - 2.6 mg/dL       Assessment & Plan:   57 y. o. male w/ PMH HFrEF (20-25%), HLD, HTN, hypoparathyroidism, CKD3, admitted with NSTEMI and HF exacerbation.      NSTEMI with acute exacerbation of HF (EF = 20-25%), Cardiomyopathy, HTN, CAD, pulmonary HTN, and hx of SVT s/p alona ablation in 2018  - Pt takes Carvedilol 12.5mg BID, Torsemide 20mg BID, Lisinopril 5mg every day, NTG 0.4g PRN, Spironolactone 25mg daily, and ASA 81mg daily. Unsure about medication compliance.      Plan: Maintain K+ at level of 4 as per recommended by Cards. Continue ASA and Statin, Coreg. Pt counseled on wearing his life vest. If pt needs another K+ replacement then we can administer PO replacement to avoid painful infusion. Pts cardiac enzymes will continue to be trended and another EKG will be performed today. Pt to receive a daily CBC, BMP, and Mg. I's & O's to be monitored. Pt was counseled on cessation of cocaine/stimulants.  Pt is cleared for d/c pending Cardiology clearance.      HLD  - Continue home med Rosuvastatin 20mg daily     STANLEY on CKD stage 3  - Most recent Cr = 1.77 in setting of elevated Cr readings  - Will obtain daily BMP for this     Hx Stroke  - Pt to continue receiving heparin ppx     Hx Hypoparathyroidism/Hypocalcemia  - Will monitor Ca++  - Corrected Calcium of 8.6 is WNL    Diet NPO   DVT Prophylaxis Heparin   GI Prophylaxis Famotidine   Code status Full   Disposition Home     Tod Kuhn , MS-3   [facility]   November 20, 2021, 8:35 AM

## 2021-11-21 LAB
ALBUMIN SERPL-MCNC: 3 G/DL (ref 3.4–5)
ALBUMIN/GLOB SERPL: 0.7 {RATIO} (ref 0.8–1.7)
ALP SERPL-CCNC: 85 U/L (ref 45–117)
ALT SERPL-CCNC: 29 U/L (ref 16–61)
ANION GAP SERPL CALC-SCNC: 6 MMOL/L (ref 3–18)
AST SERPL-CCNC: 27 U/L (ref 10–38)
BASOPHILS # BLD: 0 K/UL (ref 0–0.1)
BASOPHILS NFR BLD: 0 % (ref 0–2)
BILIRUB SERPL-MCNC: 0.7 MG/DL (ref 0.2–1)
BUN SERPL-MCNC: 21 MG/DL (ref 7–18)
BUN/CREAT SERPL: 11 (ref 12–20)
CALCIUM SERPL-MCNC: 7.9 MG/DL (ref 8.5–10.1)
CHLORIDE SERPL-SCNC: 98 MMOL/L (ref 100–111)
CO2 SERPL-SCNC: 31 MMOL/L (ref 21–32)
CREAT SERPL-MCNC: 1.91 MG/DL (ref 0.6–1.3)
DIFFERENTIAL METHOD BLD: ABNORMAL
EOSINOPHIL # BLD: 0.2 K/UL (ref 0–0.4)
EOSINOPHIL NFR BLD: 4 % (ref 0–5)
ERYTHROCYTE [DISTWIDTH] IN BLOOD BY AUTOMATED COUNT: 11.5 % (ref 11.6–14.5)
GLOBULIN SER CALC-MCNC: 4.6 G/DL (ref 2–4)
GLUCOSE SERPL-MCNC: 106 MG/DL (ref 74–99)
HCT VFR BLD AUTO: 47.6 % (ref 36–48)
HGB BLD-MCNC: 15.3 G/DL (ref 13–16)
IMM GRANULOCYTES # BLD AUTO: 0 K/UL (ref 0–0.04)
IMM GRANULOCYTES NFR BLD AUTO: 0 % (ref 0–0.5)
LYMPHOCYTES # BLD: 1.8 K/UL (ref 0.9–3.6)
LYMPHOCYTES NFR BLD: 31 % (ref 21–52)
MCH RBC QN AUTO: 29.4 PG (ref 24–34)
MCHC RBC AUTO-ENTMCNC: 32.1 G/DL (ref 31–37)
MCV RBC AUTO: 91.5 FL (ref 78–100)
MONOCYTES # BLD: 0.6 K/UL (ref 0.05–1.2)
MONOCYTES NFR BLD: 11 % (ref 3–10)
NEUTS SEG # BLD: 3.3 K/UL (ref 1.8–8)
NEUTS SEG NFR BLD: 55 % (ref 40–73)
NRBC # BLD: 0 K/UL (ref 0–0.01)
NRBC BLD-RTO: 0 PER 100 WBC
PLATELET # BLD AUTO: 170 K/UL (ref 135–420)
PMV BLD AUTO: 13.6 FL (ref 9.2–11.8)
POTASSIUM SERPL-SCNC: 3.4 MMOL/L (ref 3.5–5.5)
PROT SERPL-MCNC: 7.6 G/DL (ref 6.4–8.2)
RBC # BLD AUTO: 5.2 M/UL (ref 4.35–5.65)
SODIUM SERPL-SCNC: 135 MMOL/L (ref 136–145)
WBC # BLD AUTO: 6 K/UL (ref 4.6–13.2)

## 2021-11-21 PROCEDURE — 74011250636 HC RX REV CODE- 250/636: Performed by: STUDENT IN AN ORGANIZED HEALTH CARE EDUCATION/TRAINING PROGRAM

## 2021-11-21 PROCEDURE — 99232 SBSQ HOSP IP/OBS MODERATE 35: CPT | Performed by: INTERNAL MEDICINE

## 2021-11-21 PROCEDURE — 36415 COLL VENOUS BLD VENIPUNCTURE: CPT

## 2021-11-21 PROCEDURE — 74011250637 HC RX REV CODE- 250/637: Performed by: STUDENT IN AN ORGANIZED HEALTH CARE EDUCATION/TRAINING PROGRAM

## 2021-11-21 PROCEDURE — 65660000000 HC RM CCU STEPDOWN

## 2021-11-21 PROCEDURE — 74011250637 HC RX REV CODE- 250/637: Performed by: PHYSICIAN ASSISTANT

## 2021-11-21 PROCEDURE — 85025 COMPLETE CBC W/AUTO DIFF WBC: CPT

## 2021-11-21 PROCEDURE — 80053 COMPREHEN METABOLIC PANEL: CPT

## 2021-11-21 RX ORDER — POTASSIUM CHLORIDE 7.45 MG/ML
10 INJECTION INTRAVENOUS
Status: DISCONTINUED | OUTPATIENT
Start: 2021-11-21 | End: 2021-11-21

## 2021-11-21 RX ORDER — POTASSIUM CHLORIDE 20 MEQ/1
20 TABLET, EXTENDED RELEASE ORAL 2 TIMES DAILY
Status: COMPLETED | OUTPATIENT
Start: 2021-11-21 | End: 2021-11-21

## 2021-11-21 RX ADMIN — ASPIRIN 81 MG CHEWABLE TABLET 81 MG: 81 TABLET CHEWABLE at 08:08

## 2021-11-21 RX ADMIN — FAMOTIDINE 20 MG: 20 TABLET ORAL at 08:08

## 2021-11-21 RX ADMIN — TORSEMIDE 20 MG: 20 TABLET ORAL at 08:09

## 2021-11-21 RX ADMIN — POTASSIUM CHLORIDE 20 MEQ: 1500 TABLET, EXTENDED RELEASE ORAL at 10:26

## 2021-11-21 RX ADMIN — Medication 10 ML: at 17:00

## 2021-11-21 RX ADMIN — Medication 10 ML: at 06:07

## 2021-11-21 RX ADMIN — LISINOPRIL 5 MG: 10 TABLET ORAL at 08:08

## 2021-11-21 RX ADMIN — FLUTICASONE PROPIONATE 2 SPRAY: 50 SPRAY, METERED NASAL at 10:44

## 2021-11-21 RX ADMIN — TORSEMIDE 20 MG: 20 TABLET ORAL at 17:07

## 2021-11-21 RX ADMIN — SPIRONOLACTONE 25 MG: 25 TABLET ORAL at 08:08

## 2021-11-21 RX ADMIN — CARVEDILOL 12.5 MG: 12.5 TABLET, FILM COATED ORAL at 16:52

## 2021-11-21 RX ADMIN — ROSUVASTATIN CALCIUM 20 MG: 10 TABLET, FILM COATED ORAL at 22:38

## 2021-11-21 RX ADMIN — POTASSIUM CHLORIDE 20 MEQ: 1500 TABLET, EXTENDED RELEASE ORAL at 17:07

## 2021-11-21 RX ADMIN — CARVEDILOL 12.5 MG: 12.5 TABLET, FILM COATED ORAL at 08:08

## 2021-11-21 RX ADMIN — Medication 10 ML: at 22:39

## 2021-11-21 NOTE — ROUTINE PROCESS
Bedside shift change report given to Desire Daley (oncoming nurse) by Duke Collier LPN (offgoing nurse). Report included the following information SBAR.

## 2021-11-21 NOTE — PROGRESS NOTES
Cardiology Progress Note    Admit Date: 11/17/2021  Attending Cardiologist: Dr. Mulu Javier:     Hospital Problems  Date Reviewed: 2/15/2018          Codes Class Noted POA    CHF (congestive heart failure) ([facility] [address] ICD-10-CM: I50.9  ICD-9-CM: 428.0  11/17/2021 Unknown        * (Principal) NSTEMI (non-ST elevated myocardial infarction) ([facility] [address] ICD-10-CM: I21.4  ICD-9-CM: 410.70  11/17/2021 Unknown        Shortness of breath ICD-10-CM: R06.02  ICD-9-CM: 786.05  11/17/2021 Unknown              -Acute on chronic HFrEF. Presented with progressively worsening SOB x 5 days, PND and various other complaints. ECHO this admission EF 20-25%.   -NSTEMI, Type II. Not c/w ACS, suspect demand in the setting of CHF, STANLEY and cocaine use. Troponin peaked at 0.38. ECG with chronic anterolateral T wave changes. No significant CAD seen on heart cath from 10/2020.   -STANLEY on CKD.   -Hypokalemia.    -Hypomagnesia   -NICMY ECHO (05/2021) EF 15-20%, Grade 1 LV DD,   ? on Lisinopril, aldactone and coreg as outpatient. ?  No longer on Entresto d/t affordability. ? Non-compliant with Life Vest  -Non-Significant CAD, s/p LHC (10/2020)   ? Left MAIN CAD 40-50%   ? non obstructive LAD and RCA disease   -s/p RHC (10/2020)   -HLD, on statin.   -Hx SVT, s/p Implantable loop recorder placement (2018)   -s/p AVNRT ablation (2018)   -Hx Non-compliance with medications   -Hx embolic stroke (1836)   -Cocaine Abuse, UDS + this admission.         Primary Cardiologist: Dr. Jj Houser:     Addendum: Independently seen and evaluated. Agree with below. I did discuss with him at length about consideration for AICD implantation with his severe LV dysfunction, cardiomyopathy and prevention of primary SCD. He wishes to proceed with AICD implantation.   We will keep him n.p.o.     -Burst of atrial tach/SVT with abbarency noted on tele overnight.   -Unable to interrogate loop recorder because of battery depletion.    -Continue coreg, lisinopril, aldactone and statin.   -Rising Creat - follow renal function.  -Patient's LV function has not improved, recommend AICD placement.  -Lengthy discussion with patient this AM regarding AICD placement for primary prevention - patient would like to proceed. Will make NPO after midnight tonight.       Subjective:     No new complaints. Mild dizziness while ambulating in hallway.     Objective:      Patient Vitals for the past 8 hrs:   Temp Pulse Resp BP SpO2   11/21/21 0820 98.2 °F (36.8 °C) 71 18 125/82 91 %   11/21/21 0353 98.9 °F (37.2 °C) 70 18 (!) 101/58 98 %         Patient Vitals for the past 96 hrs:   Weight   11/21/21 0513 77.2 kg (170 lb 1.6 oz)   11/20/21 0110 77.7 kg (171 lb 4.8 oz)   11/18/21 1344 80.7 kg (178 lb)   11/17/21 1829 80.7 kg (178 lb)       TELE: normal sinus rhythm               Current Facility-Administered Medications   Medication Dose Route Frequency Last Admin    potassium chloride (K-DUR, KLOR-CON M20) SR tablet 20 mEq  20 mEq Oral BID 20 mEq at 11/21/21 1026    fluticasone propionate (FLONASE) 50 mcg/actuation nasal spray 2 Spray  2 Spray Both Nostrils DAILY 2 [place] at 11/21/21 1044    carvediloL (COREG) tablet 12.5 mg  12.5 mg Oral BID WITH MEALS 12.5 mg at 11/21/21 0808    lisinopriL (PRINIVIL, ZESTRIL) tablet 5 mg  5 mg Oral DAILY 5 mg at 11/21/21 7283    rosuvastatin (CRESTOR) tablet 20 mg  20 mg Oral QHS 20 mg at 11/20/21 2219    spironolactone (ALDACTONE) tablet 25 mg  25 mg Oral DAILY 25 mg at 11/21/21 0808    torsemide (DEMADEX) tablet 20 mg  20 mg Oral BID 20 mg at 11/21/21 0809    famotidine (PEPCID) tablet 20 mg  20 mg Oral DAILY 20 mg at 11/21/21 7454    aspirin chewable tablet 81 mg  81 mg Oral DAILY 81 mg at 11/21/21 3110    sodium chloride (NS) flush 5-40 mL  5-40 mL IntraVENous Q8H 10 mL at 11/21/21 9171    sodium chloride (NS) flush 5-40 mL  5-40 mL IntraVENous PRN      acetaminophen (TYLENOL) tablet 650 mg  650 mg Oral Q6H  mg at 11/19/21 0943    Or  acetaminophen (TYLENOL) suppository 650 mg  650 mg Rectal Q6H PRN      polyethylene glycol (MIRALAX) packet 17 g  17 g Oral DAILY PRN           Intake/Output Summary (Last 24 hours) at 11/21/2021 1045  Last data filed at 11/21/2021 0854  Gross per 24 hour   Intake 240 ml   Output --   Net 240 ml       Physical Exam:  General:  alert, cooperative, no distress, appears stated age  Neck:  no JVD  Lungs:  clear to auscultation bilaterally  Heart:  regular rate and rhythm, S1, S2 normal, no murmur, click, rub or gallop  Abdomen:  abdomen is soft without significant tenderness, masses, organomegaly or guarding  Extremities:  extremities normal, atraumatic, no cyanosis or edema    Visit Vitals  /82   Pulse 71   Temp 98.2 °F (36.8 °C)   Resp 18   Ht 6' (1.829 m)   Wt 77.2 kg (170 lb 1.6 oz)   SpO2 91%   BMI 23.07 kg/m²       Data Review:     Labs: Results:       Chemistry Recent Labs     11/21/21 0122 11/20/21 0203 11/19/21 0415 11/18/21 2119 11/18/21 2119   * 106* 107*   < > 161*   * 136 135*   < > 138   K 3.4* 4.0 4.2   < > 3.7   CL 98* 101 101   < > 104   CO2 31 28 28   < > 28   BUN 21* 19* 20*   < > 19*   CREA 1.91* 1.77* 1.55*   < > 1.77*   CA 7.9* 7.7* 7.3*   < > 7.4*   MG  --  1.8  --   --  1.9   AGAP 6 7 6   < > 6   BUCR 11* 11* 13   < > 11*   AP 85 81 74  --   --    TP 7.6 6.6 6.2*  --   --    ALB 3.0* 2.9* 2.7*  --   --    GLOB 4.6* 3.7 3.5  --   --    AGRAT 0.7* 0.8 0.8  --   --     < > = values in this interval not displayed.       CBC w/Diff Recent Labs     11/21/21  0122 11/20/21  0203 11/19/21  0415   WBC 6.0 4.0* 4.3*   RBC 5.20 4.84 4.56   HGB 15.3 14.3 13.6   HCT 47.6 44.7 42.4    160 153   GRANS 55 40 37*   LYMPH 31 45 50   EOS 4 5 5      Cardiac Enzymes No results found for: CPK, CK, CKMMB, CKMB, RCK3, CKMBT, CKNDX, CKND1, SEBASTIÁN, TROPT, TROIQ, YANETH, TROPT, TNIPOC, BNP, BNPP   Coagulation Recent Labs     11/19/21 0415 11/18/21 2119   APTT 90.6* 74.1*       Lipid Panel Lab Results   Component Value Date/Time    Cholesterol, total 159 11/17/2021 09:24 PM    HDL Cholesterol 29 (L) 11/17/2021 09:24 PM    LDL, calculated 105.2 (H) 11/17/2021 09:24 PM    VLDL, calculated 24.8 11/17/2021 09:24 PM    Triglyceride 124 11/17/2021 09:24 PM    CHOL/HDL Ratio 5.5 (H) 11/17/2021 09:24 PM      BNP No results found for: BNP, BNPP, XBNPT   Liver Enzymes Recent Labs     11/21/21  0122   TP 7.6   ALB 3.0*   AP 85      Thyroid Studies Lab Results   Component Value Date/Time    TSH 0.72 02/28/2016 07:15 PM          Signed By: Geovany Avendano NP     November 21, 2021

## 2021-11-21 NOTE — PROGRESS NOTES
Intern Progress Note  [facility]       Patient: Hany Siegel MRN: [id number]  CSN: [id number]    YOB: 1960  Age: 57 y.o. Sex: male    DOA: 11/17/2021 LOS:  LOS: 4 days                    SUBJECTIVE    No acute events overnight. Patient denies decreased appetite, but has poor PO intake due to wanting to go home and cook for himself. Says he slept okay and feels about the same as yesterday. Denies fevers, chills, nausea, vomiting, cough, CP, SOB or other pain. ROS: Please see above for focused ROS.      OBJECTIVE    Vital Signs:  Patient Vitals for the past 24 hrs:   Temp Pulse Resp BP SpO2   11/21/21 0820 98.2 °F (36.8 °C) 71 18 125/82 91 %   11/21/21 0353 98.9 °F (37.2 °C) 70 18 (!) 101/58 98 %   11/21/21 0045 98.2 °F (36.8 °C) 71 18 100/61 97 %   11/20/21 2042 98.7 °F (37.1 °C) 76 18 (!) 135/92 100 %   11/20/21 1640 98.3 °F (36.8 °C) 74 18 115/78 100 %   11/20/21 1240 98.6 °F (37 °C) 67 18 120/73 100 %         Intake/Output Summary (Last 24 hours) at 11/21/2021 0927  Last data filed at 11/21/2021 0854  Gross per 24 hour   Intake 240 ml   Output --   Net 240 ml       Lab/Data:  Recent Results (from the past 24 hour(s))   GLUCOSE, POC    Collection Time: 11/20/21  4:31 PM   Result Value Ref Range    Glucose (POC) 428 (HH) 70 - 110 mg/dL   GLUCOSE, POC    Collection Time: 11/20/21  4:34 PM   Result Value Ref Range    Glucose (POC) 136 (H) 70 - 129 mg/dL   METABOLIC PANEL, COMPREHENSIVE    Collection Time: 11/21/21  1:22 AM   Result Value Ref Range    Sodium 135 (L) 136 - 145 mmol/L    Potassium 3.4 (L) 3.5 - 5.5 mmol/L    Chloride 98 (L) 100 - 111 mmol/L    CO2 31 21 - 32 mmol/L    Anion gap 6 3.0 - 18 mmol/L    Glucose 106 (H) 74 - 99 mg/dL    BUN 21 (H) 7.0 - 18 MG/DL    Creatinine 1.91 (H) 0.6 - 1.3 MG/DL    BUN/Creatinine ratio 11 (L) 12 - 20      GFR est AA 43 (L) >60 ml/min/1.73m2    GFR est non-AA 36 (L) >60 ml/min/1.73m2    Calcium 7.9 (L) 8.5 - 10.1 MG/DL Bilirubin, total 0.7 0.2 - 1.0 MG/DL    ALT (SGPT) 29 16 - 61 U/L    AST (SGOT) 27 10 - 38 U/L    Alk. phosphatase 85 45 - 117 U/L    Protein, total 7.6 6.4 - 8.2 g/dL    Albumin 3.0 (L) 3.4 - 5.0 g/dL    Globulin 4.6 (H) 2.0 - 4.0 g/dL    A-G Ratio 0.7 (L) 0.8 - 1.7     CBC WITH AUTOMATED DIFF    Collection Time: 11/21/21  1:22 AM   Result Value Ref Range    WBC 6.0 4.6 - 13.2 K/uL    RBC 5.20 4.35 - 5.65 M/uL    HGB 15.3 13.0 - 16.0 g/dL    HCT 47.6 36.0 - 48.0 %    MCV 91.5 78.0 - 100.0 FL    MCH 29.4 24.0 - 34.0 PG    MCHC 32.1 31.0 - 37.0 g/dL    RDW 11.5 (L) 11.6 - 14.5 %    PLATELET 950 215 - 769 K/uL    MPV 13.6 (H) 9.2 - 11.8 FL    NRBC 0.0 0  WBC    ABSOLUTE NRBC 0.00 0.00 - 0.01 K/uL    NEUTROPHILS 55 40 - 73 %    LYMPHOCYTES 31 21 - 52 %    MONOCYTES 11 (H) 3 - 10 %    EOSINOPHILS 4 0 - 5 %    BASOPHILS 0 0 - 2 %    IMMATURE GRANULOCYTES 0 0.0 - 0.5 %    ABS. NEUTROPHILS 3.3 1.8 - 8.0 K/UL    ABS. LYMPHOCYTES 1.8 0.9 - 3.6 K/UL    ABS. MONOCYTES 0.6 0.05 - 1.2 K/UL    ABS. EOSINOPHILS 0.2 0.0 - 0.4 K/UL    ABS. BASOPHILS 0.0 0.0 - 0.1 K/UL    ABS. IMM. GRANS. 0.0 0.00 - 0.04 K/UL    DF AUTOMATED         PHYSICAL EXAM  General: Resting comfortably in hospital bed in NAD. HEENT: EOMI. Trachea midline. No gross abnormalities. Cardiovascular:  RRR, no M/G/R. DP 2+ bilaterally. Respiratory: Normal work of breathing. CTAB, no wheezes, rales or rhonchi. Bilateral and symmetric chest rise. Gastrointestinal: Soft, NT, ND. Normoactive bowel sounds. No rebound or guarding. MSK: Skin warm and dry. No grossly visible rashes, lesions, or ulcers. No BLE edema. Neurological/Psych:  Alert and oriented to person, time, place, and situation. Normal mood and affect. No focal neurological deficits. ASSESSMENT AND PLAN    57 y. o. male with PMH significant for HFrEF (35% 27/3460, systolic and diastolic), HLD, HTN, Hypoparathyroidism, CKD3, now admitted with acute on chronic CHF.     NSTEMI and CHF exacerbation in the setting of HFrEF (15% 05/2021), non-ischemic CMO,  HTN, CAD, Pulm HTN (53 mmHg), and history of SVT s/p alona ablation in 2018:   - Home medications: Carvedilol 12.5 BID, torsemide 20mg BID, lisinopril 5 mg QD, Nitoglycerin 0.4 mg PRN, Spirinolactone 25mg daily, Aspirin 81 mg every day   - Hx of non-compliance   - Most recently admitted in 05/2021 with concern CHF exacerbation and scrotal swelling; follow up with LifeVest ordered but patient has not been wearing it. PLAN:  - Cardiology consulted, appreciate assistance. FU w/AICD placement IP vs. OP  - Continuous telemetry  - Continue lisinopril,spirinolactone, aspirin, torsemide, coreg   - Strict I/Os, daily weights  - Will repeat echocardiogram, also repeat CXR tomorrow AM  - Continue to trend cardiac enzymes x3,   - Daily CBC, BMP, Mg     TSANLEY on CKDIII  - Cr 1.73>1.54>1.77>1.55>1.77>1.91  PLAN:  - Daily BMP  - Slight Cr bump to 1.91     HLD   - At home takes Rosuvastatin 20mg daily  PLAN:  - Continue Rosuvastatin QHS     Hypoparathyroidism   - Noted per history, most recent PTH level in 2018 was wnl   - History of hypocalcemia  PLAN:   - Continue to trend   - Doubt this is contributing to current symptoms     History of embolic stroke   - Per [employer] records, in 2017 (multiple acute and subacute infarcts in the left cerebrum)   -continue heparin without acute concern     Pulmonary nodules   - Noted in discharge summary in 2017, CXR without any note of similar nodules   - denies history of smoking    [facility] inpatient team is available 24/7 at [phone] should any further acute changes or concerns arise.        Diet NPO   DVT Prophylaxis Heparin   GI Prophylaxis Famotidine, Miralax   Code status FULL   Disposition >2MNs, Home       Point of Contact Mone Antonia  Relationship: Sister  [phone]      Heather Flores MD , PGY-1   [facility]   November 21, 2021, 9:27 AM

## 2021-11-22 ENCOUNTER — ANESTHESIA (OUTPATIENT)
Dept: CARDIAC CATH/INVASIVE PROCEDURES | Age: 61
DRG: 226 | End: 2021-11-22
Payer: MEDICARE

## 2021-11-22 ENCOUNTER — ANESTHESIA EVENT (OUTPATIENT)
Dept: CARDIAC CATH/INVASIVE PROCEDURES | Age: 61
DRG: 226 | End: 2021-11-22
Payer: MEDICARE

## 2021-11-22 ENCOUNTER — APPOINTMENT (OUTPATIENT)
Dept: GENERAL RADIOLOGY | Age: 61
DRG: 226 | End: 2021-11-22
Attending: INTERNAL MEDICINE
Payer: MEDICARE

## 2021-11-22 LAB
ALBUMIN SERPL-MCNC: 3 G/DL (ref 3.4–5)
ALBUMIN/GLOB SERPL: 0.7 {RATIO} (ref 0.8–1.7)
ALP SERPL-CCNC: 79 U/L (ref 45–117)
ALT SERPL-CCNC: 31 U/L (ref 16–61)
ANION GAP SERPL CALC-SCNC: 7 MMOL/L (ref 3–18)
AST SERPL-CCNC: 31 U/L (ref 10–38)
BASOPHILS # BLD: 0 K/UL (ref 0–0.1)
BASOPHILS NFR BLD: 0 % (ref 0–2)
BILIRUB SERPL-MCNC: 0.6 MG/DL (ref 0.2–1)
BUN SERPL-MCNC: 26 MG/DL (ref 7–18)
BUN/CREAT SERPL: 14 (ref 12–20)
CALCIUM SERPL-MCNC: 7.8 MG/DL (ref 8.5–10.1)
CHLORIDE SERPL-SCNC: 98 MMOL/L (ref 100–111)
CO2 SERPL-SCNC: 29 MMOL/L (ref 21–32)
CREAT SERPL-MCNC: 1.82 MG/DL (ref 0.6–1.3)
DIFFERENTIAL METHOD BLD: ABNORMAL
EOSINOPHIL # BLD: 0.2 K/UL (ref 0–0.4)
EOSINOPHIL NFR BLD: 3 % (ref 0–5)
ERYTHROCYTE [DISTWIDTH] IN BLOOD BY AUTOMATED COUNT: 11.8 % (ref 11.6–14.5)
GLOBULIN SER CALC-MCNC: 4.4 G/DL (ref 2–4)
GLUCOSE BLD STRIP.AUTO-MCNC: 78 MG/DL (ref 70–110)
GLUCOSE SERPL-MCNC: 119 MG/DL (ref 74–99)
HCT VFR BLD AUTO: 43.8 % (ref 36–48)
HGB BLD-MCNC: 14.2 G/DL (ref 13–16)
IMM GRANULOCYTES # BLD AUTO: 0 K/UL (ref 0–0.04)
IMM GRANULOCYTES NFR BLD AUTO: 0 % (ref 0–0.5)
LYMPHOCYTES # BLD: 2.1 K/UL (ref 0.9–3.6)
LYMPHOCYTES NFR BLD: 22 % (ref 21–52)
MAGNESIUM SERPL-MCNC: 1.8 MG/DL (ref 1.6–2.6)
MCH RBC QN AUTO: 29.8 PG (ref 24–34)
MCHC RBC AUTO-ENTMCNC: 32.4 G/DL (ref 31–37)
MCV RBC AUTO: 91.8 FL (ref 78–100)
MONOCYTES # BLD: 0.9 K/UL (ref 0.05–1.2)
MONOCYTES NFR BLD: 10 % (ref 3–10)
NEUTS SEG # BLD: 6.1 K/UL (ref 1.8–8)
NEUTS SEG NFR BLD: 65 % (ref 40–73)
NRBC # BLD: 0 K/UL (ref 0–0.01)
NRBC BLD-RTO: 0 PER 100 WBC
PLATELET # BLD AUTO: 164 K/UL (ref 135–420)
PMV BLD AUTO: 13.4 FL (ref 9.2–11.8)
POTASSIUM SERPL-SCNC: 3.9 MMOL/L (ref 3.5–5.5)
PROT SERPL-MCNC: 7.4 G/DL (ref 6.4–8.2)
RBC # BLD AUTO: 4.77 M/UL (ref 4.35–5.65)
SODIUM SERPL-SCNC: 134 MMOL/L (ref 136–145)
WBC # BLD AUTO: 9.4 K/UL (ref 4.6–13.2)

## 2021-11-22 PROCEDURE — 77030018729 HC ELECTRD DEFIB PAD CARD -B: Performed by: INTERNAL MEDICINE

## 2021-11-22 PROCEDURE — 74011000250 HC RX REV CODE- 250: Performed by: INTERNAL MEDICINE

## 2021-11-22 PROCEDURE — 0JH608Z INSERTION OF DEFIBRILLATOR GENERATOR INTO CHEST SUBCUTANEOUS TISSUE AND FASCIA, OPEN APPROACH: ICD-10-PCS | Performed by: INTERNAL MEDICINE

## 2021-11-22 PROCEDURE — 77030002933 HC SUT MCRYL J&J -A: Performed by: INTERNAL MEDICINE

## 2021-11-22 PROCEDURE — C1895 LEAD, AICD, ENDO DUAL COIL: HCPCS | Performed by: INTERNAL MEDICINE

## 2021-11-22 PROCEDURE — 36415 COLL VENOUS BLD VENIPUNCTURE: CPT

## 2021-11-22 PROCEDURE — 33249 INSJ/RPLCMT DEFIB W/LEAD(S): CPT | Performed by: INTERNAL MEDICINE

## 2021-11-22 PROCEDURE — 77030031139 HC SUT VCRL2 J&J -A: Performed by: INTERNAL MEDICINE

## 2021-11-22 PROCEDURE — 83735 ASSAY OF MAGNESIUM: CPT

## 2021-11-22 PROCEDURE — C1898 LEAD, PMKR, OTHER THAN TRANS: HCPCS | Performed by: INTERNAL MEDICINE

## 2021-11-22 PROCEDURE — 02H63KZ INSERTION OF DEFIBRILLATOR LEAD INTO RIGHT ATRIUM, PERCUTANEOUS APPROACH: ICD-10-PCS | Performed by: INTERNAL MEDICINE

## 2021-11-22 PROCEDURE — 77030002996 HC SUT SLK J&J -A: Performed by: INTERNAL MEDICINE

## 2021-11-22 PROCEDURE — 33286 RMVL SUBQ CAR RHYTHM MNTR: CPT | Performed by: INTERNAL MEDICINE

## 2021-11-22 PROCEDURE — 00534 ANES INSERTION/RPLCMT CVDFB: CPT | Performed by: ANESTHESIOLOGY

## 2021-11-22 PROCEDURE — C1893 INTRO/SHEATH, FIXED,NON-PEEL: HCPCS | Performed by: INTERNAL MEDICINE

## 2021-11-22 PROCEDURE — 74011250636 HC RX REV CODE- 250/636: Performed by: STUDENT IN AN ORGANIZED HEALTH CARE EDUCATION/TRAINING PROGRAM

## 2021-11-22 PROCEDURE — 74011250637 HC RX REV CODE- 250/637: Performed by: STUDENT IN AN ORGANIZED HEALTH CARE EDUCATION/TRAINING PROGRAM

## 2021-11-22 PROCEDURE — 0JPT02Z REMOVAL OF MONITORING DEVICE FROM TRUNK SUBCUTANEOUS TISSUE AND FASCIA, OPEN APPROACH: ICD-10-PCS | Performed by: INTERNAL MEDICINE

## 2021-11-22 PROCEDURE — 77030040375: Performed by: INTERNAL MEDICINE

## 2021-11-22 PROCEDURE — 02HK3KZ INSERTION OF DEFIBRILLATOR LEAD INTO RIGHT VENTRICLE, PERCUTANEOUS APPROACH: ICD-10-PCS | Performed by: INTERNAL MEDICINE

## 2021-11-22 PROCEDURE — 80053 COMPREHEN METABOLIC PANEL: CPT

## 2021-11-22 PROCEDURE — 74011250636 HC RX REV CODE- 250/636: Performed by: ANESTHESIOLOGY

## 2021-11-22 PROCEDURE — 65660000000 HC RM CCU STEPDOWN

## 2021-11-22 PROCEDURE — 77030012935 HC DRSG AQUACEL BMS -B: Performed by: INTERNAL MEDICINE

## 2021-11-22 PROCEDURE — 82962 GLUCOSE BLOOD TEST: CPT

## 2021-11-22 PROCEDURE — 74011250637 HC RX REV CODE- 250/637: Performed by: PHYSICIAN ASSISTANT

## 2021-11-22 PROCEDURE — 74011250636 HC RX REV CODE- 250/636: Performed by: INTERNAL MEDICINE

## 2021-11-22 PROCEDURE — 74011250637 HC RX REV CODE- 250/637: Performed by: INTERNAL MEDICINE

## 2021-11-22 PROCEDURE — 77030019580 HC CBL PACE MEDT -B: Performed by: INTERNAL MEDICINE

## 2021-11-22 PROCEDURE — C1721 AICD, DUAL CHAMBER: HCPCS | Performed by: INTERNAL MEDICINE

## 2021-11-22 PROCEDURE — 85025 COMPLETE CBC W/AUTO DIFF WBC: CPT

## 2021-11-22 PROCEDURE — 71045 X-RAY EXAM CHEST 1 VIEW: CPT

## 2021-11-22 PROCEDURE — 76060000033 HC ANESTHESIA 1 TO 1.5 HR: Performed by: INTERNAL MEDICINE

## 2021-11-22 DEVICE — LEAD 407652 CAPSUREFIX NOVUS US MRI
Type: IMPLANTABLE DEVICE | Status: FUNCTIONAL
Brand: CAPSUREFIX NOVUS MRI™ SURESCAN™

## 2021-11-22 DEVICE — ICD-DR DDMB1D4 EVERA MRI XT US IS1/DF4
Type: IMPLANTABLE DEVICE | Status: FUNCTIONAL
Brand: EVERA MRI™ XT DR SURESCAN®

## 2021-11-22 DEVICE — LEAD 6947M62 QUATTRO SECURE MRI US
Type: IMPLANTABLE DEVICE | Status: FUNCTIONAL
Brand: SPRINT QUATTRO SECURE MRI™ SURESCAN™

## 2021-11-22 RX ORDER — LIDOCAINE HYDROCHLORIDE 10 MG/ML
INJECTION, SOLUTION EPIDURAL; INFILTRATION; INTRACAUDAL; PERINEURAL AS NEEDED
Status: DISCONTINUED | OUTPATIENT
Start: 2021-11-22 | End: 2021-11-22 | Stop reason: HOSPADM

## 2021-11-22 RX ORDER — PROPOFOL 10 MG/ML
INJECTION, EMULSION INTRAVENOUS AS NEEDED
Status: DISCONTINUED | OUTPATIENT
Start: 2021-11-22 | End: 2021-11-22 | Stop reason: HOSPADM

## 2021-11-22 RX ORDER — PHENYLEPHRINE HCL IN 0.9% NACL 1 MG/10 ML
SYRINGE (ML) INTRAVENOUS AS NEEDED
Status: DISCONTINUED | OUTPATIENT
Start: 2021-11-22 | End: 2021-11-22 | Stop reason: HOSPADM

## 2021-11-22 RX ORDER — SODIUM CHLORIDE 9 MG/ML
INJECTION, SOLUTION INTRAVENOUS
Status: DISCONTINUED | OUTPATIENT
Start: 2021-11-22 | End: 2021-11-22 | Stop reason: HOSPADM

## 2021-11-22 RX ORDER — CEFAZOLIN SODIUM 1 G/3ML
INJECTION, POWDER, FOR SOLUTION INTRAMUSCULAR; INTRAVENOUS AS NEEDED
Status: DISCONTINUED | OUTPATIENT
Start: 2021-11-22 | End: 2021-11-22 | Stop reason: HOSPADM

## 2021-11-22 RX ORDER — FENTANYL CITRATE 50 UG/ML
INJECTION, SOLUTION INTRAMUSCULAR; INTRAVENOUS AS NEEDED
Status: DISCONTINUED | OUTPATIENT
Start: 2021-11-22 | End: 2021-11-22 | Stop reason: HOSPADM

## 2021-11-22 RX ORDER — OXYCODONE AND ACETAMINOPHEN 5; 325 MG/1; MG/1
1 TABLET ORAL
Status: DISCONTINUED | OUTPATIENT
Start: 2021-11-22 | End: 2021-11-23 | Stop reason: HOSPADM

## 2021-11-22 RX ORDER — MIDAZOLAM HYDROCHLORIDE 1 MG/ML
INJECTION, SOLUTION INTRAMUSCULAR; INTRAVENOUS AS NEEDED
Status: DISCONTINUED | OUTPATIENT
Start: 2021-11-22 | End: 2021-11-22 | Stop reason: HOSPADM

## 2021-11-22 RX ORDER — MAGNESIUM SULFATE HEPTAHYDRATE 40 MG/ML
2 INJECTION, SOLUTION INTRAVENOUS ONCE
Status: COMPLETED | OUTPATIENT
Start: 2021-11-22 | End: 2021-11-23

## 2021-11-22 RX ADMIN — Medication 100 MCG: at 13:13

## 2021-11-22 RX ADMIN — WATER 2 G: 1 INJECTION INTRAMUSCULAR; INTRAVENOUS; SUBCUTANEOUS at 20:03

## 2021-11-22 RX ADMIN — MAGNESIUM SULFATE HEPTAHYDRATE 2 G: 2 INJECTION, SOLUTION INTRAVENOUS at 15:56

## 2021-11-22 RX ADMIN — TORSEMIDE 20 MG: 20 TABLET ORAL at 08:20

## 2021-11-22 RX ADMIN — PROPOFOL 30 MG: 10 INJECTION, EMULSION INTRAVENOUS at 12:54

## 2021-11-22 RX ADMIN — CARVEDILOL 12.5 MG: 12.5 TABLET, FILM COATED ORAL at 16:06

## 2021-11-22 RX ADMIN — Medication 100 MCG: at 13:16

## 2021-11-22 RX ADMIN — SODIUM CHLORIDE: 900 INJECTION, SOLUTION INTRAVENOUS at 12:27

## 2021-11-22 RX ADMIN — FAMOTIDINE 20 MG: 20 TABLET ORAL at 08:20

## 2021-11-22 RX ADMIN — OXYCODONE HYDROCHLORIDE AND ACETAMINOPHEN 1 TABLET: 5; 325 TABLET ORAL at 22:06

## 2021-11-22 RX ADMIN — ASPIRIN 81 MG CHEWABLE TABLET 81 MG: 81 TABLET CHEWABLE at 08:20

## 2021-11-22 RX ADMIN — PROPOFOL 20 MG: 10 INJECTION, EMULSION INTRAVENOUS at 12:51

## 2021-11-22 RX ADMIN — FENTANYL CITRATE 50 MCG: 50 INJECTION, SOLUTION INTRAMUSCULAR; INTRAVENOUS at 12:56

## 2021-11-22 RX ADMIN — MIDAZOLAM HYDROCHLORIDE 2 MG: 2 INJECTION, SOLUTION INTRAMUSCULAR; INTRAVENOUS at 12:32

## 2021-11-22 RX ADMIN — LISINOPRIL 5 MG: 10 TABLET ORAL at 08:20

## 2021-11-22 RX ADMIN — OXYCODONE HYDROCHLORIDE AND ACETAMINOPHEN 1 TABLET: 5; 325 TABLET ORAL at 16:59

## 2021-11-22 RX ADMIN — CARVEDILOL 12.5 MG: 12.5 TABLET, FILM COATED ORAL at 09:15

## 2021-11-22 RX ADMIN — TORSEMIDE 20 MG: 20 TABLET ORAL at 18:55

## 2021-11-22 RX ADMIN — ROSUVASTATIN CALCIUM 20 MG: 10 TABLET, FILM COATED ORAL at 22:07

## 2021-11-22 RX ADMIN — SPIRONOLACTONE 25 MG: 25 TABLET ORAL at 08:20

## 2021-11-22 RX ADMIN — Medication 10 ML: at 16:05

## 2021-11-22 RX ADMIN — PROPOFOL 10 MG: 10 INJECTION, EMULSION INTRAVENOUS at 12:40

## 2021-11-22 RX ADMIN — Medication 10 ML: at 22:07

## 2021-11-22 RX ADMIN — CEFAZOLIN SODIUM 2 G: 1 INJECTION, POWDER, FOR SOLUTION INTRAMUSCULAR; INTRAVENOUS at 12:46

## 2021-11-22 NOTE — PROGRESS NOTES
[facility]  Intern Progress Note    Patient: Drema Hodgkins MRN: [id number]   SSN: [id number]  YOB: 1960   Age: 57 y.o. Sex: male      Admit Date: 11/17/2021    LOS: 5 days   Chief Complaint   Patient presents with    Dizziness    Shortness of Breath       Subjective:    Patient was seen at bedside this morning and reported no concerns or complaints. As per the night team there were no overnight events reported     ROS :   Denies chest pain, shortness of breath, abdominal pain. Headaches, dizziness, nausea     Objective:     Visit Vitals  /78 (BP 1 Location: Left upper arm, BP Patient Position: Lying)   Pulse 76   Temp 98.6 °F (37 °C)   Resp 18   Ht 6' (1.829 m)   Wt 78.7 kg (173 lb 6.4 oz)   SpO2 96%   BMI 23.52 kg/m²     PHYSICAL EXAM  General: Resting comfortably in hospital bed in NAD. HEENT: EOMI. Trachea midline. No gross abnormalities. Cardiovascular:  RRR, no M/G/R. DP 2+ bilaterally. Respiratory: Normal work of breathing. CTAB, no wheezes, rales or rhonchi. Bilateral and symmetric chest rise. Gastrointestinal: Soft, NI, ND. Normoactive bowel sounds. No rebound or guarding. MSK: Skin warm and dry. No grossly visible rashes, lesions, or ulcers. No BLE edema. Neurological/Psych: Deboraha Massed and oriented to person, time, place, and situation. Normal mood and affect. No focal neurological deficits. Intake and Output:  Current Shift: No intake/output data recorded.   Last three shifts: 11/20 1901 - 11/22 0700  In: 480 [P.O.:480]  Out: -     Lab/Data Review:  Recent Results (from the past 12 hour(s))   METABOLIC PANEL, COMPREHENSIVE    Collection Time: 11/22/21  2:16 AM   Result Value Ref Range    Sodium 134 (L) 136 - 145 mmol/L    Potassium 3.9 3.5 - 5.5 mmol/L    Chloride 98 (L) 100 - 111 mmol/L    CO2 29 21 - 32 mmol/L    Anion gap 7 3.0 - 18 mmol/L    Glucose 119 (H) 74 - 99 mg/dL    BUN 26 (H) 7.0 - 18 MG/DL    Creatinine 1.82 (H) 0.6 - 1.3 MG/DL BUN/Creatinine ratio 14 12 - 20      GFR est AA 46 (L) >60 ml/min/1.73m2    GFR est non-AA 38 (L) >60 ml/min/1.73m2    Calcium 7.8 (L) 8.5 - 10.1 MG/DL    Bilirubin, total 0.6 0.2 - 1.0 MG/DL    ALT (SGPT) 31 16 - 61 U/L    AST (SGOT) 31 10 - 38 U/L    Alk. phosphatase 79 45 - 117 U/L    Protein, total 7.4 6.4 - 8.2 g/dL    Albumin 3.0 (L) 3.4 - 5.0 g/dL    Globulin 4.4 (H) 2.0 - 4.0 g/dL    A-G Ratio 0.7 (L) 0.8 - 1.7     CBC WITH AUTOMATED DIFF    Collection Time: 11/22/21  2:16 AM   Result Value Ref Range    WBC 9.4 4.6 - 13.2 K/uL    RBC 4.77 4.35 - 5.65 M/uL    HGB 14.2 13.0 - 16.0 g/dL    HCT 43.8 36.0 - 48.0 %    MCV 91.8 78.0 - 100.0 FL    MCH 29.8 24.0 - 34.0 PG    MCHC 32.4 31.0 - 37.0 g/dL    RDW 11.8 11.6 - 14.5 %    PLATELET 897 999 - 331 K/uL    MPV 13.4 (H) 9.2 - 11.8 FL    NRBC 0.0 0  WBC    ABSOLUTE NRBC 0.00 0.00 - 0.01 K/uL    NEUTROPHILS 65 40 - 73 %    LYMPHOCYTES 22 21 - 52 %    MONOCYTES 10 3 - 10 %    EOSINOPHILS 3 0 - 5 %    BASOPHILS 0 0 - 2 %    IMMATURE GRANULOCYTES 0 0.0 - 0.5 %    ABS. NEUTROPHILS 6.1 1.8 - 8.0 K/UL    ABS. LYMPHOCYTES 2.1 0.9 - 3.6 K/UL    ABS. MONOCYTES 0.9 0.05 - 1.2 K/UL    ABS. EOSINOPHILS 0.2 0.0 - 0.4 K/UL    ABS. BASOPHILS 0.0 0.0 - 0.1 K/UL    ABS. IMM. GRANS. 0.0 0.00 - 0.04 K/UL    DF AUTOMATED     MAGNESIUM    Collection Time: 11/22/21  2:16 AM   Result Value Ref Range    Magnesium 1.8 1.6 - 2.6 mg/dL           Assessment and Plan:     57 y. o. male with PMH significant for HFrEF (02% 67/6344, systolic and diastolic), HLD, HTN, Hypoparathyroidism, CKD3, now admitted with acute on chronic CHF.     NSTEMI and CHF exacerbation in the setting of HFrEF (15% 05/2021), non-ischemic CMO,  HTN, CAD, Pulm HTN (53 mmHg), and history of SVT s/p alona ablation in 2018:   - Home medications: Carvedilol 12.5 BID, torsemide 20mg BID, lisinopril 5 mg QD, Nitoglycerin 0.4 mg PRN, Spirinolactone 25mg daily, Aspirin 81 mg every day   - Hx of non-compliance   - Most recently admitted in 05/2021 with concern CHF exacerbation and scrotal swelling; follow up with LifeVest ordered but patient has not been wearing it. PLAN:  - Cardiology consulted, appreciate assistance.  As per cardiology, AICD will be placed this morning.  - Continuous telemetry  - Continue lisinopril,spirinolactone, aspirin, torsemide, coreg   - Strict I/Os, daily weights  - Will repeat echocardiogram, also repeat CXR   - Continue to trend cardiac enzymes x3,   - Daily CBC, BMP, Mg     STANLEY on CKDIII  - Cr1.55>1.77>1.91>1.82  PLAN:  - Daily BMP     HLD   - At home takes Rosuvastatin 20mg daily  PLAN:  - Continue Rosuvastatin QHS     Hypoparathyroidism   - Noted per history, most recent PTH level in 2018 was wnl   - History of hypocalcemia  PLAN:   - Continue to trend   - Doubt this is contributing to current symptoms     History of embolic stroke   - Per [employer] records, in 2017 (multiple acute and subacute infarcts in the left cerebrum)   -continue heparin without acute concern     Pulmonary nodules   - Noted in discharge summary in 2017, CXR without any note of similar nodules   - denies history of smoking       Diet NPO   DVT Prophylaxis Heparin   GI Prophylaxis Famotidine, Miralax   Code status FULL   Disposition >2MNs, Home         Point of Contact Ally Swanson  Relationship: Sister  [phone]       Julio Vasquez MD, PGY-1   [address] Medicine   Intern Pager: [phone]   November 22, 2021, 8:29 AM

## 2021-11-22 NOTE — PROGRESS NOTES
0850-TRANSFER - IN REPORT:    Verbal report received from STACEY Melo(name) on Manjit Sullivan  being received from [address] [place] [address] (unit) for ordered procedure      Report consisted of patients Situation, Background, Assessment and   Recommendations(SBAR). Information from the following report(s) SBAR, Kardex, Intake/Output and MAR was reviewed with the receiving nurse. Opportunity for questions and clarification was provided. Assessment completed upon patients arrival to unit and care assumed. 1241-TRANSFER - OUT REPORT:    Verbal report given to Bhavesh(name) on Manjit Sullivan  being transferred to EP lab(unit) for ordered procedure       Report consisted of patients Situation, Background, Assessment and   Recommendations(SBAR). Information from the following report(s) SBAR, Kardex, [facility] and Pre Procedure Checklist was reviewed with the receiving nurse. Lines:   20 g left forearm    Opportunity for questions and clarification was provided. Patient transported with:   Tech     1535-TRANSFER - OUT REPORT:    Verbal report given to STACEY Melo(name) on Manjit [place]  being transferred to [address] [place] [address](unit) for routine progression of care       Report consisted of patients Situation, Background, Assessment and   Recommendations(SBAR). Information from the following report(s) SBAR, Kardex, Intake/Output and MAR was reviewed with the receiving nurse. Lines:       Opportunity for questions and clarification was provided.
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
## 2021-11-22 NOTE — ANESTHESIA PREPROCEDURE EVALUATION
Relevant Problems   RESPIRATORY SYSTEM   (+) Shortness of breath      NEUROLOGY   (+) Depression   (+) TIA (transient ischemic attack)      CARDIOVASCULAR   (+) CHF (congestive heart failure) (HCC)   (+) CHF exacerbation (HCC)   (+) Coronary artery disease involving native coronary artery of native heart without angina pectoris   (+) Hypertension   (+) NSTEMI (non-ST elevated myocardial infarction) ([address]       Anesthetic History   No history of anesthetic complications            Review of Systems / Medical History  Patient summary reviewed and pertinent labs reviewed    Pulmonary  Within defined limits                 Neuro/Psych       CVA       Cardiovascular    Hypertension          CAD    Exercise tolerance: >4 METS  Comments: Recent cocaine abuse   GI/Hepatic/Renal         Renal disease: ARF       Endo/Other  Within defined limits           Other Findings              Physical Exam    Airway  Mallampati: I  TM Distance: 4 - 6 cm  Neck ROM: normal range of motion   Mouth opening: Normal     Cardiovascular  Regular rate and rhythm,  S1 and S2 normal,  no murmur, click, rub, or gallop  Rhythm: regular  Rate: normal         Dental    Dentition: Lower dentition intact and Upper dentition intact  Comments: Several missing molars   Pulmonary  Breath sounds clear to auscultation               Abdominal  GI exam deferred       Other Findings            Anesthetic Plan    ASA: 3  Anesthesia type: MAC          Induction: Intravenous  Anesthetic plan and risks discussed with: Patient

## 2021-11-22 NOTE — PROGRESS NOTES
Patient was wiped down with chlorhexidine wipes for preoperative procedure in the am, NPO sign was placed on the door. Patient understands that he will need to wipe down again in the am before the surgery.

## 2021-11-22 NOTE — PROGRESS NOTES
Post Procedure Progress Note  [facility]  Subjective:    Pt seen at bedside, after successful implantation of dual chamber Medtronic AICD for primary prevention. Patient is doing well and was accompanied by his son Frank Galvez at bedside. The patient is recovering from their procedure, with no clear distress or discomfort apart from the expected. The patient stated that he feels hungry - a regular diet was brought to him whilst bedside. Pt denies headaches, fevers/chills, CP/SOB, abdominal pain,    Objective:     Visit Vitals  /76 (BP 1 Location: Right upper arm, BP Patient Position: At rest)   Pulse 78   Temp 97.4 °F (36.3 °C)   Resp 8   Ht 6' (1.829 m)   Wt 78.7 kg (173 lb 6.4 oz)   SpO2 100%   BMI 23.52 kg/m²       Recent Results (from the past 12 hour(s))   GLUCOSE, POC    Collection Time: 11/22/21 11:41 AM   Result Value Ref Range    Glucose (POC) 78 70 - 110 mg/dL       Physical examination  Consitutional: NAD, AAO  Cardiovascular: RRR, no m/g/r, site of procedure is clean dry and intact with a tegaderm patch placed. Respiratory: CTA b/l; good respiratory effort  Abdominal: Abdomen soft, NT/ND      Assessment & Plan:     VSS. No change in management at this time. Will assess tomorrow and continue to seek cardiology reccomendations.      Please see daily progress note for full assessment and plan    Lorin Coreas MD , PGY-1    [facility] Medicine   November 22, 2021, 4:59 PM

## 2021-11-22 NOTE — ROUTINE PROCESS
Bedside and Verbal shift change report given to Desire Daley (oncoming nurse) by Magy Drew LPN (offgoing nurse). Report included the following information SBAR.

## 2021-11-23 VITALS
BODY MASS INDEX: 23.7 KG/M2 | SYSTOLIC BLOOD PRESSURE: 111 MMHG | WEIGHT: 175 LBS | DIASTOLIC BLOOD PRESSURE: 72 MMHG | TEMPERATURE: 97.8 F | HEIGHT: 72 IN | RESPIRATION RATE: 17 BRPM | OXYGEN SATURATION: 100 % | HEART RATE: 59 BPM

## 2021-11-23 LAB
ALBUMIN SERPL-MCNC: 3 G/DL (ref 3.4–5)
ALBUMIN/GLOB SERPL: 0.7 {RATIO} (ref 0.8–1.7)
ALP SERPL-CCNC: 84 U/L (ref 45–117)
ALT SERPL-CCNC: 25 U/L (ref 16–61)
ANION GAP SERPL CALC-SCNC: 6 MMOL/L (ref 3–18)
AST SERPL-CCNC: 23 U/L (ref 10–38)
BASOPHILS # BLD: 0 K/UL (ref 0–0.1)
BASOPHILS NFR BLD: 0 % (ref 0–2)
BILIRUB SERPL-MCNC: 0.7 MG/DL (ref 0.2–1)
BUN SERPL-MCNC: 32 MG/DL (ref 7–18)
BUN/CREAT SERPL: 17 (ref 12–20)
CALCIUM SERPL-MCNC: 7.6 MG/DL (ref 8.5–10.1)
CHLORIDE SERPL-SCNC: 95 MMOL/L (ref 100–111)
CO2 SERPL-SCNC: 30 MMOL/L (ref 21–32)
CREAT SERPL-MCNC: 1.86 MG/DL (ref 0.6–1.3)
DIFFERENTIAL METHOD BLD: ABNORMAL
EOSINOPHIL # BLD: 0.2 K/UL (ref 0–0.4)
EOSINOPHIL NFR BLD: 2 % (ref 0–5)
ERYTHROCYTE [DISTWIDTH] IN BLOOD BY AUTOMATED COUNT: 11.5 % (ref 11.6–14.5)
GLOBULIN SER CALC-MCNC: 4.1 G/DL (ref 2–4)
GLUCOSE SERPL-MCNC: 126 MG/DL (ref 74–99)
HCT VFR BLD AUTO: 43.9 % (ref 36–48)
HGB BLD-MCNC: 14.4 G/DL (ref 13–16)
IMM GRANULOCYTES # BLD AUTO: 0 K/UL (ref 0–0.04)
IMM GRANULOCYTES NFR BLD AUTO: 0 % (ref 0–0.5)
LYMPHOCYTES # BLD: 1.1 K/UL (ref 0.9–3.6)
LYMPHOCYTES NFR BLD: 11 % (ref 21–52)
MCH RBC QN AUTO: 29.8 PG (ref 24–34)
MCHC RBC AUTO-ENTMCNC: 32.8 G/DL (ref 31–37)
MCV RBC AUTO: 90.9 FL (ref 78–100)
MONOCYTES # BLD: 0.7 K/UL (ref 0.05–1.2)
MONOCYTES NFR BLD: 7 % (ref 3–10)
NEUTS SEG # BLD: 7.5 K/UL (ref 1.8–8)
NEUTS SEG NFR BLD: 78 % (ref 40–73)
NRBC # BLD: 0 K/UL (ref 0–0.01)
NRBC BLD-RTO: 0 PER 100 WBC
OTHER CELLS NFR BLD MANUAL: 2 %
PLATELET # BLD AUTO: 182 K/UL (ref 135–420)
PLATELET COMMENTS,PCOM: ABNORMAL
PMV BLD AUTO: 12.7 FL (ref 9.2–11.8)
POTASSIUM SERPL-SCNC: 5.1 MMOL/L (ref 3.5–5.5)
PROT SERPL-MCNC: 7.1 G/DL (ref 6.4–8.2)
RBC # BLD AUTO: 4.83 M/UL (ref 4.35–5.65)
RBC MORPH BLD: ABNORMAL
SODIUM SERPL-SCNC: 131 MMOL/L (ref 136–145)
WBC # BLD AUTO: 9.6 K/UL (ref 4.6–13.2)

## 2021-11-23 PROCEDURE — 74011250637 HC RX REV CODE- 250/637: Performed by: PHYSICIAN ASSISTANT

## 2021-11-23 PROCEDURE — 2709999900 HC NON-CHARGEABLE SUPPLY

## 2021-11-23 PROCEDURE — 74011250637 HC RX REV CODE- 250/637: Performed by: STUDENT IN AN ORGANIZED HEALTH CARE EDUCATION/TRAINING PROGRAM

## 2021-11-23 PROCEDURE — 74011250636 HC RX REV CODE- 250/636: Performed by: INTERNAL MEDICINE

## 2021-11-23 PROCEDURE — 36415 COLL VENOUS BLD VENIPUNCTURE: CPT

## 2021-11-23 PROCEDURE — 74011250637 HC RX REV CODE- 250/637

## 2021-11-23 PROCEDURE — 74011250637 HC RX REV CODE- 250/637: Performed by: INTERNAL MEDICINE

## 2021-11-23 PROCEDURE — 99024 POSTOP FOLLOW-UP VISIT: CPT | Performed by: INTERNAL MEDICINE

## 2021-11-23 PROCEDURE — 80053 COMPREHEN METABOLIC PANEL: CPT

## 2021-11-23 PROCEDURE — 85025 COMPLETE CBC W/AUTO DIFF WBC: CPT

## 2021-11-23 PROCEDURE — 74011000250 HC RX REV CODE- 250: Performed by: INTERNAL MEDICINE

## 2021-11-23 RX ORDER — SPIRONOLACTONE 25 MG/1
12.5 TABLET ORAL DAILY
Status: DISCONTINUED | OUTPATIENT
Start: 2021-11-24 | End: 2021-11-23 | Stop reason: HOSPADM

## 2021-11-23 RX ORDER — OXYCODONE AND ACETAMINOPHEN 5; 325 MG/1; MG/1
1 TABLET ORAL
Qty: 4 TABLET | Refills: 0 | Status: SHIPPED | OUTPATIENT
Start: 2021-11-23 | End: 2021-11-27

## 2021-11-23 RX ORDER — ONDANSETRON 4 MG/1
4 TABLET, ORALLY DISINTEGRATING ORAL ONCE
Status: COMPLETED | OUTPATIENT
Start: 2021-11-23 | End: 2021-11-23

## 2021-11-23 RX ORDER — SPIRONOLACTONE 25 MG/1
12.5 TABLET ORAL DAILY
Qty: 15 TABLET | Refills: 0 | Status: SHIPPED | OUTPATIENT
Start: 2021-11-24 | End: 2021-12-01 | Stop reason: SDUPTHER

## 2021-11-23 RX ADMIN — LISINOPRIL 5 MG: 10 TABLET ORAL at 09:01

## 2021-11-23 RX ADMIN — OXYCODONE HYDROCHLORIDE AND ACETAMINOPHEN 1 TABLET: 5; 325 TABLET ORAL at 06:58

## 2021-11-23 RX ADMIN — SPIRONOLACTONE 25 MG: 25 TABLET ORAL at 09:00

## 2021-11-23 RX ADMIN — ONDANSETRON 4 MG: 4 TABLET, ORALLY DISINTEGRATING ORAL at 15:01

## 2021-11-23 RX ADMIN — OXYCODONE HYDROCHLORIDE AND ACETAMINOPHEN 1 TABLET: 5; 325 TABLET ORAL at 10:56

## 2021-11-23 RX ADMIN — TORSEMIDE 20 MG: 20 TABLET ORAL at 09:01

## 2021-11-23 RX ADMIN — ASPIRIN 81 MG CHEWABLE TABLET 81 MG: 81 TABLET CHEWABLE at 09:01

## 2021-11-23 RX ADMIN — CARVEDILOL 12.5 MG: 12.5 TABLET, FILM COATED ORAL at 09:01

## 2021-11-23 RX ADMIN — OXYCODONE HYDROCHLORIDE AND ACETAMINOPHEN 1 TABLET: 5; 325 TABLET ORAL at 02:47

## 2021-11-23 RX ADMIN — WATER 2 G: 1 INJECTION INTRAMUSCULAR; INTRAVENOUS; SUBCUTANEOUS at 03:45

## 2021-11-23 RX ADMIN — Medication 10 ML: at 03:52

## 2021-11-23 RX ADMIN — FAMOTIDINE 20 MG: 20 TABLET ORAL at 09:00

## 2021-11-23 RX ADMIN — Medication 10 ML: at 05:16

## 2021-11-23 NOTE — PROGRESS NOTES
Physician Progress Note      PATIENT:               Livia Barkley  CSN #:                  [id number]  :                       1960  ADMIT DATE:       2021 6:31 PM  [address] [place] DATE:  RESPONDING  PROVIDER #:        Tino Willams MD          QUERY TEXT:    Patient admitted with NSTEMI and CHF exacerbation. Noted documentation in progress note by attending of \"I don not feel he had a non-stemi. \"  However there is continued documentation in the Assessment and Plan of the progress notes of \"NSTEMI and CHF exacerbation in the setting of HFrEF\"  If possible, please document in progress notes and discharge summary if you are evaluating and /or treating any of the following: The medical record reflects the following:    Risk Factors: CHF exacerbtion in the setting of HFrEF (20-25% 21), non-ischemic CMO,  HTN, CAD, Pulm HTN (53 mmHg), and history of SVT s/p alona ablation in 2018    Clinical Indicators: Conflicting documentation on IM note of . Card cons- -NSTEMI, Type II. Not c/w ACS, suspect demand in the setting of CHF, STANLEY and cocaine use. Troponin peaked at 0.38. ECG with chronic anterolateral T wave changes. 21 EKG- Normal sinus rhythm. Right atrial enlargement. T wave abnormality, consider anterolateral ischemia. Prolonged QT. Troponins 0.38; 0.34; 0.25    Treatment: Cardiology consult, echo. AICD, continued on ASA , Coreg    Thank you,  Michelle Richmond RN, CCDS  [phone]  Options provided:  -- NSTEMI ruled out  -- NSTEMI confirmed  -- Demand Ischemia confirmed and NSTEMI ruled out  -- Demand Ischemia and NSTEMI ruled out  -- Other - I will add my own diagnosis  -- Disagree - Not applicable / Not valid  -- Disagree - Clinically unable to determine / Unknown  -- Refer to Clinical Documentation Reviewer    PROVIDER RESPONSE TEXT:    After study, NSTEMI ruled out. Query created by:  Aleks Hernandez on 2021 9:24 AM      Electronically signed by:  Tino Willams MD 2021 10:58 AM

## 2021-11-23 NOTE — PROGRESS NOTES
D/C orders received. No needs identified by . Chart reviewed. Pt will be transported home by family.  available as needed.     Lana Owen RN

## 2021-11-23 NOTE — ROUTINE PROCESS
Bedside and Verbal shift change report given to Desire Daley (oncoming nurse) by Manohar Rivera LPN (offgoing nurse). Report included the following information SBAR and MAR.

## 2021-11-23 NOTE — PROGRESS NOTES
[address] [facility]  Intern Progress Note    Patient: Guyann Apley MRN: [id number]   SSN: [id number]  YOB: 1960   Age: 57 y.o. Sex: male      Admit Date: 11/17/2021    LOS: 6 days   Chief Complaint   Patient presents with    Dizziness    Shortness of Breath       Subjective:     Patient was seen at [facility] this morning and reported no complaints or concerns. He stated that his left shoulder is a bit sore. ROS     Denies chest sigifredo, shortness of breath, abdominal pain,   Endorses left shoulder pain - currently in a sling. Objective:     Visit Vitals  /75   Pulse (!) 59   Temp 98.1 °F (36.7 °C)   Resp 19   Ht 6' (1.829 m)   Wt 78.7 kg (173 lb 6.4 oz)   SpO2 92%   BMI 23.52 kg/m²       Physical Exam:   General: Resting comfortably in hospital bed in NAD. HEENT: Trachea midline. No gross abnormalities. Cardiovascular:  RRR, no M/G/R. Respiratory: Normal work of breathing. CTAB,  Bilateral and symmetric chest rise. Gastrointestinal: Soft, NI, ND. Normoactive bowel sounds. MSK: Skin warm and dry. No grossly visible rashes, lesions, or ulcers. No BLE edema. Left arm in a sling   Neurological/Psych:  Alert and oriented to person, time, place, and situation. Normal mood and affect. No focal neurological deficits.     Intake and Output:  Current Shift: No intake/output data recorded. Last three shifts: 11/21 1901 - 11/23 0700  In: 690 [P.O.:240;  I.V.:450]  Out: -     Lab/Data Review:  Recent Results (from the past 12 hour(s))   METABOLIC PANEL, COMPREHENSIVE    Collection Time: 11/23/21  2:33 AM   Result Value Ref Range    Sodium 131 (L) 136 - 145 mmol/L    Potassium 5.1 3.5 - 5.5 mmol/L    Chloride 95 (L) 100 - 111 mmol/L    CO2 30 21 - 32 mmol/L    Anion gap 6 3.0 - 18 mmol/L    Glucose 126 (H) 74 - 99 mg/dL    BUN 32 (H) 7.0 - 18 MG/DL    Creatinine 1.86 (H) 0.6 - 1.3 MG/DL    BUN/Creatinine ratio 17 12 - 20      GFR est AA 45 (L) >60 ml/min/1.73m2    GFR est non-AA 37 (L) >60 ml/min/1.73m2    Calcium 7.6 (L) 8.5 - 10.1 MG/DL    Bilirubin, total 0.7 0.2 - 1.0 MG/DL    ALT (SGPT) 25 16 - 61 U/L    AST (SGOT) 23 10 - 38 U/L    Alk. phosphatase 84 45 - 117 U/L    Protein, total 7.1 6.4 - 8.2 g/dL    Albumin 3.0 (L) 3.4 - 5.0 g/dL    Globulin 4.1 (H) 2.0 - 4.0 g/dL    A-G Ratio 0.7 (L) 0.8 - 1.7     CBC WITH AUTOMATED DIFF    Collection Time: 11/23/21  2:33 AM   Result Value Ref Range    WBC 9.6 4.6 - 13.2 K/uL    RBC 4.83 4.35 - 5.65 M/uL    HGB 14.4 13.0 - 16.0 g/dL    HCT 43.9 36.0 - 48.0 %    MCV 90.9 78.0 - 100.0 FL    MCH 29.8 24.0 - 34.0 PG    MCHC 32.8 31.0 - 37.0 g/dL    RDW 11.5 (L) 11.6 - 14.5 %    PLATELET 514 695 - 473 K/uL    MPV 12.7 (H) 9.2 - 11.8 FL    NRBC 0.0 0  WBC    ABSOLUTE NRBC 0.00 0.00 - 0.01 K/uL    NEUTROPHILS 78 (H) 40 - 73 %    LYMPHOCYTES 11 (L) 21 - 52 %    MONOCYTES 7 3 - 10 %    EOSINOPHILS 2 0 - 5 %    BASOPHILS 0 0 - 2 %    OTHER CELL 2      IMMATURE GRANULOCYTES 0 0.0 - 0.5 %    ABS. NEUTROPHILS 7.5 1.8 - 8.0 K/UL    ABS. LYMPHOCYTES 1.1 0.9 - 3.6 K/UL    ABS. MONOCYTES 0.7 0.05 - 1.2 K/UL    ABS. EOSINOPHILS 0.2 0.0 - 0.4 K/UL    ABS. BASOPHILS 0.0 0.0 - 0.1 K/UL    ABS. IMM. GRANS. 0.0 0.00 - 0.04 K/UL    DF MANUAL      PLATELET COMMENTS ADEQUATE PLATELETS      RBC COMMENTS NORMOCYTIC, NORMOCHROMIC             Assessment and Plan:     57 y. o. male with PMH significant for HFrEF (64% 14/2271, systolic and diastolic), HLD, HTN, Hypoparathyroidism, CKD3, now admitted with acute on chronic CHF.     NSTEMI and CHF exacerbation in the setting of HFrEF (15% 05/2021), non-ischemic CMO,  HTN, CAD, Pulm HTN (53 mmHg), and history of SVT s/p alona ablation in 2018:   - Home medications: Carvedilol 12.5 BID, torsemide 20mg BID, lisinopril 5 mg QD, Nitoglycerin 0.4 mg PRN, Spirinolactone 25mg daily, Aspirin 81 mg every day   - Hx of non-compliance  -s/p AICD placement on 11/22  -chest Xray done and looked normal.   PLAN:  - Cardiology consulted, appreciate assistance.    - Continuous telemetry  - Continue lisinopril,spirinolactone, aspirin, torsemide, coreg   - Strict I/Os, daily weights  - Continue to trend cardiac enzymes x3,   - Daily CBC, BMP, Mg     STANLEY on CKDIII  - Cr1.55>1.77>1.91>1.82>1.86  PLAN:  - Daily BMP     HLD   - At home takes Rosuvastatin 20mg daily  PLAN:  - Continue Rosuvastatin QHS     Hypoparathyroidism   - Noted per history, most recent PTH level in 2018 was wnl   - History of hypocalcemia  PLAN:   - Continue to trend   - Doubt this is contributing to current symptoms     History of embolic stroke   - Per [employer] records, in 2017 (multiple acute and subacute infarcts in the left cerebrum)   -continue heparin without acute concern     Pulmonary nodules   - Noted in discharge summary in 2017, CXR without any note of similar nodules   - denies history of smoking        Diet NPO   DVT Prophylaxis Heparin   GI Prophylaxis Famotidine, Miralax   Code status FULL   Disposition >2MNs, Home         Point of Contact Davide Persaud  Relationship: Sister  [phone]       Kady Sanders MD, PGY-1   [address] Medicine   Intern Pager: [phone]   November 23, 2021, 8:50 AM

## 2021-11-23 NOTE — DISCHARGE INSTRUCTIONS
[facility] Activation    Thank you for requesting access to [facility]. Please follow the instructions below to securely access and download your online medical record. [facility] allows you to send messages to your doctor, view your test results, renew your prescriptions, schedule appointments, and more. How Do I Sign Up? 1. In your internet browser, go to [email or web address]  2. Click on the First Time User? Click Here link in the Sign In box. You will be redirect to the New Member Sign Up page. 3. Enter your [facility] Access Code exactly as it appears below. You will not need to use this code after youve completed the sign-up process. If you do not sign up before the expiration date, you must request a new code. [facility] Access Code: [email or web address]  Expires: 2021  2:10 PM (This is the date your [facility] access code will )    4. Enter the last four digits of your Social Security Number (xxxx) and Date of Birth (mm/dd/yyyy) as indicated and click Submit. You will be taken to the next sign-up page. 5. Create a [facility] ID. This will be your [facility] login ID and cannot be changed, so think of one that is secure and easy to remember. 6. Create a [facility] password. You can change your password at any time. 7. Enter your Password Reset Question and Answer. This can be used at a later time if you forget your password. 8. Enter your e-mail address. You will receive e-mail notification when new information is available in [address]. 9. Click Sign Up. You can now view and download portions of your medical record. 10. Click the Download Summary menu link to download a portable copy of your medical information. Additional Information    If you have questions, please visit the Frequently Asked Questions section of the [facility] website at [email or web address] Remember, [facility] is NOT to be used for urgent needs. For medical emergencies, dial 911.     Patient {ARMBANDS:[id number]}

## 2021-11-23 NOTE — PROGRESS NOTES
[facility] - Progress Note  Admit Date: 11/17/2021    Assessment:     -s/p dual chamber Medtronic AICD 11/22/21, normal function  -Acute on chronic HFrEF. Presented with progressively worsening SOB x 5 days, PND and various other complaints.   -Nonischemic cardiomyopathy with EF 20-25% by echo 11/18/21, unchanged from 5/15/21. Noncompliant with [employer].  On ACE, coreg as outpatient > 3 months. Unable to afford Entresto due to cost.  -NSVT  -s/p NSTEMI, Type II. Not c/w ACS, suspect demand in the setting of CHF, STANLEY. Troponin peaked at 0.38. ECG with chronic anterolateral T wave changes. No significant CAD seen on heart cath from 10/2020.   -STANLEY on CKD, improved  -h/o subcutaneous loop recorder 2017  -Non-Significant CAD, s/p LHC (10/2020)   ? Left MAIN CAD 40-50%   ? non obstructive LAD and RCA disease   -HLD, on statin.   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   -Life expectancy > 1 year      Primary Cardiologist: Dr. Sixto Dick:     Device check today with normal function. Ok to discharge and needs followup 1 week for wound check and dressing removal.  I will let office know. Subjective:     C/o incisional pain at site of aicd.     Objective:      Patient Vitals for the past 8 hrs:   Temp Pulse Resp BP SpO2   11/23/21 1135 97.6 °F (36.4 °C) 60 16 123/73 99 %   11/23/21 0752 98.1 °F (36.7 °C) (!) 59 19 121/75 92 %         Patient Vitals for the past 96 hrs:   Weight   11/23/21 0859 79.4 kg (175 lb)   11/22/21 0555 78.7 kg (173 lb 6.4 oz)   11/21/21 0513 77.2 kg (170 lb 1.6 oz)   11/20/21 0110 77.7 kg (171 lb 4.8 oz)                  No intake or output data in the 24 hours ending 11/23/21 1356    Physical Exam:  General:  alert, cooperative, no distress, appears stated age  Neck:  nontender  Lungs:  clear to auscultation bilaterally  Heart:  regular rate and rhythm, S1, S2 normal, no murmur, click, rub or gallop, aicd pocket intact  Abdomen:  abdomen is soft without significant tenderness, masses, organomegaly or guarding  Extremities:  extremities normal, atraumatic, no cyanosis or edema    Data Review:     Labs: Results:       Chemistry Recent Labs     11/23/21 0233 11/22/21 0216 11/21/21 0122   * 119* 106*   * 134* 135*   K 5.1 3.9 3.4*   CL 95* 98* 98*   CO2 30 29 31   BUN 32* 26* 21*   CREA 1.86* 1.82* 1.91*   CA 7.6* 7.8* 7.9*   MG  --  1.8  --    AGAP 6 7 6   BUCR 17 14 11*   AP 84 79 85   TP 7.1 7.4 7.6   ALB 3.0* 3.0* 3.0*   GLOB 4.1* 4.4* 4.6*   AGRAT 0.7* 0.7* 0.7*      CBC w/Diff Recent Labs     11/23/21 0233 11/22/21 0216 11/21/21  0122   WBC 9.6 9.4 6.0   RBC 4.83 4.77 5.20   HGB 14.4 14.2 15.3   HCT 43.9 43.8 47.6    164 170   GRANS 78* 65 55   LYMPH 11* 22 31   EOS 2 3 4      Cardiac Enzymes No results found for: CPK, CK, CKMMB, CKMB, RCK3, CKMBT, CKNDX, CKND1, SEBASTIÁN, TROPT, TROIQ, YANETH, TROPT, TNIPOC, BNP, BNPP   Coagulation No results for input(s): PTP, INR, APTT, INREXT in the last 72 hours.     Lipid Panel Lab Results   Component Value Date/Time    Cholesterol, total 159 11/17/2021 09:24 PM    HDL Cholesterol 29 (L) 11/17/2021 09:24 PM    LDL, calculated 105.2 (H) 11/17/2021 09:24 PM    VLDL, calculated 24.8 11/17/2021 09:24 PM    Triglyceride 124 11/17/2021 09:24 PM    CHOL/HDL Ratio 5.5 (H) 11/17/2021 09:24 PM      BNP No results found for: BNP, BNPP, XBNPT   Liver Enzymes Recent Labs     11/23/21  0233   TP 7.1   ALB 3.0*   AP 84      Digoxin    Thyroid Studies Lab Results   Component Value Date/Time    TSH 0.72 02/28/2016 07:15 PM          Signed By: Kiara Rodriges MD     November 23, 2021
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
## 2021-12-01 ENCOUNTER — OFFICE VISIT (OUTPATIENT)
Dept: CARDIOLOGY CLINIC | Age: 61
End: 2021-12-01
Payer: MEDICARE

## 2021-12-01 VITALS
OXYGEN SATURATION: 98 % | HEIGHT: 72 IN | SYSTOLIC BLOOD PRESSURE: 151 MMHG | BODY MASS INDEX: 24.52 KG/M2 | WEIGHT: 181 LBS | DIASTOLIC BLOOD PRESSURE: 97 MMHG | HEART RATE: 113 BPM

## 2021-12-01 DIAGNOSIS — Z09 HOSPITAL DISCHARGE FOLLOW-UP: Primary | ICD-10-CM

## 2021-12-01 DIAGNOSIS — I25.10 CORONARY ARTERY DISEASE INVOLVING NATIVE CORONARY ARTERY OF NATIVE HEART WITHOUT ANGINA PECTORIS: ICD-10-CM

## 2021-12-01 DIAGNOSIS — I50.22 SYSTOLIC CHF, CHRONIC (HCC): ICD-10-CM

## 2021-12-01 DIAGNOSIS — I42.9 CARDIOMYOPATHY, UNSPECIFIED TYPE (HCC): ICD-10-CM

## 2021-12-01 PROCEDURE — 1111F DSCHRG MED/CURRENT MED MERGE: CPT | Performed by: NURSE PRACTITIONER

## 2021-12-01 PROCEDURE — 99214 OFFICE O/P EST MOD 30 MIN: CPT | Performed by: NURSE PRACTITIONER

## 2021-12-01 RX ORDER — LOSARTAN POTASSIUM 50 MG/1
50 TABLET ORAL DAILY
Qty: 90 TABLET | Refills: 3 | Status: ON HOLD | OUTPATIENT
Start: 2021-12-01 | End: 2022-02-25 | Stop reason: SDUPTHER

## 2021-12-01 RX ORDER — SPIRONOLACTONE 25 MG/1
12.5 TABLET ORAL DAILY
Qty: 45 TABLET | Refills: 3 | Status: SHIPPED | OUTPATIENT
Start: 2021-12-01 | End: 2021-12-31

## 2021-12-01 RX ORDER — CARVEDILOL 12.5 MG/1
12.5 TABLET ORAL 2 TIMES DAILY WITH MEALS
Qty: 180 TABLET | Refills: 2 | Status: ON HOLD | OUTPATIENT
Start: 2021-12-01 | End: 2022-02-25 | Stop reason: SDUPTHER

## 2021-12-01 RX ORDER — ASPIRIN 81 MG/1
81 TABLET ORAL DAILY
Qty: 100 TABLET | Refills: 3 | Status: SHIPPED | OUTPATIENT
Start: 2021-12-01 | End: 2022-02-26

## 2021-12-01 RX ORDER — TORSEMIDE 20 MG/1
20 TABLET ORAL 2 TIMES DAILY
Qty: 180 TABLET | Refills: 3 | Status: ON HOLD | OUTPATIENT
Start: 2021-12-01 | End: 2022-02-25 | Stop reason: SDUPTHER

## 2021-12-01 RX ORDER — ROSUVASTATIN CALCIUM 20 MG/1
20 TABLET, COATED ORAL
Qty: 90 TABLET | Refills: 2 | Status: ON HOLD | OUTPATIENT
Start: 2021-12-01 | End: 2022-02-25 | Stop reason: SDUPTHER

## 2021-12-01 RX ORDER — NITROGLYCERIN 0.4 MG/1
0.4 TABLET SUBLINGUAL
Qty: 25 EACH | Refills: 0 | Status: SHIPPED | OUTPATIENT
Start: 2021-12-01 | End: 2022-04-16
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
## 2021-12-01 NOTE — PATIENT INSTRUCTIONS
Stop taking lisinopril - cough may be due to medication  Begin taking Losartan 50 mg/ day    Patient advised to keep BP/HR(pulse rate) chart twice daily and bring us results in next 4 to 5 days. Patient may send the results via \"My Chart\" if desired. Please rest for 5-10 minutes before checking blood pressure. Sit on a comfortable chair without crossing the legs and put your arm on a table. We recommend that you use an upper arm cuff. Check the blood pressure 3 times each time you check the blood pressure and record the lowest reading. If you check the blood pressure in both arms, use the higher reading. Have blood work drawn in 1 week. Low Sodium Diet (2,000 Milligram): Care Instructions  Overview     Limiting sodium can be an important part of managing some health problems. The most common source of sodium is salt. People get most of the salt in their diet from canned, prepared, and packaged foods. Fast food and restaurant meals also are very high in sodium. Your doctor will probably limit your sodium to less than 2,000 milligrams (mg) a day. This limit counts all the sodium in prepared and packaged foods and any salt you add to your food. Follow-up care is a key part of your treatment and safety. Be sure to make and go to all appointments, and call your doctor if you are having problems. It's also a good idea to know your test results and keep a list of the medicines you take. How can you care for yourself at home? Read food labels  · Read labels on cans and food packages. The labels tell you how much sodium is in each serving. Make sure that you look at the serving size. If you eat more than the serving size, you have eaten more sodium. · Food labels also tell you the Percent Daily Value for sodium. Choose products with low Percent Daily Values for sodium.   · Be aware that sodium can come in forms other than salt, including monosodium glutamate (MSG), sodium citrate, and sodium bicarbonate (baking soda). MSG is often added to Asian food. When you eat out, you can sometimes ask for food without MSG or added salt. Buy low-sodium foods  · Buy foods that are labeled \"unsalted\" (no salt added), \"sodium-free\" (less than 5 mg of sodium per serving), or \"low-sodium\" (140 mg or less of sodium per serving). Foods labeled \"reduced-sodium\" and \"light sodium\" may still have too much sodium. Be sure to read the label to see how much sodium you are getting. · Buy fresh vegetables, or frozen vegetables without added sauces. Buy low-sodium versions of canned vegetables, soups, and other canned goods. Prepare low-sodium meals  · Cut back on the amount of salt you use in cooking. This will help you adjust to the taste. Do not add salt after cooking. One teaspoon of salt has about 2,300 mg of sodium. · Take the salt shaker off the table. · Flavor your food with garlic, lemon juice, onion, vinegar, herbs, and spices. Do not use soy sauce, lite soy sauce, steak sauce, onion salt, garlic salt, celery salt, or ketchup on your food. · Use low-sodium salad dressings, sauces, and ketchup. Or make your own salad dressings and sauces without adding salt. · Use less salt (or none) when recipes call for it. You can often use half the salt a recipe calls for without losing flavor. Other foods such as rice, pasta, and grains do not need added salt. · Rinse canned vegetables, and cook them in fresh water. This removes some--but not all--of the salt. · Avoid water that is naturally high in sodium or that has been treated with water softeners, which add sodium. If you buy bottled water, read the label and choose a sodium-free brand. Avoid high-sodium foods  · Avoid eating:  ? Smoked, cured, salted, and canned meat, fish, and poultry. ? Ham, mann, hot dogs, and luncheon meats. ? Regular, hard, and processed cheese and regular peanut butter.   ? Crackers with salted tops, and other salted snack foods such as pretzels, chips, and salted popcorn. ? Frozen prepared meals, unless labeled low-sodium. ? Canned and dried soups, broths, and bouillon, unless labeled sodium-free or low-sodium. ? Canned vegetables, unless labeled sodium-free or low-sodium. ? [place] fries, pizza, tacos, and other fast foods. ? Pickles, olives, ketchup, and other condiments, especially soy sauce, unless labeled sodium-free or low-sodium. Where can you learn more? Go to http://www.gray.com/  Enter V843 in the search box to learn more about \"Low Sodium Diet (2,000 Milligram): Care Instructions. \"  Current as of: December 17, 2020               Content Version: 13.0  © 5962-3278 [employer]. Care instructions adapted under license by [employer] (which disclaims liability or warranty for this information). If you have questions about a medical condition or this instruction, always ask your healthcare professional. [place] [address] any warranty or liability for your use of this information.

## 2021-12-02 DIAGNOSIS — I50.22 SYSTOLIC CHF, CHRONIC (HCC): ICD-10-CM

## 2022-02-23 ENCOUNTER — HOSPITAL ENCOUNTER (INPATIENT)
Age: 62
LOS: 3 days | Discharge: HOME | DRG: 247 | End: 2022-02-26
Attending: STUDENT IN AN ORGANIZED HEALTH CARE EDUCATION/TRAINING PROGRAM | Admitting: FAMILY MEDICINE
Payer: MEDICARE

## 2022-02-23 ENCOUNTER — APPOINTMENT (OUTPATIENT)
Dept: NON INVASIVE DIAGNOSTICS | Age: 62
DRG: 247 | End: 2022-02-23
Attending: PHYSICIAN ASSISTANT
Payer: MEDICARE

## 2022-02-23 ENCOUNTER — APPOINTMENT (OUTPATIENT)
Dept: GENERAL RADIOLOGY | Age: 62
DRG: 247 | End: 2022-02-23
Attending: STUDENT IN AN ORGANIZED HEALTH CARE EDUCATION/TRAINING PROGRAM
Payer: MEDICARE

## 2022-02-23 DIAGNOSIS — I50.22 CHRONIC SYSTOLIC CONGESTIVE HEART FAILURE (HCC): ICD-10-CM

## 2022-02-23 DIAGNOSIS — I42.9 CARDIOMYOPATHY, UNSPECIFIED TYPE (HCC): ICD-10-CM

## 2022-02-23 DIAGNOSIS — I50.9 ACUTE ON CHRONIC CONGESTIVE HEART FAILURE, UNSPECIFIED HEART FAILURE TYPE (HCC): ICD-10-CM

## 2022-02-23 DIAGNOSIS — I25.10 CORONARY ARTERY DISEASE INVOLVING NATIVE CORONARY ARTERY OF NATIVE HEART WITHOUT ANGINA PECTORIS: ICD-10-CM

## 2022-02-23 DIAGNOSIS — R73.9 HYPERGLYCEMIA: ICD-10-CM

## 2022-02-23 DIAGNOSIS — I42.0 DILATED CARDIOMYOPATHY (HCC): ICD-10-CM

## 2022-02-23 DIAGNOSIS — I50.22 SYSTOLIC CHF, CHRONIC (HCC): ICD-10-CM

## 2022-02-23 DIAGNOSIS — I24.9 ACS (ACUTE CORONARY SYNDROME) (HCC): Primary | ICD-10-CM

## 2022-02-23 DIAGNOSIS — R07.9 CHEST PAIN, UNSPECIFIED TYPE: ICD-10-CM

## 2022-02-23 DIAGNOSIS — I21.4 NSTEMI, INITIAL EPISODE OF CARE (HCC): ICD-10-CM

## 2022-02-23 DIAGNOSIS — N18.9 CHRONIC KIDNEY DISEASE, UNSPECIFIED CKD STAGE: ICD-10-CM

## 2022-02-23 LAB
ANION GAP SERPL CALC-SCNC: 6 MMOL/L (ref 3–18)
APTT PPP: 23.9 SEC (ref 23–36.4)
APTT PPP: 36.6 SEC (ref 23–36.4)
BASOPHILS # BLD: 0 K/UL (ref 0–0.1)
BASOPHILS NFR BLD: 0 % (ref 0–2)
BNP SERPL-MCNC: 6402 PG/ML (ref 0–900)
BUN SERPL-MCNC: 28 MG/DL (ref 7–18)
BUN/CREAT SERPL: 15 (ref 12–20)
CALCIUM SERPL-MCNC: 7.6 MG/DL (ref 8.5–10.1)
CHLORIDE SERPL-SCNC: 105 MMOL/L (ref 100–111)
CO2 SERPL-SCNC: 26 MMOL/L (ref 21–32)
CREAT SERPL-MCNC: 1.84 MG/DL (ref 0.6–1.3)
DIFFERENTIAL METHOD BLD: ABNORMAL
ECHO LV FRACTIONAL SHORTENING: 7 % (ref 28–44)
ECHO LV INTERNAL DIMENSION DIASTOLE INDEX: 2.85 CM/M2
ECHO LV INTERNAL DIMENSION DIASTOLIC: 5.7 CM (ref 4.2–5.9)
ECHO LV INTERNAL DIMENSION SYSTOLIC INDEX: 2.65 CM/M2
ECHO LV INTERNAL DIMENSION SYSTOLIC: 5.3 CM
ECHO LV IVSD: 0.9 CM (ref 0.6–1)
ECHO LV MASS 2D: 197.5 G (ref 88–224)
ECHO LV MASS INDEX 2D: 98.8 G/M2 (ref 49–115)
ECHO LV POSTERIOR WALL DIASTOLIC: 0.9 CM (ref 0.6–1)
ECHO LV RELATIVE WALL THICKNESS RATIO: 0.32
ECHO TV REGURGITANT MAX VELOCITY: 3.21 M/S
ECHO TV REGURGITANT PEAK GRADIENT: 41 MMHG
EOSINOPHIL # BLD: 0.2 K/UL (ref 0–0.4)
EOSINOPHIL NFR BLD: 2 % (ref 0–5)
ERYTHROCYTE [DISTWIDTH] IN BLOOD BY AUTOMATED COUNT: 14 % (ref 11.6–14.5)
ETHANOL SERPL-MCNC: <3 MG/DL (ref 0–3)
GLUCOSE SERPL-MCNC: 155 MG/DL (ref 74–99)
HCT VFR BLD AUTO: 40.8 % (ref 36–48)
HGB BLD-MCNC: 13 G/DL (ref 13–16)
IMM GRANULOCYTES # BLD AUTO: 0 K/UL
IMM GRANULOCYTES NFR BLD AUTO: 0 %
LYMPHOCYTES # BLD: 1.5 K/UL (ref 0.9–3.6)
LYMPHOCYTES NFR BLD: 16 % (ref 21–52)
MAGNESIUM SERPL-MCNC: 1.7 MG/DL (ref 1.6–2.6)
MCH RBC QN AUTO: 29.3 PG (ref 24–34)
MCHC RBC AUTO-ENTMCNC: 31.9 G/DL (ref 31–37)
MCV RBC AUTO: 91.9 FL (ref 78–100)
MONOCYTES # BLD: 0.6 K/UL (ref 0.05–1.2)
MONOCYTES NFR BLD: 6 % (ref 3–10)
NEUTS SEG # BLD: 7.2 K/UL (ref 1.8–8)
NEUTS SEG NFR BLD: 76 % (ref 40–73)
NRBC # BLD: 0 K/UL (ref 0–0.01)
NRBC BLD-RTO: 0 PER 100 WBC
PLATELET # BLD AUTO: 150 K/UL (ref 135–420)
PLATELET COMMENTS,PCOM: ABNORMAL
PMV BLD AUTO: 12.4 FL (ref 9.2–11.8)
POTASSIUM SERPL-SCNC: 4.2 MMOL/L (ref 3.5–5.5)
RBC # BLD AUTO: 4.44 M/UL (ref 4.35–5.65)
RBC MORPH BLD: ABNORMAL
SODIUM SERPL-SCNC: 137 MMOL/L (ref 136–145)
TROPONIN-HIGH SENSITIVITY: 109 NG/L (ref 0–78)
TROPONIN-HIGH SENSITIVITY: 277 NG/L (ref 0–78)
TROPONIN-HIGH SENSITIVITY: 3952 NG/L (ref 0–78)
TROPONIN-HIGH SENSITIVITY: 4933 NG/L (ref 0–78)
WBC # BLD AUTO: 9.5 K/UL (ref 4.6–13.2)

## 2022-02-23 PROCEDURE — 74011250636 HC RX REV CODE- 250/636: Performed by: FAMILY MEDICINE

## 2022-02-23 PROCEDURE — 65660000000 HC RM CCU STEPDOWN

## 2022-02-23 PROCEDURE — 96365 THER/PROPH/DIAG IV INF INIT: CPT

## 2022-02-23 PROCEDURE — 74011250637 HC RX REV CODE- 250/637: Performed by: PHYSICIAN ASSISTANT

## 2022-02-23 PROCEDURE — 74011250637 HC RX REV CODE- 250/637: Performed by: STUDENT IN AN ORGANIZED HEALTH CARE EDUCATION/TRAINING PROGRAM

## 2022-02-23 PROCEDURE — 83735 ASSAY OF MAGNESIUM: CPT

## 2022-02-23 PROCEDURE — 82077 ASSAY SPEC XCP UR&BREATH IA: CPT

## 2022-02-23 PROCEDURE — 93005 ELECTROCARDIOGRAM TRACING: CPT

## 2022-02-23 PROCEDURE — 36415 COLL VENOUS BLD VENIPUNCTURE: CPT

## 2022-02-23 PROCEDURE — 85025 COMPLETE CBC W/AUTO DIFF WBC: CPT

## 2022-02-23 PROCEDURE — 96374 THER/PROPH/DIAG INJ IV PUSH: CPT

## 2022-02-23 PROCEDURE — 2709999900 HC NON-CHARGEABLE SUPPLY

## 2022-02-23 PROCEDURE — 85730 THROMBOPLASTIN TIME PARTIAL: CPT

## 2022-02-23 PROCEDURE — 99285 EMERGENCY DEPT VISIT HI MDM: CPT

## 2022-02-23 PROCEDURE — 74011250636 HC RX REV CODE- 250/636: Performed by: PHYSICIAN ASSISTANT

## 2022-02-23 PROCEDURE — 71045 X-RAY EXAM CHEST 1 VIEW: CPT

## 2022-02-23 PROCEDURE — 80048 BASIC METABOLIC PNL TOTAL CA: CPT

## 2022-02-23 PROCEDURE — 83880 ASSAY OF NATRIURETIC PEPTIDE: CPT

## 2022-02-23 PROCEDURE — 84484 ASSAY OF TROPONIN QUANT: CPT

## 2022-02-23 PROCEDURE — 74011250636 HC RX REV CODE- 250/636: Performed by: STUDENT IN AN ORGANIZED HEALTH CARE EDUCATION/TRAINING PROGRAM

## 2022-02-23 PROCEDURE — 93308 TTE F-UP OR LMTD: CPT

## 2022-02-23 PROCEDURE — 99223 1ST HOSP IP/OBS HIGH 75: CPT | Performed by: INTERNAL MEDICINE

## 2022-02-23 RX ORDER — CARVEDILOL 12.5 MG/1
12.5 TABLET ORAL EVERY 12 HOURS
Status: DISCONTINUED | OUTPATIENT
Start: 2022-02-23 | End: 2022-02-23

## 2022-02-23 RX ORDER — CARVEDILOL 6.25 MG/1
6.25 TABLET ORAL 2 TIMES DAILY WITH MEALS
Status: DISCONTINUED | OUTPATIENT
Start: 2022-02-23 | End: 2022-02-24

## 2022-02-23 RX ORDER — MORPHINE SULFATE 4 MG/ML
4 INJECTION INTRAVENOUS ONCE
Status: COMPLETED | OUTPATIENT
Start: 2022-02-23 | End: 2022-02-23

## 2022-02-23 RX ORDER — ERYTHROMYCIN 5 MG/G
OINTMENT OPHTHALMIC EVERY 6 HOURS
Status: DISCONTINUED | OUTPATIENT
Start: 2022-02-23 | End: 2022-02-26 | Stop reason: HOSPADM

## 2022-02-23 RX ORDER — ASPIRIN 325 MG
325 TABLET ORAL DAILY
Status: DISCONTINUED | OUTPATIENT
Start: 2022-02-24 | End: 2022-02-23

## 2022-02-23 RX ORDER — GUAIFENESIN 100 MG/5ML
81 LIQUID (ML) ORAL DAILY
Status: DISCONTINUED | OUTPATIENT
Start: 2022-02-24 | End: 2022-02-24 | Stop reason: SDUPTHER

## 2022-02-23 RX ORDER — SPIRONOLACTONE 25 MG/1
12.5 TABLET ORAL DAILY
Status: DISCONTINUED | OUTPATIENT
Start: 2022-02-24 | End: 2022-02-26 | Stop reason: HOSPADM

## 2022-02-23 RX ORDER — MAGNESIUM SULFATE HEPTAHYDRATE 40 MG/ML
2 INJECTION, SOLUTION INTRAVENOUS ONCE
Status: COMPLETED | OUTPATIENT
Start: 2022-02-23 | End: 2022-02-23

## 2022-02-23 RX ORDER — HEPARIN SODIUM 1000 [USP'U]/ML
3000 INJECTION, SOLUTION INTRAVENOUS; SUBCUTANEOUS ONCE
Status: COMPLETED | OUTPATIENT
Start: 2022-02-23 | End: 2022-02-23

## 2022-02-23 RX ORDER — ACETAMINOPHEN 325 MG/1
650 TABLET ORAL
Status: DISCONTINUED | OUTPATIENT
Start: 2022-02-23 | End: 2022-02-26 | Stop reason: HOSPADM

## 2022-02-23 RX ORDER — FUROSEMIDE 10 MG/ML
40 INJECTION INTRAMUSCULAR; INTRAVENOUS ONCE
Status: COMPLETED | OUTPATIENT
Start: 2022-02-24 | End: 2022-02-24

## 2022-02-23 RX ORDER — FUROSEMIDE 10 MG/ML
60 INJECTION INTRAMUSCULAR; INTRAVENOUS ONCE
Status: COMPLETED | OUTPATIENT
Start: 2022-02-23 | End: 2022-02-23

## 2022-02-23 RX ORDER — HEPARIN SODIUM 5000 [USP'U]/ML
5000 INJECTION, SOLUTION INTRAVENOUS; SUBCUTANEOUS EVERY 8 HOURS
Status: DISCONTINUED | OUTPATIENT
Start: 2022-02-23 | End: 2022-02-23

## 2022-02-23 RX ORDER — SPIRONOLACTONE 25 MG/1
12.5 TABLET ORAL 2 TIMES DAILY
Status: DISCONTINUED | OUTPATIENT
Start: 2022-02-23 | End: 2022-02-23

## 2022-02-23 RX ORDER — TORSEMIDE 20 MG/1
20 TABLET ORAL 2 TIMES DAILY
Status: DISCONTINUED | OUTPATIENT
Start: 2022-02-23 | End: 2022-02-25

## 2022-02-23 RX ORDER — HEPARIN SODIUM 1000 [USP'U]/ML
4000 INJECTION, SOLUTION INTRAVENOUS; SUBCUTANEOUS ONCE
Status: COMPLETED | OUTPATIENT
Start: 2022-02-23 | End: 2022-02-23

## 2022-02-23 RX ORDER — NITROGLYCERIN 0.4 MG/1
0.4 TABLET SUBLINGUAL AS NEEDED
Status: DISCONTINUED | OUTPATIENT
Start: 2022-02-23 | End: 2022-02-26 | Stop reason: HOSPADM

## 2022-02-23 RX ORDER — ROSUVASTATIN CALCIUM 20 MG/1
20 TABLET, COATED ORAL
Status: DISCONTINUED | OUTPATIENT
Start: 2022-02-23 | End: 2022-02-26 | Stop reason: HOSPADM

## 2022-02-23 RX ADMIN — ERYTHROMYCIN: 5 OINTMENT OPHTHALMIC at 23:43

## 2022-02-23 RX ADMIN — CARVEDILOL 6.25 MG: 6.25 TABLET, FILM COATED ORAL at 18:34

## 2022-02-23 RX ADMIN — ROSUVASTATIN CALCIUM 20 MG: 20 TABLET, COATED ORAL at 23:43

## 2022-02-23 RX ADMIN — Medication 13 UNITS/KG/HR: at 19:14

## 2022-02-23 RX ADMIN — HEPARIN SODIUM 3000 UNITS: 1000 INJECTION INTRAVENOUS; SUBCUTANEOUS at 19:16

## 2022-02-23 RX ADMIN — ERYTHROMYCIN: 5 OINTMENT OPHTHALMIC at 18:34

## 2022-02-23 RX ADMIN — MAGNESIUM SULFATE HEPTAHYDRATE 2 G: 40 INJECTION, SOLUTION INTRAVENOUS at 08:01

## 2022-02-23 RX ADMIN — FUROSEMIDE 60 MG: 10 INJECTION, SOLUTION INTRAMUSCULAR; INTRAVENOUS at 11:28

## 2022-02-23 RX ADMIN — Medication 11 UNITS/KG/HR: at 12:01

## 2022-02-23 RX ADMIN — ERYTHROMYCIN: 5 OINTMENT OPHTHALMIC at 14:00

## 2022-02-23 RX ADMIN — MORPHINE SULFATE 4 MG: 4 INJECTION INTRAVENOUS at 08:30

## 2022-02-23 RX ADMIN — HEPARIN SODIUM 4000 UNITS: 1000 INJECTION, SOLUTION INTRAVENOUS; SUBCUTANEOUS at 12:01
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
## 2022-02-23 NOTE — DISCHARGE SUMMARY
[facility]  Discharge Summary    Patient: Abdulkadir Lobo MRN: [id number]  CSN: [id number]    YOB: 1960  Age: 57 y.o. Sex: male      Admission Date: 2/23/2022 Discharge Date: 2/26/22   Attending: Tyra Cardoza MD PCP: Alberto Doherty MD     =========================================================    Reason for Admission: Chest pain in adult [R07.9]    Discharge Diagnoses:   NSTEMI  Non-ischemic cardiomyopathy  HFrEF  HLD  CKD3  CAD    Important notes to PCP/ follow-up studies and evaluations   -Ensure compliance with medications prescribed on discharge  -Follow up on cocaine use, ensure patient is not currently using  -Check volume status  -Follow up appt with Cardiology in 3 weeks  -Arrange for neck rehabilitation, recommended by Cardiology    Pending labs and studies:  None    Operative Procedures:   Cardiac cath, 2/24     Discharge Medications:     Current Discharge Medication List      START taking these medications    Details   clopidogreL (PLAVIX) 75 mg tab Take 1 Tablet by mouth daily. Qty: 30 Tablet, Refills: 2  Start date: 2/26/2022      spironolactone (ALDACTONE) 25 mg tablet Take 0.5 Tablets by mouth daily. Qty: 30 Tablet, Refills: 2  Start date: 2/26/2022         CONTINUE these medications which have CHANGED    Details   aspirin delayed-release 81 mg tablet Take 1 Tablet by mouth daily. Qty: 30 Tablet, Refills: 0  Start date: 2/26/2022      carvediloL (COREG) 12.5 mg tablet Take 1 Tablet by mouth two (2) times daily (with meals) for 30 days. Qty: 60 Tablet, Refills: 0  Start date: 2/25/2022, End date: 3/27/2022    Associated Diagnoses: Cardiomyopathy, unspecified type ([address] Systolic CHF, chronic (HCC)      torsemide (DEMADEX) 20 mg tablet Take 1 Tablet by mouth daily. Qty: 30 Tablet, Refills: 2  Start date: 2/25/2022    Associated Diagnoses: Systolic CHF, chronic (HCC)      rosuvastatin (CRESTOR) 20 mg tablet Take 1 Tablet by mouth nightly.   Qty: 30 Tablet, Refills: 2  Start date: 2/25/2022      losartan (COZAAR) 50 mg tablet Take 1 Tablet by mouth daily. Qty: 30 Tablet, Refills: 2  Start date: 2/25/2022    Associated Diagnoses: Systolic CHF, chronic ([address]         CONTINUE these medications which have NOT CHANGED    Details   nitroglycerin (NITROSTAT) 0.4 mg SL tablet 1 Tablet by SubLINGual route every five (5) minutes as needed for Chest Pain. Up to 3 doses. Qty: 25 Each, Refills: 0      acetaminophen (TYLENOL) 325 mg tablet Take 650 mg by mouth every four (4) hours as needed. Disposition: Home    Consultants:    Cardiology    Brief Hospital Course (including pertinent history and physical findings)  42-year-old male with past medical history including HFrEF, HLD, CKD 3 who presented with chest pain. Had elevated troponin 277 without EKG changes. Patient started on heparin drip. He was evaluated by cardiology who recommended cardiac cath. Cardiac cath performed 2/24 revealed: 95% occlusion RCA. Drug-eluting stent placed with residual 95% occlusion. CXR and ECHO on admission were significant for worsening vascular congestion; ECHO (2/22) showed LVEF 10-15% w/ globally decreased systolic fxn. Patient received three doses of Lasix 40-60mg daily during hospitalization with improvement in respiratory status/SOB. Repeat CXR (2/25) showed slightly improved pulmonary vascular congestion. Patient was stable upon discharged and prescribed aspirin, Plavix, Crestor and additional medications for high blood pressure listed below. Patient was advised  to keep follow-up appointment with his PCP. Management of patient's other health issues during this hospitalization are described in more detail below:    NSTEMI and non-ischemic cardiomyopathy and HFrEF  -CXR: worse vascular congestion  -EKG: NSR, QTc 486, old septal infarct, poss.  Inferior infarct w/o T-wave elevation, t-wave inversion throughout   -BNP: 6402, baseline unclear  -Troponin: 480>554  -Echo 11/21: EF 20-25% with decreased global systolic fxn   -ECHO 8/56: 10% with global systolic dysfunction  - Cardiology consulted, appreciated assistance   - Placed on heparin gtt, transitioned to [facility]   - Restarted PTA medications: spirinolactone, torsemide, coreg and optimize in the setting of CHF  - Strict I/Os, daily weights    - Daily CBC, BMP, Mg; K+ goal >4, Mg goal >2  -Cards to assess for functioning of AICD   -ASA 81mg    CKDIIIa  - Cr at baseline  - Daily BMP     Bacterial conjunctivitis  -Erythromycin drops q6h for 5 days      HLD  -Rosuvastatin 20mg daily  -Lipid panel: , HDL 37, LDL 91, TG 75     Hx of Hypocalcemia  -Ca wnl when corrected for albumin: 10.6  -Monitored  -Replete electrolytes as needed      History of embolic stroke   - Per [employer] records, in 2017 (multiple acute and subacute infarcts in the left cerebrum)     Pulmonary nodules   - Noted in discharge summary in 2017, CXR without any note of similar nodules   - Denied history of smoking     Subarrachnoid hemorrhage 2016  -No deficits post-hemorrhage  -Per cards, was ok to continue heparin drip      CURRENT ADMISSION IMAGING RESULTS   XR CHEST PORT    Result Date: 2/25/2022  Slightly improved pulmonary vascular congestion. XR CHEST PORT    Result Date: 2/23/2022  Slightly worse vascular congestion.         Cardiology Procedures/Testing:  MODALITY RESULTS   EKG Results for orders placed or performed during the hospital encounter of 02/23/22   EKG, 12 LEAD, INITIAL   Result Value Ref Range    Ventricular Rate 77 BPM    Atrial Rate 77 BPM    P-R Interval 170 ms    QRS Duration 94 ms    Q-T Interval 486 ms    QTC Calculation (Bezet) 549 ms    Calculated P Axis 67 degrees    Calculated R Axis 72 degrees    Calculated T Axis 164 degrees    Diagnosis       Normal sinus rhythm  Possible Left atrial enlargement  Septal infarct (cited on or before 17-NOV-2021)  ST & T wave abnormality, consider lateral ischemia  Prolonged QT  Abnormal ECG  When compared with ECG of 17-NOV-2021 18:49,  Significant changes have occurred  Confirmed by Isak Conrad ([id number]) on 2/24/2022 9:30:34 AM     Results for orders placed or performed in visit on 02/15/18   AMB POC EKG ROUTINE W/ 12 LEADS, INTER & REP    Impression    Sinus tachycardia at 126 bpm.  No acute changes. ECHO 02/23/22    ECHO ADULT FOLLOW-UP OR LIMITED 02/23/2022 2/23/2022    Interpretation Summary    Left Ventricle: Left ventricle size is normal. Normal wall thickness. Global hypokinesis present. Severely reduced left ventricular systolic function with a visually estimated EF of 10 -15%.   Mitral Valve: Mild transvalvular regurgitation. Signed by: Reg Carson MD on 2/23/2022  3:18 PM     Nuclear Medicine No results found for this or any previous visit. IR No results found for this or any previous visit. CATH      Special Testing/Procedures:  MODALITY RESULTS   MICRO All Micro Results     None         ABG Lab Results   Component Value Date/Time    pH (POC) 7.42 11/17/2021 11:13 PM    pCO2 (POC) 40.4 11/17/2021 11:13 PM    pO2 (POC) 87 11/17/2021 11:13 PM    HCO3 (POC) 26.2 (H) 11/17/2021 11:13 PM      UA No results found for this or any previous visit.      Laboratory Results:  LABORATORY RESULTS   HEMATOLOGY Lab Results   Component Value Date/Time    WBC 7.0 02/26/2022 05:45 AM    Hemoglobin, POC 17.3 (H) 11/23/2018 06:26 PM    HGB 13.7 02/26/2022 05:45 AM    Hematocrit, POC 51 (H) 11/23/2018 06:26 PM    HCT 41.4 02/26/2022 05:45 AM    PLATELET 934 46/46/3456 05:45 AM    MCV 90.4 02/26/2022 05:45 AM       CHEMISTRIES Lab Results   Component Value Date/Time    Sodium 137 02/26/2022 05:45 AM    Potassium 3.7 02/26/2022 05:45 AM    Chloride 103 02/26/2022 05:45 AM    CO2 24 02/26/2022 05:45 AM    Anion gap 10 02/26/2022 05:45 AM    Glucose 90 02/26/2022 05:45 AM    BUN 26 (H) 02/26/2022 05:45 AM    Creatinine 1.49 (H) 02/26/2022 05:45 AM    BUN/Creatinine ratio 17 02/26/2022 05:45 AM    GFR est AA 58 (L) 02/26/2022 05:45 AM    GFR est non-AA 48 (L) 02/26/2022 05:45 AM    Calcium 7.5 (L) 02/26/2022 05:45 AM      HEPATIC FUNCTION Lab Results   Component Value Date/Time    Albumin 2.5 (L) 02/24/2022 04:58 AM    Bilirubin, total 0.7 11/23/2021 02:33 AM    Protein, total 7.1 11/23/2021 02:33 AM    Globulin 4.1 (H) 11/23/2021 02:33 AM    A-G Ratio 0.7 (L) 11/23/2021 02:33 AM    ALT (SGPT) 25 11/23/2021 02:33 AM    Alk. phosphatase 84 11/23/2021 02:33 AM       LACTIC ACID Lab Results   Component Value Date/Time    Lactic acid 0.7 02/29/2016 12:56 AM    Lactic acid 2.2 (HH) 02/28/2016 09:45 PM    Lactic acid 2.1 (HH) 02/28/2016 07:37 PM      CARDIAC PANEL Lab Results   Component Value Date/Time     11/17/2021 09:24 PM    CK - MB 2.1 11/17/2021 09:24 PM    CK-MB Index 1.0 11/17/2021 09:24 PM    Troponin-I, QT 0.25 (H) 11/19/2021 04:15 AM      NT-proBNP Lab Results   Component Value Date/Time    NT pro-BNP 6,402 (H) 02/23/2022 08:15 AM    NT pro-BNP 1,359 (H) 11/17/2021 09:24 PM    NT pro-BNP 3,676 (H) 05/13/2021 01:42 PM      THYROID Lab Results   Component Value Date/Time    TSH 0.72 02/28/2016 07:15 PM      LIPID PANEL Lab Results   Component Value Date/Time    Cholesterol, total 143 02/24/2022 04:58 AM    HDL Cholesterol 37 (L) 02/24/2022 04:58 AM    LDL, calculated 91 02/24/2022 04:58 AM    VLDL, calculated 15 02/24/2022 04:58 AM    Triglyceride 75 02/24/2022 04:58 AM    CHOL/HDL Ratio 3.9 02/24/2022 04:58 AM         RISK CALCULATORS:  SCORE RESULT   ASCVD The ASCVD Risk score (Ilda Calderon., et al., 2013) failed to calculate for the following reasons: The patient has a prior MI or stroke diagnosis    [id number]     HAS-BLED    READMISSION RISK SCORE Medium Risk            18 Total Score    4 IP Visits Last 12 Months (1-3=4, 4=9, >4=11)    5 Pt.  Coverage (Medicare=5 , Medicaid, or Self-Pay=4)    9 Charlson Comorbidity Score (Age + Comorbid Conditions) Criteria that do not apply:    Has Seen PCP in Last 6 Months (Yes=3, No=0)    . Living with Significant Other. Assisted Living. LTAC. SNF. or   Rehab    Patient Length of Stay (>5 days = 3)           Functional status and cognitive function:      Status: alert, cooperative, no distress, appears stated age  Condition: STABLE  Disposition: Home    Physical Exam on Discharge:  General: Well-appearing, NAD. On RA. CV:  RRR, no M/G/R.  RESP: Unlabored breathing. Lungs CTAB. Equal expansion bilaterally. ABD:  Soft, nontender, nondistended. Neuro: Moves extremities freely and purposefully. Ext:  No edema, erythema, or tenderness of extremities. 2+ radial and dp pulses bilaterally. Skin: Warm & dry. No rashes, lesions, or ulcers.  Good turgor. Psych: Normal mood and affect    Diet: Fluid Controlled:  1500 cc    Code status and advanced care plan: Full    Patient Education:  Patient was educated on the following topics prior to discharge: CHF, medication compliance, avoiding drug use. Follow-up:   Follow-up Information     Follow up With Specialties Details Why Hernandez Gorman MD Family Medicine Go to [facility] will set your follow up appointment and notify you of date/ time. [address] [facility]      Lindsay Perry MD Cardiology, Internal Medicine Go on 3/3/2022 @12:00PM for follow up hospitalization. [id number]            =========================================================    Allie Oates, PGY-1   [address] Medicine   February 26, 2022, 9:56 AM

## 2022-02-23 NOTE — Clinical Note
Status[de-identified] INPATIENT [101]   Type of Bed: Telemetry [19]   Cardiac Monitoring Required?: Yes   Inpatient Hospitalization Certified Necessary for the Following Reasons: 3.  Patient receiving treatment that can only be provided in an inpatient setting (further clarification in H&P documentation)   Admitting Diagnosis: Chest pain in adult [[id number]]   Admitting Physician: Edwardo Brooks [[id number]]   Attending Physician: Edwardo Brooks [[id number]]   Estimated Length of Stay: 3-4 Midnights   Discharge Plan[de-identified] Home with Office Follow-up

## 2022-02-23 NOTE — Clinical Note
Contrast Dose Calculator:   Patient's age: 57.   Patient's sex: Male. Patient weight (kg) = 84. Creatinine level (mg/dL) = 1.53. Creatinine clearance (mL/min): 59.48. Contrast concentration (mg/mL) = 300. MACD = 274.51 mL. Max Contrast dose per Creatinine Cl calculator = 133.82 mL. Normal for race

## 2022-02-23 NOTE — ED TRIAGE NOTES
Client reports havnig midsternal cp 0500, client took 2ntg prior to ems arrival. Initial bp 80/40. Pain reproducable. Reports minor relief of cp 5/10.  Prior hx of MI.
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
## 2022-02-23 NOTE — ROUTINE PROCESS
TRANSFER - OUT REPORT:    Verbal report given to STACEY De Santiago(name) on Hilario Hardwick  being transferred to (unit) for routine progression of care       Report consisted of patients Situation, Background, Assessment and   Recommendations(SBAR). Information from the following report(s) SBAR, ED Summary, Procedure Summary, Accordion, Procedure Verification and Dual Neuro Assessment was reviewed with the receiving nurse. Lines:   Peripheral IV 02/23/22 Right Antecubital (Active)   Site Assessment Clean, dry, & intact 02/23/22 0818   Infiltration Assessment 0 02/23/22 0818   Dressing Status Clean, dry, & intact 02/23/22 0818   Dressing Type 4 X 4 02/23/22 0818       Peripheral IV 02/23/22 Left Forearm (Active)   Site Assessment Clean, dry, & intact 02/23/22 0819   Phlebitis Assessment 0 02/23/22 0819   Dressing Status Clean, dry, & intact 02/23/22 0819   Dressing Type 4 X 4 02/23/22 8901        Opportunity for questions and clarification was provided.       Patient transported with:   Registered Nurse

## 2022-02-23 NOTE — PROGRESS NOTES
attempted an initial visit, but the patient was asleep and unable to be assessed at this time. Spiritual Care materials left at bedside. Hector will continue to follow and provide pastoral care as needed or requested. .     Rev. Gaurav Galdamez M.Div.   [place]  [phone]
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
## 2022-02-23 NOTE — Clinical Note
TRANSFER - OUT REPORT:     Verbal report given to: [facility]. Report consisted of patient's Situation, Background, Assessment and   Recommendations(SBAR). Opportunity for questions and clarification was provided. Patient transported with a Registered Nurse. Patient transported to: [facility] [address].

## 2022-02-23 NOTE — Clinical Note
TRANSFER - IN REPORT:     Verbal report received from: ALBERT [facility]. Report consisted of patient's Situation, Background, Assessment and   Recommendations(SBAR). Opportunity for questions and clarification was provided. Assessment completed upon patient's arrival to unit and care assumed. Patient transported with a Registered Nurse.

## 2022-02-24 LAB
ALBUMIN SERPL-MCNC: 2.5 G/DL (ref 3.4–5)
AMPHET UR QL SCN: NEGATIVE
ANION GAP SERPL CALC-SCNC: 8 MMOL/L (ref 3–18)
APTT PPP: 41.6 SEC (ref 23–36.4)
APTT PPP: 42.1 SEC (ref 23–36.4)
ATRIAL RATE: 77 BPM
BARBITURATES UR QL SCN: NEGATIVE
BASOPHILS # BLD: 0.1 K/UL (ref 0–0.1)
BASOPHILS NFR BLD: 1 % (ref 0–2)
BENZODIAZ UR QL: NEGATIVE
BUN SERPL-MCNC: 27 MG/DL (ref 7–18)
BUN/CREAT SERPL: 18 (ref 12–20)
CALCIUM SERPL-MCNC: 7.4 MG/DL (ref 8.5–10.1)
CALCULATED P AXIS, ECG09: 67 DEGREES
CALCULATED R AXIS, ECG10: 72 DEGREES
CALCULATED T AXIS, ECG11: 164 DEGREES
CANNABINOIDS UR QL SCN: NEGATIVE
CHLORIDE SERPL-SCNC: 104 MMOL/L (ref 100–111)
CHOLEST SERPL-MCNC: 143 MG/DL
CO2 SERPL-SCNC: 24 MMOL/L (ref 21–32)
COCAINE UR QL SCN: POSITIVE
CREAT SERPL-MCNC: 1.53 MG/DL (ref 0.6–1.3)
DIAGNOSIS, 93000: NORMAL
DIFFERENTIAL METHOD BLD: ABNORMAL
EOSINOPHIL # BLD: 0.1 K/UL (ref 0–0.4)
EOSINOPHIL NFR BLD: 1 % (ref 0–5)
ERYTHROCYTE [DISTWIDTH] IN BLOOD BY AUTOMATED COUNT: 14.3 % (ref 11.6–14.5)
GLUCOSE SERPL-MCNC: 92 MG/DL (ref 74–99)
HCT VFR BLD AUTO: 42 % (ref 36–48)
HDLC SERPL-MCNC: 37 MG/DL (ref 40–60)
HDLC SERPL: 3.9 {RATIO} (ref 0–5)
HDSCOM,HDSCOM: ABNORMAL
HGB BLD-MCNC: 13.3 G/DL (ref 13–16)
IMM GRANULOCYTES # BLD AUTO: 0 K/UL (ref 0–0.04)
IMM GRANULOCYTES NFR BLD AUTO: 0 % (ref 0–0.5)
LDLC SERPL CALC-MCNC: 91 MG/DL (ref 0–100)
LIPID PROFILE,FLP: ABNORMAL
LYMPHOCYTES # BLD: 2.3 K/UL (ref 0.9–3.6)
LYMPHOCYTES NFR BLD: 22 % (ref 21–52)
MAGNESIUM SERPL-MCNC: 1.8 MG/DL (ref 1.6–2.6)
MAGNESIUM SERPL-MCNC: 2.4 MG/DL (ref 1.6–2.6)
MCH RBC QN AUTO: 29.1 PG (ref 24–34)
MCHC RBC AUTO-ENTMCNC: 31.7 G/DL (ref 31–37)
MCV RBC AUTO: 91.9 FL (ref 78–100)
METHADONE UR QL: NEGATIVE
MONOCYTES # BLD: 0.7 K/UL (ref 0.05–1.2)
MONOCYTES NFR BLD: 6 % (ref 3–10)
NEUTS SEG # BLD: 7.4 K/UL (ref 1.8–8)
NEUTS SEG NFR BLD: 70 % (ref 40–73)
NRBC # BLD: 0 K/UL (ref 0–0.01)
NRBC BLD-RTO: 0 PER 100 WBC
OPIATES UR QL: POSITIVE
P-R INTERVAL, ECG05: 170 MS
PCP UR QL: NEGATIVE
PLATELET # BLD AUTO: 165 K/UL (ref 135–420)
PMV BLD AUTO: 12.9 FL (ref 9.2–11.8)
POTASSIUM SERPL-SCNC: 4.1 MMOL/L (ref 3.5–5.5)
Q-T INTERVAL, ECG07: 486 MS
QRS DURATION, ECG06: 94 MS
QTC CALCULATION (BEZET), ECG08: 549 MS
RBC # BLD AUTO: 4.57 M/UL (ref 4.35–5.65)
SODIUM SERPL-SCNC: 136 MMOL/L (ref 136–145)
TRIGL SERPL-MCNC: 75 MG/DL (ref ?–150)
TROPONIN-HIGH SENSITIVITY: 5460 NG/L (ref 0–78)
TROPONIN-HIGH SENSITIVITY: 5618 NG/L (ref 0–78)
VENTRICULAR RATE, ECG03: 77 BPM
VLDLC SERPL CALC-MCNC: 15 MG/DL
WBC # BLD AUTO: 10.5 K/UL (ref 4.6–13.2)

## 2022-02-24 PROCEDURE — 92928 PRQ TCAT PLMT NTRAC ST 1 LES: CPT | Performed by: INTERNAL MEDICINE

## 2022-02-24 PROCEDURE — 93458 L HRT ARTERY/VENTRICLE ANGIO: CPT | Performed by: INTERNAL MEDICINE

## 2022-02-24 PROCEDURE — 74011250636 HC RX REV CODE- 250/636

## 2022-02-24 PROCEDURE — 99232 SBSQ HOSP IP/OBS MODERATE 35: CPT | Performed by: INTERNAL MEDICINE

## 2022-02-24 PROCEDURE — 74011000258 HC RX REV CODE- 258: Performed by: INTERNAL MEDICINE

## 2022-02-24 PROCEDURE — 74011250636 HC RX REV CODE- 250/636: Performed by: FAMILY MEDICINE

## 2022-02-24 PROCEDURE — 85730 THROMBOPLASTIN TIME PARTIAL: CPT

## 2022-02-24 PROCEDURE — C1725 CATH, TRANSLUMIN NON-LASER: HCPCS | Performed by: INTERNAL MEDICINE

## 2022-02-24 PROCEDURE — C1769 GUIDE WIRE: HCPCS | Performed by: INTERNAL MEDICINE

## 2022-02-24 PROCEDURE — 99152 MOD SED SAME PHYS/QHP 5/>YRS: CPT | Performed by: INTERNAL MEDICINE

## 2022-02-24 PROCEDURE — 74011250637 HC RX REV CODE- 250/637: Performed by: STUDENT IN AN ORGANIZED HEALTH CARE EDUCATION/TRAINING PROGRAM

## 2022-02-24 PROCEDURE — 2709999900 HC NON-CHARGEABLE SUPPLY

## 2022-02-24 PROCEDURE — 80307 DRUG TEST PRSMV CHEM ANLYZR: CPT

## 2022-02-24 PROCEDURE — C1874 STENT, COATED/COV W/DEL SYS: HCPCS | Performed by: INTERNAL MEDICINE

## 2022-02-24 PROCEDURE — 74011000250 HC RX REV CODE- 250: Performed by: INTERNAL MEDICINE

## 2022-02-24 PROCEDURE — 76210000001 HC OR PH I REC 2.5 TO 3 HR: Performed by: INTERNAL MEDICINE

## 2022-02-24 PROCEDURE — 80048 BASIC METABOLIC PNL TOTAL CA: CPT

## 2022-02-24 PROCEDURE — C1887 CATHETER, GUIDING: HCPCS | Performed by: INTERNAL MEDICINE

## 2022-02-24 PROCEDURE — 74011250637 HC RX REV CODE- 250/637: Performed by: INTERNAL MEDICINE

## 2022-02-24 PROCEDURE — 4A023N7 MEASUREMENT OF CARDIAC SAMPLING AND PRESSURE, LEFT HEART, PERCUTANEOUS APPROACH: ICD-10-PCS | Performed by: INTERNAL MEDICINE

## 2022-02-24 PROCEDURE — 84484 ASSAY OF TROPONIN QUANT: CPT

## 2022-02-24 PROCEDURE — 80061 LIPID PANEL: CPT

## 2022-02-24 PROCEDURE — 027034Z DILATION OF CORONARY ARTERY, ONE ARTERY WITH DRUG-ELUTING INTRALUMINAL DEVICE, PERCUTANEOUS APPROACH: ICD-10-PCS | Performed by: INTERNAL MEDICINE

## 2022-02-24 PROCEDURE — 36415 COLL VENOUS BLD VENIPUNCTURE: CPT

## 2022-02-24 PROCEDURE — 77030027845 HC BND COM RDL D-STAT TELE -B: Performed by: INTERNAL MEDICINE

## 2022-02-24 PROCEDURE — 74011000636 HC RX REV CODE- 636: Performed by: INTERNAL MEDICINE

## 2022-02-24 PROCEDURE — 74011250636 HC RX REV CODE- 250/636: Performed by: INTERNAL MEDICINE

## 2022-02-24 PROCEDURE — B2111ZZ FLUOROSCOPY OF MULTIPLE CORONARY ARTERIES USING LOW OSMOLAR CONTRAST: ICD-10-PCS | Performed by: INTERNAL MEDICINE

## 2022-02-24 PROCEDURE — 74011250637 HC RX REV CODE- 250/637

## 2022-02-24 PROCEDURE — 77030015766: Performed by: INTERNAL MEDICINE

## 2022-02-24 PROCEDURE — 85025 COMPLETE CBC W/AUTO DIFF WBC: CPT

## 2022-02-24 PROCEDURE — 77030013744: Performed by: INTERNAL MEDICINE

## 2022-02-24 PROCEDURE — 65660000004 HC RM CVT STEPDOWN

## 2022-02-24 PROCEDURE — 77030012468 HC VLV BLEEDBK CNTRL ABBT -B: Performed by: INTERNAL MEDICINE

## 2022-02-24 PROCEDURE — 83735 ASSAY OF MAGNESIUM: CPT

## 2022-02-24 PROCEDURE — 77030013797 HC KT TRNSDUC PRSSR EDWD -A: Performed by: INTERNAL MEDICINE

## 2022-02-24 PROCEDURE — 99153 MOD SED SAME PHYS/QHP EA: CPT | Performed by: INTERNAL MEDICINE

## 2022-02-24 PROCEDURE — B2151ZZ FLUOROSCOPY OF LEFT HEART USING LOW OSMOLAR CONTRAST: ICD-10-PCS | Performed by: INTERNAL MEDICINE

## 2022-02-24 PROCEDURE — 82040 ASSAY OF SERUM ALBUMIN: CPT

## 2022-02-24 DEVICE — STENT RONYX27515UX RESOLUTE ONYX 2.75X15
Type: IMPLANTABLE DEVICE | Status: FUNCTIONAL
Brand: RESOLUTE ONYX™

## 2022-02-24 RX ORDER — HEPARIN SODIUM 5000 [USP'U]/ML
5000 INJECTION, SOLUTION INTRAVENOUS; SUBCUTANEOUS EVERY 8 HOURS
Status: DISCONTINUED | OUTPATIENT
Start: 2022-02-25 | End: 2022-02-26 | Stop reason: HOSPADM

## 2022-02-24 RX ORDER — CLOPIDOGREL 300 MG/1
600 TABLET, FILM COATED ORAL ONCE
Status: DISCONTINUED | OUTPATIENT
Start: 2022-02-24 | End: 2022-02-24 | Stop reason: SDUPTHER

## 2022-02-24 RX ORDER — MIDAZOLAM HYDROCHLORIDE 1 MG/ML
INJECTION, SOLUTION INTRAMUSCULAR; INTRAVENOUS AS NEEDED
Status: DISCONTINUED | OUTPATIENT
Start: 2022-02-24 | End: 2022-02-24 | Stop reason: HOSPADM

## 2022-02-24 RX ORDER — SODIUM CHLORIDE 450 MG/100ML
150 INJECTION, SOLUTION INTRAVENOUS CONTINUOUS
Status: DISPENSED | OUTPATIENT
Start: 2022-02-24 | End: 2022-02-24

## 2022-02-24 RX ORDER — MAGNESIUM SULFATE HEPTAHYDRATE 40 MG/ML
2 INJECTION, SOLUTION INTRAVENOUS ONCE
Status: DISCONTINUED | OUTPATIENT
Start: 2022-02-24 | End: 2022-02-24

## 2022-02-24 RX ORDER — GUAIFENESIN 100 MG/5ML
LIQUID (ML) ORAL
Status: COMPLETED
Start: 2022-02-24 | End: 2022-02-24

## 2022-02-24 RX ORDER — VERAPAMIL HYDROCHLORIDE 2.5 MG/ML
INJECTION, SOLUTION INTRAVENOUS AS NEEDED
Status: DISCONTINUED | OUTPATIENT
Start: 2022-02-24 | End: 2022-02-24 | Stop reason: HOSPADM

## 2022-02-24 RX ORDER — SODIUM CHLORIDE 0.9 % (FLUSH) 0.9 %
5-40 SYRINGE (ML) INJECTION AS NEEDED
Status: DISCONTINUED | OUTPATIENT
Start: 2022-02-24 | End: 2022-02-26 | Stop reason: HOSPADM

## 2022-02-24 RX ORDER — SODIUM CHLORIDE, SODIUM LACTATE, POTASSIUM CHLORIDE, CALCIUM CHLORIDE 600; 310; 30; 20 MG/100ML; MG/100ML; MG/100ML; MG/100ML
100 INJECTION, SOLUTION INTRAVENOUS CONTINUOUS
Status: DISCONTINUED | OUTPATIENT
Start: 2022-02-24 | End: 2022-02-24

## 2022-02-24 RX ORDER — CARVEDILOL 12.5 MG/1
12.5 TABLET ORAL 2 TIMES DAILY WITH MEALS
Status: DISCONTINUED | OUTPATIENT
Start: 2022-02-24 | End: 2022-02-24

## 2022-02-24 RX ORDER — CLOPIDOGREL BISULFATE 75 MG/1
75 TABLET ORAL DAILY
Status: DISCONTINUED | OUTPATIENT
Start: 2022-02-25 | End: 2022-02-26 | Stop reason: HOSPADM

## 2022-02-24 RX ORDER — NITROGLYCERIN 400 UG/1
1 SPRAY ORAL
Status: ACTIVE | OUTPATIENT
Start: 2022-02-24 | End: 2022-02-25

## 2022-02-24 RX ORDER — LOSARTAN POTASSIUM 50 MG/1
50 TABLET ORAL DAILY
Status: DISCONTINUED | OUTPATIENT
Start: 2022-02-25 | End: 2022-02-26 | Stop reason: HOSPADM

## 2022-02-24 RX ORDER — ASPIRIN 81 MG/1
81 TABLET ORAL DAILY
Status: DISCONTINUED | OUTPATIENT
Start: 2022-02-24 | End: 2022-02-26 | Stop reason: HOSPADM

## 2022-02-24 RX ORDER — HEPARIN SODIUM 1000 [USP'U]/ML
INJECTION, SOLUTION INTRAVENOUS; SUBCUTANEOUS AS NEEDED
Status: DISCONTINUED | OUTPATIENT
Start: 2022-02-24 | End: 2022-02-24 | Stop reason: HOSPADM

## 2022-02-24 RX ORDER — HEPARIN SODIUM 1000 [USP'U]/ML
3000 INJECTION, SOLUTION INTRAVENOUS; SUBCUTANEOUS ONCE
Status: COMPLETED | OUTPATIENT
Start: 2022-02-24 | End: 2022-02-24

## 2022-02-24 RX ORDER — SODIUM CHLORIDE 0.9 % (FLUSH) 0.9 %
5-40 SYRINGE (ML) INJECTION EVERY 8 HOURS
Status: DISCONTINUED | OUTPATIENT
Start: 2022-02-24 | End: 2022-02-26 | Stop reason: HOSPADM

## 2022-02-24 RX ORDER — AMIODARONE HCL/D5W 450 MG/250
0.5 PLASTIC BAG, INJECTION (ML) INTRAVENOUS CONTINUOUS
Status: DISCONTINUED | OUTPATIENT
Start: 2022-02-24 | End: 2022-02-25

## 2022-02-24 RX ORDER — BIVALIRUDIN 250 MG/5ML
INJECTION, POWDER, LYOPHILIZED, FOR SOLUTION INTRAVENOUS AS NEEDED
Status: DISCONTINUED | OUTPATIENT
Start: 2022-02-24 | End: 2022-02-24 | Stop reason: HOSPADM

## 2022-02-24 RX ORDER — CLOPIDOGREL 300 MG/1
TABLET, FILM COATED ORAL AS NEEDED
Status: DISCONTINUED | OUTPATIENT
Start: 2022-02-24 | End: 2022-02-24 | Stop reason: HOSPADM

## 2022-02-24 RX ORDER — CALCIUM CARBONATE 500(1250)
500 TABLET ORAL
Status: CANCELLED | OUTPATIENT
Start: 2022-02-24

## 2022-02-24 RX ORDER — LIDOCAINE HYDROCHLORIDE 10 MG/ML
INJECTION, SOLUTION EPIDURAL; INFILTRATION; INTRACAUDAL; PERINEURAL AS NEEDED
Status: DISCONTINUED | OUTPATIENT
Start: 2022-02-24 | End: 2022-02-24 | Stop reason: HOSPADM

## 2022-02-24 RX ORDER — CARVEDILOL 12.5 MG/1
12.5 TABLET ORAL EVERY 12 HOURS
Status: DISCONTINUED | OUTPATIENT
Start: 2022-02-24 | End: 2022-02-24

## 2022-02-24 RX ORDER — FENTANYL CITRATE 50 UG/ML
INJECTION, SOLUTION INTRAMUSCULAR; INTRAVENOUS AS NEEDED
Status: DISCONTINUED | OUTPATIENT
Start: 2022-02-24 | End: 2022-02-24 | Stop reason: HOSPADM

## 2022-02-24 RX ORDER — MAGNESIUM SULFATE HEPTAHYDRATE 40 MG/ML
2 INJECTION, SOLUTION INTRAVENOUS ONCE
Status: COMPLETED | OUTPATIENT
Start: 2022-02-24 | End: 2022-02-24

## 2022-02-24 RX ADMIN — ASPIRIN 81 MG: 81 TABLET, CHEWABLE ORAL at 09:00

## 2022-02-24 RX ADMIN — Medication 81 MG: at 09:00

## 2022-02-24 RX ADMIN — ASPIRIN 81 MG: 81 TABLET, COATED ORAL at 09:00

## 2022-02-24 RX ADMIN — HEPARIN SODIUM 3000 UNITS: 1000 INJECTION INTRAVENOUS; SUBCUTANEOUS at 06:10

## 2022-02-24 RX ADMIN — FUROSEMIDE 40 MG: 10 INJECTION, SOLUTION INTRAMUSCULAR; INTRAVENOUS at 06:11

## 2022-02-24 RX ADMIN — MAGNESIUM SULFATE 2 G: 2 INJECTION INTRAVENOUS at 02:07

## 2022-02-24 RX ADMIN — ERYTHROMYCIN: 5 OINTMENT OPHTHALMIC at 20:46

## 2022-02-24 NOTE — PROGRESS NOTES
Intern Progress Note  [facility]    MEDICAL STUDENT NOTE       Patient: Bette Person MRN: [id number]  [facility]: [id number]    YOB: 1960  Age: 57 y.o. Sex: male    DOA: 2/23/2022 LOS:  LOS: 1 day                    Subjective:    Mr. Sobia Boyd is a 57year old man with a PMH notable for HFrEF (previously 20-25%), CKD3a & HLD who was brought in by EMS following chest pain that wasn't relieved by administration of 2 nitroglycerin tablets. In the ED he was found to have an abnormal EKG, elevated BNP and upward trending troponins suspicious for acute coronary syndrome. He was admitted to our services and cardiology was consulted who confirmed the diagnosis with extremely elevated and upward trending high-specificity troponin. Overnight Mr. Sobia Boyd had non-sustained V-tach at [place] [address], [address] [place] [address], [address] [place] [address] and most recently at ~0800. Asleep and asymptomatic during these episodes and resting comfortably. He endorses feeling well but conversation was limited due to patient fatigue. Review of Systems   Constitutional: Negative. HENT: Negative. Eyes: Positive for pain and redness. Respiratory: Negative. Cardiovascular: Negative. Gastrointestinal: Negative. Genitourinary: Negative. Musculoskeletal: Negative. Skin: Negative. Neurological: Negative. Endo/Heme/Allergies: Negative. Psychiatric/Behavioral: Negative.         Objective:     Patient Vitals for the past 24 hrs:   Temp Pulse Resp BP SpO2   02/24/22 0801 98 °F (36.7 °C) 92 18 (!) 119/90 96 %   02/24/22 0503 97.2 °F (36.2 °C) 87 18 (!) 128/91 98 %   02/23/22 2340 98.5 °F (36.9 °C) 93 20 104/66 99 %   02/23/22 2145 98.1 °F (36.7 °C) 97 22 (!) 150/94 98 %   02/23/22 1709 98.2 °F (36.8 °C) 75 20 127/78 95 %   02/23/22 1413    118/89    02/23/22 1313  85 21     02/23/22 1258  82 18 118/89 100 %   02/23/22 1243  80 19 (!) 136/97 100 %   02/23/22 1228  80 16  100 %   02/23/22 1213  82 27 (!) 126/109 100 %   02/23/22 1158  85 21 (!) 132/98 100 %   02/23/22 1143  85 22 (!) 138/109 100 %   02/23/22 1128  84 20 (!) 138/109 100 %   02/23/22 1113  84 17 (!) 129/100 100 %   02/23/22 1058  80 19 (!) 122/96 100 %   02/23/22 1043  76 18 113/85 99 %   02/23/22 1028  79 16 104/74 99 %   02/23/22 1013  78 19 114/85 98 %   02/23/22 0958  60 16 101/78 100 %   02/23/22 0943  61 18 102/78 100 %   02/23/22 0930  73 16 107/85 100 %   02/23/22 0928  76 21  100 %   02/23/22 0915  65 13 100/75 98 %   02/23/22 0913  61 14  99 %   02/23/22 0900  76 15 117/88 100 %   02/23/22 0859  84 14  99 %   02/23/22 0845  74 22  98 %   02/23/22 0844  77 21 111/86 98 %   02/23/22 0830  80 18  100 %   02/23/22 0829  78 11 (!) 114/91 100 %   02/23/22 0815  78 (!) 34  99 %   02/23/22 0814  77 12 109/82 100 %         Intake/Output Summary (Last 24 hours) at 2/24/2022 8838  Last data filed at 2/23/2022 0930  Gross per 24 hour   Intake 50 ml   Output    Net 50 ml       Physical Exam:  General: well-appearing, tired and NAD  HEENT: Conjunctiva Red R>L, sclera anicteric. PERRL. EOMI   CV:  RRR, no M/G/R  RESP: Unlabored breathing. Lungs CTAB, no wheezes, rales or rhonchi appreciated. Equal expansion bilaterally. ABD:  Soft, nontender, nondistended. No hepatosplenomegaly. MS:  No joint deformity or instability. No atrophy. Neuro:  5/5 strength bilateral upper extremities and lower extremities. A+Ox3. Ext:  No edema. 2+ radial and dp pulses bilaterally. Skin: Warm & dry. No rashes, lesions, or ulcers. Good turgor. Psych: Normal mood and affect.      Lab/Data Reviewed:  Recent Results (from the past 24 hour(s))   CBC WITH AUTOMATED DIFF    Collection Time: 02/23/22  8:15 AM   Result Value Ref Range    WBC 9.5 4.6 - 13.2 K/uL    RBC 4.44 4.35 - 5.65 M/uL    HGB 13.0 13.0 - 16.0 g/dL    HCT 40.8 36.0 - 48.0 %    MCV 91.9 78.0 - 100.0 FL    MCH 29.3 24.0 - 34.0 PG    MCHC 31.9 31.0 - 37.0 g/dL    RDW 14.0 11.6 - 14.5 % PLATELET 186 921 - 663 K/uL    MPV 12.4 (H) 9.2 - 11.8 FL    NRBC 0.0 0  WBC    ABSOLUTE NRBC 0.00 0.00 - 0.01 K/uL    NEUTROPHILS 76 (H) 40 - 73 %    LYMPHOCYTES 16 (L) 21 - 52 %    MONOCYTES 6 3 - 10 %    EOSINOPHILS 2 0 - 5 %    BASOPHILS 0 0 - 2 %    IMMATURE GRANULOCYTES 0 %    ABS. NEUTROPHILS 7.2 1.8 - 8.0 K/UL    ABS. LYMPHOCYTES 1.5 0.9 - 3.6 K/UL    ABS. MONOCYTES 0.6 0.05 - 1.2 K/UL    ABS. EOSINOPHILS 0.2 0.0 - 0.4 K/UL    ABS. BASOPHILS 0.0 0.0 - 0.1 K/UL    ABS. IMM.  GRANS. 0.0 K/UL    DF MANUAL      PLATELET COMMENTS ADEQUATE PLATELETS      RBC COMMENTS NORMOCYTIC, NORMOCHROMIC     METABOLIC PANEL, BASIC    Collection Time: 02/23/22  8:15 AM   Result Value Ref Range    Sodium 137 136 - 145 mmol/L    Potassium 4.2 3.5 - 5.5 mmol/L    Chloride 105 100 - 111 mmol/L    CO2 26 21 - 32 mmol/L    Anion gap 6 3.0 - 18 mmol/L    Glucose 155 (H) 74 - 99 mg/dL    BUN 28 (H) 7.0 - 18 MG/DL    Creatinine 1.84 (H) 0.6 - 1.3 MG/DL    BUN/Creatinine ratio 15 12 - 20      GFR est AA 45 (L) >60 ml/min/1.73m2    GFR est non-AA 37 (L) >60 ml/min/1.73m2    Calcium 7.6 (L) 8.5 - 10.1 MG/DL   TROPONIN-HIGH SENSITIVITY    Collection Time: 02/23/22  8:15 AM   Result Value Ref Range    Troponin-High Sensitivity 109 (HH) 0 - 78 ng/L   NT-PRO BNP    Collection Time: 02/23/22  8:15 AM   Result Value Ref Range    NT pro-BNP 6,402 (H) 0 - 900 PG/ML   MAGNESIUM    Collection Time: 02/23/22  8:15 AM   Result Value Ref Range    Magnesium 1.7 1.6 - 2.6 mg/dL   TROPONIN-HIGH SENSITIVITY    Collection Time: 02/23/22 10:10 AM   Result Value Ref Range    Troponin-High Sensitivity 277 (HH) 0 - 78 ng/L   PTT    Collection Time: 02/23/22 12:15 PM   Result Value Ref Range    aPTT 23.9 23.0 - 36.4 SEC   ECHO ADULT FOLLOW-UP OR LIMITED    Collection Time: 02/23/22  2:57 PM   Result Value Ref Range    IVSd 0.9 0.6 - 1.0 cm    LVIDd 5.7 4.2 - 5.9 cm    LVIDs 5.3 cm    LVPWd 0.9 0.6 - 1.0 cm    TR Peak Gradient 41 mmHg    TR Max Velocity 3.21 m/s    Fractional Shortening 2D 7 28 - 44 %    LVIDd Index 2.85 cm/m2    LVIDs Index 2.65 cm/m2    LV RWT Ratio 0.32     LV Mass 2D 197.5 88 - 224 g    LV Mass 2D Index 98.8 49 - 115 g/m2   PTT    Collection Time: 02/23/22  6:06 PM   Result Value Ref Range    aPTT 36.6 (H) 23.0 - 36.4 SEC   TROPONIN-HIGH SENSITIVITY    Collection Time: 02/23/22  6:06 PM   Result Value Ref Range    Troponin-High Sensitivity 3,952 (HH) 0 - 78 ng/L   EKG, 12 LEAD, SUBSEQUENT    Collection Time: 02/23/22  9:18 PM   Result Value Ref Range    Ventricular Rate 100 BPM    Atrial Rate 100 BPM    P-R Interval 140 ms    QRS Duration 94 ms    Q-T Interval 384 ms    QTC Calculation (Bezet) 495 ms    Calculated P Axis 66 degrees    Calculated R Axis 66 degrees    Calculated T Axis 61 degrees    Diagnosis       Normal sinus rhythm  Nonspecific T wave abnormality  Prolonged QT  Abnormal ECG  When compared with ECG of 23-FEB-2022 07:55,  Criteria for Septal infarct are no longer present  T wave inversion less evident in Anterolateral leads     TROPONIN-HIGH SENSITIVITY    Collection Time: 02/23/22  9:30 PM   Result Value Ref Range    Troponin-High Sensitivity 4,933 (HH) 0 - 78 ng/L   ETHYL ALCOHOL    Collection Time: 02/23/22  9:30 PM   Result Value Ref Range    ALCOHOL(ETHYL),SERUM <3 0 - 3 MG/DL   MAGNESIUM    Collection Time: 02/23/22  9:30 PM   Result Value Ref Range    Magnesium 1.8 1.6 - 2.6 mg/dL   PTT    Collection Time: 02/24/22  1:42 AM   Result Value Ref Range    aPTT 42.1 (H) 23.0 - 36.4 SEC   TROPONIN-HIGH SENSITIVITY    Collection Time: 02/24/22  1:42 AM   Result Value Ref Range    Troponin-High Sensitivity 5,460 (HH) 0 - 78 ng/L   METABOLIC PANEL, BASIC    Collection Time: 02/24/22  4:58 AM   Result Value Ref Range    Sodium 136 136 - 145 mmol/L    Potassium 4.1 3.5 - 5.5 mmol/L    Chloride 104 100 - 111 mmol/L    CO2 24 21 - 32 mmol/L    Anion gap 8 3.0 - 18 mmol/L    Glucose 92 74 - 99 mg/dL    BUN 27 (H) 7.0 - 18 MG/DL Creatinine 1.53 (H) 0.6 - 1.3 MG/DL    BUN/Creatinine ratio 18 12 - 20      GFR est AA 56 (L) >60 ml/min/1.73m2    GFR est non-AA 46 (L) >60 ml/min/1.73m2    Calcium 7.4 (L) 8.5 - 10.1 MG/DL   CBC WITH AUTOMATED DIFF    Collection Time: 02/24/22  4:58 AM   Result Value Ref Range    WBC 10.5 4.6 - 13.2 K/uL    RBC 4.57 4.35 - 5.65 M/uL    HGB 13.3 13.0 - 16.0 g/dL    HCT 42.0 36.0 - 48.0 %    MCV 91.9 78.0 - 100.0 FL    MCH 29.1 24.0 - 34.0 PG    MCHC 31.7 31.0 - 37.0 g/dL    RDW 14.3 11.6 - 14.5 %    PLATELET 211 282 - 916 K/uL    MPV 12.9 (H) 9.2 - 11.8 FL    NRBC 0.0 0  WBC    ABSOLUTE NRBC 0.00 0.00 - 0.01 K/uL    NEUTROPHILS 70 40 - 73 %    LYMPHOCYTES 22 21 - 52 %    MONOCYTES 6 3 - 10 %    EOSINOPHILS 1 0 - 5 %    BASOPHILS 1 0 - 2 %    IMMATURE GRANULOCYTES 0 0.0 - 0.5 %    ABS. NEUTROPHILS 7.4 1.8 - 8.0 K/UL    ABS. LYMPHOCYTES 2.3 0.9 - 3.6 K/UL    ABS. MONOCYTES 0.7 0.05 - 1.2 K/UL    ABS. EOSINOPHILS 0.1 0.0 - 0.4 K/UL    ABS. BASOPHILS 0.1 0.0 - 0.1 K/UL    ABS. IMM.  GRANS. 0.0 0.00 - 0.04 K/UL    DF AUTOMATED     TROPONIN-HIGH SENSITIVITY    Collection Time: 02/24/22  4:58 AM   Result Value Ref Range    Troponin-High Sensitivity 5,618 (HH) 0 - 78 ng/L   LIPID PANEL    Collection Time: 02/24/22  4:58 AM   Result Value Ref Range    LIPID PROFILE          Cholesterol, total 143 <200 MG/DL    Triglyceride 75 <150 MG/DL    HDL Cholesterol 37 (L) 40 - 60 MG/DL    LDL, calculated 91 0 - 100 MG/DL    VLDL, calculated 15 MG/DL    CHOL/HDL Ratio 3.9 0 - 5.0     MAGNESIUM    Collection Time: 02/24/22  4:58 AM   Result Value Ref Range    Magnesium 2.4 1.6 - 2.6 mg/dL   PTT    Collection Time: 02/24/22  4:58 AM   Result Value Ref Range    aPTT 41.6 (H) 23.0 - 36.4 SEC   ALBUMIN    Collection Time: 02/24/22  4:58 AM   Result Value Ref Range    Albumin 2.5 (L) 3.4 - 5.0 g/dL   DRUG SCREEN, URINE    Collection Time: 02/24/22  6:40 AM   Result Value Ref Range    BENZODIAZEPINES Negative NEG      BARBITURATES Negative NEG      THC (TH-CANNABINOL) Negative NEG      OPIATES Positive (A) NEG      PCP(PHENCYCLIDINE) Negative NEG      COCAINE Positive (A) NEG      AMPHETAMINES Negative NEG      METHADONE Negative NEG      HDSCOM (NOTE)        Assessment & Plan:   Mr. Varghese Maher is a 57year old male with PMH significant for HFrEF, HLD & HLD, CKD3 and hypoparathyroidism who is admitted to our care in the setting of ACS. NSTEMI and non-ischemic cardiomyopathy/HFrEF   -EKG abnormal on admission, suspicious for inferior infarction. Most recent EKG showed resolving changes. EF decreased from 20-25% to 10-15% on most recent Echo, showing decreased capacity of heart and exacerbation of HF. Patient Troponin elevated and upward trending since admission, BNP high but cannot be compared to baseline.    -Plan   1. Currently on Carvedilol 12.5 BID, Rovustatin 20mg every day, Spironolactone 12.5mg every day, Torsemide held but planned, Aspirin Held, reintroduce following Heparin drip. 2. Cardiology consulted, confirmed diagnosis of ACS, taking to cath lab today to evaluate. Currently on Heparin Drip    3.  Monitor strict I/O's and daily weight to determine fluid status    CKDIIIa  -Cr at baseline, 1.84  Plan:   Daily BMP to evaluate Cr trends, I/O's and daily weights for fluid status same as above    Bacterial Conjunctivitis  ErythromycinBacterial conjunctivitis  Erythromycin drops q6h for 5 days      HLD   - At home takes Rosuvastatin 20mg daily  PLAN:  - Continue Rosuvastatin QHS  -Lipid panel     Hx of Hypocalcemia  -Ca wnl when corrected for albumin: 10.6  PLAN:   -Monitor  -Replete electrolytes as needed      History of embolic stroke   - Per [employer] records, in 2017 (multiple acute and subacute infarcts in the left cerebrum)     Pulmonary nodules   - Noted in discharge summary in 2017, CXR without any note of similar nodules   - denies history of smoking     Subarrachnoid hemorrhage 2016  No deficits post-hemorrhage  Per cards ok to continue Heparin drip             Diet NPO   DVT Prophylaxis Heparin   GI Prophylaxis Famotidine, Miralax   Code status Full   Disposition >2MNs, Home     Point of Contact Rivera Marquez  Relationship: Sister  ([phone] , [place]   [facility]   February 24, 2022, 7:45 AM        *ATTENTION:  This note has been created by a medical student for educational purposes only. Please do not refer to the content of this note for clinical decision-making, billing, or other purposes. Please see attending physicians note to obtain clinical information on this patient. *

## 2022-02-24 NOTE — PROGRESS NOTES
Notified by RN patient had 3 beats Non-sustained V-tach. Patient asymptomatic, sleeping. RN also notes intermittent PVCs. VS stable  Will continue to monitor. UDS pending to assess for Cocaine use. If progresses/symptomatic becomes persistent than we will reassess and consider Tachycardia with pulse algorithm    If sustained V-tach will plan to proceed with Adenosine 6 mg rapid IV (if regular/narrow QRS) vs Synchronized cardioversion if patient hemodynamically unstable. Can use sedation with propofol if necessary. Patient currently on Coreg 6.25 mg. If no cocaine can consider increasing dose in AM  Patient has AICD which has not been interrogated yet. Magnesium added on to PM lab showed 1.8. Will replete with 2 g to keep Mg above 2    At this time patient stable, asymptomatic. Update 454 and 546- additional notification of Vtach Nonsustained x 2 (see RN note)      06:45  Patient's corrected Calcium for hypoalbuminemia is 8.6 which falls within normal range. Patient's second Qtc Calculated is 495 which is Prolonged Qtc. Due to normal corrected calcium in context of prolonged Qtc and otherwise asymptomatic will hold off on IV Calcium Gluconate.  Continue to monitor

## 2022-02-24 NOTE — PROGRESS NOTES
[address] [facility]  Intern Progress Note    Patient: Abdulkadir Lobo MRN: [id number]   SSN: [id number]  YOB: 1960   Age: 57 y.o. Sex: male      Admit Date: 2/23/2022    LOS: 1 day   Chief Complaint   Patient presents with    Chest Pain       Subjective:     No acute events overnight. Patient seen at bedside this morning, resting comfortably, no acute complaints. Denied chest pain, shortness of breath, nausea, vomiting, palpitations or diaphoresis. Patient stated he was able to eat and drink a little bit yesterday. Discussed plan for going to the Cath Lab today and then having a better plan afterwards.     Review of Systems:  General ROS: negative for  - chills, fever, night sweats, weight gain and weight loss  Psychological ROS: negative for - anxiety and depression  Ophthalmic ROS: negative for - blurry vision, decreased vision or loss of vision  ENT ROS: negative for - headaches, hearing change or visual changes  Hematological and Lymphatic ROS: negative for - bruising, jaundice  Respiratory ROS: negative for - cough, hemoptysis, shortness of breath, orthopnea, paroxysmal dyspnea, or wheezing  Cardiovascular ROS: negative for - chest pain, dyspnea on exertion, edema, loss of consciousness, or palpitations   Gastrointestinal ROS: negative for - abdominal pain, blood in stools, change in stools, constipation, diarrhea, hematemesis, melena, nausea/vomiting or swallowing difficulty/pain  Genito-Urinary ROS: negative for - dysuria, hematuria or urinary frequency/urgency  Musculoskeletal ROS: negative for - joint pain, joint swelling or muscle pain  Neurological ROS: negative for - dizziness, headaches, numbness/tingling or weakness  Dermatological ROS: negative for - rash or skin lesion changes       Objective:     Visit Vitals  BP (!) 128/91 (BP 1 Location: Right upper arm, BP Patient Position: At rest)   Pulse 87   Temp 97.2 °F (36.2 °C)   Resp 18   Ht 5' 9\" (1.753 m)   Wt 83.9 kg (185 lb)   SpO2 98%   BMI 27.32 kg/m²       Physical Exam:   General appearance: alert, cooperative, no distress, appears stated age  Lungs: clear to auscultation bilaterally  Heart: regular rate and rhythm, S1, S2 normal, no murmur, click, rub or gallop  Abdomen: soft, non-tender. Bowel sounds normal. No masses,  no organomegaly  Pulses: 2+ and symmetric  Skin: Skin color, texture, turgor normal. No rashes or lesions  Neuro:  normal without focal findings  mental status, speech normal, alert and oriented x iii  SHELLIE  reflexes normal and symmetric  Exremities: no pitting edema      Intake and Output:  Current Shift: No intake/output data recorded.   Last three shifts: 02/22 1901 - 02/24 0700  In: 50 [I.V.:50]  Out: -     Lab/Data Review:  Recent Results (from the past 12 hour(s))   EKG, 12 LEAD, SUBSEQUENT    Collection Time: 02/23/22  9:18 PM   Result Value Ref Range    Ventricular Rate 100 BPM    Atrial Rate 100 BPM    P-R Interval 140 ms    QRS Duration 94 ms    Q-T Interval 384 ms    QTC Calculation (Bezet) 495 ms    Calculated P Axis 66 degrees    Calculated R Axis 66 degrees    Calculated T Axis 61 degrees    Diagnosis       Normal sinus rhythm  Nonspecific T wave abnormality  Prolonged QT  Abnormal ECG  When compared with ECG of 23-FEB-2022 07:55,  Criteria for Septal infarct are no longer present  T wave inversion less evident in Anterolateral leads     TROPONIN-HIGH SENSITIVITY    Collection Time: 02/23/22  9:30 PM   Result Value Ref Range    Troponin-High Sensitivity 4,933 (HH) 0 - 78 ng/L   ETHYL ALCOHOL    Collection Time: 02/23/22  9:30 PM   Result Value Ref Range    ALCOHOL(ETHYL),SERUM <3 0 - 3 MG/DL   MAGNESIUM    Collection Time: 02/23/22  9:30 PM   Result Value Ref Range    Magnesium 1.8 1.6 - 2.6 mg/dL   PTT    Collection Time: 02/24/22  1:42 AM   Result Value Ref Range    aPTT 42.1 (H) 23.0 - 36.4 SEC   TROPONIN-HIGH SENSITIVITY    Collection Time: 02/24/22  1:42 AM   Result Value Ref Range Troponin-High Sensitivity 5,460 (HH) 0 - 78 ng/L   METABOLIC PANEL, BASIC    Collection Time: 02/24/22  4:58 AM   Result Value Ref Range    Sodium 136 136 - 145 mmol/L    Potassium 4.1 3.5 - 5.5 mmol/L    Chloride 104 100 - 111 mmol/L    CO2 24 21 - 32 mmol/L    Anion gap 8 3.0 - 18 mmol/L    Glucose 92 74 - 99 mg/dL    BUN 27 (H) 7.0 - 18 MG/DL    Creatinine 1.53 (H) 0.6 - 1.3 MG/DL    BUN/Creatinine ratio 18 12 - 20      GFR est AA 56 (L) >60 ml/min/1.73m2    GFR est non-AA 46 (L) >60 ml/min/1.73m2    Calcium 7.4 (L) 8.5 - 10.1 MG/DL   CBC WITH AUTOMATED DIFF    Collection Time: 02/24/22  4:58 AM   Result Value Ref Range    WBC 10.5 4.6 - 13.2 K/uL    RBC 4.57 4.35 - 5.65 M/uL    HGB 13.3 13.0 - 16.0 g/dL    HCT 42.0 36.0 - 48.0 %    MCV 91.9 78.0 - 100.0 FL    MCH 29.1 24.0 - 34.0 PG    MCHC 31.7 31.0 - 37.0 g/dL    RDW 14.3 11.6 - 14.5 %    PLATELET 551 478 - 658 K/uL    MPV 12.9 (H) 9.2 - 11.8 FL    NRBC 0.0 0  WBC    ABSOLUTE NRBC 0.00 0.00 - 0.01 K/uL    NEUTROPHILS 70 40 - 73 %    LYMPHOCYTES 22 21 - 52 %    MONOCYTES 6 3 - 10 %    EOSINOPHILS 1 0 - 5 %    BASOPHILS 1 0 - 2 %    IMMATURE GRANULOCYTES 0 0.0 - 0.5 %    ABS. NEUTROPHILS 7.4 1.8 - 8.0 K/UL    ABS. LYMPHOCYTES 2.3 0.9 - 3.6 K/UL    ABS. MONOCYTES 0.7 0.05 - 1.2 K/UL    ABS. EOSINOPHILS 0.1 0.0 - 0.4 K/UL    ABS. BASOPHILS 0.1 0.0 - 0.1 K/UL    ABS. IMM.  GRANS. 0.0 0.00 - 0.04 K/UL    DF AUTOMATED     TROPONIN-HIGH SENSITIVITY    Collection Time: 02/24/22  4:58 AM   Result Value Ref Range    Troponin-High Sensitivity 5,618 (HH) 0 - 78 ng/L   LIPID PANEL    Collection Time: 02/24/22  4:58 AM   Result Value Ref Range    LIPID PROFILE          Cholesterol, total 143 <200 MG/DL    Triglyceride 75 <150 MG/DL    HDL Cholesterol 37 (L) 40 - 60 MG/DL    LDL, calculated 91 0 - 100 MG/DL    VLDL, calculated 15 MG/DL    CHOL/HDL Ratio 3.9 0 - 5.0     MAGNESIUM    Collection Time: 02/24/22  4:58 AM   Result Value Ref Range    Magnesium 2.4 1.6 - 2.6 mg/dL   PTT    Collection Time: 02/24/22  4:58 AM   Result Value Ref Range    aPTT 41.6 (H) 23.0 - 36.4 SEC   ALBUMIN    Collection Time: 02/24/22  4:58 AM   Result Value Ref Range    Albumin 2.5 (L) 3.4 - 5.0 g/dL   DRUG SCREEN, URINE    Collection Time: 02/24/22  6:40 AM   Result Value Ref Range    BENZODIAZEPINES Negative NEG      BARBITURATES Negative NEG      THC (TH-CANNABINOL) Negative NEG      OPIATES Positive (A) NEG      PCP(PHENCYCLIDINE) Negative NEG      COCAINE Positive (A) NEG      AMPHETAMINES Negative NEG      METHADONE Negative NEG      HDSCOM (NOTE)        Oxygen settings: RA  Gtt: heparin    Assessment and Plan:   57 y.o. male with PMH significant for HFrEF (29-51% 66/8353, systolic and diastolic), HLD, HTN, Hypoparathyroidism, CKD3, now admitted for NSTEMI.          NSTEMI and non-ischemic cardiomyopathy with reduced EF   DDx: NSTEMI vs CHF exacerbation vs PE among others    -Patient unable to tell me what his home medications are attempted to call multiple pharmacies no history of patient filling at these pharmacies and no history of patient filling scripts at pharmacy listed in allscripts. -Medications on prior DC: Carvedilol 12.5 BID, torsemide 20mg BID, spironolactone 12.5mg qd, Nitoglycerin 0.4 mg PRN,  Aspirin 81 mg every day  -No hx of prior cath   - Hx of non-compliance  -s/p AICD placement on 11/22  -CXR: worse vascular congestion  -EKG: NSR, QTc 486, old septal infarct, poss.  Inferior infarct w/o T-wave elevation, t-wave inversion throughout   -Wells score: 0   -BNP: 6402, baseline unclear  -Troponin: 109>277  -Echo 11/21: EF 20-25% with decreased global systolic fxn   -ECHO 0/24: EF 10-15% w/ globally decreased systolic fxn   PLAN:  -cath today  - Cardiology consulted, appreciate assistance.    - telemetry  - Will restart PTA medications as able with blood pressure: spirinolactone, coreg and optimize in the setting of CHF   -Coreg 6.25 on  -Recommend prioritizing Entresto (if able to afford) and SGLT2 w/ new echo results   - Strict I/Os, daily weights  -Patient euvolemic this morning, hold further diuretics   - Daily CBC, BMP, Mg; K+ goal >4, Mg goal >2  -ASA 81  -Cards to assess for functioning of AICD     CKDIIIa  Suspect component of cardiorenal with improvement in Cr after lasix's and new ECHO results   - Cr at baseline 1.84>1.54   PLAN:  - Daily BMP  -LR 100cc/hr to help hydrate prior to cath with concern for dye impacts on kidneys during procedure   -Will assess volume status daily      Bacterial conjunctivitis  Erythromycin drops q6h for 5 days      HLD   - At home takes Rosuvastatin 20mg daily  PLAN:  - Continue Rosuvastatin QHS  -Lipid panel     Hx of Hypocalcemia  -Ca wnl when corrected for albumin: 10.6  PLAN:   -Monitor  -Replete electrolytes as needed      History of embolic stroke   - Per [employer] records, in 2017 (multiple acute and subacute infarcts in the left cerebrum)     Pulmonary nodules   - Noted in discharge summary in 2017, CXR without any note of similar nodules   - denies history of smoking     Subarrachnoid hemorrhage 2016  No deficits post-hemorrhage  Per cards ok to continue Heparin drip     Diet NPO   DVT Prophylaxis Heparin   GI Prophylaxis Famotidine, Miralax   Code status FULL   Disposition >2MNs, Home         Point of Contact Drake Zafar  Relationship: Sister  [phone]             Justin Yu DO, PGY-2   [facility]   Intern Pager: [phone]   February 24, 2022, 7:35 AM

## 2022-02-24 NOTE — PROGRESS NOTES
Notified by tele tech that pt has a 5 beat run of Vtach. Pt sleeping. Has had several runs during the night. PFM resident  in to see pt and was notified. PT denies CP or SOB.     0825 [facility].     1300 received call that pt was being transferred to heart center room 2309. Clothing, shoes, cell phone, glasses  Taken to pt.s room 2309 in CVT stepdown, nurse made aware.

## 2022-02-24 NOTE — PROGRESS NOTES
Gabriel [place], DO notified of increasing troponin. Newest troponin resulted at 0217 is 5460 in addition to 6 beats of non-sustained V-tach at 0202 .  Patient asymptomatic asleep in bed    0454 Patient had 7 beats of Non-sustained V-tach, PFM aware

## 2022-02-24 NOTE — PROGRESS NOTES
TRANSFER - OUT REPORT:    Verbal report given to Keri Gilson RN  on Zuleika Santos  being transferred to CVT 2309 for routine progression of care       Report consisted of patients Situation, Background, Assessment and   Recommendations(SBAR). Information from the following report(s) SBAR, Procedure Summary and MAR was reviewed with the receiving nurse. Lines:   Peripheral IV 02/24/22 Left;Upper Forearm (Active)        Opportunity for questions and clarification was provided.       Patient transported with:   transport

## 2022-02-24 NOTE — PROGRESS NOTES
Troponin @ 2/23/22 1806 resulted 3,952    Will obtain repeat EKG to assess for any changes  Continue to trend troponin given elevation  [id number]    Pt seen sitting up in hospital bed at 20:20 tonight, states he is feeling great and denies CP, SOB, diaphoresis or N/V. Pt made aware that he will be NPO at midnight in preparation for cath tmw. Pending repeat ECG. [facility] cardiology on call Dr. Belle Casas need to  at this time as pt is asymptomatic, on heparin drip and cath already planned for tomorrow. ECG with no ST elevations.     Winston Sebastian MD, PGY-1

## 2022-02-24 NOTE — PROGRESS NOTES
SBAR transfer report received from Ever Pettit RN on Brandyn Yeager being admitted from cath lab post procedure. Per [employer] pt was initially on [address] [place] [address]. Opportunity for questions and clarification was provided. States, nurse from [address] [place] [address] will bring patient's belongings to stepdown.

## 2022-02-25 ENCOUNTER — APPOINTMENT (OUTPATIENT)
Dept: GENERAL RADIOLOGY | Age: 62
DRG: 247 | End: 2022-02-25
Attending: STUDENT IN AN ORGANIZED HEALTH CARE EDUCATION/TRAINING PROGRAM
Payer: MEDICARE

## 2022-02-25 LAB
ANION GAP SERPL CALC-SCNC: 8 MMOL/L (ref 3–18)
ATRIAL RATE: 100 BPM
BASOPHILS # BLD: 0 K/UL (ref 0–0.1)
BASOPHILS NFR BLD: 1 % (ref 0–2)
BUN SERPL-MCNC: 23 MG/DL (ref 7–18)
BUN/CREAT SERPL: 15 (ref 12–20)
CALCIUM SERPL-MCNC: 7.5 MG/DL (ref 8.5–10.1)
CALCULATED P AXIS, ECG09: 66 DEGREES
CALCULATED R AXIS, ECG10: 66 DEGREES
CALCULATED T AXIS, ECG11: 61 DEGREES
CHLORIDE SERPL-SCNC: 103 MMOL/L (ref 100–111)
CO2 SERPL-SCNC: 24 MMOL/L (ref 21–32)
CREAT SERPL-MCNC: 1.5 MG/DL (ref 0.6–1.3)
DIAGNOSIS, 93000: NORMAL
DIFFERENTIAL METHOD BLD: ABNORMAL
EOSINOPHIL # BLD: 0.1 K/UL (ref 0–0.4)
EOSINOPHIL NFR BLD: 1 % (ref 0–5)
ERYTHROCYTE [DISTWIDTH] IN BLOOD BY AUTOMATED COUNT: 14.2 % (ref 11.6–14.5)
GLUCOSE SERPL-MCNC: 144 MG/DL (ref 74–99)
HCT VFR BLD AUTO: 42.4 % (ref 36–48)
HGB BLD-MCNC: 13.9 G/DL (ref 13–16)
IMM GRANULOCYTES # BLD AUTO: 0 K/UL (ref 0–0.04)
IMM GRANULOCYTES NFR BLD AUTO: 0 % (ref 0–0.5)
LYMPHOCYTES # BLD: 1.5 K/UL (ref 0.9–3.6)
LYMPHOCYTES NFR BLD: 20 % (ref 21–52)
MAGNESIUM SERPL-MCNC: 1.9 MG/DL (ref 1.6–2.6)
MCH RBC QN AUTO: 29.6 PG (ref 24–34)
MCHC RBC AUTO-ENTMCNC: 32.8 G/DL (ref 31–37)
MCV RBC AUTO: 90.4 FL (ref 78–100)
MONOCYTES # BLD: 0.6 K/UL (ref 0.05–1.2)
MONOCYTES NFR BLD: 8 % (ref 3–10)
NEUTS SEG # BLD: 5.2 K/UL (ref 1.8–8)
NEUTS SEG NFR BLD: 71 % (ref 40–73)
NRBC # BLD: 0 K/UL (ref 0–0.01)
NRBC BLD-RTO: 0 PER 100 WBC
P-R INTERVAL, ECG05: 140 MS
PLATELET # BLD AUTO: 167 K/UL (ref 135–420)
PMV BLD AUTO: 13.1 FL (ref 9.2–11.8)
POTASSIUM SERPL-SCNC: 3.8 MMOL/L (ref 3.5–5.5)
Q-T INTERVAL, ECG07: 384 MS
QRS DURATION, ECG06: 94 MS
QTC CALCULATION (BEZET), ECG08: 495 MS
RBC # BLD AUTO: 4.69 M/UL (ref 4.35–5.65)
SODIUM SERPL-SCNC: 135 MMOL/L (ref 136–145)
VENTRICULAR RATE, ECG03: 100 BPM
WBC # BLD AUTO: 7.4 K/UL (ref 4.6–13.2)

## 2022-02-25 PROCEDURE — 74011250637 HC RX REV CODE- 250/637: Performed by: STUDENT IN AN ORGANIZED HEALTH CARE EDUCATION/TRAINING PROGRAM

## 2022-02-25 PROCEDURE — 65660000004 HC RM CVT STEPDOWN

## 2022-02-25 PROCEDURE — 83735 ASSAY OF MAGNESIUM: CPT

## 2022-02-25 PROCEDURE — 74011250636 HC RX REV CODE- 250/636

## 2022-02-25 PROCEDURE — 74011250637 HC RX REV CODE- 250/637

## 2022-02-25 PROCEDURE — 85025 COMPLETE CBC W/AUTO DIFF WBC: CPT

## 2022-02-25 PROCEDURE — 36415 COLL VENOUS BLD VENIPUNCTURE: CPT

## 2022-02-25 PROCEDURE — 99233 SBSQ HOSP IP/OBS HIGH 50: CPT | Performed by: INTERNAL MEDICINE

## 2022-02-25 PROCEDURE — 71045 X-RAY EXAM CHEST 1 VIEW: CPT

## 2022-02-25 PROCEDURE — 74011250636 HC RX REV CODE- 250/636: Performed by: STUDENT IN AN ORGANIZED HEALTH CARE EDUCATION/TRAINING PROGRAM

## 2022-02-25 PROCEDURE — 93005 ELECTROCARDIOGRAM TRACING: CPT

## 2022-02-25 PROCEDURE — 74011000258 HC RX REV CODE- 258

## 2022-02-25 PROCEDURE — 74011250637 HC RX REV CODE- 250/637: Performed by: PHYSICIAN ASSISTANT

## 2022-02-25 PROCEDURE — 80048 BASIC METABOLIC PNL TOTAL CA: CPT

## 2022-02-25 PROCEDURE — 74011000250 HC RX REV CODE- 250: Performed by: INTERNAL MEDICINE

## 2022-02-25 PROCEDURE — 74011250636 HC RX REV CODE- 250/636: Performed by: PHYSICIAN ASSISTANT

## 2022-02-25 PROCEDURE — 74011250637 HC RX REV CODE- 250/637: Performed by: INTERNAL MEDICINE

## 2022-02-25 RX ORDER — ROSUVASTATIN CALCIUM 20 MG/1
20 TABLET, COATED ORAL
Qty: 90 TABLET | Refills: 0 | Status: SHIPPED | OUTPATIENT
Start: 2022-02-25 | End: 2022-02-25 | Stop reason: SDUPTHER

## 2022-02-25 RX ORDER — CLOPIDOGREL BISULFATE 75 MG/1
75 TABLET ORAL DAILY
Qty: 30 TABLET | Refills: 2 | Status: SHIPPED | OUTPATIENT
Start: 2022-02-25 | End: 2022-02-25 | Stop reason: SDUPTHER

## 2022-02-25 RX ORDER — CARVEDILOL 12.5 MG/1
12.5 TABLET ORAL 2 TIMES DAILY WITH MEALS
Status: DISCONTINUED | OUTPATIENT
Start: 2022-02-25 | End: 2022-02-26 | Stop reason: HOSPADM

## 2022-02-25 RX ORDER — LOSARTAN POTASSIUM 50 MG/1
50 TABLET ORAL DAILY
Qty: 30 TABLET | Refills: 2 | Status: SHIPPED | OUTPATIENT
Start: 2022-02-25 | End: 2022-02-25 | Stop reason: SDUPTHER

## 2022-02-25 RX ORDER — CLOPIDOGREL BISULFATE 75 MG/1
75 TABLET ORAL DAILY
Qty: 30 TABLET | Refills: 2 | Status: SHIPPED | OUTPATIENT
Start: 2022-02-25 | End: 2022-02-25

## 2022-02-25 RX ORDER — SPIRONOLACTONE 25 MG/1
12.5 TABLET ORAL DAILY
Qty: 30 TABLET | Refills: 2 | Status: SHIPPED
Start: 2022-02-26 | End: 2022-04-16

## 2022-02-25 RX ORDER — ROSUVASTATIN CALCIUM 20 MG/1
20 TABLET, COATED ORAL
Qty: 30 TABLET | Refills: 2 | Status: SHIPPED | OUTPATIENT
Start: 2022-02-25 | End: 2022-02-25 | Stop reason: SDUPTHER

## 2022-02-25 RX ORDER — SPIRONOLACTONE 25 MG/1
12.5 TABLET ORAL DAILY
Qty: 30 TABLET | Refills: 2 | Status: SHIPPED | OUTPATIENT
Start: 2022-02-26 | End: 2022-02-25 | Stop reason: SDUPTHER

## 2022-02-25 RX ORDER — CARVEDILOL 12.5 MG/1
12.5 TABLET ORAL 2 TIMES DAILY WITH MEALS
Qty: 60 TABLET | Refills: 0 | Status: SHIPPED | OUTPATIENT
Start: 2022-02-25 | End: 2022-03-27

## 2022-02-25 RX ORDER — CLOPIDOGREL BISULFATE 75 MG/1
75 TABLET ORAL DAILY
Qty: 90 TABLET | Refills: 0 | Status: SHIPPED | OUTPATIENT
Start: 2022-02-26 | End: 2022-02-25 | Stop reason: SDUPTHER

## 2022-02-25 RX ORDER — FUROSEMIDE 10 MG/ML
40 INJECTION INTRAMUSCULAR; INTRAVENOUS ONCE
Status: COMPLETED | OUTPATIENT
Start: 2022-02-25 | End: 2022-02-25

## 2022-02-25 RX ORDER — TORSEMIDE 20 MG/1
20 TABLET ORAL DAILY
Qty: 30 TABLET | Refills: 2 | Status: SHIPPED | OUTPATIENT
Start: 2022-02-25 | End: 2022-04-16

## 2022-02-25 RX ORDER — LOSARTAN POTASSIUM 50 MG/1
50 TABLET ORAL DAILY
Qty: 30 TABLET | Refills: 2 | Status: SHIPPED
Start: 2022-02-25 | End: 2022-04-16

## 2022-02-25 RX ORDER — MAGNESIUM SULFATE HEPTAHYDRATE 40 MG/ML
2 INJECTION, SOLUTION INTRAVENOUS ONCE
Status: COMPLETED | OUTPATIENT
Start: 2022-02-25 | End: 2022-02-25

## 2022-02-25 RX ORDER — LOSARTAN POTASSIUM 50 MG/1
50 TABLET ORAL DAILY
Qty: 30 TABLET | Refills: 2 | Status: SHIPPED | OUTPATIENT
Start: 2022-02-25 | End: 2022-02-25

## 2022-02-25 RX ORDER — POTASSIUM CHLORIDE 20 MEQ/1
20 TABLET, EXTENDED RELEASE ORAL
Status: COMPLETED | OUTPATIENT
Start: 2022-02-25 | End: 2022-02-25

## 2022-02-25 RX ORDER — CLOPIDOGREL BISULFATE 75 MG/1
75 TABLET ORAL DAILY
Qty: 30 TABLET | Refills: 2 | Status: SHIPPED | OUTPATIENT
Start: 2022-02-26 | End: 2022-02-25 | Stop reason: SDUPTHER

## 2022-02-25 RX ORDER — LOSARTAN POTASSIUM 50 MG/1
50 TABLET ORAL DAILY
Qty: 90 TABLET | Refills: 0 | Status: SHIPPED | OUTPATIENT
Start: 2022-02-25 | End: 2022-02-25 | Stop reason: SDUPTHER

## 2022-02-25 RX ORDER — SPIRONOLACTONE 25 MG/1
12.5 TABLET ORAL DAILY
Qty: 30 TABLET | Refills: 2 | Status: SHIPPED | OUTPATIENT
Start: 2022-02-25 | End: 2022-02-25

## 2022-02-25 RX ORDER — TORSEMIDE 20 MG/1
20 TABLET ORAL
Status: COMPLETED | OUTPATIENT
Start: 2022-02-25 | End: 2022-02-25

## 2022-02-25 RX ORDER — ROSUVASTATIN CALCIUM 20 MG/1
20 TABLET, COATED ORAL
Qty: 30 TABLET | Refills: 2 | Status: SHIPPED | OUTPATIENT
Start: 2022-02-25 | End: 2022-02-25

## 2022-02-25 RX ORDER — ASPIRIN 81 MG/1
81 TABLET ORAL DAILY
Qty: 30 TABLET | Refills: 0 | Status: SHIPPED | OUTPATIENT
Start: 2022-02-26 | End: 2022-04-16

## 2022-02-25 RX ORDER — ROSUVASTATIN CALCIUM 20 MG/1
20 TABLET, COATED ORAL
Qty: 30 TABLET | Refills: 2 | Status: ON HOLD
Start: 2022-02-25 | End: 2022-04-16 | Stop reason: SDUPTHER

## 2022-02-25 RX ORDER — SPIRONOLACTONE 25 MG/1
12.5 TABLET ORAL DAILY
Qty: 90 TABLET | Refills: 0 | Status: SHIPPED | OUTPATIENT
Start: 2022-02-26 | End: 2022-02-25 | Stop reason: SDUPTHER

## 2022-02-25 RX ORDER — CLOPIDOGREL BISULFATE 75 MG/1
75 TABLET ORAL DAILY
Qty: 30 TABLET | Refills: 2 | Status: ON HOLD
Start: 2022-02-26 | End: 2022-04-16 | Stop reason: SDUPTHER

## 2022-02-25 RX ADMIN — FUROSEMIDE 40 MG: 40 INJECTION, SOLUTION INTRAMUSCULAR; INTRAVENOUS at 09:15

## 2022-02-25 RX ADMIN — CARVEDILOL 12.5 MG: 12.5 TABLET, FILM COATED ORAL at 09:14

## 2022-02-25 RX ADMIN — ASPIRIN 81 MG: 81 TABLET, COATED ORAL at 09:14

## 2022-02-25 RX ADMIN — DEXTROSE MONOHYDRATE 0.5 MG/MIN: 50 INJECTION, SOLUTION INTRAVENOUS at 01:42

## 2022-02-25 RX ADMIN — SODIUM CHLORIDE, PRESERVATIVE FREE 10 ML: 5 INJECTION INTRAVENOUS at 01:34

## 2022-02-25 RX ADMIN — MAGNESIUM SULFATE 2 G: 2 INJECTION INTRAVENOUS at 21:57

## 2022-02-25 RX ADMIN — AMIODARONE HYDROCHLORIDE 150 MG: 1.5 INJECTION, SOLUTION INTRAVENOUS at 01:33

## 2022-02-25 RX ADMIN — TORSEMIDE 20 MG: 20 TABLET ORAL at 06:02

## 2022-02-25 RX ADMIN — ERYTHROMYCIN: 5 OINTMENT OPHTHALMIC at 01:43

## 2022-02-25 RX ADMIN — SODIUM CHLORIDE, PRESERVATIVE FREE 10 ML: 5 INJECTION INTRAVENOUS at 14:59

## 2022-02-25 RX ADMIN — ROSUVASTATIN CALCIUM 20 MG: 20 TABLET, COATED ORAL at 01:34

## 2022-02-25 RX ADMIN — HEPARIN SODIUM 5000 UNITS: 5000 INJECTION INTRAVENOUS; SUBCUTANEOUS at 21:57

## 2022-02-25 RX ADMIN — ERYTHROMYCIN: 5 OINTMENT OPHTHALMIC at 17:39

## 2022-02-25 RX ADMIN — LOSARTAN POTASSIUM 50 MG: 50 TABLET, FILM COATED ORAL at 09:15

## 2022-02-25 RX ADMIN — SODIUM CHLORIDE, PRESERVATIVE FREE 10 ML: 5 INJECTION INTRAVENOUS at 23:00

## 2022-02-25 RX ADMIN — ERYTHROMYCIN: 5 OINTMENT OPHTHALMIC at 12:25

## 2022-02-25 RX ADMIN — SPIRONOLACTONE 12.5 MG: 25 TABLET ORAL at 09:14

## 2022-02-25 RX ADMIN — HEPARIN SODIUM 5000 UNITS: 5000 INJECTION INTRAVENOUS; SUBCUTANEOUS at 06:02

## 2022-02-25 RX ADMIN — ROSUVASTATIN CALCIUM 20 MG: 20 TABLET, COATED ORAL at 21:57

## 2022-02-25 RX ADMIN — POTASSIUM CHLORIDE 20 MEQ: 1500 TABLET, EXTENDED RELEASE ORAL at 09:15

## 2022-02-25 RX ADMIN — SODIUM CHLORIDE, PRESERVATIVE FREE 10 ML: 5 INJECTION INTRAVENOUS at 09:16

## 2022-02-25 RX ADMIN — HEPARIN SODIUM 5000 UNITS: 5000 INJECTION INTRAVENOUS; SUBCUTANEOUS at 15:37

## 2022-02-25 RX ADMIN — CLOPIDOGREL BISULFATE 75 MG: 75 TABLET, FILM COATED ORAL at 09:14

## 2022-02-25 RX ADMIN — CARVEDILOL 12.5 MG: 12.5 TABLET, FILM COATED ORAL at 17:39

## 2022-02-25 NOTE — PROGRESS NOTES
MEDICAL STUDENT NOTE:    Intern Progress Note  [address] [facility]       Patient: Retia Bosworth MRN: [id number]  CSN: [id number]    YOB: 1960  Age: 57 y.o. Sex: male    DOA: 2/23/2022 LOS:  LOS: 2 days                    Subjective:    Mr. Fay Flores had >40 runs of Vtach for 20 second intervals seen on telemetry. He felt well during these episodes. Cardiology was contacted and amiodarone 150mg bolus followed by 0.5mg/min drip was initiated. This morning, Mr. Fay Flores was again difficult to converse with. Claimed he felt \"fine\" and that he had no concerns and \"just wants to go home\". Denied any headaches when asked point blank but endorsed a headache to resident in conversation immediately following. Denied blurry vision, chest pain, abdominal pain, dysuria, changes in bowel habit.      ROS    Objective:     Patient Vitals for the past 24 hrs:   Temp Pulse Resp BP SpO2   02/25/22 0751 97.9 °F (36.6 °C) 82 18 (!) 144/100 93 %   02/25/22 0602    (!) 148/91    02/25/22 0540   20 (!) 141/98 93 %   02/25/22 0415 98.3 °F (36.8 °C) 93 20 (!) 142/103 100 %   02/25/22 0143 98.5 °F (36.9 °C) 90 18 (!) 153/101 96 %   02/24/22 2200   17 (!) 146/98 96 %   02/24/22 2000 98.5 °F (36.9 °C) 97 17 (!) 141/86 99 %   02/24/22 1600 98 °F (36.7 °C) 93 17 129/88 100 %   02/24/22 1339 98.2 °F (36.8 °C) 93 18 (!) 145/87 98 %   02/24/22 1230  84 20 130/88 99 %   02/24/22 1200  86 20 126/87 99 %   02/24/22 1145  88 22 122/84 98 %   02/24/22 1115  90 20 (!) 144/99 99 %   02/24/22 1100  86 19 (!) 160/94 98 %   02/24/22 1045  84 16 (!) 144/105 98 %   02/24/22 1030  82 14 (!) 148/97 100 %   02/24/22 1015  82 20 (!) 142/94 100 %   02/24/22 1000  82 23 97/69 99 %         Intake/Output Summary (Last 24 hours) at 2/25/2022 0901  Last data filed at 2/25/2022 0752  Gross per 24 hour   Intake    Output 1600 ml   Net -1600 ml       Physical Exam:   General: well-appearing man who appears stated age and well-groomed, NAD   HEENT: Conjunctiva pink, sclera anicteric. PERRL. EOMI. Piercing noted in right ear. CV:  RRR, no M/G/R  RESP: Unlabored breathing. Lungs CTAB, no wheezes, rales or rhonchi appreciated. Equal expansion bilaterally. ABD:  Soft, nontender, nondistended. No hepatosplenomegaly. MS:  No joint deformity or instability. No atrophy. Neuro:  5/5 strength bilateral upper extremities and lower extremities. A+Ox3. Ext:  No edema. 2+ radial and dp pulses bilaterally. Skin: Warm & dry. No rashes, lesions, or ulcers. Good turgor. Psych: Normal mood and affect. Lab/Data Reviewed:  Recent Results (from the past 24 hour(s))   METABOLIC PANEL, BASIC    Collection Time: 02/25/22  4:50 AM   Result Value Ref Range    Sodium 135 (L) 136 - 145 mmol/L    Potassium 3.8 3.5 - 5.5 mmol/L    Chloride 103 100 - 111 mmol/L    CO2 24 21 - 32 mmol/L    Anion gap 8 3.0 - 18 mmol/L    Glucose 144 (H) 74 - 99 mg/dL    BUN 23 (H) 7.0 - 18 MG/DL    Creatinine 1.50 (H) 0.6 - 1.3 MG/DL    BUN/Creatinine ratio 15 12 - 20      GFR est AA 57 (L) >60 ml/min/1.73m2    GFR est non-AA 47 (L) >60 ml/min/1.73m2    Calcium 7.5 (L) 8.5 - 10.1 MG/DL   CBC WITH AUTOMATED DIFF    Collection Time: 02/25/22  4:50 AM   Result Value Ref Range    WBC 7.4 4.6 - 13.2 K/uL    RBC 4.69 4.35 - 5.65 M/uL    HGB 13.9 13.0 - 16.0 g/dL    HCT 42.4 36.0 - 48.0 %    MCV 90.4 78.0 - 100.0 FL    MCH 29.6 24.0 - 34.0 PG    MCHC 32.8 31.0 - 37.0 g/dL    RDW 14.2 11.6 - 14.5 %    PLATELET 789 036 - 025 K/uL    MPV 13.1 (H) 9.2 - 11.8 FL    NRBC 0.0 0  WBC    ABSOLUTE NRBC 0.00 0.00 - 0.01 K/uL    NEUTROPHILS 71 40 - 73 %    LYMPHOCYTES 20 (L) 21 - 52 %    MONOCYTES 8 3 - 10 %    EOSINOPHILS 1 0 - 5 %    BASOPHILS 1 0 - 2 %    IMMATURE GRANULOCYTES 0 0.0 - 0.5 %    ABS. NEUTROPHILS 5.2 1.8 - 8.0 K/UL    ABS. LYMPHOCYTES 1.5 0.9 - 3.6 K/UL    ABS. MONOCYTES 0.6 0.05 - 1.2 K/UL    ABS. EOSINOPHILS 0.1 0.0 - 0.4 K/UL    ABS.  BASOPHILS 0.0 0.0 - 0.1 K/UL    ABS. IMM. GRANS. 0.0 0.00 - 0.04 K/UL    DF AUTOMATED     MAGNESIUM    Collection Time: 02/25/22  4:50 AM   Result Value Ref Range    Magnesium 1.9 1.6 - 2.6 mg/dL       Assessment & Plan:   57 y. o. male with PMH significant for HFrEF (32-93% 03/6054, systolic and diastolic), HLD, HTN, Hypoparathyroidism, CKD3, now admitted for NSTEMI following cath of RCA.       1. NSTEMI/Non-ischemic cardiomyopathy with reduced EF   -Patient shoed 95% occlusion of RCA during catheterization yesterday. Stent was placed by cardio. Medications: Current: ASA, Coreg, Crestor, Heparin. Scheduled: Cozaar, Plavix   - Torsemide in note per night team, not ordered in [facility] due to euvolemic status.   -Add SGLT2 inhibitor and Entresto if affordable for patient   -Strict I/O's, daily weights  2.  CKDIIIa  Suspect component of cardiorenal with improvement in Cr after lasix's and new ECHO results   - Cr at baseline 1.84>1.50   PLAN:  - Daily BMP  -LR 100cc/hr to help hydrate prior to cath with concern for dye impacts on kidneys during procedure   -Will assess volume status daily      Bacterial conjunctivitis  Erythromycin drops q6h for 5 days      HLD   - At home takes Rosuvastatin 20mg daily  PLAN:  - Continue Rosuvastatin QHS  -Lipid panel     Hx of Hypocalcemia  -Ca wnl when corrected for albumin: 10.6  PLAN:   -Monitor  -Replete electrolytes as needed      History of embolic stroke   - Per [employer] records, in 2017 (multiple acute and subacute infarcts in the left cerebrum)     Pulmonary nodules   - Noted in discharge summary in 2017, CXR without any note of similar nodules   - denies history of smoking     Subarrachnoid hemorrhage 2016  No deficits post-hemorrhage  Per cards ok to continue Heparin drip       Diet    DVT Prophylaxis    GI Prophylaxis    Code status    Disposition      Point of Contact ***  Relationship:  ([phone]      Tami Winkler , PGY-1   [facility] Medicine   February 25, 2022, 9:01 AM          *ATTENTION: This note has been created by a medical student for educational purposes only. Please do not refer to the content of this note for clinical decision-making, billing, or other purposes. Please see attending physicians note to obtain clinical information on this patient. *

## 2022-02-25 NOTE — PROGRESS NOTES
Brief Progress Note  [address] by nursing that patient had 46 beats of monomorphic v-tach lasting for 20 seconds while resting in bed. Patient s/p PCI today, with stent placement to RCA. No chest pain. Asymptomatic. VSS- /98, . Spoke to [facility], cardiology PA at 22:45pm. Recommended amiodarone 150mg bolus followed by 0.5mg/min amiodarone drip. UDS positive for cocaine on admission yesterday 2/22. Continue to hold beta-blockers-can consider adding in the morning. Will plan to use coreg. AICD has not been interrogated during this admission yet. UPDATE:  05:43 noted elevated BP of 142/103. Will resume his Torsemide now that we are s/p cath (Cr not back yet for 2/25)    For full assessment and plan, please see daily progress note.      Kevin Oconnor, PGY-1   [facility] Medicine   February 24, 2022, 11:00 PM

## 2022-02-25 NOTE — PROGRESS NOTES
Reason for Admission:  Chest pain in adult [R07.9]                 RUR Score:    11           Plan for utilizing home health:    No                       Likelihood of Readmission:   LOW                         Transition of Care Plan:              Initial assessment completed with patient. Cognitive status of patient: oriented to time, place, person and situation. Face sheet information confirmed:  yes. The patient designates his sister Karyn Lane  to participate in his discharge plan and to receive any needed information. This patient lives in a home with mother. Patient is able to navigate steps as needed. Prior to hospitalization, patient was considered to be independent with ADLs/IADLS : yes . Patient has a current ACP document on file: no      Healthcare Decision Maker:     Click here to complete [address] including selection of the Healthcare Decision Maker Relationship (ie \"Primary\")    The patient's family will be available to transport patient home upon discharge. The patient already has none reported medical equipment available in the home. Patient is not currently active with home health. Patient has not stayed in a skilled nursing facility or rehab. Was  stay within last 60 days : no. This patient is on dialysis :no     Currently, the discharge plan is Home. The patient states that he can obtain his medications from the pharmacy, and take his medications as directed. Patient's current insurance is [employer]       Care Management Interventions  PCP Verified by CM: Yes  Palliative Care Criteria Met (RRAT>21 & CHF Dx)?: No  Mode of Transport at Discharge:  Other (see comment) (family)  Transition of Care Consult (CM Consult): Discharge Planning  Support Systems: Parent(s),Other Family Member(s)  Confirm Follow Up Transport: Self  Discharge Location  Patient Expects to be Discharged to[de-identified] Home        MELY Meyer RN  Care Management  Pager: [phone]

## 2022-02-25 NOTE — PROGRESS NOTES
Cardiology Progress Note    Admit Date: 2/23/2022  Attending Cardiologist: Dr. Sarah Bell:     -NSTEMI, pt presented with c/o chest pain and diaphoresis, troponin up to 5618 on 2/24 AM.  -S/p Cardiac cath 2/24/2022 with findings as follows:  · Distal LM 35-40% stenosis. · Apical LAD bridging. LAD has diffuse 20-30% stenosis throughout with ANTHONY-3 flow. · LCx with diffuse 20% stenosis. Distal AV circumflex is 100% occluded but appears quite small. · Dominant RCA with mid segment 35-45% stenosis. Culprit lesion appears to be distal RCA 95% heavily calcified unstable appearing lesion; there is ANTHONY-II flow distally. · Distal RCA stented using 2.75 mm HITESH to residual 0%. · LVEDP is 20 mmHg. Overall LV function is severely diminished with an estimated EF of 20% with diffuse global hypokinesis. -Nonischemic cardiomyopathy, on Coreg, Losartan, Aldactone and Torsemide as outpatient.  -Cardiac catheterization 10/2020 DR YUNG [facility] with findings as follows:  · LM: 40-50% distal LM  · LAD: Large caliber with diagonal branches.  50-60% mid LAD following large septal, 40% distal, long bridging segment as well in distal LAD  · LCx: Non-dominant, large OM branches, mild-moderate diffuse disease  · RCA: Dominant, feeds a posterior descending artery and smaller posterolateral branch, 20-30% mid vessel  -S/p implantation of dual-chamber [employer] AICD for primary prevention by Dr. Fernandez Distance 11/22/2021.  -CKD. -Hx HTN, hypotensive prior to presentation s/p SL NTG x 2.  On Coreg, Losartan as outpatient. -HLD  -Hx traumatic subarachnoid hemorrhage in 2016.     Primary cardiologist is Dr. Arabella Delgado:       I saw, evaluated, interviewed and examined the patient personally. Denies any chest pain anymore. Mild dyspnea but denies other complaint  Hemodynamically stable. Blood pressure slightly elevated. Exam with slight decreased breath sound but no significant edema.   Abdomen is soft  Pertinent labs reviewed    Impression and plan:  Patient admitted with non-STEMI and CHF exacerbation  Status post RCA stent  -Importance of dual antiplatelet discussed with the patient. Continue aspirin and Plavix  -With slight dyspnea and decreased breath sound, will give IV Lasix 40 mg once before discharge  -Neck rehabilitation as outpatient  Aspirin, Coreg, Plavix, losartan and Aldactone  -was discharged, he will need to see primary cardiologist in 3 weeks    Significant time spent in reviewing the case, multiple EMR databases, physician notes, reviewing pertinent labs and imaging studies  I spent significant amount of time for medical decision making and updated history, and other providers assessments as well. I personally agree with the findings as stated and the plan as documented. I saw, examined, and evaluated this patient and performed the substantive portion of the encounter for > 50% of the time including extensive history, physical exam, assessment and plan    Rodney Rizzo MD       -Telemetry strips reviewed, reported 50-beat NSVT overnight appears to be more so tachycardia with aberrancy. Will d/c Amiodarone infusion, resume outpatient Coreg 12.5 mg BID. -Will give IV Lasix x 1.  -Continue ASA, Plavix, Crestor, Aldactone. -Increase mobility.  -Discussed with primary team, will plan to have prescriptions filled today through [facility].     Subjective:     Feels short of breath this AM.    Objective:      Patient Vitals for the past 8 hrs:   Temp Pulse Resp BP SpO2   02/25/22 0751 97.9 °F (36.6 °C) 82 18 (!) 144/100 93 %   02/25/22 0602 -- -- -- (!) 148/91 --   02/25/22 0540 -- -- 20 (!) 141/98 93 %   02/25/22 0415 98.3 °F (36.8 °C) 93 20 (!) 142/103 100 %   02/25/22 0143 98.5 °F (36.9 °C) 90 18 (!) 153/101 96 %         Patient Vitals for the past 96 hrs:   Weight   02/25/22 0415 83 kg (182 lb 14.4 oz)   02/23/22 1413 83.9 kg (185 lb)   02/23/22 0756 83.9 kg (185 lb)                  Current Facility-Administered Medications   Medication Dose Route Frequency Last Admin    carvediloL (COREG) tablet 12.5 mg  12.5 mg Oral BID WITH MEALS 12.5 mg at 02/25/22 0914    losartan (COZAAR) tablet 50 mg  50 mg Oral DAILY 50 mg at 02/25/22 0915    sodium chloride (NS) flush 5-40 mL  5-40 mL IntraVENous Q8H 10 mL at 02/25/22 0916    sodium chloride (NS) flush 5-40 mL  5-40 mL IntraVENous PRN      nitroglycerin (NITROLINGUAL) sublingual 0.4 mg/spray  1 Spray SubLINGual Q5MIN PRN      aspirin delayed-release tablet 81 mg  81 mg Oral DAILY 81 mg at 02/25/22 0914    clopidogreL (PLAVIX) tablet 75 mg  75 mg Oral DAILY 75 mg at 02/25/22 0914    heparin (porcine) injection 5,000 Units  5,000 Units SubCUTAneous Q8H 5,000 Units at 02/25/22 0602    acetaminophen (TYLENOL) tablet 650 mg  650 mg Oral Q4H PRN      rosuvastatin (CRESTOR) tablet 20 mg  20 mg Oral QHS 20 mg at 02/25/22 0134    erythromycin (ILOTYCIN) 5 mg/gram (0.5 %) ophthalmic ointment   Both Eyes Q6H Given at 02/25/22 0143    spironolactone (ALDACTONE) tablet 12.5 mg  12.5 mg Oral DAILY 12.5 mg at 02/25/22 0914    nitroglycerin (NITROSTAT) tablet 0.4 mg  0.4 mg SubLINGual PRN           Intake/Output Summary (Last 24 hours) at 2/25/2022 8283  Last data filed at 2/25/2022 2946  Gross per 24 hour   Intake --   Output 1600 ml   Net -1600 ml       Physical Exam:  General:  alert, cooperative, no distress, appears stated age  Neck:  supple  Lungs:  decreased  Heart:  regular rate and rhythm  Abdomen:  abdomen is soft without significant tenderness, masses, organomegaly or guarding  Extremities:  atraumatic, trace edema    Visit Vitals  BP (!) 144/100 (BP 1 Location: Left upper arm, BP Patient Position: At rest)   Pulse 82   Temp 97.9 °F (36.6 °C)   Resp 18   Ht 5' 9\" (1.753 m)   Wt 83 kg (182 lb 14.4 oz)   SpO2 93%   BMI 27.01 kg/m²       Data Review:     Labs: Results:       Chemistry Recent Labs     02/25/22  0450 02/24/22  0458 02/23/22  9895 02/23/22 0815 02/23/22 0815   * 92  --   --  155*   * 136  --   --  137   K 3.8 4.1  --   --  4.2    104  --   --  105   CO2 24 24  --   --  26   BUN 23* 27*  --   --  28*   CREA 1.50* 1.53*  --   --  1.84*   CA 7.5* 7.4*  --   --  7.6*   MG 1.9 2.4 1.8   < > 1.7   AGAP 8 8  --   --  6   BUCR 15 18  --   --  15   ALB  --  2.5*  --   --   --     < > = values in this interval not displayed.       CBC w/Diff Recent Labs     02/25/22 0450 02/24/22 0458 02/23/22 0815   WBC 7.4 10.5 9.5   RBC 4.69 4.57 4.44   HGB 13.9 13.3 13.0   HCT 42.4 42.0 40.8    165 150   GRANS 71 70 76*   LYMPH 20* 22 16*   EOS 1 1 2      Coagulation Recent Labs     02/24/22 0458 02/24/22  0142   APTT 41.6* 42.1*       Lipid Panel Lab Results   Component Value Date/Time    Cholesterol, total 143 02/24/2022 04:58 AM    HDL Cholesterol 37 (L) 02/24/2022 04:58 AM    LDL, calculated 91 02/24/2022 04:58 AM    VLDL, calculated 15 02/24/2022 04:58 AM    Triglyceride 75 02/24/2022 04:58 AM    CHOL/HDL Ratio 3.9 02/24/2022 04:58 AM      Liver Enzymes Recent Labs     02/24/22 0458   ALB 2.5*      Thyroid Studies Lab Results   Component Value Date/Time    TSH 0.72 02/28/2016 07:15 PM          Signed By: Messi Gordon PA-C     February 25, 2022

## 2022-02-25 NOTE — ROUTINE PROCESS
Patient had a 46 bt of VT while resting in bed. Patient denies feeling any discomfort or palpitations. B/p 151/113 with a repeat of  146/98. Attending was notified.

## 2022-02-25 NOTE — PROGRESS NOTES
[facility]  Intern Progress Note    Patient: Hilario Hardwick MRN: [id number]   SSN: [id number]  YOB: 1960   Age: 57 y.o. Sex: male      Admit Date: 2/23/2022    LOS: 2 days   Chief Complaint   Patient presents with    Chest Pain       Subjective:     Acute events: Overnight, pt reported to have >40 runs of Vtach for 20s interval on telemetry. Cardio was contacted and recommended amiodarone 150mg bolus followed by 0.5mg/min amiodarone drip, which was ordered. Upon review, run of Vtach found to be PVCs; AICD only showed NSVT of 4 runs, so amiodarone drip was discontinued. Patient seen at bedside this morning, resting comfortably with no complaints this AM. Endorses SOB when \"being moved around\" last night but breathing well otherwise. Also reports \"sinus HA\" around his eyes, but he has experienced this in the past. Denied blurry vision, chest pain, palpitations, abd pain, muscle pain/weakness, swelling. ROS: As above. Objective:     Visit Vitals  BP (!) 144/100 (BP 1 Location: Left upper arm, BP Patient Position: At rest)   Pulse 82   Temp 97.9 °F (36.6 °C)   Resp 18   Ht 5' 9\" (1.753 m)   Wt 83 kg (182 lb 14.4 oz)   SpO2 93%   BMI 27.01 kg/m²       Physical Exam:   General: Well-appearing, NAD. On RA. CV:  RRR, no M/G/R.  RESP: Unlabored breathing. Lungs CTAB. Equal expansion bilaterally. ABD:  Soft, nontender, nondistended. Neuro: Moves extremities freely and purposefully. Ext:  No edema, erythema, or tenderness of extremities. 2+ radial and dp pulses bilaterally. Skin: Warm & dry. No rashes, lesions, or ulcers. Good turgor.    Psych: Normal mood and affect.     Lab/Data Review:  Recent Results (from the past 12 hour(s))   METABOLIC PANEL, BASIC    Collection Time: 02/25/22  4:50 AM   Result Value Ref Range    Sodium 135 (L) 136 - 145 mmol/L    Potassium 3.8 3.5 - 5.5 mmol/L    Chloride 103 100 - 111 mmol/L    CO2 24 21 - 32 mmol/L    Anion gap 8 3.0 - 18 mmol/L Glucose 144 (H) 74 - 99 mg/dL    BUN 23 (H) 7.0 - 18 MG/DL    Creatinine 1.50 (H) 0.6 - 1.3 MG/DL    BUN/Creatinine ratio 15 12 - 20      GFR est AA 57 (L) >60 ml/min/1.73m2    GFR est non-AA 47 (L) >60 ml/min/1.73m2    Calcium 7.5 (L) 8.5 - 10.1 MG/DL   CBC WITH AUTOMATED DIFF    Collection Time: 02/25/22  4:50 AM   Result Value Ref Range    WBC 7.4 4.6 - 13.2 K/uL    RBC 4.69 4.35 - 5.65 M/uL    HGB 13.9 13.0 - 16.0 g/dL    HCT 42.4 36.0 - 48.0 %    MCV 90.4 78.0 - 100.0 FL    MCH 29.6 24.0 - 34.0 PG    MCHC 32.8 31.0 - 37.0 g/dL    RDW 14.2 11.6 - 14.5 %    PLATELET 971 447 - 998 K/uL    MPV 13.1 (H) 9.2 - 11.8 FL    NRBC 0.0 0  WBC    ABSOLUTE NRBC 0.00 0.00 - 0.01 K/uL    NEUTROPHILS 71 40 - 73 %    LYMPHOCYTES 20 (L) 21 - 52 %    MONOCYTES 8 3 - 10 %    EOSINOPHILS 1 0 - 5 %    BASOPHILS 1 0 - 2 %    IMMATURE GRANULOCYTES 0 0.0 - 0.5 %    ABS. NEUTROPHILS 5.2 1.8 - 8.0 K/UL    ABS. LYMPHOCYTES 1.5 0.9 - 3.6 K/UL    ABS. MONOCYTES 0.6 0.05 - 1.2 K/UL    ABS. EOSINOPHILS 0.1 0.0 - 0.4 K/UL    ABS. BASOPHILS 0.0 0.0 - 0.1 K/UL    ABS. IMM. GRANS. 0.0 0.00 - 0.04 K/UL    DF AUTOMATED     MAGNESIUM    Collection Time: 02/25/22  4:50 AM   Result Value Ref Range    Magnesium 1.9 1.6 - 2.6 mg/dL         Assessment and Plan:   57 y.o. male with PMH significant for HFrEF (23-00% 74/1671, systolic and diastolic), HLD, HTN, Hypoparathyroidism, CKD3, now admitted for NSTEMI.      NSTEMI and non-ischemic cardiomyopathy with reduced EF   DDx: NSTEMI vs CHF exacerbation vs PE among others    -Patient unable to tell me what his home medications are attempted to call multiple pharmacies no history of patient filling at these pharmacies and no history of patient filling scripts at pharmacy listed in allscripts.   -Medications on prior DC: Carvedilol 12.5 BID, torsemide 20mg BID, spironolactone 12.5mg qd, Nitoglycerin 0.4 mg PRN,  Aspirin 81 mg every day  -No hx of prior cath   - Hx of non-compliance  -s/p AICD placement on 11/22  -CXR: worse vascular congestion  -EKG: NSR, QTc 486, old septal infarct, poss. Inferior infarct w/o T-wave elevation, t-wave inversion throughout   -Wells score: 0   -BNP: 6402, baseline unclear  -Troponin: 109>277  -Echo 11/21: EF 20-25% with decreased global systolic fxn   -ECHO 3/52: EF 10-15% w/ globally decreased systolic fxn   -Cardiac cath (2/24):  L Main 35% stenosed, % stenosed, RV Branch 100% stenosed, Proximal/Mid RCA 40% stenosed, distal RCA 95% stenosed; RCA stent x1 placed  -Given 150mg bolus amiodarone followed by 0.5mg/min amiodarone drip for suspected long run of Vtach to >40 beats on 2/24; found to be PVCs so amio drip d/c'd  PLAN:  - Cardiology consulted, appreciate assistance.    - Telemetry  - Will restart PTA medications as able with blood pressure: spirinolactone, coreg and optimize in the setting of CHF   -Coreg 6.25 on  - Recommend prioritizing Entresto (if able to afford) and SGLT2 w/ new echo results   - Strict I/Os, daily weights  - Patient euvolemic this morning, hold further diuretics   - Daily CBC, BMP, Mg; K+ goal >4, Mg goal >2  - Cont ASA 81  - Furosemide 40-60mg daily for HF and vascular congestion  -Cards to assess for functioning of AICD     CKDIIIa  Suspect component of cardiorenal with improvement in Cr after lasix's and new ECHO results   - Cr at baseline 1.84>1.54   PLAN:  - Daily BMP  -LR 100cc/hr to help hydrate prior to cath with concern for dye impacts on kidneys during procedure   -Will assess volume status daily      Bacterial conjunctivitis  Erythromycin drops q6h for 5 days      HLD   - At home takes Rosuvastatin 20mg daily  PLAN:  - Continue Rosuvastatin QHS  -Lipid panel     Hx of Hypocalcemia  -Ca wnl when corrected for albumin: 10.6  PLAN:   -Monitor  -Replete electrolytes as needed      History of embolic stroke   - Per [employer] records, in 2017 (multiple acute and subacute infarcts in the left cerebrum)     Pulmonary nodules   - Noted in discharge summary in 2017, CXR without any note of similar nodules   - denies history of smoking     Subarrachnoid hemorrhage 2016  No deficits post-hemorrhage  Per cards ok to continue Heparin drip     Diet NPO   DVT Prophylaxis Heparin   GI Prophylaxis Famotidine, Miralax   Code status FULL   Disposition >2MNs, Home         Point of Contact Flor Daniels  Relationship: Sister  [phone]         Levi Swartz MD , PGY-1   [facility]   February 25, 2022, 8:26 AM

## 2022-02-25 NOTE — PROGRESS NOTES
0800:  Report received, care assumed. In bed. AAOx4. Denies CP or pressure. Amiodarone infusion at 0.5mg/min continues as ordered. NSR on cardiac monitoring now. Lungs clear. Right wrist cardiac cath site with dressing c/d/i, no oozing, no hematoma, brisk cap refill; discussed activity limitations regarding radial approach for cardiac cath; pt verbalizes understanding info. Breakfast served. Call bell, phone, urinal at side. Cardiology providers on rounds. Monitor. 0915:  Amiodarone drip discontinued per cardiology provider orders. 1400:  7 prescriptions faxed to [facility] per provider instructions. PFM providers evaluating for possible discharge home today. Await further orders/ plan of care. 1515:  [facility] Outpatient filled prescriptions placed in CVT SD pyxis until discharge anticipated for tomorrow 2/26/22. Will pass this info on in shift report, patient informed as well. Shift change report given to STACEY Jensen with bedside rounds.

## 2022-02-25 NOTE — DISCHARGE INSTRUCTIONS
Heart-Healthy Diet: Care Instructions  Your Care Instructions     A heart-healthy diet has lots of vegetables, fruits, nuts, beans, and whole grains, and is low in salt. It limits foods that are high in saturated fat, such as meats, cheeses, and fried foods. It may be hard to change your diet, but even small changes can lower your risk of heart attack and heart disease. Follow-up care is a key part of your treatment and safety. Be sure to make and go to all appointments, and call your doctor if you are having problems. It's also a good idea to know your test results and keep a list of the medicines you take. How can you care for yourself at home? Watch your portions  · Use food labels to learn what the recommended servings are for the foods you eat. · Eat only the number of calories you need to stay at a healthy weight. If you need to lose weight, eat fewer calories than your body burns (through exercise and other physical activity). Eat more fruits and vegetables  · Eat a variety of fruit and vegetables every day. Dark green, deep orange, red, or yellow fruits and vegetables are especially good for you. Examples include spinach, carrots, peaches, and berries. · Keep carrots, celery, and other veggies handy for snacks. Buy fruit that is in season and store it where you can see it so that you will be tempted to eat it. · Cook dishes that have a lot of veggies in them, such as stir-fries and soups. Limit saturated fat  · Read food labels, and try to avoid saturated fats. They increase your risk of heart disease. · Use olive or canola oil when you cook. · Bake, broil, grill, or steam foods instead of frying them. · Choose lean meats instead of high-fat meats such as hot dogs and sausages. Cut off all visible fat when you prepare meat. · Eat fish, skinless poultry, and meat alternatives such as soy products instead of high-fat meats.  Soy products, such as tofu, may be especially good for your heart.  · Choose low-fat or fat-free milk and dairy products. Eat foods high in fiber  · Eat a variety of grain products every day. Include whole-grain foods that have lots of fiber and nutrients. Examples of whole-grain foods include oats, whole wheat bread, and brown rice. · Buy whole-grain breads and cereals, instead of white bread or pastries. Limit salt and sodium  · Limit how much salt and sodium you eat to help lower your blood pressure. · Taste food before you salt it. Add only a little salt when you think you need it. With time, your taste buds will adjust to less salt. · Eat fewer snack items, fast foods, and other high-salt, processed foods. Check food labels for the amount of sodium in packaged foods. · Choose low-sodium versions of canned goods (such as soups, vegetables, and beans). Limit sugar  · Limit drinks and foods with added sugar. These include candy, desserts, and soda pop. Limit alcohol  · Limit alcohol to no more than 2 drinks a day for men and 1 drink a day for women. Too much alcohol can cause health problems. When should you call for help? Watch closely for changes in your health, and be sure to contact your doctor if:    · You would like help planning heart-healthy meals. Where can you learn more? Go to http://www.albert.com/  Enter V137 in the search box to learn more about \"Heart-Healthy Diet: Care Instructions. \"  Current as of: December 17, 2020               Content Version: 13.0  © 2006-2021 [employer] Care instructions adapted under license by [employer] (which disclaims liability or warranty for this information). If you have questions about a medical condition or this instruction, always ask your healthcare professional. [place] [address] any warranty or liability for your use of this information.        Spironolactone (By mouth)   Spironolactone ([id number]-ia-uc-LAK-tone)  Treats high blood pressure, edema (fluid retention), or high levels of aldosterone (a hormone). This medicine is a potassium-sparing diuretic (water pill). Brand Name(s): Aldactone   There may be other brand names for this medicine. When This Medicine Should Not Be Used: This medicine is not right for everyone. Do not use it if you had an allergic reaction to spironolactone, or if you have [place] disease. How to Use This Medicine:   Tablet  · Take your medicine as directed. Your dose may need to be changed several times to find what works best for you. · The oral liquid may be taken with or without food, but it should be taken the same way (with or without food) each day. · Measure the oral liquid medicine with a marked measuring spoon, oral syringe, or medicine cup. Shake well before each use. · Missed dose: Take a dose as soon as you remember. If it is almost time for your next dose, wait until then and take a regular dose. Do not take extra medicine to make up for a missed dose. · Store the medicine in a closed container at room temperature, away from heat, moisture, and direct light. Drugs and Foods to Avoid:   Ask your doctor or pharmacist before using any other medicine, including over-the-counter medicines, vitamins, and herbal products. · Do not use this medicine together with eplerenone. · Some foods and medicines can affect how spironolactone works. Tell your doctor if you are using any of the following:   ¨ Cholestyramine, cisplatin, digoxin, heparin, lithium, trimethoprim  ¨ Another blood pressure medicine, including an angiotensin receptor blocker (ARB) or an ACE inhibitor  ¨ NSAID pain or arthritis medicine (including aspirin, celecoxib, diclofenac, ibuprofen, indomethacin, naproxen)  ¨ Steroid medicine (including hydrocortisone, methylprednisolone, prednisolone, prednisone)  · Ask your doctor before you use any medicine, supplement, or salt substitute that contains potassium.  You could have high levels of potassium in your blood that would cause serious health problems. · Alcohol, narcotic pain relievers, or sleeping pills may cause you to feel more lightheaded, dizzy, or faint when used with this medicine. Warnings While Using This Medicine:   · Tell your doctor if you are pregnant or breastfeeding, or if you have kidney disease, liver disease, diabetes, gout, or trouble urinating. · This medicine may cause the following problems:  ¨ Low blood pressure  ¨ Worsening of kidney function  ¨ Electrolyte imbalance  ¨ High uric acid and blood sugar  · This medicine may make you dizzy or drowsy. Do not drive or do anything else that could be dangerous until you know how this medicine affects you. · Do not stop using the medicine without asking your doctor, even if you feel well. This medicine will not cure your high blood pressure, but it will help keep it in the normal range. You may have to take blood pressure medicine for the rest of your life. · Tell any doctor or dentist who treats you that you are using this medicine. · Your doctor will do lab tests at regular visits to check on the effects of this medicine. Keep all appointments. · Keep all medicine out of the reach of children. Never share your medicine with anyone.   Possible Side Effects While Using This Medicine:   Call your doctor right away if you notice any of these side effects:  · Allergic reaction: Itching or hives, swelling in your face or hands, swelling or tingling in your mouth or throat, chest tightness, trouble breathing  · Blistering, peeling, red skin rash  · Blood in your stools or dark stools, vomiting blood or material that looks like coffee grounds  · Confusion, weakness, uneven heartbeat, trouble breathing, numbness or tingling in your hands, feet, or lips  · Dry mouth, increased thirst, muscle cramps, nausea, vomiting  · Increased hunger or thirst, change in how much or how often you urinate, unusual weight loss  · Lightheadedness, dizziness, drowsiness, fainting  · Muscle twitching  · Unusual bleeding, bruising, or weakness  If you notice these less serious side effects, talk with your doctor:   · Breast swelling, enlargement, pain, or tenderness  If you notice other side effects that you think are caused by this medicine, tell your doctor. Call your doctor for medical advice about side effects. You may report side effects to FDA at 8-516-IEX-2107  © 2017 [facility] Information is for End User's use only and may not be sold, redistributed or otherwise used for commercial purposes. The above information is an  only. It is not intended as medical advice for individual conditions or treatments. Talk to your doctor, nurse or pharmacist before following any medical regimen to see if it is safe and effective for you. Clopidogrel (By mouth)   Clopidogrel (wobl-BPY-rn-grel)  Helps prevent stroke, heart attack, and other heart problems. This medicine is a platelet inhibitor. Brand Name(s): Plavix   There may be other brand names for this medicine. When This Medicine Should Not Be Used: This medicine is not right for everyone. Do not use it if you had an allergic reaction to clopidogrel. How to Use This Medicine:   Tablet  · Your doctor will tell you how much medicine to use. Do not use more than directed. · This medicine should come with a Medication Guide. Ask your pharmacist for a copy if you do not have one. · Missed dose: Take a dose as soon as you remember. If it is almost time for your next dose, wait until then and take a regular dose. Do not take extra medicine to make up for a missed dose. · Store the medicine in a closed container at room temperature, away from heat, moisture, and direct light. Drugs and Foods to Avoid:   Ask your doctor or pharmacist before using any other medicine, including over-the-counter medicines, vitamins, and herbal products. · Some medicines can affect how clopidogrel works. Tell your doctor if you are using any of the following:   ¨ Esomeprazole, omeprazole, repaglinide, or warfarin  ¨ Medicine to treat depression  ¨ NSAID pain or arthritis medicine (including celecoxib, diclofenac, ibuprofen, or naproxen)  Warnings While Using This Medicine:   · Tell your doctor if you are pregnant or breastfeeding, or if you have bleeding problems, stomach ulcer or bleeding, a recent stroke, or have a history of bleeding problems. · This medicine can cause you to bleed and bruise more easily. Take precautions to avoid injury. Brush and floss your teeth gently, do not play rough sports, and be careful with sharp objects. Severe bleeding can be life-threatening. · This medicine may cause a rare but serious blood clotting condition called thrombotic thrombocytopenic purpura. · Make sure any doctor or dentist who treats you knows that you are using this medicine. Tell your doctor if you plan to have surgery or a dental procedure. · Do not stop using this medicine suddenly. Your doctor will need to slowly decrease your dose before you stop it completely. · Your doctor will check your progress and the effects of this medicine at regular visits. Keep all appointments. · Keep all medicine out of the reach of children. Never share your medicine with anyone.   Possible Side Effects While Using This Medicine:   Call your doctor right away if you notice any of these side effects:  · Allergic reaction: Itching or hives, swelling in your face or hands, swelling or tingling in your mouth or throat, chest tightness, trouble breathing  · Bloody or black, tarry stools  · Nosebleeds  · Pinpoint red or purple spots on your skin or in your mouth  · Problems with vision, speech, or walking  · Red or dark brown urine  · Seizures  · Severe stomach pain  · Trouble breathing, tiredness, fast heartbeat, yellow skin or eyes  · Unusual bleeding, bruising, or weakness  · Vomiting of blood or vomit that looks like coffee grounds  If you notice other side effects that you think are caused by this medicine, tell your doctor. Call your doctor for medical advice about side effects. You may report side effects to FDA at 9-462-ZRW-5036  © 2017 [facility] Information is for End User's use only and may not be sold, redistributed or otherwise used for commercial purposes. The above information is an  only. It is not intended as medical advice for individual conditions or treatments. Talk to your doctor, nurse or pharmacist before following any medical regimen to see if it is safe and effective for you. Coronary Angioplasty: What to Expect at [facility]     Coronary angioplasty is a procedure that is used to open a narrowed or blocked coronary artery. It may also be called a percutaneous coronary intervention (PCI). The doctor opened your narrowed or blocked artery by putting a thin tube, called a catheter, into your heart through a blood vessel. The catheter was inserted into the blood vessel in your groin or arm. The doctor may have placed a small tube, called a stent, in the artery. Your groin or arm may have a bruise and feel sore for a day or two after the procedure. You can do light activities around the house. But don't do anything strenuous for a day or two. This care sheet gives you a general idea about how long it will take for you to recover. But each person recovers at a different pace. Follow the steps below to get better as quickly as possible. How can you care for yourself at home? Activity    · If the doctor gave you a sedative:  ? For 24 hours, don't do anything that requires attention to detail, such as going to work, making important decisions, or signing any legal documents. It takes time for the medicine's effects to completely wear off.  ? For your safety, do not drive or operate any machinery that could be dangerous.  Wait until the medicine wears off and you can think clearly and react easily.     · Do not do strenuous exercise and do not lift, pull, or push anything heavy until your doctor says it is okay. This may be for a day or two. You can walk around the house and do light activity, such as cooking.     · If the catheter was placed in your groin, try not to walk up stairs for the first couple of days.     · If the catheter was placed in your arm near your wrist, do not bend your wrist deeply for the first couple of days. Be careful using your hand to get into and out of a chair or bed.     · Carry your stent identification card with you at all times.     · If your doctor recommends it, get more exercise. Walking is a good choice. Bit by bit, increase the amount you walk every day. Try for at least 30 minutes on most days of the week.     · If you haven't been set up with a cardiac rehab program, talk to your doctor about whether rehab is right for you. Cardiac rehab includes supervised exercise. It also includes help with diet and lifestyle changes and emotional support. Diet    · Drink plenty of fluids to help your body flush out the dye. If you have kidney, heart, or liver disease and have to limit fluids, talk with your doctor before you increase the amount of fluids you drink.     · Keep eating a heart-healthy diet that has lots of fruits, vegetables, and whole grains. If you have not been eating this way, talk to your doctor. You also may want to talk to a dietitian. This expert can help you to learn about healthy foods and plan meals. Medicines    · Your doctor will tell you if and when you can restart your medicines. Your doctor will also give you instructions about taking any new medicines.     · If you take aspirin or some other blood thinner, ask your doctor if and when to start taking it again. Make sure that you understand exactly what your doctor wants you to do.     · Your doctor will prescribe blood-thinning medicines.  You will likely take aspirin plus another antiplatelet, such as clopidogrel (Plavix). It is very important that you take these medicines exactly as directed. These medicines help keep the coronary artery open and reduce your risk of a heart attack.     · Call your doctor if you think you are having a problem with your medicine. Care of the catheter site    · For 1 or 2 days, keep a bandage over the spot where the catheter was inserted. The bandage probably will fall off in this time.     · Put ice or a cold pack on the area for 10 to 20 minutes at a time to help with soreness or swelling. Put a thin cloth between the ice and your skin.     · You may shower 24 to 48 hours after the procedure, if your doctor okays it. Pat the incision dry.     · Do not soak the catheter site until it is healed. Don't take a bath for 1 week, or until your doctor tells you it is okay.     · Watch for bleeding from the site. A small amount of blood (up to the size of a quarter) on the bandage can be normal.     · If you are bleeding, lie down and press on the area for 15 minutes to try to make it stop. If the bleeding does not stop, call your doctor or seek immediate medical care. Follow-up care is a key part of your treatment and safety. Be sure to make and go to all appointments, and call your doctor if you are having problems. It's also a good idea to know your test results and keep a list of the medicines you take. When should you call for help? Call 911 anytime you think you may need emergency care. For example, call if:    · You passed out (lost consciousness).     · You have severe trouble breathing.     · You have sudden chest pain and shortness of breath, or you cough up blood.     · You have symptoms of a heart attack, such as:  ? Chest pain or pressure. ? Sweating. ? Shortness of breath. ? Nausea or vomiting. ? Pain that spreads from the chest to the neck, jaw, or one or both shoulders or arms. ? Dizziness or lightheadedness.   ? A fast or uneven pulse. After calling 911, chew 1 adult-strength aspirin. Wait for an ambulance. Do not try to drive yourself.     · You have been diagnosed with angina, and you have angina symptoms that do not go away with rest or are not getting better within 5 minutes after you take one dose of nitroglycerin. Call your doctor now or seek immediate medical care if:    · You are bleeding from the area where the catheter was put in your artery.     · You have a fast-growing, painful lump at the catheter site.     · You have signs of infection, such as:  ? Increased pain, swelling, warmth, or redness. ? Red streaks leading from the catheter site. ? Pus draining from the catheter site. ? A fever.     · Your leg, arm, or hand is painful, looks blue, or feels cold, numb, or tingly. Watch closely for changes in your health, and be sure to contact your doctor if you have any problems. Where can you learn more? Go to http://www.gray.com/  Enter J471 in the search box to learn more about \"Coronary Angioplasty: What to Expect at Home. \"  Current as of: April 29, 2021               Content Version: 13.0  © 8181-8146 [employer]. Care instructions adapted under license by [employer] (which disclaims liability or warranty for this information). If you have questions about a medical condition or this instruction, always ask your healthcare professional. [place] [address] any warranty or liability for your use of this information.

## 2022-02-26 VITALS
BODY MASS INDEX: 27.09 KG/M2 | OXYGEN SATURATION: 100 % | TEMPERATURE: 97.4 F | SYSTOLIC BLOOD PRESSURE: 117 MMHG | WEIGHT: 182.9 LBS | HEIGHT: 69 IN | RESPIRATION RATE: 18 BRPM | HEART RATE: 70 BPM | DIASTOLIC BLOOD PRESSURE: 78 MMHG

## 2022-02-26 LAB
ANION GAP SERPL CALC-SCNC: 10 MMOL/L (ref 3–18)
BASOPHILS # BLD: 0 K/UL (ref 0–0.1)
BASOPHILS NFR BLD: 0 % (ref 0–2)
BUN SERPL-MCNC: 26 MG/DL (ref 7–18)
BUN/CREAT SERPL: 17 (ref 12–20)
CALCIUM SERPL-MCNC: 7.5 MG/DL (ref 8.5–10.1)
CHLORIDE SERPL-SCNC: 103 MMOL/L (ref 100–111)
CO2 SERPL-SCNC: 24 MMOL/L (ref 21–32)
CREAT SERPL-MCNC: 1.49 MG/DL (ref 0.6–1.3)
DIFFERENTIAL METHOD BLD: ABNORMAL
EOSINOPHIL # BLD: 0.1 K/UL (ref 0–0.4)
EOSINOPHIL NFR BLD: 2 % (ref 0–5)
ERYTHROCYTE [DISTWIDTH] IN BLOOD BY AUTOMATED COUNT: 13.8 % (ref 11.6–14.5)
GLUCOSE SERPL-MCNC: 90 MG/DL (ref 74–99)
HCT VFR BLD AUTO: 41.4 % (ref 36–48)
HGB BLD-MCNC: 13.7 G/DL (ref 13–16)
IMM GRANULOCYTES # BLD AUTO: 0 K/UL (ref 0–0.04)
IMM GRANULOCYTES NFR BLD AUTO: 0 % (ref 0–0.5)
LYMPHOCYTES # BLD: 1.7 K/UL (ref 0.9–3.6)
LYMPHOCYTES NFR BLD: 25 % (ref 21–52)
MAGNESIUM SERPL-MCNC: 2.1 MG/DL (ref 1.6–2.6)
MCH RBC QN AUTO: 29.9 PG (ref 24–34)
MCHC RBC AUTO-ENTMCNC: 33.1 G/DL (ref 31–37)
MCV RBC AUTO: 90.4 FL (ref 78–100)
MONOCYTES # BLD: 0.6 K/UL (ref 0.05–1.2)
MONOCYTES NFR BLD: 9 % (ref 3–10)
NEUTS SEG # BLD: 4.5 K/UL (ref 1.8–8)
NEUTS SEG NFR BLD: 64 % (ref 40–73)
NRBC # BLD: 0 K/UL (ref 0–0.01)
NRBC BLD-RTO: 0 PER 100 WBC
PLATELET # BLD AUTO: 166 K/UL (ref 135–420)
PMV BLD AUTO: 12.7 FL (ref 9.2–11.8)
POTASSIUM SERPL-SCNC: 3.7 MMOL/L (ref 3.5–5.5)
RBC # BLD AUTO: 4.58 M/UL (ref 4.35–5.65)
SODIUM SERPL-SCNC: 137 MMOL/L (ref 136–145)
WBC # BLD AUTO: 7 K/UL (ref 4.6–13.2)

## 2022-02-26 PROCEDURE — 74011250637 HC RX REV CODE- 250/637: Performed by: STUDENT IN AN ORGANIZED HEALTH CARE EDUCATION/TRAINING PROGRAM

## 2022-02-26 PROCEDURE — 83735 ASSAY OF MAGNESIUM: CPT

## 2022-02-26 PROCEDURE — 74011250637 HC RX REV CODE- 250/637: Performed by: PHYSICIAN ASSISTANT

## 2022-02-26 PROCEDURE — 74011250637 HC RX REV CODE- 250/637: Performed by: INTERNAL MEDICINE

## 2022-02-26 PROCEDURE — 85025 COMPLETE CBC W/AUTO DIFF WBC: CPT

## 2022-02-26 PROCEDURE — 74011250636 HC RX REV CODE- 250/636: Performed by: STUDENT IN AN ORGANIZED HEALTH CARE EDUCATION/TRAINING PROGRAM

## 2022-02-26 PROCEDURE — 80048 BASIC METABOLIC PNL TOTAL CA: CPT

## 2022-02-26 PROCEDURE — 36415 COLL VENOUS BLD VENIPUNCTURE: CPT

## 2022-02-26 RX ADMIN — HEPARIN SODIUM 5000 UNITS: 5000 INJECTION INTRAVENOUS; SUBCUTANEOUS at 06:59

## 2022-02-26 RX ADMIN — ERYTHROMYCIN: 5 OINTMENT OPHTHALMIC at 07:00

## 2022-02-26 RX ADMIN — LOSARTAN POTASSIUM 50 MG: 50 TABLET, FILM COATED ORAL at 08:42

## 2022-02-26 RX ADMIN — SPIRONOLACTONE 12.5 MG: 25 TABLET ORAL at 08:42

## 2022-02-26 RX ADMIN — ASPIRIN 81 MG: 81 TABLET, COATED ORAL at 08:42

## 2022-02-26 RX ADMIN — ERYTHROMYCIN: 5 OINTMENT OPHTHALMIC at 12:11

## 2022-02-26 RX ADMIN — ACETAMINOPHEN 650 MG: 325 TABLET ORAL at 00:33

## 2022-02-26 RX ADMIN — CLOPIDOGREL BISULFATE 75 MG: 75 TABLET, FILM COATED ORAL at 08:42

## 2022-02-26 RX ADMIN — CARVEDILOL 12.5 MG: 12.5 TABLET, FILM COATED ORAL at 08:42

## 2022-02-26 NOTE — PROGRESS NOTES
D/C order noted for today. Orders reviewed. No needs identified at this time.           ROVERTO MeyerN RN  Care Management  Pager: [phone]

## 2022-02-26 NOTE — PROGRESS NOTES
0553: Bedside and Verbal shift change report given to April, RN (oncoming nurse) by Jay Harrell RN (offgoing nurse). Report included the following information SBAR, Kardex, Procedure Summary, Intake/Output, MAR, Recent Results and Cardiac Rhythm NSR PVC/PAC. 0730: Patient to be discharged today. Assessment complete. R Radial site loose/dry/intact. RN reinforced tegaderm dressing. Pt discharge instructions complete in AVS and OP pharmacy meds in pyxis for patient to take with him upon discharge. Awaiting MD rounds/order. 1340: RN given verbal order for discharge. Patient advised to call ride. Patient has medications in pyxis from Outpatient pharmacy to take home.

## 2022-02-26 NOTE — PROGRESS NOTES
I have reviewed discharge instructions with the patient. The patient verbalized understanding. IVs removed and patient belongings packed. AVS placed in chart.

## 2022-02-27 LAB
ATRIAL RATE: 85 BPM
CALCULATED P AXIS, ECG09: 64 DEGREES
CALCULATED R AXIS, ECG10: 66 DEGREES
CALCULATED T AXIS, ECG11: 58 DEGREES
DIAGNOSIS, 93000: NORMAL
P-R INTERVAL, ECG05: 158 MS
Q-T INTERVAL, ECG07: 418 MS
QRS DURATION, ECG06: 100 MS
QTC CALCULATION (BEZET), ECG08: 497 MS
VENTRICULAR RATE, ECG03: 85 BPM

## 2022-03-18 PROBLEM — I25.10 CORONARY ARTERY DISEASE INVOLVING NATIVE CORONARY ARTERY OF NATIVE HEART WITHOUT ANGINA PECTORIS: Status: ACTIVE | Noted: 2020-11-11

## 2022-03-18 PROBLEM — N50.819 TESTICULAR PAIN: Status: ACTIVE | Noted: 2021-05-13

## 2022-03-18 PROBLEM — R07.9 CHEST PAIN IN ADULT: Status: ACTIVE | Noted: 2022-02-23

## 2022-03-19 PROBLEM — I21.4 NSTEMI (NON-ST ELEVATED MYOCARDIAL INFARCTION) (HCC): Status: ACTIVE | Noted: 2021-11-17

## 2022-03-19 PROBLEM — I50.22 SYSTOLIC CHF, CHRONIC (HCC): Status: ACTIVE | Noted: 2020-11-11

## 2022-03-19 PROBLEM — I50.9 CHF (CONGESTIVE HEART FAILURE) (HCC): Status: ACTIVE | Noted: 2021-11-17

## 2022-03-19 PROBLEM — G45.9 TIA (TRANSIENT ISCHEMIC ATTACK): Status: ACTIVE | Noted: 2017-03-23

## 2022-03-19 NOTE — ANESTHESIA POSTPROCEDURE EVALUATION
Bed: 06  Expected date:   Expected time:   Means of arrival:   Comments:  [place] 70M chest pain   Procedure(s):  LOOP RECORDER REMOVAL  INSERT ICD DUAL. MAC    Anesthesia Post Evaluation      Multimodal analgesia: multimodal analgesia used between 6 hours prior to anesthesia start to PACU discharge  Patient location during evaluation: PACU  Patient participation: complete - patient participated  Level of consciousness: awake and alert  Pain management: adequate  Airway patency: patent  Anesthetic complications: no  Cardiovascular status: acceptable  Respiratory status: acceptable  Hydration status: acceptable  Post anesthesia nausea and vomiting:  controlled  Final Post Anesthesia Temperature Assessment:  Normothermia (36.0-37.5 degrees C)      INITIAL Post-op Vital signs:   Vitals Value Taken Time   /76 11/22/21 1504   Temp 36.3 °C (97.4 °F) 11/22/21 1347   Pulse 74 11/22/21 1536   Resp 23 11/22/21 1511   SpO2 74 % 11/22/21 1536   Vitals shown include unvalidated device data.

## 2022-03-20 PROBLEM — R06.02 SHORTNESS OF BREATH: Status: ACTIVE | Noted: 2021-11-17

## 2022-03-20 PROBLEM — Z98.890 S/P ABLATION OPERATION FOR ARRHYTHMIA: Status: ACTIVE | Noted: 2018-03-12

## 2022-03-20 PROBLEM — Z86.79 S/P ABLATION OPERATION FOR ARRHYTHMIA: Status: ACTIVE | Noted: 2018-03-12

## 2022-03-20 PROBLEM — I50.9 CHF EXACERBATION (HCC): Status: ACTIVE | Noted: 2021-05-13

## 2022-03-20 PROBLEM — I42.9 CARDIOMYOPATHY (HCC): Status: ACTIVE | Noted: 2020-11-11

## 2022-03-22 ENCOUNTER — HOSPITAL ENCOUNTER (OUTPATIENT)
Dept: GENERAL RADIOLOGY | Age: 62
Discharge: HOME | End: 2022-03-22
Payer: MEDICARE

## 2022-03-22 ENCOUNTER — TRANSCRIBE ORDER (OUTPATIENT)
Dept: REGISTRATION | Age: 62
End: 2022-03-22

## 2022-03-22 DIAGNOSIS — M79.603 PAIN OF UPPER EXTREMITY, UNSPECIFIED LATERALITY: ICD-10-CM

## 2022-03-22 DIAGNOSIS — M79.603 PAIN OF UPPER EXTREMITY, UNSPECIFIED LATERALITY: Primary | ICD-10-CM

## 2022-03-22 PROCEDURE — 73030 X-RAY EXAM OF SHOULDER: CPT

## 2022-03-22 PROCEDURE — 73060 X-RAY EXAM OF HUMERUS: CPT

## 2022-03-22 PROCEDURE — 72040 X-RAY EXAM NECK SPINE 2-3 VW: CPT

## 2022-03-30 ENCOUNTER — TRANSCRIBE ORDER (OUTPATIENT)
Dept: SCHEDULING | Age: 62
End: 2022-03-30

## 2022-03-30 DIAGNOSIS — M79.603 UPPER EXTREMITY PAIN: ICD-10-CM

## 2022-03-30 DIAGNOSIS — N63.0 BREAST MASS: Primary | ICD-10-CM

## 2022-03-30 DIAGNOSIS — M79.603 ARM PAIN: Primary | ICD-10-CM

## 2022-04-08 ENCOUNTER — TELEPHONE (OUTPATIENT)
Dept: CARDIOLOGY CLINIC | Age: 62
End: 2022-04-08

## 2022-04-08 NOTE — TELEPHONE ENCOUNTER
Patient c/o leg edema and needing medication to help him urinate. Made appointment for 4/15/22 is it possible to send medication prior to appt?

## 2022-04-08 NOTE — TELEPHONE ENCOUNTER
Please evaluate if patient has continued to take torsemide 20 mg daily and Aldactone 25 mg daily. If so may increase torsemide to twice daily and check BMP in 1 week.

## 2022-04-12 ENCOUNTER — APPOINTMENT (OUTPATIENT)
Dept: GENERAL RADIOLOGY | Age: 62
DRG: 291 | End: 2022-04-12
Attending: EMERGENCY MEDICINE
Payer: MEDICARE

## 2022-04-12 ENCOUNTER — APPOINTMENT (OUTPATIENT)
Dept: VASCULAR SURGERY | Age: 62
DRG: 291 | End: 2022-04-12
Attending: EMERGENCY MEDICINE
Payer: MEDICARE

## 2022-04-12 ENCOUNTER — HOSPITAL ENCOUNTER (EMERGENCY)
Age: 62
Discharge: HOME | DRG: 291 | End: 2022-04-12
Attending: STUDENT IN AN ORGANIZED HEALTH CARE EDUCATION/TRAINING PROGRAM
Payer: MEDICARE

## 2022-04-12 VITALS
SYSTOLIC BLOOD PRESSURE: 139 MMHG | RESPIRATION RATE: 20 BRPM | DIASTOLIC BLOOD PRESSURE: 82 MMHG | BODY MASS INDEX: 26.96 KG/M2 | TEMPERATURE: 97.4 F | HEIGHT: 69 IN | HEART RATE: 96 BPM | WEIGHT: 182 LBS | OXYGEN SATURATION: 96 %

## 2022-04-12 DIAGNOSIS — M79.601 RIGHT ARM PAIN: ICD-10-CM

## 2022-04-12 DIAGNOSIS — R60.0 BILATERAL LEG EDEMA: Primary | ICD-10-CM

## 2022-04-12 DIAGNOSIS — I50.23 ACUTE ON CHRONIC SYSTOLIC CONGESTIVE HEART FAILURE (HCC): ICD-10-CM

## 2022-04-12 LAB
ALBUMIN SERPL-MCNC: 3.6 G/DL (ref 3.4–5)
ALBUMIN/GLOB SERPL: 0.9 (ref 0.8–1.7)
ALP SERPL-CCNC: 140 U/L (ref 45–117)
ALT SERPL-CCNC: 60 U/L (ref 16–61)
ANION GAP SERPL CALC-SCNC: 7 MMOL/L (ref 3–18)
AST SERPL-CCNC: 46 U/L (ref 10–38)
BASOPHILS # BLD: 0 K/UL (ref 0–0.1)
BASOPHILS NFR BLD: 1 % (ref 0–2)
BILIRUB SERPL-MCNC: 2.2 MG/DL (ref 0.2–1)
BNP SERPL-MCNC: 9678 PG/ML (ref 0–900)
BUN SERPL-MCNC: 30 MG/DL (ref 7–18)
BUN/CREAT SERPL: 16 (ref 12–20)
CALCIUM SERPL-MCNC: 8.4 MG/DL (ref 8.5–10.1)
CHLORIDE SERPL-SCNC: 104 MMOL/L (ref 100–111)
CO2 SERPL-SCNC: 26 MMOL/L (ref 21–32)
CREAT SERPL-MCNC: 1.84 MG/DL (ref 0.6–1.3)
DIFFERENTIAL METHOD BLD: ABNORMAL
EOSINOPHIL # BLD: 0 K/UL (ref 0–0.4)
EOSINOPHIL NFR BLD: 1 % (ref 0–5)
ERYTHROCYTE [DISTWIDTH] IN BLOOD BY AUTOMATED COUNT: 14.8 % (ref 11.6–14.5)
GLOBULIN SER CALC-MCNC: 4.1 G/DL (ref 2–4)
GLUCOSE SERPL-MCNC: 96 MG/DL (ref 74–99)
HCT VFR BLD AUTO: 52.8 % (ref 36–48)
HGB BLD-MCNC: 16.8 G/DL (ref 13–16)
IMM GRANULOCYTES # BLD AUTO: 0 K/UL (ref 0–0.04)
IMM GRANULOCYTES NFR BLD AUTO: 0 % (ref 0–0.5)
LYMPHOCYTES # BLD: 1.5 K/UL (ref 0.9–3.6)
LYMPHOCYTES NFR BLD: 21 % (ref 21–52)
MCH RBC QN AUTO: 29.2 PG (ref 24–34)
MCHC RBC AUTO-ENTMCNC: 31.8 G/DL (ref 31–37)
MCV RBC AUTO: 91.8 FL (ref 78–100)
MONOCYTES # BLD: 0.5 K/UL (ref 0.05–1.2)
MONOCYTES NFR BLD: 8 % (ref 3–10)
NEUTS SEG # BLD: 4.9 K/UL (ref 1.8–8)
NEUTS SEG NFR BLD: 70 % (ref 40–73)
NRBC # BLD: 0 K/UL (ref 0–0.01)
NRBC BLD-RTO: 0 PER 100 WBC
PLATELET # BLD AUTO: 192 K/UL (ref 135–420)
PMV BLD AUTO: 13.5 FL (ref 9.2–11.8)
POTASSIUM SERPL-SCNC: 4.3 MMOL/L (ref 3.5–5.5)
PROT SERPL-MCNC: 7.7 G/DL (ref 6.4–8.2)
RBC # BLD AUTO: 5.75 M/UL (ref 4.35–5.65)
SODIUM SERPL-SCNC: 137 MMOL/L (ref 136–145)
WBC # BLD AUTO: 7 K/UL (ref 4.6–13.2)

## 2022-04-12 PROCEDURE — 96374 THER/PROPH/DIAG INJ IV PUSH: CPT

## 2022-04-12 PROCEDURE — 80053 COMPREHEN METABOLIC PANEL: CPT

## 2022-04-12 PROCEDURE — 74011250637 HC RX REV CODE- 250/637: Performed by: EMERGENCY MEDICINE

## 2022-04-12 PROCEDURE — 73080 X-RAY EXAM OF ELBOW: CPT

## 2022-04-12 PROCEDURE — 93971 EXTREMITY STUDY: CPT

## 2022-04-12 PROCEDURE — 93970 EXTREMITY STUDY: CPT

## 2022-04-12 PROCEDURE — 74011250636 HC RX REV CODE- 250/636: Performed by: EMERGENCY MEDICINE

## 2022-04-12 PROCEDURE — 83880 ASSAY OF NATRIURETIC PEPTIDE: CPT

## 2022-04-12 PROCEDURE — 73030 X-RAY EXAM OF SHOULDER: CPT

## 2022-04-12 PROCEDURE — 93005 ELECTROCARDIOGRAM TRACING: CPT

## 2022-04-12 PROCEDURE — 99285 EMERGENCY DEPT VISIT HI MDM: CPT

## 2022-04-12 PROCEDURE — 85025 COMPLETE CBC W/AUTO DIFF WBC: CPT

## 2022-04-12 RX ORDER — OXYCODONE AND ACETAMINOPHEN 5; 325 MG/1; MG/1
1 TABLET ORAL
Status: COMPLETED | OUTPATIENT
Start: 2022-04-12 | End: 2022-04-12

## 2022-04-12 RX ORDER — FUROSEMIDE 10 MG/ML
40 INJECTION INTRAMUSCULAR; INTRAVENOUS
Status: COMPLETED | OUTPATIENT
Start: 2022-04-12 | End: 2022-04-12

## 2022-04-12 RX ADMIN — OXYCODONE HYDROCHLORIDE AND ACETAMINOPHEN 1 TABLET: 5; 325 TABLET ORAL at 18:09

## 2022-04-12 RX ADMIN — FUROSEMIDE 40 MG: 10 INJECTION, SOLUTION INTRAMUSCULAR; INTRAVENOUS at 16:29
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
## 2022-04-12 NOTE — DISCHARGE INSTRUCTIONS
[employer] Activation    Thank you for requesting access to [employer]. Please follow the instructions below to securely access and download your online medical record. [employer] allows you to send messages to your doctor, view your test results, renew your prescriptions, schedule appointments, and more. How Do I Sign Up? In your internet browser, go to [email or web address]  Click on the First Time User? Click Here link in the Sign In box. You will be redirect to the New Member Sign Up page. Enter your [employer] Access Code exactly as it appears below. You will not need to use this code after youve completed the sign-up process. If you do not sign up before the expiration date, you must request a new code. [employer] Access Code: [email or web address]  Expires: 2022  4:56 PM (This is the date your [employer] access code will )    Enter the last four digits of your Social Security Number (xxxx) and Date of Birth (mm/dd/yyyy) as indicated and click Submit. You will be taken to the next sign-up page. Create a [employer] ID. This will be your [employer] login ID and cannot be changed, so think of one that is secure and easy to remember. Create a [employer] password. You can change your password at any time. Enter your Password Reset Question and Answer. This can be used at a later time if you forget your password. Enter your e-mail address. You will receive e-mail notification when new information is available in [address] Click Sign Up. You can now view and download portions of your medical record. Click the [place] link to download a portable copy of your medical information. Additional Information    If you have questions, please visit the Frequently Asked Questions section of the [employer] website at [email or web address] Remember, [employer] is NOT to be used for urgent needs. For medical emergencies, dial 911.

## 2022-04-12 NOTE — ED TRIAGE NOTES
Patient arrived via ems in police custody with complaints of right arm pain and bilateral leg pain 3+ edema noted to bilateral legs and feet.

## 2022-04-12 NOTE — ED NOTES
6:29 PM Assumed care of the pt at this time. Discussed with LEOBARDO Bell concerning patient Giovani Chang, standard discussion of reason for visit, HPI, ROS, PE, and current results available. Recommendation for obtaining pending duplex studies, followed by bedside re-evaluation to dispo the pt. Camila Hsu PA-C     9:12 PM repeat /82. Duplex studies negative for DVT per PVL tech. Pt is diuresing at bedside. Denies chest pain and SOB. Will plan to d/c with close pcp follow-up in 24 hrs. Return precautions advised. Pt agrees. Camila Hsu PA-C     Disposition: d/c    Dictation disclaimer:  Please note that this dictation was completed with [employer], the computer voice recognition software. Quite often unanticipated grammatical, syntax, homophones, and other interpretive errors are inadvertently transcribed by the computer software. Please disregard these errors. Please excuse any errors that have escaped final proofreading.

## 2022-04-13 ENCOUNTER — HOSPITAL ENCOUNTER (INPATIENT)
Age: 62
LOS: 3 days | Discharge: HOME | DRG: 291 | End: 2022-04-16
Attending: STUDENT IN AN ORGANIZED HEALTH CARE EDUCATION/TRAINING PROGRAM | Admitting: FAMILY MEDICINE
Payer: MEDICARE

## 2022-04-13 ENCOUNTER — APPOINTMENT (OUTPATIENT)
Dept: GENERAL RADIOLOGY | Age: 62
DRG: 291 | End: 2022-04-13
Attending: STUDENT IN AN ORGANIZED HEALTH CARE EDUCATION/TRAINING PROGRAM
Payer: MEDICARE

## 2022-04-13 DIAGNOSIS — I50.9 ACUTE ON CHRONIC CONGESTIVE HEART FAILURE, UNSPECIFIED HEART FAILURE TYPE (HCC): Primary | ICD-10-CM

## 2022-04-13 DIAGNOSIS — I42.0 DILATED CARDIOMYOPATHY (HCC): ICD-10-CM

## 2022-04-13 DIAGNOSIS — R06.02 SHORTNESS OF BREATH: ICD-10-CM

## 2022-04-13 DIAGNOSIS — I50.22 SYSTOLIC CHF, CHRONIC (HCC): ICD-10-CM

## 2022-04-13 LAB
ANION GAP SERPL CALC-SCNC: 4 MMOL/L (ref 3–18)
ATRIAL RATE: 89 BPM
ATRIAL RATE: 94 BPM
BNP SERPL-MCNC: ABNORMAL PG/ML (ref 0–900)
BUN SERPL-MCNC: 34 MG/DL (ref 7–18)
BUN/CREAT SERPL: 19 (ref 12–20)
CALCIUM SERPL-MCNC: 7.7 MG/DL (ref 8.5–10.1)
CALCULATED P AXIS, ECG09: 64 DEGREES
CALCULATED P AXIS, ECG09: 71 DEGREES
CALCULATED R AXIS, ECG10: 42 DEGREES
CALCULATED R AXIS, ECG10: 97 DEGREES
CALCULATED T AXIS, ECG11: 36 DEGREES
CALCULATED T AXIS, ECG11: 71 DEGREES
CHLORIDE SERPL-SCNC: 108 MMOL/L (ref 100–111)
CO2 SERPL-SCNC: 28 MMOL/L (ref 21–32)
CREAT SERPL-MCNC: 1.77 MG/DL (ref 0.6–1.3)
DIAGNOSIS, 93000: NORMAL
DIAGNOSIS, 93000: NORMAL
GLUCOSE SERPL-MCNC: 110 MG/DL (ref 74–99)
P-R INTERVAL, ECG05: 158 MS
P-R INTERVAL, ECG05: 162 MS
POTASSIUM SERPL-SCNC: 4.6 MMOL/L (ref 3.5–5.5)
Q-T INTERVAL, ECG07: 364 MS
Q-T INTERVAL, ECG07: 392 MS
QRS DURATION, ECG06: 88 MS
QRS DURATION, ECG06: 88 MS
QTC CALCULATION (BEZET), ECG08: 455 MS
QTC CALCULATION (BEZET), ECG08: 476 MS
SODIUM SERPL-SCNC: 140 MMOL/L (ref 136–145)
TROPONIN-HIGH SENSITIVITY: 25 NG/L (ref 0–78)
VENTRICULAR RATE, ECG03: 89 BPM
VENTRICULAR RATE, ECG03: 94 BPM

## 2022-04-13 PROCEDURE — 74011250636 HC RX REV CODE- 250/636: Performed by: STUDENT IN AN ORGANIZED HEALTH CARE EDUCATION/TRAINING PROGRAM

## 2022-04-13 PROCEDURE — 83880 ASSAY OF NATRIURETIC PEPTIDE: CPT

## 2022-04-13 PROCEDURE — 65660000000 HC RM CCU STEPDOWN

## 2022-04-13 PROCEDURE — 96374 THER/PROPH/DIAG INJ IV PUSH: CPT

## 2022-04-13 PROCEDURE — 74011250637 HC RX REV CODE- 250/637: Performed by: STUDENT IN AN ORGANIZED HEALTH CARE EDUCATION/TRAINING PROGRAM

## 2022-04-13 PROCEDURE — 96376 TX/PRO/DX INJ SAME DRUG ADON: CPT

## 2022-04-13 PROCEDURE — 80048 BASIC METABOLIC PNL TOTAL CA: CPT

## 2022-04-13 PROCEDURE — G0378 HOSPITAL OBSERVATION PER HR: HCPCS

## 2022-04-13 PROCEDURE — 93005 ELECTROCARDIOGRAM TRACING: CPT

## 2022-04-13 PROCEDURE — 99285 EMERGENCY DEPT VISIT HI MDM: CPT

## 2022-04-13 PROCEDURE — 74011000250 HC RX REV CODE- 250: Performed by: STUDENT IN AN ORGANIZED HEALTH CARE EDUCATION/TRAINING PROGRAM

## 2022-04-13 PROCEDURE — 71045 X-RAY EXAM CHEST 1 VIEW: CPT

## 2022-04-13 PROCEDURE — 84484 ASSAY OF TROPONIN QUANT: CPT

## 2022-04-13 RX ORDER — ENOXAPARIN SODIUM 100 MG/ML
40 INJECTION SUBCUTANEOUS DAILY
Status: DISCONTINUED | OUTPATIENT
Start: 2022-04-14 | End: 2022-04-14

## 2022-04-13 RX ORDER — ACETAMINOPHEN 325 MG/1
650 TABLET ORAL
Status: DISCONTINUED | OUTPATIENT
Start: 2022-04-13 | End: 2022-04-14

## 2022-04-13 RX ORDER — SODIUM CHLORIDE 0.9 % (FLUSH) 0.9 %
5-40 SYRINGE (ML) INJECTION AS NEEDED
Status: DISCONTINUED | OUTPATIENT
Start: 2022-04-13 | End: 2022-04-16 | Stop reason: HOSPADM

## 2022-04-13 RX ORDER — CARVEDILOL 12.5 MG/1
TABLET ORAL 2 TIMES DAILY WITH MEALS
COMMUNITY
End: 2022-04-16

## 2022-04-13 RX ORDER — CLOPIDOGREL BISULFATE 75 MG/1
75 TABLET ORAL DAILY
Status: DISCONTINUED | OUTPATIENT
Start: 2022-04-14 | End: 2022-04-16 | Stop reason: HOSPADM

## 2022-04-13 RX ORDER — CARVEDILOL 12.5 MG/1
12.5 TABLET ORAL 2 TIMES DAILY WITH MEALS
Status: DISCONTINUED | OUTPATIENT
Start: 2022-04-13 | End: 2022-04-15

## 2022-04-13 RX ORDER — ACETAMINOPHEN 650 MG/1
650 SUPPOSITORY RECTAL
Status: DISCONTINUED | OUTPATIENT
Start: 2022-04-13 | End: 2022-04-16 | Stop reason: HOSPADM

## 2022-04-13 RX ORDER — TORSEMIDE 20 MG/1
20 TABLET ORAL 2 TIMES DAILY
Status: DISCONTINUED | OUTPATIENT
Start: 2022-04-13 | End: 2022-04-16 | Stop reason: HOSPADM

## 2022-04-13 RX ORDER — ACETAMINOPHEN 325 MG/1
650 TABLET ORAL
Status: DISCONTINUED | OUTPATIENT
Start: 2022-04-13 | End: 2022-04-16 | Stop reason: HOSPADM

## 2022-04-13 RX ORDER — ROSUVASTATIN CALCIUM 20 MG/1
20 TABLET, COATED ORAL
Status: DISCONTINUED | OUTPATIENT
Start: 2022-04-13 | End: 2022-04-16 | Stop reason: HOSPADM

## 2022-04-13 RX ORDER — SPIRONOLACTONE 25 MG/1
12.5 TABLET ORAL DAILY
Status: DISCONTINUED | OUTPATIENT
Start: 2022-04-14 | End: 2022-04-14

## 2022-04-13 RX ORDER — POLYETHYLENE GLYCOL 3350 17 G/17G
17 POWDER, FOR SOLUTION ORAL DAILY PRN
Status: DISCONTINUED | OUTPATIENT
Start: 2022-04-13 | End: 2022-04-16 | Stop reason: HOSPADM

## 2022-04-13 RX ORDER — NITROGLYCERIN 0.4 MG/1
0.4 TABLET SUBLINGUAL
Status: COMPLETED | OUTPATIENT
Start: 2022-04-13 | End: 2022-04-13

## 2022-04-13 RX ORDER — FUROSEMIDE 10 MG/ML
80 INJECTION INTRAMUSCULAR; INTRAVENOUS ONCE
Status: COMPLETED | OUTPATIENT
Start: 2022-04-13 | End: 2022-04-13

## 2022-04-13 RX ORDER — TORSEMIDE 20 MG/1
20 TABLET ORAL DAILY
Status: DISCONTINUED | OUTPATIENT
Start: 2022-04-14 | End: 2022-04-13

## 2022-04-13 RX ORDER — LOSARTAN POTASSIUM 50 MG/1
50 TABLET ORAL DAILY
Status: DISCONTINUED | OUTPATIENT
Start: 2022-04-14 | End: 2022-04-16 | Stop reason: HOSPADM

## 2022-04-13 RX ORDER — NITROGLYCERIN 0.4 MG/1
0.4 TABLET SUBLINGUAL
Status: DISCONTINUED | OUTPATIENT
Start: 2022-04-13 | End: 2022-04-16 | Stop reason: HOSPADM

## 2022-04-13 RX ORDER — SODIUM CHLORIDE 0.9 % (FLUSH) 0.9 %
5-40 SYRINGE (ML) INJECTION EVERY 8 HOURS
Status: DISCONTINUED | OUTPATIENT
Start: 2022-04-13 | End: 2022-04-16 | Stop reason: HOSPADM

## 2022-04-13 RX ORDER — FUROSEMIDE 10 MG/ML
40 INJECTION INTRAMUSCULAR; INTRAVENOUS 2 TIMES DAILY
Status: DISCONTINUED | OUTPATIENT
Start: 2022-04-14 | End: 2022-04-16 | Stop reason: HOSPADM

## 2022-04-13 RX ORDER — ASPIRIN 81 MG/1
81 TABLET ORAL DAILY
Status: DISCONTINUED | OUTPATIENT
Start: 2022-04-14 | End: 2022-04-16 | Stop reason: HOSPADM

## 2022-04-13 RX ADMIN — NITROGLYCERIN 0.4 MG: 0.4 TABLET, ORALLY DISINTEGRATING SUBLINGUAL at 13:04

## 2022-04-13 RX ADMIN — SODIUM CHLORIDE, PRESERVATIVE FREE 10 ML: 5 INJECTION INTRAVENOUS at 18:36

## 2022-04-13 RX ADMIN — SODIUM CHLORIDE, PRESERVATIVE FREE 10 ML: 5 INJECTION INTRAVENOUS at 22:42

## 2022-04-13 RX ADMIN — FUROSEMIDE 80 MG: 10 INJECTION, SOLUTION INTRAMUSCULAR; INTRAVENOUS at 13:57

## 2022-04-13 RX ADMIN — ACETAMINOPHEN 650 MG: 325 TABLET ORAL at 22:42

## 2022-04-13 RX ADMIN — ROSUVASTATIN CALCIUM 20 MG: 20 TABLET, COATED ORAL at 22:42
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
## 2022-04-13 NOTE — ED TRIAGE NOTES
Patient arrived via [address] from [facility] for c/o chest pain that started at 10am. Per EMS patient was given 3 SL nitro with no relief and 324mg of aspirin pta. Upon arrival patient alert and oriented x4 .  Reports CP, SOB/FULLER

## 2022-04-13 NOTE — PROGRESS NOTES
Verbal bedside shift report given to [facility]. Report included SBAR with recent vitals. Pt resting in bed with call bell within reach with police custody.
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
## 2022-04-13 NOTE — Clinical Note
Status[de-identified] INPATIENT [101]   Type of Bed: Telemetry [19]   Cardiac Monitoring Required?: Yes   Inpatient Hospitalization Certified Necessary for the Following Reasons: 3.  Patient receiving treatment that can only be provided in an inpatient setting (further clarification in H&P documentation)   Admitting Diagnosis: Acute exacerbation of CHF (congestive heart failure) [facility]) [[id number]]   Admitting Physician: Danilo Vidales [[id number]]   Attending Physician: Danilo Vidales [[id number]]   Estimated Length of Stay: 2 Midnights   Discharge Plan[de-identified] Home with Office Follow-up

## 2022-04-13 NOTE — PROGRESS NOTES
Verbal shift report received from ED nurse, Frank Damon RN. Report included SBAR with recent vitals. 1720- Pt arrived via transport alert and oriented in custody. No distress noted. Vitals taken and placed on tele monitor. Call bell placed within reach.

## 2022-04-14 LAB
AMPHET UR QL SCN: NEGATIVE
ANION GAP SERPL CALC-SCNC: 8 MMOL/L (ref 3–18)
BARBITURATES UR QL SCN: NEGATIVE
BASOPHILS # BLD: 0.1 K/UL (ref 0–0.1)
BASOPHILS NFR BLD: 1 % (ref 0–2)
BENZODIAZ UR QL: NEGATIVE
BUN SERPL-MCNC: 29 MG/DL (ref 7–18)
BUN/CREAT SERPL: 18 (ref 12–20)
CA-I SERPL-SCNC: 0.93 MMOL/L (ref 1.15–1.33)
CALCIUM SERPL-MCNC: 7.4 MG/DL (ref 8.5–10.1)
CANNABINOIDS UR QL SCN: NEGATIVE
CHLORIDE SERPL-SCNC: 102 MMOL/L (ref 100–111)
CK SERPL-CCNC: 136 U/L (ref 39–308)
CO2 SERPL-SCNC: 28 MMOL/L (ref 21–32)
COCAINE UR QL SCN: POSITIVE
CREAT SERPL-MCNC: 1.59 MG/DL (ref 0.6–1.3)
DIFFERENTIAL METHOD BLD: ABNORMAL
EOSINOPHIL # BLD: 0.1 K/UL (ref 0–0.4)
EOSINOPHIL NFR BLD: 1 % (ref 0–5)
ERYTHROCYTE [DISTWIDTH] IN BLOOD BY AUTOMATED COUNT: 14.8 % (ref 11.6–14.5)
GLUCOSE SERPL-MCNC: 89 MG/DL (ref 74–99)
HCT VFR BLD AUTO: 43.7 % (ref 36–48)
HDSCOM,HDSCOM: ABNORMAL
HGB BLD-MCNC: 14.3 G/DL (ref 13–16)
IMM GRANULOCYTES # BLD AUTO: 0 K/UL (ref 0–0.04)
IMM GRANULOCYTES NFR BLD AUTO: 0 % (ref 0–0.5)
LYMPHOCYTES # BLD: 2.1 K/UL (ref 0.9–3.6)
LYMPHOCYTES NFR BLD: 22 % (ref 21–52)
MAGNESIUM SERPL-MCNC: 1.5 MG/DL (ref 1.6–2.6)
MCH RBC QN AUTO: 29.5 PG (ref 24–34)
MCHC RBC AUTO-ENTMCNC: 32.7 G/DL (ref 31–37)
MCV RBC AUTO: 90.3 FL (ref 78–100)
METHADONE UR QL: NEGATIVE
MONOCYTES # BLD: 0.8 K/UL (ref 0.05–1.2)
MONOCYTES NFR BLD: 9 % (ref 3–10)
NEUTS SEG # BLD: 6.5 K/UL (ref 1.8–8)
NEUTS SEG NFR BLD: 67 % (ref 40–73)
NRBC # BLD: 0 K/UL (ref 0–0.01)
NRBC BLD-RTO: 0 PER 100 WBC
OPIATES UR QL: NEGATIVE
PCP UR QL: NEGATIVE
PERIPHERAL SMEAR,PSM: NORMAL
PLATELET # BLD AUTO: 189 K/UL (ref 135–420)
PMV BLD AUTO: 13.7 FL (ref 9.2–11.8)
POTASSIUM SERPL-SCNC: 3.2 MMOL/L (ref 3.5–5.5)
POTASSIUM SERPL-SCNC: 4.7 MMOL/L (ref 3.5–5.5)
RBC # BLD AUTO: 4.84 M/UL (ref 4.35–5.65)
SODIUM SERPL-SCNC: 138 MMOL/L (ref 136–145)
TROPONIN-HIGH SENSITIVITY: 30 NG/L (ref 0–78)
WBC # BLD AUTO: 9.6 K/UL (ref 4.6–13.2)

## 2022-04-14 PROCEDURE — 83735 ASSAY OF MAGNESIUM: CPT

## 2022-04-14 PROCEDURE — 74011250636 HC RX REV CODE- 250/636: Performed by: STUDENT IN AN ORGANIZED HEALTH CARE EDUCATION/TRAINING PROGRAM

## 2022-04-14 PROCEDURE — 97535 SELF CARE MNGMENT TRAINING: CPT

## 2022-04-14 PROCEDURE — G0378 HOSPITAL OBSERVATION PER HR: HCPCS

## 2022-04-14 PROCEDURE — 99223 1ST HOSP IP/OBS HIGH 75: CPT | Performed by: INTERNAL MEDICINE

## 2022-04-14 PROCEDURE — 97116 GAIT TRAINING THERAPY: CPT

## 2022-04-14 PROCEDURE — 97161 PT EVAL LOW COMPLEX 20 MIN: CPT

## 2022-04-14 PROCEDURE — 2709999900 HC NON-CHARGEABLE SUPPLY

## 2022-04-14 PROCEDURE — 85025 COMPLETE CBC W/AUTO DIFF WBC: CPT

## 2022-04-14 PROCEDURE — 80307 DRUG TEST PRSMV CHEM ANLYZR: CPT

## 2022-04-14 PROCEDURE — 93005 ELECTROCARDIOGRAM TRACING: CPT

## 2022-04-14 PROCEDURE — 84132 ASSAY OF SERUM POTASSIUM: CPT

## 2022-04-14 PROCEDURE — 74011250636 HC RX REV CODE- 250/636: Performed by: PHYSICIAN ASSISTANT

## 2022-04-14 PROCEDURE — 65660000000 HC RM CCU STEPDOWN

## 2022-04-14 PROCEDURE — 74011250637 HC RX REV CODE- 250/637

## 2022-04-14 PROCEDURE — 74011250637 HC RX REV CODE- 250/637: Performed by: STUDENT IN AN ORGANIZED HEALTH CARE EDUCATION/TRAINING PROGRAM

## 2022-04-14 PROCEDURE — 97165 OT EVAL LOW COMPLEX 30 MIN: CPT

## 2022-04-14 PROCEDURE — 82550 ASSAY OF CK (CPK): CPT

## 2022-04-14 PROCEDURE — 36415 COLL VENOUS BLD VENIPUNCTURE: CPT

## 2022-04-14 PROCEDURE — 82330 ASSAY OF CALCIUM: CPT

## 2022-04-14 PROCEDURE — 74011000250 HC RX REV CODE- 250: Performed by: STUDENT IN AN ORGANIZED HEALTH CARE EDUCATION/TRAINING PROGRAM

## 2022-04-14 PROCEDURE — 84484 ASSAY OF TROPONIN QUANT: CPT

## 2022-04-14 RX ORDER — TRAMADOL HYDROCHLORIDE 50 MG/1
50 TABLET ORAL DAILY
Status: DISCONTINUED | OUTPATIENT
Start: 2022-04-15 | End: 2022-04-14

## 2022-04-14 RX ORDER — DICLOFENAC SODIUM 10 MG/G
2 GEL TOPICAL
Status: DISCONTINUED | OUTPATIENT
Start: 2022-04-14 | End: 2022-04-16 | Stop reason: HOSPADM

## 2022-04-14 RX ORDER — POTASSIUM CHLORIDE 20 MEQ/1
20 TABLET, EXTENDED RELEASE ORAL
Status: COMPLETED | OUTPATIENT
Start: 2022-04-14 | End: 2022-04-14

## 2022-04-14 RX ORDER — HEPARIN SODIUM 5000 [USP'U]/ML
5000 INJECTION, SOLUTION INTRAVENOUS; SUBCUTANEOUS EVERY 8 HOURS
Status: DISCONTINUED | OUTPATIENT
Start: 2022-04-14 | End: 2022-04-16 | Stop reason: HOSPADM

## 2022-04-14 RX ORDER — TRAMADOL HYDROCHLORIDE 50 MG/1
50 TABLET ORAL DAILY
Status: DISCONTINUED | OUTPATIENT
Start: 2022-04-14 | End: 2022-04-16 | Stop reason: HOSPADM

## 2022-04-14 RX ORDER — CALCIUM CARBONATE/VITAMIN D3 600MG-5MCG
1 TABLET ORAL 2 TIMES DAILY WITH MEALS
Status: DISCONTINUED | OUTPATIENT
Start: 2022-04-14 | End: 2022-04-16 | Stop reason: HOSPADM

## 2022-04-14 RX ORDER — SPIRONOLACTONE 25 MG/1
25 TABLET ORAL DAILY
Status: DISCONTINUED | OUTPATIENT
Start: 2022-04-15 | End: 2022-04-16 | Stop reason: HOSPADM

## 2022-04-14 RX ORDER — MAGNESIUM SULFATE HEPTAHYDRATE 40 MG/ML
2 INJECTION, SOLUTION INTRAVENOUS ONCE
Status: COMPLETED | OUTPATIENT
Start: 2022-04-14 | End: 2022-04-14

## 2022-04-14 RX ORDER — CYCLOBENZAPRINE HCL 10 MG
5 TABLET ORAL 3 TIMES DAILY
Status: DISCONTINUED | OUTPATIENT
Start: 2022-04-14 | End: 2022-04-16 | Stop reason: HOSPADM

## 2022-04-14 RX ADMIN — FUROSEMIDE 40 MG: 10 INJECTION, SOLUTION INTRAMUSCULAR; INTRAVENOUS at 09:02

## 2022-04-14 RX ADMIN — DICLOFENAC SODIUM 2 G: 10 GEL TOPICAL at 17:52

## 2022-04-14 RX ADMIN — SPIRONOLACTONE 12.5 MG: 25 TABLET ORAL at 09:01

## 2022-04-14 RX ADMIN — POTASSIUM CHLORIDE 20 MEQ: 20 TABLET, EXTENDED RELEASE ORAL at 09:01

## 2022-04-14 RX ADMIN — MAGNESIUM SULFATE 2 G: 2 INJECTION INTRAVENOUS at 11:25

## 2022-04-14 RX ADMIN — FUROSEMIDE 40 MG: 10 INJECTION, SOLUTION INTRAMUSCULAR; INTRAVENOUS at 17:51

## 2022-04-14 RX ADMIN — CARVEDILOL 12.5 MG: 12.5 TABLET, FILM COATED ORAL at 09:02

## 2022-04-14 RX ADMIN — SODIUM CHLORIDE, PRESERVATIVE FREE 10 ML: 5 INJECTION INTRAVENOUS at 06:43

## 2022-04-14 RX ADMIN — CLOPIDOGREL BISULFATE 75 MG: 75 TABLET ORAL at 09:02

## 2022-04-14 RX ADMIN — ASPIRIN 81 MG: 81 TABLET, COATED ORAL at 09:02

## 2022-04-14 RX ADMIN — SODIUM CHLORIDE, PRESERVATIVE FREE 10 ML: 5 INJECTION INTRAVENOUS at 22:16

## 2022-04-14 RX ADMIN — TRAMADOL HYDROCHLORIDE 50 MG: 50 TABLET, COATED ORAL at 17:51

## 2022-04-14 RX ADMIN — HEPARIN SODIUM 5000 UNITS: 5000 INJECTION INTRAVENOUS; SUBCUTANEOUS at 22:15

## 2022-04-14 RX ADMIN — SODIUM CHLORIDE, PRESERVATIVE FREE 10 ML: 5 INJECTION INTRAVENOUS at 17:52

## 2022-04-14 RX ADMIN — ROSUVASTATIN CALCIUM 20 MG: 20 TABLET, COATED ORAL at 22:16

## 2022-04-14 RX ADMIN — POTASSIUM CHLORIDE 20 MEQ: 20 TABLET, EXTENDED RELEASE ORAL at 11:25

## 2022-04-14 RX ADMIN — LOSARTAN POTASSIUM 50 MG: 50 TABLET, FILM COATED ORAL at 09:02

## 2022-04-14 RX ADMIN — Medication 1 TABLET: at 17:52

## 2022-04-14 RX ADMIN — POTASSIUM CHLORIDE 20 MEQ: 20 TABLET, EXTENDED RELEASE ORAL at 06:43

## 2022-04-14 RX ADMIN — HEPARIN SODIUM 5000 UNITS: 5000 INJECTION INTRAVENOUS; SUBCUTANEOUS at 13:20

## 2022-04-14 RX ADMIN — CARVEDILOL 12.5 MG: 12.5 TABLET, FILM COATED ORAL at 17:51
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
## 2022-04-14 NOTE — PROGRESS NOTES
Intern Progress Note  [facility] [facility]       Patient: Maedleine Hinds MRN: [id number]  CSN: [id number]    YOB: 1960  Age: 57 y.o. Sex: male    DOA: 4/13/2022 LOS:  LOS: 1 day                    Subjective:        Acute events: A couple of Vtachs, up to 5 beats early in the evening. Asymptomatic, VSS. S/p K repletion. Patient was sleeping. Not very conversant today. Denied chest pain or sob. Says still having swelling in his feet. Objective:     Patient Vitals for the past 24 hrs:   Temp Pulse Resp BP SpO2   04/14/22 0329 98.4 °F (36.9 °C) 93 20 (!) 155/89 98 %   04/13/22 2303 98.2 °F (36.8 °C) 89 21 (!) 158/104 100 %   04/13/22 2228 98 °F (36.7 °C) 91 22 (!) 155/101 100 %   04/13/22 1927 97.8 °F (36.6 °C) 83 20 (!) 158/93 100 %   04/13/22 1745 97.6 °F (36.4 °C) 98 20 (!) 175/136 99 %   04/13/22 1636 -- 93 -- (!) 157/107 96 %   04/13/22 1624 -- -- -- -- 99 %   04/13/22 1609 -- -- -- -- 100 %   04/13/22 1554 -- -- -- -- 99 %   04/13/22 1543 98.2 °F (36.8 °C) -- -- -- --   04/13/22 1539 -- 92 19 -- 100 %   04/13/22 1524 -- 88 19 (!) 153/107 --   04/13/22 1454 -- 94 22 (!) 140/102 --   04/13/22 1357 -- 94 -- (!) 176/126 --   04/13/22 1306 -- 100 21 -- --         Intake/Output Summary (Last 24 hours) at 4/14/2022 0753  Last data filed at 4/14/2022 0602  Gross per 24 hour   Intake 696 ml   Output 4800 ml   Net -4104 ml       Physical Exam:   General:  AAOx3, NAD   CV:  RRR, no M/G/R. No visible pulsations or thrills. RESP:  Unlabored breathing. Lungs CTAB, no wheezes, rales or rhonchi appreciated. Equal expansion bilaterally. ABD:  Soft, nontender, nondistended. BS (+). No hepatosplenomegaly. No suprapubic tenderness. Ext:  1+ edema in LLE, no edema in RLE, 2+ radial and dp pulses bilaterally.   Psych: normal mood and affect     Lab/Data Reviewed:  Recent Results (from the past 24 hour(s))   EKG, 12 LEAD, INITIAL    Collection Time: 04/13/22 12:32 PM   Result Value Ref Range    Ventricular Rate 89 BPM    Atrial Rate 89 BPM    P-R Interval 158 ms    QRS Duration 88 ms    Q-T Interval 392 ms    QTC Calculation (Bezet) 476 ms    Calculated P Axis 64 degrees    Calculated R Axis 42 degrees    Calculated T Axis 71 degrees    Diagnosis       Sinus rhythm with frequent premature ventricular complexes  Possible Left atrial enlargement  Anteroseptal infarct (cited on or before 17-NOV-2021)  Abnormal ECG  When compared with ECG of 12-APR-2022 20:13,  Questionable change in initial forces of Anterior leads  Confirmed by Megha Briceoñ ([id number]) on 4/13/2022 2:16:32 PM     METABOLIC PANEL, BASIC    Collection Time: 04/13/22  1:31 PM   Result Value Ref Range    Sodium 140 136 - 145 mmol/L    Potassium 4.6 3.5 - 5.5 mmol/L    Chloride 108 100 - 111 mmol/L    CO2 28 21 - 32 mmol/L    Anion gap 4 3.0 - 18 mmol/L    Glucose 110 (H) 74 - 99 mg/dL    BUN 34 (H) 7.0 - 18 MG/DL    Creatinine 1.77 (H) 0.6 - 1.3 MG/DL    BUN/Creatinine ratio 19 12 - 20      GFR est AA 47 (L) >60 ml/min/1.73m2    GFR est non-AA 39 (L) >60 ml/min/1.73m2    Calcium 7.7 (L) 8.5 - 10.1 MG/DL   TROPONIN-HIGH SENSITIVITY    Collection Time: 04/13/22  1:31 PM   Result Value Ref Range    Troponin-High Sensitivity 25 0 - 78 ng/L   NT-PRO BNP    Collection Time: 04/13/22  1:31 PM   Result Value Ref Range    NT pro-BNP 13,398 (H) 0 - 900 PG/ML   CBC WITH AUTOMATED DIFF    Collection Time: 04/14/22  4:01 AM   Result Value Ref Range    WBC 9.6 4.6 - 13.2 K/uL    RBC 4.84 4.35 - 5.65 M/uL    HGB 14.3 13.0 - 16.0 g/dL    HCT 43.7 36.0 - 48.0 %    MCV 90.3 78.0 - 100.0 FL    MCH 29.5 24.0 - 34.0 PG    MCHC 32.7 31.0 - 37.0 g/dL    RDW 14.8 (H) 11.6 - 14.5 %    PLATELET 558 036 - 241 K/uL    MPV 13.7 (H) 9.2 - 11.8 FL    NRBC 0.0 0  WBC    ABSOLUTE NRBC 0.00 0.00 - 0.01 K/uL    NEUTROPHILS 67 40 - 73 %    LYMPHOCYTES 22 21 - 52 %    MONOCYTES 9 3 - 10 %    EOSINOPHILS 1 0 - 5 %    BASOPHILS 1 0 - 2 %    IMMATURE GRANULOCYTES 0 0.0 - 0.5 %    ABS. NEUTROPHILS 6.5 1.8 - 8.0 K/UL    ABS. LYMPHOCYTES 2.1 0.9 - 3.6 K/UL    ABS. MONOCYTES 0.8 0.05 - 1.2 K/UL    ABS. EOSINOPHILS 0.1 0.0 - 0.4 K/UL    ABS. BASOPHILS 0.1 0.0 - 0.1 K/UL    ABS. IMM. GRANS. 0.0 0.00 - 0.04 K/UL    DF AUTOMATED     METABOLIC PANEL, BASIC    Collection Time: 04/14/22  4:01 AM   Result Value Ref Range    Sodium 138 136 - 145 mmol/L    Potassium 3.2 (L) 3.5 - 5.5 mmol/L    Chloride 102 100 - 111 mmol/L    CO2 28 21 - 32 mmol/L    Anion gap 8 3.0 - 18 mmol/L    Glucose 89 74 - 99 mg/dL    BUN 29 (H) 7.0 - 18 MG/DL    Creatinine 1.59 (H) 0.6 - 1.3 MG/DL    BUN/Creatinine ratio 18 12 - 20      GFR est AA 54 (L) >60 ml/min/1.73m2    GFR est non-AA 44 (L) >60 ml/min/1.73m2    Calcium 7.4 (L) 8.5 - 10.1 MG/DL   DRUG SCREEN, URINE    Collection Time: 04/14/22  7:24 AM   Result Value Ref Range    BENZODIAZEPINES Negative NEG      BARBITURATES Negative NEG      THC (TH-CANNABINOL) Negative NEG      OPIATES Negative NEG      PCP(PHENCYCLIDINE) Negative NEG      COCAINE Positive (A) NEG      AMPHETAMINES Negative NEG      METHADONE Negative NEG      HDSCOM (NOTE)        Assessment & Plan:     57 y.o. male with PMH including CAD s/p stent placement in 2/2022 on dual antiplatelet therapy (KHO86 and plavix), HFrEF(EF10-15%), Hx of multiple CVAs in 2017, HLD, CKD 3, now admitted with acute on chronic HF exacerbation.       Acute on chronic HFrEF (EF 10-15%) s/p AICD in the setting of CAD s/p stent placement in 2/2022 on dual antiplatelet therapy (AAE40 and plavix)  - On admission, bilateral LE edema, CXR w/ pulmonary vascular congestion and interstitial edema, and elevated proBNP 9K>13K.    - s/p one time dose IV lasix 80mg and nitroglycerin tablet 0.4mg sl   - SOB and leg edema most likely 2/2 acute on chronic HF exacerbation given elevated proBNP and CXR w/ pulmonary vascular congestion and interstitial edema.   Low suspicion for renal etiology given patient not on HD.    - EKG: SR  - ECHO in 2/2022: EF 10-15% w/ mild transvalvular regurgitation   - home trx: plavix 75mg daily, ASA 81, spironolactone 25mg daily, coreg 12.5mg bid, torsemide 20mg daily,  Rosuvastatin 20mg qhs, losartan 50mg daily, nitroglycerin 0.4mg SL tablet prn    - net - 4L since admission   PLAN:  [] consult cards   [] decrease IV lasix 80mg daily to po lasix 60mg   [] monitor fluid status and daily Cr  [] fluid restrict 1200cc  [] resume all home meds   [] daily CBC, CMP, Mg, Phos    Asymptomatic Vtach  - a couple of Vtachs, up to 5 beats early in the am  - Hx of Vtach d/t cocaine use  - asymptomatic, VSS  - K 4.3>4.6>3.2  PLAN:  [] monitor for Vtachs  [] pending UDS   [] s/p K repletion, f/u K lab      STANLEY in the setting of CKD3   - Cr 1.84 on admission (baseline Cr 1.5)  - Daily BMP  - STANLEY most likely 2/2 to cardiorenal syndrome given elevated proBNP, leg edema, and CXR w/ vascular congestion and interstitial edema. Low suspicion for infectious etiology given no fever, leukocytosis, or signs and symptoms of infection.     - Cr 1.84>1.77>1.59  PLAN:   [] IV lasix 80mg daily   [] monitor daily Cr  [] avoid nephrotoxic drugs  [] renal dose meds     Hypokalemia   - K 4.3>4.6>3.2  PLAN:  [] s/p repletion, pending K lab      HLD  - cont Rosuvastatin 20mg daily  -Lipid panel: , HDL 37, LDL 91, TG 75     Hx of Hypocalcemia  -Ca 8.4 on admission, wnl when corrected for albumin   -Ca 8.4>7.7>7.4  PLAN:   [] pending iCa  [] Monitor Ca, replete as needed      History of embolic stroke   - Per [employer] records, in 2017 (multiple acute and subacute infarcts in the left cerebrum)   - cont home med [id number]     Pulmonary nodules   - Noted in discharge summary in 2017, CXR without any note of similar nodules   - Denied history of smoking     Subarrachnoid hemorrhage 2016  -No deficits post-hemorrhage     Global Care:  - VS per unit routine  - supplemental O2 for sats < 92%  - incentive spirometer  - PT/OT/CM     Diet  Npo    DVT Prophylaxis  Lovenox/ SCDs   GI Prophylaxis  none   Code status  full    Disposition  home       [address] (Sister)   [phone] ([place])      Jimmy Rocha MD , PGY-1   [facility]   April 14, 2022, 7:53 AM

## 2022-04-14 NOTE — PROGRESS NOTES
conducted an initial consultation and Spiritual Assessment for Isamar Sumner, who is a 57 y. o.,male. Patients Primary Language is: [place]. According to the patients EMR Shinto Affiliation is: Cynthia Villanueva. The reason the Patient came to the hospital is:   Patient Active Problem List    Diagnosis Date Noted    Acute exacerbation of CHF (congestive heart failure) ([facility] 04/13/2022    Chest pain in adult 02/23/2022    CHF (congestive heart failure) ([facility] [address] 11/17/2021    NSTEMI (non-ST elevated myocardial infarction) ([facility] [address] 11/17/2021    Shortness of breath 11/17/2021    Testicular pain 05/13/2021    CHF exacerbation ([facility] [address] 72/13/0843    Systolic CHF, chronic ([facility] [address] 11/11/2020    [facility] 11/11/2020    Coronary artery disease involving native coronary artery of native heart without angina pectoris 11/11/2020    S/P ablation operation for arrhythmia 03/12/2018    Weakness due to cerebrovascular accident (CVA) 03/24/2017    TIA (transient ischemic attack) 03/23/2017    Hypertension     Pulmonary nodule     Head injury     Depression     SAH (subarachnoid hemorrhage) (HCC)     HLD (hyperlipidemia)     Corn of foot 08/21/2016    Hypomagnesemia 08/21/2016    Cough 04/01/2014        The  provided the following Interventions:  Initiated a relationship of care and support. A  was in the room with the patient. Patient remained silent and would not at me upon visit. Unable to explore issues of shobha, belief, spirituality and Yarsanism/ritual needs while hospitalized. Listened empathically but patient remained quiet. Provided chaplaincy education. Provided information about Spiritual Care Services. Offered assurance of continued prayers on patient's behalf. Chart reviewed. The following outcomes where achieved:  Patient did not share any information about both his medical narrative and spiritual journey/beliefs.    unable to confirm Patient's Protestant Affiliation. Assessment:  Patient showed no Amish/cultural needs that will affect patients preferences in health care. There are no spiritual or Amish issues which require intervention at this time. Plan:  Chaplains will continue to follow and will provide pastoral care on an as needed/requested basis.  recommends bedside caregivers page  on duty if patient shows signs of acute spiritual or emotional distress.     Nila Doran 5   [phone]

## 2022-04-14 NOTE — PROGRESS NOTES
Bedside and Verbal shift change report given to Margie Goldman RN (oncoming nurse) by Clarisse De La Torre RN (offgoing nurse). Report included the following information SBAR, Kardex, Intake/Output, MAR, Accordion and Recent Results.
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
## 2022-04-14 NOTE — PROGRESS NOTES
Medical Student Progress Note  [facility] Medicine        **Medical Student Note for Educational Purposes Only**       Patient: Sergio Wright MRN: [id number]  [facility]: [id number]    YOB: 1960  Age: 57 y.o. Sex: male    DOA: 4/13/2022 LOS:  LOS: 1 day                    Subjective:   Mr. Jessica Flores ia 57 y.o male with PMH of CAD s/p stent placement in 2/2022 on dual antiplatelt therapy, , HFrEF, CVAs, HLD, and CKD who presented with SOB due to chronic HF exacerbation. Acute events: Patient last night had a couple of episodes of vtach. This morning the patient was quiet and responsed with only yes or no answers. He did not have any specific concerns. He denies any SOB and or sputum collections. He has been urinating without any significant pain. He still has to sleep with his bed elevated because he notes SOB when he lies down. He denies any nausea and vomiting. The patient was present with a  and was wearing handcuffs. Review of Systems   Constitutional: Negative. HENT: Negative. Eyes: Negative. Respiratory: Negative. Cardiovascular: Negative. Gastrointestinal: Negative. Genitourinary: Negative. Musculoskeletal: Negative. Skin: Negative. Neurological: Negative. Endo/Heme/Allergies: Negative. Psychiatric/Behavioral: Negative.                 Objective:      Patient Vitals for the past 24 hrs:   Temp Pulse Resp BP SpO2   04/14/22 0329 98.4 °F (36.9 °C) 93 20 (!) 155/89 98 %   04/13/22 2303 98.2 °F (36.8 °C) 89 21 (!) 158/104 100 %   04/13/22 2228 98 °F (36.7 °C) 91 22 (!) 155/101 100 %   04/13/22 1927 97.8 °F (36.6 °C) 83 20 (!) 158/93 100 %   04/13/22 1745 97.6 °F (36.4 °C) 98 20 (!) 175/136 99 %   04/13/22 1636 -- 93 -- (!) 157/107 96 %   04/13/22 1624 -- -- -- -- 99 %   04/13/22 1609 -- -- -- -- 100 %   04/13/22 1554 -- -- -- -- 99 %   04/13/22 1543 98.2 °F (36.8 °C) -- -- -- --   04/13/22 1539 -- 92 19 -- 100 %   04/13/22 1524 -- 88 19 (!) 153/107 --   04/13/22 1454 -- 94 22 (!) 140/102 --   04/13/22 1357 -- 94 -- (!) 176/126 --   04/13/22 1306 -- 100 21 -- --         Intake/Output Summary (Last 24 hours) at 4/14/2022 0813  Last data filed at 4/14/2022 0602  Gross per 24 hour   Intake 696 ml   Output 4800 ml   Net -4104 ml       Physical Exam:     General:  Alert and Responsive and in No acute distress  HEENT: No cervical, supraclavicular, occipital or submandibular lymphadenopathy. CV:  Normal heart rate with regular rhythm. No murmurs, rubs, or gallops. RESP:  Unlabored breathing. Left lung present with crackles none noted on right side. The patient was lying on left side. ABD:  Soft, nontender, nondistended. No hepatosplenomegaly. No suprapubic tenderness. MS:  No joint deformity or instability. Neuro:  5/5 strength bilateral upper extremities and lower extremities  Ext:  No hip edema, pitting edema noted on bilateral lets  Skin:  No rashes, lesions, or ulcers.       Lab/Data Reviewed:  Recent Results (from the past 24 hour(s))   EKG, 12 LEAD, INITIAL    Collection Time: 04/13/22 12:32 PM   Result Value Ref Range    Ventricular Rate 89 BPM    Atrial Rate 89 BPM    P-R Interval 158 ms    QRS Duration 88 ms    Q-T Interval 392 ms    QTC Calculation (Bezet) 476 ms    Calculated P Axis 64 degrees    Calculated R Axis 42 degrees    Calculated T Axis 71 degrees    Diagnosis       Sinus rhythm with frequent premature ventricular complexes  Possible Left atrial enlargement  Anteroseptal infarct (cited on or before 17-NOV-2021)  Abnormal ECG  When compared with ECG of 12-APR-2022 20:13,  Questionable change in initial forces of Anterior leads  Confirmed by [employer] Roche ([id number]) on 4/13/2022 2:75:63 PM     METABOLIC PANEL, BASIC    Collection Time: 04/13/22  1:31 PM   Result Value Ref Range    Sodium 140 136 - 145 mmol/L    Potassium 4.6 3.5 - 5.5 mmol/L    Chloride 108 100 - 111 mmol/L    CO2 28 21 - 32 mmol/L    Anion gap 4 3.0 - 18 mmol/L Glucose 110 (H) 74 - 99 mg/dL    BUN 34 (H) 7.0 - 18 MG/DL    Creatinine 1.77 (H) 0.6 - 1.3 MG/DL    BUN/Creatinine ratio 19 12 - 20      GFR est AA 47 (L) >60 ml/min/1.73m2    GFR est non-AA 39 (L) >60 ml/min/1.73m2    Calcium 7.7 (L) 8.5 - 10.1 MG/DL   TROPONIN-HIGH SENSITIVITY    Collection Time: 04/13/22  1:31 PM   Result Value Ref Range    Troponin-High Sensitivity 25 0 - 78 ng/L   NT-PRO BNP    Collection Time: 04/13/22  1:31 PM   Result Value Ref Range    NT pro-BNP 13,398 (H) 0 - 900 PG/ML   CBC WITH AUTOMATED DIFF    Collection Time: 04/14/22  4:01 AM   Result Value Ref Range    WBC 9.6 4.6 - 13.2 K/uL    RBC 4.84 4.35 - 5.65 M/uL    HGB 14.3 13.0 - 16.0 g/dL    HCT 43.7 36.0 - 48.0 %    MCV 90.3 78.0 - 100.0 FL    MCH 29.5 24.0 - 34.0 PG    MCHC 32.7 31.0 - 37.0 g/dL    RDW 14.8 (H) 11.6 - 14.5 %    PLATELET 675 264 - 427 K/uL    MPV 13.7 (H) 9.2 - 11.8 FL    NRBC 0.0 0  WBC    ABSOLUTE NRBC 0.00 0.00 - 0.01 K/uL    NEUTROPHILS 67 40 - 73 %    LYMPHOCYTES 22 21 - 52 %    MONOCYTES 9 3 - 10 %    EOSINOPHILS 1 0 - 5 %    BASOPHILS 1 0 - 2 %    IMMATURE GRANULOCYTES 0 0.0 - 0.5 %    ABS. NEUTROPHILS 6.5 1.8 - 8.0 K/UL    ABS. LYMPHOCYTES 2.1 0.9 - 3.6 K/UL    ABS. MONOCYTES 0.8 0.05 - 1.2 K/UL    ABS. EOSINOPHILS 0.1 0.0 - 0.4 K/UL    ABS. BASOPHILS 0.1 0.0 - 0.1 K/UL    ABS. IMM.  GRANS. 0.0 0.00 - 0.04 K/UL    DF AUTOMATED     METABOLIC PANEL, BASIC    Collection Time: 04/14/22  4:01 AM   Result Value Ref Range    Sodium 138 136 - 145 mmol/L    Potassium 3.2 (L) 3.5 - 5.5 mmol/L    Chloride 102 100 - 111 mmol/L    CO2 28 21 - 32 mmol/L    Anion gap 8 3.0 - 18 mmol/L    Glucose 89 74 - 99 mg/dL    BUN 29 (H) 7.0 - 18 MG/DL    Creatinine 1.59 (H) 0.6 - 1.3 MG/DL    BUN/Creatinine ratio 18 12 - 20      GFR est AA 54 (L) >60 ml/min/1.73m2    GFR est non-AA 44 (L) >60 ml/min/1.73m2    Calcium 7.4 (L) 8.5 - 10.1 MG/DL   DRUG SCREEN, URINE    Collection Time: 04/14/22  7:24 AM   Result Value Ref Range BENZODIAZEPINES Negative NEG      BARBITURATES Negative NEG      THC (TH-CANNABINOL) Negative NEG      OPIATES Negative NEG      PCP(PHENCYCLIDINE) Negative NEG      COCAINE Positive (A) NEG      AMPHETAMINES Negative NEG      METHADONE Negative NEG      HDSCOM (NOTE)      All lab results for the last 24 hours reviewed. Scheduled Medications Reviewed:  Current Facility-Administered Medications   Medication Dose Route Frequency    potassium chloride (K-DUR, KLOR-CON M20) SR tablet 20 mEq  20 mEq Oral Q2H    aspirin delayed-release tablet 81 mg  81 mg Oral DAILY    losartan (COZAAR) tablet 50 mg  50 mg Oral DAILY    clopidogreL (PLAVIX) tablet 75 mg  75 mg Oral DAILY    rosuvastatin (CRESTOR) tablet 20 mg  20 mg Oral QHS    spironolactone (ALDACTONE) tablet 12.5 mg  12.5 mg Oral DAILY    sodium chloride (NS) flush 5-40 mL  5-40 mL IntraVENous Q8H    enoxaparin (LOVENOX) injection 40 mg  40 mg SubCUTAneous DAILY    carvediloL (COREG) tablet 12.5 mg  12.5 mg Oral BID WITH MEALS    furosemide (LASIX) injection 40 mg  40 mg IntraVENous BID    [Held by provider] torsemide (DEMADEX) tablet 20 mg  20 mg Oral BID         Imaging, microbiology, and EKG/Telemetry:    Imaging:  MODALITY IMPRESSION   XR Results from Hospital Encounter encounter on 04/13/22    XR CHEST PORT    Narrative  EXAM: XR CHEST PORT    CLINICAL INDICATION/HISTORY: 57 years Male. chest pain. Additional History: None    TECHNIQUE: Frontal view of the chest    COMPARISON: Chest radiograph 2/25/2022    FINDINGS:    Partial obscuration of the lung apices due to overlying chin/neck tissues. Left  subclavian approach dual-chamber AICD with similar appearance to prior. Multiple  leads and wires overlie the chest.    The cardiac silhouette is unchanged in appearance, mildly enlarged. Mild to  moderate pulmonary vascular congestion. Mild diffuse interstitial opacities. No  confluent airspace opacity is appreciated.  Blunting of the left costophrenic  sulcus. The right costophrenic sulcus appears sharp. No definite pneumothorax. No acute osseous abnormality appreciated. Impression  1. Mild to moderate pulmonary vascular congestion. 2.  Mild to moderate diffuse interstitial opacities, favor interstitial edema. 3.  Possible trace left pleural effusion. Cardiology Procedures/Testing:  MODALITY RESULTS   EKG Results for orders placed or performed during the hospital encounter of 04/13/22   EKG, 12 LEAD, INITIAL   Result Value Ref Range    Ventricular Rate 89 BPM    Atrial Rate 89 BPM    P-R Interval 158 ms    QRS Duration 88 ms    Q-T Interval 392 ms    QTC Calculation (Bezet) 476 ms    Calculated P Axis 64 degrees    Calculated R Axis 42 degrees    Calculated T Axis 71 degrees    Diagnosis       Sinus rhythm with frequent premature ventricular complexes  Possible Left atrial enlargement  Anteroseptal infarct (cited on or before 17-NOV-2021)  Abnormal ECG  When compared with ECG of 12-APR-2022 20:13,  Questionable change in initial forces of Anterior leads  Confirmed by Liana Kent ([id number]) on 4/13/2022 2:22:29 PM     Results for orders placed or performed in visit on 02/15/18   AMB POC EKG ROUTINE W/ 12 LEADS, INTER & REP    Impression    Sinus tachycardia at 126 bpm.  No acute changes. ECHO 02/23/22    ECHO ADULT FOLLOW-UP OR LIMITED 02/23/2022 2/23/2022    Interpretation Summary    Left Ventricle: Left ventricle size is normal. Normal wall thickness. Global hypokinesis present. Severely reduced left ventricular systolic function with a visually estimated EF of 10 -15%.   Mitral Valve: Mild transvalvular regurgitation.     Signed by: Bianca Weeks MD on 2/23/2022  3:18 PM         Assessment/Plan   Kip Dudley is an 57 y.o. male with PMH of of CAD s/p stent placement two months ago on dual antiplatelt therapy( UATH52 and plavis), HFrEF(EF 10-15%), CVAs in 2017 , HLD, CKD 3 admitted on 4/13/2022  for HFrEF exacerbation causing SOB. Acute on chronic HFrEF (EF 10-15%) s/p AICD in the setting of CAD s/p stent placement in 2/2022 on dual antiplatelet therapy (FDK49 and plavix). Patient has edema of his legs and SOB at admission. CXR showed vasuclar congetion and edema. ProBNP was elevated at 9K and 13K. [] Plan  - home medications of  aspirin 81 mg, carvediol 12.5 mg, plavix 75 mg  -Lasix 40 mg injections  -strict I/Os with fluid restriction of 1200cc       STANLEY in the setting of CKD3   - patients CR is at 1.59, but trending well. 1.84->1.77-->1.59  Plan:  -lasix 40 mg IV instead of 80 mg  -spironolactone 12.5 mg daily   -monitor CR and avoid nephrotoxic medications  -Potassium chloride for decrease of potassium currently at 3.2    Shoulder pain(supraspinatus tendosynovitis)   -diclofenac scream     Vtach  -supplement K since it is low at 3.2( 4.3-->4.6--->3.2  -asymptomatic continue to monitor tele      HLD  - rosuvastion 20 mg daily        Hx of Hypocalcemia  -Ca 8.4 on admission, currentl CA is 7.4   PLAN:   -f/u on ionized calcium levels.   -if low replete with calcium gulonate      History of embolic stroke   - Per [employer] records, in 2017 (multiple acute and subacute infarcts in the left cerebrum)   - cont home med ZVD12     Pulmonary nodules   - Noted in discharge summary in 2017, CXR without any note of similar nodules   - Denied history of smoking     Subarrachnoid hemorrhage 2016  -No deficits post-hemorrhage     Global Care:  - VS per unit routine  - supplemental O2 for sats < 92%  - incentive spirometer  - PT/OT/CM     Diet  Npo    DVT Prophylaxis  Lovenox/ SCDs   GI Prophylaxis  none   Code status  full    Disposition  home       [facility] [address] [place] (Sister)   [phone] ([place])         Good Saavedra, MS3  [facility], M.D. Class of 2023              *ATTENTION:  This note has been created by a medical student for educational purposes only.   Please do not refer to the content of this note for clinical decision-making, billing, or other purposes. Please see attending physicians note to obtain clinical information on this patient. *

## 2022-04-14 NOTE — PROGRESS NOTES
Notified by telemetry at 2115 that patient had 5 beats of V-tach. Patient asymptomatic and appears asleep. Informed Amon Dakin, MD of rhythm.  MD ordered UDS.     [id number] Urine sample collected and sent to the lab

## 2022-04-14 NOTE — PROGRESS NOTES
Brief Progress Note  [facility]      Subjective:      Called by nurse at 2pm about patient having leg cramps. Assessed patient at the bedside. Patient said that a few minutes ago, he started having left-sided leg cramping of 10 out of 10 pain as well as left leg swelling. He was unable to lift his left leg or move his left foot, and he could not bear weight on his left leg. Denies shortness of breath. Patient said he had leg cramping in the past involving both lower legs and Tylenol and lidocaine patch did not help him in the past.  During the encounter, patient started crying. Objective:     Visit Vitals  BP (!) 140/97 (BP 1 Location: Left upper arm, BP Patient Position: At rest)   Pulse (!) 51   Temp 98.2 °F (36.8 °C)   Resp 20   SpO2 95%       Physical Exam:   General:  Started crying during the encounter  CV:   Regular rate   RESP: Unlabored breathing. No accessory muscle use. Ext:   No hyperpigmentation or swelling of left lower extremity. Not cold or warm to touch. No lesions, rash, or abscess identified. Sensation intact in left lower extremity. Strength not assessed given patient complaining of inability to move left leg or foot due to pain. Right lower extremity unremarkable. Psych: Tearing and crying    Assessment & Plan:   Left lower extremity cramping  -Most likely secondary to electrolyte imbalance given hypokalemia, hypocalcemia, and hypomagnesemia. Another possible etiology is cocaine withdrawal given UDS positive for cocaine, patient appearing depressed and not talking much since admission, and patient crying during encounter. Low suspicion for DVT given PVL on 4/13 with no evidence of DVT in bilateral lower extremities. PLAN:  [] Started tramadol 50 mg daily  [] If cramping pain not improved with tramadol, can add Flexeril 5 mg 3 times daily  [] Monitor for pain    For full assessment and plan, please see daily progress note.      Gianni Foster MD, PGY-1 [address]   April 14, 2022, 2:54 PM
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
## 2022-04-15 LAB
ANION GAP SERPL CALC-SCNC: 10 MMOL/L (ref 3–18)
ATRIAL RATE: 76 BPM
BASOPHILS # BLD: 0.1 K/UL (ref 0–0.1)
BASOPHILS NFR BLD: 1 % (ref 0–2)
BUN SERPL-MCNC: 24 MG/DL (ref 7–18)
BUN/CREAT SERPL: 14 (ref 12–20)
CALCIUM SERPL-MCNC: 7.6 MG/DL (ref 8.5–10.1)
CALCULATED P AXIS, ECG09: 59 DEGREES
CALCULATED R AXIS, ECG10: 60 DEGREES
CALCULATED T AXIS, ECG11: 176 DEGREES
CHLORIDE SERPL-SCNC: 99 MMOL/L (ref 100–111)
CO2 SERPL-SCNC: 29 MMOL/L (ref 21–32)
CREAT SERPL-MCNC: 1.69 MG/DL (ref 0.6–1.3)
DIAGNOSIS, 93000: NORMAL
DIFFERENTIAL METHOD BLD: ABNORMAL
EOSINOPHIL # BLD: 0.1 K/UL (ref 0–0.4)
EOSINOPHIL NFR BLD: 1 % (ref 0–5)
ERYTHROCYTE [DISTWIDTH] IN BLOOD BY AUTOMATED COUNT: 14.6 % (ref 11.6–14.5)
GLUCOSE SERPL-MCNC: 97 MG/DL (ref 74–99)
HCT VFR BLD AUTO: 46.6 % (ref 36–48)
HGB BLD-MCNC: 15.1 G/DL (ref 13–16)
IMM GRANULOCYTES # BLD AUTO: 0 K/UL (ref 0–0.04)
IMM GRANULOCYTES NFR BLD AUTO: 0 % (ref 0–0.5)
LYMPHOCYTES # BLD: 1.5 K/UL (ref 0.9–3.6)
LYMPHOCYTES NFR BLD: 17 % (ref 21–52)
MAGNESIUM SERPL-MCNC: 1.7 MG/DL (ref 1.6–2.6)
MAGNESIUM SERPL-MCNC: 1.8 MG/DL (ref 1.6–2.6)
MCH RBC QN AUTO: 29.5 PG (ref 24–34)
MCHC RBC AUTO-ENTMCNC: 32.4 G/DL (ref 31–37)
MCV RBC AUTO: 91 FL (ref 78–100)
MONOCYTES # BLD: 0.8 K/UL (ref 0.05–1.2)
MONOCYTES NFR BLD: 10 % (ref 3–10)
NEUTS SEG # BLD: 6.2 K/UL (ref 1.8–8)
NEUTS SEG NFR BLD: 71 % (ref 40–73)
NRBC # BLD: 0 K/UL (ref 0–0.01)
NRBC BLD-RTO: 0 PER 100 WBC
P-R INTERVAL, ECG05: 158 MS
PLATELET # BLD AUTO: 191 K/UL (ref 135–420)
PMV BLD AUTO: 13.5 FL (ref 9.2–11.8)
POTASSIUM SERPL-SCNC: 3.6 MMOL/L (ref 3.5–5.5)
Q-T INTERVAL, ECG07: 510 MS
QRS DURATION, ECG06: 94 MS
QTC CALCULATION (BEZET), ECG08: 573 MS
RBC # BLD AUTO: 5.12 M/UL (ref 4.35–5.65)
SODIUM SERPL-SCNC: 138 MMOL/L (ref 136–145)
VENTRICULAR RATE, ECG03: 76 BPM
WBC # BLD AUTO: 8.7 K/UL (ref 4.6–13.2)

## 2022-04-15 PROCEDURE — 99232 SBSQ HOSP IP/OBS MODERATE 35: CPT | Performed by: INTERNAL MEDICINE

## 2022-04-15 PROCEDURE — 74011250636 HC RX REV CODE- 250/636: Performed by: STUDENT IN AN ORGANIZED HEALTH CARE EDUCATION/TRAINING PROGRAM

## 2022-04-15 PROCEDURE — 83735 ASSAY OF MAGNESIUM: CPT

## 2022-04-15 PROCEDURE — 36415 COLL VENOUS BLD VENIPUNCTURE: CPT

## 2022-04-15 PROCEDURE — 65660000000 HC RM CCU STEPDOWN

## 2022-04-15 PROCEDURE — G0378 HOSPITAL OBSERVATION PER HR: HCPCS

## 2022-04-15 PROCEDURE — 74011250637 HC RX REV CODE- 250/637: Performed by: PHYSICIAN ASSISTANT

## 2022-04-15 PROCEDURE — 85025 COMPLETE CBC W/AUTO DIFF WBC: CPT

## 2022-04-15 PROCEDURE — 80048 BASIC METABOLIC PNL TOTAL CA: CPT

## 2022-04-15 PROCEDURE — 74011000250 HC RX REV CODE- 250: Performed by: STUDENT IN AN ORGANIZED HEALTH CARE EDUCATION/TRAINING PROGRAM

## 2022-04-15 PROCEDURE — 74011250637 HC RX REV CODE- 250/637: Performed by: STUDENT IN AN ORGANIZED HEALTH CARE EDUCATION/TRAINING PROGRAM

## 2022-04-15 RX ORDER — CARVEDILOL 25 MG/1
25 TABLET ORAL 2 TIMES DAILY WITH MEALS
Status: DISCONTINUED | OUTPATIENT
Start: 2022-04-15 | End: 2022-04-16 | Stop reason: HOSPADM

## 2022-04-15 RX ADMIN — TRAMADOL HYDROCHLORIDE 50 MG: 50 TABLET, COATED ORAL at 09:49

## 2022-04-15 RX ADMIN — HEPARIN SODIUM 5000 UNITS: 5000 INJECTION INTRAVENOUS; SUBCUTANEOUS at 05:26

## 2022-04-15 RX ADMIN — ASPIRIN 81 MG: 81 TABLET, COATED ORAL at 09:48

## 2022-04-15 RX ADMIN — FUROSEMIDE 40 MG: 10 INJECTION, SOLUTION INTRAMUSCULAR; INTRAVENOUS at 18:01

## 2022-04-15 RX ADMIN — CARVEDILOL 25 MG: 25 TABLET, FILM COATED ORAL at 18:01

## 2022-04-15 RX ADMIN — LOSARTAN POTASSIUM 50 MG: 50 TABLET, FILM COATED ORAL at 09:49

## 2022-04-15 RX ADMIN — SPIRONOLACTONE 25 MG: 25 TABLET ORAL at 09:49

## 2022-04-15 RX ADMIN — CLOPIDOGREL BISULFATE 75 MG: 75 TABLET ORAL at 09:48

## 2022-04-15 RX ADMIN — HEPARIN SODIUM 5000 UNITS: 5000 INJECTION INTRAVENOUS; SUBCUTANEOUS at 22:29

## 2022-04-15 RX ADMIN — DICLOFENAC SODIUM 2 G: 10 GEL TOPICAL at 12:52

## 2022-04-15 RX ADMIN — Medication 1 TABLET: at 18:01

## 2022-04-15 RX ADMIN — SODIUM CHLORIDE, PRESERVATIVE FREE 10 ML: 5 INJECTION INTRAVENOUS at 18:02

## 2022-04-15 RX ADMIN — Medication 1 TABLET: at 09:48

## 2022-04-15 RX ADMIN — CARVEDILOL 12.5 MG: 12.5 TABLET, FILM COATED ORAL at 09:49

## 2022-04-15 RX ADMIN — ROSUVASTATIN CALCIUM 20 MG: 20 TABLET, COATED ORAL at 22:29

## 2022-04-15 RX ADMIN — SODIUM CHLORIDE, PRESERVATIVE FREE 10 ML: 5 INJECTION INTRAVENOUS at 06:52

## 2022-04-15 RX ADMIN — FUROSEMIDE 40 MG: 10 INJECTION, SOLUTION INTRAMUSCULAR; INTRAVENOUS at 09:49

## 2022-04-15 RX ADMIN — HEPARIN SODIUM 5000 UNITS: 5000 INJECTION INTRAVENOUS; SUBCUTANEOUS at 12:52

## 2022-04-15 RX ADMIN — SODIUM CHLORIDE, PRESERVATIVE FREE 10 ML: 5 INJECTION INTRAVENOUS at 22:30

## 2022-04-15 NOTE — PROGRESS NOTES
Spoke with medical department at the [facility]. Was told that because they don't have a pharmacy, patient would need to have all his meds with him. Will discharge patient with all his meds to leave with him.

## 2022-04-15 NOTE — PROGRESS NOTES
Initial Assessment. Pt is from [facility] and plans are to return when medically stable. Care Management Interventions  PCP Verified by CM: No  Palliative Care Criteria Met (RRAT>21 & CHF Dx)?: No  Mode of Transport at Discharge:  Other (see comment) [facility])  Transition of Care Consult (CM Consult): Discharge Planning  Physical Therapy Consult: No  Occupational Therapy Consult: No  Speech Therapy Consult: No  Support Systems: Correction Facility,Other (Comment) ([facility])  Confirm Follow Up Transport: Other (see comment) [facility])  Discharge Location  Patient Expects to be Discharged to[de-identified] Other:

## 2022-04-15 NOTE — PROGRESS NOTES
Medical Student Progress Note  [facility]        **Medical Student Note for Educational Purposes Only**       Patient: Corrinne Horner MRN: [id number]  CSN: [id number]    YOB: 1960  Age: 57 y.o. Sex: male    DOA: 4/13/2022 LOS:  LOS: 2 days                    Subjective:   Mr. Ton Richey ia 57 y.o male with PMH of CAD s/p stent placement in 2/2022 on dual antiplatelt therapy, , HFrEF, CVAs, HLD, and CKD who presented with SOB due to chronic HF exacerbation. Acute events: The patient noted to have asymptomatic vtach up to 17 beats. This was happening while is was awake and sleep. Patient is doing well and reports no coughing or SOB. He believes his edema has improved. His shoulder pain has improved and is happy with his comfort. He denies any vomiting and nausea. He denies any SOB. He denies any palpations. Patient   -  Review of Systems   Constitutional: Negative. HENT: Negative. Eyes: Negative. Respiratory: Negative. Cardiovascular: Negative. Gastrointestinal: Negative. Genitourinary: Negative. Musculoskeletal: Negative. Skin: Negative. Neurological: Negative. Endo/Heme/Allergies: Negative. Psychiatric/Behavioral: Negative. Objective:      Patient Vitals for the past 24 hrs:   Temp Pulse Resp BP SpO2   04/15/22 0526 97.8 °F (36.6 °C) 86 18 132/88 97 %   04/15/22 0131 98.1 °F (36.7 °C) 73 20 113/62 96 %   04/14/22 2212 98.3 °F (36.8 °C) 74 20 104/60 96 %   04/14/22 1741 97.5 °F (36.4 °C) 93 20 (!) 141/87 96 %   04/14/22 1230 98.2 °F (36.8 °C) (!) 51 20 (!) 140/97 95 %   04/14/22 0847 98.1 °F (36.7 °C) 97 20 (!) 157/94 96 %         Intake/Output Summary (Last 24 hours) at 4/15/2022 0723  Last data filed at 4/15/2022 0526  Gross per 24 hour   Intake 476 ml   Output 3650 ml   Net -3174 ml       Physical Exam:      General:  Alert and Responsive and in No acute distress.    HEENT: No cervical, supraclavicular, occipital or submandibular lymphadenopathy. CV:  Normal heart rate with regular rhythm. No murmurs, rubs, or gallops. RESP:  Unlabored breathing. Lungs clear to auscultation. No wheezes, rales, or rhonchi. Equal expansion bilaterally. ABD:  Soft, nontender, nondistended. No hepatosplenomegaly. No suprapubic tenderness. No CVA tenderness. MS:  No joint deformity or instability. No atrophy. Pitting edema noted on bilateral shins. Skin:  No rashes, lesions, or ulcers. Lab/Data:  Results for Iris Campbell (MRN [id number]) as of 4/15/2022 07:27   Ref. Range 4/15/2022 01:11   WBC Latest Ref Range: 4.6 - 13.2 K/uL 8.7   NRBC Latest Ref Range: 0  WBC 0.0   RBC Latest Ref Range: 4.35 - 5.65 M/uL 5.12   HGB Latest Ref Range: 13.0 - 16.0 g/dL 15.1   HCT Latest Ref Range: 36.0 - 48.0 % 46.6   MCV Latest Ref Range: 78.0 - 100.0 FL 91.0   MCH Latest Ref Range: 24.0 - 34.0 PG 29.5   MCHC Latest Ref Range: 31.0 - 37.0 g/dL 32.4   RDW Latest Ref Range: 11.6 - 14.5 % 14.6 (H)   PLATELET Latest Ref Range: 135 - 420 K/uL 191   MPV Latest Ref Range: 9.2 - 11.8 FL 13.5 (H)   NEUTROPHILS Latest Ref Range: 40 - 73 % 71   LYMPHOCYTES Latest Ref Range: 21 - 52 % 17 (L)   MONOCYTES Latest Ref Range: 3 - 10 % 10   EOSINOPHILS Latest Ref Range: 0 - 5 % 1   BASOPHILS Latest Ref Range: 0 - 2 % 1   IMMATURE GRANULOCYTES Latest Ref Range: 0.0 - 0.5 % 0   DF Latest Units:   AUTOMATED   ABSOLUTE NRBC Latest Ref Range: 0.00 - 0.01 K/uL 0.00   ABS. NEUTROPHILS Latest Ref Range: 1.8 - 8.0 K/UL 6.2   ABS. IMM. GRANS. Latest Ref Range: 0.00 - 0.04 K/UL 0.0   ABS. LYMPHOCYTES Latest Ref Range: 0.9 - 3.6 K/UL 1.5   ABS. MONOCYTES Latest Ref Range: 0.05 - 1.2 K/UL 0.8   ABS. EOSINOPHILS Latest Ref Range: 0.0 - 0.4 K/UL 0.1   ABS.  BASOPHILS Latest Ref Range: 0.0 - 0.1 K/UL 0.1   Sodium Latest Ref Range: 136 - 145 mmol/L 138   Potassium Latest Ref Range: 3.5 - 5.5 mmol/L 3.6   Chloride Latest Ref Range: 100 - 111 mmol/L 99 (L) CO2 Latest Ref Range: 21 - 32 mmol/L 29   Anion gap Latest Ref Range: 3.0 - 18 mmol/L 10   Glucose Latest Ref Range: 74 - 99 mg/dL 97   BUN Latest Ref Range: 7.0 - 18 MG/DL 24 (H)   Creatinine Latest Ref Range: 0.6 - 1.3 MG/DL 1.69 (H)   BUN/Creatinine ratio Latest Ref Range: 12 - 20   14   Calcium Latest Ref Range: 8.5 - 10.1 MG/DL 7.6 (L)   GFR est non-AA Latest Ref Range: >60 ml/min/1.73m2 41 (L)   GFR est AA Latest Ref Range: >60 ml/min/1.73m2 50 (L)     Results for Sj Grajeda (MRN [id number]) as of 4/15/2022 07:27   Ref. Range 4/14/2022 16:30   CK Latest Ref Range: 39 - 308 U/L 136     Scheduled Medications Reviewed:  Current Facility-Administered Medications   Medication Dose Route Frequency    spironolactone (ALDACTONE) tablet 25 mg  25 mg Oral DAILY    heparin (porcine) injection 5,000 Units  5,000 Units SubCUTAneous Q8H    [Held by provider] cyclobenzaprine (FLEXERIL) tablet 5 mg  5 mg Oral TID    traMADoL (ULTRAM) tablet 50 mg  50 mg Oral DAILY    calcium-vitamin D 600 mg-5 mcg (200 unit) per tablet 1 Tablet  1 Tablet Oral BID WITH MEALS    aspirin delayed-release tablet 81 mg  81 mg Oral DAILY    losartan (COZAAR) tablet 50 mg  50 mg Oral DAILY    clopidogreL (PLAVIX) tablet 75 mg  75 mg Oral DAILY    rosuvastatin (CRESTOR) tablet 20 mg  20 mg Oral QHS    sodium chloride (NS) flush 5-40 mL  5-40 mL IntraVENous Q8H    carvediloL (COREG) tablet 12.5 mg  12.5 mg Oral BID WITH MEALS    furosemide (LASIX) injection 40 mg  40 mg IntraVENous BID    [Held by provider] torsemide (DEMADEX) tablet 20 mg  20 mg Oral BID         Imaging, microbiology, and EKG/Telemetry:  Imaging:  MODALITY IMPRESSION   XR Results from Hospital Encounter encounter on 04/13/22    XR CHEST PORT    Narrative  EXAM: XR CHEST PORT    CLINICAL INDICATION/HISTORY: 57 years Male. chest pain.   Additional History: None    TECHNIQUE: Frontal view of the chest    COMPARISON: Chest radiograph 2/25/2022    FINDINGS:    Partial obscuration of the lung apices due to overlying chin/neck tissues. Left  subclavian approach dual-chamber AICD with similar appearance to prior. Multiple  leads and wires overlie the chest.    The cardiac silhouette is unchanged in appearance, mildly enlarged. Mild to  moderate pulmonary vascular congestion. Mild diffuse interstitial opacities. No  confluent airspace opacity is appreciated. Blunting of the left costophrenic  sulcus. The right costophrenic sulcus appears sharp. No definite pneumothorax. No acute osseous abnormality appreciated. Impression  1. Mild to moderate pulmonary vascular congestion. 2.  Mild to moderate diffuse interstitial opacities, favor interstitial edema. 3.  Possible trace left pleural effusion. Cardiology Procedures/Testing:  MODALITY RESULTS   EKG Results for orders placed or performed during the hospital encounter of 04/13/22   EKG, 12 LEAD, INITIAL   Result Value Ref Range    Ventricular Rate 89 BPM    Atrial Rate 89 BPM    P-R Interval 158 ms    QRS Duration 88 ms    Q-T Interval 392 ms    QTC Calculation (Bezet) 476 ms    Calculated P Axis 64 degrees    Calculated R Axis 42 degrees    Calculated T Axis 71 degrees    Diagnosis       Sinus rhythm with frequent premature ventricular complexes  Possible Left atrial enlargement  Anteroseptal infarct (cited on or before 17-NOV-2021)  Abnormal ECG  When compared with ECG of 12-APR-2022 20:13,  Questionable change in initial forces of Anterior leads  Confirmed by Eunice Frye ([id number]) on 4/13/2022 2:22:29 PM     Results for orders placed or performed in visit on 02/15/18   AMB POC EKG ROUTINE W/ 12 LEADS, INTER & REP    Impression    Sinus tachycardia at 126 bpm.  No acute changes. ECHO 02/23/22    ECHO ADULT FOLLOW-UP OR LIMITED 02/23/2022 2/23/2022    Interpretation Summary    Left Ventricle: Left ventricle size is normal. Normal wall thickness. Global hypokinesis present.  Severely reduced left ventricular systolic function with a visually estimated EF of 10 -15%.   Mitral Valve: Mild transvalvular regurgitation. Signed by: Renita Whelan MD on 2/23/2022  3:18 PM           Assessment/Plan   Madeleine Hinds is an 57 y.o. male with PMH of CAD s/p stent placement two months ago on dual antiplatelt therapy( [id number] and plavis), HFrEF(EF 10-15%), CVAs in 2017 , HLD, CKD 3 admitted on 4/13/2022  for HFrEF exacerbation causing SOB. Acute on chronic HFrEF (EF 10-15%) s/p AICD in the setting of CAD s/p stent placement in 2/2022 on dual antiplatelet therapy ([id number] and plavix). Patient has edema of his legs and SOB at admission. CXR showed vasuclar congetion and edema. ProBNP was elevated at 9K and 13K. Patient has been improving since admission 2 days ago. His leg edema has improved and SOB has dissipated. Plan  - home medications of  aspirin 81 mg, carvediol 12.5 mg, plavix 75 mg, losartan 50 mg, rosuvastine 20 mg  -Lasix 40 mg injections  -strict I/Os with fluid restriction of 1200cc  -repeat ProBNP to track progress     Vtach  -supplement K since it is low at 3.2( 4.3-->4.6--->3.2)  -calcium is low ionized CA from yesterday was 0.93 consider calcium gluconate IV instead of vitamin D+ calcium tablet   -asymptomatic continue to monitor tele  -amlodpine drip if the patient becomes symptomatic      STANLEY in the setting of CKD3   - patients CR is at 1.69 , slightly increased from yesterday.  1.84->1.77-->1.59--->1.69  Plan:  -lasix 40 mg injection   -spironolactone 25 mg daily per cards   -monitor CR and avoid nephrotoxic medications  -Potassium chloride for decrease of potassium currently at 3.2     Shoulder pain(supraspinatus tendosynovitis)   -diclofenac cream  1% cream   -CK WNL less likely to be statin related    Elbow pain(olecranon impingement)   -continue to monitor n/t and pain     HLD  - rosuvastion 20 mg daily         Hx of Hypocalcemia  -Ca 8.4 on admission, currentl CA is 7.4   PLAN:   -f/u on ionized calcium levels.   -if low replete with calcium gulonate      History of embolic stroke   - Per [employer] records, in 2017 (multiple acute and subacute infarcts in the left cerebrum)   - cont home med [id number]     Pulmonary nodules   - Noted in discharge summary in 2017, CXR without any note of similar nodules   - Denied history of smoking     Subarrachnoid hemorrhage 2016  -No deficits post-hemorrhage    Global Care:  - VS per unit routine  - supplemental O2 for sats < 92%  - incentive spirometer  - PT/OT/CM     Diet  Npo    DVT Prophylaxis  Lovenox/ SCDs   GI Prophylaxis  none   Code status  full    Disposition  home       [address] [place] (Sister)   [phone] ([place])           Alexis Lopez, MS3  [facility], M.D. Class of 2023          *ATTENTION:  This note has been created by a medical student for educational purposes only. Please do not refer to the content of this note for clinical decision-making, billing, or other purposes. Please see attending physicians note to obtain clinical information on this patient. *

## 2022-04-15 NOTE — PROGRESS NOTES
Intern Progress Note  [facility] [facility]       Patient: Lila Turner MRN: [id number]  CSN: [id number]    YOB: 1960  Age: 57 y.o. Sex: male    DOA: 4/13/2022 LOS:  LOS: 2 days                    Subjective:        Acute events: Spoke with telemetry tower in the morning. V. tach up to 17 beats overnight, no AICD shocks. Sinus rhythm with PVCs afterwards. EKG was ordered overnight that showed normal sinus rhythm with PVCs. Patient awake in bed and still has leg swelling. Denies shortness of breath, chest pain. Per patient, not having any leg cramps right now. Still has pain in right shoulder and right arm, which improves with diclofenac gel. Tolerating diet without issues. Objective:     Patient Vitals for the past 24 hrs:   Temp Pulse Resp BP SpO2   04/15/22 0855 97.6 °F (36.4 °C) 88 18 (!) 149/94 98 %   04/15/22 0526 97.8 °F (36.6 °C) 86 18 132/88 97 %   04/15/22 0131 98.1 °F (36.7 °C) 73 20 113/62 96 %   04/14/22 2212 98.3 °F (36.8 °C) 74 20 104/60 96 %   04/14/22 1741 97.5 °F (36.4 °C) 93 20 (!) 141/87 96 %   04/14/22 1230 98.2 °F (36.8 °C) (!) 51 20 (!) 140/97 95 %         Intake/Output Summary (Last 24 hours) at 4/15/2022 0906  Last data filed at 4/15/2022 0526  Gross per 24 hour   Intake 476 ml   Output 3650 ml   Net -3174 ml       Physical Exam:   General:  AAOx3, NAD   CV:  RRR, no M/G/R. No visible pulsations or thrills.    RESP:  Unlabored breathing. Lungs CTAB, no wheezes, rales or rhonchi appreciated. Equal expansion bilaterally. ABD:  Soft, nontender, nondistended. BS (+).  No hepatosplenomegaly.  No suprapubic tenderness. Ext:  2+ edema in bilateral LE, 2+ dp pulses bilaterally.   Psych: normal mood and affect     Lab/Data Reviewed:  Recent Results (from the past 24 hour(s))   CK    Collection Time: 04/14/22  4:30 PM   Result Value Ref Range     39 - 308 U/L   POTASSIUM    Collection Time: 04/14/22  4:30 PM   Result Value Ref Range    Potassium 4.7 3.5 - 5.5 mmol/L   EKG, 12 LEAD, SUBSEQUENT    Collection Time: 04/14/22 11:26 PM   Result Value Ref Range    Ventricular Rate 76 BPM    Atrial Rate 76 BPM    P-R Interval 158 ms    QRS Duration 94 ms    Q-T Interval 510 ms    QTC Calculation (Bezet) 573 ms    Calculated P Axis 59 degrees    Calculated R Axis 60 degrees    Calculated T Axis 176 degrees    Diagnosis       Sinus rhythm with frequent premature ventricular complexes  T wave abnormality, consider inferior ischemia  T wave abnormality, consider anterolateral ischemia  Prolonged QT  Abnormal ECG  When compared with ECG of 13-APR-2022 12:32,  Criteria for Anteroseptal infarct are no longer present  T wave inversion now evident in Inferior leads  T wave inversion more evident in Anterolateral leads  QT has lengthened     CBC WITH AUTOMATED DIFF    Collection Time: 04/15/22  1:11 AM   Result Value Ref Range    WBC 8.7 4.6 - 13.2 K/uL    RBC 5.12 4.35 - 5.65 M/uL    HGB 15.1 13.0 - 16.0 g/dL    HCT 46.6 36.0 - 48.0 %    MCV 91.0 78.0 - 100.0 FL    MCH 29.5 24.0 - 34.0 PG    MCHC 32.4 31.0 - 37.0 g/dL    RDW 14.6 (H) 11.6 - 14.5 %    PLATELET 035 831 - 787 K/uL    MPV 13.5 (H) 9.2 - 11.8 FL    NRBC 0.0 0  WBC    ABSOLUTE NRBC 0.00 0.00 - 0.01 K/uL    NEUTROPHILS 71 40 - 73 %    LYMPHOCYTES 17 (L) 21 - 52 %    MONOCYTES 10 3 - 10 %    EOSINOPHILS 1 0 - 5 %    BASOPHILS 1 0 - 2 %    IMMATURE GRANULOCYTES 0 0.0 - 0.5 %    ABS. NEUTROPHILS 6.2 1.8 - 8.0 K/UL    ABS. LYMPHOCYTES 1.5 0.9 - 3.6 K/UL    ABS. MONOCYTES 0.8 0.05 - 1.2 K/UL    ABS. EOSINOPHILS 0.1 0.0 - 0.4 K/UL    ABS. BASOPHILS 0.1 0.0 - 0.1 K/UL    ABS. IMM.  GRANS. 0.0 0.00 - 0.04 K/UL    DF AUTOMATED     METABOLIC PANEL, BASIC    Collection Time: 04/15/22  1:11 AM   Result Value Ref Range    Sodium 138 136 - 145 mmol/L    Potassium 3.6 3.5 - 5.5 mmol/L    Chloride 99 (L) 100 - 111 mmol/L    CO2 29 21 - 32 mmol/L    Anion gap 10 3.0 - 18 mmol/L    Glucose 97 74 - 99 mg/dL    BUN 24 (H) 7.0 - 18 MG/DL    Creatinine 1.69 (H) 0.6 - 1.3 MG/DL    BUN/Creatinine ratio 14 12 - 20      GFR est AA 50 (L) >60 ml/min/1.73m2    GFR est non-AA 41 (L) >60 ml/min/1.73m2    Calcium 7.6 (L) 8.5 - 10.1 MG/DL       Assessment & Plan:   57 y. o. male with PMH including CAD s/p stent placement in 2/2022 on dual antiplatelet therapy ([id number] and plavix), HFrEF(EF10-15%), Hx of multiple CVAs in 2017, HLD, CKD 3, now admitted with acute on chronic HF exacerbation.       Acute on chronic HFrEF (EF 10-15%) s/p AICD in the setting of CAD s/p stent placement in 2/2022 on dual antiplatelet therapy ([id number] and plavix)  - On admission, bilateral LE edema, CXR w/ pulmonary vascular congestion and interstitial edema, and elevated proBNP 9K>13K.     - s/p one time dose IV lasix 80mg and nitroglycerin tablet 0.4mg sl   - SOB and leg edema most likely 2/2 acute on chronic HF exacerbation given elevated proBNP and CXR w/ pulmonary vascular congestion and interstitial edema.  Low suspicion for renal etiology given patient not on HD.    - EKG: SR  - ECHO in 2/2022: EF 10-15% w/ mild transvalvular regurgitation   - home trx: plavix 75mg daily, ASA 81, spironolactone 25mg daily, coreg 12.5mg bid, torsemide 20mg daily,  Rosuvastatin 20mg qhs, losartan 50mg daily, nitroglycerin 0.4mg SL tablet prn    - net - 3L over the last 24 hours   PLAN:  [] Follow cards reqs  [] monitor fluid status and daily Cr  [] fluid restrict 1200cc  [] strict I's and O's  [] resume all home meds   [] daily CBC, CMP, Mg, Phos     Asymptomatic Vtach  - a couple of Vtachs, up to 5 beats early in the am  - Hx of Vtach d/t cocaine use  - asymptomatic, VSS  -UDS positive for cocaine  - K 4.3>4.6>3.2  - EKG in p.m. 4/14 showed normal sinus rhythm with PVCs  PLAN:  [] monitor for Vtachs, if resumes, assessed patient at the bedside and ordered an EKG      STANLEY in the setting of CKD3   - Cr 1.84 on admission (baseline Cr 1.5)  - Daily BMP  - STANLEY most likely 2/2 to cardiorenal syndrome given elevated proBNP, leg edema, and CXR w/ vascular congestion and interstitial edema.  Low suspicion for infectious etiology given no fever, leukocytosis, or signs and symptoms of infection.    - Cr 1.84>1.77>1.59>1.69  PLAN:   [] IV lasix 80mg daily   [] monitor daily Cr  [] avoid nephrotoxic drugs  [] renal dose meds      Hypokalemia   - K 4.3>4.6>3.2>3.6  PLAN:  [] Monitor potassium     HLD  - cont Rosuvastatin 20mg daily  -Lipid panel: , HDL 37, LDL 91, TG 75     Hx of Hypocalcemia  -Ca 8.4 on admission, wnl when corrected for albumin   -Ca 8.4>7.7>7.4>7.6  PLAN:   [] replete Ca  [] Monitor Ca, replete as needed      History of embolic stroke   - Per [employer] records, in 2017 (multiple acute and subacute infarcts in the left cerebrum)   - cont home med [id number]     Pulmonary nodules   - Noted in discharge summary in 2017, CXR without any note of similar nodules   - Denied history of smoking     Subarrachnoid hemorrhage 2016  -No deficits post-hemorrhage     Global Care:  - VS per unit routine  - supplemental O2 for sats < 92%  - incentive spirometer  - PT/OT/CM     Diet  Npo    DVT Prophylaxis  Lovenox/ SCDs   GI Prophylaxis  none   Code status  full    Disposition  home       [address] [place] (Sister)   [phone] ([place])      Claudia Farr MD , PGY-1   [facility]   April 15, 2022, 9:06 AM

## 2022-04-15 NOTE — PROGRESS NOTES
Cardiology Progress Note    Admit Date: 4/13/2022  Attending Cardiologist: Dr. Murray Every:     -HFrEF exacerbation, on Coreg, Losartan, Aldactone and Torsemide as outpatient. Echo 2/23/2022 with EF 10-15%. -S/p implantation of dual-chamber [employer] AICD for primary prevention by Dr. Niranjan Vargas 11/22/2021.  -CAD, s/p Cardiac cath 2/24/2022 (for NSTEMI) with findings as follows:  · Distal LM 35-40% stenosis. · Apical LAD bridging.  LAD has diffuse 20-30% stenosis throughout with ANTHONY-3 flow. · LCx with diffuse 20% stenosis.  Distal AV circumflex is 100% occluded but appears quite small. · Dominant RCA with mid segment 35-45% stenosis.  Culprit lesion appears to be distal RCA 95% heavily calcified unstable appearing lesion; there is ANTHONY-II flow distally. ? Distal RCA stented using 2.75 mm HITESH to residual 0%. · LVEDP is 20 mmHg.  Overall LV function is severely diminished with an estimated EF of 20% with diffuse global hypokinesis. -STANLEY on CKD, improving. -HTN, on Coreg, Losartan as outpatient. -HLD, on Crestor.  -Hx traumatic subarachnoid hemorrhage in 2016.      Primary cardiologist is Dr. Mg Graft:     -Renal indices overall stable, reasonable to continue IV Lasix today and consider switch to PO diuretics tomorrow.  -Pt with frequent PVC's on telemetry/NSVT, will increase Coreg to 25 mg BID. -Continue ASA, Plavix, Crestor, Cozaar, Aldactone. CHF improving. Denies chest pain. Agree with transitioning to p.o. Lasix tomorrow. Continue current dose of Coreg as patient noted to have frequent PVCs. Monitor input output/electrolytes. Will follow. I have independently evaluated and examined the patient. All relevant labs and testing data's are reviewed. Care plan discussed and updated after review. 50% time spent reviewing data, examining patient and recommending assessment and plan. Suzette Ott MD    Subjective:     Had some L leg cramping overnight.   Reports continued RUE pain.     Objective:      Patient Vitals for the past 8 hrs:   Temp Pulse Resp BP SpO2   04/15/22 1257 97 °F (36.1 °C) 67 18 93/61 99 %   04/15/22 0855 97.6 °F (36.4 °C) 88 18 (!) 149/94 98 %         Patient Vitals for the past 96 hrs:   Weight   04/15/22 0526 82 kg (180 lb 12.8 oz)                    Current Facility-Administered Medications   Medication Dose Route Frequency Last Admin    carvediloL (COREG) tablet 25 mg  25 mg Oral BID WITH MEALS      spironolactone (ALDACTONE) tablet 25 mg  25 mg Oral DAILY 25 mg at 04/15/22 0949    heparin (porcine) injection 5,000 Units  5,000 Units SubCUTAneous Q8H 5,000 Units at 04/15/22 1252    [Held by provider] cyclobenzaprine (FLEXERIL) tablet 5 mg  5 mg Oral TID      traMADoL (ULTRAM) tablet 50 mg  50 mg Oral DAILY 50 mg at 04/15/22 0949    diclofenac (VOLTAREN) 1 % topical gel 2 g  2 g Topical QID PRN 2 g at 04/15/22 1252    calcium-vitamin D 600 mg-5 mcg (200 unit) per tablet 1 Tablet  1 Tablet Oral BID WITH MEALS 1 Tablet at 04/15/22 0948    aspirin delayed-release tablet 81 mg  81 mg Oral DAILY 81 mg at 04/15/22 0948    losartan (COZAAR) tablet 50 mg  50 mg Oral DAILY 50 mg at 04/15/22 0949    clopidogreL (PLAVIX) tablet 75 mg  75 mg Oral DAILY 75 mg at 04/15/22 0948    nitroglycerin (NITROSTAT) tablet 0.4 mg  0.4 mg SubLINGual Q5MIN PRN      rosuvastatin (CRESTOR) tablet 20 mg  20 mg Oral QHS 20 mg at 04/14/22 2216    sodium chloride (NS) flush 5-40 mL  5-40 mL IntraVENous Q8H 10 mL at 04/15/22 5247    sodium chloride (NS) flush 5-40 mL  5-40 mL IntraVENous PRN      acetaminophen (TYLENOL) tablet 650 mg  650 mg Oral Q6H PRN      Or    acetaminophen (TYLENOL) suppository 650 mg  650 mg Rectal Q6H PRN      polyethylene glycol (MIRALAX) packet 17 g  17 g Oral DAILY PRN      furosemide (LASIX) injection 40 mg  40 mg IntraVENous BID 40 mg at 04/15/22 0949    [Held by provider] torsemide (DEMADEX) tablet 20 mg  20 mg Oral BID Intake/Output Summary (Last 24 hours) at 4/15/2022 1329  Last data filed at 4/15/2022 1251  Gross per 24 hour   Intake 476 ml   Output 2475 ml   Net -1999 ml       Physical Exam:  General:  alert, cooperative, no distress, appears stated age  Neck:  supple  Lungs:  clear to auscultation bilaterally  Heart:  regular rate and rhythm  Abdomen:  abdomen is soft without significant tenderness, masses, organomegaly or guarding  Extremities:   atraumatic, trace LE edema    Visit Vitals  BP 93/61 (BP 1 Location: Left upper arm, BP Patient Position: At rest)   Pulse 67   Temp 97 °F (36.1 °C)   Resp 18   Wt 82 kg (180 lb 12.8 oz)   SpO2 99%   BMI 26.70 kg/m²       Data Review:     Labs: Results:       Chemistry Recent Labs     04/15/22  0111 04/14/22  1630 04/14/22  0808 04/14/22  0401 04/13/22  1331 04/13/22  1331 04/12/22  1625 04/12/22  1625   GLU 97  --   --  89  --  110*   < > 96     --   --  138  --  140   < > 137   K 3.6 4.7  --  3.2*   < > 4.6   < > 4.3   CL 99*  --   --  102  --  108   < > 104   CO2 29  --   --  28  --  28   < > 26   BUN 24*  --   --  29*  --  34*   < > 30*   CREA 1.69*  --   --  1.59*  --  1.77*   < > 1.84*   CA 7.6*  --   --  7.4*  --  7.7*   < > 8.4*   MG 1.8  --  1.5*  --   --   --   --   --    AGAP 10  --   --  8  --  4   < > 7   BUCR 14  --   --  18  --  19   < > 16   AP  --   --   --   --   --   --   --  140*   TP  --   --   --   --   --   --   --  7.7   ALB  --   --   --   --   --   --   --  3.6   GLOB  --   --   --   --   --   --   --  4.1*   AGRAT  --   --   --   --   --   --   --  0.9    < > = values in this interval not displayed.       CBC w/Diff Recent Labs     04/15/22  0111 04/14/22  0401 04/12/22  1625   WBC 8.7 9.6 7.0   RBC 5.12 4.84 5.75*   HGB 15.1 14.3 16.8*   HCT 46.6 43.7 52.8*    189 192   GRANS 71 67 70   LYMPH 17* 22 21   EOS 1 1 1      Cardiac Enzymes Lab Results   Component Value Date/Time     04/14/2022 04:30 PM      Lipid Panel Lab Results Component Value Date/Time    Cholesterol, total 143 02/24/2022 04:58 AM    HDL Cholesterol 37 (L) 02/24/2022 04:58 AM    LDL, calculated 91 02/24/2022 04:58 AM    VLDL, calculated 15 02/24/2022 04:58 AM    Triglyceride 75 02/24/2022 04:58 AM    CHOL/HDL Ratio 3.9 02/24/2022 04:58 AM      Liver Enzymes Recent Labs     04/12/22  1625   TP 7.7   ALB 3.6   *      Thyroid Studies Lab Results   Component Value Date/Time    TSH 0.72 02/28/2016 07:15 PM          Signed By: Makenzie Laurent PA-C supervised    April 15, 2022

## 2022-04-15 NOTE — PROGRESS NOTES
Notified by telemetry that patient had 17 beats of ectopy and V-tach. Patient asymptomatic. Patient currently up interacting with nurse but at the time of occurrence appeared asleep.      Jessica Levin MD aware and ordered STAT EKG along with lab work- Potassium, magnesium, phosphorous

## 2022-04-15 NOTE — PROGRESS NOTES
Brief Progress Note  [facility]      Subjective:      Called by nursing due to report from tele that Mr. Juanpablo Koehler has had several runs of ectopy and vtach lasting up to 17 beats. Pt denies any symptoms. Happening while he is awake and asleep. Objective:     Visit Vitals  /60 (BP 1 Location: Left upper arm, BP Patient Position: At rest)   Pulse 74   Temp 98.3 °F (36.8 °C)   Resp 20   SpO2 96%       Assessment & Plan:   Pt with HFrEF (10-15%) w/ AICD having runs of ectopy/ V-tach per tele. Vital signs stable on review and patient is asymptomatic. Runs appear to be increasing in length, but have not yet triggered AICD shock. Given that these runs of v-tach are self resolving and pt has AICD, will hold off in any intervention at this time. If they increase in frequency/ severity, can consider amiodarone. For full assessment and plan, please see daily progress note.      Charles Evans MD, PGY-2   [facility] Medicine   April 15, 2022, 1:03 AM

## 2022-04-16 VITALS
HEIGHT: 69 IN | WEIGHT: 174.7 LBS | SYSTOLIC BLOOD PRESSURE: 111 MMHG | HEART RATE: 68 BPM | DIASTOLIC BLOOD PRESSURE: 74 MMHG | TEMPERATURE: 97.8 F | BODY MASS INDEX: 25.87 KG/M2 | OXYGEN SATURATION: 96 % | RESPIRATION RATE: 18 BRPM

## 2022-04-16 LAB
ANION GAP SERPL CALC-SCNC: 8 MMOL/L (ref 3–18)
ATRIAL RATE: 84 BPM
BASOPHILS # BLD: 0.1 K/UL (ref 0–0.1)
BASOPHILS NFR BLD: 1 % (ref 0–2)
BUN SERPL-MCNC: 29 MG/DL (ref 7–18)
BUN/CREAT SERPL: 15 (ref 12–20)
CA-I SERPL-SCNC: 0.97 MMOL/L (ref 1.15–1.33)
CALCIUM SERPL-MCNC: 7.6 MG/DL (ref 8.5–10.1)
CALCULATED P AXIS, ECG09: 60 DEGREES
CALCULATED R AXIS, ECG10: 64 DEGREES
CALCULATED T AXIS, ECG11: 31 DEGREES
CHLORIDE SERPL-SCNC: 98 MMOL/L (ref 100–111)
CO2 SERPL-SCNC: 30 MMOL/L (ref 21–32)
CREAT SERPL-MCNC: 1.91 MG/DL (ref 0.6–1.3)
DIAGNOSIS, 93000: NORMAL
DIFFERENTIAL METHOD BLD: ABNORMAL
EOSINOPHIL # BLD: 0.1 K/UL (ref 0–0.4)
EOSINOPHIL NFR BLD: 2 % (ref 0–5)
ERYTHROCYTE [DISTWIDTH] IN BLOOD BY AUTOMATED COUNT: 14.4 % (ref 11.6–14.5)
GLUCOSE SERPL-MCNC: 101 MG/DL (ref 74–99)
HCT VFR BLD AUTO: 43.3 % (ref 36–48)
HGB BLD-MCNC: 14.2 G/DL (ref 13–16)
IMM GRANULOCYTES # BLD AUTO: 0 K/UL (ref 0–0.04)
IMM GRANULOCYTES NFR BLD AUTO: 0 % (ref 0–0.5)
LYMPHOCYTES # BLD: 1.8 K/UL (ref 0.9–3.6)
LYMPHOCYTES NFR BLD: 26 % (ref 21–52)
MAGNESIUM SERPL-MCNC: 1.6 MG/DL (ref 1.6–2.6)
MCH RBC QN AUTO: 29.8 PG (ref 24–34)
MCHC RBC AUTO-ENTMCNC: 32.8 G/DL (ref 31–37)
MCV RBC AUTO: 91 FL (ref 78–100)
MONOCYTES # BLD: 0.7 K/UL (ref 0.05–1.2)
MONOCYTES NFR BLD: 10 % (ref 3–10)
NEUTS SEG # BLD: 4.1 K/UL (ref 1.8–8)
NEUTS SEG NFR BLD: 61 % (ref 40–73)
NRBC # BLD: 0 K/UL (ref 0–0.01)
NRBC BLD-RTO: 0 PER 100 WBC
P-R INTERVAL, ECG05: 156 MS
PLATELET # BLD AUTO: 189 K/UL (ref 135–420)
PMV BLD AUTO: 13.2 FL (ref 9.2–11.8)
POTASSIUM SERPL-SCNC: 4.1 MMOL/L (ref 3.5–5.5)
Q-T INTERVAL, ECG07: 406 MS
QRS DURATION, ECG06: 90 MS
QTC CALCULATION (BEZET), ECG08: 479 MS
RBC # BLD AUTO: 4.76 M/UL (ref 4.35–5.65)
SODIUM SERPL-SCNC: 136 MMOL/L (ref 136–145)
VENTRICULAR RATE, ECG03: 84 BPM
WBC # BLD AUTO: 6.8 K/UL (ref 4.6–13.2)

## 2022-04-16 PROCEDURE — 83735 ASSAY OF MAGNESIUM: CPT

## 2022-04-16 PROCEDURE — 93005 ELECTROCARDIOGRAM TRACING: CPT

## 2022-04-16 PROCEDURE — 36415 COLL VENOUS BLD VENIPUNCTURE: CPT

## 2022-04-16 PROCEDURE — 74011250636 HC RX REV CODE- 250/636: Performed by: STUDENT IN AN ORGANIZED HEALTH CARE EDUCATION/TRAINING PROGRAM

## 2022-04-16 PROCEDURE — 74011250637 HC RX REV CODE- 250/637: Performed by: PHYSICIAN ASSISTANT

## 2022-04-16 PROCEDURE — 99232 SBSQ HOSP IP/OBS MODERATE 35: CPT | Performed by: INTERNAL MEDICINE

## 2022-04-16 PROCEDURE — G0378 HOSPITAL OBSERVATION PER HR: HCPCS

## 2022-04-16 PROCEDURE — 82330 ASSAY OF CALCIUM: CPT

## 2022-04-16 PROCEDURE — 85025 COMPLETE CBC W/AUTO DIFF WBC: CPT

## 2022-04-16 PROCEDURE — 74011000250 HC RX REV CODE- 250: Performed by: STUDENT IN AN ORGANIZED HEALTH CARE EDUCATION/TRAINING PROGRAM

## 2022-04-16 PROCEDURE — 80048 BASIC METABOLIC PNL TOTAL CA: CPT

## 2022-04-16 PROCEDURE — 74011250637 HC RX REV CODE- 250/637: Performed by: STUDENT IN AN ORGANIZED HEALTH CARE EDUCATION/TRAINING PROGRAM

## 2022-04-16 RX ORDER — CLOPIDOGREL BISULFATE 75 MG/1
75 TABLET ORAL DAILY
Qty: 30 TABLET | Refills: 2 | Status: SHIPPED | OUTPATIENT
Start: 2022-04-16 | End: 2022-10-12 | Stop reason: SDUPTHER

## 2022-04-16 RX ORDER — ACETAMINOPHEN 325 MG/1
650 TABLET ORAL
Qty: 30 TABLET | Refills: 2 | Status: CANCELLED | OUTPATIENT
Start: 2022-04-16 | End: 2022-05-16

## 2022-04-16 RX ORDER — CALCIUM GLUCONATE 20 MG/ML
1 INJECTION, SOLUTION INTRAVENOUS
Status: COMPLETED | OUTPATIENT
Start: 2022-04-16 | End: 2022-04-16

## 2022-04-16 RX ORDER — NITROGLYCERIN 0.4 MG/1
0.4 TABLET SUBLINGUAL
Qty: 10 TABLET | Refills: 0 | Status: SHIPPED | OUTPATIENT
Start: 2022-04-16

## 2022-04-16 RX ORDER — LANOLIN ALCOHOL/MO/W.PET/CERES
800 CREAM (GRAM) TOPICAL DAILY
Qty: 60 TABLET | Refills: 2 | Status: SHIPPED | OUTPATIENT
Start: 2022-04-16 | End: 2022-04-16 | Stop reason: SDUPTHER

## 2022-04-16 RX ORDER — CARVEDILOL 25 MG/1
25 TABLET ORAL 2 TIMES DAILY WITH MEALS
Qty: 60 TABLET | Refills: 2 | Status: SHIPPED | OUTPATIENT
Start: 2022-04-16 | End: 2022-07-15

## 2022-04-16 RX ORDER — ACETAMINOPHEN 325 MG/1
650 TABLET ORAL
Qty: 30 TABLET | Refills: 3 | Status: SHIPPED | OUTPATIENT
Start: 2022-04-16 | End: 2022-10-12 | Stop reason: ALTCHOICE

## 2022-04-16 RX ORDER — TORSEMIDE 20 MG/1
20 TABLET ORAL 2 TIMES DAILY
Qty: 60 TABLET | Refills: 2 | Status: SHIPPED | OUTPATIENT
Start: 2022-04-16 | End: 2022-04-16 | Stop reason: SDUPTHER

## 2022-04-16 RX ORDER — LANOLIN ALCOHOL/MO/W.PET/CERES
800 CREAM (GRAM) TOPICAL DAILY
Qty: 60 TABLET | Refills: 2 | Status: SHIPPED | OUTPATIENT
Start: 2022-04-16 | End: 2022-07-15

## 2022-04-16 RX ORDER — NITROGLYCERIN 0.4 MG/1
0.4 TABLET SUBLINGUAL
Qty: 10 TABLET | Refills: 0 | Status: SHIPPED | OUTPATIENT
Start: 2022-04-16 | End: 2022-04-16 | Stop reason: SDUPTHER

## 2022-04-16 RX ORDER — SPIRONOLACTONE 25 MG/1
25 TABLET ORAL DAILY
Qty: 30 TABLET | Refills: 2 | Status: SHIPPED | OUTPATIENT
Start: 2022-04-17 | End: 2022-07-16

## 2022-04-16 RX ORDER — SPIRONOLACTONE 25 MG/1
25 TABLET ORAL DAILY
Qty: 30 TABLET | Refills: 2 | Status: SHIPPED | OUTPATIENT
Start: 2022-04-17 | End: 2022-04-16 | Stop reason: SDUPTHER

## 2022-04-16 RX ORDER — NITROGLYCERIN 40 MG/1
1 PATCH TRANSDERMAL DAILY
Status: DISCONTINUED | OUTPATIENT
Start: 2022-04-17 | End: 2022-04-16

## 2022-04-16 RX ORDER — ASPIRIN 81 MG/1
81 TABLET ORAL DAILY
Qty: 30 TABLET | Refills: 2 | Status: SHIPPED | OUTPATIENT
Start: 2022-04-17 | End: 2022-07-16

## 2022-04-16 RX ORDER — TORSEMIDE 20 MG/1
20 TABLET ORAL 2 TIMES DAILY
Qty: 60 TABLET | Refills: 2 | Status: SHIPPED | OUTPATIENT
Start: 2022-04-16 | End: 2022-07-15

## 2022-04-16 RX ORDER — CALCIUM CARBONATE/VITAMIN D3 600MG-5MCG
1 TABLET ORAL 2 TIMES DAILY WITH MEALS
Qty: 60 TABLET | Refills: 2 | Status: SHIPPED | OUTPATIENT
Start: 2022-04-16 | End: 2022-04-16 | Stop reason: SDUPTHER

## 2022-04-16 RX ORDER — MAGNESIUM SULFATE HEPTAHYDRATE 40 MG/ML
2 INJECTION, SOLUTION INTRAVENOUS ONCE
Status: COMPLETED | OUTPATIENT
Start: 2022-04-16 | End: 2022-04-16

## 2022-04-16 RX ORDER — POTASSIUM CHLORIDE 750 MG/1
10 CAPSULE, EXTENDED RELEASE ORAL 2 TIMES DAILY
Qty: 30 CAPSULE | Refills: 2 | Status: SHIPPED | OUTPATIENT
Start: 2022-04-16 | End: 2022-08-07

## 2022-04-16 RX ORDER — LOSARTAN POTASSIUM 50 MG/1
50 TABLET ORAL DAILY
Qty: 30 TABLET | Refills: 2 | Status: SHIPPED | OUTPATIENT
Start: 2022-04-17 | End: 2022-07-16

## 2022-04-16 RX ORDER — CALCIUM CARBONATE/VITAMIN D3 600MG-5MCG
1 TABLET ORAL 2 TIMES DAILY WITH MEALS
Qty: 60 TABLET | Refills: 2 | Status: SHIPPED | OUTPATIENT
Start: 2022-04-16 | End: 2022-07-15

## 2022-04-16 RX ORDER — ROSUVASTATIN CALCIUM 20 MG/1
20 TABLET, COATED ORAL
Qty: 30 TABLET | Refills: 2 | Status: SHIPPED | OUTPATIENT
Start: 2022-04-16 | End: 2022-10-12 | Stop reason: SDUPTHER

## 2022-04-16 RX ORDER — LIDOCAINE 4 G/100G
1 PATCH TOPICAL EVERY 24 HOURS
Status: DISCONTINUED | OUTPATIENT
Start: 2022-04-16 | End: 2022-04-16 | Stop reason: HOSPADM

## 2022-04-16 RX ADMIN — DICLOFENAC SODIUM 2 G: 10 GEL TOPICAL at 09:09

## 2022-04-16 RX ADMIN — SODIUM CHLORIDE, PRESERVATIVE FREE 10 ML: 5 INJECTION INTRAVENOUS at 06:36

## 2022-04-16 RX ADMIN — CLOPIDOGREL BISULFATE 75 MG: 75 TABLET ORAL at 09:08

## 2022-04-16 RX ADMIN — HEPARIN SODIUM 5000 UNITS: 5000 INJECTION INTRAVENOUS; SUBCUTANEOUS at 07:33

## 2022-04-16 RX ADMIN — Medication 1 TABLET: at 09:07

## 2022-04-16 RX ADMIN — HEPARIN SODIUM 5000 UNITS: 5000 INJECTION INTRAVENOUS; SUBCUTANEOUS at 13:15

## 2022-04-16 RX ADMIN — CALCIUM GLUCONATE 1000 MG: 20 INJECTION, SOLUTION INTRAVENOUS at 09:04

## 2022-04-16 RX ADMIN — CARVEDILOL 25 MG: 25 TABLET, FILM COATED ORAL at 09:08

## 2022-04-16 RX ADMIN — MAGNESIUM SULFATE 2 G: 2 INJECTION INTRAVENOUS at 09:05

## 2022-04-16 RX ADMIN — FUROSEMIDE 40 MG: 10 INJECTION, SOLUTION INTRAMUSCULAR; INTRAVENOUS at 09:08

## 2022-04-16 RX ADMIN — SPIRONOLACTONE 25 MG: 25 TABLET ORAL at 09:08

## 2022-04-16 RX ADMIN — ASPIRIN 81 MG: 81 TABLET, COATED ORAL at 09:07

## 2022-04-16 RX ADMIN — LOSARTAN POTASSIUM 50 MG: 50 TABLET, FILM COATED ORAL at 09:07

## 2022-04-16 RX ADMIN — TRAMADOL HYDROCHLORIDE 50 MG: 50 TABLET, COATED ORAL at 09:08

## 2022-04-16 RX ADMIN — CALCIUM GLUCONATE 1000 MG: 20 INJECTION, SOLUTION INTRAVENOUS at 10:27

## 2022-04-16 NOTE — PROGRESS NOTES
Bedside and Verbal shift change report given to STACEY Patel (oncoming nurse) by Lindsay Moreland RN (offgoing nurse). Report included the following information SBAR, Kardex, Intake/Output, MAR, Accordion and Recent Results.

## 2022-04-16 NOTE — ROUTINE PROCESS
Bedside and Verbal shift change report given to Hansel Cooley (oncoming nurse) by Ever Ocasio RN (offgoing nurse). Report included the following information SBAR, Kardex and Recent Results. Patient alert and resting in bed. Security at the bedside.

## 2022-04-16 NOTE — PROGRESS NOTES
Problem: Falls - Risk of  Goal: *Absence of Falls  Description: Document Lupillo Blanchard Fall Risk and appropriate interventions in the flowsheet.   4/16/2022 1412 by Lucas Galvez RN  Outcome: Resolved/Met  4/16/2022 1039 by Lucas Galvez RN  Outcome: Progressing Towards Goal  Note: Fall Risk Interventions:            Medication Interventions: Bed/chair exit alarm                   Problem: Patient Education: Go to Patient Education Activity  Goal: Patient/Family Education  4/16/2022 1412 by Lucas Galvez RN  Outcome: Resolved/Met  4/16/2022 1039 by Lucas Galvez RN  Outcome: Progressing Towards Goal     Problem: Patient Education: Go to Patient Education Activity  Goal: Patient/Family Education  4/16/2022 1412 by Lucas Galvez RN  Outcome: Resolved/Met  4/16/2022 1039 by Lucas Galvez RN  Outcome: Progressing Towards Goal     Problem: Patient Education: Go to Patient Education Activity  Goal: Patient/Family Education  4/16/2022 1412 by Lucas Galvez RN  Outcome: Resolved/Met  4/16/2022 1039 by Lucas Galvez RN  Outcome: Progressing Towards Goal

## 2022-04-16 NOTE — DISCHARGE INSTRUCTIONS
I discharged you from the hospital with paper prescriptions for all your medications. It would be important for you to take all your medications regularly to prevent future heart failure exacerbations and hospital admissions. Your medications include nitroglycerin patches for your right arm pain. If your right arm pain worsens, please follow up with medical staff at the senior living. When you are released from senior living, please call [address] [place] [address] for a follow-up appointment at [phone]. Please also follow up with your cardiologist by calling their office for an appointment.

## 2022-04-16 NOTE — PROGRESS NOTES
Problem: Falls - Risk of  Goal: *Absence of Falls  Description: Document Cindia Neigh Fall Risk and appropriate interventions in the flowsheet.   Outcome: Progressing Towards Goal  Note: Fall Risk Interventions:            Medication Interventions: Bed/chair exit alarm                   Problem: Patient Education: Go to Patient Education Activity  Goal: Patient/Family Education  Outcome: Progressing Towards Goal     Problem: Patient Education: Go to Patient Education Activity  Goal: Patient/Family Education  Outcome: Progressing Towards Goal     Problem: Patient Education: Go to Patient Education Activity  Goal: Patient/Family Education  Outcome: Progressing Towards Goal

## 2022-04-16 NOTE — DISCHARGE SUMMARY
Discharge Summary  [facility]      Patient: Micheal Garces MRN: [id number]  CSN: [id number]    YOB: 1960  Age: 57 y.o. Sex: male      Admission Date: 4/13/2022 Discharge Date: 4/16/2022   Attending: Isaac Fan MD PCP: Yasmin Mora MD     ===================================================================    Reason for Admission:   Acute exacerbation of CHF (congestive heart failure) ([facility] [address] [I50.9]  CHF exacerbation ([facility] [address] [I50.9]    Discharge Diagnoses:   HF exacerbation   HFrEF (EF 10-15%) s/p AICD   CAD s/p stent placement in 2/2022 on dual antiplatelet therapy   CKD3  HLD  Hx of multiple CVAs (2017)  Hx of hypocalcemia     Important notes to PCP/ follow-up studies and evaluations   - daily weights, monitor for shortness of breath and edema  - Follow-up with cardiologist for HFrEF (EF 10-15%) s/p AICD in the setting of CAD s/p stent placement in 2/2022 on dual antiplatelet therapy   - follow up K, Mg, and Ca, discharged on daily potassium, magnesium, and calcium supplements-Follow-up right arm pain  - Follow-up leg cramps    Pending labs and studies:  none    Operative Procedures:   None     Discharge Medications:     Current Discharge Medication List      START taking these medications    Details   calcium-vitamin D 600 mg-5 mcg (200 unit) tab Take 1 Tablet by mouth two (2) times daily (with meals) for 90 days. Qty: 60 Tablet, Refills: 2  Start date: 4/16/2022, End date: 7/15/2022      potassium chloride SA (MICRO-K) 10 mEq capsule Take 1 Capsule by mouth two (2) times a day. Qty: 30 Capsule, Refills: 2  Start date: 4/16/2022         CONTINUE these medications which have CHANGED    Details   acetaminophen (TYLENOL) 325 mg tablet Take 2 Tablets by mouth every four (4) hours as needed for Pain for up to 60 doses. Qty: 30 Tablet, Refills: 3  Start date: 4/16/2022      clopidogreL (PLAVIX) 75 mg tab Take 1 Tablet by mouth daily.   Qty: 30 Tablet, Refills: 2  Start date: 4/16/2022      rosuvastatin (CRESTOR) 20 mg tablet Take 1 Tablet by mouth nightly. Qty: 30 Tablet, Refills: 2  Start date: 4/16/2022      aspirin delayed-release 81 mg tablet Take 1 Tablet by mouth daily for 90 days. Qty: 30 Tablet, Refills: 2  Start date: 4/17/2022, End date: 7/16/2022      carvediloL (COREG) 25 mg tablet Take 1 Tablet by mouth two (2) times daily (with meals) for 90 days. Qty: 60 Tablet, Refills: 2  Start date: 4/16/2022, End date: 7/15/2022      losartan (COZAAR) 50 mg tablet Take 1 Tablet by mouth daily for 90 days. Qty: 30 Tablet, Refills: 2  Start date: 4/17/2022, End date: 7/16/2022      nitroglycerin (NITROSTAT) 0.4 mg SL tablet 1 Tablet by SubLINGual route every five (5) minutes as needed for Chest Pain for up to 10 doses. Up to 3 doses. Qty: 10 Tablet, Refills: 0  Start date: 4/16/2022      spironolactone (ALDACTONE) 25 mg tablet Take 1 Tablet by mouth daily for 90 days. Qty: 30 Tablet, Refills: 2  Start date: 4/17/2022, End date: 7/16/2022      torsemide (DEMADEX) 20 mg tablet Take 1 Tablet by mouth two (2) times a day for 90 days. Qty: 60 Tablet, Refills: 2  Start date: 4/16/2022, End date: 7/15/2022           Disposition: bozena     Consultants:    Cardiology     [address] [place] [address] Course (including pertinent history and physical findings)  57 y. o. male with PMH including CAD s/p stent placement in 2/2022 on dual antiplatelet therapy (HVX31 and plavix), HFrEF(EF10-15%), Hx of multiple CVAs in 2017, HLD, CKD 3, now admitted with acute on chronic HF exacerbation. Patient was treated with lasix with improvement of fluid and respiratory status. He also presented with STANLEY on admission most likely secondary to cardiorenal syndrome. He was treated with Lasix for the STANLEY. Patient also had hypokalemia, hypomagnesemia, and hypocalcemia, all of which were repleted and monitor daily.   During his hospital stay, he also had 2 episodes of Vtach at night that resolved without intervention. Patient was asymptomatic during both episodes. Vital signs were stable. On discharge, patient was stable, eating, drinking, voiding, and ambulating. He was discharged back to the [facility] with paper prescriptions. It was confirmed at the medical department of the half-way that they have a pharmacy which will allow patient to fill his meds. CURRENT ADMISSION IMAGING RESULTS   XR CHEST PORT    Result Date: 4/13/2022  1. Mild to moderate pulmonary vascular congestion. 2.  Mild to moderate diffuse interstitial opacities, favor interstitial edema. 3.  Possible trace left pleural effusion. Cardiology Procedures/Testing:  MODALITY RESULTS   EKG Results for orders placed or performed during the hospital encounter of 04/13/22   EKG, 12 LEAD, INITIAL   Result Value Ref Range    Ventricular Rate 89 BPM    Atrial Rate 89 BPM    P-R Interval 158 ms    QRS Duration 88 ms    Q-T Interval 392 ms    QTC Calculation (Bezet) 476 ms    Calculated P Axis 64 degrees    Calculated R Axis 42 degrees    Calculated T Axis 71 degrees    Diagnosis       Sinus rhythm with frequent premature ventricular complexes  Possible Left atrial enlargement  Anteroseptal infarct (cited on or before 17-NOV-2021)  Abnormal ECG  When compared with ECG of 12-APR-2022 20:13,  Questionable change in initial forces of Anterior leads  Confirmed by Megha Briceño ([id number]) on 4/13/2022 2:22:29 PM     Results for orders placed or performed in visit on 02/15/18   AMB POC EKG ROUTINE W/ 12 LEADS, INTER & REP    Impression    Sinus tachycardia at 126 bpm.  No acute changes. ECHO 02/23/22    ECHO ADULT FOLLOW-UP OR LIMITED 02/23/2022 2/23/2022    Interpretation Summary    Left Ventricle: Left ventricle size is normal. Normal wall thickness. Global hypokinesis present. Severely reduced left ventricular systolic function with a visually estimated EF of 10 -15%.   Mitral Valve: Mild transvalvular regurgitation.     Signed by: Mateus Tamayo MD on 2/23/2022  3:18 PM     IR No results found for this or any previous visit. CATH 02/23/22    CARDIAC PROCEDURE 02/25/2022 2/25/2022    Conclusion  · Distal left main 35-40% stenosis. · Apical LAD bridging. LAD has diffuse 20-30% stenosis throughout with normal ANTHONY-3 flow. · Circumflex with diffuse 20% stenosis. Distal AV circumflex is 100% occluded but appears quite small. · Dominant RCA with mid segment 35-45% stenosis. Culprit lesion appears to be distal RCA 95% heavily calcified unstable appearing lesion. There is ANTHONY II flow distally. · LVEDP is 20 mmHg. Overall left ventricular LV function is severely diminished with an estimated ejection fraction of 20% with diffuse global hypokinesis. · Distal RCA stented using 2.75 mm HITESH to residual 0%. Signed by: Maeve Jose MD on 2/25/2022  8:00 AM       Special Testing/Procedures:  MODALITY RESULTS   MICRO All Micro Results     None         ABG Lab Results   Component Value Date/Time    pH (POC) 7.42 11/17/2021 11:13 PM    pCO2 (POC) 40.4 11/17/2021 11:13 PM    pO2 (POC) 87 11/17/2021 11:13 PM    HCO3 (POC) 26.2 (H) 11/17/2021 11:13 PM      UA No results found for this or any previous visit.      Laboratory Results:  LABORATORY RESULTS   HEMATOLOGY Lab Results   Component Value Date/Time    WBC 6.8 04/16/2022 01:30 AM    Hemoglobin, POC 17.3 (H) 11/23/2018 06:26 PM    HGB 14.2 04/16/2022 01:30 AM    Hematocrit, POC 51 (H) 11/23/2018 06:26 PM    HCT 43.3 04/16/2022 01:30 AM    PLATELET 421 46/59/7187 01:30 AM    MCV 91.0 04/16/2022 01:30 AM       CHEMISTRIES Lab Results   Component Value Date/Time    Sodium 136 04/16/2022 01:30 AM    Potassium 4.1 04/16/2022 01:30 AM    Chloride 98 (L) 04/16/2022 01:30 AM    CO2 30 04/16/2022 01:30 AM    Anion gap 8 04/16/2022 01:30 AM    Glucose 101 (H) 04/16/2022 01:30 AM    BUN 29 (H) 04/16/2022 01:30 AM    Creatinine 1.91 (H) 04/16/2022 01:30 AM    BUN/Creatinine ratio 15 04/16/2022 01:30 AM    GFR est AA 43 (L) 04/16/2022 01:30 AM    GFR est non-AA 36 (L) 04/16/2022 01:30 AM    Calcium 7.6 (L) 04/16/2022 01:30 AM      HEPATIC FUNCTION Lab Results   Component Value Date/Time    Albumin 3.6 04/12/2022 04:25 PM    Bilirubin, total 2.2 (H) 04/12/2022 04:25 PM    Protein, total 7.7 04/12/2022 04:25 PM    Globulin 4.1 (H) 04/12/2022 04:25 PM    A-G Ratio 0.9 04/12/2022 04:25 PM    ALT (SGPT) 60 04/12/2022 04:25 PM    Alk. phosphatase 140 (H) 04/12/2022 04:25 PM       LACTIC ACID Lab Results   Component Value Date/Time    Lactic acid 0.7 02/29/2016 12:56 AM    Lactic acid 2.2 (HH) 02/28/2016 09:45 PM    Lactic acid 2.1 (HH) 02/28/2016 07:37 PM      CARDIAC PANEL Lab Results   Component Value Date/Time     04/14/2022 04:30 PM    CK - MB 2.1 11/17/2021 09:24 PM    CK-MB Index 1.0 11/17/2021 09:24 PM    Troponin-I, QT 0.25 (H) 11/19/2021 04:15 AM      NT-proBNP Lab Results   Component Value Date/Time    NT pro-BNP 13,398 (H) 04/13/2022 01:31 PM    NT pro-BNP 9,678 (H) 04/12/2022 04:25 PM    NT pro-BNP 6,402 (H) 02/23/2022 08:15 AM    NT pro-BNP 1,359 (H) 11/17/2021 09:24 PM    NT pro-BNP 3,676 (H) 05/13/2021 01:42 PM      THYROID Lab Results   Component Value Date/Time    TSH 0.72 02/28/2016 07:15 PM        Functional status and cognitive function:    Status: alert, cooperative, no distress, appears stated age  Condition: STABLE    Physical exam on day of discharge:    General:  AAOx3, NAD   CV:  RRR, no M/G/R. No visible pulsations or thrills.    RESP:  Unlabored breathing. Lungs CTAB, no wheezes, rales or rhonchi appreciated. Equal expansion bilaterally. ABD:  Soft, nontender, nondistended. BS (+).  No hepatosplenomegaly.  No suprapubic tenderness. Ext:  1+ edema in LLE, no edema in RLE, 2+ dp pulses bilaterally.     Code status and advanced care plan: Full    Point of Contact Moni Song (Sister)   [phone] (Mobile)     Patient Education:  Patient was educated on the following topics prior to discharge: Heart failure, hypokalemia     RISK CALCULATORS:  SCORE RESULT   READMISSION RISK SCORE Medium Risk            20 Total Score    2 . Living with Significant Other. Assisted Living. LTAC. SNF. or   Rehab    4 IP Visits Last 12 Months (1-3=4, 4=9, >4=11)    5 Pt.  Coverage (Medicare=5 , Medicaid, or Self-Pay=4)    9 Charlson Comorbidity Score (Age + Comorbid Conditions)        Criteria that do not apply:    Has Seen PCP in Last 6 Months (Yes=3, No=0)    Patient Length of Stay (>5 days = 3)         Follow-up:   Follow-up Information    None         =================================================================    Jimmy Rocha MD, PGY-1   [facility] Medicine   April 16, 2022, 11:05 AM

## 2022-04-16 NOTE — PROGRESS NOTES
Intern Progress Note  [address] [facility]       Patient: Booker Shone MRN: [id number]  CSN: [id number]    YOB: 1960  Age: 57 y.o. Sex: male    DOA: 4/13/2022 LOS:  LOS: 3 days                    Subjective:        Acute events: NAEON, repleted Ca    Patient was sleeping in bed. Did not want to talk much. Denied sob and chest pain. Feels unsure about leg swelling. Still having R arm pain and would like some additional pain control. Objective:     Patient Vitals for the past 24 hrs:   Temp Pulse Resp BP SpO2   04/16/22 0322 98.2 °F (36.8 °C) 86 18 134/87 99 %   04/15/22 2314 98.3 °F (36.8 °C) 82 18 (!) 144/92 98 %   04/15/22 2029 98.8 °F (37.1 °C) 80 18 120/72 98 %   04/15/22 1940 -- -- -- -- 98 %   04/15/22 1600 98.4 °F (36.9 °C) 84 18 124/78 97 %   04/15/22 1257 97 °F (36.1 °C) 67 18 93/61 99 %   04/15/22 0855 97.6 °F (36.4 °C) 88 18 (!) 149/94 98 %         Intake/Output Summary (Last 24 hours) at 4/16/2022 0751  Last data filed at 4/16/2022 0725  Gross per 24 hour   Intake --   Output 2425 ml   Net -2425 ml       Physical Exam:   General:  AAOx3, NAD   CV:  RRR, no M/G/R. No visible pulsations or thrills.    RESP:  Unlabored breathing. Lungs CTAB, no wheezes, rales or rhonchi appreciated. Equal expansion bilaterally. ABD:  Soft, nontender, nondistended. BS (+).  No hepatosplenomegaly.  No suprapubic tenderness. Ext:  2+ edema in bilateral LE, 2+ dp pulses bilaterally.   Psych: normal mood and affect     Lab/Data Reviewed:  Recent Results (from the past 24 hour(s))   MAGNESIUM    Collection Time: 04/15/22  5:00 PM   Result Value Ref Range    Magnesium 1.7 1.6 - 2.6 mg/dL   CBC WITH AUTOMATED DIFF    Collection Time: 04/16/22  1:30 AM   Result Value Ref Range    WBC 6.8 4.6 - 13.2 K/uL    RBC 4.76 4.35 - 5.65 M/uL    HGB 14.2 13.0 - 16.0 g/dL    HCT 43.3 36.0 - 48.0 %    MCV 91.0 78.0 - 100.0 FL    MCH 29.8 24.0 - 34.0 PG    MCHC 32.8 31.0 - 37.0 g/dL    RDW 14.4 11.6 - 14.5 %    PLATELET 841 215 - 690 K/uL    MPV 13.2 (H) 9.2 - 11.8 FL    NRBC 0.0 0  WBC    ABSOLUTE NRBC 0.00 0.00 - 0.01 K/uL    NEUTROPHILS 61 40 - 73 %    LYMPHOCYTES 26 21 - 52 %    MONOCYTES 10 3 - 10 %    EOSINOPHILS 2 0 - 5 %    BASOPHILS 1 0 - 2 %    IMMATURE GRANULOCYTES 0 0.0 - 0.5 %    ABS. NEUTROPHILS 4.1 1.8 - 8.0 K/UL    ABS. LYMPHOCYTES 1.8 0.9 - 3.6 K/UL    ABS. MONOCYTES 0.7 0.05 - 1.2 K/UL    ABS. EOSINOPHILS 0.1 0.0 - 0.4 K/UL    ABS. BASOPHILS 0.1 0.0 - 0.1 K/UL    ABS. IMM.  GRANS. 0.0 0.00 - 0.04 K/UL    DF AUTOMATED     METABOLIC PANEL, BASIC    Collection Time: 04/16/22  1:30 AM   Result Value Ref Range    Sodium 136 136 - 145 mmol/L    Potassium 4.1 3.5 - 5.5 mmol/L    Chloride 98 (L) 100 - 111 mmol/L    CO2 30 21 - 32 mmol/L    Anion gap 8 3.0 - 18 mmol/L    Glucose 101 (H) 74 - 99 mg/dL    BUN 29 (H) 7.0 - 18 MG/DL    Creatinine 1.91 (H) 0.6 - 1.3 MG/DL    BUN/Creatinine ratio 15 12 - 20      GFR est AA 43 (L) >60 ml/min/1.73m2    GFR est non-AA 36 (L) >60 ml/min/1.73m2    Calcium 7.6 (L) 8.5 - 10.1 MG/DL   MAGNESIUM    Collection Time: 04/16/22  1:30 AM   Result Value Ref Range    Magnesium 1.6 1.6 - 2.6 mg/dL   CALCIUM, IONIZED    Collection Time: 04/16/22  4:31 AM   Result Value Ref Range    Ionized Calcium 0.97 (L) 1.15 - 1.33 MMOL/L   EKG, 12 LEAD, SUBSEQUENT    Collection Time: 04/16/22  7:27 AM   Result Value Ref Range    Ventricular Rate 84 BPM    Atrial Rate 84 BPM    P-R Interval 156 ms    QRS Duration 90 ms    Q-T Interval 406 ms    QTC Calculation (Bezet) 479 ms    Calculated P Axis 60 degrees    Calculated R Axis 64 degrees    Calculated T Axis 31 degrees    Diagnosis       Sinus rhythm with occasional premature ventricular complexes  Septal infarct , age undetermined  T wave abnormality, consider lateral ischemia  Abnormal ECG  When compared with ECG of 14-APR-2022 23:26,  T wave inversion less evident in Anterior leads  QT has shortened         Assessment & Plan: 57 y.o. male with PMH including CAD s/p stent placement in 2/2022 on dual antiplatelet therapy (LTG31 and plavix), HFrEF(EF10-15%), Hx of multiple CVAs in 2017, HLD, CKD 3, now admitted with acute on chronic HF exacerbation.       Acute on chronic HFrEF (EF 10-15%) s/p AICD in the setting of CAD s/p stent placement in 2/2022 on dual antiplatelet therapy (BCJ91 and plavix)  - On admission, bilateral LE edema, CXR w/ pulmonary vascular congestion and interstitial edema, and elevated proBNP 9K>13K.    - s/p one time dose IV lasix 80mg and nitroglycerin tablet 0.4mg sl   - SOB and leg edema most likely 2/2 acute on chronic HF exacerbation given elevated proBNP and CXR w/ pulmonary vascular congestion and interstitial edema.  Low suspicion for renal etiology given patient not on HD.    - EKG: SR  - ECHO in 2/2022: EF 10-15% w/ mild transvalvular regurgitation   - home trx: plavix 75mg daily, ASA 81, spironolactone 25mg daily, coreg 12.5mg bid, torsemide 20mg daily,  Rosuvastatin 20mg qhs, losartan 50mg daily, nitroglycerin 0.4mg SL tablet prn    - net - 2.3L over the last 24 hours   - increased coreg to 25mg bid   PLAN:  [] switch to po lasix, recommend cards reqs   [] cleared for d/c from cardiac standpt  [] monitor fluid status and daily Cr  [] fluid restrict 1200cc  [] strict I's and O's  [] resume all home meds   [] daily CBC, CMP, Mg, Phos     Asymptomatic Vtach  - a couple of Vtachs, up to 5 beats early in the am  - Hx of Vtach d/t cocaine use  - asymptomatic, VSS  -UDS positive for cocaine  - K 4.3>4.6>3.2>3.6>4.1  - EKG on 4/14 showed normal sinus rhythm with PVCs  PLAN:  [] monitor for Vtachs, if resumes, assess patient at the bedside and order an EKG      STANLEY in the setting of CKD3   - Cr 1.84 on admission (baseline Cr 1.5)  - Daily BMP  - STANLEY most likely 2/2 to cardiorenal syndrome given elevated proBNP, leg edema, and CXR w/ vascular congestion and interstitial edema.  Low suspicion for infectious etiology given no fever, leukocytosis, or signs and symptoms of infection.    - Cr 1.84>1.77>1.59>1.69>1.91  PLAN:   [] switch to po lasix   [] monitor daily Cr  [] avoid nephrotoxic drugs  [] renal dose meds      Hypokalemia/hypomagnesemia   - K 4.3>4.6>3.2>3.6>4.1  - Mg 1.6  PLAN:  [] repleted Mg, monitor Mg   [] monitor potassium     HLD  - cont Rosuvastatin 20mg daily  -Lipid panel: , HDL 37, LDL 91, TG 75     Hx of Hypocalcemia  -Ca 8.4 on admission, wnl when corrected for albumin   -Ca 8.4>7.7>7.4>7.6>7.6  PLAN:   [] repleted Ca  [] Monitor Ca, replete as needed      History of embolic stroke   - Per [employer] records, in 2017 (multiple acute and subacute infarcts in the left cerebrum)   - cont home med [id number]     Pulmonary nodules   - Noted in discharge summary in 2017, CXR without any note of similar nodules   - Denied history of smoking     Subarrachnoid hemorrhage 2016  -No deficits post-hemorrhage     Global Care:  - VS per unit routine  - supplemental O2 for sats < 92%  - incentive spirometer  - PT/OT/CM     Diet  Npo    DVT Prophylaxis  Lovenox/ SCDs   GI Prophylaxis  none   Code status  full    Disposition  home       [facility] [address] [place] (Sister)   [phone] ([place])       Lana Zamarripa MD , PGY-1   [facility]   April 16, 2022, 7:51 AM

## 2022-04-16 NOTE — PROGRESS NOTES
Spoke with medical department at [facility]. They have a pharmacy at a different building. Patient needs to be discharged with paper prescriptions in order to get his medications filled.

## 2022-04-16 NOTE — PROGRESS NOTES
Bedside and Verbal shift change report given to Karen Smalls RN (oncoming nurse) by Elia Darby (offgoing nurse). Report included the following information SBAR, Kardex and Cardiac Rhythm NSR.     1533 pt discharged to [facility] AT [place]. Taken down by security.  Pt and security took all personal belongings at the time of discharge pt signed discharge instructions

## 2022-04-16 NOTE — PROGRESS NOTES
[facility]  -  Progress Note      Patient: Betty Eid MRN: [id number]  SSN: [id number]    YOB: 1960  Age: 57 y.o. Sex: male      Admit Date: 4/13/2022    Assessment:     Hospital Problems  Date Reviewed: 2/15/2018          Codes Class Noted POA    Acute exacerbation of CHF (congestive heart failure) ([facility] ICD-10-CM: I50.9  ICD-9-CM: 428.0  4/13/2022 Unknown        * (Principal) CHF exacerbation ([facility] ICD-10-CM: I50.9  ICD-9-CM: 428.0  5/13/2021 Unknown            -HFrEF exacerbation, on Coreg, Losartan, Aldactone and Torsemide as outpatient.  Echo 2/23/2022 with EF 10-15%. -S/p implantation of dual-chamber Medtronic AICD for primary prevention by Dr. Dionna Pierre 11/22/2021.  -CAD, s/p Cardiac cath 2/24/2022 (for NSTEMI) with findings as follows:  · Distal LM 35-40% stenosis. · Apical LAD bridging.  LAD has diffuse 20-30% stenosis throughout with ANTHONY-3 flow. · LCx with diffuse 20% stenosis.  Distal AV circumflex is 100% occluded but appears quite small. · Dominant RCA with mid segment 35-45% stenosis.  Culprit lesion appears to be distal RCA 95% heavily calcified unstable appearing lesion; there is ANTHONY-II flow distally. ? Distal RCA stented using 2.75 mm HITESH to residual 0%. · LVEDP is 20 mmHg.  Overall LV function is severely diminished with an estimated EF of 20% with diffuse global hypokinesis. -STANLEY on CKD, improving. -HTN, on Coreg, Losartan as outpatient. -HLD, on Crestor.  -Hx traumatic subarachnoid hemorrhage in 2016.      Primary cardiologist is Dr. Terrance Villarreal:     His cardiac status is stable. He still complains of some arm ache. These are not anginal equivalents. Would continue gentle oral diuretic regimen. No new cardiac recommendations at this time. We will be available as needed. Thank you. Subjective:     No new complaints.      Objective:      Patient Vitals for the past 8 hrs:   Temp Pulse Resp BP SpO2   04/16/22 1149 97.8 °F (36.6 °C) 68 18 111/74 96 %   04/16/22 0800 98.3 °F (36.8 °C) 72 18 125/70 99 %         Patient Vitals for the past 96 hrs:   Weight   04/16/22 0723 79.2 kg (174 lb 11.2 oz)   04/15/22 0526 82 kg (180 lb 12.8 oz)         Intake/Output Summary (Last 24 hours) at 4/16/2022 1408  Last data filed at 4/16/2022 1321  Gross per 24 hour   Intake --   Output 3900 ml   Net -3900 ml       Physical Exam:  General:  alert, cooperative, no distress, appears stated age  Neck:  no JVD  Lungs:  clear to auscultation bilaterally  Heart:  regular rate and rhythm  Abdomen:  no guarding or rigidity  Extremities:  no edema    Data Review:     Labs: Results:       Chemistry Recent Labs     04/16/22  0130 04/15/22  1700 04/15/22  0111 04/14/22  1630 04/14/22  0808 04/14/22  0401   *  --  97  --   --  89     --  138  --   --  138   K 4.1  --  3.6 4.7  --  3.2*   CL 98*  --  99*  --   --  102   CO2 30  --  29  --   --  28   BUN 29*  --  24*  --   --  29*   CREA 1.91*  --  1.69*  --   --  1.59*   CA 7.6*  --  7.6*  --   --  7.4*   MG 1.6 1.7 1.8  --    < >  --    AGAP 8  --  10  --   --  8   BUCR 15  --  14  --   --  18    < > = values in this interval not displayed. CBC w/Diff Recent Labs     04/16/22  0130 04/15/22  0111 04/14/22  0401   WBC 6.8 8.7 9.6   RBC 4.76 5.12 4.84   HGB 14.2 15.1 14.3   HCT 43.3 46.6 43.7    191 189   GRANS 61 71 67   LYMPH 26 17* 22   EOS 2 1 1      Cardiac Enzymes No results found for: CPK, CK, CKMMB, CKMB, RCK3, CKMBT, CKNDX, CKND1, SEBASTIÁN, TROPT, TROIQ, YANETH, TROPT, TNIPOC, BNP, BNPP   Coagulation No results for input(s): PTP, INR, APTT, INREXT in the last 72 hours.     Lipid Panel Lab Results   Component Value Date/Time    Cholesterol, total 143 02/24/2022 04:58 AM    HDL Cholesterol 37 (L) 02/24/2022 04:58 AM    LDL, calculated 91 02/24/2022 04:58 AM    VLDL, calculated 15 02/24/2022 04:58 AM    Triglyceride 75 02/24/2022 04:58 AM    CHOL/HDL Ratio 3.9 02/24/2022 04:58 AM      BNP No results found for: BNP, BNPP, XBNPT   Liver Enzymes No results for input(s): TP, ALB, TBIL, AP in the last 72 hours.     No lab exists for component: SGOT, GPT, DBIL   Digoxin    Thyroid Studies Lab Results   Component Value Date/Time    TSH 0.72 02/28/2016 07:15 PM

## 2022-04-18 ENCOUNTER — HOSPITAL ENCOUNTER (EMERGENCY)
Age: 62
Discharge: HOME | End: 2022-04-18
Attending: EMERGENCY MEDICINE
Payer: MEDICARE

## 2022-04-18 ENCOUNTER — APPOINTMENT (OUTPATIENT)
Dept: GENERAL RADIOLOGY | Age: 62
End: 2022-04-18
Attending: STUDENT IN AN ORGANIZED HEALTH CARE EDUCATION/TRAINING PROGRAM
Payer: MEDICARE

## 2022-04-18 VITALS
HEART RATE: 80 BPM | SYSTOLIC BLOOD PRESSURE: 131 MMHG | DIASTOLIC BLOOD PRESSURE: 96 MMHG | RESPIRATION RATE: 22 BRPM | TEMPERATURE: 97.8 F | OXYGEN SATURATION: 98 %

## 2022-04-18 DIAGNOSIS — R07.9 CHEST PAIN, UNSPECIFIED TYPE: Primary | ICD-10-CM

## 2022-04-18 LAB
ALBUMIN SERPL-MCNC: 2.9 G/DL (ref 3.4–5)
ALBUMIN/GLOB SERPL: 0.9 {RATIO} (ref 0.8–1.7)
ALP SERPL-CCNC: 81 U/L (ref 45–117)
ALT SERPL-CCNC: 41 U/L (ref 16–61)
ANION GAP SERPL CALC-SCNC: 5 MMOL/L (ref 3–18)
APTT PPP: 27.6 SEC (ref 23–36.4)
AST SERPL-CCNC: 36 U/L (ref 10–38)
BASOPHILS # BLD: 0 K/UL (ref 0–0.1)
BASOPHILS NFR BLD: 1 % (ref 0–2)
BILIRUB SERPL-MCNC: 1.5 MG/DL (ref 0.2–1)
BNP SERPL-MCNC: 3813 PG/ML (ref 0–900)
BUN SERPL-MCNC: 27 MG/DL (ref 7–18)
BUN/CREAT SERPL: 16 (ref 12–20)
CALCIUM SERPL-MCNC: 8 MG/DL (ref 8.5–10.1)
CHLORIDE SERPL-SCNC: 100 MMOL/L (ref 100–111)
CO2 SERPL-SCNC: 33 MMOL/L (ref 21–32)
CREAT SERPL-MCNC: 1.64 MG/DL (ref 0.6–1.3)
DIFFERENTIAL METHOD BLD: ABNORMAL
EOSINOPHIL # BLD: 0.1 K/UL (ref 0–0.4)
EOSINOPHIL NFR BLD: 2 % (ref 0–5)
ERYTHROCYTE [DISTWIDTH] IN BLOOD BY AUTOMATED COUNT: 14.4 % (ref 11.6–14.5)
GLOBULIN SER CALC-MCNC: 3.3 G/DL (ref 2–4)
GLUCOSE SERPL-MCNC: 106 MG/DL (ref 74–99)
HCT VFR BLD AUTO: 49.9 % (ref 36–48)
HGB BLD-MCNC: 15.9 G/DL (ref 13–16)
IMM GRANULOCYTES # BLD AUTO: 0 K/UL (ref 0–0.04)
IMM GRANULOCYTES NFR BLD AUTO: 0 % (ref 0–0.5)
INR PPP: 1 (ref 0.8–1.2)
LIPASE SERPL-CCNC: 320 U/L (ref 73–393)
LYMPHOCYTES # BLD: 1.4 K/UL (ref 0.9–3.6)
LYMPHOCYTES NFR BLD: 27 % (ref 21–52)
MAGNESIUM SERPL-MCNC: 2 MG/DL (ref 1.6–2.6)
MCH RBC QN AUTO: 29.7 PG (ref 24–34)
MCHC RBC AUTO-ENTMCNC: 31.9 G/DL (ref 31–37)
MCV RBC AUTO: 93.3 FL (ref 78–100)
MONOCYTES # BLD: 0.6 K/UL (ref 0.05–1.2)
MONOCYTES NFR BLD: 11 % (ref 3–10)
NEUTS SEG # BLD: 3.2 K/UL (ref 1.8–8)
NEUTS SEG NFR BLD: 59 % (ref 40–73)
NRBC # BLD: 0 K/UL (ref 0–0.01)
NRBC BLD-RTO: 0 PER 100 WBC
PLATELET # BLD AUTO: 189 K/UL (ref 135–420)
PMV BLD AUTO: 12.9 FL (ref 9.2–11.8)
POTASSIUM SERPL-SCNC: 4.4 MMOL/L (ref 3.5–5.5)
PROT SERPL-MCNC: 6.2 G/DL (ref 6.4–8.2)
PROTHROMBIN TIME: 13.3 SEC (ref 11.5–15.2)
RBC # BLD AUTO: 5.35 M/UL (ref 4.35–5.65)
SODIUM SERPL-SCNC: 138 MMOL/L (ref 136–145)
TROPONIN-HIGH SENSITIVITY: 22 NG/L (ref 0–78)
TROPONIN-HIGH SENSITIVITY: 24 NG/L (ref 0–78)
WBC # BLD AUTO: 5.4 K/UL (ref 4.6–13.2)

## 2022-04-18 PROCEDURE — 71045 X-RAY EXAM CHEST 1 VIEW: CPT

## 2022-04-18 PROCEDURE — 80053 COMPREHEN METABOLIC PANEL: CPT

## 2022-04-18 PROCEDURE — 93005 ELECTROCARDIOGRAM TRACING: CPT

## 2022-04-18 PROCEDURE — 85610 PROTHROMBIN TIME: CPT

## 2022-04-18 PROCEDURE — 84484 ASSAY OF TROPONIN QUANT: CPT

## 2022-04-18 PROCEDURE — 83735 ASSAY OF MAGNESIUM: CPT

## 2022-04-18 PROCEDURE — 83880 ASSAY OF NATRIURETIC PEPTIDE: CPT

## 2022-04-18 PROCEDURE — 85025 COMPLETE CBC W/AUTO DIFF WBC: CPT

## 2022-04-18 PROCEDURE — 74011250637 HC RX REV CODE- 250/637: Performed by: STUDENT IN AN ORGANIZED HEALTH CARE EDUCATION/TRAINING PROGRAM

## 2022-04-18 PROCEDURE — 83690 ASSAY OF LIPASE: CPT

## 2022-04-18 PROCEDURE — 99285 EMERGENCY DEPT VISIT HI MDM: CPT

## 2022-04-18 PROCEDURE — 85730 THROMBOPLASTIN TIME PARTIAL: CPT

## 2022-04-18 RX ORDER — ACETAMINOPHEN 500 MG
1000 TABLET ORAL
Status: COMPLETED | OUTPATIENT
Start: 2022-04-18 | End: 2022-04-18

## 2022-04-18 RX ADMIN — ACETAMINOPHEN 1000 MG: 500 TABLET ORAL at 18:57

## 2022-04-18 NOTE — DISCHARGE INSTRUCTIONS
Been evaluated today for chest pain. Based off our examination as well as lab findings, it is most likely that your pain is musculoskeletal in nature. No evidence that you are currently experiencing any damage to the heart. Please return to the ER if you experience worsening chest pain, shortness of breath, or for any other concerning symptoms. Sure to follow-up with your cardiologist as soon as possible to make up for the missed appointment.
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
## 2022-04-18 NOTE — ED TRIAGE NOTES
Patint arrives to ED with report of chest pain, patient had defib placed two months ago, patient was given 3 nitro and aspirin en route.

## 2022-04-19 LAB
ATRIAL RATE: 73 BPM
CALCULATED P AXIS, ECG09: 73 DEGREES
CALCULATED R AXIS, ECG10: 67 DEGREES
CALCULATED T AXIS, ECG11: 88 DEGREES
DIAGNOSIS, 93000: NORMAL
P-R INTERVAL, ECG05: 150 MS
Q-T INTERVAL, ECG07: 418 MS
QRS DURATION, ECG06: 88 MS
QTC CALCULATION (BEZET), ECG08: 460 MS
VENTRICULAR RATE, ECG03: 73 BPM
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
## 2022-08-07 ENCOUNTER — APPOINTMENT (OUTPATIENT)
Dept: VASCULAR SURGERY | Age: 62
DRG: 291 | End: 2022-08-07
Attending: EMERGENCY MEDICINE
Payer: MEDICARE

## 2022-08-07 ENCOUNTER — APPOINTMENT (OUTPATIENT)
Dept: GENERAL RADIOLOGY | Age: 62
DRG: 291 | End: 2022-08-07
Attending: EMERGENCY MEDICINE
Payer: MEDICARE

## 2022-08-07 ENCOUNTER — HOSPITAL ENCOUNTER (INPATIENT)
Age: 62
LOS: 7 days | Discharge: HOME HEALTH | DRG: 291 | End: 2022-08-14
Attending: EMERGENCY MEDICINE | Admitting: FAMILY MEDICINE
Payer: MEDICARE

## 2022-08-07 ENCOUNTER — APPOINTMENT (OUTPATIENT)
Dept: CT IMAGING | Age: 62
DRG: 291 | End: 2022-08-07
Attending: EMERGENCY MEDICINE
Payer: MEDICARE

## 2022-08-07 DIAGNOSIS — I50.23 ACUTE ON CHRONIC SYSTOLIC CONGESTIVE HEART FAILURE (HCC): Primary | ICD-10-CM

## 2022-08-07 LAB
ALBUMIN SERPL-MCNC: 2.9 G/DL (ref 3.4–5)
ALBUMIN/GLOB SERPL: 0.8 {RATIO} (ref 0.8–1.7)
ALP SERPL-CCNC: 136 U/L (ref 45–117)
ALT SERPL-CCNC: 48 U/L (ref 16–61)
AMPHET UR QL SCN: NEGATIVE
ANION GAP BLD CALC-SCNC: 12 MMOL/L (ref 10–20)
ANION GAP SERPL CALC-SCNC: 9 MMOL/L (ref 3–18)
APPEARANCE UR: CLEAR
ARTERIAL PATENCY WRIST A: POSITIVE
AST SERPL-CCNC: 48 U/L (ref 10–38)
BARBITURATES UR QL SCN: NEGATIVE
BASE DEFICIT BLD-SCNC: 2.9 MMOL/L
BASE EXCESS BLDV CALC-SCNC: 0.2 MMOL/L
BASOPHILS # BLD: 0 K/UL (ref 0–0.1)
BASOPHILS NFR BLD: 0 % (ref 0–2)
BDY SITE: ABNORMAL
BENZODIAZ UR QL: NEGATIVE
BILIRUB SERPL-MCNC: 1.5 MG/DL (ref 0.2–1)
BILIRUB UR QL: NEGATIVE
BNP SERPL-MCNC: ABNORMAL PG/ML (ref 0–900)
BUN SERPL-MCNC: 37 MG/DL (ref 7–18)
BUN/CREAT SERPL: 20 (ref 12–20)
CA-I BLD-MCNC: 0.99 MMOL/L (ref 1.12–1.32)
CALCIUM SERPL-MCNC: 7.6 MG/DL (ref 8.5–10.1)
CANNABINOIDS UR QL SCN: NEGATIVE
CHLORIDE BLD-SCNC: 106 MMOL/L (ref 98–107)
CHLORIDE SERPL-SCNC: 105 MMOL/L (ref 100–111)
CO2 BLD-SCNC: 21 MMOL/L (ref 19–24)
CO2 SERPL-SCNC: 25 MMOL/L (ref 21–32)
COCAINE UR QL SCN: POSITIVE
COLOR UR: YELLOW
CREAT BLD-MCNC: 1.87 MG/DL (ref 0.6–1.3)
CREAT SERPL-MCNC: 1.83 MG/DL (ref 0.6–1.3)
DIFFERENTIAL METHOD BLD: ABNORMAL
EOSINOPHIL # BLD: 0.2 K/UL (ref 0–0.4)
EOSINOPHIL NFR BLD: 2 % (ref 0–5)
ERYTHROCYTE [DISTWIDTH] IN BLOOD BY AUTOMATED COUNT: 13.9 % (ref 11.6–14.5)
ETHANOL SERPL-MCNC: <3 MG/DL (ref 0–3)
FIO2 ON VENT: 21 %
FLUAV RNA SPEC QL NAA+PROBE: NOT DETECTED
FLUBV RNA SPEC QL NAA+PROBE: NOT DETECTED
GLOBULIN SER CALC-MCNC: 3.8 G/DL (ref 2–4)
GLUCOSE BLD STRIP.AUTO-MCNC: 103 MG/DL (ref 70–110)
GLUCOSE BLD-MCNC: 169 MG/DL (ref 65–100)
GLUCOSE SERPL-MCNC: 110 MG/DL (ref 74–99)
GLUCOSE UR STRIP.AUTO-MCNC: NEGATIVE MG/DL
HCO3 BLD-SCNC: 21 MMOL/L (ref 22–26)
HCO3 BLDV-SCNC: 23.9 MMOL/L (ref 23–28)
HCT VFR BLD AUTO: 45.9 % (ref 36–48)
HDSCOM,HDSCOM: ABNORMAL
HGB BLD-MCNC: 14.7 G/DL (ref 13–16)
HGB UR QL STRIP: NEGATIVE
IMM GRANULOCYTES # BLD AUTO: 0 K/UL
IMM GRANULOCYTES NFR BLD AUTO: 0 %
KETONES UR QL STRIP.AUTO: NEGATIVE MG/DL
LACTATE BLD-SCNC: 1.22 MMOL/L (ref 0.4–2)
LEUKOCYTE ESTERASE UR QL STRIP.AUTO: NEGATIVE
LYMPHOCYTES # BLD: 1.1 K/UL (ref 0.9–3.6)
LYMPHOCYTES NFR BLD: 12 % (ref 21–52)
MAGNESIUM SERPL-MCNC: 1.6 MG/DL (ref 1.6–2.6)
MCH RBC QN AUTO: 30.1 PG (ref 24–34)
MCHC RBC AUTO-ENTMCNC: 32 G/DL (ref 31–37)
MCV RBC AUTO: 93.9 FL (ref 78–100)
METHADONE UR QL: NEGATIVE
MONOCYTES # BLD: 0.5 K/UL (ref 0.05–1.2)
MONOCYTES NFR BLD: 6 % (ref 3–10)
NEUTS SEG # BLD: 7.2 K/UL (ref 1.8–8)
NEUTS SEG NFR BLD: 80 % (ref 40–73)
NITRITE UR QL STRIP.AUTO: NEGATIVE
NRBC # BLD: 0 K/UL (ref 0–0.01)
NRBC BLD-RTO: 0 PER 100 WBC
OPIATES UR QL: NEGATIVE
PCO2 BLD: 33.5 MMHG (ref 35–45)
PCO2 BLDV: 35.2 MMHG (ref 41–51)
PCP UR QL: NEGATIVE
PH BLD: 7.4 [PH] (ref 7.35–7.45)
PH BLDV: 7.44 [PH] (ref 7.32–7.42)
PH UR STRIP: 5.5 [PH] (ref 5–8)
PLATELET # BLD AUTO: 228 K/UL (ref 135–420)
PLATELET COMMENTS,PCOM: ABNORMAL
PMV BLD AUTO: 12.3 FL (ref 9.2–11.8)
PO2 BLD: 60 MMHG (ref 80–100)
PO2 BLDV: 34 MMHG (ref 25–40)
POTASSIUM BLD-SCNC: 3.6 MMOL/L (ref 3.5–5.1)
POTASSIUM SERPL-SCNC: 4.3 MMOL/L (ref 3.5–5.5)
PROT SERPL-MCNC: 6.7 G/DL (ref 6.4–8.2)
PROT UR STRIP-MCNC: NEGATIVE MG/DL
RBC # BLD AUTO: 4.89 M/UL (ref 4.35–5.65)
RBC MORPH BLD: ABNORMAL
SAO2 % BLD: 91 %
SAO2 % BLDV: 68.2 % (ref 65–88)
SARS-COV-2, COV2: NOT DETECTED
SERVICE CMNT-IMP: ABNORMAL
SERVICE CMNT-IMP: ABNORMAL
SODIUM BLD-SCNC: 138 MMOL/L (ref 136–145)
SODIUM SERPL-SCNC: 139 MMOL/L (ref 136–145)
SP GR UR REFRACTOMETRY: 1.01 (ref 1–1.03)
SPECIMEN SITE: ABNORMAL
SPECIMEN TYPE: ABNORMAL
TROPONIN-HIGH SENSITIVITY: 21 NG/L (ref 0–78)
TROPONIN-HIGH SENSITIVITY: 22 NG/L (ref 0–78)
TROPONIN-HIGH SENSITIVITY: 22 NG/L (ref 0–78)
UROBILINOGEN UR QL STRIP.AUTO: 1 EU/DL (ref 0.2–1)
WBC # BLD AUTO: 9 K/UL (ref 4.6–13.2)

## 2022-08-07 PROCEDURE — 74011000636 HC RX REV CODE- 636: Performed by: EMERGENCY MEDICINE

## 2022-08-07 PROCEDURE — 80053 COMPREHEN METABOLIC PANEL: CPT

## 2022-08-07 PROCEDURE — 81003 URINALYSIS AUTO W/O SCOPE: CPT

## 2022-08-07 PROCEDURE — 74011250637 HC RX REV CODE- 250/637: Performed by: STUDENT IN AN ORGANIZED HEALTH CARE EDUCATION/TRAINING PROGRAM

## 2022-08-07 PROCEDURE — 2709999900 HC NON-CHARGEABLE SUPPLY

## 2022-08-07 PROCEDURE — 71275 CT ANGIOGRAPHY CHEST: CPT

## 2022-08-07 PROCEDURE — 74011000250 HC RX REV CODE- 250

## 2022-08-07 PROCEDURE — 82077 ASSAY SPEC XCP UR&BREATH IA: CPT

## 2022-08-07 PROCEDURE — 83880 ASSAY OF NATRIURETIC PEPTIDE: CPT

## 2022-08-07 PROCEDURE — 93005 ELECTROCARDIOGRAM TRACING: CPT

## 2022-08-07 PROCEDURE — 71045 X-RAY EXAM CHEST 1 VIEW: CPT

## 2022-08-07 PROCEDURE — 74011250637 HC RX REV CODE- 250/637

## 2022-08-07 PROCEDURE — 82947 ASSAY GLUCOSE BLOOD QUANT: CPT

## 2022-08-07 PROCEDURE — 93970 EXTREMITY STUDY: CPT

## 2022-08-07 PROCEDURE — 74011250636 HC RX REV CODE- 250/636: Performed by: EMERGENCY MEDICINE

## 2022-08-07 PROCEDURE — 87636 SARSCOV2 & INF A&B AMP PRB: CPT

## 2022-08-07 PROCEDURE — 99285 EMERGENCY DEPT VISIT HI MDM: CPT

## 2022-08-07 PROCEDURE — 82962 GLUCOSE BLOOD TEST: CPT

## 2022-08-07 PROCEDURE — 96374 THER/PROPH/DIAG INJ IV PUSH: CPT

## 2022-08-07 PROCEDURE — 36600 WITHDRAWAL OF ARTERIAL BLOOD: CPT

## 2022-08-07 PROCEDURE — 84484 ASSAY OF TROPONIN QUANT: CPT

## 2022-08-07 PROCEDURE — 85025 COMPLETE CBC W/AUTO DIFF WBC: CPT

## 2022-08-07 PROCEDURE — 80307 DRUG TEST PRSMV CHEM ANLYZR: CPT

## 2022-08-07 PROCEDURE — 83735 ASSAY OF MAGNESIUM: CPT

## 2022-08-07 PROCEDURE — 74011250636 HC RX REV CODE- 250/636

## 2022-08-07 PROCEDURE — 65270000046 HC RM TELEMETRY

## 2022-08-07 PROCEDURE — 82803 BLOOD GASES ANY COMBINATION: CPT

## 2022-08-07 RX ORDER — ONDANSETRON 2 MG/ML
4 INJECTION INTRAMUSCULAR; INTRAVENOUS
Status: DISCONTINUED | OUTPATIENT
Start: 2022-08-07 | End: 2022-08-07

## 2022-08-07 RX ORDER — FUROSEMIDE 10 MG/ML
80 INJECTION INTRAMUSCULAR; INTRAVENOUS
Status: COMPLETED | OUTPATIENT
Start: 2022-08-07 | End: 2022-08-07

## 2022-08-07 RX ORDER — ONDANSETRON 4 MG/1
4 TABLET, ORALLY DISINTEGRATING ORAL
Status: DISCONTINUED | OUTPATIENT
Start: 2022-08-07 | End: 2022-08-07

## 2022-08-07 RX ORDER — CARVEDILOL 12.5 MG/1
12.5 TABLET ORAL 2 TIMES DAILY WITH MEALS
Status: DISCONTINUED | OUTPATIENT
Start: 2022-08-08 | End: 2022-08-11

## 2022-08-07 RX ORDER — POLYETHYLENE GLYCOL 3350 17 G/17G
17 POWDER, FOR SOLUTION ORAL DAILY PRN
Status: DISCONTINUED | OUTPATIENT
Start: 2022-08-07 | End: 2022-08-14 | Stop reason: HOSPADM

## 2022-08-07 RX ORDER — ENOXAPARIN SODIUM 100 MG/ML
40 INJECTION SUBCUTANEOUS DAILY
Status: DISCONTINUED | OUTPATIENT
Start: 2022-08-08 | End: 2022-08-07

## 2022-08-07 RX ORDER — LOSARTAN POTASSIUM 50 MG/1
50 TABLET ORAL DAILY
Status: DISCONTINUED | OUTPATIENT
Start: 2022-08-08 | End: 2022-08-07

## 2022-08-07 RX ORDER — ROSUVASTATIN CALCIUM 20 MG/1
20 TABLET, COATED ORAL
Status: DISCONTINUED | OUTPATIENT
Start: 2022-08-07 | End: 2022-08-14 | Stop reason: HOSPADM

## 2022-08-07 RX ORDER — TORSEMIDE 20 MG/1
20 TABLET ORAL 2 TIMES DAILY
Status: DISCONTINUED | OUTPATIENT
Start: 2022-08-07 | End: 2022-08-11

## 2022-08-07 RX ORDER — GABAPENTIN 100 MG/1
100 CAPSULE ORAL 3 TIMES DAILY
Status: DISCONTINUED | OUTPATIENT
Start: 2022-08-07 | End: 2022-08-14 | Stop reason: HOSPADM

## 2022-08-07 RX ORDER — GABAPENTIN 100 MG/1
CAPSULE ORAL 3 TIMES DAILY
COMMUNITY

## 2022-08-07 RX ORDER — CARVEDILOL 12.5 MG/1
12.5 TABLET ORAL 2 TIMES DAILY WITH MEALS
Status: DISCONTINUED | OUTPATIENT
Start: 2022-08-07 | End: 2022-08-07

## 2022-08-07 RX ORDER — SODIUM CHLORIDE 0.9 % (FLUSH) 0.9 %
5-40 SYRINGE (ML) INJECTION AS NEEDED
Status: DISCONTINUED | OUTPATIENT
Start: 2022-08-07 | End: 2022-08-14 | Stop reason: HOSPADM

## 2022-08-07 RX ORDER — LOSARTAN POTASSIUM 50 MG/1
50 TABLET ORAL DAILY
Status: DISCONTINUED | OUTPATIENT
Start: 2022-08-07 | End: 2022-08-07

## 2022-08-07 RX ORDER — NITROGLYCERIN 0.4 MG/1
0.4 TABLET SUBLINGUAL
Status: DISCONTINUED | OUTPATIENT
Start: 2022-08-07 | End: 2022-08-14 | Stop reason: HOSPADM

## 2022-08-07 RX ORDER — CARVEDILOL 12.5 MG/1
12.5 TABLET ORAL 2 TIMES DAILY WITH MEALS
COMMUNITY
End: 2022-08-14

## 2022-08-07 RX ORDER — CLOPIDOGREL BISULFATE 75 MG/1
75 TABLET ORAL DAILY
Status: DISCONTINUED | OUTPATIENT
Start: 2022-08-08 | End: 2022-08-07

## 2022-08-07 RX ORDER — ACETAMINOPHEN 650 MG/1
650 SUPPOSITORY RECTAL
Status: DISCONTINUED | OUTPATIENT
Start: 2022-08-07 | End: 2022-08-14 | Stop reason: HOSPADM

## 2022-08-07 RX ORDER — HEPARIN SODIUM 5000 [USP'U]/ML
5000 INJECTION, SOLUTION INTRAVENOUS; SUBCUTANEOUS EVERY 8 HOURS
Status: DISCONTINUED | OUTPATIENT
Start: 2022-08-07 | End: 2022-08-14 | Stop reason: HOSPADM

## 2022-08-07 RX ORDER — FUROSEMIDE 10 MG/ML
40 INJECTION INTRAMUSCULAR; INTRAVENOUS DAILY
Status: DISCONTINUED | OUTPATIENT
Start: 2022-08-08 | End: 2022-08-09

## 2022-08-07 RX ORDER — FUROSEMIDE 10 MG/ML
40 INJECTION INTRAMUSCULAR; INTRAVENOUS DAILY
Status: DISCONTINUED | OUTPATIENT
Start: 2022-08-08 | End: 2022-08-07

## 2022-08-07 RX ORDER — TORSEMIDE 20 MG/1
20 TABLET ORAL 2 TIMES DAILY
COMMUNITY
End: 2022-08-14

## 2022-08-07 RX ORDER — LOSARTAN POTASSIUM 50 MG/1
50 TABLET ORAL DAILY
Status: DISCONTINUED | OUTPATIENT
Start: 2022-08-07 | End: 2022-08-08

## 2022-08-07 RX ORDER — LOSARTAN POTASSIUM 50 MG/1
50 TABLET ORAL DAILY
COMMUNITY
End: 2022-08-14

## 2022-08-07 RX ORDER — ACETAMINOPHEN 325 MG/1
650 TABLET ORAL
Status: DISCONTINUED | OUTPATIENT
Start: 2022-08-07 | End: 2022-08-14 | Stop reason: HOSPADM

## 2022-08-07 RX ORDER — CLOPIDOGREL BISULFATE 75 MG/1
75 TABLET ORAL DAILY
Status: DISCONTINUED | OUTPATIENT
Start: 2022-08-07 | End: 2022-08-14 | Stop reason: HOSPADM

## 2022-08-07 RX ORDER — MAGNESIUM SULFATE HEPTAHYDRATE 40 MG/ML
2 INJECTION, SOLUTION INTRAVENOUS ONCE
Status: COMPLETED | OUTPATIENT
Start: 2022-08-07 | End: 2022-08-07

## 2022-08-07 RX ORDER — SODIUM CHLORIDE 0.9 % (FLUSH) 0.9 %
5-40 SYRINGE (ML) INJECTION EVERY 8 HOURS
Status: DISCONTINUED | OUTPATIENT
Start: 2022-08-07 | End: 2022-08-14 | Stop reason: HOSPADM

## 2022-08-07 RX ADMIN — ROSUVASTATIN CALCIUM 20 MG: 20 TABLET, COATED ORAL at 22:11

## 2022-08-07 RX ADMIN — GABAPENTIN 100 MG: 100 CAPSULE ORAL at 15:44

## 2022-08-07 RX ADMIN — SODIUM CHLORIDE, PRESERVATIVE FREE 10 ML: 5 INJECTION INTRAVENOUS at 16:47

## 2022-08-07 RX ADMIN — IOPAMIDOL 75 ML: 755 INJECTION, SOLUTION INTRAVENOUS at 11:59

## 2022-08-07 RX ADMIN — CARVEDILOL 12.5 MG: 12.5 TABLET, FILM COATED ORAL at 15:44

## 2022-08-07 RX ADMIN — HEPARIN SODIUM 5000 UNITS: 5000 INJECTION INTRAVENOUS; SUBCUTANEOUS at 15:45

## 2022-08-07 RX ADMIN — CLOPIDOGREL BISULFATE 75 MG: 75 TABLET ORAL at 16:10

## 2022-08-07 RX ADMIN — LOSARTAN POTASSIUM 50 MG: 50 TABLET, FILM COATED ORAL at 16:56

## 2022-08-07 RX ADMIN — MAGNESIUM SULFATE HEPTAHYDRATE 2 G: 40 INJECTION, SOLUTION INTRAVENOUS at 16:45

## 2022-08-07 RX ADMIN — FUROSEMIDE 80 MG: 10 INJECTION, SOLUTION INTRAMUSCULAR; INTRAVENOUS at 10:52

## 2022-08-07 RX ADMIN — GABAPENTIN 100 MG: 100 CAPSULE ORAL at 22:11

## 2022-08-07 RX ADMIN — SODIUM CHLORIDE, PRESERVATIVE FREE 10 ML: 5 INJECTION INTRAVENOUS at 22:12

## 2022-08-07 NOTE — ED NOTES
Patient ambulated in hallway on room air with nurse. 02 sat remained at 96% but HR sustained in 120's and was tachypneic with c/o SOB. Patient placed back on stretcher.
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
## 2022-08-07 NOTE — ED NOTES
Report rec'd from Marah Martinez RN. Pt alert, awake, resting on gurney. No acute distress noted at this time.
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
## 2022-08-08 ENCOUNTER — APPOINTMENT (OUTPATIENT)
Dept: GENERAL RADIOLOGY | Age: 62
DRG: 291 | End: 2022-08-08
Payer: MEDICARE

## 2022-08-08 LAB
ANION GAP SERPL CALC-SCNC: 7 MMOL/L (ref 3–18)
ATRIAL RATE: 100 BPM
ATRIAL RATE: 101 BPM
BASOPHILS # BLD: 0.1 K/UL (ref 0–0.1)
BASOPHILS NFR BLD: 1 % (ref 0–2)
BUN SERPL-MCNC: 36 MG/DL (ref 7–18)
BUN/CREAT SERPL: 22 (ref 12–20)
CALCIUM SERPL-MCNC: 6.9 MG/DL (ref 8.5–10.1)
CALCULATED P AXIS, ECG09: -3 DEGREES
CALCULATED P AXIS, ECG09: 67 DEGREES
CALCULATED R AXIS, ECG10: -16 DEGREES
CALCULATED R AXIS, ECG10: 78 DEGREES
CALCULATED T AXIS, ECG11: 14 DEGREES
CALCULATED T AXIS, ECG11: 47 DEGREES
CHLORIDE SERPL-SCNC: 106 MMOL/L (ref 100–111)
CO2 SERPL-SCNC: 24 MMOL/L (ref 21–32)
CREAT SERPL-MCNC: 1.62 MG/DL (ref 0.6–1.3)
DIAGNOSIS, 93000: NORMAL
DIAGNOSIS, 93000: NORMAL
DIFFERENTIAL METHOD BLD: ABNORMAL
EOSINOPHIL # BLD: 0.3 K/UL (ref 0–0.4)
EOSINOPHIL NFR BLD: 3 % (ref 0–5)
ERYTHROCYTE [DISTWIDTH] IN BLOOD BY AUTOMATED COUNT: 13.9 % (ref 11.6–14.5)
GLUCOSE SERPL-MCNC: 116 MG/DL (ref 74–99)
HCT VFR BLD AUTO: 38.7 % (ref 36–48)
HGB BLD-MCNC: 12.7 G/DL (ref 13–16)
IMM GRANULOCYTES # BLD AUTO: 0 K/UL (ref 0–0.04)
IMM GRANULOCYTES NFR BLD AUTO: 0 % (ref 0–0.5)
LYMPHOCYTES # BLD: 1.2 K/UL (ref 0.9–3.6)
LYMPHOCYTES NFR BLD: 16 % (ref 21–52)
MAGNESIUM SERPL-MCNC: 1.7 MG/DL (ref 1.6–2.6)
MCH RBC QN AUTO: 30.2 PG (ref 24–34)
MCHC RBC AUTO-ENTMCNC: 32.8 G/DL (ref 31–37)
MCV RBC AUTO: 92.1 FL (ref 78–100)
MONOCYTES # BLD: 0.7 K/UL (ref 0.05–1.2)
MONOCYTES NFR BLD: 9 % (ref 3–10)
NEUTS SEG # BLD: 5.3 K/UL (ref 1.8–8)
NEUTS SEG NFR BLD: 71 % (ref 40–73)
NRBC # BLD: 0 K/UL (ref 0–0.01)
NRBC BLD-RTO: 0 PER 100 WBC
P-R INTERVAL, ECG05: 150 MS
P-R INTERVAL, ECG05: 156 MS
PLATELET # BLD AUTO: 203 K/UL (ref 135–420)
PMV BLD AUTO: 12.9 FL (ref 9.2–11.8)
POTASSIUM SERPL-SCNC: 3.7 MMOL/L (ref 3.5–5.5)
Q-T INTERVAL, ECG07: 366 MS
Q-T INTERVAL, ECG07: 366 MS
QRS DURATION, ECG06: 90 MS
QRS DURATION, ECG06: 90 MS
QTC CALCULATION (BEZET), ECG08: 472 MS
QTC CALCULATION (BEZET), ECG08: 474 MS
RBC # BLD AUTO: 4.2 M/UL (ref 4.35–5.65)
SODIUM SERPL-SCNC: 137 MMOL/L (ref 136–145)
VENTRICULAR RATE, ECG03: 100 BPM
VENTRICULAR RATE, ECG03: 101 BPM
WBC # BLD AUTO: 7.5 K/UL (ref 4.6–13.2)

## 2022-08-08 PROCEDURE — 65270000046 HC RM TELEMETRY

## 2022-08-08 PROCEDURE — 99223 1ST HOSP IP/OBS HIGH 75: CPT | Performed by: INTERNAL MEDICINE

## 2022-08-08 PROCEDURE — 74011250636 HC RX REV CODE- 250/636

## 2022-08-08 PROCEDURE — 74011250637 HC RX REV CODE- 250/637

## 2022-08-08 PROCEDURE — 71045 X-RAY EXAM CHEST 1 VIEW: CPT

## 2022-08-08 PROCEDURE — 80048 BASIC METABOLIC PNL TOTAL CA: CPT

## 2022-08-08 PROCEDURE — 36415 COLL VENOUS BLD VENIPUNCTURE: CPT

## 2022-08-08 PROCEDURE — 97166 OT EVAL MOD COMPLEX 45 MIN: CPT

## 2022-08-08 PROCEDURE — 85025 COMPLETE CBC W/AUTO DIFF WBC: CPT

## 2022-08-08 PROCEDURE — 74011000250 HC RX REV CODE- 250

## 2022-08-08 PROCEDURE — 83735 ASSAY OF MAGNESIUM: CPT

## 2022-08-08 PROCEDURE — 74011250637 HC RX REV CODE- 250/637: Performed by: STUDENT IN AN ORGANIZED HEALTH CARE EDUCATION/TRAINING PROGRAM

## 2022-08-08 RX ORDER — HYDRALAZINE HYDROCHLORIDE 20 MG/ML
20 INJECTION INTRAMUSCULAR; INTRAVENOUS
Status: DISCONTINUED | OUTPATIENT
Start: 2022-08-08 | End: 2022-08-14 | Stop reason: HOSPADM

## 2022-08-08 RX ORDER — MAGNESIUM SULFATE HEPTAHYDRATE 40 MG/ML
2 INJECTION, SOLUTION INTRAVENOUS ONCE
Status: COMPLETED | OUTPATIENT
Start: 2022-08-08 | End: 2022-08-08

## 2022-08-08 RX ORDER — CALCIUM CHLORIDE INJECTION 100 MG/ML
1 INJECTION, SOLUTION INTRAVENOUS ONCE
Status: COMPLETED | OUTPATIENT
Start: 2022-08-08 | End: 2022-08-08

## 2022-08-08 RX ORDER — GUAIFENESIN 100 MG/5ML
81 LIQUID (ML) ORAL DAILY
Status: DISCONTINUED | OUTPATIENT
Start: 2022-08-08 | End: 2022-08-14 | Stop reason: HOSPADM

## 2022-08-08 RX ORDER — SACUBITRIL AND VALSARTAN 24; 26 MG/1; MG/1
1 TABLET, FILM COATED ORAL 2 TIMES DAILY
Qty: 60 TABLET | Refills: 0 | Status: SHIPPED | OUTPATIENT
Start: 2022-08-08 | End: 2022-10-12

## 2022-08-08 RX ORDER — POTASSIUM CHLORIDE 20 MEQ/1
20 TABLET, EXTENDED RELEASE ORAL
Status: COMPLETED | OUTPATIENT
Start: 2022-08-08 | End: 2022-08-08

## 2022-08-08 RX ADMIN — HEPARIN SODIUM 5000 UNITS: 5000 INJECTION INTRAVENOUS; SUBCUTANEOUS at 16:24

## 2022-08-08 RX ADMIN — POTASSIUM CHLORIDE 20 MEQ: 1500 TABLET, EXTENDED RELEASE ORAL at 09:22

## 2022-08-08 RX ADMIN — CARVEDILOL 12.5 MG: 12.5 TABLET, FILM COATED ORAL at 09:32

## 2022-08-08 RX ADMIN — SODIUM CHLORIDE, PRESERVATIVE FREE 10 ML: 5 INJECTION INTRAVENOUS at 21:38

## 2022-08-08 RX ADMIN — GABAPENTIN 100 MG: 100 CAPSULE ORAL at 21:38

## 2022-08-08 RX ADMIN — SODIUM CHLORIDE, PRESERVATIVE FREE 10 ML: 5 INJECTION INTRAVENOUS at 16:24

## 2022-08-08 RX ADMIN — CLOPIDOGREL BISULFATE 75 MG: 75 TABLET ORAL at 09:22

## 2022-08-08 RX ADMIN — GABAPENTIN 100 MG: 100 CAPSULE ORAL at 09:22

## 2022-08-08 RX ADMIN — HEPARIN SODIUM 5000 UNITS: 5000 INJECTION INTRAVENOUS; SUBCUTANEOUS at 09:23

## 2022-08-08 RX ADMIN — MAGNESIUM SULFATE HEPTAHYDRATE 2 G: 2 INJECTION, SOLUTION INTRAVENOUS at 09:22

## 2022-08-08 RX ADMIN — ASPIRIN 81 MG CHEWABLE TABLET 81 MG: 81 TABLET CHEWABLE at 09:22

## 2022-08-08 RX ADMIN — CARVEDILOL 12.5 MG: 12.5 TABLET, FILM COATED ORAL at 20:11

## 2022-08-08 RX ADMIN — HEPARIN SODIUM 5000 UNITS: 5000 INJECTION INTRAVENOUS; SUBCUTANEOUS at 23:48

## 2022-08-08 RX ADMIN — TORSEMIDE 20 MG: 20 TABLET ORAL at 09:22

## 2022-08-08 RX ADMIN — CALCIUM CHLORIDE 1 G: 100 INJECTION, SOLUTION INTRAVENOUS; INTRAVENTRICULAR at 09:32

## 2022-08-08 RX ADMIN — SODIUM CHLORIDE, PRESERVATIVE FREE 10 ML: 5 INJECTION INTRAVENOUS at 07:24

## 2022-08-08 RX ADMIN — ROSUVASTATIN CALCIUM 20 MG: 20 TABLET, COATED ORAL at 21:38

## 2022-08-08 RX ADMIN — GABAPENTIN 100 MG: 100 CAPSULE ORAL at 16:24

## 2022-08-08 NOTE — ED NOTES
TRANSFER - OUT REPORT:    Verbal report given to RN Zack(name) on [facility]  being transferred to (unit) for routine progression of care       Report consisted of patients Situation, Background, Assessment and   Recommendations(SBAR). Information from the following report(s) SBAR, ED Summary, MAR, and Recent Results was reviewed with the receiving nurse. Lines:   Peripheral IV 08/07/22 Left Antecubital (Active)        Opportunity for questions and clarification was provided.       Patient transported with:   Dajie
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
## 2022-08-08 NOTE — ROUTINE PROCESS
TRANSFER - IN REPORT:    Teleport report received from Pascale Zamarripa RN(name) on Diogenes Somers  being received from ED(unit) for routine progression of care      Report consisted of patients Situation, Background, Assessment and   Recommendations(SBAR). Information from the following report(s) SBAR, Kardex, ED Summary, MAR, and Recent Results was reviewed with the receiving nurse. Opportunity for questions and clarification was provided. Assessment completed upon patients arrival to unit and care assumed.

## 2022-08-08 NOTE — ROUTINE PROCESS
Bedside and Verbal shift change report given to Lyudmila Meza (oncoming nurse) by Elizabeth Leiva (offgoing nurse). Report included the following information SBAR, Kardex, ED Summary, Recent Results, and Cardiac Rhythm SR . Patient quietly resting with call light in reach.
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
## 2022-08-08 NOTE — PROGRESS NOTES
PT order received and chart reviewed. Pt sitting EOB eating breakfast. Pt endorses no mobility concerns at this time, has been getting up independently in the room with no AD. Pt endorses no acute mobility concerns, does not warrant further acute PT. Will sign off with no discharge needs noted.  Thank you for this referral. Georgina Wheeler, PT, DPT

## 2022-08-08 NOTE — PROGRESS NOTES
Potassium 3.7, magnesium 1.7, calcium correction for albumin 7.8    Spoke with RN, will replete potassium (goal > 4), magnesium (goal >1.8), and calcium.        Lilia Bennett MD, PGY-2   [facility]   PF Senior Pager: [phone]   August 8, 2022, 6:37 AM
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
## 2022-08-09 LAB
ANION GAP SERPL CALC-SCNC: 10 MMOL/L (ref 3–18)
BASOPHILS # BLD: 0.1 K/UL (ref 0–0.1)
BASOPHILS NFR BLD: 1 % (ref 0–2)
BUN SERPL-MCNC: 36 MG/DL (ref 7–18)
BUN/CREAT SERPL: 22 (ref 12–20)
CALCIUM SERPL-MCNC: 7.3 MG/DL (ref 8.5–10.1)
CHLORIDE SERPL-SCNC: 101 MMOL/L (ref 100–111)
CO2 SERPL-SCNC: 24 MMOL/L (ref 21–32)
CREAT SERPL-MCNC: 1.63 MG/DL (ref 0.6–1.3)
DIFFERENTIAL METHOD BLD: ABNORMAL
EOSINOPHIL # BLD: 0.3 K/UL (ref 0–0.4)
EOSINOPHIL NFR BLD: 4 % (ref 0–5)
ERYTHROCYTE [DISTWIDTH] IN BLOOD BY AUTOMATED COUNT: 13.7 % (ref 11.6–14.5)
GLUCOSE SERPL-MCNC: 115 MG/DL (ref 74–99)
HCT VFR BLD AUTO: 41.5 % (ref 36–48)
HGB BLD-MCNC: 13.9 G/DL (ref 13–16)
IMM GRANULOCYTES # BLD AUTO: 0 K/UL (ref 0–0.04)
IMM GRANULOCYTES NFR BLD AUTO: 0 % (ref 0–0.5)
LYMPHOCYTES # BLD: 1.3 K/UL (ref 0.9–3.6)
LYMPHOCYTES NFR BLD: 16 % (ref 21–52)
MAGNESIUM SERPL-MCNC: 1.7 MG/DL (ref 1.6–2.6)
MCH RBC QN AUTO: 30.5 PG (ref 24–34)
MCHC RBC AUTO-ENTMCNC: 33.5 G/DL (ref 31–37)
MCV RBC AUTO: 91 FL (ref 78–100)
MONOCYTES # BLD: 0.6 K/UL (ref 0.05–1.2)
MONOCYTES NFR BLD: 8 % (ref 3–10)
NEUTS SEG # BLD: 5.7 K/UL (ref 1.8–8)
NEUTS SEG NFR BLD: 71 % (ref 40–73)
NRBC # BLD: 0 K/UL (ref 0–0.01)
NRBC BLD-RTO: 0 PER 100 WBC
PLATELET # BLD AUTO: 225 K/UL (ref 135–420)
PMV BLD AUTO: 12.8 FL (ref 9.2–11.8)
POTASSIUM SERPL-SCNC: 3.6 MMOL/L (ref 3.5–5.5)
RBC # BLD AUTO: 4.56 M/UL (ref 4.35–5.65)
SODIUM SERPL-SCNC: 135 MMOL/L (ref 136–145)
WBC # BLD AUTO: 8 K/UL (ref 4.6–13.2)

## 2022-08-09 PROCEDURE — 74011250637 HC RX REV CODE- 250/637: Performed by: STUDENT IN AN ORGANIZED HEALTH CARE EDUCATION/TRAINING PROGRAM

## 2022-08-09 PROCEDURE — 2709999900 HC NON-CHARGEABLE SUPPLY

## 2022-08-09 PROCEDURE — 80048 BASIC METABOLIC PNL TOTAL CA: CPT

## 2022-08-09 PROCEDURE — 36415 COLL VENOUS BLD VENIPUNCTURE: CPT

## 2022-08-09 PROCEDURE — 99232 SBSQ HOSP IP/OBS MODERATE 35: CPT | Performed by: INTERNAL MEDICINE

## 2022-08-09 PROCEDURE — 74011250637 HC RX REV CODE- 250/637

## 2022-08-09 PROCEDURE — 74011250636 HC RX REV CODE- 250/636

## 2022-08-09 PROCEDURE — 74011250636 HC RX REV CODE- 250/636: Performed by: PHYSICIAN ASSISTANT

## 2022-08-09 PROCEDURE — 65270000046 HC RM TELEMETRY

## 2022-08-09 PROCEDURE — 83735 ASSAY OF MAGNESIUM: CPT

## 2022-08-09 PROCEDURE — 74011000250 HC RX REV CODE- 250: Performed by: PHYSICIAN ASSISTANT

## 2022-08-09 PROCEDURE — 85025 COMPLETE CBC W/AUTO DIFF WBC: CPT

## 2022-08-09 PROCEDURE — 74011000250 HC RX REV CODE- 250

## 2022-08-09 RX ORDER — BUMETANIDE 0.25 MG/ML
1 INJECTION INTRAMUSCULAR; INTRAVENOUS 2 TIMES DAILY
Status: DISCONTINUED | OUTPATIENT
Start: 2022-08-09 | End: 2022-08-10

## 2022-08-09 RX ORDER — POTASSIUM CHLORIDE 20 MEQ/1
20 TABLET, EXTENDED RELEASE ORAL
Status: COMPLETED | OUTPATIENT
Start: 2022-08-09 | End: 2022-08-09

## 2022-08-09 RX ORDER — FUROSEMIDE 10 MG/ML
40 INJECTION INTRAMUSCULAR; INTRAVENOUS 2 TIMES DAILY
Status: DISCONTINUED | OUTPATIENT
Start: 2022-08-09 | End: 2022-08-09

## 2022-08-09 RX ORDER — FUROSEMIDE 10 MG/ML
40 INJECTION INTRAMUSCULAR; INTRAVENOUS DAILY
Status: DISCONTINUED | OUTPATIENT
Start: 2022-08-09 | End: 2022-08-09

## 2022-08-09 RX ORDER — MAGNESIUM SULFATE HEPTAHYDRATE 40 MG/ML
2 INJECTION, SOLUTION INTRAVENOUS ONCE
Status: COMPLETED | OUTPATIENT
Start: 2022-08-09 | End: 2022-08-09

## 2022-08-09 RX ADMIN — CARVEDILOL 12.5 MG: 12.5 TABLET, FILM COATED ORAL at 18:26

## 2022-08-09 RX ADMIN — GABAPENTIN 100 MG: 100 CAPSULE ORAL at 22:44

## 2022-08-09 RX ADMIN — MAGNESIUM SULFATE HEPTAHYDRATE 2 G: 2 INJECTION, SOLUTION INTRAVENOUS at 06:50

## 2022-08-09 RX ADMIN — HEPARIN SODIUM 5000 UNITS: 5000 INJECTION INTRAVENOUS; SUBCUTANEOUS at 17:23

## 2022-08-09 RX ADMIN — BUMETANIDE 1 MG: 0.25 INJECTION INTRAMUSCULAR; INTRAVENOUS at 09:23

## 2022-08-09 RX ADMIN — HYDRALAZINE HYDROCHLORIDE 20 MG: 20 INJECTION, SOLUTION INTRAMUSCULAR; INTRAVENOUS at 09:14

## 2022-08-09 RX ADMIN — ASPIRIN 81 MG CHEWABLE TABLET 81 MG: 81 TABLET CHEWABLE at 09:14

## 2022-08-09 RX ADMIN — CARVEDILOL 12.5 MG: 12.5 TABLET, FILM COATED ORAL at 09:14

## 2022-08-09 RX ADMIN — SODIUM CHLORIDE, PRESERVATIVE FREE 10 ML: 5 INJECTION INTRAVENOUS at 07:03

## 2022-08-09 RX ADMIN — POTASSIUM CHLORIDE 20 MEQ: 1500 TABLET, EXTENDED RELEASE ORAL at 06:51

## 2022-08-09 RX ADMIN — GABAPENTIN 100 MG: 100 CAPSULE ORAL at 09:14

## 2022-08-09 RX ADMIN — SODIUM CHLORIDE, PRESERVATIVE FREE 10 ML: 5 INJECTION INTRAVENOUS at 22:27

## 2022-08-09 RX ADMIN — BUMETANIDE 1 MG: 0.25 INJECTION INTRAMUSCULAR; INTRAVENOUS at 17:23

## 2022-08-09 RX ADMIN — CLOPIDOGREL BISULFATE 75 MG: 75 TABLET ORAL at 09:14

## 2022-08-09 RX ADMIN — HEPARIN SODIUM 5000 UNITS: 5000 INJECTION INTRAVENOUS; SUBCUTANEOUS at 09:14

## 2022-08-09 RX ADMIN — GABAPENTIN 100 MG: 100 CAPSULE ORAL at 17:23

## 2022-08-09 RX ADMIN — SODIUM CHLORIDE, PRESERVATIVE FREE 10 ML: 5 INJECTION INTRAVENOUS at 17:22

## 2022-08-09 RX ADMIN — ACETAMINOPHEN 650 MG: 325 TABLET, FILM COATED ORAL at 22:59

## 2022-08-09 RX ADMIN — ROSUVASTATIN CALCIUM 20 MG: 20 TABLET, COATED ORAL at 22:44

## 2022-08-09 NOTE — ROUTINE PROCESS
Patient is alert and oriented x 4, he denies any acute distress, vitals within normal limits. No apparent distress, noted, patient is voiding clear yellow urine, appetite good. Call bell placed within reach. Will continue to monitor.

## 2022-08-09 NOTE — PROGRESS NOTES
Problem: Falls - Risk of  Goal: *Absence of Falls  Description: Document Hayder Hernandez Fall Risk and appropriate interventions in the flowsheet.   Outcome: Progressing Towards Goal  Note: Fall Risk Interventions:            Medication Interventions: Assess postural VS orthostatic hypotension, Patient to call before getting OOB, Teach patient to arise slowly    Elimination Interventions: Bed/chair exit alarm, Call light in reach, Patient to call for help with toileting needs, Toilet paper/wipes in reach, Toileting schedule/hourly rounds, Urinal in reach              Problem: Patient Education: Go to Patient Education Activity  Goal: Patient/Family Education  Outcome: Progressing Towards Goal     Problem: Patient Education: Go to Patient Education Activity  Goal: Patient/Family Education  Outcome: Progressing Towards Goal     Problem: Patient Education: Go to Patient Education Activity  Goal: Patient/Family Education  Outcome: Progressing Towards Goal
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
## 2022-08-09 NOTE — PROGRESS NOTES
Bedside rounds, report received from off going shift RN, patient was resting quietly upon entering the room, patient awaken with c/o SOB \"breathing difficulty\". VS recorded, Coreg scheduled dose given. Patient informed medication action/ purpose. PFM called with patient's complaint. MD at [facility], examined patient.

## 2022-08-09 NOTE — PROGRESS NOTES
Cardiology Progress Note    Admit Date: 8/7/2022  Attending Cardiologist: Dr. Jennifer Schultz   Patient seen and examined independently. Patient reports that he is still short of breath although was sleeping flat in bed without any respiratory distress. Lower extremity edema has improved. Will increase diuretic dosing. Agree with assessment and plan as noted below. Rosa Bautista MD  Assessment:     Hospital Problems  Date Reviewed: 2/15/2018            Codes Class Noted POA    * (Principal) CHF (congestive heart failure) ([facility] ICD-10-CM: I50.9  ICD-9-CM: 428.0  11/17/2021 Unknown         -a/c HFrEF, in setting of medication non compliance and cocaine abuse. CXR with mild pulmonary edema. -CAD, s/p Cardiac cath 10/2020 with non obstructive CAD, s/p LHC in 2/24/2022 with PCI/HITESH to RCA. On ASA and Plavix.  -Non ischemic Cardiomyopathy, initially diagnosed in (2020), EF 15% at that time. Echo 2/23/2022 with EF 10-15%. S/p implantation of dual-chamber Medtronic AICD for primary prevention. Interrogated on 8/8, device functioning appropriately, short runs of NSVT, no terminated events. -CKD  -HTN, on Coreg, Losartan, Aldactone and Torsemide as outpatient. -HLD, on Crestor.  -Hx traumatic subarachnoid hemorrhage 2016.  -Active cocaine abuse, UDS+ this admission. Primary cardiologist is Dr. Ureña Needles:     -Continues to c/o dyspnea, O2 saturation at 100% on room air. Will switch to Bumex IV BID. Continue monitoring renal indices, Strict I/Os, daily weights and electrolytes. IV Mg replacement given this am.   -AICD interrogated yesterday, last checked in April. No treated episodes. Device functioning appropriately. Optivol was 24 in July. -Continue ASA and Plavix for recent cardiac stent in Feb. Continue statin.   -Plan to start [facility] tomorrow if renal function and BP allow. Continued on Coreg. Continue PRN IV hydralazine as needed for now.     Subjective:     Denies CP, reports on going dyspnea that is worsened with lying flat or activity.      Objective:      Patient Vitals for the past 8 hrs:   Temp Pulse Resp BP SpO2   08/09/22 0746 98 °F (36.7 °C) 71 22 (!) 163/115 100 %   08/09/22 0342 98.9 °F (37.2 °C) 80 20 123/83 99 %         Patient Vitals for the past 96 hrs:   Weight   08/08/22 0347 81 kg (178 lb 9.6 oz)       TELE: SR, PVCs                Current Facility-Administered Medications   Medication Dose Route Frequency Last Admin    magnesium sulfate 2 g/50 ml IVPB (premix or compounded)  2 g IntraVENous ONCE 2 g at 08/09/22 0650    furosemide (LASIX) injection 40 mg  40 mg IntraVENous DAILY      aspirin chewable tablet 81 mg  81 mg Oral DAILY 81 mg at 08/08/22 7213    hydrALAZINE (APRESOLINE) 20 mg/mL injection 20 mg  20 mg IntraVENous Q6H PRN      [START ON 8/10/2022] sacubitriL-valsartan (ENTRESTO) 24-26 mg tablet 1 Tablet  1 Tablet Oral Q12H      gabapentin (NEURONTIN) capsule 100 mg  100 mg Oral  mg at 08/08/22 2138    nitroglycerin (NITROSTAT) tablet 0.4 mg  0.4 mg SubLINGual Q5MIN PRN      rosuvastatin (CRESTOR) tablet 20 mg  20 mg Oral QHS 20 mg at 08/08/22 2138    [Held by provider] torsemide (DEMADEX) tablet 20 mg  20 mg Oral BID 20 mg at 08/08/22 1003    sodium chloride (NS) flush 5-40 mL  5-40 mL IntraVENous Q8H 10 mL at 08/09/22 0703    sodium chloride (NS) flush 5-40 mL  5-40 mL IntraVENous PRN      acetaminophen (TYLENOL) tablet 650 mg  650 mg Oral Q6H PRN      Or    acetaminophen (TYLENOL) suppository 650 mg  650 mg Rectal Q6H PRN      polyethylene glycol (MIRALAX) packet 17 g  17 g Oral DAILY PRN      heparin (porcine) injection 5,000 Units  5,000 Units SubCUTAneous Q8H 5,000 Units at 08/08/22 2348    clopidogreL (PLAVIX) tablet 75 mg  75 mg Oral DAILY 75 mg at 08/08/22 0922    carvediloL (COREG) tablet 12.5 mg  12.5 mg Oral BID WITH MEALS 12.5 mg at 08/08/22 2011         Intake/Output Summary (Last 24 hours) at 8/9/2022 8078  Last data filed at 8/9/2022 0342  Gross per 24 hour Intake 620 ml   Output 2050 ml   Net -1430 ml       Physical Exam:  General:  alert, cooperative, no distress, appears stated age  Neck:  supple, JVD   Lungs:  CTA, few crackles bases B/L   Heart:  regular rate and rhythm  Abdomen:  abdomen is soft without significant tenderness  Extremities:  ++ LE edema B/L     Visit Vitals  BP (!) 163/115 (BP 1 Location: Left upper arm, BP Patient Position: At rest;Lying)   Pulse 71   Temp 98 °F (36.7 °C)   Resp 22   Ht 6' (1.829 m)   Wt 81 kg (178 lb 9.6 oz)   SpO2 100%   BMI 24.22 kg/m²       Data Review:     Labs: Results:       Chemistry Recent Labs     08/09/22 0256 08/08/22 0217 08/07/22  1047   * 116* 110*   * 137 139   K 3.6 3.7 4.3    106 105   CO2 24 24 25   BUN 36* 36* 37*   CREA 1.63* 1.62* 1.83*   CA 7.3* 6.9* 7.6*   MG 1.7 1.7 1.6   AGAP 10 7 9   BUCR 22* 22* 20   AP  --   --  136*   TP  --   --  6.7   ALB  --   --  2.9*   GLOB  --   --  3.8   AGRAT  --   --  0.8      CBC w/Diff Recent Labs     08/09/22 0256 08/08/22 0217 08/07/22  1047   WBC 8.0 7.5 9.0   RBC 4.56 4.20* 4.89   HGB 13.9 12.7* 14.7   HCT 41.5 38.7 45.9    203 228   GRANS 71 71 80*   LYMPH 16* 16* 12*   EOS 4 3 2      Cardiac Enzymes No results found for: CPK, CK, CKMMB, CKMB, RCK3, CKMBT, CKNDX, CKND1, SEBASTIÁN, TROPT, TROIQ, YANETH, TROPT, TNIPOC, BNP, BNPP   Coagulation No results for input(s): PTP, INR, APTT, INREXT in the last 72 hours.     Lipid Panel Lab Results   Component Value Date/Time    Cholesterol, total 143 02/24/2022 04:58 AM    HDL Cholesterol 37 (L) 02/24/2022 04:58 AM    LDL, calculated 91 02/24/2022 04:58 AM    VLDL, calculated 15 02/24/2022 04:58 AM    Triglyceride 75 02/24/2022 04:58 AM    CHOL/HDL Ratio 3.9 02/24/2022 04:58 AM      BNP No results found for: BNP, BNPP, XBNPT   Liver Enzymes Recent Labs     08/07/22  1047   TP 6.7   ALB 2.9*   *      Thyroid Studies Lab Results   Component Value Date/Time    TSH 0.72 02/28/2016 07:15 PM Signed By: Marielos Delgado PA-C     August 9, 2022

## 2022-08-09 NOTE — ROUTINE PROCESS
Bedside and Verbal shift change report given to Micheal RN (oncoming nurse) by Davida RN (offgoing nurse). Report included the following information SBAR, Kardex, ED Summary, Recent Results, and Cardiac Rhythm SR . Patient quietly resting with call light in reach.

## 2022-08-09 NOTE — DISCHARGE SUMMARY
Duran Higgins SUMMARY      Name:   Immanuel Arzola 57 y.o. male  MRN:   [id number]  CSN:   [id number]  Admission Date:  8/7/2022  Discharge Date:  8/14/2022  Attending:             No att. providers found   PCP:              Ramirez Solis MD   ================================================================  Reason for Admission:  CHF (congestive heart failure) ([facility] [address] [I50.9]    Discharge Diagnosis:    Acute Heart failure HFrEF in the setting of possible Medication non-compliance and cocain abuse  CKD III  HLD     Follow-up studies/evaluations for PCP/Important Notes to PCP:  Follow up on patient's medication, make sure patient is on prior diuretic dosing with half dosages of his carvedilol and losartan. He will then need close follow-up with his primary cardiologist.  Who: [facility] with NP  When: August 25 at 9:30am  Where: [address] [facility], [address], [facility], [facility]  Be sure to do repeat BMP to make sure electrolytes are WNL, specifically the sodium. Go over patients symptoms and see progress of SOB. Did not start entresto, until he sees cardiologist outpatient. Patient is denying Cocain abuse despite positive UDS. Patient gets angry that everyone keeps talking to him about his cocaine use. UPDATE 8/17 : Pending labs/investigations   Blood culture came back positive for Staphylococcosis, and staph epidermidis, and MECA (methicillin resistant gene)  Ask patient about possible new symptoms, fever, chills, cough, SOB.   New Medications:   (ENTRESTO) 24 mg/26 mg tablet But do not start until after Cardiology appointment  spironolactone (ALDACTONE) 25 mg tablet  CONTINUE   - carvediloL (COREG) 6.25 mg tablet     - torsemide (DEMADEX) 10 mg tablet     - gabapentin (NEURONTIN) 100 mg capsule     - acetaminophen (TYLENOL) 325 mg tablet     - clopidogreL (PLAVIX) 75 mg tab     - rosuvastatin (CRESTOR) 20 mg tablet     - nitroglycerin (NITROSTAT) 0.4 mg SL tablet       SVITLANA Follow Up Appointment:   Follow-up Information       Follow up With Specialties Details Why Carlin Mcwilliams MD Cardiovascular Disease Physician, Internal Medicine Physician Follow up on 8/25/2022 at 9:30am [address] [place] [address] [address] [facility] [address]  51 [facility], [address], MD Family Medicine Follow up on 8/19/2022 Hospital follow up appointment: 8/19/22 at 2:00pm with Dr. Marija Owens [phone]      Oksana Guan MD [facility] Medicine   [address] [facility] [phone]      [address] Follow up Your preferred agency, [facility] to continue managing healthcare needs [address]    [address] [facility], [facility] Follow up Your preferred agency, [facility] to continue managing healthcare needs  For questions or concerns with Shower Chair. [address] [facility], [address] [address] [phone]            Recommended follow-up after OR[place] visit: Patient needs follow-up after the cardiologist appointment PRN     Readmission prevention plan:   HF education  Cocaine abuse education  Re-establishing follow up care with cardiologist  Not letting patient run out of medications, patient already educated on contacting pharmacy and PCP if medication is runn    GOALS OF CARE (including Code Status, [address] [facility]):    Full measures    Pending labs/ investigations at discharge to follow up:   None    Operative Procedures:   none    Consultants:    Cardiology    Condition at discharge: Afebrile  Ambulating  Eating, Drinking, Voiding  Stable    Disposition at Discharge:  Home    Functional Status at Discharge: Ambulates without assistance    Diet: Regular diet    Discharge Medications:    Discharge Medication List as of 8/14/2022  2:25 PM        START taking these medications Details   !! sacubitril-valsartan (ENTRESTO) 24 mg/26 mg tablet Take 1 Tablet by mouth every twelve (12) hours. , No Print, Disp-60 Tablet, R-0      spironolactone (ALDACTONE) 25 mg tablet Take 0.5 Tablets by mouth in the morning., Mail Order, Disp-30 Tablet, R-0      !! sacubitriL-valsartan (Entresto) 24-26 mg tablet Take 1 Tablet by mouth two (2) times a day., Normal, Disp-60 Tablet, R-0       !! - Potential duplicate medications found. Please discuss with provider. CONTINUE these medications which have CHANGED    Details   carvediloL (COREG) 6.25 mg tablet Take 1 Tablet by mouth every twelve (12) hours. , Mail Order, Disp-60 Tablet, R-0      torsemide (DEMADEX) 10 mg tablet Take 1 Tablet by mouth in the morning., Mail Order, Disp-30 Tablet, R-0           CONTINUE these medications which have NOT CHANGED    Details   gabapentin (NEURONTIN) 100 mg capsule Take  by mouth three (3) times daily. , Historical Med      acetaminophen (TYLENOL) 325 mg tablet Take 2 Tablets by mouth every four (4) hours as needed for Pain for up to 60 doses. , Print, Disp-30 Tablet, R-3      clopidogreL (PLAVIX) 75 mg tab Take 1 Tablet by mouth daily. , Print, Disp-30 Tablet, R-2      rosuvastatin (CRESTOR) 20 mg tablet Take 1 Tablet by mouth nightly. , Print, Disp-30 Tablet, R-2      nitroglycerin (NITROSTAT) 0.4 mg SL tablet 1 Tablet by SubLINGual route every five (5) minutes as needed for Chest Pain for up to 10 doses. Up to 3 doses. , Print, Disp-10 Tablet, R-0           STOP taking these medications       losartan (COZAAR) 50 mg tablet Comments:   Reason for Stopping:                 Hospital Course:   Patient's problem list was managed in hospital as stated below:     Adrian Rose is a 57 y.o.  male with PMHx of hypertension, hyperlipidemia, and CHF who presented to [facility] ED on 8/07/22 with complaint of SOB of two days with swelling in extremities.  Patient reported running out of medications and had been without his Torsemide 20 mg BID. Patient endorsed two days of SOB, orthopnea,   in the ED  patient was found to be Afebrile having elevated 's/100's on 4L on NC. Patient was admitted with acute on chronic HF exacerbation. Patient had a rapid called on him for becoming minimally responsive and having dyspnea. Rapid response: Patient was sitting in the chair when nursing noted that he became diaphoretic and minimally responsive. Patient was only responsive to pain, minimally responsive to voice. Katsusan Flanagan He was moved into bed and placed in trendelenburg. EKG showed NSR with non-specific t-wave inversions. He was given a 250 ml bolus. After fluid bolus he became more alert and responsive to voice but was not answering questions initially. Patient placed back to supine position, patient became responsive to questions and denied any pain, CP and HA. Patient stated that he felt dizzy and was tearful. Rapid ended and patient was then transferred to step down unit. Patient was closely monitored for his respiratory status. Diuretics were held and the dosage of outpatient maintenance Torsemide was decreased from 20 mg BID to 10 mg daily. Patients Medications for blood pressure was slowly restarted to outpatient cardiac regimen as blood pressure allowed. Cardiology recommended discharging patient on his prior diuretic dosing with half dosages of his carvedilol and losartan. Patient was then scheduled to have close follow-up with his primary cardiologist with [address] [place] [address] Cardiology on August 25. Acute Congestive Heart failure HFrEF in the setting of possible Medication non-compliance and cocain abuse    Carmina Castro is a 57 y.o.  male with PMHx of hypertension, hyperlipidemia, and CHF who presented to [facility] ED on 8/07/22 with complaint of SOB of two days with swelling in extremities. Patient reported running out of medications and had been without his Torsemide 20 mg BID.  Patient endorsed two days of SOB, orthopnea, In the ED  patient was found to be Afebrile having elevated 's/100's on 4L on NC. Patient was worked up for Acute respiratory failure with hypoxia, and work up was to differentiate between non-ischemic cardiomyopathy and HFrEF in the setting of possible Medication non-compliance vs cocain abuse vs MI vs pulmonary Embolism. - MI  was less likely 2/2 -EKG: NSR, QTc 486, w/o st elevation. With no significant change from previous EKG and troponin's within normal range times X2.   - CXR showed no vascular congestion  - BNP:  was 15k, baseline around 6k  - Troponin was 22  - Previous Echo 4/22 showed and  EF  of 10-15% ( S/p implantation of dual-chamber Medtronic AICD for primary prevention. )  - Duplex lower Ext Venous Bilateral was negative, and CTA PE was negative for PE, Small Bilateral effusions with small pulmonary nodules   - ABG showed a pH 7.4, PCO2 33.5 , PO2 60.  - Patient was admitted with acute on chronic HF exacerbation.   - Patient had a rapid called on him for becoming minimally responsive and having dyspnea. Rapid response: patient was stabilized ( more information in the Hospital course note above). - Patient was then transferred to step down unit where he was closely monitored for his respiratory status. Diuretics were held and the dosage of outpatient maintenance. Pt Had patients electrolytes repleated as needed specifically following previous cardiologists recommendations goals of  potassium (goal > 4), magnesium (goal >1.8). -Patients Medications for blood pressure was slowly restarted to outpatient cardiac regimen as blood pressure allowed. Cardiology recommended discharging patient on his prior diuretic dosing with half dosages of his carvedilol and losartan. Patient was then scheduled to have close follow-up with his primary cardiologist with [facility] on August 25.   Patient had an episode of SOB, patient was found lethargic while eating and out of suspicion for Aspiration a chest Xray was ordered and it showed. Mild pulmonary edema but improved and pacemaker without evidence of complication. Patient spiked a fever and was given tyonol. Blood work showed no leukocytosis, he was Afebrile over last 24hrs. Being medically stable patient was discharged 8/14. CKD IIIa- around stable  Patients Creatinine levels were close to his baseling and was closly monitored during his stay with daily BMP    HLD   Was stable and had daily Rosuvastatin(Crestor) 20mg       Global:  Code: Full  IVF/Drips: none  I/O / Wt: 23.69 kg  Diet: Regular adult, low sodium      Bowel Regimen, Last BM: yesterday  DVT/AC: SCD  Mobility: per protocol  Disposition: Inpatient  Anticipated LOS: TBD      Moni Song (Sister)   [phone] [facility]  [place]        Sister  [phone]      Pertinent Results:      CURRENT ADMISSION IMAGING RESULTS   CTA CHEST W OR W WO CONT    Result Date: 8/7/2022  1. No CT evidence for central pulmonary embolism. -Suboptimal contrast filling/opacification of the subsegmental vessels at the lung bases due to image timing. 2. Small bilateral pleural effusions. Component of early pulmonary edema or vascular congestion is suspect. -No pneumothorax or consolidation. 3. Stable 6 mm right middle lobe pulmonary nodule with additional small fissural nodules as above. XR CHEST PORT    Result Date: 8/13/2022   1. Similar to slightly improved prominence of the bronchovascular markings, possibly mild pulmonary vascular congestion or nonspecific bronchitis. XR CHEST PORT    Result Date: 8/11/2022  1. Cardiomegaly and interstitial edema, stable 2. Pacemaker appears appropriate, described as above. XR CHEST PORT    Result Date: 8/8/2022  1. Mild pulmonary edema but improved. 2.  Pacemaker without evidence of complication. XR CHEST PORT    Result Date: 8/7/2022  1. Findings suggest early/mild pulmonary edema.         Cardiology Procedures/Testing:  MODALITY RESULTS   EKG Results for orders placed or performed during the hospital encounter of 08/07/22   EKG, 12 LEAD, INITIAL   Result Value Ref Range    Ventricular Rate 101 BPM    Atrial Rate 101 BPM    P-R Interval 156 ms    QRS Duration 90 ms    Q-T Interval 366 ms    QTC Calculation (Bezet) 474 ms    Calculated P Axis 67 degrees    Calculated R Axis 78 degrees    Calculated T Axis 47 degrees    Diagnosis       Sinus tachycardia  Left atrial enlargement  Septal infarct (cited on or before 16-APR-2022)  Abnormal ECG  When compared with ECG of 18-APR-2022 17:14,  Nonspecific T wave abnormality has replaced inverted T waves in Lateral leads  Confirmed by Daron Collier ([id number]) on 8/8/2022 9:12:31 AM     Results for orders placed or performed in visit on 02/15/18   AMB POC EKG ROUTINE W/ 12 LEADS, INTER & REP    Impression    Sinus tachycardia at 126 bpm.  No acute changes. ECHO 02/23/22    ECHO ADULT FOLLOW-UP OR LIMITED 02/23/2022 2/23/2022    Interpretation Summary  Formatting of this result is different from the original.      Left Ventricle: Left ventricle size is normal. Normal wall thickness. Global hypokinesis present. Severely reduced left ventricular systolic function with a visually estimated EF of 10 -15%. Mitral Valve: Mild transvalvular regurgitation. Signed by: Haritha Loredo MD on 2/23/2022  3:18 PM     IR No results found for this or any previous visit. CATH 02/23/22    CARDIAC PROCEDURE 02/25/2022 2/25/2022    Conclusion  · Distal left main 35-40% stenosis. · Apical LAD bridging. LAD has diffuse 20-30% stenosis throughout with normal ANTHONY-3 flow. · Circumflex with diffuse 20% stenosis. Distal AV circumflex is 100% occluded but appears quite small. · Dominant RCA with mid segment 35-45% stenosis. Culprit lesion appears to be distal RCA 95% heavily calcified unstable appearing lesion. There is ANTHONY II flow distally. · LVEDP is 20 mmHg.  Overall left ventricular LV function is severely diminished with an estimated ejection fraction of 20% with diffuse global hypokinesis. · Distal RCA stented using 2.75 mm HITESH to residual 0%. Signed by: David Jacobs MD on 2/25/2022  8:00 AM        Special Testing/Procedures:  MODALITY RESULTS   MICRO All Micro Results       Procedure Component Value Units Date/Time    CULTURE, BLOOD [[id number]]  (Abnormal) Collected: 08/13/22 0130    Order Status: Completed Specimen: Blood Updated: 08/17/22 0841     Special Requests: --        NO SPECIAL REQUESTS  RIGHT  FOREARM       GRAM STAIN       AEROBIC BOTTLE GRAM POSITIVE COCCI IN GROUPS                  SMEAR CALLED TO AND CORRECTLY REPEATED BY: Neomi Friendly, DR. M.BAUMGARTEN'S OFFICE, [id number] 8/16/22 TO RERE           Culture result:       STAPHYLOCOCCUS SPECIES, COAGULASE NEGATIVE GROWING IN 1 OF 2 BOTTLES DRAWN No Site Indicated          CULTURE, BLOOD [[id number]] Collected: 08/13/22 0137    Order Status: Completed Specimen: Blood Updated: 08/17/22 0627     Special Requests: --        NO SPECIAL REQUESTS  LEFT  HAND       Culture result: NO GROWTH 4 DAYS       BLOOD CULTURE ID PANEL [[id number]]  (Abnormal) Collected: 08/13/22 0130    Order Status: Completed Specimen: Blood Updated: 08/16/22 1555     Acc. no. from Micro Order [id number]     Enterococcus faecalis Not detected        Enterococcus faecium Not detected        Listeria monocytogenes Not detected        Staphylococcus Detected        Staphylococcus aureus Not detected        Staph epidermidis Detected        Staph lugdunensis Not detected        Streptococcus Not detected        Streptococcus agalactiae (Group B) Not detected        Streptococcus pneumoniae Not detected        Streptococcus pyogenes (Group A) Not detected        Acinetobacter calcoaceticus-baumanii complex Not detected        Bacteroides fragilis Not detected        Enterobacterales species Not detected        Enterobacter cloacae complex Not detected        Escherichia coli Not detected Klebsiella aerogenes Not detected        Klebsiella oxytoca Not detected        Klebsiella pneumoniae group Not detected        Proteus Not detected        Salmonella Not detected        Serratia marcescens Not detected        Haemophilus influenzae Not detected        Neisseria meningitidis Not detected        Pseudomonas aeruginosa Not detected        Steno maltophilia Not detected        Candida albicans Not detected        Candida auris Not detected        Candida glabrata Not detected        Candida krusei Not detected        Candida parapsilosis Not detected        Candida tropicalis Not detected        Crypto neoformans/gattii Not detected        RESISTANT GENES:            MECA/C (Methicillin resistant gene) Detected        Comment       False positive results may rarely occur. Correlate with clinical,epidemiologic, and other laboratory findings           Comment: Please see BCID Interpretation Guide in EPIC Links       CULTURE, URINE [[id number]] Collected: 08/13/22 0240    Order Status: Completed Specimen: Urine from Clean catch Updated: 08/14/22 1056     Special Requests: NO SPECIAL REQUESTS        Culture result:       No significant growth, <10,000 CFU/mL          CULTURE, BLOOD [[id number]] Collected: 08/13/22 0545    Order Status: Canceled Specimen: Blood     COVID-19 WITH INFLUENZA A/B [[id number]] Collected: 08/07/22 1020    Order Status: Completed Specimen: Nasopharyngeal Updated: 08/07/22 1113     SARS-CoV-2 by PCR Not detected        Comment: Not Detected results do not preclude SARS-CoV-2 infection and should not be used as the sole basis for patient management decisions. Results must be combined with clinical observations, patient history, and epidemiological information. Influenza A by PCR Not detected        Influenza B by PCR Not detected        Comment: Testing was performed using cynthia Yarely SARS-CoV-2 and Influenza A/B nucleic acid assay.   This test is a multiplex Real-Time Reverse Transcriptase Polymerase Chain Reaction (RT-PCR) based in virto diagnostic test intended for the qualitative detection of nucleic acids from SARS-CoV-2, Influenza A, and Influenza B in nasopharyngeal for use under the FDA's Emergency Use Authorization (EAU) only. Fact sheet for Patients: FindDrives.pl  Fact sheet for Healthcare Providers: FindDrives.pl         COVID-19 RAPID TEST [[id number]]     Order Status: Canceled            ABG Lab Results   Component Value Date/Time    pH (POC) 7.46 (H) 08/11/2022 12:11 PM    pCO2 (POC) 35.8 08/11/2022 12:11 PM    pO2 (POC) 77 (L) 08/11/2022 12:11 PM    HCO3 (POC) 25.4 08/11/2022 12:11 PM    FIO2 21 08/07/2022 10:15 AM    FIO2 (POC) 2 08/11/2022 12:11 PM      UA No results found for this or any previous visit.       Laboratory Results:  LABORATORY RESULTS   HEMATOLOGY Lab Results   Component Value Date/Time    WBC 6.5 08/14/2022 06:12 AM    Hemoglobin, POC 17.3 (H) 11/23/2018 06:26 PM    HGB 13.8 08/14/2022 06:12 AM    Hematocrit, POC 51 (H) 11/23/2018 06:26 PM    HCT 42.4 08/14/2022 06:12 AM    PLATELET 763 40/96/1626 06:12 AM    MCV 91.0 08/14/2022 06:12 AM       CHEMISTRIES Lab Results   Component Value Date/Time    Sodium 134 (L) 08/14/2022 06:12 AM    Potassium 4.3 08/14/2022 06:12 AM    Chloride 102 08/14/2022 06:12 AM    CO2 26 08/14/2022 06:12 AM    Anion gap 6 08/14/2022 06:12 AM    Glucose 84 08/14/2022 06:12 AM    BUN 18 08/14/2022 06:12 AM    Creatinine 1.38 (H) 08/14/2022 06:12 AM    BUN/Creatinine ratio 13 08/14/2022 06:12 AM    GFR est AA >60 08/14/2022 06:12 AM    GFR est non-AA 52 (L) 08/14/2022 06:12 AM    Calcium 7.6 (L) 08/14/2022 06:12 AM      HEPATIC FUNCTION Lab Results   Component Value Date/Time    Albumin 2.1 (L) 08/11/2022 12:15 PM    Bilirubin, total 1.3 (H) 08/11/2022 12:15 PM    Protein, total 5.8 (L) 08/11/2022 12:15 PM    Globulin 3.7 08/11/2022 12:15 PM    A-G Ratio 0.6 (L) 08/11/2022 12:15 PM    ALT (SGPT) 32 08/11/2022 12:15 PM    Alk. phosphatase 100 08/11/2022 12:15 PM       LACTIC ACID Lab Results   Component Value Date/Time    Lactic acid 1.1 08/13/2022 01:30 AM    Lactic acid 1.3 08/12/2022 05:20 AM    Lactic acid 2.8 (HH) 08/11/2022 05:48 PM      CARDIAC PANEL Lab Results   Component Value Date/Time     04/14/2022 04:30 PM    CK - MB 2.1 11/17/2021 09:24 PM    CK-MB Index 1.0 11/17/2021 09:24 PM    Troponin-I, QT 0.25 (H) 11/19/2021 04:15 AM      NT-proBNP Lab Results   Component Value Date/Time    NT pro-BNP 6,384 (H) 08/10/2022 05:09 AM    NT pro-BNP 15,570 (H) 08/07/2022 10:47 AM    NT pro-BNP 3,813 (H) 04/18/2022 05:35 PM    NT pro-BNP 13,398 (H) 04/13/2022 01:31 PM    NT pro-BNP 9,678 (H) 04/12/2022 04:25 PM      THYROID Lab Results   Component Value Date/Time    TSH 0.72 02/28/2016 07:15 PM            Readmission Risk Score: Low Risk            12 Total Score    3 Patient Length of Stay (>5 days = 3)    4 IP Visits Last 12 Months (1-3=4, 4=9, >4=11)    5 Pt. Coverage (Medicare=5 , Medicaid, or Self-Pay=4)        Criteria that do not apply:    Has Seen PCP in Last 6 Months (Yes=3, No=0)    . Living with Significant Other. Assisted Living. LTAC. SNF.  or   Rehab    Charlson Comorbidity Score (Age + Comorbid Conditions)            Joanna Bran MD, PGY-1  [facility]  8/17/2022 2:10 PM

## 2022-08-09 NOTE — PROGRESS NOTES
conducted an initial consultation and Spiritual Assessment for Hemalatha Acosta, who is a 57 y. o.,male. Patients Primary Language is: [place]. According to the patients EMR Latter day Affiliation is: Jameel Gruber. The reason the Patient came to the hospital is:   Patient Active Problem List    Diagnosis Date Noted    Acute exacerbation of CHF (congestive heart failure) ([facility] [address] 04/13/2022    Chest pain in adult 02/23/2022    CHF (congestive heart failure) ([facility] [address] 11/17/2021    NSTEMI (non-ST elevated myocardial infarction) ([facility] [address] 11/17/2021    Shortness of breath 11/17/2021    Testicular pain 05/13/2021    CHF exacerbation ([facility] [address] 61/62/3783    Systolic CHF, chronic ([facility] [address] 11/11/2020    Cardiomyopathy ([facility] [address] 11/11/2020    Coronary artery disease involving native coronary artery of native heart without angina pectoris 11/11/2020    S/P ablation operation for arrhythmia 03/12/2018    Weakness due to cerebrovascular accident (CVA) 03/24/2017    TIA (transient ischemic attack) 03/23/2017    Hypertension     Pulmonary nodule     Head injury     Depression     SAH (subarachnoid hemorrhage) ([facility] [address]     HLD (hyperlipidemia)     Corn of foot 08/21/2016    Hypomagnesemia 08/21/2016    Cough 04/01/2014        The  provided the following Interventions:  Initiated a relationship of care and support. Explored issues of shobha, belief, spirituality and Jain/ritual needs while hospitalized. Listened empathically. Provided information about Spiritual Care Services. Offered prayer and assurance of continued prayers on patient's behalf. Chart reviewed. The following outcomes where achieved:  Patient is not interested at this time in completing an Advance Medical Directive.  confirmed Patient's Latter day Affiliation. Patient expressed gratitude for 's visit. Assessment:  Patient's shobha in God is very important for him to cope.    Patient does not have any Jain/cultural needs that will affect patients preferences in health care. There are no spiritual or Mu-ism issues which require intervention at this time. Plan:  Chaplains will continue to follow and will provide pastoral care on an as needed/requested basis.  recommends bedside caregivers page  on duty if patient shows signs of acute spiritual or emotional distress.     [address]  [phone])

## 2022-08-10 ENCOUNTER — APPOINTMENT (OUTPATIENT)
Dept: VASCULAR SURGERY | Age: 62
DRG: 291 | End: 2022-08-10
Attending: STUDENT IN AN ORGANIZED HEALTH CARE EDUCATION/TRAINING PROGRAM
Payer: MEDICARE

## 2022-08-10 ENCOUNTER — HOME HEALTH ADMISSION (OUTPATIENT)
Dept: HOME HEALTH SERVICES | Facility: HOME HEALTH | Age: 62
End: 2022-08-10

## 2022-08-10 LAB
ANION GAP SERPL CALC-SCNC: 8 MMOL/L (ref 3–18)
BASOPHILS # BLD: 0 K/UL (ref 0–0.1)
BASOPHILS NFR BLD: 1 % (ref 0–2)
BNP SERPL-MCNC: 6384 PG/ML (ref 0–900)
BUN SERPL-MCNC: 31 MG/DL (ref 7–18)
BUN/CREAT SERPL: 21 (ref 12–20)
CALCIUM SERPL-MCNC: 7.4 MG/DL (ref 8.5–10.1)
CHLORIDE SERPL-SCNC: 100 MMOL/L (ref 100–111)
CO2 SERPL-SCNC: 27 MMOL/L (ref 21–32)
CREAT SERPL-MCNC: 1.46 MG/DL (ref 0.6–1.3)
DIFFERENTIAL METHOD BLD: ABNORMAL
EOSINOPHIL # BLD: 0.3 K/UL (ref 0–0.4)
EOSINOPHIL NFR BLD: 4 % (ref 0–5)
ERYTHROCYTE [DISTWIDTH] IN BLOOD BY AUTOMATED COUNT: 13.7 % (ref 11.6–14.5)
GLUCOSE SERPL-MCNC: 91 MG/DL (ref 74–99)
HCT VFR BLD AUTO: 41.2 % (ref 36–48)
HGB BLD-MCNC: 13.7 G/DL (ref 13–16)
IMM GRANULOCYTES # BLD AUTO: 0 K/UL (ref 0–0.04)
IMM GRANULOCYTES NFR BLD AUTO: 0 % (ref 0–0.5)
LEFT ABI: 1.34
LEFT ARM BP: 107 MMHG
LEFT CALF PRESSURE: 167 MMHG
LEFT POSTERIOR TIBIAL: 146 MMHG
LEFT TBI: 0.93
LEFT TOE PRESSURE: 101 MMHG
LYMPHOCYTES # BLD: 1.5 K/UL (ref 0.9–3.6)
LYMPHOCYTES NFR BLD: 21 % (ref 21–52)
MAGNESIUM SERPL-MCNC: 1.6 MG/DL (ref 1.6–2.6)
MCH RBC QN AUTO: 30.1 PG (ref 24–34)
MCHC RBC AUTO-ENTMCNC: 33.3 G/DL (ref 31–37)
MCV RBC AUTO: 90.5 FL (ref 78–100)
MONOCYTES # BLD: 0.6 K/UL (ref 0.05–1.2)
MONOCYTES NFR BLD: 9 % (ref 3–10)
NEUTS SEG # BLD: 4.8 K/UL (ref 1.8–8)
NEUTS SEG NFR BLD: 66 % (ref 40–73)
NRBC # BLD: 0 K/UL (ref 0–0.01)
NRBC BLD-RTO: 0 PER 100 WBC
PLATELET # BLD AUTO: 221 K/UL (ref 135–420)
PMV BLD AUTO: 12.3 FL (ref 9.2–11.8)
POTASSIUM SERPL-SCNC: 3.6 MMOL/L (ref 3.5–5.5)
RBC # BLD AUTO: 4.55 M/UL (ref 4.35–5.65)
RIGHT ABI: 1.19
RIGHT ARM BP: 109 MMHG
RIGHT CALF PRESSURE: 170 MMHG
RIGHT POSTERIOR TIBIAL: 128 MMHG
RIGHT TBI: 0.91
RIGHT TOE PRESSURE: 99 MMHG
SODIUM SERPL-SCNC: 135 MMOL/L (ref 136–145)
VAS LEFT DORSALIS PEDIS BP: 142 MMHG
VAS RIGHT DORSALIS PEDIS BP: 130 MMHG
WBC # BLD AUTO: 7.3 K/UL (ref 4.6–13.2)

## 2022-08-10 PROCEDURE — 74011250637 HC RX REV CODE- 250/637

## 2022-08-10 PROCEDURE — 74011250637 HC RX REV CODE- 250/637: Performed by: STUDENT IN AN ORGANIZED HEALTH CARE EDUCATION/TRAINING PROGRAM

## 2022-08-10 PROCEDURE — 74011250637 HC RX REV CODE- 250/637: Performed by: PHYSICIAN ASSISTANT

## 2022-08-10 PROCEDURE — 36415 COLL VENOUS BLD VENIPUNCTURE: CPT

## 2022-08-10 PROCEDURE — 85025 COMPLETE CBC W/AUTO DIFF WBC: CPT

## 2022-08-10 PROCEDURE — 99232 SBSQ HOSP IP/OBS MODERATE 35: CPT | Performed by: INTERNAL MEDICINE

## 2022-08-10 PROCEDURE — 83880 ASSAY OF NATRIURETIC PEPTIDE: CPT

## 2022-08-10 PROCEDURE — 74011250636 HC RX REV CODE- 250/636

## 2022-08-10 PROCEDURE — 65270000046 HC RM TELEMETRY

## 2022-08-10 PROCEDURE — 80048 BASIC METABOLIC PNL TOTAL CA: CPT

## 2022-08-10 PROCEDURE — 74011000250 HC RX REV CODE- 250

## 2022-08-10 PROCEDURE — 83735 ASSAY OF MAGNESIUM: CPT

## 2022-08-10 PROCEDURE — 2709999900 HC NON-CHARGEABLE SUPPLY

## 2022-08-10 PROCEDURE — 74011000250 HC RX REV CODE- 250: Performed by: PHYSICIAN ASSISTANT

## 2022-08-10 PROCEDURE — 93923 UPR/LXTR ART STDY 3+ LVLS: CPT

## 2022-08-10 RX ORDER — SPIRONOLACTONE 25 MG/1
25 TABLET ORAL DAILY
Status: DISCONTINUED | OUTPATIENT
Start: 2022-08-10 | End: 2022-08-11

## 2022-08-10 RX ORDER — FLUTICASONE PROPIONATE 50 MCG
2 SPRAY, SUSPENSION (ML) NASAL DAILY
Status: DISCONTINUED | OUTPATIENT
Start: 2022-08-10 | End: 2022-08-14 | Stop reason: HOSPADM

## 2022-08-10 RX ORDER — LANOLIN ALCOHOL/MO/W.PET/CERES
400 CREAM (GRAM) TOPICAL ONCE
Status: COMPLETED | OUTPATIENT
Start: 2022-08-10 | End: 2022-08-10

## 2022-08-10 RX ORDER — POTASSIUM CHLORIDE 20 MEQ/1
40 TABLET, EXTENDED RELEASE ORAL
Status: COMPLETED | OUTPATIENT
Start: 2022-08-10 | End: 2022-08-10

## 2022-08-10 RX ADMIN — ASPIRIN 81 MG CHEWABLE TABLET 81 MG: 81 TABLET CHEWABLE at 09:52

## 2022-08-10 RX ADMIN — SODIUM CHLORIDE, PRESERVATIVE FREE 10 ML: 5 INJECTION INTRAVENOUS at 22:15

## 2022-08-10 RX ADMIN — GABAPENTIN 100 MG: 100 CAPSULE ORAL at 17:32

## 2022-08-10 RX ADMIN — BUMETANIDE 1 MG: 0.25 INJECTION INTRAMUSCULAR; INTRAVENOUS at 09:52

## 2022-08-10 RX ADMIN — ROSUVASTATIN CALCIUM 20 MG: 20 TABLET, COATED ORAL at 22:14

## 2022-08-10 RX ADMIN — SODIUM CHLORIDE, PRESERVATIVE FREE 10 ML: 5 INJECTION INTRAVENOUS at 07:53

## 2022-08-10 RX ADMIN — HEPARIN SODIUM 5000 UNITS: 5000 INJECTION INTRAVENOUS; SUBCUTANEOUS at 00:26

## 2022-08-10 RX ADMIN — GABAPENTIN 100 MG: 100 CAPSULE ORAL at 22:14

## 2022-08-10 RX ADMIN — TORSEMIDE 20 MG: 20 TABLET ORAL at 17:32

## 2022-08-10 RX ADMIN — CLOPIDOGREL BISULFATE 75 MG: 75 TABLET ORAL at 09:52

## 2022-08-10 RX ADMIN — SACUBITRIL AND VALSARTAN 1 TABLET: 24; 26 TABLET, FILM COATED ORAL at 22:14

## 2022-08-10 RX ADMIN — CARVEDILOL 12.5 MG: 12.5 TABLET, FILM COATED ORAL at 22:15

## 2022-08-10 RX ADMIN — HEPARIN SODIUM 5000 UNITS: 5000 INJECTION INTRAVENOUS; SUBCUTANEOUS at 09:52

## 2022-08-10 RX ADMIN — SPIRONOLACTONE 25 MG: 25 TABLET ORAL at 17:32

## 2022-08-10 RX ADMIN — HEPARIN SODIUM 5000 UNITS: 5000 INJECTION INTRAVENOUS; SUBCUTANEOUS at 17:32

## 2022-08-10 RX ADMIN — POTASSIUM CHLORIDE 40 MEQ: 1500 TABLET, EXTENDED RELEASE ORAL at 09:57

## 2022-08-10 RX ADMIN — GABAPENTIN 100 MG: 100 CAPSULE ORAL at 09:52

## 2022-08-10 RX ADMIN — SACUBITRIL AND VALSARTAN 1 TABLET: 24; 26 TABLET, FILM COATED ORAL at 09:52

## 2022-08-10 RX ADMIN — CARVEDILOL 12.5 MG: 12.5 TABLET, FILM COATED ORAL at 09:52

## 2022-08-10 RX ADMIN — Medication 400 MG: at 09:57

## 2022-08-10 NOTE — ROUTINE PROCESS
Bedside and Verbal shift change report given to [facility] (oncoming nurse) by Hilaria Anguiano RN (offgoing nurse). Report included the following information SBAR, Kardex, ED Summary, Recent Results, and Cardiac Rhythm SR . Patient quietly resting with call light in reach.

## 2022-08-10 NOTE — PROGRESS NOTES
Reason for Admission:  CHF (congestive heart failure) ([facility] [address] [I50.9]                 RUR Score:    15            Plan for utilizing home health:    h2h for CHF                       Likelihood of Readmission:   LOW                         Transition of Care Plan:              Initial assessment completed with patient. Cognitive status of patient: oriented to time, place, person and situation. Face sheet information confirmed:  yes. The patient designates sister  to participate in his discharge plan and to receive any needed information. This patient lives in a single family home with mother. Patient is able to navigate steps as needed. Prior to hospitalization, patient was considered to be independent with ADLs/IADLS : yes . Patient has a current ACP document on file: no  Educated. Legally sister not decision maker     Healthcare Decision Maker:   Primary Decision Maker: Quiana Redmangamaliel - Sister - [phone]    Click here to complete [address] including selection of the Healthcare Decision Maker Relationship (ie \"Primary\")    The sister will be available to transport patient home upon discharge. The patient already has none reported, and  medical equipment available in the home. Patient is not currently active with home health. IPatient has not stayed in a skilled nursing facility or rehab. Was  stay within last 60 days : no. This patient is on dialysis :no        List of available Home Health agencies were provided and reviewed with the patient prior to discharge. Freedom of choice signed: yes, for [facility]. Currently, the discharge plan is Home with [address] [place] [address] [place]. The patient states that he can obtain his medications from the pharmacy, and take his medications as directed. Patient's current insurance is [employer]        Care Management Interventions  PCP Verified by CM: Yes  Mode of Transport at Discharge:  Other (see comment) (family)  Transition of Care Consult (CM Consult): Discharge Planning, [address]: Yes  Support Systems: Other Family Member(s), Parent(s)  Confirm Follow Up Transport: Other (see comment)  The Plan for Transition of Care is Related to the Following Treatment Goals : h2h for CHF  The Patient and/or Patient Representative was Provided with a Choice of Provider and Agrees with the Discharge Plan?: Yes  Freedom of Choice List was Provided with Basic Dialogue that Supports the Patient's Individualized Plan of Care/Goals, Treatment Preferences and Shares the Quality Data Associated with the Providers?: Yes  Discharge Location  Patient Expects to be Discharged to[de-identified] Home with home health
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
## 2022-08-10 NOTE — PROGRESS NOTES
Pulse oximetry assessment   99% at rest on room air (if 88% or less, skip next steps)  84% while ambulating on room air  Recovery to 98% at rest on room air.

## 2022-08-11 ENCOUNTER — APPOINTMENT (OUTPATIENT)
Dept: GENERAL RADIOLOGY | Age: 62
DRG: 291 | End: 2022-08-11
Payer: MEDICARE

## 2022-08-11 LAB
ALBUMIN SERPL-MCNC: 2.1 G/DL (ref 3.4–5)
ALBUMIN/GLOB SERPL: 0.6 {RATIO} (ref 0.8–1.7)
ALP SERPL-CCNC: 100 U/L (ref 45–117)
ALT SERPL-CCNC: 32 U/L (ref 16–61)
ANION GAP SERPL CALC-SCNC: 10 MMOL/L (ref 3–18)
ANION GAP SERPL CALC-SCNC: 7 MMOL/L (ref 3–18)
ARTERIAL PATENCY WRIST A: POSITIVE
AST SERPL-CCNC: 27 U/L (ref 10–38)
ATRIAL RATE: 60 BPM
ATRIAL RATE: 67 BPM
BASE EXCESS BLD CALC-SCNC: 1.8 MMOL/L
BASOPHILS # BLD: 0 K/UL (ref 0–0.1)
BASOPHILS NFR BLD: 0 % (ref 0–2)
BDY SITE: ABNORMAL
BILIRUB SERPL-MCNC: 1.3 MG/DL (ref 0.2–1)
BUN SERPL-MCNC: 24 MG/DL (ref 7–18)
BUN SERPL-MCNC: 29 MG/DL (ref 7–18)
BUN/CREAT SERPL: 14 (ref 12–20)
BUN/CREAT SERPL: 19 (ref 12–20)
CALCIUM SERPL-MCNC: 7 MG/DL (ref 8.5–10.1)
CALCIUM SERPL-MCNC: 7.3 MG/DL (ref 8.5–10.1)
CALCULATED P AXIS, ECG09: 110 DEGREES
CALCULATED P AXIS, ECG09: 62 DEGREES
CALCULATED R AXIS, ECG10: 81 DEGREES
CALCULATED R AXIS, ECG10: 83 DEGREES
CALCULATED T AXIS, ECG11: 110 DEGREES
CALCULATED T AXIS, ECG11: 80 DEGREES
CHLORIDE SERPL-SCNC: 100 MMOL/L (ref 100–111)
CHLORIDE SERPL-SCNC: 103 MMOL/L (ref 100–111)
CO2 SERPL-SCNC: 25 MMOL/L (ref 21–32)
CO2 SERPL-SCNC: 27 MMOL/L (ref 21–32)
CREAT SERPL-MCNC: 1.55 MG/DL (ref 0.6–1.3)
CREAT SERPL-MCNC: 1.68 MG/DL (ref 0.6–1.3)
DIAGNOSIS, 93000: NORMAL
DIAGNOSIS, 93000: NORMAL
DIFFERENTIAL METHOD BLD: ABNORMAL
EOSINOPHIL # BLD: 0.3 K/UL (ref 0–0.4)
EOSINOPHIL NFR BLD: 4 % (ref 0–5)
ERYTHROCYTE [DISTWIDTH] IN BLOOD BY AUTOMATED COUNT: 13.7 % (ref 11.6–14.5)
GAS FLOW.O2 O2 DELIVERY SYS: ABNORMAL L/MIN
GLOBULIN SER CALC-MCNC: 3.7 G/DL (ref 2–4)
GLUCOSE BLD STRIP.AUTO-MCNC: 144 MG/DL (ref 70–110)
GLUCOSE SERPL-MCNC: 106 MG/DL (ref 74–99)
GLUCOSE SERPL-MCNC: 117 MG/DL (ref 74–99)
HCO3 BLD-SCNC: 25.4 MMOL/L (ref 22–26)
HCT VFR BLD AUTO: 45.1 % (ref 36–48)
HGB BLD-MCNC: 14.9 G/DL (ref 13–16)
IMM GRANULOCYTES # BLD AUTO: 0 K/UL (ref 0–0.04)
IMM GRANULOCYTES NFR BLD AUTO: 0 % (ref 0–0.5)
LACTATE BLD-SCNC: 1.38 MMOL/L (ref 0.4–2)
LACTATE SERPL-SCNC: 2 MMOL/L (ref 0.4–2)
LACTATE SERPL-SCNC: 2.8 MMOL/L (ref 0.4–2)
LACTATE SERPL-SCNC: 3.8 MMOL/L (ref 0.4–2)
LYMPHOCYTES # BLD: 1.2 K/UL (ref 0.9–3.6)
LYMPHOCYTES NFR BLD: 16 % (ref 21–52)
MAGNESIUM SERPL-MCNC: 1.5 MG/DL (ref 1.6–2.6)
MAGNESIUM SERPL-MCNC: 2 MG/DL (ref 1.6–2.6)
MCH RBC QN AUTO: 30 PG (ref 24–34)
MCHC RBC AUTO-ENTMCNC: 33 G/DL (ref 31–37)
MCV RBC AUTO: 90.9 FL (ref 78–100)
MONOCYTES # BLD: 0.8 K/UL (ref 0.05–1.2)
MONOCYTES NFR BLD: 11 % (ref 3–10)
NEUTS SEG # BLD: 5.1 K/UL (ref 1.8–8)
NEUTS SEG NFR BLD: 69 % (ref 40–73)
NRBC # BLD: 0 K/UL (ref 0–0.01)
NRBC BLD-RTO: 0 PER 100 WBC
O2/TOTAL GAS SETTING VFR VENT: 2 %
P-R INTERVAL, ECG05: 172 MS
P-R INTERVAL, ECG05: 186 MS
PCO2 BLD: 35.8 MMHG (ref 35–45)
PH BLD: 7.46 [PH] (ref 7.35–7.45)
PLATELET # BLD AUTO: 234 K/UL (ref 135–420)
PMV BLD AUTO: 12.7 FL (ref 9.2–11.8)
PO2 BLD: 77 MMHG (ref 80–100)
POTASSIUM SERPL-SCNC: 3.8 MMOL/L (ref 3.5–5.5)
POTASSIUM SERPL-SCNC: 3.8 MMOL/L (ref 3.5–5.5)
PROT SERPL-MCNC: 5.8 G/DL (ref 6.4–8.2)
Q-T INTERVAL, ECG07: 502 MS
Q-T INTERVAL, ECG07: 530 MS
QRS DURATION, ECG06: 100 MS
QRS DURATION, ECG06: 94 MS
QTC CALCULATION (BEZET), ECG08: 530 MS
QTC CALCULATION (BEZET), ECG08: 530 MS
RBC # BLD AUTO: 4.96 M/UL (ref 4.35–5.65)
SAO2 % BLD: 96.1 % (ref 92–97)
SERVICE CMNT-IMP: ABNORMAL
SODIUM SERPL-SCNC: 135 MMOL/L (ref 136–145)
SODIUM SERPL-SCNC: 137 MMOL/L (ref 136–145)
SPECIMEN TYPE: ABNORMAL
TOTAL RESP. RATE, ITRR: 24
TROPONIN-HIGH SENSITIVITY: 15 NG/L (ref 0–78)
TROPONIN-HIGH SENSITIVITY: 17 NG/L (ref 0–78)
TROPONIN-HIGH SENSITIVITY: 17 NG/L (ref 0–78)
VENTRICULAR RATE, ECG03: 60 BPM
VENTRICULAR RATE, ECG03: 67 BPM
WBC # BLD AUTO: 7.4 K/UL (ref 4.6–13.2)

## 2022-08-11 PROCEDURE — 36600 WITHDRAWAL OF ARTERIAL BLOOD: CPT

## 2022-08-11 PROCEDURE — P9047 ALBUMIN (HUMAN), 25%, 50ML: HCPCS

## 2022-08-11 PROCEDURE — 65660000004 HC RM CVT STEPDOWN

## 2022-08-11 PROCEDURE — 74011250636 HC RX REV CODE- 250/636

## 2022-08-11 PROCEDURE — 93005 ELECTROCARDIOGRAM TRACING: CPT

## 2022-08-11 PROCEDURE — 83605 ASSAY OF LACTIC ACID: CPT

## 2022-08-11 PROCEDURE — 74011250637 HC RX REV CODE- 250/637: Performed by: PHYSICIAN ASSISTANT

## 2022-08-11 PROCEDURE — 71045 X-RAY EXAM CHEST 1 VIEW: CPT

## 2022-08-11 PROCEDURE — 74011250637 HC RX REV CODE- 250/637

## 2022-08-11 PROCEDURE — 74011250637 HC RX REV CODE- 250/637: Performed by: STUDENT IN AN ORGANIZED HEALTH CARE EDUCATION/TRAINING PROGRAM

## 2022-08-11 PROCEDURE — 74011000250 HC RX REV CODE- 250

## 2022-08-11 PROCEDURE — 36415 COLL VENOUS BLD VENIPUNCTURE: CPT

## 2022-08-11 PROCEDURE — 83735 ASSAY OF MAGNESIUM: CPT

## 2022-08-11 PROCEDURE — 85025 COMPLETE CBC W/AUTO DIFF WBC: CPT

## 2022-08-11 PROCEDURE — 82962 GLUCOSE BLOOD TEST: CPT

## 2022-08-11 PROCEDURE — 80053 COMPREHEN METABOLIC PANEL: CPT

## 2022-08-11 PROCEDURE — 99232 SBSQ HOSP IP/OBS MODERATE 35: CPT | Performed by: INTERNAL MEDICINE

## 2022-08-11 PROCEDURE — 2709999900 HC NON-CHARGEABLE SUPPLY

## 2022-08-11 PROCEDURE — 84484 ASSAY OF TROPONIN QUANT: CPT

## 2022-08-11 RX ORDER — POTASSIUM CHLORIDE 750 MG/1
10 TABLET, EXTENDED RELEASE ORAL
Status: COMPLETED | OUTPATIENT
Start: 2022-08-11 | End: 2022-08-11

## 2022-08-11 RX ORDER — ALBUMIN HUMAN 250 G/1000ML
25 SOLUTION INTRAVENOUS ONCE
Status: COMPLETED | OUTPATIENT
Start: 2022-08-11 | End: 2022-08-12

## 2022-08-11 RX ORDER — SPIRONOLACTONE 25 MG/1
12.5 TABLET ORAL DAILY
Status: DISCONTINUED | OUTPATIENT
Start: 2022-08-12 | End: 2022-08-14 | Stop reason: HOSPADM

## 2022-08-11 RX ORDER — LANOLIN ALCOHOL/MO/W.PET/CERES
400 CREAM (GRAM) TOPICAL DAILY
Status: DISCONTINUED | OUTPATIENT
Start: 2022-08-12 | End: 2022-08-14 | Stop reason: HOSPADM

## 2022-08-11 RX ORDER — CARVEDILOL 6.25 MG/1
6.25 TABLET ORAL 2 TIMES DAILY WITH MEALS
Status: DISCONTINUED | OUTPATIENT
Start: 2022-08-11 | End: 2022-08-12

## 2022-08-11 RX ORDER — TORSEMIDE 20 MG/1
10 TABLET ORAL DAILY
Status: DISCONTINUED | OUTPATIENT
Start: 2022-08-12 | End: 2022-08-14 | Stop reason: HOSPADM

## 2022-08-11 RX ORDER — SODIUM CHLORIDE 9 MG/ML
75 INJECTION, SOLUTION INTRAVENOUS CONTINUOUS
Status: DISPENSED | OUTPATIENT
Start: 2022-08-11 | End: 2022-08-11

## 2022-08-11 RX ORDER — MAGNESIUM SULFATE HEPTAHYDRATE 40 MG/ML
2 INJECTION, SOLUTION INTRAVENOUS ONCE
Status: COMPLETED | OUTPATIENT
Start: 2022-08-11 | End: 2022-08-11

## 2022-08-11 RX ADMIN — SODIUM CHLORIDE 75 ML/HR: 9 INJECTION, SOLUTION INTRAVENOUS at 12:22

## 2022-08-11 RX ADMIN — GABAPENTIN 100 MG: 100 CAPSULE ORAL at 15:53

## 2022-08-11 RX ADMIN — ROSUVASTATIN CALCIUM 20 MG: 20 TABLET, COATED ORAL at 22:10

## 2022-08-11 RX ADMIN — ASPIRIN 81 MG CHEWABLE TABLET 81 MG: 81 TABLET CHEWABLE at 09:25

## 2022-08-11 RX ADMIN — SPIRONOLACTONE 25 MG: 25 TABLET ORAL at 09:25

## 2022-08-11 RX ADMIN — HEPARIN SODIUM 5000 UNITS: 5000 INJECTION INTRAVENOUS; SUBCUTANEOUS at 15:52

## 2022-08-11 RX ADMIN — CLOPIDOGREL BISULFATE 75 MG: 75 TABLET ORAL at 09:24

## 2022-08-11 RX ADMIN — HEPARIN SODIUM 5000 UNITS: 5000 INJECTION INTRAVENOUS; SUBCUTANEOUS at 09:25

## 2022-08-11 RX ADMIN — GABAPENTIN 100 MG: 100 CAPSULE ORAL at 09:24

## 2022-08-11 RX ADMIN — ALBUMIN (HUMAN) 25 G: 0.25 INJECTION, SOLUTION INTRAVENOUS at 18:34

## 2022-08-11 RX ADMIN — TORSEMIDE 20 MG: 20 TABLET ORAL at 09:24

## 2022-08-11 RX ADMIN — SODIUM CHLORIDE, PRESERVATIVE FREE 10 ML: 5 INJECTION INTRAVENOUS at 06:42

## 2022-08-11 RX ADMIN — MAGNESIUM SULFATE HEPTAHYDRATE 2 G: 2 INJECTION, SOLUTION INTRAVENOUS at 06:41

## 2022-08-11 RX ADMIN — SODIUM CHLORIDE, PRESERVATIVE FREE 10 ML: 5 INJECTION INTRAVENOUS at 15:53

## 2022-08-11 RX ADMIN — HEPARIN SODIUM 5000 UNITS: 5000 INJECTION INTRAVENOUS; SUBCUTANEOUS at 23:06

## 2022-08-11 RX ADMIN — SODIUM CHLORIDE, PRESERVATIVE FREE 10 ML: 5 INJECTION INTRAVENOUS at 22:10

## 2022-08-11 RX ADMIN — SACUBITRIL AND VALSARTAN 1 TABLET: 24; 26 TABLET, FILM COATED ORAL at 09:24

## 2022-08-11 RX ADMIN — POTASSIUM CHLORIDE 10 MEQ: 750 TABLET, EXTENDED RELEASE ORAL at 09:25

## 2022-08-11 RX ADMIN — HEPARIN SODIUM 5000 UNITS: 5000 INJECTION INTRAVENOUS; SUBCUTANEOUS at 01:45

## 2022-08-11 RX ADMIN — CARVEDILOL 12.5 MG: 12.5 TABLET, FILM COATED ORAL at 09:25

## 2022-08-11 RX ADMIN — FLUTICASONE PROPIONATE 2 SPRAY: 50 SPRAY, METERED NASAL at 09:28

## 2022-08-11 NOTE — PROGRESS NOTES
Cardiology Progress Note    Admit Date: 8/7/2022  Attending Cardiologist: Dr. Brenda Timmons    Patient seen and examined independently. He reports that his breathing is much improved. He continues to deny cocaine usage despite the positive UDS. We will continue present cardiac Rx. Agree with assessment and plan as noted below. We will sign off and be available. Lázaro Palomino MD  Assessment:     -a/c HFrEF, in setting of medication non compliance and cocaine abuse. CXR with mild pulmonary edema. -CAD, s/p Cardiac cath 10/2020 with non obstructive CAD, s/p LHC in 2/24/2022 with PCI/HITESH to RCA. On ASA and Plavix.  -Non ischemic Cardiomyopathy, initially diagnosed in (2020), EF 15% at that time. Echo 2/23/2022 with EF 10-15%. S/p implantation of dual-chamber [employer] AICD for primary prevention. Interrogated on 8/8, device functioning appropriately, short runs of NSVT, no terminated events. -CKD  -HTN, on Coreg, Losartan, Aldactone and Torsemide as outpatient. Losartan d/c this admission, started on Entresto. -HLD, on Crestor.  -Hx traumatic subarachnoid hemorrhage 2016.  -Active cocaine abuse, UDS+ this admission. Primary cardiologist is Dr. Emperatriz Wright:     -Transitioned to maintenance diuretics. -Lifestyle modifications encouraged, including cocaine cessation and compliance with medications and follow up.   -Continue ASA and Plavix for recent cardiac stent in Feb. Continue statin. -GDMT for CMY: Entresto, Coreg, Aldactone. Will decrease Coreg dosage to allow BP room for Entresto. Consider Willene Search or Jardiance at discharge, will defer to primary cardiologist.   -Increase activity as tolerated. -No further recommendations from a CV standpoint. Recommend patient follow up with Dr. Dewayne Reyes in 2 weeks. Subjective:     Breathing comfortably on room air, lying flat.      Objective:      Patient Vitals for the past 8 hrs:   Temp Pulse Resp BP SpO2   08/11/22 0752 98.7 °F (37.1 °C) 67 18 101/63 92 %   08/11/22 0313 98.9 °F (37.2 °C) 73 18 114/70 99 %           Patient Vitals for the past 96 hrs:   Weight   08/09/22 2247 79.3 kg (174 lb 12.8 oz)   08/08/22 0347 81 kg (178 lb 9.6 oz)         TELE: SR, PVCs                Current Facility-Administered Medications   Medication Dose Route Frequency Last Admin    fluticasone propionate (FLONASE) 50 mcg/actuation nasal spray 2 Spray  2 Spray Both Nostrils DAILY 2 [place] at 08/11/22 8791    spironolactone (ALDACTONE) tablet 25 mg  25 mg Oral DAILY 25 mg at 08/11/22 1305    aspirin chewable tablet 81 mg  81 mg Oral DAILY 81 mg at 08/11/22 0925    hydrALAZINE (APRESOLINE) 20 mg/mL injection 20 mg  20 mg IntraVENous Q6H PRN 20 mg at 08/09/22 0914    sacubitriL-valsartan (ENTRESTO) 24-26 mg tablet 1 Tablet  1 Tablet Oral Q12H 1 Tablet at 08/11/22 0924    gabapentin (NEURONTIN) capsule 100 mg  100 mg Oral  mg at 08/11/22 0924    nitroglycerin (NITROSTAT) tablet 0.4 mg  0.4 mg SubLINGual Q5MIN PRN      rosuvastatin (CRESTOR) tablet 20 mg  20 mg Oral QHS 20 mg at 08/10/22 2214    torsemide (DEMADEX) tablet 20 mg  20 mg Oral BID 20 mg at 08/11/22 0924    sodium chloride (NS) flush 5-40 mL  5-40 mL IntraVENous Q8H 10 mL at 08/11/22 0642    sodium chloride (NS) flush 5-40 mL  5-40 mL IntraVENous PRN      acetaminophen (TYLENOL) tablet 650 mg  650 mg Oral Q6H  mg at 08/09/22 2259    Or    acetaminophen (TYLENOL) suppository 650 mg  650 mg Rectal Q6H PRN      polyethylene glycol (MIRALAX) packet 17 g  17 g Oral DAILY PRN      heparin (porcine) injection 5,000 Units  5,000 Units SubCUTAneous Q8H 5,000 Units at 08/11/22 0925    clopidogreL (PLAVIX) tablet 75 mg  75 mg Oral DAILY 75 mg at 08/11/22 0924    carvediloL (COREG) tablet 12.5 mg  12.5 mg Oral BID WITH MEALS 12.5 mg at 08/11/22 0925         Intake/Output Summary (Last 24 hours) at 8/11/2022 1010  Last data filed at 8/11/2022 0645  Gross per 24 hour   Intake 520 ml   Output 59351 ml   Net -81602 ml         Physical Exam:  General:  alert, cooperative, no distress, appears stated age  Neck:  supple, no JVD   Lungs:  CTA, no rales or rhonchi   Heart:  regular rate and rhythm  Abdomen:  abdomen is soft without significant tenderness  Extremities:  trace  LE edema B/L     Visit Vitals  /63 (BP 1 Location: Left upper arm, BP Patient Position: At rest)   Pulse 67   Temp 98.7 °F (37.1 °C)   Resp 18   Ht 6' (1.829 m)   Wt 79.3 kg (174 lb 12.8 oz)   SpO2 92%   BMI 23.71 kg/m²       Data Review:     Labs: Results:       Chemistry Recent Labs     08/11/22  0030 08/10/22  0509 08/09/22  0256   * 91 115*   * 135* 135*   K 3.8 3.6 3.6    100 101   CO2 25 27 24   BUN 29* 31* 36*   CREA 1.55* 1.46* 1.63*   CA 7.3* 7.4* 7.3*   MG 1.5* 1.6 1.7   AGAP 10 8 10   BUCR 19 21* 22*        CBC w/Diff Recent Labs     08/11/22  0030 08/10/22  0509 08/09/22  0256   WBC 7.4 7.3 8.0   RBC 4.96 4.55 4.56   HGB 14.9 13.7 13.9   HCT 45.1 41.2 41.5    221 225   GRANS 69 66 71   LYMPH 16* 21 16*   EOS 4 4 4        Cardiac Enzymes No results found for: CPK, CK, CKMMB, CKMB, RCK3, CKMBT, CKNDX, CKND1, SEBASTIÁN, TROPT, TROIQ, YANETH, TROPT, TNIPOC, BNP, BNPP   Coagulation No results for input(s): PTP, INR, APTT, INREXT, INREXT in the last 72 hours. Lipid Panel Lab Results   Component Value Date/Time    Cholesterol, total 143 02/24/2022 04:58 AM    HDL Cholesterol 37 (L) 02/24/2022 04:58 AM    LDL, calculated 91 02/24/2022 04:58 AM    VLDL, calculated 15 02/24/2022 04:58 AM    Triglyceride 75 02/24/2022 04:58 AM    CHOL/HDL Ratio 3.9 02/24/2022 04:58 AM      BNP No results found for: BNP, BNPP, XBNPT   Liver Enzymes No results for input(s): TP, ALB, TBIL, AP in the last 72 hours.     No lab exists for component: SGOT, GPT, DBIL     Thyroid Studies Lab Results   Component Value Date/Time    TSH 0.72 02/28/2016 07:15 PM          Signed By: Foster Gasca PA-C     August 11, 2022

## 2022-08-11 NOTE — ROUTINE PROCESS
Bedside and Verbal shift change report given to [facility] (oncoming nurse) by Prabhjot Boyd RN (offgoing nurse). Report included the following information SBAR, Kardex, ED Summary, Recent Results, and Cardiac Rhythm SR . Patient quietly resting with call light in reach.

## 2022-08-11 NOTE — PROGRESS NOTES
1238: TRANSFER - IN REPORT:    Verbal report received from [address], RN (name) on [facility]  being received from 2S (unit) for change in patient condition (s/p RRT for syncopal episode)      Report consisted of patients Situation, Background, Assessment and   Recommendations(SBAR). Information from the following report(s) SBAR, Kardex, Intake/Output, MAR, Recent Results, and Cardiac Rhythm SR  was reviewed with the receiving nurse. Opportunity for questions and clarification was provided. Assessment completed upon patients arrival to unit and care assumed. 1327: Received patient from 2S on bed. Patient is awake, A/O x4, denies complaints. Educated patient on calling for help if needing to get OOB, bed exit alarm on, oriented patient to room and unit. 1500: Patient sitting in bed eating lunch. NAD noted. 1700: Patient resting in bed quietly, NAD noted. 1810: Patient's BP low, checked cuff placement and rechecked BP, patient is sleepy but arouses to voice. Paged PFM awaiting call back. 1824: Paged PFM again. Patient's BP 84/58 MAP 66    1826: Paged PFM Resident pager. [id number] Farida with Resident regarding patient's BP, MD to place orders. 1900: Bedside and Verbal shift change report given to Kezia Lott RN (oncoming nurse) by [employer] (offgoing nurse).  Report included the following information SBAR, Kardex, Intake/Output, MAR, Recent Results, and Cardiac Rhythm SR .

## 2022-08-11 NOTE — PROGRESS NOTES
RRT called for \"minimally responsiveness\". Patient reportedly became dizzy and diaphoretic upon standing to walk to the restroom. Dizziness worsened after urinating. SBP noted to be in the 60s, which subsequently improved to the low 100s with one 250 cc IVF bolus. ECG obtained with worsening T wave changes, negative troponin in the absence of chest pain. Patient likely orthostatic in setting of IV diuresis, negative net balance this admission of ~12.0 L. Patient was alert and oriented x 3, resting comfortably in NAD. Reports feeling back to his baseline.     - Agree with gentle IVF hydration with close monitoring of respiratory status with underlying CMY. -Continue to hold diuretics at this time. Will decrease outpatient maintenance Torsemide dose from 20 mg BID to 10 mg daily.   -Resume Coreg, Entresto and aldactone tomorrow if pressures allow.   -Repeat ECG pending. Troponin negative.   -Orthostatic VS   -Repeat Mg ordered, recommend Maintaining K+ at 4.0 and Mg at 2.0. Will add maintenance to Mg replacement.

## 2022-08-11 NOTE — PROGRESS NOTES
Discharge/Transition Planning       0830: Liter flow of o2 for recovery not on o2 walk test yesterday.  Noted MD wants repeat

## 2022-08-11 NOTE — PROGRESS NOTES
Patient sitting in chair during 1200 vitals. Automatic blood pressure not reading on either arm. Patient diaphoretic and states \"I feel like I am going to pass out. \" Blood glucose 144. Rapid response called. Patients nurse to obtain manual blood pressure.

## 2022-08-11 NOTE — PROGRESS NOTES
[facility]   POST-ROUND NOTE    Received page that patient's blood pressure was soft at 77/51 with MAP 60. Albumin 25% infusion started at cardiology on-call was contacted. Evaluated patient at bedside. He was drowsy but awake and interactive. Cardiopulmonary exam was remarkable for expiratory wheezes but no crackles. RRR, no murmurs or leana appreciated. Extremities warm and well-perfused. Blood pressure to 90/60 with albumin infusion running. Plan  - Albumin 25% infusion  - Cards recs: If SBP < 90, administer 250 cc bolus NS. Avoid midodrine     The above patient and plan were discussed with my supervising physician. See daily progress note for full assessment/plan.       Cato Bamberger, MD, PGY-1  [facility]  8/11/2022, 7:08 PM

## 2022-08-11 NOTE — PROGRESS NOTES
Pt transferred to [facility] after RRT. Report called ahead to [facility]. Pt tolerated transfer well. Care given over to [facility].

## 2022-08-11 NOTE — PROGRESS NOTES
Pulse oximetry assessment   97% at rest on room air (if 88% or less, skip next steps)  95% while ambulating on room air  Patient walked and spoke throughout the walk test without complaints of shortness of breath or low oxygen saturation numbers. Patient does not require supplemental  oxygen.
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
## 2022-08-11 NOTE — PROGRESS NOTES
[facility]  Rapid/Medical Response Team Note      Patient:    Ysabel Agustin 57 y.o. male, : 1960  Patient MRN: [id number]    Admission Date: 2022   Admission Diagnosis: CHF (congestive heart failure) ([facility] [address] [I50.9]      RAPID RESPONSE  Rapid response called for: dyspnea, minimally responsive    Patient was sitting in the chair when nursing noted that he became diaphoretic and minimally responsive. Saw patient at bedside, he was only responsive to pain, minimally responsive to voice. He was on 2L O2. He was moved into bed and placed in trendelenburg. EKG showed NSR with non-specific t-wave inversions. He was given a 250 ml bolus. After fluid bolus he became more alert and responsive to voice but was not answering questions initially. Patient placed back to supine position, patient was responding to questions and denied any pain, CP and HA. Patient states that he feels dizzy and was tearful. Rapid ended and patient was getting ready to be transferred to Step down unit. 250ml bolus   EKG  BS normal  Pupils reactive      OBJECTIVE    RRT/MRT start time:               RRT/MRT end time:  Visit Vitals  /63 (BP 1 Location: Left upper arm, BP Patient Position: At rest)   Pulse 60   Temp 97.5 °F (36.4 °C)   Resp 18   Ht 6' (1.829 m)   Wt 79.3 kg (174 lb 12.8 oz)   SpO2 96%   BMI 23.71 kg/m²        Physical Exam:  Gen: responsive to sternal rub, minimally responsive to voice, on 2L O2 then on 4L O2  HEENT: normocephalic, atraumatic, MMM, pupils reactive 3 mm , no thyromegaly, no JVD  CV: RRR, S1/S2 present without M/R/G, +2 radial pulses, well-perfused, PMI not displaced  Pulm: CTAB, no wheezes, no crackles  Abd: S/NT/ND   MSK: no clubbing, no edema  Skin: warm, dry, intact, no rash  Neuro: CN II-XII grossly intact, no focal deficits appreciated, strength 3/5 bilaterally in Upper extremities  Psych: Unable to fully assess.         ASSESSMENT/PLAN AND DISPOSITION  Ysabel Agustin is a 57 y.o. year old male admitted for CHF (congestive heart failure) ([facility] [address] [I50.9]  Rapid Response Team/ Medical Response Team Called For: Dyspnea and minimally responsive. In setting of Rapid response the following completed the below:    Medications Administered During Rapid:  250 mL NS    EKG:NSR, non-specific t-wave changes in leads V3-V6    Labs: CMP, troponin, lactate, abg    Imaging: None      Other interventions: 250 ml NS bolus      Medical Problem(s)    Plan:  - With soft pressures and decreased responsiveness, will trasfer to stepdown  - continue telemetry   - primary team to follow up on abg, cmp, troponins,   - 75 ml/hr NS for 3 hours for total of 250 mls  Disposition: Stepdown unit   Patient condition: guarded     Attending Jacquelin Hu notified of rapid response and is in agreement with plan. Primary team resuming care. [facility] Senior resident Dr. Sohan Terry present during Rapid Response.         Jessica Irizarry MD -PGY 1  [facility]  August 11, 2022 11:55 AM

## 2022-08-11 NOTE — PROGRESS NOTES
Bedside and Verbal shift change report given to Radha Urias RN (oncoming nurse) by Kaey Marino RN (offgoing nurse). Report included the following information SBAR, Kardex, ED Summary, Intake/Output, MAR, Recent Results, Med Rec Status, and Cardiac Rhythm NSR . Wound Prevention Checklist    Patient: Demetris Simpson (42 y.o. male)  Date: 8/11/2022  Diagnosis: CHF (congestive heart failure) ([facility]) [I50.9] CHF (congestive heart failure) ([facility])    Precautions:         []  Heel prevention boots placed on patient    []  Patient turned q2h during shift    []  Lift team ordered    [x]  Patient on [place] bed/Specialty bed    []  Each Wound is documented during shift (Stage, Color, drainage, odor, measurements, and dressings)    [x]  Dual skin checks done at bedside during shift report with Lisa Barron RN      1940: Called Dr. Tao Ac about pt Lactic Acid 2.8, No orders received    2021:Pt awake and talking on the phone, AOX4, denies pain or sob, on room air, shift assessment completed, bed locked and low position, call bell and urinal within reach    2210: Due medication given, Pt ambulated to bathroom and back to bed    0015: Pt sleeping comfortably, no distress noted, no change from previous assessment    0132: /79, Pt sleeping, call bell within reach    0413: Pt sleeping, no distress noted    0618: Pt sleeping, needs within reach    Bedside and Verbal shift change report given to Kaye Marino RN (oncoming nurse) by Radha Urias RN (offgoing nurse). Report included the following information SBAR, Kardex, Intake/Output, MAR, Recent Results, Med Rec Status, and Cardiac Rhythm NSR .

## 2022-08-11 NOTE — PROGRESS NOTES
responded to Rapid Respond  for  Joe Watson, who is a 57 y. o.,male,     The  provided the following Interventions:  Provided crisis spiritual care and support. Offered prayers on behalf for the patient. Chart reviewed. Plan:  Chaplains will continue to follow and will provide spiritual care as needed.  recommends bedside caregivers page  on duty if patient or family shows signs of acute spiritual or emotional distress.       Nan Jensen

## 2022-08-12 LAB
ANION GAP SERPL CALC-SCNC: 7 MMOL/L (ref 3–18)
BASOPHILS # BLD: 0 K/UL (ref 0–0.1)
BASOPHILS NFR BLD: 1 % (ref 0–2)
BUN SERPL-MCNC: 23 MG/DL (ref 7–18)
BUN/CREAT SERPL: 16 (ref 12–20)
CALCIUM SERPL-MCNC: 7.5 MG/DL (ref 8.5–10.1)
CHLORIDE SERPL-SCNC: 103 MMOL/L (ref 100–111)
CO2 SERPL-SCNC: 26 MMOL/L (ref 21–32)
CREAT SERPL-MCNC: 1.4 MG/DL (ref 0.6–1.3)
DIFFERENTIAL METHOD BLD: ABNORMAL
EOSINOPHIL # BLD: 0.4 K/UL (ref 0–0.4)
EOSINOPHIL NFR BLD: 6 % (ref 0–5)
ERYTHROCYTE [DISTWIDTH] IN BLOOD BY AUTOMATED COUNT: 13.4 % (ref 11.6–14.5)
EST. AVERAGE GLUCOSE BLD GHB EST-MCNC: 105 MG/DL
GLUCOSE SERPL-MCNC: 84 MG/DL (ref 74–99)
HBA1C MFR BLD: 5.3 % (ref 4.2–5.6)
HCT VFR BLD AUTO: 42.9 % (ref 36–48)
HGB BLD-MCNC: 14 G/DL (ref 13–16)
IMM GRANULOCYTES # BLD AUTO: 0 K/UL (ref 0–0.04)
IMM GRANULOCYTES NFR BLD AUTO: 0 % (ref 0–0.5)
LACTATE SERPL-SCNC: 1.3 MMOL/L (ref 0.4–2)
LYMPHOCYTES # BLD: 1.6 K/UL (ref 0.9–3.6)
LYMPHOCYTES NFR BLD: 21 % (ref 21–52)
MAGNESIUM SERPL-MCNC: 1.7 MG/DL (ref 1.6–2.6)
MCH RBC QN AUTO: 29.7 PG (ref 24–34)
MCHC RBC AUTO-ENTMCNC: 32.6 G/DL (ref 31–37)
MCV RBC AUTO: 90.9 FL (ref 78–100)
MONOCYTES # BLD: 0.8 K/UL (ref 0.05–1.2)
MONOCYTES NFR BLD: 11 % (ref 3–10)
NEUTS SEG # BLD: 4.6 K/UL (ref 1.8–8)
NEUTS SEG NFR BLD: 62 % (ref 40–73)
NRBC # BLD: 0 K/UL (ref 0–0.01)
NRBC BLD-RTO: 0 PER 100 WBC
PLATELET # BLD AUTO: 232 K/UL (ref 135–420)
PMV BLD AUTO: 12.4 FL (ref 9.2–11.8)
POTASSIUM SERPL-SCNC: 4 MMOL/L (ref 3.5–5.5)
RBC # BLD AUTO: 4.72 M/UL (ref 4.35–5.65)
SODIUM SERPL-SCNC: 136 MMOL/L (ref 136–145)
WBC # BLD AUTO: 7.5 K/UL (ref 4.6–13.2)

## 2022-08-12 PROCEDURE — 74011250637 HC RX REV CODE- 250/637

## 2022-08-12 PROCEDURE — 83735 ASSAY OF MAGNESIUM: CPT

## 2022-08-12 PROCEDURE — 80048 BASIC METABOLIC PNL TOTAL CA: CPT

## 2022-08-12 PROCEDURE — 83605 ASSAY OF LACTIC ACID: CPT

## 2022-08-12 PROCEDURE — 85025 COMPLETE CBC W/AUTO DIFF WBC: CPT

## 2022-08-12 PROCEDURE — 99232 SBSQ HOSP IP/OBS MODERATE 35: CPT | Performed by: INTERNAL MEDICINE

## 2022-08-12 PROCEDURE — 74011250637 HC RX REV CODE- 250/637: Performed by: STUDENT IN AN ORGANIZED HEALTH CARE EDUCATION/TRAINING PROGRAM

## 2022-08-12 PROCEDURE — 65660000004 HC RM CVT STEPDOWN

## 2022-08-12 PROCEDURE — 36415 COLL VENOUS BLD VENIPUNCTURE: CPT

## 2022-08-12 PROCEDURE — 83036 HEMOGLOBIN GLYCOSYLATED A1C: CPT

## 2022-08-12 PROCEDURE — 74011000250 HC RX REV CODE- 250

## 2022-08-12 PROCEDURE — 74011250636 HC RX REV CODE- 250/636

## 2022-08-12 PROCEDURE — 74011250637 HC RX REV CODE- 250/637: Performed by: PHYSICIAN ASSISTANT

## 2022-08-12 RX ORDER — CARVEDILOL 3.12 MG/1
3.12 TABLET ORAL EVERY 12 HOURS
Status: DISCONTINUED | OUTPATIENT
Start: 2022-08-12 | End: 2022-08-13

## 2022-08-12 RX ADMIN — SODIUM CHLORIDE, PRESERVATIVE FREE 10 ML: 5 INJECTION INTRAVENOUS at 15:07

## 2022-08-12 RX ADMIN — ACETAMINOPHEN 650 MG: 325 TABLET, FILM COATED ORAL at 08:13

## 2022-08-12 RX ADMIN — CLOPIDOGREL BISULFATE 75 MG: 75 TABLET ORAL at 08:11

## 2022-08-12 RX ADMIN — ROSUVASTATIN CALCIUM 20 MG: 20 TABLET, COATED ORAL at 22:06

## 2022-08-12 RX ADMIN — HEPARIN SODIUM 5000 UNITS: 5000 INJECTION INTRAVENOUS; SUBCUTANEOUS at 08:11

## 2022-08-12 RX ADMIN — SODIUM CHLORIDE, PRESERVATIVE FREE 10 ML: 5 INJECTION INTRAVENOUS at 22:11

## 2022-08-12 RX ADMIN — Medication 400 MG: at 08:13

## 2022-08-12 RX ADMIN — FLUTICASONE PROPIONATE 2 SPRAY: 50 SPRAY, METERED NASAL at 11:14

## 2022-08-12 RX ADMIN — ASPIRIN 81 MG CHEWABLE TABLET 81 MG: 81 TABLET CHEWABLE at 08:11

## 2022-08-12 RX ADMIN — HEPARIN SODIUM 5000 UNITS: 5000 INJECTION INTRAVENOUS; SUBCUTANEOUS at 15:07

## 2022-08-12 RX ADMIN — SODIUM CHLORIDE, PRESERVATIVE FREE 10 ML: 5 INJECTION INTRAVENOUS at 06:13

## 2022-08-12 NOTE — PROGRESS NOTES
0700: Bedside and Verbal shift change report given to This Writer (oncoming nurse) by Familia Orosco RN (offgoing nurse). Report included the following information SBAR, Kardex, Intake/Output, MAR, Recent Results, and Cardiac Rhythm SR . Wound Prevention Checklist    Patient: Orlin Robbins (86 y.o. male)  Date: 8/12/2022  Diagnosis: CHF (congestive heart failure) ([facility]) [I50.9] CHF (congestive heart failure) ([facility] [address]    Precautions:         []  Heel prevention boots placed on patient    [x]  Patient turned q2h during shift    []  Lift team ordered    [x]  Patient on [place] bed/Specialty bed    []  Each Wound is documented during shift (Stage, Color, drainage, odor, measurements, and dressings)    [x]  Dual skin checks done at bedside during shift report with STACEY Castanon RN      0900: Patient resting in bed, NAD noted. 1115: PFM team at bedside to see patient, repeat walk test again today per providers. 1150: R FA IV infiltrated, removed. New IV placed to R wrist.     1300: Patient resting in bed quietly, NAD noted. 1500: Six minute walk test done, patient tolerated well. See separate progress note. . Orthostatic BP checked prior to performing walk test. See separate progress note. Patient sitting in recliner. 1700: Patient sitting in recliner, c/o R forearm pain and swelling, BP cuff moved to left arm, provided patient with icepack. 1900: Bedside and Verbal shift change report given to Elenita Rosario RN (oncoming nurse) by This Writer (offgoing nurse).  Report included the following information SBAR, Kardex, Intake/Output, MAR, Recent Results, and Cardiac Rhythm SR .
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
## 2022-08-12 NOTE — PROGRESS NOTES
Pulse oximetry assessment   100% at rest on room air (if 88% or less, skip next steps)  91% while ambulating on room air

## 2022-08-12 NOTE — PROGRESS NOTES
Order for home health h2h noted. Sent order to [address] [place] [address] was notified. 1545: Order for shower chair for home use faxed to [facility]el[facility]issen.           ROVERTO CarpenterN RN  Care Management  Pager: [phone]

## 2022-08-12 NOTE — PROGRESS NOTES
Outpatient cardiology appointment scheduled    Who: [facility] Cardiology with NP  When: August 25 at 9:30am   Where: [address] [facility], [address], [place], [place], PGY-2   [address] Pager: [phone]   August 12, 2022, 9:44 AM

## 2022-08-12 NOTE — PROGRESS NOTES
Cardiology Progress Note    Admit Date: 8/7/2022      Assessment/Plan:     Hospital Problems  Date Reviewed: 2/15/2018            Codes Class Noted POA    * (Principal) CHF (congestive heart failure) ([facility] [address] ICD-10-CM: I50.9  ICD-9-CM: 428.0  11/17/2021 Unknown         -a/c HFrEF, in setting of medication non compliance and cocaine abuse. This has since resolved with aggressive diuretics. -Transient hypotension. This occurred yesterday. This is likely due to overdiuresis. His blood pressure has improved after stopping his diuretics and holding his blood pressure medications. -CAD, s/p Cardiac cath 10/2020 with non obstructive CAD, s/p LHC in 2/24/2022 with PCI/HITESH to RCA. On ASA and Plavix.  -Non ischemic Cardiomyopathy, initially diagnosed in (2020), EF 15% at that time. Echo 2/23/2022 with EF 10-15%. S/p implantation of dual-chamber Medtronic AICD for primary prevention. Interrogated on 8/8, device functioning appropriately, short runs of NSVT, no terminated events. -CKD  -HTN, on Coreg, Losartan, Aldactone and Torsemide as outpatient      Will slowly restart outpatient cardiac regimen tomorrow morning as long as his blood pressure will allow. We discharged on his prior diuretic dosing with half dosages of his carvedilol and losartan. He will then need close follow-up with his primary cardiologist.      Subjective:     No new complaints. Shortness of breath much improved, leg swelling has resolved.     Objective:      Patient Vitals for the past 8 hrs:   Temp Pulse Resp BP SpO2   08/12/22 1630 -- -- -- (!) 129/97 --   08/12/22 1627 97.7 °F (36.5 °C) 87 12 (!) 141/104 97 %   08/12/22 1136 98.3 °F (36.8 °C) 81 21 122/88 98 %         Patient Vitals for the past 96 hrs:   Weight   08/12/22 0412 74.5 kg (164 lb 4.8 oz)   08/09/22 2247 79.3 kg (174 lb 12.8 oz)       TELE: normal sinus rhythm               Current Facility-Administered Medications   Medication Dose Route Frequency Last Admin    carvediloL (COREG) tablet 3.125 mg  3.125 mg Oral Q12H      [Held by provider] torsemide (DEMADEX) tablet 10 mg  10 mg Oral DAILY      [Held by provider] spironolactone (ALDACTONE) tablet 12.5 mg  12.5 mg Oral DAILY      magnesium oxide (MAG-OX) tablet 400 mg  400 mg Oral DAILY 400 mg at 08/12/22 0813    fluticasone propionate (FLONASE) 50 mcg/actuation nasal spray 2 Spray  2 Spray Both Nostrils DAILY 2 [place] at 08/12/22 1114    aspirin chewable tablet 81 mg  81 mg Oral DAILY 81 mg at 08/12/22 0811    [Held by provider] hydrALAZINE (APRESOLINE) 20 mg/mL injection 20 mg  20 mg IntraVENous Q6H PRN 20 mg at 08/09/22 0914    [Held by provider] sacubitriL-valsartan (ENTRESTO) 24-26 mg tablet 1 Tablet  1 Tablet Oral Q12H 1 Tablet at 08/11/22 0924    [Held by provider] gabapentin (NEURONTIN) capsule 100 mg  100 mg Oral  mg at 08/11/22 1553    [Held by provider] nitroglycerin (NITROSTAT) tablet 0.4 mg  0.4 mg SubLINGual Q5MIN PRN      rosuvastatin (CRESTOR) tablet 20 mg  20 mg Oral QHS 20 mg at 08/11/22 2210    sodium chloride (NS) flush 5-40 mL  5-40 mL IntraVENous Q8H 10 mL at 08/12/22 1507    sodium chloride (NS) flush 5-40 mL  5-40 mL IntraVENous PRN      acetaminophen (TYLENOL) tablet 650 mg  650 mg Oral Q6H  mg at 08/12/22 0813    Or    acetaminophen (TYLENOL) suppository 650 mg  650 mg Rectal Q6H PRN      polyethylene glycol (MIRALAX) packet 17 g  17 g Oral DAILY PRN      heparin (porcine) injection 5,000 Units  5,000 Units SubCUTAneous Q8H 5,000 Units at 08/12/22 1507    clopidogreL (PLAVIX) tablet 75 mg  75 mg Oral DAILY 75 mg at 08/12/22 0811         Intake/Output Summary (Last 24 hours) at 8/12/2022 1713  Last data filed at 8/12/2022 1631  Gross per 24 hour   Intake 1420 ml   Output 1850 ml   Net -430 ml       Physical Exam:  General:  alert, cooperative, no distress, appears stated age  Neck:  no JVD  Lungs:  clear to auscultation bilaterally  Heart:  regular rate and rhythm  Abdomen:  abdomen is soft without significant tenderness, masses, organomegaly or guarding  Extremities:  extremities normal, atraumatic, no cyanosis or edema    Visit Vitals  BP (!) 129/97   Pulse 87   Temp 97.7 °F (36.5 °C)   Resp 12   Ht 6' (1.829 m)   Wt 74.5 kg (164 lb 4.8 oz)   SpO2 97%   BMI 22.28 kg/m²       Data Review:     Labs: Results:       Chemistry Recent Labs     08/12/22  0520 08/11/22  1215 08/11/22  1214 08/11/22  0030   GLU 84 106*  --  117*    137  --  135*   K 4.0 3.8  --  3.8    103  --  100   CO2 26 27  --  25   BUN 23* 24*  --  29*   CREA 1.40* 1.68*  --  1.55*   CA 7.5* 7.0*  --  7.3*   MG 1.7  --  2.0 1.5*   AGAP 7 7  --  10   BUCR 16 14  --  19   AP  --  100  --   --    TP  --  5.8*  --   --    ALB  --  2.1*  --   --    GLOB  --  3.7  --   --    AGRAT  --  0.6*  --   --       CBC w/Diff Recent Labs     08/12/22  0520 08/11/22  0030 08/10/22  0509   WBC 7.5 7.4 7.3   RBC 4.72 4.96 4.55   HGB 14.0 14.9 13.7   HCT 42.9 45.1 41.2    234 221   GRANS 62 69 66   LYMPH 21 16* 21   EOS 6* 4 4      Cardiac Enzymes No results found for: CPK, CK, CKMMB, CKMB, RCK3, CKMBT, CKNDX, CKND1, SEBASTIÁN, TROPT, TROIQ, YANETH, TROPT, TNIPOC, BNP, BNPP   Coagulation No results for input(s): PTP, INR, APTT, INREXT in the last 72 hours.     Lipid Panel Lab Results   Component Value Date/Time    Cholesterol, total 143 02/24/2022 04:58 AM    HDL Cholesterol 37 (L) 02/24/2022 04:58 AM    LDL, calculated 91 02/24/2022 04:58 AM    VLDL, calculated 15 02/24/2022 04:58 AM    Triglyceride 75 02/24/2022 04:58 AM    CHOL/HDL Ratio 3.9 02/24/2022 04:58 AM      BNP No results found for: BNP, BNPP, XBNPT   Liver Enzymes Recent Labs     08/11/22  1215   TP 5.8*   ALB 2.1*         Thyroid Studies Lab Results   Component Value Date/Time    TSH 0.72 02/28/2016 07:15 PM          Signed By: Ashok Collins MD     August 12, 2022

## 2022-08-13 ENCOUNTER — APPOINTMENT (OUTPATIENT)
Dept: GENERAL RADIOLOGY | Age: 62
DRG: 291 | End: 2022-08-13
Payer: MEDICARE

## 2022-08-13 LAB
ANION GAP SERPL CALC-SCNC: 6 MMOL/L (ref 3–18)
APPEARANCE UR: CLEAR
BACTERIA URNS QL MICRO: NEGATIVE /HPF
BASOPHILS # BLD: 0.1 K/UL (ref 0–0.1)
BASOPHILS NFR BLD: 1 % (ref 0–2)
BILIRUB UR QL: NEGATIVE
BUN SERPL-MCNC: 20 MG/DL (ref 7–18)
BUN/CREAT SERPL: 14 (ref 12–20)
CALCIUM SERPL-MCNC: 7.3 MG/DL (ref 8.5–10.1)
CHLORIDE SERPL-SCNC: 102 MMOL/L (ref 100–111)
CO2 SERPL-SCNC: 25 MMOL/L (ref 21–32)
COLOR UR: YELLOW
CREAT SERPL-MCNC: 1.45 MG/DL (ref 0.6–1.3)
DIFFERENTIAL METHOD BLD: ABNORMAL
EOSINOPHIL # BLD: 0.4 K/UL (ref 0–0.4)
EOSINOPHIL NFR BLD: 5 % (ref 0–5)
EPITH CASTS URNS QL MICRO: ABNORMAL /LPF (ref 0–5)
ERYTHROCYTE [DISTWIDTH] IN BLOOD BY AUTOMATED COUNT: 13.5 % (ref 11.6–14.5)
GLUCOSE SERPL-MCNC: 90 MG/DL (ref 74–99)
GLUCOSE UR STRIP.AUTO-MCNC: NEGATIVE MG/DL
HCT VFR BLD AUTO: 41.2 % (ref 36–48)
HGB BLD-MCNC: 13.6 G/DL (ref 13–16)
HGB UR QL STRIP: ABNORMAL
IMM GRANULOCYTES # BLD AUTO: 0 K/UL (ref 0–0.04)
IMM GRANULOCYTES NFR BLD AUTO: 0 % (ref 0–0.5)
KETONES UR QL STRIP.AUTO: NEGATIVE MG/DL
LACTATE SERPL-SCNC: 1.1 MMOL/L (ref 0.4–2)
LEUKOCYTE ESTERASE UR QL STRIP.AUTO: ABNORMAL
LYMPHOCYTES # BLD: 1 K/UL (ref 0.9–3.6)
LYMPHOCYTES NFR BLD: 14 % (ref 21–52)
MAGNESIUM SERPL-MCNC: 1.5 MG/DL (ref 1.6–2.6)
MCH RBC QN AUTO: 30.2 PG (ref 24–34)
MCHC RBC AUTO-ENTMCNC: 33 G/DL (ref 31–37)
MCV RBC AUTO: 91.6 FL (ref 78–100)
MONOCYTES # BLD: 0.7 K/UL (ref 0.05–1.2)
MONOCYTES NFR BLD: 10 % (ref 3–10)
MUCOUS THREADS URNS QL MICRO: ABNORMAL /LPF
NEUTS SEG # BLD: 5 K/UL (ref 1.8–8)
NEUTS SEG NFR BLD: 70 % (ref 40–73)
NITRITE UR QL STRIP.AUTO: NEGATIVE
NRBC # BLD: 0 K/UL (ref 0–0.01)
NRBC BLD-RTO: 0 PER 100 WBC
PH UR STRIP: 6.5 [PH] (ref 5–8)
PLATELET # BLD AUTO: 254 K/UL (ref 135–420)
PMV BLD AUTO: 11.8 FL (ref 9.2–11.8)
POTASSIUM SERPL-SCNC: 4.1 MMOL/L (ref 3.5–5.5)
PROT UR STRIP-MCNC: NEGATIVE MG/DL
RBC # BLD AUTO: 4.5 M/UL (ref 4.35–5.65)
RBC #/AREA URNS HPF: ABNORMAL /HPF (ref 0–5)
SODIUM SERPL-SCNC: 133 MMOL/L (ref 136–145)
SP GR UR REFRACTOMETRY: 1.02 (ref 1–1.03)
UROBILINOGEN UR QL STRIP.AUTO: 1 EU/DL (ref 0.2–1)
WBC # BLD AUTO: 7.1 K/UL (ref 4.6–13.2)
WBC URNS QL MICRO: ABNORMAL /HPF (ref 0–4)

## 2022-08-13 PROCEDURE — 83735 ASSAY OF MAGNESIUM: CPT

## 2022-08-13 PROCEDURE — 83605 ASSAY OF LACTIC ACID: CPT

## 2022-08-13 PROCEDURE — 81001 URINALYSIS AUTO W/SCOPE: CPT

## 2022-08-13 PROCEDURE — 74011000250 HC RX REV CODE- 250

## 2022-08-13 PROCEDURE — 74011250637 HC RX REV CODE- 250/637

## 2022-08-13 PROCEDURE — 99232 SBSQ HOSP IP/OBS MODERATE 35: CPT | Performed by: INTERNAL MEDICINE

## 2022-08-13 PROCEDURE — 85025 COMPLETE CBC W/AUTO DIFF WBC: CPT

## 2022-08-13 PROCEDURE — 74011250637 HC RX REV CODE- 250/637: Performed by: STUDENT IN AN ORGANIZED HEALTH CARE EDUCATION/TRAINING PROGRAM

## 2022-08-13 PROCEDURE — 80048 BASIC METABOLIC PNL TOTAL CA: CPT

## 2022-08-13 PROCEDURE — 65660000004 HC RM CVT STEPDOWN

## 2022-08-13 PROCEDURE — 71045 X-RAY EXAM CHEST 1 VIEW: CPT

## 2022-08-13 PROCEDURE — 74011250637 HC RX REV CODE- 250/637: Performed by: INTERNAL MEDICINE

## 2022-08-13 PROCEDURE — 36415 COLL VENOUS BLD VENIPUNCTURE: CPT

## 2022-08-13 PROCEDURE — 87040 BLOOD CULTURE FOR BACTERIA: CPT

## 2022-08-13 PROCEDURE — 2709999900 HC NON-CHARGEABLE SUPPLY

## 2022-08-13 PROCEDURE — 74011250637 HC RX REV CODE- 250/637: Performed by: PHYSICIAN ASSISTANT

## 2022-08-13 PROCEDURE — 87086 URINE CULTURE/COLONY COUNT: CPT

## 2022-08-13 PROCEDURE — 74011250636 HC RX REV CODE- 250/636

## 2022-08-13 PROCEDURE — 87150 DNA/RNA AMPLIFIED PROBE: CPT

## 2022-08-13 RX ORDER — CARVEDILOL 6.25 MG/1
6.25 TABLET ORAL EVERY 12 HOURS
Status: DISCONTINUED | OUTPATIENT
Start: 2022-08-13 | End: 2022-08-14 | Stop reason: HOSPADM

## 2022-08-13 RX ADMIN — CARVEDILOL 6.25 MG: 6.25 TABLET, FILM COATED ORAL at 21:55

## 2022-08-13 RX ADMIN — HEPARIN SODIUM 5000 UNITS: 5000 INJECTION INTRAVENOUS; SUBCUTANEOUS at 00:09

## 2022-08-13 RX ADMIN — HEPARIN SODIUM 5000 UNITS: 5000 INJECTION INTRAVENOUS; SUBCUTANEOUS at 09:09

## 2022-08-13 RX ADMIN — CLOPIDOGREL BISULFATE 75 MG: 75 TABLET ORAL at 09:08

## 2022-08-13 RX ADMIN — ACETAMINOPHEN 650 MG: 325 TABLET, FILM COATED ORAL at 00:12

## 2022-08-13 RX ADMIN — Medication 400 MG: at 09:08

## 2022-08-13 RX ADMIN — ACETAMINOPHEN 650 MG: 325 TABLET, FILM COATED ORAL at 09:17

## 2022-08-13 RX ADMIN — ASPIRIN 81 MG CHEWABLE TABLET 81 MG: 81 TABLET CHEWABLE at 09:08

## 2022-08-13 RX ADMIN — TORSEMIDE 10 MG: 20 TABLET ORAL at 12:14

## 2022-08-13 RX ADMIN — HEPARIN SODIUM 5000 UNITS: 5000 INJECTION INTRAVENOUS; SUBCUTANEOUS at 23:55

## 2022-08-13 RX ADMIN — SODIUM CHLORIDE, PRESERVATIVE FREE 10 ML: 5 INJECTION INTRAVENOUS at 06:19

## 2022-08-13 RX ADMIN — SODIUM CHLORIDE, PRESERVATIVE FREE 10 ML: 5 INJECTION INTRAVENOUS at 21:56

## 2022-08-13 RX ADMIN — SPIRONOLACTONE 12.5 MG: 25 TABLET ORAL at 12:14

## 2022-08-13 RX ADMIN — ROSUVASTATIN CALCIUM 20 MG: 20 TABLET, COATED ORAL at 21:55

## 2022-08-13 RX ADMIN — HEPARIN SODIUM 5000 UNITS: 5000 INJECTION INTRAVENOUS; SUBCUTANEOUS at 16:48

## 2022-08-13 NOTE — PROGRESS NOTES
Problem: Falls - Risk of  Goal: *Absence of Falls  Description: Document Charito Currie Fall Risk and appropriate interventions in the flowsheet.   Outcome: Progressing Towards Goal  Note: Fall Risk Interventions:            Medication Interventions: Assess postural VS orthostatic hypotension, Bed/chair exit alarm, Evaluate medications/consider consulting pharmacy, Patient to call before getting OOB, Teach patient to arise slowly    Elimination Interventions: Bed/chair exit alarm, Call light in reach, Patient to call for help with toileting needs, Urinal in reach    History of Falls Interventions: Bed/chair exit alarm, Door open when patient unattended, Evaluate medications/consider consulting pharmacy, Vital signs minimum Q4HRs X 24 hrs (comment for end date)         Problem: Patient Education: Go to Patient Education Activity  Goal: Patient/Family Education  Outcome: Progressing Towards Goal

## 2022-08-13 NOTE — ROUTINE PROCESS
0730 Bedside and Verbal shift change report given to [address] [facility] (oncoming nurse) by Felipe Lamb (offgoing nurse). Report included the following information SBAR, Intake/Output, MAR, and Cardiac Rhythm sinus rhythym  . Patient given education on excessive fluid intake as patient was asking for multiple juices from nurses, patient stated he did not care if it made his legs swell he wanted more juice     Patient slept during most of shift and got up and used the bathroom and up to chair with no assistance needed, patient had no chest pain or SOB during shift    1900 Bedside and Verbal shift change report given to [address] [place] [address] (oncoming nurse) by [address] [facility] (offgoing nurse). Report included the following information SBAR, MAR, and Cardiac Rhythm sinus rhythym  .

## 2022-08-13 NOTE — PROGRESS NOTES
Cardiology Progress Note    Admit Date: 8/7/2022      Assessment:     Hospital Problems  Date Reviewed: 2/15/2018            Codes Class Noted POA    * (Principal) CHF (congestive heart failure) ([facility] [address] ICD-10-CM: I50.9  ICD-9-CM: 428.0  11/17/2021 Unknown           -a/c HFrEF, in setting of medication non compliance and cocaine abuse. This has since resolved with aggressive diuretics. -Transient hypotension. This occurred 2 days ago. His blood pressure has since normalized. This is likely due to overdiuresis. His blood pressure has improved after stopping his diuretics and holding his blood pressure medications. -CAD, s/p Cardiac cath 10/2020 with non obstructive CAD, s/p LHC in 2/24/2022 with PCI/HITESH to RCA. On ASA and Plavix.  -Non ischemic Cardiomyopathy, initially diagnosed in (2020), EF 15% at that time. Echo 2/23/2022 with EF 10-15%. S/p implantation of dual-chamber Medtronic AICD for primary prevention. Interrogated on 8/8, device functioning appropriately, short runs of NSVT, no terminated events. -CKD --acute kidney injury secondary to overdiuresis has resolved. -HTN, on Coreg, Losartan, Aldactone and Torsemide as outpatient    Primary cardiologist: Dr. Inez Terry: Will increase carvedilol to 6.25 mg twice daily today. Resumed loop diuretics and spironolactone as well. Would hold off on restarting Entresto until he follows up with his outpatient cardiologist.  Stable for discharge today from a cardiac standpoint. Subjective:     No new complaints.      Objective:      Patient Vitals for the past 8 hrs:   Temp Pulse Resp BP SpO2   08/13/22 0752 98.6 °F (37 °C) 85 (!) 35 (!) 130/90 93 %   08/13/22 0355 98.2 °F (36.8 °C) 87 18 126/84 94 %         Patient Vitals for the past 96 hrs:   Weight   08/13/22 0622 75.6 kg (166 lb 11.2 oz)   08/12/22 0412 74.5 kg (164 lb 4.8 oz)   08/09/22 2247 79.3 kg (174 lb 12.8 oz)       TELE: normal sinus rhythm, short runs of nonsustained VT and SVT.               Current Facility-Administered Medications   Medication Dose Route Frequency Last Admin    carvediloL (COREG) tablet 6.25 mg  6.25 mg Oral Q12H      torsemide (DEMADEX) tablet 10 mg  10 mg Oral DAILY      spironolactone (ALDACTONE) tablet 12.5 mg  12.5 mg Oral DAILY      magnesium oxide (MAG-OX) tablet 400 mg  400 mg Oral DAILY 400 mg at 08/13/22 0908    fluticasone propionate (FLONASE) 50 mcg/actuation nasal spray 2 Spray  2 Spray Both Nostrils DAILY 2 [place] at 08/12/22 1114    aspirin chewable tablet 81 mg  81 mg Oral DAILY 81 mg at 08/13/22 0908    [Held by provider] hydrALAZINE (APRESOLINE) 20 mg/mL injection 20 mg  20 mg IntraVENous Q6H PRN 20 mg at 08/09/22 0914    [Held by provider] sacubitriL-valsartan (ENTRESTO) 24-26 mg tablet 1 Tablet  1 Tablet Oral Q12H 1 Tablet at 08/11/22 0924    [Held by provider] gabapentin (NEURONTIN) capsule 100 mg  100 mg Oral  mg at 08/11/22 1553    [Held by provider] nitroglycerin (NITROSTAT) tablet 0.4 mg  0.4 mg SubLINGual Q5MIN PRN      rosuvastatin (CRESTOR) tablet 20 mg  20 mg Oral QHS 20 mg at 08/12/22 2206    sodium chloride (NS) flush 5-40 mL  5-40 mL IntraVENous Q8H 10 mL at 08/13/22 0619    sodium chloride (NS) flush 5-40 mL  5-40 mL IntraVENous PRN      acetaminophen (TYLENOL) tablet 650 mg  650 mg Oral Q6H  mg at 08/13/22 3579    Or    acetaminophen (TYLENOL) suppository 650 mg  650 mg Rectal Q6H PRN      polyethylene glycol (MIRALAX) packet 17 g  17 g Oral DAILY PRN      heparin (porcine) injection 5,000 Units  5,000 Units SubCUTAneous Q8H 5,000 Units at 08/13/22 0909    clopidogreL (PLAVIX) tablet 75 mg  75 mg Oral DAILY 75 mg at 08/13/22 0908         Intake/Output Summary (Last 24 hours) at 8/13/2022 1038  Last data filed at 8/13/2022 4836  Gross per 24 hour   Intake 960 ml   Output 2425 ml   Net -1465 ml       Physical Exam:  General:  fatigued, cooperative, no distress, appears stated age  Neck:  nontender, no JVD  Lungs:  clear to auscultation bilaterally  Heart:  regular rate and rhythm  Abdomen:  abdomen is soft without significant tenderness, masses, organomegaly or guarding  Extremities:  extremities normal, atraumatic, no cyanosis or edema    Visit Vitals  BP (!) 130/90 (BP 1 Location: Left upper arm, BP Patient Position: At rest)   Pulse 85   Temp 98.6 °F (37 °C)   Resp (!) 35   Ht 6' (1.829 m)   Wt 75.6 kg (166 lb 11.2 oz)   SpO2 93%   BMI 22.61 kg/m²       Data Review:     Labs: Results:       Chemistry Recent Labs     08/13/22  0130 08/12/22  0520 08/11/22  1215 08/11/22  1214   GLU 90 84 106*  --    * 136 137  --    K 4.1 4.0 3.8  --     103 103  --    CO2 25 26 27  --    BUN 20* 23* 24*  --    CREA 1.45* 1.40* 1.68*  --    CA 7.3* 7.5* 7.0*  --    MG 1.5* 1.7  --  2.0   AGAP 6 7 7  --    BUCR 14 16 14  --    AP  --   --  100  --    TP  --   --  5.8*  --    ALB  --   --  2.1*  --    GLOB  --   --  3.7  --    AGRAT  --   --  0.6*  --       CBC w/Diff Recent Labs     08/13/22  0130 08/12/22  0520 08/11/22  0030   WBC 7.1 7.5 7.4   RBC 4.50 4.72 4.96   HGB 13.6 14.0 14.9   HCT 41.2 42.9 45.1    232 234   GRANS 70 62 69   LYMPH 14* 21 16*   EOS 5 6* 4      Cardiac Enzymes No results found for: CPK, CK, CKMMB, CKMB, RCK3, CKMBT, CKNDX, CKND1, SEBASTIÁN, TROPT, TROIQ, YANETH, TROPT, TNIPOC, BNP, BNPP   Coagulation No results for input(s): PTP, INR, APTT, INREXT in the last 72 hours.     Lipid Panel Lab Results   Component Value Date/Time    Cholesterol, total 143 02/24/2022 04:58 AM    HDL Cholesterol 37 (L) 02/24/2022 04:58 AM    LDL, calculated 91 02/24/2022 04:58 AM    VLDL, calculated 15 02/24/2022 04:58 AM    Triglyceride 75 02/24/2022 04:58 AM    CHOL/HDL Ratio 3.9 02/24/2022 04:58 AM      BNP No results found for: BNP, BNPP, XBNPT   Liver Enzymes Recent Labs     08/11/22  1215   TP 5.8*   ALB 2.1*         Thyroid Studies Lab Results   Component Value Date/Time    TSH 0.72 02/28/2016 07:15 PM          Signed By: Michel Snyder MD     August 13, 2022

## 2022-08-13 NOTE — PROGRESS NOTES
Dr. Elizabeth Gonzalez informed of patients temperature. Tylenol given per Prn order.      08/13/22 0000   Vital Signs   Temp (!) 101.6 °F (38.7 °C)   Temp Source Oral   Pulse (Heart Rate) (!) 103   Resp Rate 20   O2 Sat (%) 97 %   Level of Consciousness Alert (0)   /82   MAP (Monitor) 93   MAP (Calculated) 94   MEWS Score 4
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
## 2022-08-13 NOTE — ROUTINE PROCESS
Bedside and Verbal shift change report given to Elgin Torre (oncoming nurse) by Oswaldo Varma (offgoing nurse). Report included the following information SBAR, Kardex, Intake/Output, MAR, Recent Results, and Cardiac Rhythm NSR  .

## 2022-08-14 VITALS
BODY MASS INDEX: 23.43 KG/M2 | TEMPERATURE: 97.8 F | HEART RATE: 68 BPM | SYSTOLIC BLOOD PRESSURE: 115 MMHG | RESPIRATION RATE: 22 BRPM | WEIGHT: 173 LBS | OXYGEN SATURATION: 98 % | DIASTOLIC BLOOD PRESSURE: 77 MMHG | HEIGHT: 72 IN

## 2022-08-14 LAB
ANION GAP SERPL CALC-SCNC: 6 MMOL/L (ref 3–18)
BACTERIA SPEC CULT: NORMAL
BASOPHILS # BLD: 0.1 K/UL (ref 0–0.1)
BASOPHILS NFR BLD: 1 % (ref 0–2)
BUN SERPL-MCNC: 18 MG/DL (ref 7–18)
BUN/CREAT SERPL: 13 (ref 12–20)
CALCIUM SERPL-MCNC: 7.6 MG/DL (ref 8.5–10.1)
CHLORIDE SERPL-SCNC: 102 MMOL/L (ref 100–111)
CO2 SERPL-SCNC: 26 MMOL/L (ref 21–32)
CREAT SERPL-MCNC: 1.38 MG/DL (ref 0.6–1.3)
DIFFERENTIAL METHOD BLD: ABNORMAL
EOSINOPHIL # BLD: 0.4 K/UL (ref 0–0.4)
EOSINOPHIL NFR BLD: 6 % (ref 0–5)
ERYTHROCYTE [DISTWIDTH] IN BLOOD BY AUTOMATED COUNT: 13.4 % (ref 11.6–14.5)
GLUCOSE SERPL-MCNC: 84 MG/DL (ref 74–99)
HCT VFR BLD AUTO: 42.4 % (ref 36–48)
HGB BLD-MCNC: 13.8 G/DL (ref 13–16)
IMM GRANULOCYTES # BLD AUTO: 0 K/UL (ref 0–0.04)
IMM GRANULOCYTES NFR BLD AUTO: 0 % (ref 0–0.5)
LYMPHOCYTES # BLD: 1.5 K/UL (ref 0.9–3.6)
LYMPHOCYTES NFR BLD: 23 % (ref 21–52)
MAGNESIUM SERPL-MCNC: 1.6 MG/DL (ref 1.6–2.6)
MCH RBC QN AUTO: 29.6 PG (ref 24–34)
MCHC RBC AUTO-ENTMCNC: 32.5 G/DL (ref 31–37)
MCV RBC AUTO: 91 FL (ref 78–100)
MONOCYTES # BLD: 1 K/UL (ref 0.05–1.2)
MONOCYTES NFR BLD: 15 % (ref 3–10)
NEUTS SEG # BLD: 3.5 K/UL (ref 1.8–8)
NEUTS SEG NFR BLD: 55 % (ref 40–73)
NRBC # BLD: 0 K/UL (ref 0–0.01)
NRBC BLD-RTO: 0 PER 100 WBC
PLATELET # BLD AUTO: 241 K/UL (ref 135–420)
PMV BLD AUTO: 12.1 FL (ref 9.2–11.8)
POTASSIUM SERPL-SCNC: 4.3 MMOL/L (ref 3.5–5.5)
RBC # BLD AUTO: 4.66 M/UL (ref 4.35–5.65)
SERVICE CMNT-IMP: NORMAL
SODIUM SERPL-SCNC: 134 MMOL/L (ref 136–145)
WBC # BLD AUTO: 6.5 K/UL (ref 4.6–13.2)

## 2022-08-14 PROCEDURE — 83735 ASSAY OF MAGNESIUM: CPT

## 2022-08-14 PROCEDURE — 74011250637 HC RX REV CODE- 250/637: Performed by: STUDENT IN AN ORGANIZED HEALTH CARE EDUCATION/TRAINING PROGRAM

## 2022-08-14 PROCEDURE — 85025 COMPLETE CBC W/AUTO DIFF WBC: CPT

## 2022-08-14 PROCEDURE — 74011250637 HC RX REV CODE- 250/637

## 2022-08-14 PROCEDURE — 74011250637 HC RX REV CODE- 250/637: Performed by: PHYSICIAN ASSISTANT

## 2022-08-14 PROCEDURE — 74011250636 HC RX REV CODE- 250/636

## 2022-08-14 PROCEDURE — 36415 COLL VENOUS BLD VENIPUNCTURE: CPT

## 2022-08-14 PROCEDURE — 74011250637 HC RX REV CODE- 250/637: Performed by: INTERNAL MEDICINE

## 2022-08-14 PROCEDURE — 80048 BASIC METABOLIC PNL TOTAL CA: CPT

## 2022-08-14 PROCEDURE — 74011000250 HC RX REV CODE- 250

## 2022-08-14 RX ORDER — SPIRONOLACTONE 25 MG/1
12.5 TABLET ORAL DAILY
Qty: 30 TABLET | Refills: 0 | Status: SHIPPED | COMMUNITY
Start: 2022-08-15 | End: 2022-10-12 | Stop reason: SDUPTHER

## 2022-08-14 RX ORDER — CARVEDILOL 6.25 MG/1
6.25 TABLET ORAL EVERY 12 HOURS
Qty: 60 TABLET | Refills: 0 | Status: SHIPPED | COMMUNITY
Start: 2022-08-14 | End: 2022-10-12 | Stop reason: SDUPTHER

## 2022-08-14 RX ORDER — TORSEMIDE 10 MG/1
10 TABLET ORAL DAILY
Qty: 30 TABLET | Refills: 0 | Status: SHIPPED | COMMUNITY
Start: 2022-08-15 | End: 2022-10-12 | Stop reason: SDUPTHER

## 2022-08-14 RX ADMIN — TORSEMIDE 10 MG: 20 TABLET ORAL at 08:22

## 2022-08-14 RX ADMIN — SODIUM CHLORIDE, PRESERVATIVE FREE 10 ML: 5 INJECTION INTRAVENOUS at 06:11

## 2022-08-14 RX ADMIN — Medication 400 MG: at 08:22

## 2022-08-14 RX ADMIN — CLOPIDOGREL BISULFATE 75 MG: 75 TABLET ORAL at 08:22

## 2022-08-14 RX ADMIN — HEPARIN SODIUM 5000 UNITS: 5000 INJECTION INTRAVENOUS; SUBCUTANEOUS at 08:21

## 2022-08-14 RX ADMIN — ACETAMINOPHEN 650 MG: 325 TABLET, FILM COATED ORAL at 08:21

## 2022-08-14 RX ADMIN — SPIRONOLACTONE 12.5 MG: 25 TABLET ORAL at 08:21

## 2022-08-14 RX ADMIN — ASPIRIN 81 MG CHEWABLE TABLET 81 MG: 81 TABLET CHEWABLE at 08:21

## 2022-08-14 RX ADMIN — CARVEDILOL 6.25 MG: 6.25 TABLET, FILM COATED ORAL at 08:22

## 2022-08-14 NOTE — PROGRESS NOTES
Discharge order noted for today. Pt has been accepted to [facility] agency. Transport has been arranged through patient's sister. Patient's home health  orders have been forwarded to [address] via [facility]. Shower Chair was previously sent to [facility] by another CM as documented.  Discharge information has been documented on the AVS.             Latricia Arroyo RN  Case Management [phone]
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
## 2022-08-14 NOTE — PROGRESS NOTES
HARISH spoke with Daria with [facility], updated her that patient will be discharging today.              Lulu Hernandez, RN  Case Management [phone]

## 2022-08-14 NOTE — PROGRESS NOTES
1930:Bedside change of shift report given to Mary Bhandari RN (On-coming nurse) from [facility] (Off-going nurse). Report included MAR, SBAR, vital signs, I&Os, recent labs, and nursing care plans.     Wound Prevention Checklist    Patient: Maru Kilgore (72 y.o. male)  Date: 8/14/2022  Diagnosis: CHF (congestive heart failure) ([facility]) [I50.9] CHF (congestive heart failure) ([facility]    Precautions:         []  Heel prevention boots placed on patient    []  Patient turned q2h during shift    []  Lift team ordered    []  Patient on [place] bed/Specialty bed    []  Each Wound is documented during shift (Stage, Color, drainage, odor, measurements, and dressings)    [x]  Dual skin checks done at bedside during shift report with Marcey Crane, RN Lida Leventhal, RN

## 2022-08-15 NOTE — HOME CARE
Discharge noted over the weekend; [facility] will follow patient for [facility] visit for CHF; [facility] referral processed by [facility] central intake over the weekend. GRECIA HUTCHINS.

## 2022-08-15 NOTE — PROGRESS NOTES
8/15/2022 1650: Patient called the unit stating his medications should have been sent to [facility] but they are not there. Reviewed discharge instructions which indicate medications were sent to [facility] Outpatient pharmacy, which is closed on the weekends. Paged [facility] to inquire if they can send them to [facility] because patient will not be able to make it to the pharmacy here before they close at 5 PM.    1659: [facility] has not called back but Outpatient pharmacy tech called and stated patient called them and stated they would  prescriptions tomorrow. Pharmacy tech explained to patient that if the medications were processed here, [facility] wouldn't be able to bill the insurance so either way he wouldn't be able to get his medications until tomorrow. Patient told pharmacy tech he will come to the hospital to get his medications tomorrow morning.

## 2022-08-16 ENCOUNTER — HOME CARE VISIT (OUTPATIENT)
Dept: HOME HEALTH SERVICES | Facility: HOME HEALTH | Age: 62
End: 2022-08-16

## 2022-08-16 LAB
ACC. NO. FROM MICRO ORDER, ACCP: ABNORMAL
ACINETOBACTER CALCOACETICUS-BAUMANII COMPLEX, ACBCX: NOT DETECTED
BACTEROIDES FRAGILIS, BFRA: NOT DETECTED
BIOFIRE COMMENT, BCIDPF: ABNORMAL
C GLABRATA DNA VAG QL NAA+PROBE: NOT DETECTED
CANDIDA ALBICANS: NOT DETECTED
CANDIDA AURIS, CAAU: NOT DETECTED
CANDIDA KRUSEI, CKRP: NOT DETECTED
CANDIDA PARAPSILOSIS, CPAUP: NOT DETECTED
CANDIDA TROPICALIS, CTROP: NOT DETECTED
CRYPTO NEOFORMANS/GATTII, CRYNEG: NOT DETECTED
ENTEROBACTER CLOACAE COMPLEX, ECCP: NOT DETECTED
ENTEROBACTERALES SP. , ENBLS: NOT DETECTED
ENTEROCOCCUS FAECALIS, ENFA: NOT DETECTED
ENTEROCOCCUS FAECIUM, ENFAM: NOT DETECTED
ESCHERICHIA COLI: NOT DETECTED
HAEMOPHILUS INFLUENZAE, HMI: NOT DETECTED
KLEBSIELLA AEROGENES, KLAE: NOT DETECTED
KLEBSIELLA OXYTOCA: NOT DETECTED
KLEBSIELLA PNEUMONIAE GROUP, KPPG: NOT DETECTED
LISTERIA MONOCYTOGENES, LMONP: NOT DETECTED
MECA/C (METHICILLIN RESISTANT GENE), MECACP: DETECTED
NEISSERIA MENINGITIDIS, NMNI: NOT DETECTED
PROTEUS, PRP: NOT DETECTED
PSEUDOMONAS AERUGINOSA: NOT DETECTED
RESISTANT GENE SPACE, REGENE: ABNORMAL
SALMONELLA, SALMO: NOT DETECTED
SERRATIA MARCESCENS: NOT DETECTED
STAPH EPIDERMIDIS, STEP: DETECTED
STAPH LUGDUNENSIS, STALUG: NOT DETECTED
STAPHYLOCOCCUS AUREUS: NOT DETECTED
STAPHYLOCOCCUS, STAPP: DETECTED
STENO MALTOPHILIA, STMA: NOT DETECTED
STREPTOCOCCUS , STPSP: NOT DETECTED
STREPTOCOCCUS AGALACTIAE (GROUP B): NOT DETECTED
STREPTOCOCCUS PNEUMONIAE , SPNP: NOT DETECTED
STREPTOCOCCUS PYOGENES (GROUP A), SPYOP: NOT DETECTED

## 2022-08-17 LAB
BACTERIA SPEC CULT: ABNORMAL
GRAM STN SPEC: ABNORMAL
GRAM STN SPEC: ABNORMAL
SERVICE CMNT-IMP: ABNORMAL

## 2022-08-19 LAB
BACTERIA SPEC CULT: NORMAL
SERVICE CMNT-IMP: NORMAL

## 2022-10-12 ENCOUNTER — OFFICE VISIT (OUTPATIENT)
Dept: CARDIOLOGY CLINIC | Age: 62
End: 2022-10-12
Payer: MEDICARE

## 2022-10-12 VITALS
DIASTOLIC BLOOD PRESSURE: 85 MMHG | WEIGHT: 172 LBS | HEIGHT: 72 IN | SYSTOLIC BLOOD PRESSURE: 128 MMHG | BODY MASS INDEX: 23.3 KG/M2 | HEART RATE: 79 BPM | OXYGEN SATURATION: 98 %

## 2022-10-12 DIAGNOSIS — Z09 HOSPITAL DISCHARGE FOLLOW-UP: ICD-10-CM

## 2022-10-12 DIAGNOSIS — E78.1 PURE HYPERTRIGLYCERIDEMIA: ICD-10-CM

## 2022-10-12 DIAGNOSIS — I42.9 CARDIOMYOPATHY, UNSPECIFIED TYPE (HCC): ICD-10-CM

## 2022-10-12 DIAGNOSIS — I10 PRIMARY HYPERTENSION: ICD-10-CM

## 2022-10-12 DIAGNOSIS — F14.10 COCAINE ABUSE (HCC): ICD-10-CM

## 2022-10-12 DIAGNOSIS — I25.10 CORONARY ARTERY DISEASE INVOLVING NATIVE CORONARY ARTERY OF NATIVE HEART WITHOUT ANGINA PECTORIS: ICD-10-CM

## 2022-10-12 DIAGNOSIS — Z98.890 HISTORY OF ATRIOVENTRICULAR NODAL ABLATION: ICD-10-CM

## 2022-10-12 DIAGNOSIS — Z95.810 S/P IMPLANTATION OF AUTOMATIC CARDIOVERTER/DEFIBRILLATOR (AICD): ICD-10-CM

## 2022-10-12 DIAGNOSIS — I50.22 SYSTOLIC CHF, CHRONIC (HCC): Primary | ICD-10-CM

## 2022-10-12 PROCEDURE — 1111F DSCHRG MED/CURRENT MED MERGE: CPT | Performed by: NURSE PRACTITIONER

## 2022-10-12 PROCEDURE — 99214 OFFICE O/P EST MOD 30 MIN: CPT | Performed by: NURSE PRACTITIONER

## 2022-10-12 PROCEDURE — 3017F COLORECTAL CA SCREEN DOC REV: CPT | Performed by: NURSE PRACTITIONER

## 2022-10-12 PROCEDURE — G9717 DOC PT DX DEP/BP F/U NT REQ: HCPCS | Performed by: NURSE PRACTITIONER

## 2022-10-12 PROCEDURE — G8427 DOCREV CUR MEDS BY ELIG CLIN: HCPCS | Performed by: NURSE PRACTITIONER

## 2022-10-12 PROCEDURE — G8420 CALC BMI NORM PARAMETERS: HCPCS | Performed by: NURSE PRACTITIONER

## 2022-10-12 PROCEDURE — G8754 DIAS BP LESS 90: HCPCS | Performed by: NURSE PRACTITIONER

## 2022-10-12 PROCEDURE — G8752 SYS BP LESS 140: HCPCS | Performed by: NURSE PRACTITIONER

## 2022-10-12 RX ORDER — SPIRONOLACTONE 25 MG/1
12.5 TABLET ORAL DAILY
Qty: 90 TABLET | Refills: 3 | Status: SHIPPED | OUTPATIENT
Start: 2022-10-12

## 2022-10-12 RX ORDER — LOSARTAN POTASSIUM 50 MG/1
50 TABLET ORAL DAILY
Qty: 90 TABLET | Refills: 3 | Status: CANCELLED | OUTPATIENT
Start: 2022-10-12

## 2022-10-12 RX ORDER — CLOPIDOGREL BISULFATE 75 MG/1
75 TABLET ORAL DAILY
Qty: 90 TABLET | Refills: 3 | Status: SHIPPED | OUTPATIENT
Start: 2022-10-12

## 2022-10-12 RX ORDER — LOSARTAN POTASSIUM 50 MG/1
TABLET ORAL DAILY
COMMUNITY
End: 2022-10-12

## 2022-10-12 RX ORDER — CARVEDILOL 6.25 MG/1
6.25 TABLET ORAL EVERY 12 HOURS
Qty: 90 TABLET | Refills: 3 | Status: SHIPPED | OUTPATIENT
Start: 2022-10-12

## 2022-10-12 RX ORDER — ROSUVASTATIN CALCIUM 20 MG/1
20 TABLET, COATED ORAL
Qty: 90 TABLET | Refills: 3 | Status: SHIPPED | OUTPATIENT
Start: 2022-10-12

## 2022-10-12 RX ORDER — TORSEMIDE 10 MG/1
10 TABLET ORAL DAILY
Qty: 90 TABLET | Refills: 3 | Status: SHIPPED | OUTPATIENT
Start: 2022-10-12
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
## 2023-04-14 PROCEDURE — 96372 THER/PROPH/DIAG INJ SC/IM: CPT

## 2023-04-14 PROCEDURE — 96376 TX/PRO/DX INJ SAME DRUG ADON: CPT

## 2023-04-14 PROCEDURE — 96375 TX/PRO/DX INJ NEW DRUG ADDON: CPT

## 2023-04-15 PROCEDURE — 96376 TX/PRO/DX INJ SAME DRUG ADON: CPT

## 2023-04-15 PROCEDURE — 96372 THER/PROPH/DIAG INJ SC/IM: CPT

## 2023-04-16 PROCEDURE — 96376 TX/PRO/DX INJ SAME DRUG ADON: CPT

## 2023-04-16 PROCEDURE — 96375 TX/PRO/DX INJ NEW DRUG ADDON: CPT

## 2023-04-16 PROCEDURE — 96372 THER/PROPH/DIAG INJ SC/IM: CPT

## 2023-04-17 NOTE — IP AVS SNAPSHOT
Advised that urine was not reviewed yet. Will update patient once reviewed by [facility]  patient verbalized understanding and agreed to plan     Current Discharge Medication List  
  
START taking these medications Dose & Instructions Dispensing Information Comments Morning Noon Evening Bedtime  
 amLODIPine 5 mg tablet Commonly known as:  Jeff Briceño Your last dose was: Your next dose is:    
   
   
 Dose:  5 mg Take 1 Tab by mouth daily. Quantity:  30 Tab Refills:  0  
     
   
   
   
  
 aspirin 81 mg chewable tablet Your last dose was: Your next dose is:    
   
   
 Dose:  81 mg Take 1 Tab by mouth daily. Quantity:  30 Tab Refills:  0  
     
   
   
   
  
 atorvastatin 40 mg tablet Commonly known as:  LIPITOR Your last dose was: Your next dose is:    
   
   
 Dose:  40 mg Take 1 Tab by mouth nightly. Quantity:  30 Tab Refills:  0  
     
   
   
   
  
 clopidogrel 75 mg Tab Commonly known as:  PLAVIX Your last dose was: Your next dose is:    
   
   
 Dose:  75 mg Take 1 Tab by mouth daily. Quantity:  30 Tab Refills:  0  
     
   
   
   
  
 famotidine 20 mg tablet Commonly known as:  PEPCID Your last dose was: Your next dose is:    
   
   
 Dose:  20 mg Take 1 Tab by mouth two (2) times a day. Quantity:  60 Tab Refills:  0  
     
   
   
   
  
 metoprolol tartrate 25 mg tablet Commonly known as:  LOPRESSOR Your last dose was: Your next dose is:    
   
   
 Dose:  12.5 mg Take 0.5 Tabs by mouth every twelve (12) hours. Quantity:  60 Tab Refills:  0 Where to Get Your Medications Information on where to get these meds will be given to you by the nurse or doctor. ! Ask your nurse or doctor about these medications  
  amLODIPine 5 mg tablet  
 aspirin 81 mg chewable tablet  
 atorvastatin 40 mg tablet  
 clopidogrel 75 mg Tab  
 famotidine 20 mg tablet  
 metoprolol tartrate 25 mg tablet

## 2023-08-22 ENCOUNTER — HOSPITAL ENCOUNTER (INPATIENT)
Facility: HOSPITAL | Age: 63
LOS: 9 days | Discharge: HOME HEALTH | End: 2023-08-31
Attending: EMERGENCY MEDICINE | Admitting: STUDENT IN AN ORGANIZED HEALTH CARE EDUCATION/TRAINING PROGRAM
Payer: MEDICARE

## 2023-08-22 ENCOUNTER — APPOINTMENT (OUTPATIENT)
Facility: HOSPITAL | Age: 63
End: 2023-08-22
Payer: MEDICARE

## 2023-08-22 DIAGNOSIS — Z91.148 NON COMPLIANCE W MEDICATION REGIMEN: ICD-10-CM

## 2023-08-22 DIAGNOSIS — R07.9 CHEST PAIN, UNSPECIFIED TYPE: ICD-10-CM

## 2023-08-22 DIAGNOSIS — I50.43 ACUTE ON CHRONIC COMBINED SYSTOLIC AND DIASTOLIC CONGESTIVE HEART FAILURE (HCC): ICD-10-CM

## 2023-08-22 DIAGNOSIS — I50.23 ACUTE ON CHRONIC SYSTOLIC CONGESTIVE HEART FAILURE (HCC): ICD-10-CM

## 2023-08-22 DIAGNOSIS — I50.21 ACUTE HFREF (HEART FAILURE WITH REDUCED EJECTION FRACTION) (HCC): ICD-10-CM

## 2023-08-22 DIAGNOSIS — G62.9 PERIPHERAL POLYNEUROPATHY: ICD-10-CM

## 2023-08-22 DIAGNOSIS — R06.02 SHORTNESS OF BREATH: Primary | ICD-10-CM

## 2023-08-22 DIAGNOSIS — I50.1 PULMONARY EDEMA CARDIAC CAUSE (HCC): ICD-10-CM

## 2023-08-22 DIAGNOSIS — I50.9 ACUTE ON CHRONIC CONGESTIVE HEART FAILURE, UNSPECIFIED HEART FAILURE TYPE (HCC): ICD-10-CM

## 2023-08-22 LAB
ALBUMIN SERPL-MCNC: 3 G/DL (ref 3.4–5)
ALBUMIN/GLOB SERPL: 0.8 (ref 0.8–1.7)
ALP SERPL-CCNC: 142 U/L (ref 45–117)
ALT SERPL-CCNC: 32 U/L (ref 16–61)
ANION GAP SERPL CALC-SCNC: 10 MMOL/L (ref 3–18)
AST SERPL-CCNC: 28 U/L (ref 10–38)
BASOPHILS # BLD: 0.1 K/UL (ref 0–0.1)
BASOPHILS NFR BLD: 1 % (ref 0–2)
BILIRUB SERPL-MCNC: 1.7 MG/DL (ref 0.2–1)
BUN SERPL-MCNC: 40 MG/DL (ref 7–18)
BUN/CREAT SERPL: 22 (ref 12–20)
CALCIUM SERPL-MCNC: 7.7 MG/DL (ref 8.5–10.1)
CHLORIDE SERPL-SCNC: 100 MMOL/L (ref 100–111)
CO2 SERPL-SCNC: 26 MMOL/L (ref 21–32)
CREAT SERPL-MCNC: 1.82 MG/DL (ref 0.6–1.3)
DIFFERENTIAL METHOD BLD: ABNORMAL
EKG ATRIAL RATE: 97 BPM
EKG DIAGNOSIS: NORMAL
EKG P AXIS: 82 DEGREES
EKG P-R INTERVAL: 162 MS
EKG Q-T INTERVAL: 388 MS
EKG QRS DURATION: 88 MS
EKG QTC CALCULATION (BAZETT): 492 MS
EKG R AXIS: 83 DEGREES
EKG T AXIS: 67 DEGREES
EKG VENTRICULAR RATE: 97 BPM
EOSINOPHIL # BLD: 0.1 K/UL (ref 0–0.4)
EOSINOPHIL NFR BLD: 1 % (ref 0–5)
ERYTHROCYTE [DISTWIDTH] IN BLOOD BY AUTOMATED COUNT: 14.1 % (ref 11.6–14.5)
GLOBULIN SER CALC-MCNC: 3.8 G/DL (ref 2–4)
GLUCOSE SERPL-MCNC: 151 MG/DL (ref 74–99)
HCT VFR BLD AUTO: 44.3 % (ref 36–48)
HGB BLD-MCNC: 14.5 G/DL (ref 13–16)
IMM GRANULOCYTES # BLD AUTO: 0 K/UL (ref 0–0.04)
IMM GRANULOCYTES NFR BLD AUTO: 0 % (ref 0–0.5)
LYMPHOCYTES # BLD: 1.3 K/UL (ref 0.9–3.6)
LYMPHOCYTES NFR BLD: 17 % (ref 21–52)
MAGNESIUM SERPL-MCNC: 1.8 MG/DL (ref 1.6–2.6)
MCH RBC QN AUTO: 31 PG (ref 24–34)
MCHC RBC AUTO-ENTMCNC: 32.7 G/DL (ref 31–37)
MCV RBC AUTO: 94.7 FL (ref 78–100)
MONOCYTES # BLD: 0.7 K/UL (ref 0.05–1.2)
MONOCYTES NFR BLD: 9 % (ref 3–10)
NEUTS SEG # BLD: 5.4 K/UL (ref 1.8–8)
NEUTS SEG NFR BLD: 72 % (ref 40–73)
NRBC # BLD: 0 K/UL (ref 0–0.01)
NRBC BLD-RTO: 0 PER 100 WBC
NT PRO BNP: ABNORMAL PG/ML (ref 0–900)
PLATELET # BLD AUTO: 181 K/UL (ref 135–420)
PMV BLD AUTO: 12.9 FL (ref 9.2–11.8)
POTASSIUM SERPL-SCNC: 4.1 MMOL/L (ref 3.5–5.5)
PROT SERPL-MCNC: 6.8 G/DL (ref 6.4–8.2)
RBC # BLD AUTO: 4.68 M/UL (ref 4.35–5.65)
SODIUM SERPL-SCNC: 136 MMOL/L (ref 136–145)
TROPONIN I SERPL HS-MCNC: 19 NG/L (ref 0–78)
TROPONIN I SERPL HS-MCNC: 26 NG/L (ref 0–78)
WBC # BLD AUTO: 7.4 K/UL (ref 4.6–13.2)

## 2023-08-22 PROCEDURE — 2580000003 HC RX 258: Performed by: STUDENT IN AN ORGANIZED HEALTH CARE EDUCATION/TRAINING PROGRAM

## 2023-08-22 PROCEDURE — 1100000003 HC PRIVATE W/ TELEMETRY

## 2023-08-22 PROCEDURE — 84484 ASSAY OF TROPONIN QUANT: CPT

## 2023-08-22 PROCEDURE — 93010 ELECTROCARDIOGRAM REPORT: CPT | Performed by: INTERNAL MEDICINE

## 2023-08-22 PROCEDURE — 94762 N-INVAS EAR/PLS OXIMTRY CONT: CPT

## 2023-08-22 PROCEDURE — 80053 COMPREHEN METABOLIC PANEL: CPT

## 2023-08-22 PROCEDURE — 96374 THER/PROPH/DIAG INJ IV PUSH: CPT

## 2023-08-22 PROCEDURE — 99285 EMERGENCY DEPT VISIT HI MDM: CPT

## 2023-08-22 PROCEDURE — 6370000000 HC RX 637 (ALT 250 FOR IP): Performed by: STUDENT IN AN ORGANIZED HEALTH CARE EDUCATION/TRAINING PROGRAM

## 2023-08-22 PROCEDURE — 94761 N-INVAS EAR/PLS OXIMETRY MLT: CPT

## 2023-08-22 PROCEDURE — 83735 ASSAY OF MAGNESIUM: CPT

## 2023-08-22 PROCEDURE — 6360000002 HC RX W HCPCS: Performed by: NURSE PRACTITIONER

## 2023-08-22 PROCEDURE — 6360000002 HC RX W HCPCS: Performed by: STUDENT IN AN ORGANIZED HEALTH CARE EDUCATION/TRAINING PROGRAM

## 2023-08-22 PROCEDURE — 83880 ASSAY OF NATRIURETIC PEPTIDE: CPT

## 2023-08-22 PROCEDURE — 76705 ECHO EXAM OF ABDOMEN: CPT

## 2023-08-22 PROCEDURE — 93005 ELECTROCARDIOGRAM TRACING: CPT | Performed by: NURSE PRACTITIONER

## 2023-08-22 PROCEDURE — 85025 COMPLETE CBC W/AUTO DIFF WBC: CPT

## 2023-08-22 PROCEDURE — 71045 X-RAY EXAM CHEST 1 VIEW: CPT

## 2023-08-22 RX ORDER — GABAPENTIN 100 MG/1
100 CAPSULE ORAL 3 TIMES DAILY
Status: DISCONTINUED | OUTPATIENT
Start: 2023-08-22 | End: 2023-08-24

## 2023-08-22 RX ORDER — ROSUVASTATIN CALCIUM 20 MG/1
20 TABLET, COATED ORAL NIGHTLY
Status: DISCONTINUED | OUTPATIENT
Start: 2023-08-22 | End: 2023-08-31 | Stop reason: HOSPADM

## 2023-08-22 RX ORDER — FUROSEMIDE 10 MG/ML
40 INJECTION INTRAMUSCULAR; INTRAVENOUS 2 TIMES DAILY
Status: DISCONTINUED | OUTPATIENT
Start: 2023-08-22 | End: 2023-08-26

## 2023-08-22 RX ORDER — SODIUM CHLORIDE 0.9 % (FLUSH) 0.9 %
5-40 SYRINGE (ML) INJECTION PRN
Status: DISCONTINUED | OUTPATIENT
Start: 2023-08-22 | End: 2023-08-31 | Stop reason: HOSPADM

## 2023-08-22 RX ORDER — ONDANSETRON 4 MG/1
4 TABLET, ORALLY DISINTEGRATING ORAL EVERY 8 HOURS PRN
Status: DISCONTINUED | OUTPATIENT
Start: 2023-08-22 | End: 2023-08-31 | Stop reason: HOSPADM

## 2023-08-22 RX ORDER — MAGNESIUM SULFATE IN WATER 40 MG/ML
2000 INJECTION, SOLUTION INTRAVENOUS ONCE
Status: COMPLETED | OUTPATIENT
Start: 2023-08-22 | End: 2023-08-22

## 2023-08-22 RX ORDER — SPIRONOLACTONE 25 MG/1
12.5 TABLET ORAL DAILY
Status: DISCONTINUED | OUTPATIENT
Start: 2023-08-22 | End: 2023-08-31 | Stop reason: HOSPADM

## 2023-08-22 RX ORDER — GABAPENTIN 300 MG/1
300 CAPSULE ORAL 3 TIMES DAILY
Status: DISCONTINUED | OUTPATIENT
Start: 2023-08-22 | End: 2023-08-22

## 2023-08-22 RX ORDER — FUROSEMIDE 10 MG/ML
40 INJECTION INTRAMUSCULAR; INTRAVENOUS
Status: COMPLETED | OUTPATIENT
Start: 2023-08-22 | End: 2023-08-22

## 2023-08-22 RX ORDER — ENOXAPARIN SODIUM 100 MG/ML
40 INJECTION SUBCUTANEOUS EVERY 24 HOURS
Status: DISCONTINUED | OUTPATIENT
Start: 2023-08-22 | End: 2023-08-31 | Stop reason: HOSPADM

## 2023-08-22 RX ORDER — ACETAMINOPHEN 650 MG/1
650 SUPPOSITORY RECTAL EVERY 6 HOURS PRN
Status: DISCONTINUED | OUTPATIENT
Start: 2023-08-22 | End: 2023-08-29

## 2023-08-22 RX ORDER — CLOPIDOGREL BISULFATE 75 MG/1
75 TABLET ORAL DAILY
Status: DISCONTINUED | OUTPATIENT
Start: 2023-08-22 | End: 2023-08-31 | Stop reason: HOSPADM

## 2023-08-22 RX ORDER — SODIUM CHLORIDE 9 MG/ML
INJECTION, SOLUTION INTRAVENOUS PRN
Status: DISCONTINUED | OUTPATIENT
Start: 2023-08-22 | End: 2023-08-31 | Stop reason: HOSPADM

## 2023-08-22 RX ORDER — SODIUM CHLORIDE 0.9 % (FLUSH) 0.9 %
5-40 SYRINGE (ML) INJECTION EVERY 12 HOURS SCHEDULED
Status: DISCONTINUED | OUTPATIENT
Start: 2023-08-22 | End: 2023-08-31 | Stop reason: HOSPADM

## 2023-08-22 RX ORDER — ACETAMINOPHEN 325 MG/1
650 TABLET ORAL EVERY 6 HOURS PRN
Status: DISCONTINUED | OUTPATIENT
Start: 2023-08-22 | End: 2023-08-29

## 2023-08-22 RX ORDER — POLYETHYLENE GLYCOL 3350 17 G/17G
17 POWDER, FOR SOLUTION ORAL DAILY PRN
Status: DISCONTINUED | OUTPATIENT
Start: 2023-08-22 | End: 2023-08-23

## 2023-08-22 RX ORDER — ONDANSETRON 2 MG/ML
4 INJECTION INTRAMUSCULAR; INTRAVENOUS EVERY 6 HOURS PRN
Status: DISCONTINUED | OUTPATIENT
Start: 2023-08-22 | End: 2023-08-31 | Stop reason: HOSPADM

## 2023-08-22 RX ORDER — CARVEDILOL 6.25 MG/1
6.25 TABLET ORAL 2 TIMES DAILY WITH MEALS
Status: DISCONTINUED | OUTPATIENT
Start: 2023-08-22 | End: 2023-08-31 | Stop reason: HOSPADM

## 2023-08-22 RX ADMIN — GABAPENTIN 100 MG: 100 CAPSULE ORAL at 14:57

## 2023-08-22 RX ADMIN — GABAPENTIN 100 MG: 100 CAPSULE ORAL at 21:30

## 2023-08-22 RX ADMIN — FUROSEMIDE 40 MG: 10 INJECTION, SOLUTION INTRAMUSCULAR; INTRAVENOUS at 12:07

## 2023-08-22 RX ADMIN — MAGNESIUM SULFATE HEPTAHYDRATE 2000 MG: 40 INJECTION, SOLUTION INTRAVENOUS at 15:00

## 2023-08-22 RX ADMIN — ENOXAPARIN SODIUM 40 MG: 100 INJECTION SUBCUTANEOUS at 14:56

## 2023-08-22 RX ADMIN — CARVEDILOL 6.25 MG: 6.25 TABLET, FILM COATED ORAL at 17:24

## 2023-08-22 RX ADMIN — SODIUM CHLORIDE, PRESERVATIVE FREE 10 ML: 5 INJECTION INTRAVENOUS at 21:31

## 2023-08-22 RX ADMIN — FUROSEMIDE 40 MG: 10 INJECTION, SOLUTION INTRAMUSCULAR; INTRAVENOUS at 17:25

## 2023-08-22 RX ADMIN — ROSUVASTATIN CALCIUM 20 MG: 20 TABLET, FILM COATED ORAL at 21:30

## 2023-08-22 RX ADMIN — CLOPIDOGREL BISULFATE 75 MG: 75 TABLET, FILM COATED ORAL at 14:57

## 2023-08-22 ASSESSMENT — PAIN - FUNCTIONAL ASSESSMENT: PAIN_FUNCTIONAL_ASSESSMENT: 0-10

## 2023-08-22 ASSESSMENT — ENCOUNTER SYMPTOMS
COUGH: 1
SHORTNESS OF BREATH: 1
GASTROINTESTINAL NEGATIVE: 1

## 2023-08-22 ASSESSMENT — PAIN SCALES - GENERAL
PAINLEVEL_OUTOF10: 6
PAINLEVEL_OUTOF10: 0

## 2023-08-22 ASSESSMENT — HEART SCORE: ECG: 1

## 2023-08-22 NOTE — PROGRESS NOTES
conducted an initial consultation and Spiritual Assessment for Yossi Whiteside, who is a 60 y.o.,male. Patient's Primary Language is: [place]. According to the patient's EMR Rastafari Affiliation is: Hao. The reason the Patient came to the hospital is:   Patient Active Problem List    Diagnosis Date Noted    Pulmonary edema cardiac cause ([address]) 08/22/2023    Acute HFrEF (heart failure with reduced ejection fraction) ([address]) 08/22/2023    Acute exacerbation of CHF (congestive heart failure) ([address]) 04/13/2022    Chest pain in adult 02/23/2022    NSTEMI (non-ST elevated myocardial infarction) ([address]) 11/17/2021    CHF (congestive heart failure) ([address]) 11/17/2021    Shortness of breath 11/17/2021    Testicular pain 05/13/2021    CHF exacerbation ([address]) 05/13/2021    Coronary artery disease involving native coronary artery of native heart without angina pectoris 57/43/7588    Systolic CHF, chronic ([address]) 11/11/2020    Cardiomyopathy ([address]) 11/11/2020    S/P ablation operation for arrhythmia 03/12/2018    Hypertension     HLD (hyperlipidemia)     TIA (transient ischemic attack) 03/23/2017    Pulmonary nodule     Head injury     Depression     SAH (subarachnoid hemorrhage) (HCC)     Hypomagnesemia 08/21/2016    Corn of foot 08/21/2016    Cough 04/01/2014        The  provided the following Interventions:  Initiated a relationship of care and support. Provided information about Spiritual Care Services. Chart reviewed. The following outcomes where achieved:  Patient expressed gratitude for 's visit. Assessment:  Patient does not have any Pentecostalism/cultural needs that will affect patient's preferences in health care. Plan:  Chaplains will continue to follow and will provide pastoral care on an as needed/requested basis.  recommends bedside caregivers page  on duty if patient shows signs of acute spiritual or emotional distress.     [address]

## 2023-08-22 NOTE — ED NOTES
Pt in position of most comfort with call light in reach. Bed in lowest position with rails up for safety. Pt states no other needs at this time, Nurse made aware of pt current status.       Marianna MONTEZ [place]  08/22/23 1059
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
## 2023-08-22 NOTE — ED TRIAGE NOTES
Patient arrived to triage with c/o chest pain with SOB. Symptoms have been for over a week. Patient c/o of generalized weakness with labored breathing.

## 2023-08-23 ENCOUNTER — APPOINTMENT (OUTPATIENT)
Facility: HOSPITAL | Age: 63
End: 2023-08-23
Payer: MEDICARE

## 2023-08-23 LAB
ANION GAP SERPL CALC-SCNC: 10 MMOL/L (ref 3–18)
BUN SERPL-MCNC: 44 MG/DL (ref 7–18)
BUN/CREAT SERPL: 24 (ref 12–20)
CALCIUM SERPL-MCNC: 7.2 MG/DL (ref 8.5–10.1)
CHLORIDE SERPL-SCNC: 103 MMOL/L (ref 100–111)
CO2 SERPL-SCNC: 25 MMOL/L (ref 21–32)
CREAT SERPL-MCNC: 1.81 MG/DL (ref 0.6–1.3)
EKG ATRIAL RATE: 90 BPM
EKG DIAGNOSIS: NORMAL
EKG P AXIS: 66 DEGREES
EKG P-R INTERVAL: 154 MS
EKG Q-T INTERVAL: 406 MS
EKG QRS DURATION: 98 MS
EKG QTC CALCULATION (BAZETT): 496 MS
EKG R AXIS: 71 DEGREES
EKG T AXIS: 24 DEGREES
EKG VENTRICULAR RATE: 90 BPM
EST. AVERAGE GLUCOSE BLD GHB EST-MCNC: 97 MG/DL
GLUCOSE SERPL-MCNC: 142 MG/DL (ref 74–99)
HBA1C MFR BLD: 5 % (ref 4.2–5.6)
LIPASE SERPL-CCNC: 474 U/L (ref 73–393)
MAGNESIUM SERPL-MCNC: 2.1 MG/DL (ref 1.6–2.6)
POTASSIUM SERPL-SCNC: 3.7 MMOL/L (ref 3.5–5.5)
SODIUM SERPL-SCNC: 138 MMOL/L (ref 136–145)
TROPONIN I SERPL HS-MCNC: 26 NG/L (ref 0–78)

## 2023-08-23 PROCEDURE — 84484 ASSAY OF TROPONIN QUANT: CPT

## 2023-08-23 PROCEDURE — 6370000000 HC RX 637 (ALT 250 FOR IP): Performed by: STUDENT IN AN ORGANIZED HEALTH CARE EDUCATION/TRAINING PROGRAM

## 2023-08-23 PROCEDURE — 2580000003 HC RX 258: Performed by: STUDENT IN AN ORGANIZED HEALTH CARE EDUCATION/TRAINING PROGRAM

## 2023-08-23 PROCEDURE — 97161 PT EVAL LOW COMPLEX 20 MIN: CPT

## 2023-08-23 PROCEDURE — 97165 OT EVAL LOW COMPLEX 30 MIN: CPT

## 2023-08-23 PROCEDURE — 1100000003 HC PRIVATE W/ TELEMETRY

## 2023-08-23 PROCEDURE — 83036 HEMOGLOBIN GLYCOSYLATED A1C: CPT

## 2023-08-23 PROCEDURE — 97535 SELF CARE MNGMENT TRAINING: CPT

## 2023-08-23 PROCEDURE — 80048 BASIC METABOLIC PNL TOTAL CA: CPT

## 2023-08-23 PROCEDURE — 83735 ASSAY OF MAGNESIUM: CPT

## 2023-08-23 PROCEDURE — 94761 N-INVAS EAR/PLS OXIMETRY MLT: CPT

## 2023-08-23 PROCEDURE — 6370000000 HC RX 637 (ALT 250 FOR IP)

## 2023-08-23 PROCEDURE — 6360000002 HC RX W HCPCS: Performed by: STUDENT IN AN ORGANIZED HEALTH CARE EDUCATION/TRAINING PROGRAM

## 2023-08-23 PROCEDURE — 71046 X-RAY EXAM CHEST 2 VIEWS: CPT

## 2023-08-23 PROCEDURE — 97530 THERAPEUTIC ACTIVITIES: CPT

## 2023-08-23 PROCEDURE — 83690 ASSAY OF LIPASE: CPT

## 2023-08-23 PROCEDURE — 36415 COLL VENOUS BLD VENIPUNCTURE: CPT

## 2023-08-23 RX ORDER — POTASSIUM CHLORIDE 20 MEQ/1
40 TABLET, EXTENDED RELEASE ORAL ONCE
Status: COMPLETED | OUTPATIENT
Start: 2023-08-23 | End: 2023-08-23

## 2023-08-23 RX ORDER — PANTOPRAZOLE SODIUM 40 MG/1
40 TABLET, DELAYED RELEASE ORAL
Status: DISCONTINUED | OUTPATIENT
Start: 2023-08-23 | End: 2023-08-31 | Stop reason: HOSPADM

## 2023-08-23 RX ORDER — POLYETHYLENE GLYCOL 3350 17 G/17G
17 POWDER, FOR SOLUTION ORAL ONCE
Status: COMPLETED | OUTPATIENT
Start: 2023-08-23 | End: 2023-08-23

## 2023-08-23 RX ORDER — POLYETHYLENE GLYCOL 3350 17 G/17G
17 POWDER, FOR SOLUTION ORAL DAILY
Status: DISCONTINUED | OUTPATIENT
Start: 2023-08-24 | End: 2023-08-31 | Stop reason: HOSPADM

## 2023-08-23 RX ADMIN — GABAPENTIN 100 MG: 100 CAPSULE ORAL at 15:12

## 2023-08-23 RX ADMIN — POTASSIUM CHLORIDE 40 MEQ: 1500 TABLET, EXTENDED RELEASE ORAL at 08:46

## 2023-08-23 RX ADMIN — ACETAMINOPHEN 325MG 650 MG: 325 TABLET ORAL at 08:42

## 2023-08-23 RX ADMIN — ALUMINUM HYDROXIDE, MAGNESIUM HYDROXIDE, AND SIMETHICONE 40 ML: 200; 200; 20 SUSPENSION ORAL at 11:24

## 2023-08-23 RX ADMIN — CARVEDILOL 6.25 MG: 6.25 TABLET, FILM COATED ORAL at 17:52

## 2023-08-23 RX ADMIN — POLYETHYLENE GLYCOL 3350 17 G: 17 POWDER, FOR SOLUTION ORAL at 15:11

## 2023-08-23 RX ADMIN — FUROSEMIDE 40 MG: 10 INJECTION, SOLUTION INTRAMUSCULAR; INTRAVENOUS at 17:52

## 2023-08-23 RX ADMIN — FUROSEMIDE 40 MG: 10 INJECTION, SOLUTION INTRAMUSCULAR; INTRAVENOUS at 08:45

## 2023-08-23 RX ADMIN — ROSUVASTATIN CALCIUM 20 MG: 20 TABLET, FILM COATED ORAL at 22:09

## 2023-08-23 RX ADMIN — CARVEDILOL 6.25 MG: 6.25 TABLET, FILM COATED ORAL at 08:42

## 2023-08-23 RX ADMIN — CLOPIDOGREL BISULFATE 75 MG: 75 TABLET, FILM COATED ORAL at 08:42

## 2023-08-23 RX ADMIN — ENOXAPARIN SODIUM 40 MG: 100 INJECTION SUBCUTANEOUS at 15:11

## 2023-08-23 RX ADMIN — PANTOPRAZOLE SODIUM 40 MG: 40 TABLET, DELAYED RELEASE ORAL at 11:24

## 2023-08-23 RX ADMIN — GLYCERIN 2 G: 2 SUPPOSITORY RECTAL at 17:52

## 2023-08-23 RX ADMIN — GABAPENTIN 100 MG: 100 CAPSULE ORAL at 22:09

## 2023-08-23 RX ADMIN — SODIUM CHLORIDE, PRESERVATIVE FREE 10 ML: 5 INJECTION INTRAVENOUS at 22:09

## 2023-08-23 RX ADMIN — SODIUM CHLORIDE, PRESERVATIVE FREE 5 ML: 5 INJECTION INTRAVENOUS at 10:33

## 2023-08-23 RX ADMIN — GABAPENTIN 100 MG: 100 CAPSULE ORAL at 08:42

## 2023-08-23 ASSESSMENT — PAIN DESCRIPTION - LOCATION: LOCATION: STERNUM

## 2023-08-23 ASSESSMENT — PAIN SCALES - GENERAL
PAINLEVEL_OUTOF10: 0
PAINLEVEL_OUTOF10: 0
PAINLEVEL_OUTOF10: 6

## 2023-08-23 ASSESSMENT — PAIN DESCRIPTION - PAIN TYPE: TYPE: ACUTE PAIN

## 2023-08-23 NOTE — PLAN OF CARE
Problem: Occupational Therapy - Adult  Goal: By Discharge: Performs self-care activities at highest level of function for planned discharge setting. See evaluation for individualized goals. Description: Occupational Therapy Goals:  Initiated 8/23/2023 to be met within 7-10 days. 1.  Patient will perform grooming task standing at sink > 5 minutes with modified independence, G balance. 2.  Patient will perform lower body dressing with modified independence. 3.  Patient will perform toilet transfers with modified independence. 4.  Patient will perform all aspects of toileting with modified independence. 5.  Patient will participate in upper extremity therapeutic exercise/activities with modified independence for 8-10 minutes to increase ROM/strength for ADLs. 6.  Patient will utilize energy conservation techniques during functional activities with verbal cues. PLOF: Patient was independent with self-care and functional mobility PTA. Outcome: Progressing       OCCUPATIONAL THERAPY EVALUATION    Patient: Angie Salazar (47 y.o. male)  Date: 8/23/2023  Primary Diagnosis: Shortness of breath [R06.02]  Pulmonary edema cardiac cause (HCC) [I50.1]  Non compliance w medication regimen [Z91.148]  Chest pain, unspecified type [R07.9]  Acute HFrEF (heart failure with reduced ejection fraction) (HCC) [I50.21]  Precautions: General Precautions    ASSESSMENT :  Upon entering the room, patient was supine in bed, alert with rubiks cube, and agreeable to participate in OT evaluation with 3L NC donned. Patient educated on the role of OT, evaluation process, and safety during this admission with patient verbalizing understanding. Patient O2 remained > 96% this session, educated on energy conservation techniques, pursed lip breathing, and self pacing during daily activities.  During the evaluation, the patient presented with decreased BUE strength and (+) numbness in B hands, however BUEs present to be still
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
## 2023-08-23 NOTE — PROGRESS NOTES
[address] [facility]  DAILY PROGRESS NOTE      Patient:    Ned Santos , 60 y.o. male   MRN:  [id number]  Room/Bed:  209/01  Admission Date:   8/22/2023  Code status:  Full Code    Reason for Admission: Ned Santos is a 60y.o. year old male with history of HFrEF, hypertension, CAD, hyperlipidemia, peripheral neuropathy, CKD 3 who presents with approximately 1 week of increased fatigue, dyspnea, cough and decreased appetite. Emergency department, patient was tachypneic and hypoxic requiring 2 to 6 L of nasal cannula. He was afebrile, normotensive. His laboratory evaluation was significant for a mild SANTY, elevated BNP and a chest x-ray showing pulmonary edema. Symptoms most likely consistent with an acute exacerbation of his HFrEF likely secondary to medication noncompliance or running out of his medications in the past 2 weeks. ASSESSMENT AND PLAN:   Acute HFrEF (10-15%), ischemic cardiomyopathy with AICD ([employer]tr[employer])  Hypoxia requiring supplemental oxygen  Elevated proBNP  Coronary artery disease s/p PCI February 2022 (SHIRA to RCA)  Hypertension  Hyperlipidemia  -Mild crackles heard on exam today. Patient still experiencing significant SOB.    Home meds: Coreg 6.25 mg twice daily, aspirin 81 mg, Plavix 75 mg twice daily, Entresto 24-26 mg twice daily, rosuvastatin 20 mg at night, torsemide 10 mg twice daily  Reports running out of medicines 1 to 2 weeks ago, unsure which medications  Elevated proBNP to 17,000  Chest x-ray: Cardiomegaly, pulmonary edema  Last echo 2/22: Global hypokinesis, severely reduced systolic function, EF 10 to 15%  Plan:  -Continue Coreg 6.25 mg twice daily  -Continue aspirin and Plavix  -Clarify need for continued DAPT as PCI was greater than 12 months ago  -Hold Valsartan  -Continue Crestor 20 mg  -IV Lasix 40 mg twice daily  -oxygen via NC, wean as tolerated  -TTE   -Cardiac diet with fluid restriction  -maintain K > 4, Mg > 2  -check lipase      Unspecified Moderate to Severe

## 2023-08-23 NOTE — PLAN OF CARE
Problem: Safety - Adult  Goal: Free from fall injury  Outcome: Progressing  Flowsheets (Taken 8/22/2023 2043)  Free From Fall Injury:   Instruct family/caregiver on patient safety   Based on caregiver fall risk screen, instruct family/caregiver to ask for assistance with transferring infant if caregiver noted to have fall risk factors

## 2023-08-24 ENCOUNTER — APPOINTMENT (OUTPATIENT)
Facility: HOSPITAL | Age: 63
End: 2023-08-24
Payer: MEDICARE

## 2023-08-24 LAB
ANION GAP SERPL CALC-SCNC: 7 MMOL/L (ref 3–18)
BUN SERPL-MCNC: 44 MG/DL (ref 7–18)
BUN/CREAT SERPL: 24 (ref 12–20)
CALCIUM SERPL-MCNC: 7.3 MG/DL (ref 8.5–10.1)
CHLORIDE SERPL-SCNC: 104 MMOL/L (ref 100–111)
CO2 SERPL-SCNC: 26 MMOL/L (ref 21–32)
CREAT SERPL-MCNC: 1.84 MG/DL (ref 0.6–1.3)
ECHO AO ASC DIAM: 3.6 CM
ECHO AO ASCENDING AORTA INDEX: 1.83 CM/M2
ECHO AO ROOT DIAM: 2.9 CM
ECHO AO ROOT INDEX: 1.47 CM/M2
ECHO AV MEAN GRADIENT: 2 MMHG
ECHO AV MEAN VELOCITY: 0.6 M/S
ECHO AV PEAK GRADIENT: 3 MMHG
ECHO AV PEAK VELOCITY: 0.8 M/S
ECHO AV VTI: 13.8 CM
ECHO BSA: 1.96 M2
ECHO EST RA PRESSURE: 8 MMHG
ECHO LA VOL 2C: 101 ML (ref 18–58)
ECHO LA VOL 2C: 104 ML (ref 18–58)
ECHO LA VOL 4C: 54 ML (ref 18–58)
ECHO LA VOL 4C: 60 ML (ref 18–58)
ECHO LA VOL BP: 73 ML (ref 18–58)
ECHO LA VOL/BSA BIPLANE: 37 ML/M2 (ref 16–34)
ECHO LA VOLUME AREA LENGTH: 79 ML
ECHO LA VOLUME INDEX AREA LENGTH: 40 ML/M2 (ref 16–34)
ECHO LV FRACTIONAL SHORTENING: 3 % (ref 28–44)
ECHO LV INTERNAL DIMENSION DIASTOLE INDEX: 2.99 CM/M2
ECHO LV INTERNAL DIMENSION DIASTOLIC: 5.9 CM (ref 4.2–5.9)
ECHO LV INTERNAL DIMENSION SYSTOLIC INDEX: 2.89 CM/M2
ECHO LV INTERNAL DIMENSION SYSTOLIC: 5.7 CM
ECHO LV IVSD: 0.8 CM (ref 0.6–1)
ECHO LV MASS 2D: 180.7 G (ref 88–224)
ECHO LV MASS INDEX 2D: 91.7 G/M2 (ref 49–115)
ECHO LV POSTERIOR WALL DIASTOLIC: 0.8 CM (ref 0.6–1)
ECHO LV RELATIVE WALL THICKNESS RATIO: 0.27
ECHO LVOT AREA: 3.1 CM2
ECHO LVOT DIAM: 2 CM
ECHO PV MAX VELOCITY: 0.5 M/S
ECHO PV PEAK GRADIENT: 1 MMHG
ECHO RA AREA 4C: 32.8 CM2
ECHO RIGHT VENTRICULAR SYSTOLIC PRESSURE (RVSP): 41 MMHG
ECHO RV BASAL DIMENSION: 5.6 CM
ECHO RV FREE WALL PEAK S': 8 CM/S
ECHO RV LONGITUDINAL DIMENSION: 8.8 CM
ECHO RV MID DIMENSION: 4.9 CM
ECHO RV TAPSE: 1.2 CM (ref 1.7–?)
ECHO TV REGURGITANT MAX VELOCITY: 2.86 M/S
ECHO TV REGURGITANT PEAK GRADIENT: 33 MMHG
GLUCOSE SERPL-MCNC: 152 MG/DL (ref 74–99)
MAGNESIUM SERPL-MCNC: 1.8 MG/DL (ref 1.6–2.6)
POTASSIUM SERPL-SCNC: 3.7 MMOL/L (ref 3.5–5.5)
SODIUM SERPL-SCNC: 137 MMOL/L (ref 136–145)

## 2023-08-24 PROCEDURE — 1100000003 HC PRIVATE W/ TELEMETRY

## 2023-08-24 PROCEDURE — 94761 N-INVAS EAR/PLS OXIMETRY MLT: CPT

## 2023-08-24 PROCEDURE — 83735 ASSAY OF MAGNESIUM: CPT

## 2023-08-24 PROCEDURE — 6370000000 HC RX 637 (ALT 250 FOR IP): Performed by: STUDENT IN AN ORGANIZED HEALTH CARE EDUCATION/TRAINING PROGRAM

## 2023-08-24 PROCEDURE — 6370000000 HC RX 637 (ALT 250 FOR IP): Performed by: FAMILY MEDICINE

## 2023-08-24 PROCEDURE — 6360000004 HC RX CONTRAST MEDICATION: Performed by: STUDENT IN AN ORGANIZED HEALTH CARE EDUCATION/TRAINING PROGRAM

## 2023-08-24 PROCEDURE — 2700000000 HC OXYGEN THERAPY PER DAY

## 2023-08-24 PROCEDURE — 6360000002 HC RX W HCPCS: Performed by: STUDENT IN AN ORGANIZED HEALTH CARE EDUCATION/TRAINING PROGRAM

## 2023-08-24 PROCEDURE — 2580000003 HC RX 258: Performed by: STUDENT IN AN ORGANIZED HEALTH CARE EDUCATION/TRAINING PROGRAM

## 2023-08-24 PROCEDURE — 36415 COLL VENOUS BLD VENIPUNCTURE: CPT

## 2023-08-24 PROCEDURE — 74176 CT ABD & PELVIS W/O CONTRAST: CPT

## 2023-08-24 PROCEDURE — 80048 BASIC METABOLIC PNL TOTAL CA: CPT

## 2023-08-24 PROCEDURE — C8924 2D TTE W OR W/O FOL W/CON,FU: HCPCS

## 2023-08-24 RX ORDER — GABAPENTIN 100 MG/1
200 CAPSULE ORAL 3 TIMES DAILY
Status: DISCONTINUED | OUTPATIENT
Start: 2023-08-24 | End: 2023-08-24

## 2023-08-24 RX ORDER — POTASSIUM CHLORIDE 20 MEQ/1
20 TABLET, EXTENDED RELEASE ORAL
Status: COMPLETED | OUTPATIENT
Start: 2023-08-24 | End: 2023-08-24

## 2023-08-24 RX ORDER — GABAPENTIN 300 MG/1
300 CAPSULE ORAL 3 TIMES DAILY
Status: DISCONTINUED | OUTPATIENT
Start: 2023-08-24 | End: 2023-08-31 | Stop reason: HOSPADM

## 2023-08-24 RX ORDER — GABAPENTIN 300 MG/1
300 CAPSULE ORAL 3 TIMES DAILY
Status: DISCONTINUED | OUTPATIENT
Start: 2023-08-24 | End: 2023-08-24

## 2023-08-24 RX ORDER — METOCLOPRAMIDE 5 MG/1
5 TABLET ORAL
Status: DISCONTINUED | OUTPATIENT
Start: 2023-08-24 | End: 2023-08-30

## 2023-08-24 RX ORDER — MAGNESIUM SULFATE IN WATER 40 MG/ML
2000 INJECTION, SOLUTION INTRAVENOUS ONCE
Status: COMPLETED | OUTPATIENT
Start: 2023-08-24 | End: 2023-08-24

## 2023-08-24 RX ADMIN — SODIUM CHLORIDE, PRESERVATIVE FREE 5 ML: 5 INJECTION INTRAVENOUS at 08:50

## 2023-08-24 RX ADMIN — CARVEDILOL 6.25 MG: 6.25 TABLET, FILM COATED ORAL at 17:40

## 2023-08-24 RX ADMIN — FUROSEMIDE 40 MG: 10 INJECTION, SOLUTION INTRAMUSCULAR; INTRAVENOUS at 17:40

## 2023-08-24 RX ADMIN — POTASSIUM CHLORIDE 20 MEQ: 1500 TABLET, EXTENDED RELEASE ORAL at 10:29

## 2023-08-24 RX ADMIN — FUROSEMIDE 40 MG: 10 INJECTION, SOLUTION INTRAMUSCULAR; INTRAVENOUS at 08:47

## 2023-08-24 RX ADMIN — ENOXAPARIN SODIUM 40 MG: 100 INJECTION SUBCUTANEOUS at 14:37

## 2023-08-24 RX ADMIN — PANTOPRAZOLE SODIUM 40 MG: 40 TABLET, DELAYED RELEASE ORAL at 08:49

## 2023-08-24 RX ADMIN — PERFLUTREN 2 ML: 6.52 INJECTION, SUSPENSION INTRAVENOUS at 12:09

## 2023-08-24 RX ADMIN — GABAPENTIN 300 MG: 300 CAPSULE ORAL at 14:37

## 2023-08-24 RX ADMIN — GABAPENTIN 300 MG: 300 CAPSULE ORAL at 21:06

## 2023-08-24 RX ADMIN — CLOPIDOGREL BISULFATE 75 MG: 75 TABLET, FILM COATED ORAL at 08:47

## 2023-08-24 RX ADMIN — POTASSIUM CHLORIDE 20 MEQ: 1500 TABLET, EXTENDED RELEASE ORAL at 08:48

## 2023-08-24 RX ADMIN — ROSUVASTATIN CALCIUM 20 MG: 20 TABLET, FILM COATED ORAL at 21:06

## 2023-08-24 RX ADMIN — SODIUM CHLORIDE, PRESERVATIVE FREE 10 ML: 5 INJECTION INTRAVENOUS at 21:08

## 2023-08-24 RX ADMIN — MAGNESIUM SULFATE HEPTAHYDRATE 2000 MG: 40 INJECTION, SOLUTION INTRAVENOUS at 10:36

## 2023-08-24 RX ADMIN — POTASSIUM CHLORIDE 20 MEQ: 1500 TABLET, EXTENDED RELEASE ORAL at 13:04

## 2023-08-24 RX ADMIN — CARVEDILOL 6.25 MG: 6.25 TABLET, FILM COATED ORAL at 08:49

## 2023-08-24 RX ADMIN — GABAPENTIN 300 MG: 300 CAPSULE ORAL at 10:29

## 2023-08-24 ASSESSMENT — PAIN SCALES - GENERAL: PAINLEVEL_OUTOF10: 0

## 2023-08-24 NOTE — PROGRESS NOTES
[address] [facility]  DAILY PROGRESS NOTE      Patient:    Daniela Shipley , 60 y.o. male   MRN:  [id number]  Room/Bed:  209/01  Admission Date:   8/22/2023  Code status:  Full Code    Reason for Admission: Daniela Shipley is a 60y.o. year old male with history of HFrEF, hypertension, CAD, hyperlipidemia, peripheral neuropathy, CKD 3 who presents with approximately 1 week of increased fatigue, dyspnea, cough and decreased appetite. Emergency department, patient was tachypneic and hypoxic requiring 2 to 6 L of nasal cannula. He was afebrile, normotensive. His laboratory evaluation was significant for a mild SANTY, elevated BNP and a chest x-ray showing pulmonary edema. Symptoms most likely consistent with an acute exacerbation of his HFrEF likely secondary to medication noncompliance or running out of his medications in the past 2 weeks. SOB improving with IV diuresis. ASSESSMENT AND PLAN:   Acute HFrEF (10-15%), ischemic cardiomyopathy with AICD ([employer]tr[employer])  Hypoxia requiring supplemental oxygen  Elevated proBNP  Coronary artery disease s/p PCI February 2022 (SHIRA to RCA)  Hypertension  Hyperlipidemia  -Mild crackles heard LLQ lung exam today. Patient with significantly improved SOB and good urine output. SpO2 98 - 100% on room air this morning.  No increased WOB noted on exam.   Home meds: Coreg 6.25 mg twice daily, aspirin 81 mg, Plavix 75 mg twice daily, Entresto 24-26 mg twice daily, rosuvastatin 20 mg at night, torsemide 10 mg twice daily  Reports running out of medicines 1 to 2 weeks ago, unsure which medications  8/22 Elevated proBNP to ~17,000  8/22 Chest x-ray: Cardiomegaly, pulmonary edema  Last echo 2/22: Global hypokinesis, severely reduced systolic function, EF 10 to 15%  8/23 chest xray showed partially improved lung aeration w/ residual pulmonary interstitial edema   -potassium 3.7 and Mg 1.8 this AM  Plan:  -Continue Coreg 6.25 mg twice daily  -Continue aspirin and Plavix  -Clarify need for GALLBLADDER, LIVER    Result Date: 8/22/2023  Contracted gallbladder with nonspecific wall thickening probably due to contraction, follow-up recommended. -No cholelithiasis or finding of acute cholecystitis. Increased renal echogenicity likely due to chronic medical renal disease.       ================================================================  Further management for Mr. Sammi Salcedo will be discussed on rounds with my attending.       Harshad Raines MD, PGY-1  [address] [facility] Medicine  August 24, 2023 6:28 AM

## 2023-08-24 NOTE — PROGRESS NOTES
Physician Progress Note      PATIENT:               Juventino Malhotra  CSN #:                  [id number]  :                       1960  ADMIT DATE:       2023 10:05 AM  DISCH DATE:  RESPONDING  PROVIDER #:        Malaika Duke MD          QUERY TEXT:    Patient admitted with Acute HFrEF . Noted documentation of Acute Kidney   Injury in H&P  with creatinine - 1.82, 1.81. In order to support the   diagnosis of SANTY, please include additional clinical indicators in your   documentation. ? Or please document if the diagnosis of SANTY has been ruled out   after further study. The medical record reflects the following:    Risk Factors: chf , ckd 3. Clinical Indicators: H&P   \"SANTY on CKD 3, likely cardiorenal\". Creatinine   - 1.82, 1.81. BUN - 40, 44. GFR - 60, >60    Treatment: Avoid nephrotoxic agents, Monitor BMP daily, I&Os. Thank Lakhwinder Gar RN, CDS  Options provided:  -- Acute kidney injury evidenced by, Please document evidence as well as a   numerical baseline creatinine, if known. -- CKD stage 3 without SANTY  -- Other - I will add my own diagnosis  -- Disagree - Not applicable / Not valid  -- Disagree - Clinically unable to determine / Unknown  -- Refer to Clinical Documentation Reviewer    PROVIDER RESPONSE TEXT:    This patient has acute kidney injury as evidenced by increase in Cr by more   than 0.3.     Query created by: Denise Jackson on 2023 1:26 PM      Electronically signed by:  Malaika Duke MD 2023 9:19 AM

## 2023-08-24 NOTE — PROGRESS NOTES
Called [facility] [place] about patient complaining of not feeling well. Vitals signs are in computer and are stable. MD burroughs and coming to assess at this time.

## 2023-08-24 NOTE — PROGRESS NOTES
made aware that pt had 9 beats of V Tach. Orders for pt to have potassium and magnesium drawn. BMP already ordered and magnesium order placed. Spoke to  with PFM in regards to this pt.  Initial note made in error, leslie GUNN contacted

## 2023-08-24 NOTE — PROGRESS NOTES
Received pt with bedside report. Pt up walking around room with No oxygen. Asymptomatic. No SOB, v/s retrieved. Patient says he was just cleaning up his room after bathing. R/A sats at 100%.

## 2023-08-24 NOTE — PROGRESS NOTES
2300 Pt up to bathroom, bathed himself. Walked around the room cleaning up. No SOB noted and pt was not wearing oxygen.     No complaints of pain voiced

## 2023-08-25 LAB
ANION GAP SERPL CALC-SCNC: 8 MMOL/L (ref 3–18)
BASOPHILS # BLD: 0 K/UL (ref 0–0.1)
BASOPHILS NFR BLD: 1 % (ref 0–2)
BUN SERPL-MCNC: 43 MG/DL (ref 7–18)
BUN/CREAT SERPL: 26 (ref 12–20)
CALCIUM SERPL-MCNC: 7.6 MG/DL (ref 8.5–10.1)
CHLORIDE SERPL-SCNC: 103 MMOL/L (ref 100–111)
CO2 SERPL-SCNC: 24 MMOL/L (ref 21–32)
CREAT SERPL-MCNC: 1.67 MG/DL (ref 0.6–1.3)
DIFFERENTIAL METHOD BLD: ABNORMAL
EOSINOPHIL # BLD: 0.1 K/UL (ref 0–0.4)
EOSINOPHIL NFR BLD: 2 % (ref 0–5)
ERYTHROCYTE [DISTWIDTH] IN BLOOD BY AUTOMATED COUNT: 14.2 % (ref 11.6–14.5)
GLUCOSE SERPL-MCNC: 113 MG/DL (ref 74–99)
HCT VFR BLD AUTO: 42.8 % (ref 36–48)
HGB BLD-MCNC: 13.6 G/DL (ref 13–16)
IMM GRANULOCYTES # BLD AUTO: 0 K/UL (ref 0–0.04)
IMM GRANULOCYTES NFR BLD AUTO: 0 % (ref 0–0.5)
LYMPHOCYTES # BLD: 1.6 K/UL (ref 0.9–3.6)
LYMPHOCYTES NFR BLD: 25 % (ref 21–52)
MAGNESIUM SERPL-MCNC: 2.3 MG/DL (ref 1.6–2.6)
MCH RBC QN AUTO: 30.8 PG (ref 24–34)
MCHC RBC AUTO-ENTMCNC: 31.8 G/DL (ref 31–37)
MCV RBC AUTO: 97.1 FL (ref 78–100)
MONOCYTES # BLD: 0.6 K/UL (ref 0.05–1.2)
MONOCYTES NFR BLD: 9 % (ref 3–10)
NEUTS SEG # BLD: 4.1 K/UL (ref 1.8–8)
NEUTS SEG NFR BLD: 63 % (ref 40–73)
NRBC # BLD: 0 K/UL (ref 0–0.01)
NRBC BLD-RTO: 0 PER 100 WBC
PLATELET # BLD AUTO: 168 K/UL (ref 135–420)
PMV BLD AUTO: 13.3 FL (ref 9.2–11.8)
POTASSIUM SERPL-SCNC: 4.4 MMOL/L (ref 3.5–5.5)
RBC # BLD AUTO: 4.41 M/UL (ref 4.35–5.65)
SODIUM SERPL-SCNC: 135 MMOL/L (ref 136–145)
WBC # BLD AUTO: 6.4 K/UL (ref 4.6–13.2)

## 2023-08-25 PROCEDURE — 6360000002 HC RX W HCPCS: Performed by: INTERNAL MEDICINE

## 2023-08-25 PROCEDURE — 85025 COMPLETE CBC W/AUTO DIFF WBC: CPT

## 2023-08-25 PROCEDURE — 80048 BASIC METABOLIC PNL TOTAL CA: CPT

## 2023-08-25 PROCEDURE — 6370000000 HC RX 637 (ALT 250 FOR IP): Performed by: FAMILY MEDICINE

## 2023-08-25 PROCEDURE — 2580000003 HC RX 258: Performed by: STUDENT IN AN ORGANIZED HEALTH CARE EDUCATION/TRAINING PROGRAM

## 2023-08-25 PROCEDURE — 36415 COLL VENOUS BLD VENIPUNCTURE: CPT

## 2023-08-25 PROCEDURE — 6360000002 HC RX W HCPCS: Performed by: STUDENT IN AN ORGANIZED HEALTH CARE EDUCATION/TRAINING PROGRAM

## 2023-08-25 PROCEDURE — 1100000003 HC PRIVATE W/ TELEMETRY

## 2023-08-25 PROCEDURE — 94761 N-INVAS EAR/PLS OXIMETRY MLT: CPT

## 2023-08-25 PROCEDURE — 83735 ASSAY OF MAGNESIUM: CPT

## 2023-08-25 PROCEDURE — 6370000000 HC RX 637 (ALT 250 FOR IP): Performed by: STUDENT IN AN ORGANIZED HEALTH CARE EDUCATION/TRAINING PROGRAM

## 2023-08-25 RX ORDER — MILRINONE LACTATE 0.2 MG/ML
.0625-.75 INJECTION, SOLUTION INTRAVENOUS CONTINUOUS
Status: DISCONTINUED | OUTPATIENT
Start: 2023-08-25 | End: 2023-08-28

## 2023-08-25 RX ORDER — MILRINONE LACTATE 0.2 MG/ML
.0625-.75 INJECTION, SOLUTION INTRAVENOUS CONTINUOUS
Status: DISCONTINUED | OUTPATIENT
Start: 2023-08-25 | End: 2023-08-25

## 2023-08-25 RX ADMIN — CARVEDILOL 6.25 MG: 6.25 TABLET, FILM COATED ORAL at 08:39

## 2023-08-25 RX ADMIN — SODIUM CHLORIDE, PRESERVATIVE FREE 10 ML: 5 INJECTION INTRAVENOUS at 21:21

## 2023-08-25 RX ADMIN — ROSUVASTATIN CALCIUM 20 MG: 20 TABLET, FILM COATED ORAL at 21:20

## 2023-08-25 RX ADMIN — CLOPIDOGREL BISULFATE 75 MG: 75 TABLET, FILM COATED ORAL at 08:39

## 2023-08-25 RX ADMIN — GABAPENTIN 300 MG: 300 CAPSULE ORAL at 13:57

## 2023-08-25 RX ADMIN — ENOXAPARIN SODIUM 40 MG: 100 INJECTION SUBCUTANEOUS at 13:58

## 2023-08-25 RX ADMIN — GABAPENTIN 300 MG: 300 CAPSULE ORAL at 08:39

## 2023-08-25 RX ADMIN — FUROSEMIDE 40 MG: 10 INJECTION, SOLUTION INTRAMUSCULAR; INTRAVENOUS at 08:39

## 2023-08-25 RX ADMIN — MILRINONE LACTATE IN DEXTROSE 0.38 MCG/KG/MIN: 200 INJECTION, SOLUTION INTRAVENOUS at 16:04

## 2023-08-25 RX ADMIN — GABAPENTIN 300 MG: 300 CAPSULE ORAL at 21:20

## 2023-08-25 RX ADMIN — FUROSEMIDE 40 MG: 10 INJECTION, SOLUTION INTRAMUSCULAR; INTRAVENOUS at 18:20

## 2023-08-25 RX ADMIN — SODIUM CHLORIDE, PRESERVATIVE FREE 5 ML: 5 INJECTION INTRAVENOUS at 08:42

## 2023-08-25 RX ADMIN — CARVEDILOL 6.25 MG: 6.25 TABLET, FILM COATED ORAL at 16:43

## 2023-08-25 RX ADMIN — PANTOPRAZOLE SODIUM 40 MG: 40 TABLET, DELAYED RELEASE ORAL at 08:39

## 2023-08-25 ASSESSMENT — PAIN SCALES - GENERAL
PAINLEVEL_OUTOF10: 0

## 2023-08-25 NOTE — PROGRESS NOTES
Michelle Grace called to report 14 beats of V Tach. Checked on pt, pt was asleep, pt says he is ok, No chest pain,no SOB, Paged [facility]. Waiting on return call.

## 2023-08-25 NOTE — PLAN OF CARE
Problem: Discharge Planning  Goal: Discharge to home or other facility with appropriate resources  Outcome: Progressing  Flowsheets (Taken 8/24/2023 0845)  Discharge to home or other facility with appropriate resources: Identify barriers to discharge with patient and caregiver     Problem: Safety - Adult  Goal: Free from fall injury  Outcome: Progressing     Problem: Pain  Goal: Verbalizes/displays adequate comfort level or baseline comfort level  Outcome: Progressing  Flowsheets  Taken 8/24/2023 1730  Verbalizes/displays adequate comfort level or baseline comfort level: Encourage patient to monitor pain and request assistance  Taken 8/24/2023 1339  Verbalizes/displays adequate comfort level or baseline comfort level: Encourage patient to monitor pain and request assistance     Problem: Chronic Conditions and Co-morbidities  Goal: Patient's chronic conditions and co-morbidity symptoms are monitored and maintained or improved  Outcome: Progressing  Flowsheets (Taken 8/24/2023 0845)  Care Plan - Patient's Chronic Conditions and Co-Morbidity Symptoms are Monitored and Maintained or Improved: Monitor and assess patient's chronic conditions and comorbid symptoms for stability, deterioration, or improvement

## 2023-08-25 NOTE — CARE COORDINATION
Case Management Assessment  Initial Evaluation    Date/Time of Evaluation: 8/25/2023 9:30 AM  Assessment Completed by: Jarek Patterson RN    If patient is discharged prior to next notation, then this note serves as note for discharge by case management. Patient Name: Sammi Salcedo                   YOB: 1960  Diagnosis: Shortness of breath [R06.02]  Pulmonary edema cardiac cause ([address]) [I50.1]  Non compliance w medication regimen [Z91.148]  Chest pain, unspecified type [R07.9]  Acute HFrEF (heart failure with reduced ejection fraction) ([address]) [I50.21]                   Date / Time: 8/22/2023 10:05 AM    Patient Admission Status: Inpatient   Readmission Risk (Low < 19, Mod (19-27), High > 27): Readmission Risk Score: 12.6    Current PCP: Ivania Liu MD  PCP verified by CM? Yes    Chart Reviewed: Yes      History Provided by: Patient  Patient Orientation: Alert and Oriented    Patient Cognition: Alert    Hospitalization in the last 30 days (Readmission):  No    If yes, Readmission Assessment in CM Navigator will be completed. Advance Directives:      Code Status: Full Code   Patient's Primary Decision Maker is: Legal Next of Kin    [facility] [phone]    Discharge Planning:    Patient lives with: (P) Alone Type of Home: (P) House  Primary Care Giver: Self  Patient Support Systems include: Family Members, Friends/Neighbors   Current Financial resources: Medicare  Current community resources: None  Current services prior to admission: (P) None            Current DME:              Type of Home Care services:  None    ADLS  Prior functional level: Independent in ADLs/IADLs  Current functional level: Independent in ADLs/IADLs    PT AM-PAC:   /24  OT AM-PAC: 20 /24    Family can provide assistance at DC: Yes  Would you like Case Management to discuss the discharge plan with any other family members/significant others, and if so, who?  No  Plans to

## 2023-08-25 NOTE — CARE COORDINATION
CM met with the patient. CM provided GoodRx discount card and assisted patient with downloading berna to phone. CM also provided patient with [facility] patient assistance contact information.        JERICHO Huynh, RN  Care Management  Phone: [phone]

## 2023-08-25 NOTE — PROGRESS NOTES
[address] [facility]  DAILY PROGRESS NOTE      Patient:    Fanny Austin , 60 y.o. male   MRN:  [id number]  Room/Bed:  209/01  Admission Date:   8/22/2023  Code status:  Full Code    Reason for Admission: Fanny Austin is a 60y.o. year old male with history of HFrEF, hypertension, CAD, hyperlipidemia, peripheral neuropathy, CKD 3 who presented to [address] [place] [address] with approximately 1 week of increased fatigue, dyspnea, cough and decreased appetite. Emergency department, patient was tachypneic and hypoxic requiring 2 to 6 L of nasal cannula. He was afebrile, normotensive. His laboratory evaluation was significant for a mild SANTY, elevated BNP and a chest x-ray showing pulmonary edema. Symptoms most likely consistent with an acute exacerbation of his HFrEF secondary to medication noncompliance or running out of his medications in the past 2 weeks. SOB improving with IV diuresis with SpO2 % on room air. Discharge planning. ASSESSMENT AND PLAN:   Acute HFrEF (10-15%), ischemic cardiomyopathy with AICD ([employer]tr[employer])  Hypoxia requiring supplemental oxygen, resolved  Elevated proBNP  Coronary artery disease s/p PCI February 2022 (SHIRA to RCA)  Hypertension  Hyperlipidemia  -Mild crackles heard LLQ lung exam today. Patient with significantly improved SOB even with ambulation though he complains of intermittent SOB with repositioning in bed. Continued good urine output with diuresis. SpO2 98 - 100% on room air this morning.  No increased WOB noted on exam.   Home meds: Coreg 6.25 mg twice daily, aspirin 81 mg, Plavix 75 mg twice daily, Entresto 24-26 mg twice daily, rosuvastatin 20 mg at night, torsemide 10 mg daily  Reports running out of medicines 1 to 2 weeks ago due to him allocating his money towards building a pool at his home  8/22 Elevated proBNP to ~17,000  8/22 Chest x-ray: Cardiomegaly, pulmonary edema  8/23 chest xray showed partially improved lung aeration w/ residual pulmonary interstitial edema   -potassium nephrotoxic agents     Peripheral neuropathy  Complaining of significant neuropathic pain in bilateral feet and toes and numbness mostly in R hand in ulnar nerve distribution. Patient reports this has been ongoing for a few years. Notes that numbness in hands improves with exercise. Plan:  -gabapentin 300 mg TID  -at home strengthening exercises  -PT     Hypomagnesemia  Magnesium 2.3 today  Plan:  -Replete magnesium PRN    Discharge Planning  Per case management, patient reports that the reason he ran out of his home medications is because Hernandez Lemus is tight\" due to him working on having a pool installed into his home. Plan:  -Rx all meds to [facility]; consider meds on $4 list  - patient on prioritization of health   -plan to d/c on home torsemide 10 mg daily     Code: Full  Diet: Cardiac diet, fluid restricted to 1500 mL  DVT/AC: Lovenox  Mobility: per protocol  Disposition: [address] [facility] of Contact (relationship): David Snowden) [phone]      SUBJECTIVE:   Events of the last 24 hours:  14 beats non-sustained VT, asymptomatic overnight. NSR after. Random PVCs, Rate remained between 88 - 100. Patient seen at beside. SOB improved but patient still complaining of intermittent SOB when changing positions in bed though not present at other times when ambulating in room. SpO2 % on room air. Abdominal pain improved. Neuropathic pain in feet and numbness/tingling in hands and feet. Per nursing staff, patient was up walking around room yesterday without NC with SpO2 of high 90s to 100% and requesting double portions of meals.      ROS   Constitutional: fevers, chills, appetite changes, weight changes, fatigue  HEENT: changes in vision, changes in hearing, sore throat, dysphagia  Cardiovascular: no chest pain, no palpitations, bilateral feet edema  Pulmonary: no current SOB, no current cough, no chest tightness  Gastrointestinal: mild abdominal pain, no nausea/vomiting, no diarrhea, no

## 2023-08-25 NOTE — PROGRESS NOTES
Spoke with  with PFM. Made aware of the 14 beats of V Tach. Pt states he is OK, Potassium and Magnesium are in normal range. Runs of V Tack nonsustaining. They will continue to monitor. No new orders.

## 2023-08-25 NOTE — PLAN OF CARE
Problem: Discharge Planning  Goal: Discharge to home or other facility with appropriate resources  Outcome: Progressing  Flowsheets (Taken 8/25/2023 6969)  Discharge to home or other facility with appropriate resources: Identify barriers to discharge with patient and caregiver     Problem: Safety - Adult  Goal: Free from fall injury  Outcome: Progressing     Problem: Pain  Goal: Verbalizes/displays adequate comfort level or baseline comfort level  Outcome: Progressing     Problem: Chronic Conditions and Co-morbidities  Goal: Patient's chronic conditions and co-morbidity symptoms are monitored and maintained or improved  Outcome: Progressing  Flowsheets (Taken 8/25/2023 0810)  Care Plan - Patient's Chronic Conditions and Co-Morbidity Symptoms are Monitored and Maintained or Improved: Monitor and assess patient's chronic conditions and comorbid symptoms for stability, deterioration, or improvement

## 2023-08-26 LAB
ANION GAP SERPL CALC-SCNC: 7 MMOL/L (ref 3–18)
BASOPHILS # BLD: 0.1 K/UL (ref 0–0.1)
BASOPHILS NFR BLD: 1 % (ref 0–2)
BUN SERPL-MCNC: 38 MG/DL (ref 7–18)
BUN/CREAT SERPL: 20 (ref 12–20)
CALCIUM SERPL-MCNC: 7.4 MG/DL (ref 8.5–10.1)
CHLORIDE SERPL-SCNC: 102 MMOL/L (ref 100–111)
CO2 SERPL-SCNC: 27 MMOL/L (ref 21–32)
CREAT SERPL-MCNC: 1.89 MG/DL (ref 0.6–1.3)
DIFFERENTIAL METHOD BLD: ABNORMAL
EOSINOPHIL # BLD: 0.2 K/UL (ref 0–0.4)
EOSINOPHIL NFR BLD: 4 % (ref 0–5)
ERYTHROCYTE [DISTWIDTH] IN BLOOD BY AUTOMATED COUNT: 13.3 % (ref 11.6–14.5)
GLUCOSE SERPL-MCNC: 107 MG/DL (ref 74–99)
HCT VFR BLD AUTO: 38.2 % (ref 36–48)
HGB BLD-MCNC: 12.8 G/DL (ref 13–16)
IMM GRANULOCYTES # BLD AUTO: 0.1 K/UL (ref 0–0.04)
IMM GRANULOCYTES NFR BLD AUTO: 1 % (ref 0–0.5)
LYMPHOCYTES # BLD: 1.3 K/UL (ref 0.9–3.6)
LYMPHOCYTES NFR BLD: 20 % (ref 21–52)
MAGNESIUM SERPL-MCNC: 1.9 MG/DL (ref 1.6–2.6)
MCH RBC QN AUTO: 31.6 PG (ref 24–34)
MCHC RBC AUTO-ENTMCNC: 33.5 G/DL (ref 31–37)
MCV RBC AUTO: 94.3 FL (ref 78–100)
MONOCYTES # BLD: 0.7 K/UL (ref 0.05–1.2)
MONOCYTES NFR BLD: 11 % (ref 3–10)
NEUTS SEG # BLD: 4 K/UL (ref 1.8–8)
NEUTS SEG NFR BLD: 63 % (ref 40–73)
NRBC # BLD: 0 K/UL (ref 0–0.01)
NRBC BLD-RTO: 0 PER 100 WBC
PLATELET # BLD AUTO: 167 K/UL (ref 135–420)
PMV BLD AUTO: 12.5 FL (ref 9.2–11.8)
POTASSIUM SERPL-SCNC: 3.7 MMOL/L (ref 3.5–5.5)
RBC # BLD AUTO: 4.05 M/UL (ref 4.35–5.65)
SODIUM SERPL-SCNC: 136 MMOL/L (ref 136–145)
WBC # BLD AUTO: 6.3 K/UL (ref 4.6–13.2)

## 2023-08-26 PROCEDURE — 85025 COMPLETE CBC W/AUTO DIFF WBC: CPT

## 2023-08-26 PROCEDURE — 6360000002 HC RX W HCPCS: Performed by: INTERNAL MEDICINE

## 2023-08-26 PROCEDURE — 36415 COLL VENOUS BLD VENIPUNCTURE: CPT

## 2023-08-26 PROCEDURE — 6370000000 HC RX 637 (ALT 250 FOR IP): Performed by: STUDENT IN AN ORGANIZED HEALTH CARE EDUCATION/TRAINING PROGRAM

## 2023-08-26 PROCEDURE — 80048 BASIC METABOLIC PNL TOTAL CA: CPT

## 2023-08-26 PROCEDURE — 94761 N-INVAS EAR/PLS OXIMETRY MLT: CPT

## 2023-08-26 PROCEDURE — 2580000003 HC RX 258: Performed by: STUDENT IN AN ORGANIZED HEALTH CARE EDUCATION/TRAINING PROGRAM

## 2023-08-26 PROCEDURE — 6370000000 HC RX 637 (ALT 250 FOR IP): Performed by: FAMILY MEDICINE

## 2023-08-26 PROCEDURE — 6360000002 HC RX W HCPCS: Performed by: STUDENT IN AN ORGANIZED HEALTH CARE EDUCATION/TRAINING PROGRAM

## 2023-08-26 PROCEDURE — 6370000000 HC RX 637 (ALT 250 FOR IP)

## 2023-08-26 PROCEDURE — 83735 ASSAY OF MAGNESIUM: CPT

## 2023-08-26 PROCEDURE — 1100000003 HC PRIVATE W/ TELEMETRY

## 2023-08-26 PROCEDURE — 6360000002 HC RX W HCPCS

## 2023-08-26 RX ORDER — TORSEMIDE 10 MG/1
10 TABLET ORAL DAILY
Status: DISCONTINUED | OUTPATIENT
Start: 2023-08-26 | End: 2023-08-30

## 2023-08-26 RX ORDER — POTASSIUM CHLORIDE 20 MEQ/1
40 TABLET, EXTENDED RELEASE ORAL ONCE
Status: COMPLETED | OUTPATIENT
Start: 2023-08-26 | End: 2023-08-26

## 2023-08-26 RX ORDER — FUROSEMIDE 10 MG/ML
40 INJECTION INTRAMUSCULAR; INTRAVENOUS ONCE
Status: COMPLETED | OUTPATIENT
Start: 2023-08-26 | End: 2023-08-26

## 2023-08-26 RX ADMIN — FUROSEMIDE 40 MG: 10 INJECTION, SOLUTION INTRAMUSCULAR; INTRAVENOUS at 08:40

## 2023-08-26 RX ADMIN — SODIUM CHLORIDE, PRESERVATIVE FREE 10 ML: 5 INJECTION INTRAVENOUS at 22:26

## 2023-08-26 RX ADMIN — CLOPIDOGREL BISULFATE 75 MG: 75 TABLET, FILM COATED ORAL at 08:40

## 2023-08-26 RX ADMIN — CARVEDILOL 6.25 MG: 6.25 TABLET, FILM COATED ORAL at 08:40

## 2023-08-26 RX ADMIN — MILRINONE LACTATE IN DEXTROSE 0.38 MCG/KG/MIN: 200 INJECTION, SOLUTION INTRAVENOUS at 04:57

## 2023-08-26 RX ADMIN — ENOXAPARIN SODIUM 40 MG: 100 INJECTION SUBCUTANEOUS at 14:50

## 2023-08-26 RX ADMIN — FUROSEMIDE 40 MG: 10 INJECTION, SOLUTION INTRAMUSCULAR; INTRAVENOUS at 17:31

## 2023-08-26 RX ADMIN — GABAPENTIN 300 MG: 300 CAPSULE ORAL at 08:40

## 2023-08-26 RX ADMIN — SODIUM CHLORIDE, PRESERVATIVE FREE 10 ML: 5 INJECTION INTRAVENOUS at 08:41

## 2023-08-26 RX ADMIN — POTASSIUM CHLORIDE 40 MEQ: 1500 TABLET, EXTENDED RELEASE ORAL at 10:16

## 2023-08-26 RX ADMIN — MILRINONE LACTATE IN DEXTROSE 0.38 MCG/KG/MIN: 200 INJECTION, SOLUTION INTRAVENOUS at 16:14

## 2023-08-26 RX ADMIN — ROSUVASTATIN CALCIUM 20 MG: 20 TABLET, FILM COATED ORAL at 22:25

## 2023-08-26 RX ADMIN — PANTOPRAZOLE SODIUM 40 MG: 40 TABLET, DELAYED RELEASE ORAL at 08:39

## 2023-08-26 RX ADMIN — CARVEDILOL 6.25 MG: 6.25 TABLET, FILM COATED ORAL at 17:31

## 2023-08-26 RX ADMIN — GABAPENTIN 300 MG: 300 CAPSULE ORAL at 14:50

## 2023-08-26 RX ADMIN — GABAPENTIN 300 MG: 300 CAPSULE ORAL at 22:26

## 2023-08-26 ASSESSMENT — PAIN SCALES - GENERAL
PAINLEVEL_OUTOF10: 0

## 2023-08-26 NOTE — PROGRESS NOTES
Physical Therapy  Appreciate new orders. Pt currently on caseload. No noted change in pt status, will continue to treat per POC. Thank you.   Carmelita Davila PT, DPT

## 2023-08-27 LAB
ANION GAP SERPL CALC-SCNC: 9 MMOL/L (ref 3–18)
BASOPHILS # BLD: 0 K/UL (ref 0–0.1)
BASOPHILS NFR BLD: 0 % (ref 0–2)
BUN SERPL-MCNC: 36 MG/DL (ref 7–18)
BUN/CREAT SERPL: 21 (ref 12–20)
CALCIUM SERPL-MCNC: 7.6 MG/DL (ref 8.5–10.1)
CHLORIDE SERPL-SCNC: 100 MMOL/L (ref 100–111)
CO2 SERPL-SCNC: 28 MMOL/L (ref 21–32)
CREAT SERPL-MCNC: 1.69 MG/DL (ref 0.6–1.3)
DIFFERENTIAL METHOD BLD: ABNORMAL
EOSINOPHIL # BLD: 0.3 K/UL (ref 0–0.4)
EOSINOPHIL NFR BLD: 4 % (ref 0–5)
ERYTHROCYTE [DISTWIDTH] IN BLOOD BY AUTOMATED COUNT: 13.1 % (ref 11.6–14.5)
GLUCOSE SERPL-MCNC: 92 MG/DL (ref 74–99)
HCT VFR BLD AUTO: 39.4 % (ref 36–48)
HGB BLD-MCNC: 13.2 G/DL (ref 13–16)
IMM GRANULOCYTES # BLD AUTO: 0 K/UL (ref 0–0.04)
IMM GRANULOCYTES NFR BLD AUTO: 0 % (ref 0–0.5)
LYMPHOCYTES # BLD: 1.1 K/UL (ref 0.9–3.6)
LYMPHOCYTES NFR BLD: 16 % (ref 21–52)
MAGNESIUM SERPL-MCNC: 1.9 MG/DL (ref 1.6–2.6)
MCH RBC QN AUTO: 31 PG (ref 24–34)
MCHC RBC AUTO-ENTMCNC: 33.5 G/DL (ref 31–37)
MCV RBC AUTO: 92.5 FL (ref 78–100)
MONOCYTES # BLD: 0.9 K/UL (ref 0.05–1.2)
MONOCYTES NFR BLD: 12 % (ref 3–10)
NEUTS SEG # BLD: 5 K/UL (ref 1.8–8)
NEUTS SEG NFR BLD: 68 % (ref 40–73)
NRBC # BLD: 0 K/UL (ref 0–0.01)
NRBC BLD-RTO: 0 PER 100 WBC
PLATELET # BLD AUTO: 169 K/UL (ref 135–420)
PMV BLD AUTO: 12.8 FL (ref 9.2–11.8)
POTASSIUM SERPL-SCNC: 3.5 MMOL/L (ref 3.5–5.5)
RBC # BLD AUTO: 4.26 M/UL (ref 4.35–5.65)
SODIUM SERPL-SCNC: 137 MMOL/L (ref 136–145)
WBC # BLD AUTO: 7.3 K/UL (ref 4.6–13.2)

## 2023-08-27 PROCEDURE — 6370000000 HC RX 637 (ALT 250 FOR IP): Performed by: STUDENT IN AN ORGANIZED HEALTH CARE EDUCATION/TRAINING PROGRAM

## 2023-08-27 PROCEDURE — 94761 N-INVAS EAR/PLS OXIMETRY MLT: CPT

## 2023-08-27 PROCEDURE — 6370000000 HC RX 637 (ALT 250 FOR IP)

## 2023-08-27 PROCEDURE — 36415 COLL VENOUS BLD VENIPUNCTURE: CPT

## 2023-08-27 PROCEDURE — 85025 COMPLETE CBC W/AUTO DIFF WBC: CPT

## 2023-08-27 PROCEDURE — 2580000003 HC RX 258: Performed by: STUDENT IN AN ORGANIZED HEALTH CARE EDUCATION/TRAINING PROGRAM

## 2023-08-27 PROCEDURE — 83735 ASSAY OF MAGNESIUM: CPT

## 2023-08-27 PROCEDURE — 6360000002 HC RX W HCPCS: Performed by: INTERNAL MEDICINE

## 2023-08-27 PROCEDURE — 1100000003 HC PRIVATE W/ TELEMETRY

## 2023-08-27 PROCEDURE — 80048 BASIC METABOLIC PNL TOTAL CA: CPT

## 2023-08-27 PROCEDURE — 6370000000 HC RX 637 (ALT 250 FOR IP): Performed by: FAMILY MEDICINE

## 2023-08-27 PROCEDURE — 6360000002 HC RX W HCPCS: Performed by: STUDENT IN AN ORGANIZED HEALTH CARE EDUCATION/TRAINING PROGRAM

## 2023-08-27 RX ORDER — LANOLIN ALCOHOL/MO/W.PET/CERES
400 CREAM (GRAM) TOPICAL DAILY
Status: DISCONTINUED | OUTPATIENT
Start: 2023-08-27 | End: 2023-08-31 | Stop reason: HOSPADM

## 2023-08-27 RX ORDER — FUROSEMIDE 10 MG/ML
40 INJECTION INTRAMUSCULAR; INTRAVENOUS ONCE
Status: COMPLETED | OUTPATIENT
Start: 2023-08-27 | End: 2023-08-27

## 2023-08-27 RX ADMIN — CLOPIDOGREL BISULFATE 75 MG: 75 TABLET, FILM COATED ORAL at 08:54

## 2023-08-27 RX ADMIN — MILRINONE LACTATE IN DEXTROSE 0.38 MCG/KG/MIN: 200 INJECTION, SOLUTION INTRAVENOUS at 04:41

## 2023-08-27 RX ADMIN — ACETAMINOPHEN 325MG 650 MG: 325 TABLET ORAL at 22:16

## 2023-08-27 RX ADMIN — CARVEDILOL 6.25 MG: 6.25 TABLET, FILM COATED ORAL at 08:54

## 2023-08-27 RX ADMIN — GABAPENTIN 300 MG: 300 CAPSULE ORAL at 22:12

## 2023-08-27 RX ADMIN — Medication 400 MG: at 08:54

## 2023-08-27 RX ADMIN — CARVEDILOL 6.25 MG: 6.25 TABLET, FILM COATED ORAL at 17:33

## 2023-08-27 RX ADMIN — ROSUVASTATIN CALCIUM 20 MG: 20 TABLET, FILM COATED ORAL at 22:12

## 2023-08-27 RX ADMIN — PANTOPRAZOLE SODIUM 40 MG: 40 TABLET, DELAYED RELEASE ORAL at 08:54

## 2023-08-27 RX ADMIN — FUROSEMIDE 40 MG: 10 INJECTION, SOLUTION INTRAMUSCULAR; INTRAVENOUS at 12:20

## 2023-08-27 RX ADMIN — SODIUM CHLORIDE, PRESERVATIVE FREE 10 ML: 5 INJECTION INTRAVENOUS at 08:55

## 2023-08-27 RX ADMIN — GABAPENTIN 300 MG: 300 CAPSULE ORAL at 08:54

## 2023-08-27 RX ADMIN — ENOXAPARIN SODIUM 40 MG: 100 INJECTION SUBCUTANEOUS at 17:33

## 2023-08-27 ASSESSMENT — PAIN SCALES - GENERAL
PAINLEVEL_OUTOF10: 0
PAINLEVEL_OUTOF10: 7
PAINLEVEL_OUTOF10: 0
PAINLEVEL_OUTOF10: 0

## 2023-08-27 ASSESSMENT — PAIN DESCRIPTION - ORIENTATION: ORIENTATION: ANTERIOR;MID

## 2023-08-27 ASSESSMENT — PAIN DESCRIPTION - DESCRIPTORS: DESCRIPTORS: ACHING

## 2023-08-27 ASSESSMENT — PAIN DESCRIPTION - LOCATION: LOCATION: HEAD

## 2023-08-27 NOTE — PROGRESS NOTES
[address] [facility]  DAILY PROGRESS NOTE      Patient:    Calixto Flaherty , 60 y.o. male   MRN:  [id number]  Room/Bed:  209/01  Admission Date:   8/22/2023  Code status:  Full Code    Reason for Admission: Calixto Flaherty is a 60y.o. year old male with history of HFrEF, hypertension, CAD, hyperlipidemia, peripheral neuropathy, CKD 3 who presented to [facility] with approximately 1 week of increased fatigue, dyspnea, cough and decreased appetite. Symptoms most consistent with an acute exacerbation of his HFrEF secondary to medication noncompliance or running out of his medications. He was started on milrinone drip by cardiology after adequate diuresis due to on going symptoms. Plan to discontinue milrinone drip today and continue to work towards discharge. ASSESSMENT AND PLAN:   Acute HFrEF (10-15%), ischemic cardiomyopathy with AICD (Medtronic)  Hypoxia requiring supplemental oxygen, resolved  Elevated proBNP  Coronary artery disease s/p PCI February 2022 (SHIRA to RCA)  Hypertension  Hyperlipidemia  - Continues to have crackles to lower lung bases.  No further SOB and reports increased activity tolerance since starting milrinone  -Home meds: Coreg 6.25 mg twice daily, aspirin 81 mg, Plavix 75 mg twice daily, Entresto 24-26 mg twice daily, rosuvastatin 20 mg at night, torsemide 10 mg daily  -Reports running out of medicines 1 to 2 weeks ago due to him allocating his money towards building a pool at his home  -8/24 TTE: EF 10-15%, bilateral pleural effusion, dilated ascending aorta, mild PHTN, tricuspid, mitral, and pulmonic regurg, dilated R and L atrium and R ventricle  Plan:  -Continue Coreg 6.25 mg twice daily  -Continue Plavix  -Hold Entresto, with continued improvement on kidney function  -Continue Crestor 20 mg  -Transition to oral torsemide 10 mg daily tomorrow  -continue off of NC   -Cardiac diet with fluid restriction 1500 mL  -maintain K > 4, Mg > 2 with PRN repletion  -cardiology consulted, appreciate

## 2023-08-27 NOTE — DISCHARGE SUMMARY
stable  - Ca currently (8/29) 7.1. With most recent albumin of 3.0, Ca corrects to 7.9 which is still mildly low. On 8/30, Ca 7.2 with albumin 2.4 therefore correct Ca is 8.5 (wnl). -Ionized Ca 0.98 on 8/29 (seems to be pt's baseline per labs since 2021)  - Differential includes 2/2 CHF vs CKD vs vitamin D deficiency vs familial hypocalcemia   - Continue to monitor    SANTY on CKD 3, likely cardiorenal  -Cr 1.89 -->1.23,  GFR 39-->62  -Monitored BMP daily while diuresing inpatient     Peripheral neuropathy  -Increased gabapentin from 100 to 300 mg TID due to worsening pain.  -at home strengthening exercises  -splint ordered   -PT while inpatient. Hypomagnesemia, resolved  -Magnesium 2.1 on discharge.  -Started Mg oxide 400 mg PO daily while inpatient.   -Repleted with 2g IV Mg as needed  -Monitored daily Mg    Discharge Planning  -Per case management, patient reports that the reason he ran out of his home medications is because [employer] is tight\" due to him working on having a pool installed into his home.  -Counseled patient on prioritization of health; expressed understanding  -Rx all meds to [facility]; consider meds on $4 list  -plan to d/c on home torsemide 20 mg daily   -Safe to d/c home    Pertinent Results:      Laboratory Results:  LABORATORY RESULTS   HEMATOLOGY Lab Results   Component Value Date/Time    WBC 6.0 08/31/2023 04:08 AM    HGB 14.3 08/31/2023 04:08 AM    HCT 43.5 08/31/2023 04:08 AM     08/31/2023 04:08 AM    MCV 95.2 08/31/2023 04:08 AM       CHEMISTRIES Lab Results   Component Value Date/Time     08/31/2023 04:08 AM    K 4.5 08/31/2023 04:08 AM     08/31/2023 04:08 AM    CO2 24 08/31/2023 04:08 AM    BUN 33 08/31/2023 04:08 AM    GFRAA >60 08/14/2022 06:12 AM   Mg 2.1 on 8/31/2023   HEPATIC FUNCTION 8/30/2023 CMP:  - ALT 24  - AST 26  - Alk Phos 1-2  - Albumin 2.4   CARDIAC PANEL 8/22-8/23/2023 Troponin 26 --> 19 --> 26   NT-proBNP NT Pro-BNP 16,966 on 8/22/2023       Maggie MATOS David Cortes MD, PGY-1  [facility]  8/31/2023 10:43 AM

## 2023-08-28 PROBLEM — Z71.89 GOALS OF CARE, COUNSELING/DISCUSSION: Status: ACTIVE | Noted: 2023-08-28

## 2023-08-28 PROBLEM — R51.9 NONINTRACTABLE HEADACHE: Status: ACTIVE | Noted: 2023-08-28

## 2023-08-28 LAB
ANION GAP SERPL CALC-SCNC: 7 MMOL/L (ref 3–18)
BASOPHILS # BLD: 0 K/UL (ref 0–0.1)
BASOPHILS NFR BLD: 1 % (ref 0–2)
BUN SERPL-MCNC: 35 MG/DL (ref 7–18)
BUN/CREAT SERPL: 22 (ref 12–20)
CALCIUM SERPL-MCNC: 7.5 MG/DL (ref 8.5–10.1)
CHLORIDE SERPL-SCNC: 100 MMOL/L (ref 100–111)
CO2 SERPL-SCNC: 28 MMOL/L (ref 21–32)
CREAT SERPL-MCNC: 1.61 MG/DL (ref 0.6–1.3)
DIFFERENTIAL METHOD BLD: ABNORMAL
EOSINOPHIL # BLD: 0.2 K/UL (ref 0–0.4)
EOSINOPHIL NFR BLD: 3 % (ref 0–5)
ERYTHROCYTE [DISTWIDTH] IN BLOOD BY AUTOMATED COUNT: 13.1 % (ref 11.6–14.5)
GLUCOSE SERPL-MCNC: 95 MG/DL (ref 74–99)
HCT VFR BLD AUTO: 38 % (ref 36–48)
HGB BLD-MCNC: 12.5 G/DL (ref 13–16)
IMM GRANULOCYTES # BLD AUTO: 0 K/UL (ref 0–0.04)
IMM GRANULOCYTES NFR BLD AUTO: 0 % (ref 0–0.5)
LYMPHOCYTES # BLD: 1.3 K/UL (ref 0.9–3.6)
LYMPHOCYTES NFR BLD: 17 % (ref 21–52)
MAGNESIUM SERPL-MCNC: 1.9 MG/DL (ref 1.6–2.6)
MCH RBC QN AUTO: 30.9 PG (ref 24–34)
MCHC RBC AUTO-ENTMCNC: 32.9 G/DL (ref 31–37)
MCV RBC AUTO: 94.1 FL (ref 78–100)
MONOCYTES # BLD: 1 K/UL (ref 0.05–1.2)
MONOCYTES NFR BLD: 13 % (ref 3–10)
NEUTS SEG # BLD: 5 K/UL (ref 1.8–8)
NEUTS SEG NFR BLD: 66 % (ref 40–73)
NRBC # BLD: 0 K/UL (ref 0–0.01)
NRBC BLD-RTO: 0 PER 100 WBC
PLATELET # BLD AUTO: 171 K/UL (ref 135–420)
PMV BLD AUTO: 12.7 FL (ref 9.2–11.8)
POTASSIUM SERPL-SCNC: 3.5 MMOL/L (ref 3.5–5.5)
RBC # BLD AUTO: 4.04 M/UL (ref 4.35–5.65)
SODIUM SERPL-SCNC: 135 MMOL/L (ref 136–145)
WBC # BLD AUTO: 7.6 K/UL (ref 4.6–13.2)

## 2023-08-28 PROCEDURE — 6370000000 HC RX 637 (ALT 250 FOR IP)

## 2023-08-28 PROCEDURE — 2580000003 HC RX 258: Performed by: STUDENT IN AN ORGANIZED HEALTH CARE EDUCATION/TRAINING PROGRAM

## 2023-08-28 PROCEDURE — 1100000003 HC PRIVATE W/ TELEMETRY

## 2023-08-28 PROCEDURE — 6370000000 HC RX 637 (ALT 250 FOR IP): Performed by: STUDENT IN AN ORGANIZED HEALTH CARE EDUCATION/TRAINING PROGRAM

## 2023-08-28 PROCEDURE — 94761 N-INVAS EAR/PLS OXIMETRY MLT: CPT

## 2023-08-28 PROCEDURE — 36415 COLL VENOUS BLD VENIPUNCTURE: CPT

## 2023-08-28 PROCEDURE — 6360000002 HC RX W HCPCS: Performed by: INTERNAL MEDICINE

## 2023-08-28 PROCEDURE — 6360000002 HC RX W HCPCS: Performed by: STUDENT IN AN ORGANIZED HEALTH CARE EDUCATION/TRAINING PROGRAM

## 2023-08-28 PROCEDURE — 85025 COMPLETE CBC W/AUTO DIFF WBC: CPT

## 2023-08-28 PROCEDURE — 80048 BASIC METABOLIC PNL TOTAL CA: CPT

## 2023-08-28 PROCEDURE — 99221 1ST HOSP IP/OBS SF/LOW 40: CPT | Performed by: NURSE PRACTITIONER

## 2023-08-28 PROCEDURE — 83735 ASSAY OF MAGNESIUM: CPT

## 2023-08-28 PROCEDURE — 6370000000 HC RX 637 (ALT 250 FOR IP): Performed by: FAMILY MEDICINE

## 2023-08-28 RX ORDER — MILRINONE LACTATE 0.2 MG/ML
.0625-.75 INJECTION, SOLUTION INTRAVENOUS CONTINUOUS
Status: DISCONTINUED | OUTPATIENT
Start: 2023-08-28 | End: 2023-08-29

## 2023-08-28 RX ADMIN — ROSUVASTATIN CALCIUM 20 MG: 20 TABLET, FILM COATED ORAL at 22:45

## 2023-08-28 RX ADMIN — ENOXAPARIN SODIUM 40 MG: 100 INJECTION SUBCUTANEOUS at 14:49

## 2023-08-28 RX ADMIN — CLOPIDOGREL BISULFATE 75 MG: 75 TABLET, FILM COATED ORAL at 10:08

## 2023-08-28 RX ADMIN — GABAPENTIN 300 MG: 300 CAPSULE ORAL at 10:06

## 2023-08-28 RX ADMIN — CARVEDILOL 6.25 MG: 6.25 TABLET, FILM COATED ORAL at 10:10

## 2023-08-28 RX ADMIN — ACETAMINOPHEN 325MG 650 MG: 325 TABLET ORAL at 18:11

## 2023-08-28 RX ADMIN — CARVEDILOL 6.25 MG: 6.25 TABLET, FILM COATED ORAL at 18:11

## 2023-08-28 RX ADMIN — MILRINONE LACTATE IN DEXTROSE 0.38 MCG/KG/MIN: 200 INJECTION, SOLUTION INTRAVENOUS at 06:21

## 2023-08-28 RX ADMIN — GABAPENTIN 300 MG: 300 CAPSULE ORAL at 14:49

## 2023-08-28 RX ADMIN — POLYETHYLENE GLYCOL 3350 17 G: 17 POWDER, FOR SOLUTION ORAL at 10:06

## 2023-08-28 RX ADMIN — ACETAMINOPHEN 325MG 650 MG: 325 TABLET ORAL at 11:49

## 2023-08-28 RX ADMIN — MILRINONE LACTATE IN DEXTROSE 0.25 MCG/KG/MIN: 200 INJECTION, SOLUTION INTRAVENOUS at 11:55

## 2023-08-28 RX ADMIN — METOCLOPRAMIDE 5 MG: 5 TABLET ORAL at 18:15

## 2023-08-28 RX ADMIN — MILRINONE LACTATE IN DEXTROSE 0.25 MCG/KG/MIN: 200 INJECTION, SOLUTION INTRAVENOUS at 22:45

## 2023-08-28 RX ADMIN — ACETAMINOPHEN 325MG 650 MG: 325 TABLET ORAL at 22:45

## 2023-08-28 RX ADMIN — Medication 400 MG: at 10:06

## 2023-08-28 RX ADMIN — METOCLOPRAMIDE 5 MG: 5 TABLET ORAL at 10:07

## 2023-08-28 RX ADMIN — SODIUM CHLORIDE, PRESERVATIVE FREE 10 ML: 5 INJECTION INTRAVENOUS at 10:50

## 2023-08-28 RX ADMIN — PANTOPRAZOLE SODIUM 40 MG: 40 TABLET, DELAYED RELEASE ORAL at 06:43

## 2023-08-28 RX ADMIN — ACETAMINOPHEN 325MG 650 MG: 325 TABLET ORAL at 06:43

## 2023-08-28 RX ADMIN — GABAPENTIN 300 MG: 300 CAPSULE ORAL at 22:45

## 2023-08-28 RX ADMIN — METOCLOPRAMIDE 5 MG: 5 TABLET ORAL at 06:43

## 2023-08-28 ASSESSMENT — PAIN DESCRIPTION - LOCATION
LOCATION: HEAD

## 2023-08-28 ASSESSMENT — PAIN DESCRIPTION - DESCRIPTORS
DESCRIPTORS: ACHING
DESCRIPTORS: ACHING
DESCRIPTORS: ACHING;THROBBING
DESCRIPTORS: THROBBING
DESCRIPTORS: ACHING
DESCRIPTORS: ACHING

## 2023-08-28 ASSESSMENT — PAIN - FUNCTIONAL ASSESSMENT
PAIN_FUNCTIONAL_ASSESSMENT: ACTIVITIES ARE NOT PREVENTED

## 2023-08-28 ASSESSMENT — PAIN DESCRIPTION - PAIN TYPE
TYPE: ACUTE PAIN
TYPE: ACUTE PAIN

## 2023-08-28 ASSESSMENT — PAIN DESCRIPTION - ORIENTATION
ORIENTATION: MID;OTHER (COMMENT)
ORIENTATION: MID
ORIENTATION: MID
ORIENTATION: MID;ANTERIOR
ORIENTATION: MID

## 2023-08-28 ASSESSMENT — PAIN SCALES - GENERAL
PAINLEVEL_OUTOF10: 7
PAINLEVEL_OUTOF10: 5
PAINLEVEL_OUTOF10: 10
PAINLEVEL_OUTOF10: 5
PAINLEVEL_OUTOF10: 7

## 2023-08-28 ASSESSMENT — PAIN DESCRIPTION - ONSET
ONSET: ON-GOING
ONSET: ON-GOING

## 2023-08-28 ASSESSMENT — PAIN DESCRIPTION - FREQUENCY
FREQUENCY: CONTINUOUS
FREQUENCY: CONTINUOUS

## 2023-08-28 NOTE — PROGRESS NOTES
[facility]  POST-ROUNDING NOTE    Assessment & Plan:    Called to patient's room to respond to new complaint of a headache. Headache is of pounding quality located behind his eyes bilaterally and forehead which patient believe began \"a few hours ago. \" There is also associated blurry vision. Patient took one PRN tylenal 650 MG orally at 2216. Neuro exam grossly normal, though patient was drowsy which required prompting to evaluate extraocular eye movements. No sensory loss or motor deficits identified. Patient has a speaker and is listening to music which he indicates is helping him relax. Patient denies past history of headaches. Decided on conservative management and ordered cold or warm compress and minimize light exposure given patient's hospitalization for heart failure and low ejection fraction (10-15%). Returned to check on patient at 1:30 AM and explain conservative management for presumed tension headache. Patient found asleep and comfortable. Returned to check on patient at 2 AM and patient still sleeping comfortably.     Nella Pinedo MD, PGY-1  [facility]  8/28/2023, 1:19 AM

## 2023-08-28 NOTE — CONSULTS
Current Facility-Administered Medications   Medication Dose Route Frequency    milrinone (PRIMACOR) 20 mg in dextrose 5 % 100 mL infusion  0.0625-0.75 mcg/kg/min IntraVENous Continuous    magnesium oxide (MAG-OX) tablet 400 mg  400 mg Oral Daily    [Held by provider] torsemide (DEMADEX) tablet 10 mg  10 mg Oral Daily    gabapentin (NEURONTIN) capsule 300 mg  300 mg Oral TID    metoclopramide (REGLAN) tablet 5 mg  5 mg Oral TID AC    pantoprazole (PROTONIX) tablet 40 mg  40 mg Oral QAM AC    polyethylene glycol (GLYCOLAX) packet 17 g  17 g Oral Daily    carvedilol (COREG) tablet 6.25 mg  6.25 mg Oral BID WC    clopidogrel (PLAVIX) tablet 75 mg  75 mg Oral Daily    rosuvastatin (CRESTOR) tablet 20 mg  20 mg Oral Nightly    [Held by provider] sacubitril-valsartan (ENTRESTO) 24-26 MG per tablet 1 tablet  1 tablet Oral Q12H    [Held by provider] spironolactone (ALDACTONE) tablet 12.5 mg  12.5 mg Oral Daily    sodium chloride flush 0.9 % injection 5-40 mL  5-40 mL IntraVENous 2 times per day    sodium chloride flush 0.9 % injection 5-40 mL  5-40 mL IntraVENous PRN    0.9 % sodium chloride infusion   IntraVENous PRN    enoxaparin (LOVENOX) injection 40 mg  40 mg SubCUTAneous Q24H    ondansetron (ZOFRAN-ODT) disintegrating tablet 4 mg  4 mg Oral Q8H PRN    Or    ondansetron (ZOFRAN) injection 4 mg  4 mg IntraVENous Q6H PRN    acetaminophen (TYLENOL) tablet 650 mg  650 mg Oral Q6H PRN    Or    acetaminophen (TYLENOL) suppository 650 mg  650 mg Rectal Q6H PRN        LAB AND IMAGING FINDINGS:     Lab Results   Component Value Date/Time    WBC 7.6 08/28/2023 03:44 AM    HGB 12.5 08/28/2023 03:44 AM     08/28/2023 03:44 AM     Lab Results   Component Value Date/Time     08/28/2023 03:44 AM    K 3.5 08/28/2023 03:44 AM     08/28/2023 03:44 AM    CO2 28 08/28/2023 03:44 AM    BUN 35 08/28/2023 03:44 AM    MG 1.9 08/28/2023 03:44 AM      Lab Results   Component Value Date/Time    GLOB 3.8 08/22/2023 10:53 AM Lab Results   Component Value Date/Time    INR 1.0 04/18/2022 05:35 PM    APTT 27.6 04/18/2022 05:35 PM      No results found for: IRON, TIBC, IBCT, FERR   No results found for: PH, PCO2, PO2  No components found for: [place]   Lab Results   Component Value Date/Time    CKMB 4.1 12/30/2020 03:30 AM    TROPONINI 0.25 11/19/2021 04:15 AM              Total time: 40 minutes

## 2023-08-28 NOTE — PROGRESS NOTES
Attempted to see patient for skilled OT treatment session. First attempt [phone], pt speaking with providers in room, 2nd attempt [phone], pt receiving pain medication from RN d/t increased headache. Will continue to follow and see patient when available/as appropriate.           Thank you,   Paco Sanford MS, OTR/L

## 2023-08-28 NOTE — ACP (ADVANCE CARE PLANNING)
Advance Care Planning       General Advance Care Planning (ACP) Conversation      Date of Conversation: 8/28/2023    Conducted with: Patient, Faye Clayton NP (Palliative) and this writer. Healthcare Decision Maker:    Patient did not have a formal healthcare decision maker document, on file. A new advance medical directive (AMD) was completed, with patient. Patient elected his sister Tessa Wall, CS#[phone]) as his primary healthcare decision maker. Patient given the original and a copy of the AMD. A copy was placed on the chart to be scanned in to the EMR. Content/Action Overview:  Has NO ACP documents/care preferences - requested patient complete ACP documents. A new advance medical directive (AMD) was completed, with patient. Advance Medical Directive (AMD)      Primary Decision Maker (Health Care Agent): Tessa Wall  Relationship to patient: Sister  Phone number: [phone]  [x] Named in a newly created document   [] Legal Next of Kin  [] Guardian    Secondary Decision Maker ([address]): N/A  Relationship to patient:  Phone number:  [] Named in a scanned document   [] Legal Next of Kin  [] Guardian    ACP documents you currently have include:  [x] New created Advance Directive or Living Will  [] Durable Do Not Resuscitate  [] Physician Orders for Scope of Treatment (POST)  [] Medical Power of   [] Other      Length of Voluntary ACP Conversation in minutes:  <16 minutes (Non-Billable)      ----------------------------------------------------------------  Advance Care Planning Conversation     The patient and/or family consented to a voluntary [address] [place] [address] conversation. Individuals present for the conversation: Patient. Outcome of the conversation and documents completed:  This writer, along with Faye Clayton NP, with the Palliative Care team; visited with patient today to offer support and to also discuss healthcare decision makers and goals

## 2023-08-28 NOTE — FLOWSHEET NOTE
Patient noted going into restroom and getting water from the sink. Patient reminded of fluid restriction.

## 2023-08-29 LAB
ANION GAP SERPL CALC-SCNC: 4 MMOL/L (ref 3–18)
BASOPHILS # BLD: 0 K/UL (ref 0–0.1)
BASOPHILS NFR BLD: 1 % (ref 0–2)
BUN SERPL-MCNC: 31 MG/DL (ref 7–18)
BUN/CREAT SERPL: 21 (ref 12–20)
CA-I SERPL-SCNC: 0.98 MMOL/L (ref 1.15–1.33)
CALCIUM SERPL-MCNC: 7.1 MG/DL (ref 8.5–10.1)
CHLORIDE SERPL-SCNC: 104 MMOL/L (ref 100–111)
CO2 SERPL-SCNC: 27 MMOL/L (ref 21–32)
CREAT SERPL-MCNC: 1.47 MG/DL (ref 0.6–1.3)
DIFFERENTIAL METHOD BLD: ABNORMAL
EOSINOPHIL # BLD: 0.3 K/UL (ref 0–0.4)
EOSINOPHIL NFR BLD: 5 % (ref 0–5)
ERYTHROCYTE [DISTWIDTH] IN BLOOD BY AUTOMATED COUNT: 13 % (ref 11.6–14.5)
GLUCOSE SERPL-MCNC: 111 MG/DL (ref 74–99)
HCT VFR BLD AUTO: 38.9 % (ref 36–48)
HGB BLD-MCNC: 12.5 G/DL (ref 13–16)
IMM GRANULOCYTES # BLD AUTO: 0 K/UL (ref 0–0.04)
IMM GRANULOCYTES NFR BLD AUTO: 0 % (ref 0–0.5)
LYMPHOCYTES # BLD: 1.2 K/UL (ref 0.9–3.6)
LYMPHOCYTES NFR BLD: 18 % (ref 21–52)
MAGNESIUM SERPL-MCNC: 1.7 MG/DL (ref 1.6–2.6)
MCH RBC QN AUTO: 30.4 PG (ref 24–34)
MCHC RBC AUTO-ENTMCNC: 32.1 G/DL (ref 31–37)
MCV RBC AUTO: 94.6 FL (ref 78–100)
MONOCYTES # BLD: 0.9 K/UL (ref 0.05–1.2)
MONOCYTES NFR BLD: 14 % (ref 3–10)
NEUTS SEG # BLD: 4.2 K/UL (ref 1.8–8)
NEUTS SEG NFR BLD: 63 % (ref 40–73)
NRBC # BLD: 0 K/UL (ref 0–0.01)
NRBC BLD-RTO: 0 PER 100 WBC
PLATELET # BLD AUTO: 171 K/UL (ref 135–420)
PMV BLD AUTO: 12.2 FL (ref 9.2–11.8)
POTASSIUM SERPL-SCNC: 4 MMOL/L (ref 3.5–5.5)
RBC # BLD AUTO: 4.11 M/UL (ref 4.35–5.65)
SODIUM SERPL-SCNC: 135 MMOL/L (ref 136–145)
WBC # BLD AUTO: 6.7 K/UL (ref 4.6–13.2)

## 2023-08-29 PROCEDURE — 6360000002 HC RX W HCPCS: Performed by: PHYSICIAN ASSISTANT

## 2023-08-29 PROCEDURE — 85025 COMPLETE CBC W/AUTO DIFF WBC: CPT

## 2023-08-29 PROCEDURE — 83735 ASSAY OF MAGNESIUM: CPT

## 2023-08-29 PROCEDURE — 6370000000 HC RX 637 (ALT 250 FOR IP): Performed by: FAMILY MEDICINE

## 2023-08-29 PROCEDURE — 82330 ASSAY OF CALCIUM: CPT

## 2023-08-29 PROCEDURE — 6360000002 HC RX W HCPCS

## 2023-08-29 PROCEDURE — 6360000002 HC RX W HCPCS: Performed by: STUDENT IN AN ORGANIZED HEALTH CARE EDUCATION/TRAINING PROGRAM

## 2023-08-29 PROCEDURE — 94761 N-INVAS EAR/PLS OXIMETRY MLT: CPT

## 2023-08-29 PROCEDURE — 80048 BASIC METABOLIC PNL TOTAL CA: CPT

## 2023-08-29 PROCEDURE — 99232 SBSQ HOSP IP/OBS MODERATE 35: CPT | Performed by: NURSE PRACTITIONER

## 2023-08-29 PROCEDURE — 36415 COLL VENOUS BLD VENIPUNCTURE: CPT

## 2023-08-29 PROCEDURE — 6370000000 HC RX 637 (ALT 250 FOR IP): Performed by: PHYSICIAN ASSISTANT

## 2023-08-29 PROCEDURE — 6370000000 HC RX 637 (ALT 250 FOR IP)

## 2023-08-29 PROCEDURE — 6370000000 HC RX 637 (ALT 250 FOR IP): Performed by: STUDENT IN AN ORGANIZED HEALTH CARE EDUCATION/TRAINING PROGRAM

## 2023-08-29 PROCEDURE — 2580000003 HC RX 258: Performed by: STUDENT IN AN ORGANIZED HEALTH CARE EDUCATION/TRAINING PROGRAM

## 2023-08-29 PROCEDURE — 1100000003 HC PRIVATE W/ TELEMETRY

## 2023-08-29 RX ORDER — MAGNESIUM SULFATE 1 G/100ML
1000 INJECTION INTRAVENOUS ONCE
Status: COMPLETED | OUTPATIENT
Start: 2023-08-29 | End: 2023-08-29

## 2023-08-29 RX ORDER — BUTALBITAL, ACETAMINOPHEN AND CAFFEINE 300; 40; 50 MG/1; MG/1; MG/1
1 CAPSULE ORAL ONCE
Status: COMPLETED | OUTPATIENT
Start: 2023-08-29 | End: 2023-08-29

## 2023-08-29 RX ORDER — ACETAMINOPHEN 650 MG/1
650 SUPPOSITORY RECTAL EVERY 6 HOURS PRN
Status: DISCONTINUED | OUTPATIENT
Start: 2023-08-29 | End: 2023-08-29

## 2023-08-29 RX ORDER — ACETAMINOPHEN 500 MG
1000 TABLET ORAL EVERY 6 HOURS PRN
Status: DISCONTINUED | OUTPATIENT
Start: 2023-08-29 | End: 2023-08-29

## 2023-08-29 RX ORDER — FUROSEMIDE 10 MG/ML
40 INJECTION INTRAMUSCULAR; INTRAVENOUS ONCE
Status: COMPLETED | OUTPATIENT
Start: 2023-08-29 | End: 2023-08-29

## 2023-08-29 RX ORDER — METHOCARBAMOL 750 MG/1
750 TABLET, FILM COATED ORAL 3 TIMES DAILY
Status: DISCONTINUED | OUTPATIENT
Start: 2023-08-29 | End: 2023-08-29

## 2023-08-29 RX ADMIN — GABAPENTIN 300 MG: 300 CAPSULE ORAL at 15:33

## 2023-08-29 RX ADMIN — ENOXAPARIN SODIUM 40 MG: 100 INJECTION SUBCUTANEOUS at 15:33

## 2023-08-29 RX ADMIN — CARVEDILOL 6.25 MG: 6.25 TABLET, FILM COATED ORAL at 16:49

## 2023-08-29 RX ADMIN — FUROSEMIDE 40 MG: 10 INJECTION, SOLUTION INTRAMUSCULAR; INTRAVENOUS at 09:20

## 2023-08-29 RX ADMIN — SODIUM CHLORIDE, PRESERVATIVE FREE 5 ML: 5 INJECTION INTRAVENOUS at 09:22

## 2023-08-29 RX ADMIN — GABAPENTIN 300 MG: 300 CAPSULE ORAL at 09:20

## 2023-08-29 RX ADMIN — MAGNESIUM SULFATE HEPTAHYDRATE 1000 MG: 1 INJECTION, SOLUTION INTRAVENOUS at 18:12

## 2023-08-29 RX ADMIN — PANTOPRAZOLE SODIUM 40 MG: 40 TABLET, DELAYED RELEASE ORAL at 06:10

## 2023-08-29 RX ADMIN — ROSUVASTATIN CALCIUM 20 MG: 20 TABLET, FILM COATED ORAL at 22:08

## 2023-08-29 RX ADMIN — METHOCARBAMOL TABLETS 750 MG: 750 TABLET, COATED ORAL at 09:25

## 2023-08-29 RX ADMIN — GABAPENTIN 300 MG: 300 CAPSULE ORAL at 22:08

## 2023-08-29 RX ADMIN — CARVEDILOL 6.25 MG: 6.25 TABLET, FILM COATED ORAL at 09:25

## 2023-08-29 RX ADMIN — SODIUM CHLORIDE, PRESERVATIVE FREE 10 ML: 5 INJECTION INTRAVENOUS at 22:11

## 2023-08-29 RX ADMIN — CLOPIDOGREL BISULFATE 75 MG: 75 TABLET, FILM COATED ORAL at 09:20

## 2023-08-29 RX ADMIN — METOCLOPRAMIDE 5 MG: 5 TABLET ORAL at 06:11

## 2023-08-29 RX ADMIN — Medication 400 MG: at 09:20

## 2023-08-29 RX ADMIN — BUTALBITA,ACETAMINOPHEN AND CAFFEINE 1 CAPSULE: 50; 300; 40 CAPSULE ORAL at 11:37

## 2023-08-29 RX ADMIN — SACUBITRIL AND VALSARTAN 1 TABLET: 24; 26 TABLET, FILM COATED ORAL at 22:08

## 2023-08-29 ASSESSMENT — PAIN SCALES - GENERAL
PAINLEVEL_OUTOF10: 8
PAINLEVEL_OUTOF10: 0
PAINLEVEL_OUTOF10: 8
PAINLEVEL_OUTOF10: 6
PAINLEVEL_OUTOF10: 0

## 2023-08-29 ASSESSMENT — PAIN DESCRIPTION - LOCATION
LOCATION: HEAD

## 2023-08-29 ASSESSMENT — PAIN - FUNCTIONAL ASSESSMENT
PAIN_FUNCTIONAL_ASSESSMENT: PREVENTS OR INTERFERES SOME ACTIVE ACTIVITIES AND ADLS
PAIN_FUNCTIONAL_ASSESSMENT: PREVENTS OR INTERFERES SOME ACTIVE ACTIVITIES AND ADLS
PAIN_FUNCTIONAL_ASSESSMENT: ACTIVITIES ARE NOT PREVENTED

## 2023-08-29 ASSESSMENT — PAIN DESCRIPTION - DESCRIPTORS
DESCRIPTORS: ACHING

## 2023-08-29 ASSESSMENT — PAIN DESCRIPTION - ORIENTATION: ORIENTATION: ANTERIOR

## 2023-08-29 NOTE — PLAN OF CARE
Problem: Discharge Planning  Goal: Discharge to home or other facility with appropriate resources  Outcome: Progressing  Flowsheets (Taken 8/29/2023 0920)  Discharge to home or other facility with appropriate resources: Identify barriers to discharge with patient and caregiver     Problem: Safety - Adult  Goal: Free from fall injury  Outcome: Progressing     Problem: Pain  Goal: Verbalizes/displays adequate comfort level or baseline comfort level  Outcome: Progressing     Problem: Chronic Conditions and Co-morbidities  Goal: Patient's chronic conditions and co-morbidity symptoms are monitored and maintained or improved  Outcome: Progressing  Flowsheets (Taken 8/29/2023 0920)  Care Plan - Patient's Chronic Conditions and Co-Morbidity Symptoms are Monitored and Maintained or Improved: Monitor and assess patient's chronic conditions and comorbid symptoms for stability, deterioration, or improvement

## 2023-08-29 NOTE — PROGRESS NOTES
[address]: [phone] ([id number])  [facility]: [address] [place] [address] Se: [phone]    Patient Name: Yossi Whiteside  YOB: 1960    Date of Initial Consult: 8/28/2023  Follow up 8/29/2023    Reason for Consult: goals of care   Requesting Provider: Dr Td Esquivel   Primary Care Physician: Miguel Ángel Feliciano MD      SUMMARY:   Yossi Whiteside is a 60y.o. year old with a past history of HFrEF, hypertension, CAD, peripheral neuropathy, chronic kidney disease stage III who was admitted on 8/22/2023 from home with a diagnosis of CHF exacerbation likely due to  non adherence to medication. Current medical issues leading to Palliative Medicine involvement include: 70-year-old gentleman who has an EF of 10 to 15% with an AICD. Presents with CHF exacerbation. Palliative medicine is consulted for goals of care discussions. 8/29/2023 I bed holding his head. Answers simple questions but complains of headache and not wanting to answer questions due to his headache. BSRN present and medicating with Robaxin. PALLIATIVE DIAGNOSES:   Goals of care/advanced care plan discussions  CHF exacerbation  Headache  Shortness of breath       PLAN:   Goals of care / advanced care plan discussions   8/29/2023 follow up along with Mr Rell Rangel, MSW. Patient lying on his side in bed he will alert to his name. Answer simple questions. However tells us he still has a severe headache. Still denies nausea. Breakfast tray at bedside he ate most of his waffle. We again tried to engage conversation on goals of care and shared we have some important questions to discuss, he promptly \" said not today my head hurts too bad. \"  Bedside RN present and is medicating with Robaxin p.o. unfortunately patient still does not engage with our team to discuss goals of care. We called his Sister Gage Narayanan she tells us prior to hospital, patient was engaging lived by himself.

## 2023-08-29 NOTE — PROGRESS NOTES
she said could be the reason patient does not want to talk to staff. Roula Georges stated that if the medical team had anymore questions; feel free to call her. Zhou Vasques., MSW  Palliative Care   WK#[phone]

## 2023-08-29 NOTE — PROGRESS NOTES
Comprehensive Nutrition Assessment    Type and Reason for Visit:  Initial, RD Nutrition Re-Screen/LOS    Nutrition Recommendations/Plan:   Continue current diet. Monitor PO intake, labs, weight, and POC while admitted. Malnutrition Assessment:  Malnutrition Status:  No malnutrition (08/29/23 1110)    Context:  Acute Illness       Nutrition History and Allergies: PMHx: HFrEF, HTN, CAD, HLD, peripheral neuropathy, CKD3,   Wt hx:c wt- 160.5 lb; 10/12/ lb (-6.7%); 8/14/22- 173 lb (-7.2%). no significant wt loss noted x >1 year PTA, per limited EMR review. Noted pt - 10 lbs since admission, also -13.8 L of fluid since admission, per I/O documentation. NKFA. Nutrition Assessment:    Pt admitted for management of acute exacerbation of HFrEF, pulmonary edema cardiac cause noted. Minimal PO documentation shows meal intake % x 3 entries. Attempted to visit pt- was asleep on first attempt, other members of care harris in room on second attempt. Left handout in room regarding fluid restriction. Will attempt to address at follow-up. Nutrition Related Findings:    Last BM: 8/26. Output: 1750 mL (urine) x 24-hrs. Note pt -13.8 L since admit. Edema: none. Labs: Na 135 L, BUN/Cr 31/1.47 H, GFR 53 L, Ca 7.1 L. Pertinent meds: magnesium-oxide, reglan, Protonix. Wound Type: None       Current Nutrition Intake & Therapies:    Average Meal Intake: %  Average Supplements Intake: None Ordered  ADULT DIET; Regular; Low Fat/Low Chol/High Fiber/LEELEE; 1500 ml    Anthropometric Measures:  Height: 6' (182.9 cm)  Ideal Body Weight (IBW): 178 lbs (81 kg)    Admission Body Weight: 170 lb (77.1 kg)  Current Body Weight: 160 lb 8 oz (72.8 kg), 90.2 % IBW. Weight Source: Bed Scale  Current BMI (kg/m2): 21.8  Usual Body Weight: 172 lb (78 kg) (10/12/22)  % Weight Change (Calculated): -6.7  Weight Adjustment For: No Adjustment  BMI Categories: Normal Weight (BMI 18.5-24. 9)    Estimated Daily Nutrient Needs:  Energy

## 2023-08-30 LAB
ALBUMIN SERPL-MCNC: 2.4 G/DL (ref 3.4–5)
ALBUMIN/GLOB SERPL: 0.6 (ref 0.8–1.7)
ALP SERPL-CCNC: 102 U/L (ref 45–117)
ALT SERPL-CCNC: 24 U/L (ref 16–61)
ANION GAP SERPL CALC-SCNC: 7 MMOL/L (ref 3–18)
AST SERPL-CCNC: 26 U/L (ref 10–38)
BASOPHILS # BLD: 0.1 K/UL (ref 0–0.1)
BASOPHILS NFR BLD: 1 % (ref 0–2)
BILIRUB SERPL-MCNC: 1 MG/DL (ref 0.2–1)
BUN SERPL-MCNC: 32 MG/DL (ref 7–18)
BUN/CREAT SERPL: 23 (ref 12–20)
CALCIUM SERPL-MCNC: 7.2 MG/DL (ref 8.5–10.1)
CHLORIDE SERPL-SCNC: 102 MMOL/L (ref 100–111)
CO2 SERPL-SCNC: 26 MMOL/L (ref 21–32)
CREAT SERPL-MCNC: 1.38 MG/DL (ref 0.6–1.3)
DIFFERENTIAL METHOD BLD: ABNORMAL
EOSINOPHIL # BLD: 0.3 K/UL (ref 0–0.4)
EOSINOPHIL NFR BLD: 4 % (ref 0–5)
ERYTHROCYTE [DISTWIDTH] IN BLOOD BY AUTOMATED COUNT: 13 % (ref 11.6–14.5)
GLOBULIN SER CALC-MCNC: 3.7 G/DL (ref 2–4)
GLUCOSE SERPL-MCNC: 83 MG/DL (ref 74–99)
HCT VFR BLD AUTO: 41.1 % (ref 36–48)
HGB BLD-MCNC: 13.5 G/DL (ref 13–16)
IMM GRANULOCYTES # BLD AUTO: 0 K/UL (ref 0–0.04)
IMM GRANULOCYTES NFR BLD AUTO: 0 % (ref 0–0.5)
LYMPHOCYTES # BLD: 1.5 K/UL (ref 0.9–3.6)
LYMPHOCYTES NFR BLD: 19 % (ref 21–52)
MAGNESIUM SERPL-MCNC: 2.2 MG/DL (ref 1.6–2.6)
MCH RBC QN AUTO: 31.1 PG (ref 24–34)
MCHC RBC AUTO-ENTMCNC: 32.8 G/DL (ref 31–37)
MCV RBC AUTO: 94.7 FL (ref 78–100)
MONOCYTES # BLD: 1 K/UL (ref 0.05–1.2)
MONOCYTES NFR BLD: 13 % (ref 3–10)
NEUTS SEG # BLD: 4.9 K/UL (ref 1.8–8)
NEUTS SEG NFR BLD: 63 % (ref 40–73)
NRBC # BLD: 0 K/UL (ref 0–0.01)
NRBC BLD-RTO: 0 PER 100 WBC
PLATELET # BLD AUTO: 198 K/UL (ref 135–420)
PMV BLD AUTO: 12.6 FL (ref 9.2–11.8)
POTASSIUM SERPL-SCNC: 4.4 MMOL/L (ref 3.5–5.5)
PROT SERPL-MCNC: 6.1 G/DL (ref 6.4–8.2)
RBC # BLD AUTO: 4.34 M/UL (ref 4.35–5.65)
SODIUM SERPL-SCNC: 135 MMOL/L (ref 136–145)
WBC # BLD AUTO: 7.8 K/UL (ref 4.6–13.2)

## 2023-08-30 PROCEDURE — 80053 COMPREHEN METABOLIC PANEL: CPT

## 2023-08-30 PROCEDURE — 97535 SELF CARE MNGMENT TRAINING: CPT

## 2023-08-30 PROCEDURE — 6370000000 HC RX 637 (ALT 250 FOR IP): Performed by: STUDENT IN AN ORGANIZED HEALTH CARE EDUCATION/TRAINING PROGRAM

## 2023-08-30 PROCEDURE — 85025 COMPLETE CBC W/AUTO DIFF WBC: CPT

## 2023-08-30 PROCEDURE — 97530 THERAPEUTIC ACTIVITIES: CPT

## 2023-08-30 PROCEDURE — 2580000003 HC RX 258: Performed by: STUDENT IN AN ORGANIZED HEALTH CARE EDUCATION/TRAINING PROGRAM

## 2023-08-30 PROCEDURE — 83735 ASSAY OF MAGNESIUM: CPT

## 2023-08-30 PROCEDURE — 6370000000 HC RX 637 (ALT 250 FOR IP)

## 2023-08-30 PROCEDURE — 97168 OT RE-EVAL EST PLAN CARE: CPT

## 2023-08-30 PROCEDURE — 6370000000 HC RX 637 (ALT 250 FOR IP): Performed by: FAMILY MEDICINE

## 2023-08-30 PROCEDURE — 1100000003 HC PRIVATE W/ TELEMETRY

## 2023-08-30 PROCEDURE — 94761 N-INVAS EAR/PLS OXIMETRY MLT: CPT

## 2023-08-30 PROCEDURE — 6370000000 HC RX 637 (ALT 250 FOR IP): Performed by: PHYSICIAN ASSISTANT

## 2023-08-30 PROCEDURE — 36415 COLL VENOUS BLD VENIPUNCTURE: CPT

## 2023-08-30 PROCEDURE — 6360000002 HC RX W HCPCS: Performed by: STUDENT IN AN ORGANIZED HEALTH CARE EDUCATION/TRAINING PROGRAM

## 2023-08-30 RX ORDER — TORSEMIDE 20 MG/1
20 TABLET ORAL DAILY
Status: DISCONTINUED | OUTPATIENT
Start: 2023-08-31 | End: 2023-08-31 | Stop reason: HOSPADM

## 2023-08-30 RX ADMIN — ROSUVASTATIN CALCIUM 20 MG: 20 TABLET, FILM COATED ORAL at 21:19

## 2023-08-30 RX ADMIN — TORSEMIDE 10 MG: 10 TABLET ORAL at 08:28

## 2023-08-30 RX ADMIN — METOCLOPRAMIDE 5 MG: 5 TABLET ORAL at 06:25

## 2023-08-30 RX ADMIN — CLOPIDOGREL BISULFATE 75 MG: 75 TABLET, FILM COATED ORAL at 08:28

## 2023-08-30 RX ADMIN — SPIRONOLACTONE 12.5 MG: 25 TABLET ORAL at 08:28

## 2023-08-30 RX ADMIN — METOCLOPRAMIDE 5 MG: 5 TABLET ORAL at 11:27

## 2023-08-30 RX ADMIN — SODIUM CHLORIDE, PRESERVATIVE FREE 10 ML: 5 INJECTION INTRAVENOUS at 08:30

## 2023-08-30 RX ADMIN — GABAPENTIN 300 MG: 300 CAPSULE ORAL at 14:34

## 2023-08-30 RX ADMIN — ENOXAPARIN SODIUM 40 MG: 100 INJECTION SUBCUTANEOUS at 14:34

## 2023-08-30 RX ADMIN — Medication 400 MG: at 08:28

## 2023-08-30 RX ADMIN — GABAPENTIN 300 MG: 300 CAPSULE ORAL at 21:19

## 2023-08-30 RX ADMIN — GABAPENTIN 300 MG: 300 CAPSULE ORAL at 08:28

## 2023-08-30 RX ADMIN — SODIUM CHLORIDE, PRESERVATIVE FREE 10 ML: 5 INJECTION INTRAVENOUS at 21:19

## 2023-08-30 RX ADMIN — CARVEDILOL 6.25 MG: 6.25 TABLET, FILM COATED ORAL at 08:28

## 2023-08-30 RX ADMIN — CARVEDILOL 6.25 MG: 6.25 TABLET, FILM COATED ORAL at 17:47

## 2023-08-30 RX ADMIN — PANTOPRAZOLE SODIUM 40 MG: 40 TABLET, DELAYED RELEASE ORAL at 06:25

## 2023-08-30 RX ADMIN — SACUBITRIL AND VALSARTAN 1 TABLET: 24; 26 TABLET, FILM COATED ORAL at 08:28

## 2023-08-30 NOTE — CARE COORDINATION
CM met with the patient. Discharge plan remains home with home health [facility] for CHF). Patient has ride home. Patient needs prescription for all meds sent to [facility] on [address].      JERICHO Gregory, RN  Care Management  Phone: [phone]

## 2023-08-30 NOTE — PLAN OF CARE
Problem: Occupational Therapy - Adult  Goal: By Discharge: Performs self-care activities at highest level of function for planned discharge setting. See evaluation for individualized goals. Description: Occupational Therapy Goals:  Initiated 8/23/2023 to be met within 7-10 days. 1.  Patient will perform grooming task standing at sink > 5 minutes with modified independence, G balance. 2.  Patient will perform lower body dressing with modified independence. 3.  Patient will perform toilet transfers with modified independence. 4.  Patient will perform all aspects of toileting with modified independence. 5.  Patient will participate in upper extremity therapeutic exercise/activities with modified independence for 8-10 minutes to increase ROM/strength for ADLs. 6.  Patient will utilize energy conservation techniques during functional activities with verbal cues. PLOF: Patient was independent with self-care and functional mobility PTA. Outcome: Adequate for Discharge  OCCUPATIONAL THERAPY RE-EVALUATION/DISCHARGE    Patient: Yobani Soto (28 y.o. male)  Date: 8/30/2023  Primary Diagnosis: Shortness of breath [R06.02]  Pulmonary edema cardiac cause (HCC) [I50.1]  Non compliance w medication regimen [Z91.148]  Chest pain, unspecified type [R07.9]  Acute HFrEF (heart failure with reduced ejection fraction) ([facility]) [I50.21]       Precautions: General Precautions    ASSESSMENT AND RECOMMENDATIONS:    Pt is sitting in recliner upon entry, initially declining OT re-evaluation, agreeable with encouragement. Pt demos improvement with functional mobility and ADLs as compared to initial evaluation, able to perform/simulate all seated and standing ADLs with Md Gonzales. Pt reports fatigue with activity.  Pt educated on mult energy conservation techniques to utilize in home environment and while at the hospital, including pacing and deep breathing to prevent SOB and fatigue, and increase activity tolerance and safety accessible  Home Equipment: None  Receives Help From: Friend(s), Family  ADL Assistance: Independent  Ambulation Assistance: Independent  Transfer Assistance: Independent  Active : Yes  Occupation: Full time employment  []  Right hand dominant   []  Left hand dominant    Cognitive/Behavioral Status:  Orientation  Overall Orientation Status: Within Functional Limits  Orientation Level: Oriented X4  Cognition  Overall Cognitive Status: WFL    Skin: visible skin intact  Edema: none noted    Vision/Perceptual:         WFL    Coordination: BUE  Coordination: Within functional limits   Balance:     Balance  Sitting: Intact  Standing: Impaired  Standing - Static: Good  Standing - Dynamic: Good    Strength: BUE  Strength: Generally decreased, functional    Tone & Sensation: BUE  Tone: Normal  Sensation: Impaired    Range of Motion: BUE  AROM: Within functional limits   Functional Mobility and Transfers for ADLs:  Bed Mobility:     Bed Mobility Training  Bed Mobility Training: No  Transfers:   Transfer Training  Transfer Training: Yes  Sit to Stand: Modified independent  Stand to Sit: Modified independent  Toilet Transfer: Modified independent    ADL Assessment:   Feeding: Independent  Grooming: Modified independent   UE Bathing: Modified independent   LE Bathing: Modified independent   UE Dressing: Modified independent   LE Dressing: Modified independent   Toileting: Modified independent     ADL Intervention:  Education on EC techniques with ADLs and IADLs. Issued reacher and educated on use for [facility] with items retrieval for ADLs  Issued EC techniques handout and reviewed with pt to maximize carryover of all education to home environment. Pain:  Pain level pre-treatment: not rated  Pain level post-treatment: not rated    Activity Tolerance:   Activity Tolerance: Patient limited by fatigue  Please refer to the flowsheet for vital signs taken during this treatment.     After treatment:   [x] Patient left in no apparent

## 2023-08-30 NOTE — PLAN OF CARE
Problem: Physical Therapy - Adult  Goal: By Discharge: Performs mobility at highest level of function for planned discharge setting. See evaluation for individualized goals. Description: Initiated  8/23/23  to be met within 7-10 days. 1.  Patient will move from supine to sit and sit to supine , scoot up and down, and roll side to side in bed with modified independence. 2.  Patient will transfer from bed to chair and chair to bed with modified independence using the least restrictive device. 3.  Patient will perform sit to stand with modified independence. 4.  Patient will ambulate with modified independence for 150 feet with the least restrictive device. 5.  Patient will ascend/descend 3 stairs with 1 handrail(s) with supervision/set-up. PLOF: pt lives alone in a 1 SH, ind PTA    Outcome: Adequate for Discharge     PHYSICAL THERAPY TREATMENT/DISCHARGE    Patient: Steven Mauro (32 y.o. male)  Date: 8/30/2023  Diagnosis: Shortness of breath [R06.02]  Pulmonary edema cardiac cause ([address]) [I50.1]  Non compliance w medication regimen [Z91.148]  Chest pain, unspecified type [R07.9]  Acute HFrEF (heart failure with reduced ejection fraction) ([facility]) [I50.21] Pulmonary edema cardiac cause ([address])      Precautions: General Precautions    ASSESSMENT:  Patient received in bed, alert, and agreeable to PT treatment. He has been getting out of bed on his own and denies SOB with mobility. Patient demonstrates mod I with functional mobility today. He ambulates in the room with steady gait. He denies to ambulate in the hallway d/t not shaven appearance. Patient is at his baseline level of mobility and therefore further skilled PT not indicated. PLAN:  Maximum therapeutic gains met at current level of care and patient will be discharged from physical therapy at this time.   Rationale for discharge:  [x]     Goals Achieved  []     [address]  []     Patient not participating in therapy  []

## 2023-08-30 NOTE — PROGRESS NOTES
[address] [facility]  DAILY PROGRESS NOTE      Patient:    Lauro De La Cruz , 60 y.o. male   MRN:  [id number]  Room/Bed:  209/01  Admission Date:   8/22/2023  Code status:  Full Code    Reason for Admission: CHF exacerbation  Lauro De La Cruz is a 60y.o. year old male with history of HFrEF, hypertension, CAD, hyperlipidemia, peripheral neuropathy, CKD 3 who presented to [facility] with approximately 1 week of increased fatigue, dyspnea, cough and decreased appetite. Symptoms most consistent with an acute exacerbation of his HFrEF secondary to medication non-adherence or running out of his medications. He was started on milrinone drip by cardiology after adequate diuresis due to ongoing symptoms. Discontinued milrinone drip 8/29 and will transition to oral torsemide 8/30. Resumed Entresto on 8/29 and resumed spironolactone on 8/30. ASSESSMENT AND PLAN:   Problem List Items Addressed This Visit          Circulatory    CHF (congestive heart failure) ([facility])    Relevant Medications    torsemide (DEMADEX) tablet 10 mg    sacubitril-valsartan (ENTRESTO) 24-26 MG per tablet 1 tablet    * (Principal) Pulmonary edema cardiac cause ([facility])    Relevant Orders    Transthoracic echocardiogram (TTE) limited with contrast, bubble, strain, and 3D PRN (Completed)    Acute HFrEF (heart failure with reduced ejection fraction) ([facility])    Relevant Orders    Transthoracic echocardiogram (TTE) limited with contrast, bubble, strain, and 3D PRN (Completed)       Other    Shortness of breath - Primary     Other Visit Diagnoses       Chest pain, unspecified type        Non compliance w medication regimen                Acute HFrEF (10-15%), ischemic cardiomyopathy with AICD ([employer])  Non-Sustained Monomorphic V Tach (6-11 beat runs)  Hypoxia requiring supplemental oxygen, resolved  Elevated proBNP  Coronary artery disease s/p PCI Feb 2022 (SHIRA to RCA)  Hypertension  Hyperlipidemia  - Resolution of crackles in lower lung bases.  No further

## 2023-08-30 NOTE — PROGRESS NOTES
[facility]  POST-ROUNDING NOTE    Assessment & Plan:    CHF Exacerbation  - Gave torsemide 10 mg PO this morning  - Will increase to torsemide 20 mg PO tomorrow morning then discharge  - No indication for anticoagulation per cardiology    Headache  - Will discontinue metoclopramide given no indication to continue (pt denies nausea)    The above patient and plan were discussed with my supervising physician. See daily progress note for full assessment/plan.       Hubert Osman MD, PGY-1  [facility]  8/30/2023, 12:25 PM

## 2023-08-31 ENCOUNTER — HOME HEALTH ADMISSION (OUTPATIENT)
Age: 63
End: 2023-08-31

## 2023-08-31 VITALS
BODY MASS INDEX: 21.17 KG/M2 | RESPIRATION RATE: 18 BRPM | TEMPERATURE: 97.7 F | SYSTOLIC BLOOD PRESSURE: 126 MMHG | HEIGHT: 72 IN | HEART RATE: 87 BPM | WEIGHT: 156.31 LBS | OXYGEN SATURATION: 98 % | DIASTOLIC BLOOD PRESSURE: 74 MMHG

## 2023-08-31 LAB
ALBUMIN SERPL-MCNC: 2.3 G/DL (ref 3.4–5)
ALBUMIN/GLOB SERPL: 0.7 (ref 0.8–1.7)
ALP SERPL-CCNC: 90 U/L (ref 45–117)
ALT SERPL-CCNC: 27 U/L (ref 16–61)
ANION GAP SERPL CALC-SCNC: 8 MMOL/L (ref 3–18)
AST SERPL-CCNC: 26 U/L (ref 10–38)
BASOPHILS # BLD: 0.1 K/UL (ref 0–0.1)
BASOPHILS NFR BLD: 1 % (ref 0–2)
BILIRUB SERPL-MCNC: 1 MG/DL (ref 0.2–1)
BUN SERPL-MCNC: 33 MG/DL (ref 7–18)
BUN/CREAT SERPL: 27 (ref 12–20)
CALCIUM SERPL-MCNC: 7.3 MG/DL (ref 8.5–10.1)
CHLORIDE SERPL-SCNC: 105 MMOL/L (ref 100–111)
CO2 SERPL-SCNC: 24 MMOL/L (ref 21–32)
CREAT SERPL-MCNC: 1.23 MG/DL (ref 0.6–1.3)
DIFFERENTIAL METHOD BLD: ABNORMAL
EOSINOPHIL # BLD: 0.3 K/UL (ref 0–0.4)
EOSINOPHIL NFR BLD: 5 % (ref 0–5)
ERYTHROCYTE [DISTWIDTH] IN BLOOD BY AUTOMATED COUNT: 13.1 % (ref 11.6–14.5)
GLOBULIN SER CALC-MCNC: 3.5 G/DL (ref 2–4)
GLUCOSE SERPL-MCNC: 89 MG/DL (ref 74–99)
HCT VFR BLD AUTO: 43.5 % (ref 36–48)
HGB BLD-MCNC: 14.3 G/DL (ref 13–16)
IMM GRANULOCYTES # BLD AUTO: 0 K/UL (ref 0–0.04)
IMM GRANULOCYTES NFR BLD AUTO: 1 % (ref 0–0.5)
LYMPHOCYTES # BLD: 1.5 K/UL (ref 0.9–3.6)
LYMPHOCYTES NFR BLD: 25 % (ref 21–52)
MAGNESIUM SERPL-MCNC: 2.1 MG/DL (ref 1.6–2.6)
MCH RBC QN AUTO: 31.3 PG (ref 24–34)
MCHC RBC AUTO-ENTMCNC: 32.9 G/DL (ref 31–37)
MCV RBC AUTO: 95.2 FL (ref 78–100)
MONOCYTES # BLD: 0.7 K/UL (ref 0.05–1.2)
MONOCYTES NFR BLD: 11 % (ref 3–10)
NEUTS SEG # BLD: 3.5 K/UL (ref 1.8–8)
NEUTS SEG NFR BLD: 58 % (ref 40–73)
NRBC # BLD: 0 K/UL (ref 0–0.01)
NRBC BLD-RTO: 0 PER 100 WBC
PLATELET # BLD AUTO: 213 K/UL (ref 135–420)
PMV BLD AUTO: 12.5 FL (ref 9.2–11.8)
POTASSIUM SERPL-SCNC: 4.5 MMOL/L (ref 3.5–5.5)
PROT SERPL-MCNC: 5.8 G/DL (ref 6.4–8.2)
RBC # BLD AUTO: 4.57 M/UL (ref 4.35–5.65)
SODIUM SERPL-SCNC: 137 MMOL/L (ref 136–145)
WBC # BLD AUTO: 6 K/UL (ref 4.6–13.2)

## 2023-08-31 PROCEDURE — 85025 COMPLETE CBC W/AUTO DIFF WBC: CPT

## 2023-08-31 PROCEDURE — 94761 N-INVAS EAR/PLS OXIMETRY MLT: CPT

## 2023-08-31 PROCEDURE — 6370000000 HC RX 637 (ALT 250 FOR IP): Performed by: FAMILY MEDICINE

## 2023-08-31 PROCEDURE — 2580000003 HC RX 258: Performed by: STUDENT IN AN ORGANIZED HEALTH CARE EDUCATION/TRAINING PROGRAM

## 2023-08-31 PROCEDURE — 83735 ASSAY OF MAGNESIUM: CPT

## 2023-08-31 PROCEDURE — 80053 COMPREHEN METABOLIC PANEL: CPT

## 2023-08-31 PROCEDURE — 36415 COLL VENOUS BLD VENIPUNCTURE: CPT

## 2023-08-31 PROCEDURE — 6370000000 HC RX 637 (ALT 250 FOR IP): Performed by: PHYSICIAN ASSISTANT

## 2023-08-31 PROCEDURE — 6370000000 HC RX 637 (ALT 250 FOR IP): Performed by: STUDENT IN AN ORGANIZED HEALTH CARE EDUCATION/TRAINING PROGRAM

## 2023-08-31 PROCEDURE — 99233 SBSQ HOSP IP/OBS HIGH 50: CPT | Performed by: NURSE PRACTITIONER

## 2023-08-31 PROCEDURE — 6370000000 HC RX 637 (ALT 250 FOR IP)

## 2023-08-31 RX ORDER — CARVEDILOL 6.25 MG/1
6.25 TABLET ORAL 2 TIMES DAILY WITH MEALS
Qty: 60 TABLET | Refills: 3 | Status: SHIPPED | OUTPATIENT
Start: 2023-08-31

## 2023-08-31 RX ORDER — GABAPENTIN 300 MG/1
300 CAPSULE ORAL 3 TIMES DAILY
Qty: 90 CAPSULE | Refills: 0 | Status: SHIPPED | OUTPATIENT
Start: 2023-08-31 | End: 2023-09-30

## 2023-08-31 RX ORDER — PANTOPRAZOLE SODIUM 40 MG/1
40 TABLET, DELAYED RELEASE ORAL
Qty: 30 TABLET | Refills: 0 | Status: SHIPPED | OUTPATIENT
Start: 2023-09-01 | End: 2023-10-01

## 2023-08-31 RX ORDER — SPIRONOLACTONE 25 MG/1
12.5 TABLET ORAL DAILY
Qty: 30 TABLET | Refills: 3 | Status: SHIPPED | OUTPATIENT
Start: 2023-08-31

## 2023-08-31 RX ORDER — TORSEMIDE 20 MG/1
20 TABLET ORAL DAILY
Qty: 30 TABLET | Refills: 3 | Status: SHIPPED | OUTPATIENT
Start: 2023-09-01

## 2023-08-31 RX ORDER — CLOPIDOGREL BISULFATE 75 MG/1
75 TABLET ORAL DAILY
Qty: 30 TABLET | Refills: 3 | Status: SHIPPED | OUTPATIENT
Start: 2023-08-31

## 2023-08-31 RX ORDER — ROSUVASTATIN CALCIUM 20 MG/1
20 TABLET, COATED ORAL NIGHTLY
Qty: 30 TABLET | Refills: 3 | Status: SHIPPED | OUTPATIENT
Start: 2023-08-31

## 2023-08-31 RX ADMIN — CLOPIDOGREL BISULFATE 75 MG: 75 TABLET, FILM COATED ORAL at 09:15

## 2023-08-31 RX ADMIN — TORSEMIDE 20 MG: 20 TABLET ORAL at 09:13

## 2023-08-31 RX ADMIN — PANTOPRAZOLE SODIUM 40 MG: 40 TABLET, DELAYED RELEASE ORAL at 06:00

## 2023-08-31 RX ADMIN — SODIUM CHLORIDE, PRESERVATIVE FREE 10 ML: 5 INJECTION INTRAVENOUS at 09:15

## 2023-08-31 RX ADMIN — SPIRONOLACTONE 12.5 MG: 25 TABLET ORAL at 09:13

## 2023-08-31 RX ADMIN — CARVEDILOL 6.25 MG: 6.25 TABLET, FILM COATED ORAL at 09:13

## 2023-08-31 RX ADMIN — GABAPENTIN 300 MG: 300 CAPSULE ORAL at 09:15

## 2023-08-31 RX ADMIN — Medication 400 MG: at 09:15

## 2023-08-31 NOTE — PROGRESS NOTES
Tele and IV removed. Belongings packed. AVS and prescriptions reviewed with pt, no further questions at this time. RN transported pt to main entrance for ride home with friend.

## 2023-08-31 NOTE — ACP (ADVANCE CARE PLANNING)
Advance Care Planning     General Advance Care Planning (ACP) Conversation    Date of Conversation: 8/31/2023   Conducted with: Patient with Decision Making Capacity    Healthcare Decision Maker:    Primary Decision Maker: Ivon Daniel (Sister) - Brother/Sister - [phone]  Click here to complete Healthcare Decision Makers including selection of the Healthcare Decision Maker Relationship (ie \"Primary\"). Today we documented Decision Maker(s) consistent with ACP documents on file. Content/Action Overview: Has ACP document(s) on file - reflects the patient's care preferences  Reviewed DNR/DNI and patient elects Full Code (Attempt Resuscitation)  treatment goals, ventilation preferences, and resuscitation preferences    ----------------------------------------------------------------  Advance Care Planning Conversation      The patient and/or family consented to a voluntary Advance Care Planning conversation. Individuals present for the conversation: Patient Christiano Mandujano. Palliative team members, Mark Shankar NP and this writer Dagmar Howell RN. Summary of the conversation: Palliative Medicine team for follow up visit with Mr. Carlos Llanos. He was sitting up in the chair, dressed in street clothes. He shared his fiona of being able to be discharged today. Team discussed his current chronic medical condition. Patient is aware of his low EF % of 10 and the relationship to his medication regime. Provided basic information about his AICD. Team reviewed the AMD completed on 8/28/2023 naming his sister Destin Shane [phone]. Team discussed his wishes at the time his heart and breathing stop. Discussed the benefits and burdens of intubation and CPR in the event of respiratory decline or cardiopulmonary arrest in a patient with severe cardiac disease. He first shared that he would want CPR efforts but would need to review the information we provided.  Team encouraged him to speak with his sister, so she is

## 2023-08-31 NOTE — CARE COORDINATION
Discharge order noted for today. Pt has been accepted to [facility] agency. Met with patient and he is agreeable to the transition plan today. Transport has been arranged through his friend. Patient's discharge summary and home health  orders have been forwarded to [facility] home health agency via [id number]. Updated bedside RN, Lorelei Sutart,  to the transition plan.   Discharge information has been documented on the AVS.       JERICHO Barbour, RN  Care Management  Phone: [phone]

## 2023-08-31 NOTE — PROGRESS NOTES
[address]: [phone] ([phone])  [facility]: [address]: [phone]    Patient Name: Maria T Mchugh  YOB: 1960    Date of Initial Consult: 8/28/2023  Follow up 8/31/2023    Reason for Consult: goals of care   Requesting Provider: Dr Rose Gipson Physician: Kourtney uW MD      SUMMARY:   Maria T Mchugh is a 60y.o. year old with a past history of HFrEF, hypertension, CAD, peripheral neuropathy, chronic kidney disease stage III who was admitted on 8/22/2023 from home with a diagnosis of CHF exacerbation likely due to  non adherence to medication. Current medical issues leading to Palliative Medicine involvement include: 60-year-old gentleman who has an EF of 10 to 15% with an AICD. Presents with CHF exacerbation. Palliative medicine is consulted for goals of care discussions. 8/31/2023 Much more engaged sitting up smiling. Feels good. D/C planned for today. 8/29/2023 I bed holding his head. Answers simple questions but complains of headache and not wanting to answer questions due to his headache. BSRN present and medicating with Robaxin. PALLIATIVE DIAGNOSES:   Goals of care/advanced care plan discussions  CHF exacerbation  Headache  Shortness of breath       PLAN:   Goals of care / advanced care plan discussions   8/31/2023 seen along with Ms Annia Schaefer. Patient much more engaged this am. Sitting up in recliner alert and oriented x 4,. Tells us he feels much better. He has a very good understanding of his congestive heart failure. \" I did this to myself I am putting an above ground pool in and it was more important then buying my medications. \" We discussed the importance of medication adherence. He has a Good RX card to help with the cost.Patient recalled completing the AMD. Goals of care discussed including benefits and burdens of these efforts in light of advanced cardiac disease.  He tells us he would want attempts of CPR at the present time, but would like to further discuss with his family goals of care options. Written information given to patient for further review. Goals of care full code with full interventions. ( Please see below for previous notes per Palliative team)    8/29/2023 follow up along with Mr Jamal Cabrera, MSDALTON. Patient lying on his side in bed he will alert to his name. Answer simple questions. However tells us he still has a severe headache. Still denies nausea. Breakfast tray at bedside he ate most of his waffle. We again tried to engage conversation on goals of care and shared we have some important questions to discuss, he promptly \" said not today my head hurts too bad. \"  Bedside RN present and is medicating with Robaxin p.o. unfortunately patient still does not engage with our team to discuss goals of care. We called his Sister Trung Lia she tells us prior to hospital, patient was engaging lived by himself. He has 3 children but they have  no contact with patient. She requested all other questions be discussed with patient. We will continue to follow with you. Hopefully once headache improves he will be engage with our team. Goals of care full code with full interventions. ( Please see below for previous notes per palliative team)     8/29/2023 seen along with Mr Berry SHANTELL Cabrera. Patient lying in bed. Will alert to his name however would not engage well with our team telling us he had a headache. He would answer a few questions. We ask who was in his family and who could make his medical decisions if he could not;  he told us his sister Tyron Ruff. We completed an AMD naming his Sister Tyron Ruff is medical power of . After completion of form he refused further engagement with our team.  We were not able to discuss goals of care. Current goals of care remain full code with full interventions.   Congestive heart failure noted to have an EF of 10 to

## 2023-08-31 NOTE — HOME CARE
[facility] referral has been initiated by RN Liaison JEFFREY  and has been sent to central intake and scheduling. 15:30  -  return call received. Demographics have been verified. Patient agreed to [facility] visit. Requesting a call the evening prior to visit.

## 2023-08-31 NOTE — PROGRESS NOTES
[address] [facility]  DAILY PROGRESS NOTE      Patient:    Amanda Cortes , 60 y.o. male   MRN:  [id number]  Room/Bed:  209/01  Admission Date:   8/22/2023  Code status:  Full Code    Reason for Admission: CHF Exacerbation    ASSESSMENT AND PLAN:   Problem List Items Addressed This Visit          Circulatory    CHF (congestive heart failure) ([address])    Relevant Medications    sacubitril-valsartan (ENTRESTO) 24-26 MG per tablet 1 tablet    torsemide (DEMADEX) tablet 20 mg (Start on 8/31/2023  9:00 AM)    * (Principal) Pulmonary edema cardiac cause ([address])    Relevant Orders    Transthoracic echocardiogram (TTE) limited with contrast, bubble, strain, and 3D PRN (Completed)    Acute HFrEF (heart failure with reduced ejection fraction) ([address])    Relevant Orders    Transthoracic echocardiogram (TTE) limited with contrast, bubble, strain, and 3D PRN (Completed)       Other    Shortness of breath - Primary     Other Visit Diagnoses       Chest pain, unspecified type        Non compliance w medication regimen                Acute HFrEF (10-15%), ischemic cardiomyopathy with AICD ([employer]tr[employer])  Non-Sustained Monomorphic V Tach (6-11 beat runs)  Hypoxia requiring supplemental oxygen, resolved  Elevated proBNP  Coronary artery disease s/p PCI Feb 2022 (SHIRA to RCA)  Hypertension  Hyperlipidemia  -Pt ran out of medicines 1 to 2 weeks prior to admission - will need new rx.  -8/24 TTE: EF 10-15%, bilateral pleural effusion, dilated ascending aorta, mild PHTN, tricuspid, mitral, and pulmonic regurg, dilated R and L atrium and R ventricle  -Medtronic AICD 11/2021, NSVT noted, normal function, checked 8/25/23 per cardiology  -Weight as of 8/30 (156 lbs) is down 14 lbs from admission (170 lbs)  Plan:  -Increase PO torsemide dose to 20mg PO daily today  -Continue Coreg 6.25 mg twice daily, Plavix, Crestor 20 mg, 12.5 mg  -Continue Entresto 24-26 mg BID (restarted 8/29 PM)  -Cardiac diet (Na <2g) with fluid restriction 1500 mL  -Maintain K

## 2023-09-01 ENCOUNTER — APPOINTMENT (OUTPATIENT)
Facility: HOSPITAL | Age: 63
DRG: 917 | End: 2023-09-01
Payer: MEDICARE

## 2023-09-01 ENCOUNTER — HOSPITAL ENCOUNTER (INPATIENT)
Facility: HOSPITAL | Age: 63
LOS: 4 days | Discharge: HOME HEALTH | DRG: 917 | End: 2023-09-05
Attending: EMERGENCY MEDICINE | Admitting: INTERNAL MEDICINE
Payer: MEDICARE

## 2023-09-01 DIAGNOSIS — J96.01 ACUTE RESPIRATORY FAILURE WITH HYPOXIA (HCC): Primary | ICD-10-CM

## 2023-09-01 DIAGNOSIS — F19.10 SUBSTANCE ABUSE (HCC): ICD-10-CM

## 2023-09-01 PROBLEM — J96.00 ACUTE RESPIRATORY FAILURE (HCC): Status: ACTIVE | Noted: 2023-09-01

## 2023-09-01 PROBLEM — I46.9 CARDIAC ARREST (HCC): Status: ACTIVE | Noted: 2023-09-01

## 2023-09-01 LAB
ALBUMIN SERPL-MCNC: 2.7 G/DL (ref 3.4–5)
ALBUMIN/GLOB SERPL: 0.7 (ref 0.8–1.7)
ALP SERPL-CCNC: 84 U/L (ref 45–117)
ALT SERPL-CCNC: 34 U/L (ref 16–61)
AMMONIA PLAS-SCNC: 29 UMOL/L (ref 11–32)
AMPHET UR QL SCN: NEGATIVE
ANION GAP SERPL CALC-SCNC: 10 MMOL/L (ref 3–18)
ANION GAP SERPL CALC-SCNC: 11 MMOL/L (ref 3–18)
APAP SERPL-MCNC: <2 UG/ML (ref 10–30)
APPEARANCE UR: ABNORMAL
ARTERIAL PATENCY WRIST A: POSITIVE
AST SERPL-CCNC: 34 U/L (ref 10–38)
BACTERIA URNS QL MICRO: NEGATIVE /HPF
BARBITURATES UR QL SCN: POSITIVE
BASE DEFICIT BLD-SCNC: 0.9 MMOL/L
BASE DEFICIT BLD-SCNC: 4.3 MMOL/L
BASOPHILS # BLD: 0.1 K/UL (ref 0–0.1)
BASOPHILS NFR BLD: 1 % (ref 0–2)
BDY SITE: ABNORMAL
BDY SITE: ABNORMAL
BENZODIAZ UR QL: NEGATIVE
BILIRUB SERPL-MCNC: 0.7 MG/DL (ref 0.2–1)
BILIRUB UR QL: NEGATIVE
BODY TEMPERATURE: 95.9
BUN SERPL-MCNC: 39 MG/DL (ref 7–18)
BUN SERPL-MCNC: 44 MG/DL (ref 7–18)
BUN/CREAT SERPL: 20 (ref 12–20)
BUN/CREAT SERPL: 22 (ref 12–20)
CA-I SERPL-SCNC: 1.04 MMOL/L (ref 1.15–1.33)
CALCIUM SERPL-MCNC: 7.3 MG/DL (ref 8.5–10.1)
CALCIUM SERPL-MCNC: 7.7 MG/DL (ref 8.5–10.1)
CANNABINOIDS UR QL SCN: NEGATIVE
CHLORIDE SERPL-SCNC: 103 MMOL/L (ref 100–111)
CHLORIDE SERPL-SCNC: 107 MMOL/L (ref 100–111)
CO2 SERPL-SCNC: 24 MMOL/L (ref 21–32)
CO2 SERPL-SCNC: 24 MMOL/L (ref 21–32)
COCAINE UR QL SCN: POSITIVE
COLOR UR: YELLOW
CREAT SERPL-MCNC: 1.77 MG/DL (ref 0.6–1.3)
CREAT SERPL-MCNC: 2.18 MG/DL (ref 0.6–1.3)
DIFFERENTIAL METHOD BLD: ABNORMAL
EOSINOPHIL # BLD: 0.3 K/UL (ref 0–0.4)
EOSINOPHIL NFR BLD: 3 % (ref 0–5)
EPITH CASTS URNS QL MICRO: ABNORMAL /LPF (ref 0–5)
ERYTHROCYTE [DISTWIDTH] IN BLOOD BY AUTOMATED COUNT: 13.2 % (ref 11.6–14.5)
ETHANOL SERPL-MCNC: <3 MG/DL (ref 0–3)
GAS FLOW.O2 O2 DELIVERY SYS: ABNORMAL
GAS FLOW.O2 O2 DELIVERY SYS: ABNORMAL
GAS FLOW.O2 SETTING OXYMISER: 16 BPM
GAS FLOW.O2 SETTING OXYMISER: 18 BPM
GLOBULIN SER CALC-MCNC: 3.7 G/DL (ref 2–4)
GLUCOSE BLD STRIP.AUTO-MCNC: 55 MG/DL (ref 70–110)
GLUCOSE BLD STRIP.AUTO-MCNC: 58 MG/DL (ref 70–110)
GLUCOSE BLD STRIP.AUTO-MCNC: 76 MG/DL (ref 70–110)
GLUCOSE BLD STRIP.AUTO-MCNC: 80 MG/DL (ref 70–110)
GLUCOSE BLD STRIP.AUTO-MCNC: 81 MG/DL (ref 70–110)
GLUCOSE SERPL-MCNC: 212 MG/DL (ref 74–99)
GLUCOSE SERPL-MCNC: 97 MG/DL (ref 74–99)
GLUCOSE UR STRIP.AUTO-MCNC: NEGATIVE MG/DL
GRAN CASTS URNS QL MICRO: ABNORMAL /LPF
HCO3 BLD-SCNC: 23.4 MMOL/L (ref 22–26)
HCO3 BLD-SCNC: 23.6 MMOL/L (ref 22–26)
HCT VFR BLD AUTO: 51.7 % (ref 36–48)
HGB BLD-MCNC: 15.9 G/DL (ref 13–16)
HGB UR QL STRIP: NEGATIVE
HYALINE CASTS URNS QL MICRO: ABNORMAL /LPF (ref 0–2)
IMM GRANULOCYTES # BLD AUTO: 0 K/UL (ref 0–0.04)
IMM GRANULOCYTES NFR BLD AUTO: 1 % (ref 0–0.5)
IPAP/PIP/HIGH PEEP: 27
KETONES UR QL STRIP.AUTO: NEGATIVE MG/DL
LACTATE BLD-SCNC: 0.71 MMOL/L (ref 0.4–2)
LACTATE BLD-SCNC: 1.36 MMOL/L (ref 0.4–2)
LACTATE SERPL-SCNC: 1.6 MMOL/L (ref 0.4–2)
LACTATE SERPL-SCNC: 3.1 MMOL/L (ref 0.4–2)
LEUKOCYTE ESTERASE UR QL STRIP.AUTO: NEGATIVE
LYMPHOCYTES # BLD: 2.4 K/UL (ref 0.9–3.6)
LYMPHOCYTES NFR BLD: 27 % (ref 21–52)
Lab: ABNORMAL
MAGNESIUM SERPL-MCNC: 2 MG/DL (ref 1.6–2.6)
MCH RBC QN AUTO: 30.8 PG (ref 24–34)
MCHC RBC AUTO-ENTMCNC: 30.8 G/DL (ref 31–37)
MCV RBC AUTO: 100 FL (ref 78–100)
METHADONE UR QL: NEGATIVE
MONOCYTES # BLD: 0.6 K/UL (ref 0.05–1.2)
MONOCYTES NFR BLD: 7 % (ref 3–10)
MUCOUS THREADS URNS QL MICRO: ABNORMAL /LPF
NEUTS SEG # BLD: 5.5 K/UL (ref 1.8–8)
NEUTS SEG NFR BLD: 62 % (ref 40–73)
NITRITE UR QL STRIP.AUTO: NEGATIVE
NRBC # BLD: 0 K/UL (ref 0–0.01)
NRBC BLD-RTO: 0 PER 100 WBC
O2/TOTAL GAS SETTING VFR VENT: 100 %
O2/TOTAL GAS SETTING VFR VENT: 50 %
OPIATES UR QL: NEGATIVE
PAW @ MEAN EXP FLOW ON VENT: 14 CMH2O
PCO2 BLD: 34.7 MMHG (ref 35–45)
PCO2 BLD: 53.7 MMHG (ref 35–45)
PCP UR QL: NEGATIVE
PEEP RESPIRATORY: 8 CMH2O
PEEP RESPIRATORY: 8 CMH2O
PH BLD: 7.25 (ref 7.35–7.45)
PH BLD: 7.43 (ref 7.35–7.45)
PH UR STRIP: 5 (ref 5–8)
PHOSPHATE SERPL-MCNC: 5.7 MG/DL (ref 2.5–4.9)
PLATELET # BLD AUTO: 260 K/UL (ref 135–420)
PMV BLD AUTO: 12.5 FL (ref 9.2–11.8)
PO2 BLD: 219 MMHG (ref 80–100)
PO2 BLD: 534 MMHG (ref 80–100)
POTASSIUM SERPL-SCNC: 4.7 MMOL/L (ref 3.5–5.5)
POTASSIUM SERPL-SCNC: 4.9 MMOL/L (ref 3.5–5.5)
PROT SERPL-MCNC: 6.4 G/DL (ref 6.4–8.2)
PROT UR STRIP-MCNC: ABNORMAL MG/DL
RBC # BLD AUTO: 5.17 M/UL (ref 4.35–5.65)
RBC #/AREA URNS HPF: ABNORMAL /HPF (ref 0–5)
RESPIRATORY RATE, POC: 16 (ref 5–40)
RESPIRATORY RATE, POC: 18 (ref 5–40)
SALICYLATES SERPL-MCNC: <1.7 MG/DL (ref 2.8–20)
SAO2 % BLD: 100 % (ref 92–97)
SAO2 % BLD: 99.8 % (ref 92–97)
SERVICE CMNT-IMP: ABNORMAL
SERVICE CMNT-IMP: ABNORMAL
SODIUM SERPL-SCNC: 138 MMOL/L (ref 136–145)
SODIUM SERPL-SCNC: 141 MMOL/L (ref 136–145)
SP GR UR REFRACTOMETRY: 1.02 (ref 1–1.03)
SPECIMEN TYPE: ABNORMAL
SPECIMEN TYPE: ABNORMAL
T3FREE SERPL-MCNC: 2.6 PG/ML (ref 2.18–3.98)
T4 FREE SERPL-MCNC: 1 NG/DL (ref 0.7–1.5)
TROPONIN I SERPL HS-MCNC: 35 NG/L (ref 0–78)
TROPONIN I SERPL HS-MCNC: 62 NG/L (ref 0–78)
TROPONIN I SERPL HS-MCNC: 67 NG/L (ref 0–78)
TSH SERPL DL<=0.05 MIU/L-ACNC: 12.1 UIU/ML (ref 0.36–3.74)
UROBILINOGEN UR QL STRIP.AUTO: 0.2 EU/DL (ref 0.2–1)
VENTILATION MODE VENT: ABNORMAL
VENTILATION MODE VENT: ABNORMAL
VT SETTING VENT: 510 ML
VT SETTING VENT: 510 ML
WBC # BLD AUTO: 8.9 K/UL (ref 4.6–13.2)
WBC URNS QL MICRO: ABNORMAL /HPF (ref 0–4)

## 2023-09-01 PROCEDURE — 70450 CT HEAD/BRAIN W/O DYE: CPT

## 2023-09-01 PROCEDURE — 84439 ASSAY OF FREE THYROXINE: CPT

## 2023-09-01 PROCEDURE — 2500000003 HC RX 250 WO HCPCS: Performed by: EMERGENCY MEDICINE

## 2023-09-01 PROCEDURE — 2580000003 HC RX 258: Performed by: EMERGENCY MEDICINE

## 2023-09-01 PROCEDURE — 2000000000 HC ICU R&B

## 2023-09-01 PROCEDURE — 84100 ASSAY OF PHOSPHORUS: CPT

## 2023-09-01 PROCEDURE — 2500000003 HC RX 250 WO HCPCS

## 2023-09-01 PROCEDURE — 80179 DRUG ASSAY SALICYLATE: CPT

## 2023-09-01 PROCEDURE — 6360000002 HC RX W HCPCS: Performed by: PHYSICIAN ASSISTANT

## 2023-09-01 PROCEDURE — 96374 THER/PROPH/DIAG INJ IV PUSH: CPT

## 2023-09-01 PROCEDURE — 5A1945Z RESPIRATORY VENTILATION, 24-96 CONSECUTIVE HOURS: ICD-10-PCS | Performed by: EMERGENCY MEDICINE

## 2023-09-01 PROCEDURE — 6360000002 HC RX W HCPCS

## 2023-09-01 PROCEDURE — 6360000002 HC RX W HCPCS: Performed by: EMERGENCY MEDICINE

## 2023-09-01 PROCEDURE — A4216 STERILE WATER/SALINE, 10 ML: HCPCS | Performed by: PHYSICIAN ASSISTANT

## 2023-09-01 PROCEDURE — 31500 INSERT EMERGENCY AIRWAY: CPT

## 2023-09-01 PROCEDURE — 96375 TX/PRO/DX INJ NEW DRUG ADDON: CPT

## 2023-09-01 PROCEDURE — 99291 CRITICAL CARE FIRST HOUR: CPT | Performed by: INTERNAL MEDICINE

## 2023-09-01 PROCEDURE — 80053 COMPREHEN METABOLIC PANEL: CPT

## 2023-09-01 PROCEDURE — 6370000000 HC RX 637 (ALT 250 FOR IP): Performed by: PHYSICIAN ASSISTANT

## 2023-09-01 PROCEDURE — 2580000003 HC RX 258: Performed by: INTERNAL MEDICINE

## 2023-09-01 PROCEDURE — 0BH17EZ INSERTION OF ENDOTRACHEAL AIRWAY INTO TRACHEA, VIA NATURAL OR ARTIFICIAL OPENING: ICD-10-PCS | Performed by: EMERGENCY MEDICINE

## 2023-09-01 PROCEDURE — 83605 ASSAY OF LACTIC ACID: CPT

## 2023-09-01 PROCEDURE — 2700000000 HC OXYGEN THERAPY PER DAY

## 2023-09-01 PROCEDURE — 84484 ASSAY OF TROPONIN QUANT: CPT

## 2023-09-01 PROCEDURE — 93005 ELECTROCARDIOGRAM TRACING: CPT | Performed by: EMERGENCY MEDICINE

## 2023-09-01 PROCEDURE — 36556 INSERT NON-TUNNEL CV CATH: CPT

## 2023-09-01 PROCEDURE — 02HV33Z INSERTION OF INFUSION DEVICE INTO SUPERIOR VENA CAVA, PERCUTANEOUS APPROACH: ICD-10-PCS | Performed by: EMERGENCY MEDICINE

## 2023-09-01 PROCEDURE — 82330 ASSAY OF CALCIUM: CPT

## 2023-09-01 PROCEDURE — 83735 ASSAY OF MAGNESIUM: CPT

## 2023-09-01 PROCEDURE — 84481 FREE ASSAY (FT-3): CPT

## 2023-09-01 PROCEDURE — 85025 COMPLETE CBC W/AUTO DIFF WBC: CPT

## 2023-09-01 PROCEDURE — 2580000003 HC RX 258: Performed by: PHYSICIAN ASSISTANT

## 2023-09-01 PROCEDURE — 82077 ASSAY SPEC XCP UR&BREATH IA: CPT

## 2023-09-01 PROCEDURE — 71045 X-RAY EXAM CHEST 1 VIEW: CPT

## 2023-09-01 PROCEDURE — 84443 ASSAY THYROID STIM HORMONE: CPT

## 2023-09-01 PROCEDURE — 94761 N-INVAS EAR/PLS OXIMETRY MLT: CPT

## 2023-09-01 PROCEDURE — 81001 URINALYSIS AUTO W/SCOPE: CPT

## 2023-09-01 PROCEDURE — 82140 ASSAY OF AMMONIA: CPT

## 2023-09-01 PROCEDURE — 36600 WITHDRAWAL OF ARTERIAL BLOOD: CPT

## 2023-09-01 PROCEDURE — 96366 THER/PROPH/DIAG IV INF ADDON: CPT

## 2023-09-01 PROCEDURE — 99285 EMERGENCY DEPT VISIT HI MDM: CPT

## 2023-09-01 PROCEDURE — 80307 DRUG TEST PRSMV CHEM ANLYZR: CPT

## 2023-09-01 PROCEDURE — 80143 DRUG ASSAY ACETAMINOPHEN: CPT

## 2023-09-01 PROCEDURE — 87040 BLOOD CULTURE FOR BACTERIA: CPT

## 2023-09-01 PROCEDURE — 2580000003 HC RX 258

## 2023-09-01 PROCEDURE — 93005 ELECTROCARDIOGRAM TRACING: CPT

## 2023-09-01 PROCEDURE — 82962 GLUCOSE BLOOD TEST: CPT

## 2023-09-01 PROCEDURE — 6360000002 HC RX W HCPCS: Performed by: STUDENT IN AN ORGANIZED HEALTH CARE EDUCATION/TRAINING PROGRAM

## 2023-09-01 PROCEDURE — 6360000002 HC RX W HCPCS: Performed by: INTERNAL MEDICINE

## 2023-09-01 PROCEDURE — 96365 THER/PROPH/DIAG IV INF INIT: CPT

## 2023-09-01 PROCEDURE — 2500000003 HC RX 250 WO HCPCS: Performed by: PHYSICIAN ASSISTANT

## 2023-09-01 PROCEDURE — 94002 VENT MGMT INPAT INIT DAY: CPT

## 2023-09-01 PROCEDURE — 82803 BLOOD GASES ANY COMBINATION: CPT

## 2023-09-01 PROCEDURE — 2580000003 HC RX 258: Performed by: STUDENT IN AN ORGANIZED HEALTH CARE EDUCATION/TRAINING PROGRAM

## 2023-09-01 PROCEDURE — 74176 CT ABD & PELVIS W/O CONTRAST: CPT

## 2023-09-01 RX ORDER — ROCURONIUM BROMIDE 10 MG/ML
INJECTION, SOLUTION INTRAVENOUS DAILY PRN
Status: DISCONTINUED | OUTPATIENT
Start: 2023-09-01 | End: 2023-09-01

## 2023-09-01 RX ORDER — ETOMIDATE 2 MG/ML
INJECTION INTRAVENOUS DAILY PRN
Status: DISCONTINUED | OUTPATIENT
Start: 2023-09-01 | End: 2023-09-01

## 2023-09-01 RX ORDER — ACETAMINOPHEN 650 MG/1
650 SUPPOSITORY RECTAL EVERY 6 HOURS PRN
Status: DISCONTINUED | OUTPATIENT
Start: 2023-09-01 | End: 2023-09-05 | Stop reason: HOSPADM

## 2023-09-01 RX ORDER — DEXTROSE MONOHYDRATE 100 MG/ML
INJECTION, SOLUTION INTRAVENOUS CONTINUOUS PRN
Status: DISCONTINUED | OUTPATIENT
Start: 2023-09-01 | End: 2023-09-05 | Stop reason: HOSPADM

## 2023-09-01 RX ORDER — MIDAZOLAM IN NACL,ISO-OSMOT/PF 50 MG/50ML
2 INFUSION BOTTLE (ML) INTRAVENOUS CONTINUOUS
Status: DISCONTINUED | OUTPATIENT
Start: 2023-09-01 | End: 2023-09-02

## 2023-09-01 RX ORDER — MIDAZOLAM HYDROCHLORIDE 1 MG/ML
INJECTION INTRAMUSCULAR; INTRAVENOUS DAILY PRN
Status: DISCONTINUED | OUTPATIENT
Start: 2023-09-01 | End: 2023-09-01

## 2023-09-01 RX ORDER — NOREPINEPHRINE BITARTRATE 0.06 MG/ML
1-100 INJECTION, SOLUTION INTRAVENOUS CONTINUOUS
Status: DISCONTINUED | OUTPATIENT
Start: 2023-09-01 | End: 2023-09-02

## 2023-09-01 RX ORDER — POLYETHYLENE GLYCOL 3350 17 G/17G
17 POWDER, FOR SOLUTION ORAL DAILY PRN
Status: DISCONTINUED | OUTPATIENT
Start: 2023-09-01 | End: 2023-09-05 | Stop reason: HOSPADM

## 2023-09-01 RX ORDER — MIDAZOLAM IN NACL,ISO-OSMOT/PF 50 MG/50ML
1-10 INFUSION BOTTLE (ML) INTRAVENOUS CONTINUOUS
Status: DISCONTINUED | OUTPATIENT
Start: 2023-09-01 | End: 2023-09-01

## 2023-09-01 RX ORDER — ONDANSETRON 4 MG/1
4 TABLET, ORALLY DISINTEGRATING ORAL EVERY 8 HOURS PRN
Status: DISCONTINUED | OUTPATIENT
Start: 2023-09-01 | End: 2023-09-01

## 2023-09-01 RX ORDER — MIDAZOLAM HYDROCHLORIDE 2 MG/2ML
1 INJECTION, SOLUTION INTRAMUSCULAR; INTRAVENOUS EVERY 30 MIN PRN
Status: DISCONTINUED | OUTPATIENT
Start: 2023-09-01 | End: 2023-09-04

## 2023-09-01 RX ORDER — CHLORHEXIDINE GLUCONATE 0.12 MG/ML
15 RINSE ORAL 2 TIMES DAILY
Status: DISCONTINUED | OUTPATIENT
Start: 2023-09-01 | End: 2023-09-02

## 2023-09-01 RX ORDER — INSULIN LISPRO 100 [IU]/ML
0-4 INJECTION, SOLUTION INTRAVENOUS; SUBCUTANEOUS EVERY 6 HOURS
Status: DISCONTINUED | OUTPATIENT
Start: 2023-09-01 | End: 2023-09-02

## 2023-09-01 RX ORDER — INSULIN LISPRO 100 [IU]/ML
0-4 INJECTION, SOLUTION INTRAVENOUS; SUBCUTANEOUS EVERY 6 HOURS
Status: DISCONTINUED | OUTPATIENT
Start: 2023-09-01 | End: 2023-09-01

## 2023-09-01 RX ORDER — SODIUM CHLORIDE 9 MG/ML
INJECTION, SOLUTION INTRAVENOUS PRN
Status: DISCONTINUED | OUTPATIENT
Start: 2023-09-01 | End: 2023-09-05 | Stop reason: HOSPADM

## 2023-09-01 RX ORDER — ONDANSETRON 2 MG/ML
4 INJECTION INTRAMUSCULAR; INTRAVENOUS EVERY 6 HOURS PRN
Status: DISCONTINUED | OUTPATIENT
Start: 2023-09-01 | End: 2023-09-01

## 2023-09-01 RX ORDER — MIDAZOLAM HYDROCHLORIDE 1 MG/ML
INJECTION INTRAMUSCULAR; INTRAVENOUS
Status: DISCONTINUED
Start: 2023-09-01 | End: 2023-09-01

## 2023-09-01 RX ORDER — ACETAMINOPHEN 325 MG/1
650 TABLET ORAL EVERY 6 HOURS PRN
Status: DISCONTINUED | OUTPATIENT
Start: 2023-09-01 | End: 2023-09-05 | Stop reason: HOSPADM

## 2023-09-01 RX ORDER — NOREPINEPHRINE BITARTRATE 0.06 MG/ML
INJECTION, SOLUTION INTRAVENOUS
Status: COMPLETED
Start: 2023-09-01 | End: 2023-09-01

## 2023-09-01 RX ORDER — FENTANYL CITRATE 50 UG/ML
INJECTION, SOLUTION INTRAMUSCULAR; INTRAVENOUS DAILY PRN
Status: DISCONTINUED | OUTPATIENT
Start: 2023-09-01 | End: 2023-09-01

## 2023-09-01 RX ORDER — FENTANYL CITRATE 50 UG/ML
INJECTION, SOLUTION INTRAMUSCULAR; INTRAVENOUS
Status: DISCONTINUED
Start: 2023-09-01 | End: 2023-09-01

## 2023-09-01 RX ORDER — MAGNESIUM SULFATE IN WATER 40 MG/ML
2000 INJECTION, SOLUTION INTRAVENOUS ONCE
Status: COMPLETED | OUTPATIENT
Start: 2023-09-01 | End: 2023-09-02

## 2023-09-01 RX ORDER — HEPARIN SODIUM 5000 [USP'U]/ML
5000 INJECTION, SOLUTION INTRAVENOUS; SUBCUTANEOUS EVERY 8 HOURS SCHEDULED
Status: DISCONTINUED | OUTPATIENT
Start: 2023-09-01 | End: 2023-09-05 | Stop reason: HOSPADM

## 2023-09-01 RX ORDER — 0.9 % SODIUM CHLORIDE 0.9 %
1000 INTRAVENOUS SOLUTION INTRAVENOUS ONCE
Status: COMPLETED | OUTPATIENT
Start: 2023-09-01 | End: 2023-09-01

## 2023-09-01 RX ORDER — SODIUM CHLORIDE 0.9 % (FLUSH) 0.9 %
5-40 SYRINGE (ML) INJECTION EVERY 12 HOURS SCHEDULED
Status: DISCONTINUED | OUTPATIENT
Start: 2023-09-01 | End: 2023-09-05 | Stop reason: HOSPADM

## 2023-09-01 RX ORDER — SODIUM CHLORIDE 0.9 % (FLUSH) 0.9 %
5-40 SYRINGE (ML) INJECTION PRN
Status: DISCONTINUED | OUTPATIENT
Start: 2023-09-01 | End: 2023-09-05 | Stop reason: HOSPADM

## 2023-09-01 RX ORDER — ENOXAPARIN SODIUM 100 MG/ML
40 INJECTION SUBCUTANEOUS DAILY
Status: DISCONTINUED | OUTPATIENT
Start: 2023-09-01 | End: 2023-09-01

## 2023-09-01 RX ORDER — CALCIUM GLUCONATE 20 MG/ML
1000 INJECTION, SOLUTION INTRAVENOUS
Status: COMPLETED | OUTPATIENT
Start: 2023-09-01 | End: 2023-09-02

## 2023-09-01 RX ADMIN — PIPERACILLIN AND TAZOBACTAM 3375 MG: 3; .375 INJECTION, POWDER, FOR SOLUTION INTRAVENOUS at 05:19

## 2023-09-01 RX ADMIN — FENTANYL CITRATE 100 MCG: 50 INJECTION INTRAMUSCULAR; INTRAVENOUS at 05:03

## 2023-09-01 RX ADMIN — CALCIUM GLUCONATE 1000 MG: 20 INJECTION, SOLUTION INTRAVENOUS at 23:07

## 2023-09-01 RX ADMIN — CHLORHEXIDINE GLUCONATE 0.12% ORAL RINSE 15 ML: 1.2 LIQUID ORAL at 14:44

## 2023-09-01 RX ADMIN — NOREPINEPHRINE BITARTRATE 10 MCG/MIN: 0.06 INJECTION, SOLUTION INTRAVENOUS at 06:35

## 2023-09-01 RX ADMIN — ACETAMINOPHEN 650 MG: 650 SUPPOSITORY RECTAL at 19:40

## 2023-09-01 RX ADMIN — VANCOMYCIN HYDROCHLORIDE 1000 MG: 1 INJECTION, POWDER, LYOPHILIZED, FOR SOLUTION INTRAVENOUS at 10:33

## 2023-09-01 RX ADMIN — Medication 1750 MG: at 10:27

## 2023-09-01 RX ADMIN — VANCOMYCIN HYDROCHLORIDE 500 MG: 500 INJECTION, POWDER, LYOPHILIZED, FOR SOLUTION INTRAVENOUS at 11:43

## 2023-09-01 RX ADMIN — CHLORHEXIDINE GLUCONATE 0.12% ORAL RINSE 15 ML: 1.2 LIQUID ORAL at 22:07

## 2023-09-01 RX ADMIN — HEPARIN SODIUM 5000 UNITS: 5000 INJECTION INTRAVENOUS; SUBCUTANEOUS at 22:07

## 2023-09-01 RX ADMIN — Medication 2 MG/HR: at 06:35

## 2023-09-01 RX ADMIN — SODIUM CHLORIDE, PRESERVATIVE FREE 10 ML: 5 INJECTION INTRAVENOUS at 21:00

## 2023-09-01 RX ADMIN — ETOMIDATE 20 MG: 2 INJECTION, SOLUTION INTRAVENOUS at 04:53

## 2023-09-01 RX ADMIN — Medication 2 MG/HR: at 17:31

## 2023-09-01 RX ADMIN — PIPERACILLIN SODIUM AND TAZOBACTAM SODIUM 3375 MG: 3; .375 INJECTION, POWDER, LYOPHILIZED, FOR SOLUTION INTRAVENOUS at 19:40

## 2023-09-01 RX ADMIN — MAGNESIUM SULFATE HEPTAHYDRATE 2000 MG: 40 INJECTION, SOLUTION INTRAVENOUS at 22:09

## 2023-09-01 RX ADMIN — Medication 10 MCG/MIN: at 06:35

## 2023-09-01 RX ADMIN — PIPERACILLIN AND TAZOBACTAM 4500 MG: 4; .5 INJECTION, POWDER, LYOPHILIZED, FOR SOLUTION INTRAVENOUS at 14:38

## 2023-09-01 RX ADMIN — FAMOTIDINE 20 MG: 10 INJECTION, SOLUTION INTRAVENOUS at 14:44

## 2023-09-01 RX ADMIN — HEPARIN SODIUM 5000 UNITS: 5000 INJECTION INTRAVENOUS; SUBCUTANEOUS at 14:43

## 2023-09-01 RX ADMIN — SODIUM CHLORIDE 1000 ML: 9 INJECTION, SOLUTION INTRAVENOUS at 05:16

## 2023-09-01 RX ADMIN — ROCURONIUM BROMIDE 80 MG: 10 INJECTION, SOLUTION INTRAVENOUS at 04:53

## 2023-09-01 RX ADMIN — DEXTROSE MONOHYDRATE 125 ML: 100 INJECTION, SOLUTION INTRAVENOUS at 23:41

## 2023-09-01 RX ADMIN — MIDAZOLAM 2 MG: 1 INJECTION, SOLUTION INTRAMUSCULAR; INTRAVENOUS at 05:01

## 2023-09-01 ASSESSMENT — PULMONARY FUNCTION TESTS
PIF_VALUE: 25
PIF_VALUE: 20
PIF_VALUE: 23
PIF_VALUE: 30
PIF_VALUE: 27
PIF_VALUE: 29

## 2023-09-01 ASSESSMENT — PAIN SCALES - GENERAL: PAINLEVEL_OUTOF10: 0

## 2023-09-01 NOTE — ED TRIAGE NOTES
Pt BIBA from home, friend called due to patient being unresponsive. Pt arrives unresponsive being bagged. IO in R tibia, 2mg narcan given in route without response.  RSI medications given and intubated on arrival.

## 2023-09-01 NOTE — ED NOTES
Patient handoff completed with Sienna Hi RN who denies further questions/concerns and assumes responsibility of the patient. Patient is stable . Continuous VS monitoring. Call foster in reach.       Annia Cortez  09/01/23 1919

## 2023-09-01 NOTE — H&P
[facility]  Pulmonary, Critical Care, and Sleep Medicine    Name: Maria T Mchugh MRN: [id number]   : 1960 [facility]   Date: 2023        Critical Care History and Physical      IMPRESSION:   AMS, found down - unclear cause, likely drug overdose - sedated on versed  Respiratory failure, requiring intubation for airway protection  Shock, unclear cause - cardiogenic vs septic vs volume - on levo 10  SANTY on CKD  Lactic acidosis - 3.1 on admission, normalised  HAGMA - likely lactate related vs santy  Substance abuse - UDS posiitive for cocaine vs barbiturates   Ischemic cardiomyopathy with EF 10% s/p RCA sent   Hx of SVT s/p SVT ablation , AICD      Patient Active Problem List   Diagnosis    Testicular pain    Chest pain in adult    Pulmonary nodule    Coronary artery disease involving native coronary artery of native heart without angina pectoris    Hypomagnesemia    Head injury    Depression    NSTEMI (non-ST elevated myocardial infarction) ([address])    Fenton of foot    CHF (congestive heart failure) (HCC)    Cough    TIA (transient ischemic attack)    Hypertension    HLD (hyperlipidemia)    Systolic CHF, chronic (HCC)    Cardiomyopathy ([address])    Shortness of breath    S/P ablation operation for arrhythmia    CHF exacerbation ([address])    SAH (subarachnoid hemorrhage) ([address])    Acute exacerbation of CHF (congestive heart failure) ([address])    Pulmonary edema cardiac cause (HCC)    Acute HFrEF (heart failure with reduced ejection fraction) (HCC)    Goals of care, counseling/discussion    Nonintractable headache    Cardiac arrest ([address])    Acute respiratory failure (HCC)        RECOMMENDATIONS:   Neuro:   - Hold sedation for neurological assessment  Pulm:   - Titrate FiO2 for goal SPO2> 90%,VAP prevention bundle, head of the bed at 30' all times. - Daily sedation holiday and assessment for weaning with SBT as tolerated. -  PRN duonebs.    CVS:  - Actively titrate

## 2023-09-01 NOTE — ED NOTES
Sister updated on POC. All questions/concerns addressed. Sister, Alonzo Hoang, reports she is the POA and caretaker of patient and requests she is who to be updated on patient's status. Sister reports her number is correct in chart.       Annia Longoria  09/01/23 1412

## 2023-09-01 NOTE — ED NOTES
Son at bedside Updated on POC. Exchanges spot for ex-wife who was updated on POC.       Mary Kay [place], [place]  09/01/23 8642

## 2023-09-01 NOTE — ED NOTES
Registered friend in chart, Joel Gary, may be reached at [phone]. Please keep updated.       Everett Ni, [address]  09/01/23 0694

## 2023-09-01 NOTE — ED NOTES
Versed gtt paused and patient blinking eyes and holding daughters hand, who is at bedside. RN pages MD for clarification if Versed gtt is to be stopped, to which MD reports wanted it paused (as it was) and is writing the order for it to be restarted at 2mg/hr, which it was. Daughter at bedside.       John Harris, [address]  09/01/23 6187

## 2023-09-01 NOTE — ED NOTES
Updated Sofía Rice, via phone call, was tearful but denied further questions/concerns. Reports she will be up to visit patient.       Shauna Hernandez, [place]  09/01/23 6992

## 2023-09-01 NOTE — ED PROVIDER NOTES
ED Course as of 09/01/23 1403   Fri Sep 01, 2023   0858 Eb to ks 61 yo male hx of htn/ chf/ polysubstance abuse cc- found down / given 2mg io/ 2mg iv narcan / cardiac arrest Dany Kim- + barbs/ plan - pending imaging  [KS]   1308 CT Head W/O Contrast  1. Mild global volume loss for age. Probable small remote lacunar type infarct  left caudate and age indeterminate likely more remote right periventricular  infarct. 2. No hemorrhage identified. No evidence of acute process otherwise. [KS]   1403 Pt admitted to the icu.   [KS]      ED Course User Index  [KS] MD Betsy Hassan MD  09/01/23 6318
50 mL IVPB (mini-bag) (has no administration in time range)   midazolam (VERSED) infusion 100mg/100mL (has no administration in time range)       ED Course as of 09/04/23 1011   Fri Sep 01, 2023   0858 Eb to ks 59 yo male hx of htn/ chf/ polysubstance abuse cc- found down / given 2mg io/ 2mg iv narcan / cardiac arrest Raysa Webb- + barbs/ plan - pending imaging  [KS]   1308 CT Head W/O Contrast  1. Mild global volume loss for age. Probable small remote lacunar type infarct  left caudate and age indeterminate likely more remote right periventricular  infarct. 2. No hemorrhage identified. No evidence of acute process otherwise. [KS]   1403 Pt admitted to the icu. [KS]      ED Course User Index  [KS] Chadd Ferrara MD       Medical Decision Making     CC/HPI Summary, DDx, ED Course, and Reassessment: The patient is a 68-year-old male with past medical history significant for coronary artery disease, CHF, and substance abuse, who was brought to the ED today by EMS unresponsive. He was a GCS of 3 upon arrival.  He was being bagged due to agonal respirations. He did receive a total of 4 mg of Narcan without any response. The patient was intubated. A central line was placed because of his requirements for pressors. I suspect that this is most likely an overdose, given the fact that the patient is positive for barbiturates and cocaine. Other considerations would be anoxic brain injury, intracranial hemorrhage, cardiogenic shock, or sepsis. I briefly spoke with Dr. Ilia Pitts prior to the end of my shift. I am the provider of record for this patient. Dr. Guido Carrillo will await the results of the head CT prior to formally consulting the ICU for admission in the event that the patient may have an [address] and would need to be transferred.       Disposition Considerations (tests considered but not done, Admit vs D/C, Shared Decision Making, Pt Expectation of Test or Tx.): 0       Critical Care Time:     Critical

## 2023-09-02 ENCOUNTER — APPOINTMENT (OUTPATIENT)
Facility: HOSPITAL | Age: 63
DRG: 917 | End: 2023-09-02
Payer: MEDICARE

## 2023-09-02 PROBLEM — F19.10 SUBSTANCE ABUSE (HCC): Status: ACTIVE | Noted: 2023-09-02

## 2023-09-02 LAB
ANION GAP SERPL CALC-SCNC: 7 MMOL/L (ref 3–18)
ARTERIAL PATENCY WRIST A: POSITIVE
BASE DEFICIT BLD-SCNC: 0.2 MMOL/L
BASOPHILS # BLD: 0 K/UL (ref 0–0.1)
BASOPHILS NFR BLD: 1 % (ref 0–2)
BDY SITE: ABNORMAL
BUN SERPL-MCNC: 33 MG/DL (ref 7–18)
BUN/CREAT SERPL: 20 (ref 12–20)
CALCIUM SERPL-MCNC: 7.8 MG/DL (ref 8.5–10.1)
CHLORIDE SERPL-SCNC: 107 MMOL/L (ref 100–111)
CO2 SERPL-SCNC: 24 MMOL/L (ref 21–32)
CREAT SERPL-MCNC: 1.62 MG/DL (ref 0.6–1.3)
DIFFERENTIAL METHOD BLD: ABNORMAL
EKG ATRIAL RATE: 113 BPM
EKG ATRIAL RATE: 70 BPM
EKG DIAGNOSIS: NORMAL
EKG DIAGNOSIS: NORMAL
EKG P AXIS: 66 DEGREES
EKG P AXIS: 77 DEGREES
EKG P-R INTERVAL: 166 MS
EKG P-R INTERVAL: 168 MS
EKG Q-T INTERVAL: 348 MS
EKG Q-T INTERVAL: 490 MS
EKG QRS DURATION: 100 MS
EKG QRS DURATION: 90 MS
EKG QTC CALCULATION (BAZETT): 477 MS
EKG QTC CALCULATION (BAZETT): 529 MS
EKG R AXIS: 77 DEGREES
EKG R AXIS: 80 DEGREES
EKG T AXIS: 48 DEGREES
EKG T AXIS: 81 DEGREES
EKG VENTRICULAR RATE: 113 BPM
EKG VENTRICULAR RATE: 70 BPM
EOSINOPHIL # BLD: 0.1 K/UL (ref 0–0.4)
EOSINOPHIL NFR BLD: 1 % (ref 0–5)
ERYTHROCYTE [DISTWIDTH] IN BLOOD BY AUTOMATED COUNT: 13.4 % (ref 11.6–14.5)
FENTANYL URINE: NEGATIVE PG/ML
GAS FLOW.O2 O2 DELIVERY SYS: ABNORMAL
GAS FLOW.O2 SETTING OXYMISER: 16 BPM
GLUCOSE BLD STRIP.AUTO-MCNC: 102 MG/DL (ref 70–110)
GLUCOSE BLD STRIP.AUTO-MCNC: 104 MG/DL (ref 70–110)
GLUCOSE BLD STRIP.AUTO-MCNC: 117 MG/DL (ref 70–110)
GLUCOSE BLD STRIP.AUTO-MCNC: 150 MG/DL (ref 70–110)
GLUCOSE BLD STRIP.AUTO-MCNC: 54 MG/DL (ref 70–110)
GLUCOSE BLD STRIP.AUTO-MCNC: 59 MG/DL (ref 70–110)
GLUCOSE BLD STRIP.AUTO-MCNC: 71 MG/DL (ref 70–110)
GLUCOSE BLD STRIP.AUTO-MCNC: 94 MG/DL (ref 70–110)
GLUCOSE BLD STRIP.AUTO-MCNC: 98 MG/DL (ref 70–110)
GLUCOSE SERPL-MCNC: 106 MG/DL (ref 74–99)
HCO3 BLD-SCNC: 24.1 MMOL/L (ref 22–26)
HCT VFR BLD AUTO: 38.8 % (ref 36–48)
HGB BLD-MCNC: 12.3 G/DL (ref 13–16)
IMM GRANULOCYTES # BLD AUTO: 0 K/UL (ref 0–0.04)
IMM GRANULOCYTES NFR BLD AUTO: 0 % (ref 0–0.5)
IPAP/PIP/HIGH PEEP: 27
LYMPHOCYTES # BLD: 0.7 K/UL (ref 0.9–3.6)
LYMPHOCYTES NFR BLD: 10 % (ref 21–52)
MAGNESIUM SERPL-MCNC: 2.1 MG/DL (ref 1.6–2.6)
MCH RBC QN AUTO: 30.8 PG (ref 24–34)
MCHC RBC AUTO-ENTMCNC: 31.7 G/DL (ref 31–37)
MCV RBC AUTO: 97.2 FL (ref 78–100)
MONOCYTES # BLD: 0.8 K/UL (ref 0.05–1.2)
MONOCYTES NFR BLD: 10 % (ref 3–10)
NEUTS SEG # BLD: 5.8 K/UL (ref 1.8–8)
NEUTS SEG NFR BLD: 78 % (ref 40–73)
NRBC # BLD: 0 K/UL (ref 0–0.01)
NRBC BLD-RTO: 0 PER 100 WBC
O2/TOTAL GAS SETTING VFR VENT: 40 %
PAW @ MEAN EXP FLOW ON VENT: 13 CMH2O
PCO2 BLD: 37.4 MMHG (ref 35–45)
PEEP RESPIRATORY: 8 CMH2O
PH BLD: 7.42 (ref 7.35–7.45)
PHOSPHATE SERPL-MCNC: 5.1 MG/DL (ref 2.5–4.9)
PLATELET # BLD AUTO: 171 K/UL (ref 135–420)
PMV BLD AUTO: 12.3 FL (ref 9.2–11.8)
PO2 BLD: 194 MMHG (ref 80–100)
POTASSIUM SERPL-SCNC: 4.4 MMOL/L (ref 3.5–5.5)
RBC # BLD AUTO: 3.99 M/UL (ref 4.35–5.65)
RESPIRATORY RATE, POC: 16 (ref 5–40)
SAO2 % BLD: 99.7 % (ref 92–97)
SERVICE CMNT-IMP: ABNORMAL
SODIUM SERPL-SCNC: 138 MMOL/L (ref 136–145)
SPECIMEN TYPE: ABNORMAL
VANCOMYCIN SERPL-MCNC: 10.2 UG/ML (ref 5–40)
VENTILATION MODE VENT: ABNORMAL
VT SETTING VENT: 510 ML
WBC # BLD AUTO: 7.5 K/UL (ref 4.6–13.2)

## 2023-09-02 PROCEDURE — 2000000000 HC ICU R&B

## 2023-09-02 PROCEDURE — 6360000002 HC RX W HCPCS: Performed by: REGISTERED NURSE

## 2023-09-02 PROCEDURE — 93010 ELECTROCARDIOGRAM REPORT: CPT | Performed by: INTERNAL MEDICINE

## 2023-09-02 PROCEDURE — 2580000003 HC RX 258

## 2023-09-02 PROCEDURE — 71045 X-RAY EXAM CHEST 1 VIEW: CPT

## 2023-09-02 PROCEDURE — 2500000003 HC RX 250 WO HCPCS: Performed by: PHYSICIAN ASSISTANT

## 2023-09-02 PROCEDURE — 36600 WITHDRAWAL OF ARTERIAL BLOOD: CPT

## 2023-09-02 PROCEDURE — 82962 GLUCOSE BLOOD TEST: CPT

## 2023-09-02 PROCEDURE — 2580000003 HC RX 258: Performed by: PHYSICIAN ASSISTANT

## 2023-09-02 PROCEDURE — 82803 BLOOD GASES ANY COMBINATION: CPT

## 2023-09-02 PROCEDURE — 80048 BASIC METABOLIC PNL TOTAL CA: CPT

## 2023-09-02 PROCEDURE — 94761 N-INVAS EAR/PLS OXIMETRY MLT: CPT

## 2023-09-02 PROCEDURE — 99291 CRITICAL CARE FIRST HOUR: CPT | Performed by: INTERNAL MEDICINE

## 2023-09-02 PROCEDURE — 2500000003 HC RX 250 WO HCPCS: Performed by: REGISTERED NURSE

## 2023-09-02 PROCEDURE — 83735 ASSAY OF MAGNESIUM: CPT

## 2023-09-02 PROCEDURE — 6360000002 HC RX W HCPCS: Performed by: INTERNAL MEDICINE

## 2023-09-02 PROCEDURE — 85025 COMPLETE CBC W/AUTO DIFF WBC: CPT

## 2023-09-02 PROCEDURE — 6370000000 HC RX 637 (ALT 250 FOR IP): Performed by: PHYSICIAN ASSISTANT

## 2023-09-02 PROCEDURE — 84100 ASSAY OF PHOSPHORUS: CPT

## 2023-09-02 PROCEDURE — 80202 ASSAY OF VANCOMYCIN: CPT

## 2023-09-02 PROCEDURE — 94003 VENT MGMT INPAT SUBQ DAY: CPT

## 2023-09-02 PROCEDURE — 6360000002 HC RX W HCPCS: Performed by: PHYSICIAN ASSISTANT

## 2023-09-02 PROCEDURE — 2580000003 HC RX 258: Performed by: INTERNAL MEDICINE

## 2023-09-02 PROCEDURE — A4216 STERILE WATER/SALINE, 10 ML: HCPCS | Performed by: PHYSICIAN ASSISTANT

## 2023-09-02 PROCEDURE — 2500000003 HC RX 250 WO HCPCS

## 2023-09-02 RX ORDER — INSULIN LISPRO 100 [IU]/ML
0-4 INJECTION, SOLUTION INTRAVENOUS; SUBCUTANEOUS EVERY 6 HOURS
Status: DISCONTINUED | OUTPATIENT
Start: 2023-09-03 | End: 2023-09-03

## 2023-09-02 RX ORDER — DEXTROSE MONOHYDRATE 100 MG/ML
INJECTION, SOLUTION INTRAVENOUS CONTINUOUS
Status: DISCONTINUED | OUTPATIENT
Start: 2023-09-02 | End: 2023-09-04

## 2023-09-02 RX ORDER — DEXMEDETOMIDINE HYDROCHLORIDE 4 UG/ML
.1-1.5 INJECTION, SOLUTION INTRAVENOUS CONTINUOUS
Status: DISCONTINUED | OUTPATIENT
Start: 2023-09-02 | End: 2023-09-02

## 2023-09-02 RX ORDER — PROPOFOL 10 MG/ML
5-50 INJECTION, EMULSION INTRAVENOUS CONTINUOUS
Status: DISCONTINUED | OUTPATIENT
Start: 2023-09-02 | End: 2023-09-02

## 2023-09-02 RX ADMIN — FAMOTIDINE 20 MG: 10 INJECTION, SOLUTION INTRAVENOUS at 09:04

## 2023-09-02 RX ADMIN — VANCOMYCIN HYDROCHLORIDE 1000 MG: 1 INJECTION, POWDER, LYOPHILIZED, FOR SOLUTION INTRAVENOUS at 12:34

## 2023-09-02 RX ADMIN — SODIUM CHLORIDE, PRESERVATIVE FREE 10 ML: 5 INJECTION INTRAVENOUS at 20:35

## 2023-09-02 RX ADMIN — HEPARIN SODIUM 5000 UNITS: 5000 INJECTION INTRAVENOUS; SUBCUTANEOUS at 21:42

## 2023-09-02 RX ADMIN — DEXTROSE MONOHYDRATE: 100 INJECTION, SOLUTION INTRAVENOUS at 02:33

## 2023-09-02 RX ADMIN — HEPARIN SODIUM 5000 UNITS: 5000 INJECTION INTRAVENOUS; SUBCUTANEOUS at 14:30

## 2023-09-02 RX ADMIN — PIPERACILLIN SODIUM AND TAZOBACTAM SODIUM 3375 MG: 3; .375 INJECTION, POWDER, LYOPHILIZED, FOR SOLUTION INTRAVENOUS at 20:35

## 2023-09-02 RX ADMIN — PROPOFOL 20 MCG/KG/MIN: 10 INJECTION, EMULSION INTRAVENOUS at 09:05

## 2023-09-02 RX ADMIN — HEPARIN SODIUM 5000 UNITS: 5000 INJECTION INTRAVENOUS; SUBCUTANEOUS at 05:53

## 2023-09-02 RX ADMIN — DEXMEDETOMIDINE HYDROCHLORIDE 0.2 MCG/KG/HR: 4 INJECTION, SOLUTION INTRAVENOUS at 12:28

## 2023-09-02 RX ADMIN — PIPERACILLIN SODIUM AND TAZOBACTAM SODIUM 3375 MG: 3; .375 INJECTION, POWDER, LYOPHILIZED, FOR SOLUTION INTRAVENOUS at 04:36

## 2023-09-02 RX ADMIN — PIPERACILLIN SODIUM AND TAZOBACTAM SODIUM 3375 MG: 3; .375 INJECTION, POWDER, LYOPHILIZED, FOR SOLUTION INTRAVENOUS at 12:33

## 2023-09-02 RX ADMIN — ACETAMINOPHEN 325MG 650 MG: 325 TABLET ORAL at 06:54

## 2023-09-02 RX ADMIN — CHLORHEXIDINE GLUCONATE 0.12% ORAL RINSE 15 ML: 1.2 LIQUID ORAL at 09:04

## 2023-09-02 RX ADMIN — CALCIUM GLUCONATE 1000 MG: 20 INJECTION, SOLUTION INTRAVENOUS at 00:02

## 2023-09-02 ASSESSMENT — PAIN SCALES - GENERAL
PAINLEVEL_OUTOF10: 0

## 2023-09-02 ASSESSMENT — PULMONARY FUNCTION TESTS
PIF_VALUE: 29
PIF_VALUE: 14
PIF_VALUE: 26

## 2023-09-02 NOTE — ED NOTES
TRANSFER - OUT REPORT:    Verbal report given to Ro Zavaleta on Isa Curran  being transferred to ICU 3 for routine progression of patient care       Report consisted of patient's Situation, Background, Assessment and   Recommendations(SBAR). Information from the following report(s) Nurse Handoff Report, Index, ED Encounter Summary, ED SBAR, and MAR was reviewed with the receiving nurse. Warren Fall Assessment:    Presents to emergency department  because of falls (Syncope, seizure, or loss of consciousness): Yes  Age > 79: No  Altered Mental Status, Intoxication with alcohol or substance confusion (Disorientation, impaired judgment, poor safety awaremess, or inability to follow instructions): Yes  Impaired Mobility: Ambulates or transfers with assistive devices or assistance; Unable to ambulate or transer.: Yes  Nursing Judgement: Yes          Lines:   Peripheral IV 09/01/23 Left;Proximal;Anterior Forearm (Active)       Peripheral IV 09/01/23 Right; Anterior External Jugular (Active)       Peripheral IV 09/01/23 Distal;Right; Anterior Cephalic (Active)        Opportunity for questions and clarification was provided.       Patient transported with:  Registered Nurse Alicia Peguero RN  09/01/23 2005

## 2023-09-02 NOTE — CONSULTS
Comprehensive Nutrition Assessment    Type and Reason for Visit:  Initial, Consult, NPO/Clear Liquid    Nutrition Recommendations/Plan:   If unable to extubate, rec tube feeding regimen:   Formula: Jevity 1.5  Start at 20 ml/hr  Advance as tolerated by 10 ml q 8 hours  Goal Rate: 55 ml/hr x 20 hours (for anticipation of holding tube feeds for standard nursing care)  Water Flushes: 50 mL q 4 hours (300 mL total)  Modular(s): Prosource once daily  Goal Regimen Provides (with modulars):  1730 kcal [1954 kcal w/ propofol], 90g protein, 1136 ml free water, 100% RDIs  Continue to monitor tolerance of EN, weight, labs, and plan of care during admission. Malnutrition Assessment:  Malnutrition Status: At risk for malnutrition (Comment) (NPO, vent) (09/02/23 1234)    Context:  Acute Illness       Nutrition History and Allergies: PMHx: CHF, CAD s/p stent, SVT s/p AICD, substance abuse, medical non-compliance. Wt hx: 172 lb (10/12/22), 156 lb (8/30/23), noted current charted wt is estimated. Bed scale this date 69.9 kg. If EMR hx accurate, wt loss of 16 lb (9.3%) x 11 months (not significant). Limited hx available. NKFA per chart. Nutrition Assessment:    Admitted for being found down unresponsive, resp failure requiring intubation 9/1. Consult noted for TF ordering and management. Discussed care with MD. Plan for possible extubation, will provide TF recommendations only. Sedation held per RN.     Nutrition Related Findings:    Last BM (including prior to admit):  (PTA)  Edema: Generalized  Edema Generalized: +1 Pertinent Meds: pepcid, humalog, zosyn, D10 @ 10 ml/hr (24g dex, 86 kcal), versed, propofol @ 8.5 ml/hr (provides 224 kcal)  Pertinent Labs: POC Glucose  mg/dl x 24 hrs, BUN/Cr 33/1.62, GFR 47, Phos 5.1 H Wound Type: None       Current Nutrition Intake & Therapies:    Average Meal Intake: NPO     Diet NPO    Anthropometric Measures:  Height: 6' (182.9 cm)  Ideal Body Weight (IBW): 178 lbs (81 kg)

## 2023-09-02 NOTE — PLAN OF CARE
Problem: Discharge Planning  Goal: Discharge to home or other facility with appropriate resources  Outcome: Not Progressing     Problem: Safety - Medical Restraint  Goal: Remains free of injury from restraints (Restraint for Interference with Medical Device)  Description: INTERVENTIONS:  1. Determine that other, less restrictive measures have been tried or would not be effective before applying the restraint  2. Evaluate the patient's condition at the time of restraint application  3. Inform patient/family regarding the reason for restraint  4. Q2H: Monitor safety, psychosocial status, comfort, nutrition and hydration  Outcome: Not Progressing     Problem: Safety - Adult  Goal: Free from fall injury  Outcome: Progressing     Problem: Pain  Goal: Verbalizes/displays adequate comfort level or baseline comfort level  Outcome: Progressing     Problem: Discharge Planning  Goal: Discharge to home or other facility with appropriate resources  Outcome: Not Progressing     Problem: Safety - Medical Restraint  Goal: Remains free of injury from restraints (Restraint for Interference with Medical Device)  Description: INTERVENTIONS:  1. Determine that other, less restrictive measures have been tried or would not be effective before applying the restraint  2. Evaluate the patient's condition at the time of restraint application  3. Inform patient/family regarding the reason for restraint  4.  Q2H: Monitor safety, psychosocial status, comfort, nutrition and hydration  Outcome: Not Progressing

## 2023-09-03 ENCOUNTER — APPOINTMENT (OUTPATIENT)
Facility: HOSPITAL | Age: 63
DRG: 917 | End: 2023-09-03
Payer: MEDICARE

## 2023-09-03 LAB
ANION GAP SERPL CALC-SCNC: 4 MMOL/L (ref 3–18)
BASOPHILS # BLD: 0 K/UL (ref 0–0.1)
BASOPHILS NFR BLD: 0 % (ref 0–2)
BUN SERPL-MCNC: 23 MG/DL (ref 7–18)
BUN/CREAT SERPL: 17 (ref 12–20)
CALCIUM SERPL-MCNC: 7.3 MG/DL (ref 8.5–10.1)
CHLORIDE SERPL-SCNC: 108 MMOL/L (ref 100–111)
CO2 SERPL-SCNC: 27 MMOL/L (ref 21–32)
CREAT SERPL-MCNC: 1.36 MG/DL (ref 0.6–1.3)
DIFFERENTIAL METHOD BLD: ABNORMAL
EOSINOPHIL # BLD: 0.1 K/UL (ref 0–0.4)
EOSINOPHIL NFR BLD: 1 % (ref 0–5)
ERYTHROCYTE [DISTWIDTH] IN BLOOD BY AUTOMATED COUNT: 13.2 % (ref 11.6–14.5)
GLUCOSE BLD STRIP.AUTO-MCNC: 105 MG/DL (ref 70–110)
GLUCOSE BLD STRIP.AUTO-MCNC: 88 MG/DL (ref 70–110)
GLUCOSE BLD STRIP.AUTO-MCNC: 89 MG/DL (ref 70–110)
GLUCOSE SERPL-MCNC: 94 MG/DL (ref 74–99)
HCT VFR BLD AUTO: 36.4 % (ref 36–48)
HGB BLD-MCNC: 11.6 G/DL (ref 13–16)
IMM GRANULOCYTES # BLD AUTO: 0 K/UL (ref 0–0.04)
IMM GRANULOCYTES NFR BLD AUTO: 0 % (ref 0–0.5)
LYMPHOCYTES # BLD: 0.9 K/UL (ref 0.9–3.6)
LYMPHOCYTES NFR BLD: 12 % (ref 21–52)
MAGNESIUM SERPL-MCNC: 1.9 MG/DL (ref 1.6–2.6)
MCH RBC QN AUTO: 31 PG (ref 24–34)
MCHC RBC AUTO-ENTMCNC: 31.9 G/DL (ref 31–37)
MCV RBC AUTO: 97.3 FL (ref 78–100)
MONOCYTES # BLD: 0.9 K/UL (ref 0.05–1.2)
MONOCYTES NFR BLD: 11 % (ref 3–10)
NEUTS SEG # BLD: 6.1 K/UL (ref 1.8–8)
NEUTS SEG NFR BLD: 76 % (ref 40–73)
NRBC # BLD: 0 K/UL (ref 0–0.01)
NRBC BLD-RTO: 0 PER 100 WBC
PHOSPHATE SERPL-MCNC: 4.8 MG/DL (ref 2.5–4.9)
PLATELET # BLD AUTO: 166 K/UL (ref 135–420)
PMV BLD AUTO: 12.3 FL (ref 9.2–11.8)
POTASSIUM SERPL-SCNC: 4.1 MMOL/L (ref 3.5–5.5)
RBC # BLD AUTO: 3.74 M/UL (ref 4.35–5.65)
SODIUM SERPL-SCNC: 139 MMOL/L (ref 136–145)
VANCOMYCIN SERPL-MCNC: 11 UG/ML (ref 5–40)
WBC # BLD AUTO: 7.9 K/UL (ref 4.6–13.2)

## 2023-09-03 PROCEDURE — 2700000000 HC OXYGEN THERAPY PER DAY

## 2023-09-03 PROCEDURE — 97161 PT EVAL LOW COMPLEX 20 MIN: CPT

## 2023-09-03 PROCEDURE — 2580000003 HC RX 258: Performed by: INTERNAL MEDICINE

## 2023-09-03 PROCEDURE — 85025 COMPLETE CBC W/AUTO DIFF WBC: CPT

## 2023-09-03 PROCEDURE — 2580000003 HC RX 258: Performed by: STUDENT IN AN ORGANIZED HEALTH CARE EDUCATION/TRAINING PROGRAM

## 2023-09-03 PROCEDURE — 2500000003 HC RX 250 WO HCPCS: Performed by: PHYSICIAN ASSISTANT

## 2023-09-03 PROCEDURE — 83735 ASSAY OF MAGNESIUM: CPT

## 2023-09-03 PROCEDURE — 71045 X-RAY EXAM CHEST 1 VIEW: CPT

## 2023-09-03 PROCEDURE — 1100000003 HC PRIVATE W/ TELEMETRY

## 2023-09-03 PROCEDURE — 6360000002 HC RX W HCPCS: Performed by: PHYSICIAN ASSISTANT

## 2023-09-03 PROCEDURE — 99291 CRITICAL CARE FIRST HOUR: CPT | Performed by: INTERNAL MEDICINE

## 2023-09-03 PROCEDURE — 92526 ORAL FUNCTION THERAPY: CPT

## 2023-09-03 PROCEDURE — A4216 STERILE WATER/SALINE, 10 ML: HCPCS | Performed by: PHYSICIAN ASSISTANT

## 2023-09-03 PROCEDURE — 82962 GLUCOSE BLOOD TEST: CPT

## 2023-09-03 PROCEDURE — 6360000002 HC RX W HCPCS: Performed by: STUDENT IN AN ORGANIZED HEALTH CARE EDUCATION/TRAINING PROGRAM

## 2023-09-03 PROCEDURE — 97530 THERAPEUTIC ACTIVITIES: CPT

## 2023-09-03 PROCEDURE — 80048 BASIC METABOLIC PNL TOTAL CA: CPT

## 2023-09-03 PROCEDURE — 92610 EVALUATE SWALLOWING FUNCTION: CPT

## 2023-09-03 PROCEDURE — 6360000002 HC RX W HCPCS: Performed by: INTERNAL MEDICINE

## 2023-09-03 PROCEDURE — 80202 ASSAY OF VANCOMYCIN: CPT

## 2023-09-03 PROCEDURE — 84100 ASSAY OF PHOSPHORUS: CPT

## 2023-09-03 PROCEDURE — 2580000003 HC RX 258: Performed by: PHYSICIAN ASSISTANT

## 2023-09-03 PROCEDURE — 94761 N-INVAS EAR/PLS OXIMETRY MLT: CPT

## 2023-09-03 RX ORDER — MAGNESIUM SULFATE IN WATER 40 MG/ML
2000 INJECTION, SOLUTION INTRAVENOUS ONCE
Status: COMPLETED | OUTPATIENT
Start: 2023-09-03 | End: 2023-09-03

## 2023-09-03 RX ADMIN — SODIUM CHLORIDE, PRESERVATIVE FREE 10 ML: 5 INJECTION INTRAVENOUS at 09:25

## 2023-09-03 RX ADMIN — PIPERACILLIN SODIUM AND TAZOBACTAM SODIUM 3375 MG: 3; .375 INJECTION, POWDER, LYOPHILIZED, FOR SOLUTION INTRAVENOUS at 21:16

## 2023-09-03 RX ADMIN — VANCOMYCIN HYDROCHLORIDE 1000 MG: 1 INJECTION, POWDER, LYOPHILIZED, FOR SOLUTION INTRAVENOUS at 12:15

## 2023-09-03 RX ADMIN — HEPARIN SODIUM 5000 UNITS: 5000 INJECTION INTRAVENOUS; SUBCUTANEOUS at 06:29

## 2023-09-03 RX ADMIN — SODIUM CHLORIDE, PRESERVATIVE FREE 10 ML: 5 INJECTION INTRAVENOUS at 21:16

## 2023-09-03 RX ADMIN — PIPERACILLIN SODIUM AND TAZOBACTAM SODIUM 3375 MG: 3; .375 INJECTION, POWDER, LYOPHILIZED, FOR SOLUTION INTRAVENOUS at 03:45

## 2023-09-03 RX ADMIN — PIPERACILLIN SODIUM AND TAZOBACTAM SODIUM 3375 MG: 3; .375 INJECTION, POWDER, LYOPHILIZED, FOR SOLUTION INTRAVENOUS at 12:15

## 2023-09-03 RX ADMIN — HEPARIN SODIUM 5000 UNITS: 5000 INJECTION INTRAVENOUS; SUBCUTANEOUS at 21:16

## 2023-09-03 RX ADMIN — FAMOTIDINE 20 MG: 10 INJECTION, SOLUTION INTRAVENOUS at 09:25

## 2023-09-03 RX ADMIN — MAGNESIUM SULFATE HEPTAHYDRATE 2000 MG: 40 INJECTION, SOLUTION INTRAVENOUS at 06:29

## 2023-09-03 RX ADMIN — HEPARIN SODIUM 5000 UNITS: 5000 INJECTION INTRAVENOUS; SUBCUTANEOUS at 14:17

## 2023-09-03 NOTE — PLAN OF CARE
Problem: Physical Therapy - Adult  Goal: By Discharge: Performs mobility at highest level of function for planned discharge setting. See evaluation for individualized goals. Description: Physical Therapy Goals:  Initiated 9/3/2023 to be met within 7-10 days. 1.  Patient will move from supine to sit and sit to supine  in bed with independence. 2.  Patient will transfer from bed to chair and chair to bed with independence using the least restrictive device. 3.  Patient will perform sit to stand with independence. 4.  Patient will ambulate with independence for 250 feet with the least restrictive device. 5.  Patient will ascend/descend 3 stairs with (U) handrail(s) with modified independence. PLOF: Independent, owns his own company. Pt has some help if needed. Outcome: Progressing     PHYSICAL THERAPY EVALUATION    Patient: Carmen Terrell (78 y.o. male)  Date: 9/3/2023  Primary Diagnosis: Cardiac arrest [facility]) [I46.9]  Acute respiratory failure ([address]) [J96.00]  Substance abuse ([address]) [F19.10]  Acute respiratory failure with hypoxia ([address]) [J96.01]       Precautions: Fall Risk, General Precautions,  ,  ,  ,  ,  ,  ,      ASSESSMENT :  Pt presents with impaired functional mobility 2/2 admission. Pt is oriented x 3 yet is very confused on the situation and what exactly happened causing him to be hospitalized. Pt ambulated around room this session with RW and SBA, no LOB or symptomatic complaints. Anticipate pt will progress well with mobility, will follow up during acute stay for longer ambulation distances and stair negotiation as needed. DEFICITS/IMPAIRMENTS:    , Body Structures, Functions, Activity Limitations Requiring Skilled Therapeutic Intervention: Decreased functional mobility ; Decreased endurance    Patient will benefit from skilled intervention to address the above impairments. Patient's rehabilitation potential/Therapy Prognosis: Good.   Factors which may influence rehabilitation

## 2023-09-03 NOTE — PROGRESS NOTES
Physician Progress Note      PATIENT:               Suleiman Lundberg  CSN #:                  [id number]  :                       1960  ADMIT DATE:       2023 10:05 AM  DISCH DATE:        2023 1:45 PM  RESPONDING  PROVIDER #:        Tanika Lora MD          QUERY TEXT:    Pt admitted with SOB,CP and Acute HFrEF . Pt noted to have 86% on RA  hypoxia    and tachypnea present   MD PN  he appears dyspneic during conversation. If possible, please document in the progress notes and discharge summary if   you are evaluating and/or treating any of the following: The medical record reflects the following:    Risk Factors: 60y.o. year old male with history of HFrEF, hypertension, CAD,   hyperlipidemia, peripheral neuropathy, CKD 3 who presents with approximately 1   week of increased fatigue, dyspnea, cough abd pain and decreased appetite . Clinical Indicators: -ED RN- arrived to triage with c/o chest pain with   SOB with labored breathing. ED MD-c/o CP and SOB swelling to his lower extremities as well as a   nonproductive cough + shortness of breath is worse with laying down and with   movement ,Tachypnea present. Hypoxia requiring supplemental oxygen. H+P IM MD-who presented to [facility] ED today with complaints of approximately 1   week of generalized fatigue and malaise, increased shortness of breath and   dyspnea, orthopnea, decreased appetite. Patient reports running out of his   medications over the past 1 to 2 weeks , Hypoxia requiring supplemental oxygen   . 86 % on room air , RR 32 in ED.     MD PNPt reports persistent dyspnea and abdominal pain today. He denies   any improvement in his dyspnea. Has diuresised appropriately with IV lasix. O2   sat is 99% on 3 L oxygen today. Renal function tolerating. Repeat EKG,   troponin's, CXR without acute changes On exam he appears dyspneic during   conversation. Mild crackles heard on exam today.  Patient still experiencing

## 2023-09-03 NOTE — PLAN OF CARE
Problem: SLP Adult - Impaired Swallowing  Goal: By Discharge: Advance to least restrictive diet without signs or symptoms of aspiration for planned discharge setting. See evaluation for individualized goals. Description: Patient will:  1. Tolerate PO trials with 0 s/s overt distress in 4/5 trials  2. Utilize compensatory swallow strategies/maneuvers (decrease bite/sip, size/rate, alt. liq/sol) with min cues in 4/5 trials  3. Perform oral-motor/laryngeal exercises to increase oropharyngeal swallow function with min cues  4. Complete an objective swallow study (i.e., MBSS) to assess swallow integrity, r/o aspiration, and determine of safest LRD, min A as indicated/ordered by MD     Rec:     Regular diet with thin liquids  Aspiration precautions  HOB >45 during po intake, remain >30 for 30-45 minutes after po   Small bites/sips; alternate liquid/solid with slow feeding rate   Oral care TID  Meds per pt preference  Outcome: Progressing   SPEECH LANGUAGE PATHOLOGY BEDSIDE SWALLOW EVALUATION/TREATMENT    Patient: Yossi Whiteside (31 y.o. male)  Date: 9/3/2023  Primary Diagnosis: Cardiac arrest ([address]) [I46.9]  Acute respiratory failure ([address]) [J96.00]  Substance abuse ([address]) [F19.10]  Acute respiratory failure with hypoxia ([address]) [J96.01]       Precautions: Aspiration  PLOF: As per H&P    ASSESSMENT:  Based on the objective data described below, the patient presents with oropharyngeal swallow fxn essentially WFL. Strength, ROM, and coordination of the orofacial musculature were all found to be [facility]. Pt tolerated reg solid, puree, and thin liquids +/- straw consecutive swallows without any overt s/sx of aspiration. Mastication was appropriate with timely a-p transit. Positive oral clearance observed across all trials. Pt safe for initiation of reg solid diet with thin liquids, aspiration precautions, oral care TID, and meds as tolerated.      TREATMENT:  Skilled therapy initiated; Educated patient on aspiration precautions

## 2023-09-03 NOTE — INTERDISCIPLINARY ROUNDS
[address] [facility]  Pulmonary, Critical Care, and Sleep Medicine  Interdisciplinary and Ventilator Weaning Rounds    Patient discussed in morning walking rounds and interdisciplinary rounds. ICU admission day: 9/1    Overnight events:   No acute events     Assessments and best practice:  Ventilator  Lieberated 9/3  Glycemic control  Diet  NPO  Stress ulcer prophylaxis. Pepcid  DVT prophylaxis. SQH  Need for Lines, patel assessed.   Yes  Disposition regarding transferring out of the ICU  GREEN      Palliative consult within 3 days of admission to ICU-  Ethics Guidance: 21 days      Daily Plans:  Swallow eval  Transfer to medicine team     Karena Olszewski, APRN - NP  09/03/23  Pulmonary, [facility]

## 2023-09-04 ENCOUNTER — APPOINTMENT (OUTPATIENT)
Facility: HOSPITAL | Age: 63
DRG: 917 | End: 2023-09-04
Payer: MEDICARE

## 2023-09-04 LAB
ANION GAP SERPL CALC-SCNC: 7 MMOL/L (ref 3–18)
BASOPHILS # BLD: 0 K/UL (ref 0–0.1)
BASOPHILS NFR BLD: 0 % (ref 0–2)
BUN SERPL-MCNC: 16 MG/DL (ref 7–18)
BUN/CREAT SERPL: 13 (ref 12–20)
CALCIUM SERPL-MCNC: 7 MG/DL (ref 8.5–10.1)
CHLORIDE SERPL-SCNC: 106 MMOL/L (ref 100–111)
CO2 SERPL-SCNC: 24 MMOL/L (ref 21–32)
CREAT SERPL-MCNC: 1.25 MG/DL (ref 0.6–1.3)
DIFFERENTIAL METHOD BLD: ABNORMAL
EOSINOPHIL # BLD: 0.2 K/UL (ref 0–0.4)
EOSINOPHIL NFR BLD: 2 % (ref 0–5)
ERYTHROCYTE [DISTWIDTH] IN BLOOD BY AUTOMATED COUNT: 12.9 % (ref 11.6–14.5)
GLUCOSE SERPL-MCNC: 98 MG/DL (ref 74–99)
HCT VFR BLD AUTO: 34.8 % (ref 36–48)
HGB BLD-MCNC: 11.3 G/DL (ref 13–16)
IMM GRANULOCYTES # BLD AUTO: 0 K/UL (ref 0–0.04)
IMM GRANULOCYTES NFR BLD AUTO: 0 % (ref 0–0.5)
LYMPHOCYTES # BLD: 1 K/UL (ref 0.9–3.6)
LYMPHOCYTES NFR BLD: 13 % (ref 21–52)
MAGNESIUM SERPL-MCNC: 1.6 MG/DL (ref 1.6–2.6)
MCH RBC QN AUTO: 31.1 PG (ref 24–34)
MCHC RBC AUTO-ENTMCNC: 32.5 G/DL (ref 31–37)
MCV RBC AUTO: 95.9 FL (ref 78–100)
MONOCYTES # BLD: 0.9 K/UL (ref 0.05–1.2)
MONOCYTES NFR BLD: 12 % (ref 3–10)
NEUTS SEG # BLD: 5.5 K/UL (ref 1.8–8)
NEUTS SEG NFR BLD: 73 % (ref 40–73)
NRBC # BLD: 0 K/UL (ref 0–0.01)
NRBC BLD-RTO: 0 PER 100 WBC
PHOSPHATE SERPL-MCNC: 3.7 MG/DL (ref 2.5–4.9)
PLATELET # BLD AUTO: 167 K/UL (ref 135–420)
PMV BLD AUTO: 12.9 FL (ref 9.2–11.8)
POTASSIUM SERPL-SCNC: 3.5 MMOL/L (ref 3.5–5.5)
RBC # BLD AUTO: 3.63 M/UL (ref 4.35–5.65)
SODIUM SERPL-SCNC: 137 MMOL/L (ref 136–145)
WBC # BLD AUTO: 7.5 K/UL (ref 4.6–13.2)

## 2023-09-04 PROCEDURE — 2580000003 HC RX 258: Performed by: STUDENT IN AN ORGANIZED HEALTH CARE EDUCATION/TRAINING PROGRAM

## 2023-09-04 PROCEDURE — 6370000000 HC RX 637 (ALT 250 FOR IP)

## 2023-09-04 PROCEDURE — 36415 COLL VENOUS BLD VENIPUNCTURE: CPT

## 2023-09-04 PROCEDURE — 71045 X-RAY EXAM CHEST 1 VIEW: CPT

## 2023-09-04 PROCEDURE — 2580000003 HC RX 258: Performed by: PHYSICIAN ASSISTANT

## 2023-09-04 PROCEDURE — 92526 ORAL FUNCTION THERAPY: CPT

## 2023-09-04 PROCEDURE — 2140000001 HC CVICU INTERMEDIATE R&B

## 2023-09-04 PROCEDURE — 6360000002 HC RX W HCPCS: Performed by: INTERNAL MEDICINE

## 2023-09-04 PROCEDURE — 84100 ASSAY OF PHOSPHORUS: CPT

## 2023-09-04 PROCEDURE — 80048 BASIC METABOLIC PNL TOTAL CA: CPT

## 2023-09-04 PROCEDURE — 6360000002 HC RX W HCPCS: Performed by: PHYSICIAN ASSISTANT

## 2023-09-04 PROCEDURE — 97535 SELF CARE MNGMENT TRAINING: CPT

## 2023-09-04 PROCEDURE — 83735 ASSAY OF MAGNESIUM: CPT

## 2023-09-04 PROCEDURE — 2580000003 HC RX 258: Performed by: INTERNAL MEDICINE

## 2023-09-04 PROCEDURE — 85025 COMPLETE CBC W/AUTO DIFF WBC: CPT

## 2023-09-04 PROCEDURE — 6360000002 HC RX W HCPCS: Performed by: STUDENT IN AN ORGANIZED HEALTH CARE EDUCATION/TRAINING PROGRAM

## 2023-09-04 PROCEDURE — 97166 OT EVAL MOD COMPLEX 45 MIN: CPT

## 2023-09-04 PROCEDURE — 94761 N-INVAS EAR/PLS OXIMETRY MLT: CPT

## 2023-09-04 RX ORDER — CLOPIDOGREL BISULFATE 75 MG/1
75 TABLET ORAL DAILY
Status: DISCONTINUED | OUTPATIENT
Start: 2023-09-04 | End: 2023-09-05 | Stop reason: HOSPADM

## 2023-09-04 RX ORDER — SPIRONOLACTONE 25 MG/1
12.5 TABLET ORAL DAILY
Status: DISCONTINUED | OUTPATIENT
Start: 2023-09-04 | End: 2023-09-05 | Stop reason: HOSPADM

## 2023-09-04 RX ORDER — TORSEMIDE 20 MG/1
10 TABLET ORAL ONCE
Status: COMPLETED | OUTPATIENT
Start: 2023-09-04 | End: 2023-09-04

## 2023-09-04 RX ORDER — ROSUVASTATIN CALCIUM 20 MG/1
20 TABLET, COATED ORAL NIGHTLY
Status: DISCONTINUED | OUTPATIENT
Start: 2023-09-04 | End: 2023-09-05 | Stop reason: HOSPADM

## 2023-09-04 RX ORDER — POTASSIUM CHLORIDE 20 MEQ/1
20 TABLET, EXTENDED RELEASE ORAL
Status: COMPLETED | OUTPATIENT
Start: 2023-09-04 | End: 2023-09-04

## 2023-09-04 RX ORDER — GABAPENTIN 300 MG/1
300 CAPSULE ORAL 3 TIMES DAILY
Status: DISCONTINUED | OUTPATIENT
Start: 2023-09-04 | End: 2023-09-05 | Stop reason: HOSPADM

## 2023-09-04 RX ORDER — PANTOPRAZOLE SODIUM 40 MG/1
40 TABLET, DELAYED RELEASE ORAL
Status: DISCONTINUED | OUTPATIENT
Start: 2023-09-04 | End: 2023-09-05 | Stop reason: HOSPADM

## 2023-09-04 RX ORDER — CARVEDILOL 6.25 MG/1
6.25 TABLET ORAL 2 TIMES DAILY WITH MEALS
Status: DISCONTINUED | OUTPATIENT
Start: 2023-09-04 | End: 2023-09-05

## 2023-09-04 RX ORDER — LANOLIN ALCOHOL/MO/W.PET/CERES
400 CREAM (GRAM) TOPICAL DAILY
Status: DISCONTINUED | OUTPATIENT
Start: 2023-09-04 | End: 2023-09-05 | Stop reason: HOSPADM

## 2023-09-04 RX ORDER — TORSEMIDE 20 MG/1
20 TABLET ORAL DAILY
Status: DISCONTINUED | OUTPATIENT
Start: 2023-09-04 | End: 2023-09-05 | Stop reason: HOSPADM

## 2023-09-04 RX ADMIN — HEPARIN SODIUM 5000 UNITS: 5000 INJECTION INTRAVENOUS; SUBCUTANEOUS at 21:30

## 2023-09-04 RX ADMIN — SODIUM CHLORIDE, PRESERVATIVE FREE 10 ML: 5 INJECTION INTRAVENOUS at 08:04

## 2023-09-04 RX ADMIN — PANTOPRAZOLE SODIUM 40 MG: 40 TABLET, DELAYED RELEASE ORAL at 08:08

## 2023-09-04 RX ADMIN — ROSUVASTATIN 20 MG: 20 TABLET, FILM COATED ORAL at 20:41

## 2023-09-04 RX ADMIN — PIPERACILLIN SODIUM AND TAZOBACTAM SODIUM 3375 MG: 3; .375 INJECTION, POWDER, LYOPHILIZED, FOR SOLUTION INTRAVENOUS at 20:41

## 2023-09-04 RX ADMIN — POTASSIUM CHLORIDE 20 MEQ: 1500 TABLET, EXTENDED RELEASE ORAL at 09:38

## 2023-09-04 RX ADMIN — PIPERACILLIN SODIUM AND TAZOBACTAM SODIUM 3375 MG: 3; .375 INJECTION, POWDER, LYOPHILIZED, FOR SOLUTION INTRAVENOUS at 11:39

## 2023-09-04 RX ADMIN — Medication 400 MG: at 08:00

## 2023-09-04 RX ADMIN — POTASSIUM CHLORIDE 20 MEQ: 1500 TABLET, EXTENDED RELEASE ORAL at 08:00

## 2023-09-04 RX ADMIN — CLOPIDOGREL BISULFATE 75 MG: 75 TABLET ORAL at 08:00

## 2023-09-04 RX ADMIN — TORSEMIDE 10 MG: 20 TABLET ORAL at 16:37

## 2023-09-04 RX ADMIN — SODIUM CHLORIDE, PRESERVATIVE FREE 10 ML: 5 INJECTION INTRAVENOUS at 20:42

## 2023-09-04 RX ADMIN — HEPARIN SODIUM 5000 UNITS: 5000 INJECTION INTRAVENOUS; SUBCUTANEOUS at 13:51

## 2023-09-04 RX ADMIN — VANCOMYCIN HYDROCHLORIDE 1000 MG: 1 INJECTION, POWDER, LYOPHILIZED, FOR SOLUTION INTRAVENOUS at 05:42

## 2023-09-04 RX ADMIN — PIPERACILLIN SODIUM AND TAZOBACTAM SODIUM 3375 MG: 3; .375 INJECTION, POWDER, LYOPHILIZED, FOR SOLUTION INTRAVENOUS at 03:14

## 2023-09-04 RX ADMIN — HEPARIN SODIUM 5000 UNITS: 5000 INJECTION INTRAVENOUS; SUBCUTANEOUS at 05:43

## 2023-09-04 RX ADMIN — SPIRONOLACTONE 12.5 MG: 25 TABLET ORAL at 08:00

## 2023-09-04 ASSESSMENT — PAIN SCALES - GENERAL
PAINLEVEL_OUTOF10: 0

## 2023-09-04 NOTE — PLAN OF CARE
Problem: SLP Adult - Impaired Swallowing  Goal: By Discharge: Advance to least restrictive diet without signs or symptoms of aspiration for planned discharge setting. See evaluation for individualized goals. Description: Patient will:  1. Tolerate PO trials with 0 s/s overt distress in 4/5 trials- met  2. Utilize compensatory swallow strategies/maneuvers (decrease bite/sip, size/rate, alt. liq/sol) with min cues in 4/5 trials- met  3. Perform oral-motor/laryngeal exercises to increase oropharyngeal swallow function with min cues- n/a  4. Complete an objective swallow study (i.e., MBSS) to assess swallow integrity, r/o aspiration, and determine of safest LRD, min A as indicated/ordered by MD- n/a    Rec:     Regular diet with thin liquids  Aspiration precautions  HOB >45 during po intake, remain >30 for 30-45 minutes after po   Small bites/sips; alternate liquid/solid with slow feeding rate   Oral care TID  Meds per pt preference  Outcome: Completed   [address]    Patient: Adam Manriquez (89 y.o. male)  Date: 9/4/2023  Diagnosis: Cardiac arrest ([address]) [I46.9]  Acute respiratory failure ([address]) [J96.00]  Substance abuse ([address]) [F19.10]  Acute respiratory failure with hypoxia ([address]) [J96.01] Acute respiratory failure ([address])      Precautions: Aspiration  PLOF: As per H&P     ASSESSMENT:  Pt was seen at bedside for follow up dysphagia management. Pt tolerated reg solid, puree, and thin liquids + straw without any overt s/sx of aspiration. He reports no concern for dysphagia. Laryngeal elevation/excursion was functional/timely to palpation. Recommend to continue regular solid diet with thin liquids, aspiration precautions, oral care TID, and meds as tolerated. No further skilled SLP services are indicated at this time. Please re-consult if needed.     Progression toward goals:  [x]         Goals met/approximated  []         Not making progress/Not appropriate - will d/c POC

## 2023-09-04 NOTE — DISCHARGE SUMMARY
START taking these medications      benzonatate 100 MG capsule  Commonly known as: TESSALON  Take 1 capsule by mouth 2 times daily as needed for Cough            CONTINUE taking these medications      carvedilol 6.25 MG tablet  Commonly known as: COREG  Take 1 tablet by mouth 2 times daily (with meals)     clopidogrel 75 MG tablet  Commonly known as: PLAVIX  Take 1 tablet by mouth daily     gabapentin 300 MG capsule  Commonly known as: NEURONTIN  Take 1 capsule by mouth 3 times daily for 90 doses. Max Daily Amount: 900 mg     nitroGLYCERIN 0.4 MG SL tablet  Commonly known as: NITROSTAT     pantoprazole 40 MG tablet  Commonly known as: PROTONIX  Take 1 tablet by mouth every morning (before breakfast)     rosuvastatin 20 MG tablet  Commonly known as: CRESTOR  Take 1 tablet by mouth nightly     spironolactone 25 MG tablet  Commonly known as: ALDACTONE  Take 0.5 tablets by mouth daily     torsemide 20 MG tablet  Commonly known as: DEMADEX  Take 1 tablet by mouth daily               Where to Get Your Medications        These medications were sent to [address] [place] [address], [address] [place], [place] [address] ([address])  [address] [place] [address],Third Floor      Phone: [phone]   benzonatate 100 MG capsule       Hospital Course:   Patient is a 58yo male with PMH of HFrEF, HTN, CAD, HLD, CKD3, peripheral neuropathy, positive UDS, and recent hospitalization 8/22-8/31 for CHF exacerbation who presented to the ED on 09/01 after being found unresponsive at home. Patient was brought to the ED and received BVM ventilation during transport. He required intubation. He received 4 doses of Narcan with no response despite pinpoint pupils and respiratory failure. UDS was positive for cocaine and barbiturates (though did receive a dose of fioricet in the hospital on 8/29).  He was started on vanc (9/1-9/4) and zosyn on 9/1 for sepsis ppx and concern for aspiration pneumonia, but blood & urine

## 2023-09-04 NOTE — CARE COORDINATION
Case Management Assessment  Initial Evaluation    Date/Time of Evaluation: 9/4/2023 8:25 AM  Assessment Completed by: Nahomy Coulter    If patient is discharged prior to next notation, then this note serves as note for discharge by case management. Patient Name: Deborah Goldstein                   YOB: 1960  Diagnosis: Cardiac arrest [facility]) [I46.9]  Acute respiratory failure ([address]) [J96.00]  Substance abuse ([address]) [F19.10]  Acute respiratory failure with hypoxia ([address]) [J96.01]                   Date / Time: 9/1/2023  4:56 AM    Patient Admission Status: Inpatient   Readmission Risk (Low < 19, Mod (19-27), High > 27): Readmission Risk Score: 17.4    Current PCP: Jen Beach MD  PCP verified by CM? Yes (Dr Jen Beach MD)    Chart Reviewed: Yes      History Provided by: Patient  Patient Orientation: Alert and Oriented, Person, Self    Patient Cognition: Alert    Hospitalization in the last 30 days (Readmission):  Yes    If yes, Readmission Assessment in CM Navigator will be completed. Advance Directives:      Code Status: Full Code   Patient's Primary Decision Maker is:      Primary Decision Maker: Ivon Daniel (Sister) - Brother/Sister - [phone]    Discharge Planning:    Patient lives with: Alone Type of Home: House  Primary Care Giver: Family  Patient Support Systems include: Family Members [facility]: [phone])   Current Financial resources: Medicare  Current community resources: None  Current services prior to admission: Home Care            Current DME:              Type of Home Care services:  PT, OT, Nursing Services    ADLS  Prior functional level: Independent in ADLs/IADLs  Current functional level: Other (see comment) (TBD)    PT AM-PAC: 20 /24  OT AM-PAC:   /24    Family can provide assistance at DC: No  Would you like Case Management to discuss the discharge plan with any other family members/significant others, and if so, who?  No  Plans to Return to

## 2023-09-05 VITALS
HEART RATE: 88 BPM | SYSTOLIC BLOOD PRESSURE: 114 MMHG | OXYGEN SATURATION: 97 % | DIASTOLIC BLOOD PRESSURE: 63 MMHG | HEIGHT: 72 IN | WEIGHT: 156 LBS | TEMPERATURE: 98 F | BODY MASS INDEX: 21.13 KG/M2 | RESPIRATION RATE: 19 BRPM

## 2023-09-05 PROBLEM — I25.5 ISCHEMIC CARDIOMYOPATHY: Status: ACTIVE | Noted: 2023-09-05

## 2023-09-05 PROBLEM — I50.20 HFREF (HEART FAILURE WITH REDUCED EJECTION FRACTION) (HCC): Status: ACTIVE | Noted: 2023-09-05

## 2023-09-05 LAB
ANION GAP SERPL CALC-SCNC: 7 MMOL/L (ref 3–18)
BASOPHILS # BLD: 0 K/UL (ref 0–0.1)
BASOPHILS NFR BLD: 1 % (ref 0–2)
BUN SERPL-MCNC: 15 MG/DL (ref 7–18)
BUN/CREAT SERPL: 12 (ref 12–20)
CALCIUM SERPL-MCNC: 7.4 MG/DL (ref 8.5–10.1)
CHLORIDE SERPL-SCNC: 108 MMOL/L (ref 100–111)
CO2 SERPL-SCNC: 23 MMOL/L (ref 21–32)
CREAT SERPL-MCNC: 1.28 MG/DL (ref 0.6–1.3)
DIFFERENTIAL METHOD BLD: ABNORMAL
EOSINOPHIL # BLD: 0.2 K/UL (ref 0–0.4)
EOSINOPHIL NFR BLD: 4 % (ref 0–5)
ERYTHROCYTE [DISTWIDTH] IN BLOOD BY AUTOMATED COUNT: 12.8 % (ref 11.6–14.5)
GLUCOSE SERPL-MCNC: 90 MG/DL (ref 74–99)
HCT VFR BLD AUTO: 34 % (ref 36–48)
HGB BLD-MCNC: 11.1 G/DL (ref 13–16)
IMM GRANULOCYTES # BLD AUTO: 0 K/UL (ref 0–0.04)
IMM GRANULOCYTES NFR BLD AUTO: 0 % (ref 0–0.5)
LYMPHOCYTES # BLD: 1.3 K/UL (ref 0.9–3.6)
LYMPHOCYTES NFR BLD: 22 % (ref 21–52)
MAGNESIUM SERPL-MCNC: 1.8 MG/DL (ref 1.6–2.6)
MCH RBC QN AUTO: 31.2 PG (ref 24–34)
MCHC RBC AUTO-ENTMCNC: 32.6 G/DL (ref 31–37)
MCV RBC AUTO: 95.5 FL (ref 78–100)
MONOCYTES # BLD: 0.6 K/UL (ref 0.05–1.2)
MONOCYTES NFR BLD: 10 % (ref 3–10)
NEUTS SEG # BLD: 3.9 K/UL (ref 1.8–8)
NEUTS SEG NFR BLD: 63 % (ref 40–73)
NRBC # BLD: 0 K/UL (ref 0–0.01)
NRBC BLD-RTO: 0 PER 100 WBC
PHOSPHATE SERPL-MCNC: 4 MG/DL (ref 2.5–4.9)
PLATELET # BLD AUTO: 192 K/UL (ref 135–420)
PMV BLD AUTO: 12.1 FL (ref 9.2–11.8)
POTASSIUM SERPL-SCNC: 3.8 MMOL/L (ref 3.5–5.5)
RBC # BLD AUTO: 3.56 M/UL (ref 4.35–5.65)
SODIUM SERPL-SCNC: 138 MMOL/L (ref 136–145)
WBC # BLD AUTO: 6.1 K/UL (ref 4.6–13.2)

## 2023-09-05 PROCEDURE — 94761 N-INVAS EAR/PLS OXIMETRY MLT: CPT

## 2023-09-05 PROCEDURE — 6360000002 HC RX W HCPCS: Performed by: INTERNAL MEDICINE

## 2023-09-05 PROCEDURE — 6370000000 HC RX 637 (ALT 250 FOR IP)

## 2023-09-05 PROCEDURE — 2580000003 HC RX 258: Performed by: INTERNAL MEDICINE

## 2023-09-05 PROCEDURE — 2580000003 HC RX 258: Performed by: PHYSICIAN ASSISTANT

## 2023-09-05 PROCEDURE — 6360000002 HC RX W HCPCS: Performed by: PHYSICIAN ASSISTANT

## 2023-09-05 PROCEDURE — 80048 BASIC METABOLIC PNL TOTAL CA: CPT

## 2023-09-05 PROCEDURE — 84100 ASSAY OF PHOSPHORUS: CPT

## 2023-09-05 PROCEDURE — 85025 COMPLETE CBC W/AUTO DIFF WBC: CPT

## 2023-09-05 PROCEDURE — 83735 ASSAY OF MAGNESIUM: CPT

## 2023-09-05 PROCEDURE — 36415 COLL VENOUS BLD VENIPUNCTURE: CPT

## 2023-09-05 RX ORDER — BENZONATATE 100 MG/1
100 CAPSULE ORAL 3 TIMES DAILY PRN
Status: DISCONTINUED | OUTPATIENT
Start: 2023-09-05 | End: 2023-09-05 | Stop reason: HOSPADM

## 2023-09-05 RX ORDER — CARVEDILOL 6.25 MG/1
6.25 TABLET ORAL 2 TIMES DAILY WITH MEALS
Status: DISCONTINUED | OUTPATIENT
Start: 2023-09-05 | End: 2023-09-05 | Stop reason: HOSPADM

## 2023-09-05 RX ORDER — POTASSIUM CHLORIDE 20 MEQ/1
20 TABLET, EXTENDED RELEASE ORAL ONCE
Status: COMPLETED | OUTPATIENT
Start: 2023-09-05 | End: 2023-09-05

## 2023-09-05 RX ORDER — BENZONATATE 100 MG/1
100 CAPSULE ORAL 2 TIMES DAILY PRN
Qty: 14 CAPSULE | Refills: 0 | Status: SHIPPED | OUTPATIENT
Start: 2023-09-05 | End: 2023-09-12

## 2023-09-05 RX ADMIN — PIPERACILLIN SODIUM AND TAZOBACTAM SODIUM 3375 MG: 3; .375 INJECTION, POWDER, LYOPHILIZED, FOR SOLUTION INTRAVENOUS at 03:58

## 2023-09-05 RX ADMIN — CLOPIDOGREL BISULFATE 75 MG: 75 TABLET ORAL at 07:42

## 2023-09-05 RX ADMIN — POTASSIUM CHLORIDE 20 MEQ: 1500 TABLET, EXTENDED RELEASE ORAL at 07:42

## 2023-09-05 RX ADMIN — CARVEDILOL 6.25 MG: 6.25 TABLET, FILM COATED ORAL at 10:15

## 2023-09-05 RX ADMIN — SODIUM CHLORIDE, PRESERVATIVE FREE 10 ML: 5 INJECTION INTRAVENOUS at 07:43

## 2023-09-05 RX ADMIN — PANTOPRAZOLE SODIUM 40 MG: 40 TABLET, DELAYED RELEASE ORAL at 06:22

## 2023-09-05 RX ADMIN — BENZONATATE 100 MG: 100 CAPSULE ORAL at 07:42

## 2023-09-05 RX ADMIN — Medication 400 MG: at 07:42

## 2023-09-05 RX ADMIN — SPIRONOLACTONE 12.5 MG: 25 TABLET ORAL at 07:42

## 2023-09-05 RX ADMIN — HEPARIN SODIUM 5000 UNITS: 5000 INJECTION INTRAVENOUS; SUBCUTANEOUS at 06:22

## 2023-09-05 RX ADMIN — TORSEMIDE 20 MG: 20 TABLET ORAL at 07:42

## 2023-09-05 RX ADMIN — HEPARIN SODIUM 5000 UNITS: 5000 INJECTION INTRAVENOUS; SUBCUTANEOUS at 14:15

## 2023-09-05 ASSESSMENT — PAIN SCALES - GENERAL
PAINLEVEL_OUTOF10: 0
PAINLEVEL_OUTOF10: 0

## 2023-09-05 NOTE — PLAN OF CARE
Problem: Discharge Planning  Goal: Discharge to home or other facility with appropriate resources  Outcome: Adequate for Discharge  Flowsheets (Taken 9/5/2023 0752)  Discharge to home or other facility with appropriate resources: Identify barriers to discharge with patient and caregiver     Problem: Safety - Medical Restraint  Goal: Remains free of injury from restraints (Restraint for Interference with Medical Device)  Description: INTERVENTIONS:  1. Determine that other, less restrictive measures have been tried or would not be effective before applying the restraint  2. Evaluate the patient's condition at the time of restraint application  3. Inform patient/family regarding the reason for restraint  4. Q2H: Monitor safety, psychosocial status, comfort, nutrition and hydration  Outcome: Adequate for Discharge     Problem: Safety - Adult  Goal: Free from fall injury  Outcome: Adequate for Discharge     Problem: Pain  Goal: Verbalizes/displays adequate comfort level or baseline comfort level  Outcome: Adequate for Discharge     Problem: Nutrition Deficit:  Goal: Optimize nutritional status  Outcome: Adequate for Discharge     Problem: Chronic Conditions and Co-morbidities  Goal: Patient's chronic conditions and co-morbidity symptoms are monitored and maintained or improved  Outcome: Adequate for Discharge  Flowsheets (Taken 9/5/2023 0752)  Care Plan - Patient's Chronic Conditions and Co-Morbidity Symptoms are Monitored and Maintained or Improved: Monitor and assess patient's chronic conditions and comorbid symptoms for stability, deterioration, or improvement     Problem: Skin/Tissue Integrity  Goal: Absence of new skin breakdown  Description: 1. Monitor for areas of redness and/or skin breakdown  2. Assess vascular access sites hourly  3. Every 4-6 hours minimum:  Change oxygen saturation probe site  4.   Every 4-6 hours:  If on nasal continuous positive airway pressure, respiratory therapy assess nares and

## 2023-09-05 NOTE — CARE COORDINATION
Met with pt. He stated home health did not see him before he came back to the hospital.      Kae Delcid of [facility] - INPATIENT. She stated pt had orders for hospital to home but was not seen before pt returned to the hospital.  She stated pt will need a new order for h2h. Called Attending for h2h orders.           Swapna Rodriguez, LIZZETHN RN  Care Management

## 2023-09-05 NOTE — HOME CARE
[facility] referral received. Spoke with sister listed on contact list.  Explained [facility] services. Demographics verified including insurance, phone and address confirmed. Caregivers available lives by self.   Has family member available to assist.  Orders noted and arranged and sent to central intake and scheduling.      --  Halie Gil LPN  [facility]

## 2023-09-05 NOTE — CARE COORDINATION
09/05/23 1123   IMM Letter   IMM Letter given to Patient/Family/Significant other/Guardian/POA/by: Wendy Rodriguez   IMM Letter date given: 09/05/23   IMM Letter time given: 120 LIZZETH MarinelliN RN  Care Management

## 2023-09-05 NOTE — CARE COORDINATION
Discharge order noted for today. Pt has been accepted to [facility] agency. Met with patient  and are agreeable to the transition plan today. Transport has been arranged through pt's family. Patient's discharge summary and home health  orders have been forwarded to [facility]. Updated bedside RN, Donna,  to the transition plan.   Discharge information has been documented on the AVS.             JERICHO Barnett RN  Care Management

## 2023-09-06 ENCOUNTER — TELEPHONE (OUTPATIENT)
Age: 63
End: 2023-09-06

## 2023-09-06 NOTE — TELEPHONE ENCOUNTER
----- Message from Shahana Ruvalcaba PA-C sent at 8/30/2023  9:06 AM EDT -----  Regarding: A [facility]ale appt  Please schedule an appt with Jackie Shown in 3 weeks, re: a/c HFrEF             Thanks,   Dinah Bravo

## 2023-09-07 LAB
BACTERIA SPEC CULT: NORMAL
BACTERIA SPEC CULT: NORMAL
SERVICE CMNT-IMP: NORMAL
SERVICE CMNT-IMP: NORMAL

## 2024-01-16 ENCOUNTER — APPOINTMENT (OUTPATIENT)
Facility: HOSPITAL | Age: 64
End: 2024-01-16
Payer: MEDICARE

## 2024-01-16 ENCOUNTER — HOSPITAL ENCOUNTER (INPATIENT)
Facility: HOSPITAL | Age: 64
LOS: 4 days | Discharge: HOME | End: 2024-01-20
Attending: EMERGENCY MEDICINE | Admitting: FAMILY MEDICINE
Payer: MEDICARE

## 2024-01-16 DIAGNOSIS — J18.9 COMMUNITY ACQUIRED PNEUMONIA, BILATERAL: ICD-10-CM

## 2024-01-16 DIAGNOSIS — I50.21 ACUTE HFREF (HEART FAILURE WITH REDUCED EJECTION FRACTION) (HCC): ICD-10-CM

## 2024-01-16 DIAGNOSIS — I50.22 SYSTOLIC CHF, CHRONIC (HCC): ICD-10-CM

## 2024-01-16 DIAGNOSIS — J18.9 COMMUNITY ACQUIRED PNEUMONIA OF RIGHT LOWER LOBE OF LUNG: Primary | ICD-10-CM

## 2024-01-16 PROBLEM — J15.9 COMMUNITY ACQUIRED BACTERIAL PNEUMONIA: Status: ACTIVE | Noted: 2024-01-16

## 2024-01-16 LAB
ALBUMIN SERPL-MCNC: 2.4 G/DL (ref 3.4–5)
ALBUMIN/GLOB SERPL: 0.5 (ref 0.8–1.7)
ALP SERPL-CCNC: 124 U/L (ref 45–117)
ALT SERPL-CCNC: 17 U/L (ref 16–61)
ANION GAP SERPL CALC-SCNC: 8 MMOL/L (ref 3–18)
APPEARANCE UR: CLEAR
AST SERPL-CCNC: 21 U/L (ref 10–38)
BACTERIA URNS QL MICRO: NEGATIVE /HPF
BASOPHILS # BLD: 0 K/UL (ref 0–0.1)
BASOPHILS NFR BLD: 0 % (ref 0–2)
BILIRUB SERPL-MCNC: 1.2 MG/DL (ref 0.2–1)
BILIRUB UR QL: ABNORMAL
BUN SERPL-MCNC: 27 MG/DL (ref 7–18)
BUN/CREAT SERPL: 17 (ref 12–20)
CALCIUM SERPL-MCNC: 7.5 MG/DL (ref 8.5–10.1)
CHLORIDE SERPL-SCNC: 97 MMOL/L (ref 100–111)
CO2 SERPL-SCNC: 28 MMOL/L (ref 21–32)
COLOR UR: ABNORMAL
CREAT SERPL-MCNC: 1.62 MG/DL (ref 0.6–1.3)
DIFFERENTIAL METHOD BLD: ABNORMAL
EOSINOPHIL # BLD: 0 K/UL (ref 0–0.4)
EOSINOPHIL NFR BLD: 0 % (ref 0–5)
EPITH CASTS URNS QL MICRO: NORMAL /LPF (ref 0–5)
ERYTHROCYTE [DISTWIDTH] IN BLOOD BY AUTOMATED COUNT: 15.5 % (ref 11.6–14.5)
FLUAV RNA SPEC QL NAA+PROBE: NOT DETECTED
FLUBV RNA SPEC QL NAA+PROBE: NOT DETECTED
GLOBULIN SER CALC-MCNC: 4.6 G/DL (ref 2–4)
GLUCOSE SERPL-MCNC: 102 MG/DL (ref 74–99)
GLUCOSE UR STRIP.AUTO-MCNC: NEGATIVE MG/DL
HCT VFR BLD AUTO: 36 % (ref 36–48)
HGB BLD-MCNC: 11.7 G/DL (ref 13–16)
HGB UR QL STRIP: NEGATIVE
IMM GRANULOCYTES # BLD AUTO: 0.1 K/UL (ref 0–0.04)
IMM GRANULOCYTES NFR BLD AUTO: 1 % (ref 0–0.5)
KETONES UR QL STRIP.AUTO: NEGATIVE MG/DL
LACTATE SERPL-SCNC: 1.4 MMOL/L (ref 0.4–2)
LACTATE SERPL-SCNC: 1.6 MMOL/L (ref 0.4–2)
LEUKOCYTE ESTERASE UR QL STRIP.AUTO: NEGATIVE
LYMPHOCYTES # BLD: 0.9 K/UL (ref 0.9–3.6)
LYMPHOCYTES NFR BLD: 6 % (ref 21–52)
MCH RBC QN AUTO: 29.8 PG (ref 24–34)
MCHC RBC AUTO-ENTMCNC: 32.5 G/DL (ref 31–37)
MCV RBC AUTO: 91.8 FL (ref 78–100)
MONOCYTES # BLD: 1.2 K/UL (ref 0.05–1.2)
MONOCYTES NFR BLD: 7 % (ref 3–10)
NEUTS SEG # BLD: 14.7 K/UL (ref 1.8–8)
NEUTS SEG NFR BLD: 87 % (ref 40–73)
NITRITE UR QL STRIP.AUTO: NEGATIVE
NRBC # BLD: 0 K/UL (ref 0–0.01)
NRBC BLD-RTO: 0 PER 100 WBC
NT PRO BNP: ABNORMAL PG/ML (ref 0–900)
PH UR STRIP: 5.5 (ref 5–8)
PLATELET # BLD AUTO: 523 K/UL (ref 135–420)
PMV BLD AUTO: 10.6 FL (ref 9.2–11.8)
POTASSIUM SERPL-SCNC: 3.7 MMOL/L (ref 3.5–5.5)
PROCALCITONIN SERPL-MCNC: 3.91 NG/ML
PROT SERPL-MCNC: 7 G/DL (ref 6.4–8.2)
PROT UR STRIP-MCNC: ABNORMAL MG/DL
RBC # BLD AUTO: 3.92 M/UL (ref 4.35–5.65)
RBC #/AREA URNS HPF: NORMAL /HPF (ref 0–5)
SARS-COV-2 RNA RESP QL NAA+PROBE: NOT DETECTED
SODIUM SERPL-SCNC: 133 MMOL/L (ref 136–145)
SP GR UR REFRACTOMETRY: 1.03 (ref 1–1.03)
TROPONIN I SERPL HS-MCNC: 16 NG/L (ref 0–78)
TROPONIN I SERPL HS-MCNC: 21 NG/L (ref 0–78)
UROBILINOGEN UR QL STRIP.AUTO: 2 EU/DL (ref 0.2–1)
WBC # BLD AUTO: 16.9 K/UL (ref 4.6–13.2)
WBC URNS QL MICRO: NORMAL /HPF (ref 0–4)

## 2024-01-16 PROCEDURE — 1100000000 HC RM PRIVATE

## 2024-01-16 PROCEDURE — 94761 N-INVAS EAR/PLS OXIMETRY MLT: CPT

## 2024-01-16 PROCEDURE — 2580000003 HC RX 258

## 2024-01-16 PROCEDURE — 83880 ASSAY OF NATRIURETIC PEPTIDE: CPT

## 2024-01-16 PROCEDURE — 71260 CT THORAX DX C+: CPT

## 2024-01-16 PROCEDURE — 84484 ASSAY OF TROPONIN QUANT: CPT

## 2024-01-16 PROCEDURE — 80053 COMPREHEN METABOLIC PANEL: CPT

## 2024-01-16 PROCEDURE — 84145 PROCALCITONIN (PCT): CPT

## 2024-01-16 PROCEDURE — 85025 COMPLETE CBC W/AUTO DIFF WBC: CPT

## 2024-01-16 PROCEDURE — 80307 DRUG TEST PRSMV CHEM ANLYZR: CPT

## 2024-01-16 PROCEDURE — 93005 ELECTROCARDIOGRAM TRACING: CPT | Performed by: EMERGENCY MEDICINE

## 2024-01-16 PROCEDURE — 81001 URINALYSIS AUTO W/SCOPE: CPT

## 2024-01-16 PROCEDURE — 99285 EMERGENCY DEPT VISIT HI MDM: CPT

## 2024-01-16 PROCEDURE — 87636 SARSCOV2 & INF A&B AMP PRB: CPT

## 2024-01-16 PROCEDURE — 6360000004 HC RX CONTRAST MEDICATION: Performed by: EMERGENCY MEDICINE

## 2024-01-16 PROCEDURE — 2580000003 HC RX 258: Performed by: EMERGENCY MEDICINE

## 2024-01-16 PROCEDURE — 83605 ASSAY OF LACTIC ACID: CPT

## 2024-01-16 PROCEDURE — 71045 X-RAY EXAM CHEST 1 VIEW: CPT

## 2024-01-16 PROCEDURE — 6370000000 HC RX 637 (ALT 250 FOR IP)

## 2024-01-16 PROCEDURE — 6360000002 HC RX W HCPCS

## 2024-01-16 PROCEDURE — 6360000002 HC RX W HCPCS: Performed by: EMERGENCY MEDICINE

## 2024-01-16 PROCEDURE — 87040 BLOOD CULTURE FOR BACTERIA: CPT

## 2024-01-16 RX ORDER — TORSEMIDE 20 MG/1
20 TABLET ORAL DAILY
Status: DISCONTINUED | OUTPATIENT
Start: 2024-01-17 | End: 2024-01-20 | Stop reason: HOSPADM

## 2024-01-16 RX ORDER — POTASSIUM CHLORIDE 20 MEQ/1
40 TABLET, EXTENDED RELEASE ORAL PRN
Status: DISCONTINUED | OUTPATIENT
Start: 2024-01-16 | End: 2024-01-20 | Stop reason: HOSPADM

## 2024-01-16 RX ORDER — SODIUM CHLORIDE, SODIUM LACTATE, POTASSIUM CHLORIDE, CALCIUM CHLORIDE 600; 310; 30; 20 MG/100ML; MG/100ML; MG/100ML; MG/100ML
INJECTION, SOLUTION INTRAVENOUS CONTINUOUS
Status: DISCONTINUED | OUTPATIENT
Start: 2024-01-16 | End: 2024-01-17

## 2024-01-16 RX ORDER — ONDANSETRON 4 MG/1
4 TABLET, ORALLY DISINTEGRATING ORAL EVERY 8 HOURS
Status: DISCONTINUED | OUTPATIENT
Start: 2024-01-16 | End: 2024-01-20 | Stop reason: HOSPADM

## 2024-01-16 RX ORDER — SODIUM CHLORIDE, SODIUM LACTATE, POTASSIUM CHLORIDE, AND CALCIUM CHLORIDE .6; .31; .03; .02 G/100ML; G/100ML; G/100ML; G/100ML
500 INJECTION, SOLUTION INTRAVENOUS ONCE
Status: COMPLETED | OUTPATIENT
Start: 2024-01-16 | End: 2024-01-17

## 2024-01-16 RX ORDER — SODIUM CHLORIDE 0.9 % (FLUSH) 0.9 %
5-40 SYRINGE (ML) INJECTION EVERY 12 HOURS SCHEDULED
Status: DISCONTINUED | OUTPATIENT
Start: 2024-01-16 | End: 2024-01-20 | Stop reason: HOSPADM

## 2024-01-16 RX ORDER — ACETAMINOPHEN 650 MG/1
650 SUPPOSITORY RECTAL EVERY 6 HOURS PRN
Status: DISCONTINUED | OUTPATIENT
Start: 2024-01-16 | End: 2024-01-20 | Stop reason: HOSPADM

## 2024-01-16 RX ORDER — ACETAMINOPHEN 325 MG/1
650 TABLET ORAL EVERY 6 HOURS PRN
Status: DISCONTINUED | OUTPATIENT
Start: 2024-01-16 | End: 2024-01-20 | Stop reason: HOSPADM

## 2024-01-16 RX ORDER — MAGNESIUM SULFATE IN WATER 40 MG/ML
2000 INJECTION, SOLUTION INTRAVENOUS PRN
Status: DISCONTINUED | OUTPATIENT
Start: 2024-01-16 | End: 2024-01-20 | Stop reason: HOSPADM

## 2024-01-16 RX ORDER — CARVEDILOL 6.25 MG/1
6.25 TABLET ORAL 2 TIMES DAILY WITH MEALS
Status: DISCONTINUED | OUTPATIENT
Start: 2024-01-17 | End: 2024-01-20 | Stop reason: HOSPADM

## 2024-01-16 RX ORDER — ACETAMINOPHEN 325 MG/1
650 TABLET ORAL
Status: DISCONTINUED | OUTPATIENT
Start: 2024-01-16 | End: 2024-01-16

## 2024-01-16 RX ORDER — CLOPIDOGREL BISULFATE 75 MG/1
75 TABLET ORAL DAILY
Status: DISCONTINUED | OUTPATIENT
Start: 2024-01-17 | End: 2024-01-20 | Stop reason: HOSPADM

## 2024-01-16 RX ORDER — POTASSIUM CHLORIDE 7.45 MG/ML
10 INJECTION INTRAVENOUS PRN
Status: DISCONTINUED | OUTPATIENT
Start: 2024-01-16 | End: 2024-01-20 | Stop reason: HOSPADM

## 2024-01-16 RX ORDER — ROSUVASTATIN CALCIUM 20 MG/1
20 TABLET, COATED ORAL NIGHTLY
Status: DISCONTINUED | OUTPATIENT
Start: 2024-01-16 | End: 2024-01-20 | Stop reason: HOSPADM

## 2024-01-16 RX ORDER — SODIUM CHLORIDE 9 MG/ML
INJECTION, SOLUTION INTRAVENOUS PRN
Status: DISCONTINUED | OUTPATIENT
Start: 2024-01-16 | End: 2024-01-20 | Stop reason: HOSPADM

## 2024-01-16 RX ORDER — ENOXAPARIN SODIUM 100 MG/ML
40 INJECTION SUBCUTANEOUS DAILY
Status: DISCONTINUED | OUTPATIENT
Start: 2024-01-17 | End: 2024-01-20 | Stop reason: HOSPADM

## 2024-01-16 RX ORDER — ONDANSETRON 2 MG/ML
4 INJECTION INTRAMUSCULAR; INTRAVENOUS EVERY 8 HOURS
Status: DISCONTINUED | OUTPATIENT
Start: 2024-01-16 | End: 2024-01-20 | Stop reason: HOSPADM

## 2024-01-16 RX ORDER — ONDANSETRON 4 MG/1
4 TABLET, ORALLY DISINTEGRATING ORAL EVERY 8 HOURS PRN
Status: DISCONTINUED | OUTPATIENT
Start: 2024-01-16 | End: 2024-01-16

## 2024-01-16 RX ORDER — POLYETHYLENE GLYCOL 3350 17 G/17G
17 POWDER, FOR SOLUTION ORAL DAILY PRN
Status: DISCONTINUED | OUTPATIENT
Start: 2024-01-16 | End: 2024-01-20 | Stop reason: HOSPADM

## 2024-01-16 RX ORDER — NITROGLYCERIN 0.4 MG/1
0.4 TABLET SUBLINGUAL EVERY 5 MIN PRN
Status: DISCONTINUED | OUTPATIENT
Start: 2024-01-16 | End: 2024-01-20 | Stop reason: HOSPADM

## 2024-01-16 RX ORDER — ONDANSETRON 2 MG/ML
4 INJECTION INTRAMUSCULAR; INTRAVENOUS EVERY 6 HOURS PRN
Status: DISCONTINUED | OUTPATIENT
Start: 2024-01-16 | End: 2024-01-16

## 2024-01-16 RX ORDER — SPIRONOLACTONE 25 MG/1
12.5 TABLET ORAL DAILY
Status: DISCONTINUED | OUTPATIENT
Start: 2024-01-17 | End: 2024-01-20 | Stop reason: HOSPADM

## 2024-01-16 RX ORDER — SODIUM CHLORIDE 0.9 % (FLUSH) 0.9 %
5-40 SYRINGE (ML) INJECTION PRN
Status: DISCONTINUED | OUTPATIENT
Start: 2024-01-16 | End: 2024-01-20 | Stop reason: HOSPADM

## 2024-01-16 RX ADMIN — ONDANSETRON 4 MG: 2 INJECTION INTRAMUSCULAR; INTRAVENOUS at 22:42

## 2024-01-16 RX ADMIN — ROSUVASTATIN CALCIUM 20 MG: 20 TABLET, FILM COATED ORAL at 22:42

## 2024-01-16 RX ADMIN — IOPAMIDOL 80 ML: 612 INJECTION, SOLUTION INTRAVENOUS at 18:11

## 2024-01-16 RX ADMIN — WATER 1000 MG: 1 INJECTION INTRAMUSCULAR; INTRAVENOUS; SUBCUTANEOUS at 20:30

## 2024-01-16 RX ADMIN — SODIUM CHLORIDE, POTASSIUM CHLORIDE, SODIUM LACTATE AND CALCIUM CHLORIDE: 600; 310; 30; 20 INJECTION, SOLUTION INTRAVENOUS at 22:46

## 2024-01-16 RX ADMIN — SODIUM CHLORIDE, POTASSIUM CHLORIDE, SODIUM LACTATE AND CALCIUM CHLORIDE 500 ML: 600; 310; 30; 20 INJECTION, SOLUTION INTRAVENOUS at 23:12

## 2024-01-16 RX ADMIN — AZITHROMYCIN DIHYDRATE 500 MG: 500 INJECTION, POWDER, LYOPHILIZED, FOR SOLUTION INTRAVENOUS at 20:31

## 2024-01-16 RX ADMIN — SODIUM CHLORIDE, PRESERVATIVE FREE 10 ML: 5 INJECTION INTRAVENOUS at 22:50

## 2024-01-16 ASSESSMENT — ENCOUNTER SYMPTOMS
COUGH: 1
CONSTIPATION: 1
STRIDOR: 0
TROUBLE SWALLOWING: 0
ABDOMINAL DISTENTION: 0
ABDOMINAL PAIN: 1
NAUSEA: 1
CHEST TIGHTNESS: 0
RHINORRHEA: 1
EYES NEGATIVE: 1
WHEEZING: 0
SHORTNESS OF BREATH: 0
VOMITING: 1
SORE THROAT: 0
BLOOD IN STOOL: 0
DIARRHEA: 0

## 2024-01-16 NOTE — ED PROVIDER NOTES
EMERGENCY DEPARTMENT HISTORY AND PHYSICAL EXAM    5:24 PM      Date: 1/16/2024  Patient Name: Moiz May    History of Presenting Illness     Chief Complaint   Patient presents with    Chest Pain       History From: Patient  Patient is a 63-year-old male with a history of coronary disease, CHF, ejection fraction of 10 to 15%, subarachnoid hemorrhage in the past, ICD placement, hypertension, hyperlipidemia, the presents emergency department with complaint of 1 week of increasing fatigue, nausea, vomiting, not being able to eat or drink anything, and weakness.  Patient is also been feeling lightheaded, and having abdominal pain.  Patient denies any blood in his stool but he said he is been losing weight and thinks he is also getting swollen.  Patient denies any other aggravating or alleviating factors.  Patient said he is can compliant with all his medications.  Patient denies being a smoker, drinker, no drug user.               Nursing Notes were all reviewed and agreed with or any disagreements were addressed in the HPI.    PCP: Dee Dee Alves MD    Current Facility-Administered Medications   Medication Dose Route Frequency Provider Last Rate Last Admin    iopamidol (ISOVUE-300) 61 % injection 80 mL  80 mL IntraVENous ONCE PRN Jonas Srinivasan MD         Current Outpatient Medications   Medication Sig Dispense Refill    rosuvastatin (CRESTOR) 20 MG tablet Take 1 tablet by mouth nightly 30 tablet 3    carvedilol (COREG) 6.25 MG tablet Take 1 tablet by mouth 2 times daily (with meals) 60 tablet 3    torsemide (DEMADEX) 20 MG tablet Take 1 tablet by mouth daily 30 tablet 3    gabapentin (NEURONTIN) 300 MG capsule Take 1 capsule by mouth 3 times daily for 90 doses. Max Daily Amount: 900 mg 90 capsule 0    pantoprazole (PROTONIX) 40 MG tablet Take 1 tablet by mouth every morning (before breakfast) 30 tablet 0    clopidogrel (PLAVIX) 75 MG tablet Take 1 tablet by mouth daily 30 tablet 3    spironolactone

## 2024-01-16 NOTE — ED TRIAGE NOTES
Pt states that started with chest pain that started yesterday. Pt states has a defibrillator. Denies any firing of defibrillator.
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
## 2024-01-17 LAB
ALBUMIN SERPL-MCNC: 2 G/DL (ref 3.4–5)
ALBUMIN/GLOB SERPL: 0.5 (ref 0.8–1.7)
ALP SERPL-CCNC: 122 U/L (ref 45–117)
ALT SERPL-CCNC: 20 U/L (ref 16–61)
AMPHET UR QL SCN: NEGATIVE
ANION GAP SERPL CALC-SCNC: 6 MMOL/L (ref 3–18)
AST SERPL-CCNC: 42 U/L (ref 10–38)
BARBITURATES UR QL SCN: NEGATIVE
BENZODIAZ UR QL: NEGATIVE
BILIRUB SERPL-MCNC: 0.7 MG/DL (ref 0.2–1)
BUN SERPL-MCNC: 24 MG/DL (ref 7–18)
BUN/CREAT SERPL: 20 (ref 12–20)
CALCIUM SERPL-MCNC: 6.7 MG/DL (ref 8.5–10.1)
CANNABINOIDS UR QL SCN: NEGATIVE
CHLORIDE SERPL-SCNC: 101 MMOL/L (ref 100–111)
CO2 SERPL-SCNC: 29 MMOL/L (ref 21–32)
COCAINE UR QL SCN: POSITIVE
CREAT SERPL-MCNC: 1.18 MG/DL (ref 0.6–1.3)
EKG ATRIAL RATE: 109 BPM
EKG DIAGNOSIS: NORMAL
EKG P AXIS: 80 DEGREES
EKG P-R INTERVAL: 152 MS
EKG Q-T INTERVAL: 382 MS
EKG QRS DURATION: 100 MS
EKG QTC CALCULATION (BAZETT): 514 MS
EKG R AXIS: 87 DEGREES
EKG T AXIS: 78 DEGREES
EKG VENTRICULAR RATE: 109 BPM
ERYTHROCYTE [DISTWIDTH] IN BLOOD BY AUTOMATED COUNT: 15.6 % (ref 11.6–14.5)
GLOBULIN SER CALC-MCNC: 4 G/DL (ref 2–4)
GLUCOSE SERPL-MCNC: 109 MG/DL (ref 74–99)
HCT VFR BLD AUTO: 35.6 % (ref 36–48)
HGB BLD-MCNC: 11.3 G/DL (ref 13–16)
Lab: ABNORMAL
MAGNESIUM SERPL-MCNC: 1.4 MG/DL (ref 1.6–2.6)
MCH RBC QN AUTO: 30.3 PG (ref 24–34)
MCHC RBC AUTO-ENTMCNC: 31.7 G/DL (ref 31–37)
MCV RBC AUTO: 95.4 FL (ref 78–100)
METHADONE UR QL: NEGATIVE
NRBC # BLD: 0 K/UL (ref 0–0.01)
NRBC BLD-RTO: 0 PER 100 WBC
OPIATES UR QL: NEGATIVE
PCP UR QL: NEGATIVE
PHOSPHATE SERPL-MCNC: 4 MG/DL (ref 2.5–4.9)
PLATELET # BLD AUTO: 432 K/UL (ref 135–420)
PMV BLD AUTO: 11.1 FL (ref 9.2–11.8)
POTASSIUM SERPL-SCNC: 3.6 MMOL/L (ref 3.5–5.5)
PROT SERPL-MCNC: 6 G/DL (ref 6.4–8.2)
RBC # BLD AUTO: 3.73 M/UL (ref 4.35–5.65)
SODIUM SERPL-SCNC: 136 MMOL/L (ref 136–145)
WBC # BLD AUTO: 11.2 K/UL (ref 4.6–13.2)

## 2024-01-17 PROCEDURE — 6360000002 HC RX W HCPCS

## 2024-01-17 PROCEDURE — 84100 ASSAY OF PHOSPHORUS: CPT

## 2024-01-17 PROCEDURE — 80053 COMPREHEN METABOLIC PANEL: CPT

## 2024-01-17 PROCEDURE — 93010 ELECTROCARDIOGRAM REPORT: CPT | Performed by: INTERNAL MEDICINE

## 2024-01-17 PROCEDURE — 2580000003 HC RX 258

## 2024-01-17 PROCEDURE — 1100000000 HC RM PRIVATE

## 2024-01-17 PROCEDURE — 83735 ASSAY OF MAGNESIUM: CPT

## 2024-01-17 PROCEDURE — 6370000000 HC RX 637 (ALT 250 FOR IP)

## 2024-01-17 PROCEDURE — 85027 COMPLETE CBC AUTOMATED: CPT

## 2024-01-17 RX ORDER — ALBUMIN, HUMAN INJ 5% 5 %
25 SOLUTION INTRAVENOUS ONCE
Status: DISCONTINUED | OUTPATIENT
Start: 2024-01-17 | End: 2024-01-20 | Stop reason: HOSPADM

## 2024-01-17 RX ADMIN — WATER 1000 MG: 1 INJECTION INTRAMUSCULAR; INTRAVENOUS; SUBCUTANEOUS at 22:30

## 2024-01-17 RX ADMIN — CARVEDILOL 6.25 MG: 6.25 TABLET, FILM COATED ORAL at 17:40

## 2024-01-17 RX ADMIN — ENOXAPARIN SODIUM 40 MG: 100 INJECTION SUBCUTANEOUS at 10:29

## 2024-01-17 RX ADMIN — ONDANSETRON 4 MG: 4 TABLET, ORALLY DISINTEGRATING ORAL at 22:30

## 2024-01-17 RX ADMIN — SPIRONOLACTONE 12.5 MG: 25 TABLET ORAL at 10:30

## 2024-01-17 RX ADMIN — AZITHROMYCIN DIHYDRATE 500 MG: 500 INJECTION, POWDER, LYOPHILIZED, FOR SOLUTION INTRAVENOUS at 22:30

## 2024-01-17 RX ADMIN — SODIUM CHLORIDE, PRESERVATIVE FREE 10 ML: 5 INJECTION INTRAVENOUS at 22:30

## 2024-01-17 RX ADMIN — ROSUVASTATIN CALCIUM 20 MG: 20 TABLET, FILM COATED ORAL at 22:30

## 2024-01-17 RX ADMIN — CARVEDILOL 6.25 MG: 6.25 TABLET, FILM COATED ORAL at 10:30

## 2024-01-17 RX ADMIN — CLOPIDOGREL BISULFATE 75 MG: 75 TABLET ORAL at 10:30

## 2024-01-17 RX ADMIN — TORSEMIDE 20 MG: 20 TABLET ORAL at 10:30

## 2024-01-17 NOTE — PROGRESS NOTES
completed the initial Spiritual Assessment of the patient, and offered Pastoral Care support to the patient in bed 8 where he will be admitted to the hospital from due to bacterial pneumonia. There is an advance directive on file.,   Patient does not have any Mandaeism/cultural needs that will affect patient’s preferences in health care. Chaplains will continue to follow and will provide pastoral care on an as needed/requested basis.    Chaplain Clayton Richey  Board Certified   Spiritual Care Department  [phone]

## 2024-01-17 NOTE — ED PROVIDER NOTES
7:51 PM :Pt care assumed from Dr. Srinivasan , ED provider. Pt complaint(s), current treatment plan, progression and available diagnostic results have been discussed thoroughly. The patient was seen and evaluated on my shift.   Rounding occurred: Yes  Intended Disposition: admit family medicine team  Pending diagnostic reports and/or labs (please list): Pending CT of the chest abdomen pelvis.  I reviewed the results and it is suggestive of multifocal pneumonia.  This is consistent with patient's complaint of chest pain shortness of breath.  He did have some leukocytosis with tachycardic which meet SIRS criteria.  He is amenable to admission.  Will page family medicine team to admit for inpatient management.  Azithromycin and ceftriaxone ordered.      Diganosis: Multifocal Pneumonia  Dispo: admit to family medicine.      Sp Carpio MD  01/17/24 0228

## 2024-01-17 NOTE — ED NOTES
Pt resting eyes closed breathing even and unlabored, cardiac monitor in place clal bell in reach pt tolerating Po intake , am labs obtained sample sent to lab

## 2024-01-17 NOTE — ED NOTES
..Patient notified of results as noted below by MD, patient verbalized understanding. Patient scheduled for labs 7/7   Pt resting on stretcher eyes open using personal electronic device. Cardiac monitor in place VSS NAD at this time, Pt allergies verified pt medicated per MAR 1L LR infusing at 100ml/hr. Pt reports is hungry will prepare microwave meal. Pt awaiting bed placement at this time. Aware of plan voiced no issues or concerns at this time

## 2024-01-17 NOTE — ED NOTES
Assumed care of pt resting on stretcher urinal at bedside voids freely w/o incident pt reports 7/10 chest pain. Pt using personal electroniic device. Cardiac monitor in place call bell in reach . Pt voiced no further issues or concerns at this time

## 2024-01-17 NOTE — ED NOTES
TRANSFER - OUT REPORT:    Verbal report given to MARNIE aMyo on Moiz May  being transferred to [facility] for routine progression of patient care       Report consisted of patient's Situation, Background, Assessment and   Recommendations(SBAR).     Information from the following report(s) Nurse Handoff Report, ED Encounter Summary, Adult Overview, and Neuro Assessment was reviewed with the receiving nurse.    [place] Fall Assessment:                           Lines:   Peripheral IV 01/16/24 Right;Upper Arm (Active)   Site Assessment Clean, dry & intact 01/16/24 1711   Line Status Blood return noted 01/16/24 1711   Phlebitis Assessment No symptoms 01/16/24 1711   Infiltration Assessment 0 01/16/24 1711   Alcohol Cap Used No 01/16/24 1711   Dressing Status New dressing applied;Clean, dry & intact 01/16/24 1711   Dressing Type Transparent 01/16/24 1711       Peripheral IV 01/16/24 Right Wrist (Active)   Site Assessment Clean, dry & intact 01/16/24 1711   Line Status Blood return noted 01/16/24 1711   Phlebitis Assessment No symptoms 01/16/24 1711   Infiltration Assessment 0 01/16/24 1711   Dressing Status New dressing applied;Clean, dry & intact 01/16/24 1711   Dressing Type Transparent 01/16/24 1711        Opportunity for questions and clarification was provided.      Patient transported with:  Tech

## 2024-01-17 NOTE — ED NOTES
Patient arrives to ED with c/o CP that started yesterday.  Patient A&OX4, on full cardiac monitor, has call bell in reach.  Patient given a urinal and told we need a urine sample.

## 2024-01-17 NOTE — ED NOTES
1L LR 100ml/hr infusion stopped , 500ML bolus at 249ml/hr started pt tolerating well cardiac monitor in place.

## 2024-01-17 NOTE — PROGRESS NOTES
[facility]  POST-ROUNDING NOTE    Assessment & Plan:  Moiz May is a 63 y.o. year old male with PMH of HFrEF, coronary artery disease s/p stent placement, ICD placement, hx subarachnoid hemorrhage, HTN, HLD, admitted for Community acquired bacterial pneumonia     Patient did not want to be seen and evaluated on rounds given he was severely fatigued from overnight stay in ED. However, no changes to the plan at this time. Will continue to monitor for clinical improvement of sx on abx.     The above patient and plan were discussed with my supervising physician.     See daily progress note for full assessment/plan.      Leeanna Mckeon MD, PGY-1  [facility]  1/17/2024, 2:07 PM         9

## 2024-01-17 NOTE — ACP (ADVANCE CARE PLANNING)
Advance Care Planning     Advance Care Planning Inpatient Note  [facility] Department    Today's Date: 1/17/2024  Unit: [facility] 5 SOUTH SURGICAL    Received request from .  Upon review of chart and communication with care team, patient's decision making abilities are not in question.. Patient was/were present in the room during visit.    Goals of ACP Conversation:  Discuss advance care planning documents    Health Care Decision Makers:       Primary Decision Maker: Ivon Daniel (Sister) - Brother/Sister - [phone]  Summary:  Verified Documents  Verified Healthcare Decision Maker    Advance Care Planning Documents (Patient Wishes):  Healthcare Power of /Advance Directive Appointment of Health Care Agent     Assessment:  Patient seen in bed 8 of the emergency room where he will be admitted to the hospital with community acquired bacterial pneumonia. There is an advance directive on file for this patient.    Interventions:      Care Preferences Communicated:   No    Outcomes/Plan:  Existing advance directive reviewed with patient; no changes to patient's previously recorded wishes.    Electronically signed by John Richey Jr., [facility] on 1/17/2024 at 12:17 PM

## 2024-01-17 NOTE — H&P
WBC, UA 0 to 3 0 - 4 /hpf    RBC, UA 0 to 1 0 - 5 /hpf    Epithelial Cells UA FEW 0 - 5 /lpf    BACTERIA, URINE Negative NEG /hpf          RECENT IMAGING ON ADMISSION   XR CHEST PORTABLE    Result Date: 1/16/2024  1.  Patchy multifocal opacities suggestive of developing multifocal pneumonia. 2.  Possible trace left pleural effusion.    CT CHEST ABDOMEN PELVIS W CONTRAST Additional Contrast? Radiologist Recommendation    Result Date: 1/16/2024  Extensive bilateral multilobar pneumonia. Tiny left pleural effusion. Mild circumferential urinary bladder wall thickening. Correlate for cystitis.       MOST RECENT EKG ON ADMISSION  Results for orders placed or performed during the hospital encounter of 01/16/24   EKG 12 Lead   Result Value Ref Range    Ventricular Rate 109 BPM    Atrial Rate 109 BPM    P-R Interval 152 ms    QRS Duration 100 ms    Q-T Interval 382 ms    QTc Calculation (Bazett) 514 ms    P Axis 80 degrees    R Axis 87 degrees    T Axis 78 degrees    Diagnosis       Sinus tachycardia  Minimal voltage criteria for LVH, may be normal variant ( Gunnar product )  Nonspecific T wave abnormality  Abnormal ECG  When compared with ECG of 01-SEP-2023 22:44,  premature ventricular complexes are no longer present  Vent. rate has increased BY  39 BPM  T wave inversion no longer evident in Anterior leads         I have discussed Mr. Moiz May case with my attending who agrees with the plan of care.    Meron Cruz MD , PGY-1   [facility]   January 17, 2024, 12:05 AM     [facility]  Senior Addendum to History and Physical    I have also seen and independently evaluated the patient. I agree with the plan as noted above.    Additional HPI, A/P:     63 y.o. year old male with PMH of HFrEF, coronary artery disease s/p stent placement, ICD placement, hx subarachnoid hemorrhage, HTN, HLD, admitted for CAP. Meeting 2/4 sirs criteria but HDS. On broad spectrum antibiotics to be

## 2024-01-17 NOTE — ED NOTES
Allergies verified pt medicated per MAR , admitting provider at bedside informed of pt reported pain, will place orders cardiac monitoring in place call bell in reach

## 2024-01-18 ENCOUNTER — APPOINTMENT (OUTPATIENT)
Facility: HOSPITAL | Age: 64
End: 2024-01-18
Payer: MEDICARE

## 2024-01-18 PROBLEM — I49.1 PREMATURE ATRIAL COMPLEXES: Status: ACTIVE | Noted: 2024-01-18

## 2024-01-18 LAB
ALBUMIN SERPL-MCNC: 2 G/DL (ref 3.4–5)
ALBUMIN/GLOB SERPL: 0.4 (ref 0.8–1.7)
ALP SERPL-CCNC: 104 U/L (ref 45–117)
ALT SERPL-CCNC: 18 U/L (ref 16–61)
ANION GAP SERPL CALC-SCNC: 5 MMOL/L (ref 3–18)
AST SERPL-CCNC: 21 U/L (ref 10–38)
BILIRUB SERPL-MCNC: 0.9 MG/DL (ref 0.2–1)
BUN SERPL-MCNC: 23 MG/DL (ref 7–18)
BUN/CREAT SERPL: 19 (ref 12–20)
CALCIUM SERPL-MCNC: 7.2 MG/DL (ref 8.5–10.1)
CHLORIDE SERPL-SCNC: 98 MMOL/L (ref 100–111)
CO2 SERPL-SCNC: 29 MMOL/L (ref 21–32)
CREAT SERPL-MCNC: 1.23 MG/DL (ref 0.6–1.3)
ERYTHROCYTE [DISTWIDTH] IN BLOOD BY AUTOMATED COUNT: 15.4 % (ref 11.6–14.5)
GLOBULIN SER CALC-MCNC: 4.6 G/DL (ref 2–4)
GLUCOSE SERPL-MCNC: 142 MG/DL (ref 74–99)
HCT VFR BLD AUTO: 34.5 % (ref 36–48)
HGB BLD-MCNC: 11 G/DL (ref 13–16)
MAGNESIUM SERPL-MCNC: 1.3 MG/DL (ref 1.6–2.6)
MAGNESIUM SERPL-MCNC: 1.9 MG/DL (ref 1.6–2.6)
MCH RBC QN AUTO: 30 PG (ref 24–34)
MCHC RBC AUTO-ENTMCNC: 31.9 G/DL (ref 31–37)
MCV RBC AUTO: 94 FL (ref 78–100)
NRBC # BLD: 0 K/UL (ref 0–0.01)
NRBC BLD-RTO: 0 PER 100 WBC
PHOSPHATE SERPL-MCNC: 2.4 MG/DL (ref 2.5–4.9)
PLATELET # BLD AUTO: 417 K/UL (ref 135–420)
PMV BLD AUTO: 11.3 FL (ref 9.2–11.8)
POTASSIUM SERPL-SCNC: 3.5 MMOL/L (ref 3.5–5.5)
PROT SERPL-MCNC: 6.6 G/DL (ref 6.4–8.2)
RBC # BLD AUTO: 3.67 M/UL (ref 4.35–5.65)
SODIUM SERPL-SCNC: 132 MMOL/L (ref 136–145)
WBC # BLD AUTO: 13.2 K/UL (ref 4.6–13.2)

## 2024-01-18 PROCEDURE — 1100000000 HC RM PRIVATE

## 2024-01-18 PROCEDURE — 2580000003 HC RX 258

## 2024-01-18 PROCEDURE — 6370000000 HC RX 637 (ALT 250 FOR IP)

## 2024-01-18 PROCEDURE — 71045 X-RAY EXAM CHEST 1 VIEW: CPT

## 2024-01-18 PROCEDURE — 6360000002 HC RX W HCPCS: Performed by: FAMILY MEDICINE

## 2024-01-18 PROCEDURE — 6360000002 HC RX W HCPCS

## 2024-01-18 PROCEDURE — 80053 COMPREHEN METABOLIC PANEL: CPT

## 2024-01-18 PROCEDURE — 84100 ASSAY OF PHOSPHORUS: CPT

## 2024-01-18 PROCEDURE — 85027 COMPLETE CBC AUTOMATED: CPT

## 2024-01-18 PROCEDURE — 36415 COLL VENOUS BLD VENIPUNCTURE: CPT

## 2024-01-18 PROCEDURE — 2500000003 HC RX 250 WO HCPCS

## 2024-01-18 PROCEDURE — 83735 ASSAY OF MAGNESIUM: CPT

## 2024-01-18 RX ORDER — MAGNESIUM SULFATE HEPTAHYDRATE 40 MG/ML
2000 INJECTION, SOLUTION INTRAVENOUS ONCE
Status: COMPLETED | OUTPATIENT
Start: 2024-01-18 | End: 2024-01-18

## 2024-01-18 RX ORDER — VANCOMYCIN 1.75 G/350ML
1250 INJECTION, SOLUTION INTRAVENOUS ONCE
Status: COMPLETED | OUTPATIENT
Start: 2024-01-18 | End: 2024-01-18

## 2024-01-18 RX ORDER — BENZONATATE 100 MG/1
100 CAPSULE ORAL 3 TIMES DAILY PRN
Status: DISCONTINUED | OUTPATIENT
Start: 2024-01-18 | End: 2024-01-20 | Stop reason: HOSPADM

## 2024-01-18 RX ORDER — MAGNESIUM SULFATE IN WATER 40 MG/ML
2000 INJECTION, SOLUTION INTRAVENOUS ONCE
Status: DISCONTINUED | OUTPATIENT
Start: 2024-01-18 | End: 2024-01-18

## 2024-01-18 RX ORDER — GUAIFENESIN 600 MG/1
600 TABLET, EXTENDED RELEASE ORAL 2 TIMES DAILY
Status: DISCONTINUED | OUTPATIENT
Start: 2024-01-18 | End: 2024-01-20 | Stop reason: HOSPADM

## 2024-01-18 RX ORDER — MAGNESIUM SULFATE 1 G/100ML
1000 INJECTION INTRAVENOUS ONCE
Status: COMPLETED | OUTPATIENT
Start: 2024-01-18 | End: 2024-01-18

## 2024-01-18 RX ADMIN — PIPERACILLIN SODIUM AND TAZOBACTAM SODIUM 3375 MG: 3; .375 INJECTION, POWDER, LYOPHILIZED, FOR SOLUTION INTRAVENOUS at 17:04

## 2024-01-18 RX ADMIN — VANCOMYCIN 1250 MG: 1.75 INJECTION, SOLUTION INTRAVENOUS at 14:28

## 2024-01-18 RX ADMIN — SPIRONOLACTONE 12.5 MG: 25 TABLET ORAL at 09:47

## 2024-01-18 RX ADMIN — ENOXAPARIN SODIUM 40 MG: 100 INJECTION SUBCUTANEOUS at 09:47

## 2024-01-18 RX ADMIN — SODIUM CHLORIDE, PRESERVATIVE FREE 10 ML: 5 INJECTION INTRAVENOUS at 20:38

## 2024-01-18 RX ADMIN — MAGNESIUM SULFATE HEPTAHYDRATE 2000 MG: 40 INJECTION, SOLUTION INTRAVENOUS at 12:14

## 2024-01-18 RX ADMIN — GUAIFENESIN 600 MG: 600 TABLET, EXTENDED RELEASE ORAL at 20:38

## 2024-01-18 RX ADMIN — CLOPIDOGREL BISULFATE 75 MG: 75 TABLET ORAL at 09:47

## 2024-01-18 RX ADMIN — TORSEMIDE 20 MG: 20 TABLET ORAL at 09:47

## 2024-01-18 RX ADMIN — CARVEDILOL 6.25 MG: 6.25 TABLET, FILM COATED ORAL at 17:02

## 2024-01-18 RX ADMIN — MAGNESIUM SULFATE HEPTAHYDRATE 1000 MG: 1 INJECTION, SOLUTION INTRAVENOUS at 14:30

## 2024-01-18 RX ADMIN — CARVEDILOL 6.25 MG: 6.25 TABLET, FILM COATED ORAL at 09:47

## 2024-01-18 RX ADMIN — GUAIFENESIN 600 MG: 600 TABLET, EXTENDED RELEASE ORAL at 11:01

## 2024-01-18 RX ADMIN — ROSUVASTATIN CALCIUM 20 MG: 20 TABLET, FILM COATED ORAL at 20:38

## 2024-01-18 RX ADMIN — PIPERACILLIN AND TAZOBACTAM 4500 MG: 4; .5 INJECTION, POWDER, FOR SOLUTION INTRAVENOUS at 12:14

## 2024-01-18 NOTE — PROGRESS NOTES
PT orders received and chart reviewed. Patient lying in bed resting. Educated on role of PT and benefits of mobility. Declines participation in skilled PT evaluation. Will follow up.

## 2024-01-18 NOTE — PROGRESS NOTES
[facility]  POST-ROUNDING NOTE    Assessment & Plan:  Moiz May is a 63 y.o. year old male with PMH of HFrEF, coronary artery disease s/p stent placement, ICD placement, hx subarachnoid hemorrhage, HTN, HLD, admitted for Community acquired bacterial pneumonia.     Patient seen and evaluated on rounds. Still endorsing little to no improvement of sx clinically. Also fatigued, short of breath. CBC came back with increase in WBC to 13.2 from 11.2 and now w/ increased ANC and slight left shift. BMP significant for mild hyponatremia.     Community-acquired pneumonia  Sepsis  Plan:  -daily CBC, CMP  -discontinued CTX, azithro; started vanc, zosyn given clinical picture as above  -consider respiratory steroids if needed   -scheduled mucinex, prn tessalon  -incentive spirometry  -repeat CXR  -low threshold for repeat blood cx  -follow up on blood cx, narrow abx as needed; NGTD  -PT/OT/CM consulted; appreciate recs     HFrEF, EF 10% (Aug 2023), stable  Hx SVT s/p SVT ablation 2018  CAD s/p PCI Feb 2022 (SHIRA to RCA)  Ischemic Cardiomyopathy  Plan:  -ordered telemetry  -continue home medications  -daily Mg, Phos; cardiac diet  -has not been following w/ cardiology OP; will need OP f/u  -will look into prior cardiac history for possible repeat TTE while IP vs OP cardiology f/u      See daily progress note for full assessment/plan.      Leeanna Mckeon MD, PGY-1  [facility]  1/18/2024, 12:22 PM

## 2024-01-18 NOTE — PROGRESS NOTES
Bedside and Verbal shift change report given to MARNIE Norwood (oncoming nurse) by MARNIE Marr (offgoing nurse). Report included the following information Nurse Handoff Report, Index, ED Encounter Summary, ED SBAR, Adult Overview, Intake/Output, MAR, and Recent Results.

## 2024-01-18 NOTE — PROGRESS NOTES
Patient had 18 beats Vtach, however PFM Dr. Mckeon was called earlier regarding 14 beat and 5 beat runs and was told to page again only if patient has more than 20 beats. Patient resting in bed, does not appear to be in distress. Reports general malaise but no chest pain.

## 2024-01-18 NOTE — PROGRESS NOTES
[facility]  DAILY PROGRESS NOTE      Patient:    Moiz May , 63 y.o. male   MRN:  [id number]  Room/Bed:  508/01  Admission Date:   1/16/2024  Code status:  Full Code    Reason for Admission: Moiz May is a 63 y.o. year old male with PMH of HFrEF, coronary artery disease s/p stent placement, ICD placement, hx subarachnoid hemorrhage, HTN, HLD, admitted for Community acquired bacterial pneumonia [J15.9]  Community acquired pneumonia, bilateral [J18.9].   Barriers to Discharge: Clinical improvement of sx, clearance of blood cx      ASSESSMENT AND PLAN:     Community-acquired pneumonia  Sepsis  -DDX: stimulant induced bronchospasm/cardiomyopathy exacerbating CP/SOB (hx polysubstance use disorder, hx of UDS+ cocaine and barbituates)  -C/o SOB, cough, cold symptoms, CP rhinorrhea x1week upon ED presentation  -Meets 3 SIRS criteria (tachycardia, elevated white count, SANTY)  -moderate hospitalization risk as per CURB65 criteria, however, given the fact that the patient has multiple comorbidities and required intubation during previous admission (sept 2023), it would be wise to admit this patient for treatment   -CXR (1/16/24): patchy multifocal opacities suggestive of developing multifocal pneumonia, possible trace left pleural effusion  -CT (1/16/24): extensive bilateral multilobar pneumonia  -WBC: 16.9   Plan:  -daily CBC, CMP; pending  -continue CTX 1g IV qd, azithromycin 500mg IV qd  -consider respiratory steroids if needed   -scheduled mucinex, prn tessalon  -incentive spirometry  -follow up on blood cx, narrow abx as needed; NGTD     SANTY - now resolved  -BUN/Cr: 27/1.62 > 24/1.18  Plan  -daily CMP  -avoid nephrotoxic medications     RLQ Abdominal Pain  Anorexia and Cachexia most likely 2/2 Nausea/Vomiting; Dizziness, Malaise  Hypoalbuminemia  BMI <19  DDX: gastroenteritis, gastritis, appendicitis, cystitis, pain from intractable vomitus  -CT abdomen/pelvis (1/16): mild circumferential urinary

## 2024-01-18 NOTE — PROGRESS NOTES
[facility]  DAILY PROGRESS NOTE  DAILY PROGRESS NOTE  THIS IS A MEDICAL STUDENT NOTE TO BE USED FOR EDUCATIONAL PURPOSES ONLY      Patient:    Moiz May , 63 y.o. male   MRN:  [id number]  Room/Bed:  8/  Admission Date:   1/16/2024  Code status:  Full Code    Reason for Admission: Community acquired pneumonia  Barriers to Discharge: Pneumonia, clinical improvement of sx      ASSESSMENT AND PLAN:        Community-acquired pneumonia  Sepsis  --DDX: stimulant induced bronchospasm/cardiomyopathy exacerbating CP/SOB (hx polysubstance use disorder, hx of UDS+ cocaine and barbituates)  -C/o SOB, cough, cold symptoms, CP rhinorrhea x1week upon ED presentation  -Meets 3 SIRS criteria (tachycardia, elevated white count, SANTY)  -moderate hospitalization risk as per CURB65 criteria, however, given the fact that the patient has multiple comorbidities and required intubation during previous admission (sept 2023), it would be wise to admit this patient for treatment   -CXR (1/16/24): patchy multifocal opacities suggestive of developing multifocal pneumonia, possible trace left pleural effusion  -CT (1/16/24): extensive bilateral multilobar pneumonia  -WBC 16.9 > 11.2 > 13.2  Plan:  -daily CBC, CMP  -discontinue CTX, azithro; start on vancomycin and Zosyn considering his clinical picture and slight increase in WBC  -consider respiratory steroids if needed   -scheduled mucinex, prn tessalon  - incentive spirometry  - repeat CXR  -follow up on blood cx, narrow abx as needed  - PT/OT/CM consult        HFrEF, EF 10% (Aug 2023), stable  Hx SVT s/p SVT ablation 2018  CAD s/p PCI Feb 2022 (SHIRA to RCA)  Ischemic Cardiomyopathy  -EKG: NSR  -Echo (Aug 2022): EF 10%  -Home meds: spironolactone 12.5mg qd, torsemide 10mg qd, carvedilol 6.25mg BID, clopidogrel 75mg qd, nitroglycerin 0.4mg PRN  -follows with [facility]; found to have acute on chronic HF exacerbation during hospital admission in Sept 2023,  suppository 650 mg  650 mg Rectal Q6H PRN Meron Cruz MD        ondansetron (ZOFRAN-ODT) disintegrating tablet 4 mg  4 mg Oral q8h eMron Cruz MD   4 mg at 01/17/24 2230    Or    ondansetron (ZOFRAN) injection 4 mg  4 mg IntraVENous q8h Meron Cruz MD   4 mg at 01/16/24 2242       OBJECTIVE:  No intake or output data in the 24 hours ending 01/18/24 0653    BP 99/68   Pulse 91   Temp 97.3 °F (36.3 °C) (Oral)   Resp 20   Ht 1.829 m (6')   Wt 62.5 kg (137 lb 11.2 oz)   SpO2 98%   BMI 18.68 kg/m²     PHYSICAL EXAM  Gen: laying in bed, labored breathing, tripod position  CV: RRR, S1/S2 present without M/R/G, +2 radial pulses, well-perfused  Pulm: CTAB, no wheezes, no crackles  Abd: non tender to palpation   Skin: warm, some sweating  Psych: appropriate, alert, oriented x3, decreased mood    LABWORK (LAST 24 HOURS)  No results found for this or any previous visit (from the past 24 hour(s)).    IMAGING AND PROCEDURES (LAST 24 HOURS)  XR CHEST PORTABLE    Result Date: 1/16/2024  1.  Patchy multifocal opacities suggestive of developing multifocal pneumonia. 2.  Possible trace left pleural effusion.    CT CHEST ABDOMEN PELVIS W CONTRAST Additional Contrast? Radiologist Recommendation    Result Date: 1/16/2024  Extensive bilateral multilobar pneumonia. Tiny left pleural effusion. Mild circumferential urinary bladder wall thickening. Correlate for cystitis.        ================================================================  Further management for Mr. Moiz May will be discussed on rounds with my attending.      [facility]  January 18, 2024 6:53 AM

## 2024-01-18 NOTE — PLAN OF CARE
Problem: Discharge Planning  Goal: Discharge to home or other facility with appropriate resources  Outcome: Progressing  Flowsheets (Taken 1/17/2024 2030)  Discharge to home or other facility with appropriate resources:   Identify barriers to discharge with patient and caregiver   Arrange for needed discharge resources and transportation as appropriate   Identify discharge learning needs (meds, wound care, etc)   Arrange for interpreters to assist at discharge as needed   Refer to discharge planning if patient needs post-hospital services based on physician order or complex needs related to functional status, cognitive ability or social support system

## 2024-01-18 NOTE — CARE COORDINATION
01/18/24 1609   Service Assessment   Patient Orientation Unable to Assess   History Provided By Child/Family   Primary Caregiver Self   Support Systems None   PCP Verified by CM Yes   Prior Functional Level Independent in ADLs/IADLs   Current Functional Level Independent in ADLs/IADLs   Can patient return to prior living arrangement Yes   Ability to make needs known: Fair   Family able to assist with home care needs: No   Financial Resources Medicare   Social/Functional History   Lives With Alone   Type of Home House   Home Layout One level   Home Access Stairs to enter with rails   Entrance Stairs - Number of Steps 4   Home Equipment None   ADL Assistance Independent   Homemaking Assistance Independent   Homemaking Responsibilities Yes   Ambulation Assistance Independent   Transfer Assistance Independent   Mode of Transportation Bus       Attempted to meet with patient and patient was sleeping  Assessment completed with sister over the phone    Patient lives alone at [facility] house  Independent with care. No dme needed  Hx of substance abuse  Unemployed     Per chart review patient has uses [employer] in the past    When medically stable patient will likely arrange own transport    Dispo: likely home    Case Management Assessment  Initial Evaluation    Date/Time of Evaluation: 1/18/2024 4:15 PM  Assessment Completed by: Chandni Boyer    If patient is discharged prior to next notation, then this note serves as note for discharge by case management.    Patient Name: Moiz May                   YOB: 1960  Diagnosis: Community acquired bacterial pneumonia [J15.9]  Community acquired pneumonia of right lower lobe of lung [J18.9]  Community acquired pneumonia, bilateral [J18.9]                   Date / Time: 1/16/2024  4:18 PM    Patient Admission Status: Inpatient   Readmission Risk (Low < 19, Mod (19-27), High > 27): Readmission Risk Score: 15.9    Current PCP: Dee Dee Alves,

## 2024-01-18 NOTE — PROGRESS NOTES
[facility]  PM Check    Assessment & Plan:    Patient seen at bedside. Says that he feels unchanged from last bedside visit. Reports some swelling of the toes; otherwise no acute complaints. Denies CP, SOB.    Physical Exam:  General: Toxic appearing, NAD    Cardiac: RRR, no MRG, no JVP detected; +1 radial pulse with very faint intermittently palpable pedal pulse; no LE edema    Pulm: CTAB, no wheeze, rales, rhonchi    The above patient and plan were discussed with my supervising physician.     See daily progress note for full assessment/plan.      Meron Cruz MD, PGY-1  [facility]  1/18/2024, 6:53 PM

## 2024-01-19 ENCOUNTER — APPOINTMENT (OUTPATIENT)
Facility: HOSPITAL | Age: 64
End: 2024-01-19
Payer: MEDICARE

## 2024-01-19 LAB
ALBUMIN SERPL-MCNC: 2 G/DL (ref 3.4–5)
ALBUMIN/GLOB SERPL: 0.4 (ref 0.8–1.7)
ALP SERPL-CCNC: 118 U/L (ref 45–117)
ALT SERPL-CCNC: 21 U/L (ref 16–61)
ANION GAP SERPL CALC-SCNC: 6 MMOL/L (ref 3–18)
AST SERPL-CCNC: 26 U/L (ref 10–38)
BILIRUB SERPL-MCNC: 1.1 MG/DL (ref 0.2–1)
BUN SERPL-MCNC: 23 MG/DL (ref 7–18)
BUN/CREAT SERPL: 17 (ref 12–20)
CALCIUM SERPL-MCNC: 7.2 MG/DL (ref 8.5–10.1)
CHLORIDE SERPL-SCNC: 97 MMOL/L (ref 100–111)
CO2 SERPL-SCNC: 29 MMOL/L (ref 21–32)
CREAT SERPL-MCNC: 1.32 MG/DL (ref 0.6–1.3)
ECHO AO ASC DIAM: 3.9 CM
ECHO AO ASCENDING AORTA INDEX: 2.15 CM/M2
ECHO AO ROOT DIAM: 3.4 CM
ECHO AO ROOT INDEX: 1.88 CM/M2
ECHO AV PEAK GRADIENT: 4 MMHG
ECHO AV PEAK VELOCITY: 1 M/S
ECHO BSA: 1.78 M2
ECHO LA VOL A-L A2C: 89 ML (ref 18–58)
ECHO LA VOL A-L A4C: 105 ML (ref 18–58)
ECHO LA VOL MOD A2C: 85 ML (ref 18–58)
ECHO LA VOL MOD A4C: 101 ML (ref 18–58)
ECHO LA VOLUME AREA LENGTH: 105 ML
ECHO LA VOLUME INDEX A-L A2C: 49 ML/M2 (ref 16–34)
ECHO LA VOLUME INDEX A-L A4C: 58 ML/M2 (ref 16–34)
ECHO LA VOLUME INDEX AREA LENGTH: 58 ML/M2 (ref 16–34)
ECHO LA VOLUME INDEX MOD A2C: 47 ML/M2 (ref 16–34)
ECHO LA VOLUME INDEX MOD A4C: 56 ML/M2 (ref 16–34)
ECHO LV EDV A2C: 245 ML
ECHO LV EDV A4C: 201 ML
ECHO LV EDV BP: 224 ML (ref 67–155)
ECHO LV EDV INDEX A4C: 111 ML/M2
ECHO LV EDV INDEX BP: 124 ML/M2
ECHO LV EDV NDEX A2C: 135 ML/M2
ECHO LV EJECTION FRACTION A2C: 17 %
ECHO LV EJECTION FRACTION A4C: 19 %
ECHO LV EJECTION FRACTION BIPLANE: 19 % (ref 55–100)
ECHO LV ESV A2C: 204 ML
ECHO LV ESV A4C: 162 ML
ECHO LV ESV BP: 181 ML (ref 22–58)
ECHO LV ESV INDEX A2C: 113 ML/M2
ECHO LV ESV INDEX A4C: 90 ML/M2
ECHO LV ESV INDEX BP: 100 ML/M2
ECHO LV FRACTIONAL SHORTENING: 7 % (ref 28–44)
ECHO LV INTERNAL DIMENSION DIASTOLE INDEX: 3.31 CM/M2
ECHO LV INTERNAL DIMENSION DIASTOLIC: 6 CM (ref 4.2–5.9)
ECHO LV INTERNAL DIMENSION SYSTOLIC INDEX: 3.09 CM/M2
ECHO LV INTERNAL DIMENSION SYSTOLIC: 5.6 CM
ECHO LV IVSD: 0.8 CM (ref 0.6–1)
ECHO LV MASS 2D: 200.7 G (ref 88–224)
ECHO LV MASS INDEX 2D: 110.9 G/M2 (ref 49–115)
ECHO LV POSTERIOR WALL DIASTOLIC: 0.9 CM (ref 0.6–1)
ECHO LV RELATIVE WALL THICKNESS RATIO: 0.3
ECHO LVOT AREA: 3.5 CM2
ECHO LVOT DIAM: 2.1 CM
ECHO MV A VELOCITY: 0.22 M/S
ECHO MV E DECELERATION TIME (DT): 129.3 MS
ECHO MV E VELOCITY: 1.06 M/S
ECHO MV E/A RATIO: 4.82
ECHO PULMONARY ARTERY END DIASTOLIC PRESSURE: 12 MMHG
ECHO PV REGURGITANT MAX VELOCITY: 1.8 M/S
ECHO TV REGURGITANT MAX VELOCITY: 3.39 M/S
ECHO TV REGURGITANT PEAK GRADIENT: 46 MMHG
ERYTHROCYTE [DISTWIDTH] IN BLOOD BY AUTOMATED COUNT: 15.3 % (ref 11.6–14.5)
GLOBULIN SER CALC-MCNC: 5 G/DL (ref 2–4)
GLUCOSE SERPL-MCNC: 113 MG/DL (ref 74–99)
HCT VFR BLD AUTO: 37.3 % (ref 36–48)
HGB BLD-MCNC: 11.5 G/DL (ref 13–16)
MAGNESIUM SERPL-MCNC: 1.8 MG/DL (ref 1.6–2.6)
MAGNESIUM SERPL-MCNC: 2 MG/DL (ref 1.6–2.6)
MCH RBC QN AUTO: 29.5 PG (ref 24–34)
MCHC RBC AUTO-ENTMCNC: 30.8 G/DL (ref 31–37)
MCV RBC AUTO: 95.6 FL (ref 78–100)
NRBC # BLD: 0 K/UL (ref 0–0.01)
NRBC BLD-RTO: 0 PER 100 WBC
PHOSPHATE SERPL-MCNC: 3.1 MG/DL (ref 2.5–4.9)
PLATELET # BLD AUTO: 452 K/UL (ref 135–420)
PMV BLD AUTO: 11.3 FL (ref 9.2–11.8)
POTASSIUM SERPL-SCNC: 3.9 MMOL/L (ref 3.5–5.5)
PROT SERPL-MCNC: 7 G/DL (ref 6.4–8.2)
RBC # BLD AUTO: 3.9 M/UL (ref 4.35–5.65)
SODIUM SERPL-SCNC: 132 MMOL/L (ref 136–145)
WBC # BLD AUTO: 11 K/UL (ref 4.6–13.2)

## 2024-01-19 PROCEDURE — 2580000003 HC RX 258

## 2024-01-19 PROCEDURE — C8929 TTE W OR WO FOL WCON,DOPPLER: HCPCS

## 2024-01-19 PROCEDURE — 6360000002 HC RX W HCPCS

## 2024-01-19 PROCEDURE — 93306 TTE W/DOPPLER COMPLETE: CPT | Performed by: INTERNAL MEDICINE

## 2024-01-19 PROCEDURE — 6370000000 HC RX 637 (ALT 250 FOR IP)

## 2024-01-19 PROCEDURE — 83735 ASSAY OF MAGNESIUM: CPT

## 2024-01-19 PROCEDURE — 84100 ASSAY OF PHOSPHORUS: CPT

## 2024-01-19 PROCEDURE — 80053 COMPREHEN METABOLIC PANEL: CPT

## 2024-01-19 PROCEDURE — 36415 COLL VENOUS BLD VENIPUNCTURE: CPT

## 2024-01-19 PROCEDURE — 85027 COMPLETE CBC AUTOMATED: CPT

## 2024-01-19 PROCEDURE — 6360000002 HC RX W HCPCS: Performed by: FAMILY MEDICINE

## 2024-01-19 PROCEDURE — 2580000003 HC RX 258: Performed by: FAMILY MEDICINE

## 2024-01-19 PROCEDURE — 94761 N-INVAS EAR/PLS OXIMETRY MLT: CPT

## 2024-01-19 PROCEDURE — 99222 1ST HOSP IP/OBS MODERATE 55: CPT | Performed by: INTERNAL MEDICINE

## 2024-01-19 PROCEDURE — 1100000000 HC RM PRIVATE

## 2024-01-19 PROCEDURE — 6360000004 HC RX CONTRAST MEDICATION: Performed by: FAMILY MEDICINE

## 2024-01-19 RX ORDER — MAGNESIUM SULFATE 1 G/100ML
1000 INJECTION INTRAVENOUS ONCE
Status: COMPLETED | OUTPATIENT
Start: 2024-01-19 | End: 2024-01-19

## 2024-01-19 RX ADMIN — ENOXAPARIN SODIUM 40 MG: 100 INJECTION SUBCUTANEOUS at 10:40

## 2024-01-19 RX ADMIN — VANCOMYCIN HYDROCHLORIDE 1000 MG: 1 INJECTION, POWDER, FOR SOLUTION INTRAVENOUS at 22:17

## 2024-01-19 RX ADMIN — ROSUVASTATIN CALCIUM 20 MG: 20 TABLET, FILM COATED ORAL at 20:26

## 2024-01-19 RX ADMIN — PERFLUTREN 2 ML: 6.52 INJECTION, SUSPENSION INTRAVENOUS at 09:02

## 2024-01-19 RX ADMIN — SODIUM CHLORIDE, PRESERVATIVE FREE 10 ML: 5 INJECTION INTRAVENOUS at 20:26

## 2024-01-19 RX ADMIN — MAGNESIUM SULFATE HEPTAHYDRATE 1000 MG: 1 INJECTION, SOLUTION INTRAVENOUS at 06:53

## 2024-01-19 RX ADMIN — CARVEDILOL 6.25 MG: 6.25 TABLET, FILM COATED ORAL at 10:40

## 2024-01-19 RX ADMIN — TORSEMIDE 20 MG: 20 TABLET ORAL at 10:40

## 2024-01-19 RX ADMIN — BENZONATATE 100 MG: 100 CAPSULE ORAL at 17:43

## 2024-01-19 RX ADMIN — BENZONATATE 100 MG: 100 CAPSULE ORAL at 00:14

## 2024-01-19 RX ADMIN — PIPERACILLIN SODIUM AND TAZOBACTAM SODIUM 3375 MG: 3; .375 INJECTION, POWDER, LYOPHILIZED, FOR SOLUTION INTRAVENOUS at 10:40

## 2024-01-19 RX ADMIN — CLOPIDOGREL BISULFATE 75 MG: 75 TABLET ORAL at 10:40

## 2024-01-19 RX ADMIN — PIPERACILLIN SODIUM AND TAZOBACTAM SODIUM 3375 MG: 3; .375 INJECTION, POWDER, LYOPHILIZED, FOR SOLUTION INTRAVENOUS at 03:08

## 2024-01-19 RX ADMIN — GUAIFENESIN 600 MG: 600 TABLET, EXTENDED RELEASE ORAL at 10:40

## 2024-01-19 RX ADMIN — CARVEDILOL 6.25 MG: 6.25 TABLET, FILM COATED ORAL at 17:43

## 2024-01-19 RX ADMIN — PIPERACILLIN SODIUM AND TAZOBACTAM SODIUM 3375 MG: 3; .375 INJECTION, POWDER, LYOPHILIZED, FOR SOLUTION INTRAVENOUS at 17:43

## 2024-01-19 RX ADMIN — GUAIFENESIN 600 MG: 600 TABLET, EXTENDED RELEASE ORAL at 20:26

## 2024-01-19 RX ADMIN — ACETAMINOPHEN 325MG 650 MG: 325 TABLET ORAL at 00:14

## 2024-01-19 RX ADMIN — VANCOMYCIN HYDROCHLORIDE 1000 MG: 1 INJECTION, POWDER, FOR SOLUTION INTRAVENOUS at 03:11

## 2024-01-19 ASSESSMENT — PAIN DESCRIPTION - FREQUENCY: FREQUENCY: CONTINUOUS

## 2024-01-19 ASSESSMENT — PAIN SCALES - GENERAL
PAINLEVEL_OUTOF10: 3
PAINLEVEL_OUTOF10: 0
PAINLEVEL_OUTOF10: 0

## 2024-01-19 ASSESSMENT — PAIN DESCRIPTION - ORIENTATION: ORIENTATION: OTHER (COMMENT)

## 2024-01-19 ASSESSMENT — PAIN DESCRIPTION - ONSET: ONSET: AWAKENED FROM SLEEP

## 2024-01-19 ASSESSMENT — PAIN - FUNCTIONAL ASSESSMENT: PAIN_FUNCTIONAL_ASSESSMENT: ACTIVITIES ARE NOT PREVENTED

## 2024-01-19 ASSESSMENT — PAIN DESCRIPTION - LOCATION: LOCATION: GENERALIZED

## 2024-01-19 ASSESSMENT — PAIN DESCRIPTION - DESCRIPTORS: DESCRIPTORS: DULL;ACHING

## 2024-01-19 NOTE — PROGRESS NOTES
Physical Therapy    Pt not seen for skilled PT due to:    []  Nausea/vomiting  []  Eating  []  Pain  []  Pt lethargic  [x]  Off Unit (901) RADHA  Other: (1012) Refuses to participate.    This is second day of refusal for participation in PT. Nursing reporting pt has been getting up ad kiki in room to bathroom. Should pt continue to refuse, we will have to sign off.     Will f/u later as schedule allows. Thank you.  Aury Mayer, PT, DPT

## 2024-01-19 NOTE — CONSULTS
[facility] - Consult Note    Cardiology consultation request from Dr. Simental for evaluation and management/treatment of prolonged QT, NSVT    Date of  Admission: 1/16/2024  4:18 PM   Primary Care Physician:  Dee Dee Alves MD    Attending Cardiologist: Dr. Clayton       Assessment:     -Sepsis associated with CAP  -Chronic HFrEF/cardiomyopathy, primarily nonischemic, EF 10-15% by echo 8/2023, unchanged from 2/2022  GDMT: Entresto, Coreg, aldactone, Torsemide. Consider SGLT2i at follow up appt.   S/p [employer] AICD 11/2021 for primary prevention  -Chest pain, HS-troponin negative x 2  -h/o CAD, distal RCA stent 2/2022  -SVT ablation 2018  -Prolonged QT, chronic  -CKD stage III  -HTN  -Dyslipidemia  -Peripheral neuropathy  -Noncompliance  -Cocaine use, UDS positive for cocaine this admission and 09/2023     Primary cardiologist Dr. TAWNYA Madsen     Plan:     -Continue Coreg as tolerated by BP.  Aldactone on hold due to soft BP's.  -Continue PO Torsemide.  -Continue Plavix, Crestor for CAD.  -Echo completed this AM, results pending, will review as able.  -Have reached out to [employer] representative to have AICD interrogated.  -Recommend to maintain K > 4, Mg > 2 from cardiac standpoint.  Replacement per primary team.   -Antibiotics per primary team.     History of Present Illness:     This is a 63 y.o. male admitted for Community acquired bacterial pneumonia [J15.9]  Community acquired pneumonia of right lower lobe of lung [J18.9]  Community acquired pneumonia, bilateral [J18.9].    Patient complains of: shortness of breath, cough      Moiz May is a 63 y.o. male who presented to the hospital with 1 week history of productive cough, shortness of breath, N/V, dizziness, rhinorrhea and malaise.  He had been unable to keep food down and developed chest pain and a subjective fever the night PTA.  Pt admitted for pneumonia.  He denies any leg swelling.  Cardiology has been consulted due to chronic HFrEF  deficits, moves all extremities well, and no involuntary movements  Psych: non focal     Data Review:     Recent Labs     01/17/24  0500 01/18/24  0956 01/19/24  0115   WBC 11.2 13.2 11.0   HGB 11.3* 11.0* 11.5*   HCT 35.6* 34.5* 37.3   * 417 452*     Recent Labs     01/17/24  0500 01/18/24  0956 01/18/24  1511 01/19/24  0115    132*  --  132*   K 3.6 3.5  --  3.9    98*  --  97*   CO2 29 29  --  29   BUN 24* 23*  --  23*   CREATININE 1.18 1.23  --  1.32*   MG 1.4* 1.3* 1.9 1.8   PHOS 4.0 2.4*  --  3.1   ALT 20 18  --  21       All Cardiac Markers in the last 24 hours:    Lab Results   Component Value Date/Time    TROPHS 16 01/16/2024 06:40 PM    TROPHS 21 01/16/2024 04:35 PM    TROPHS 67 09/01/2023 10:00 PM     Lab Results   Component Value Date/Time    NTPROBNP 13,730 01/16/2024 04:35 PM       Last Lipid:    Lab Results   Component Value Date/Time    CHOL 143 02/24/2022 04:58 AM    HDL 37 02/24/2022 04:58 AM       Signed By: Melisa Parmar PA-C     January 19, 2024         Medications

## 2024-01-19 NOTE — PROGRESS NOTES
[facility]  DAILY PROGRESS NOTE  THIS IS A MEDICAL STUDENT NOTE TO BE USED FOR EDUCATIONAL PURPOSES ONLY       Patient:    Moiz May , 63 y.o. male   MRN:  [id number]  Room/Bed:  [facility]/  Admission Date:   1/16/2024  Code status:  Full Code    Reason for Admission: CAP   Barriers to Discharge: CAP, clinical improvement in symptoms, cardiology consult      ASSESSMENT AND PLAN:     Mr. May is a 64 yo male with past hx of  HFrEF of 10%, coronary artery disease s/p stent placement, ICD placement, hx subarachnoid hemorrhage, HTN, and HLD, admitted for community acquired bacterial pneumonia. During his stay, his pneumonia appears to have worsened slightly based on X ray and clinical symptoms, and he was switched from azithromycin and ceftriaxone to vancomycin and Zosyn. Cardiology has been consulted for further re-evaluation of his baseline cardiomyopathy in the setting of this pneumonia and some runs of Vtach and QT prolongation noted on telemetry.       Community-acquired pneumonia  Sepsis  --DDX: stimulant induced bronchospasm/cardiomyopathy exacerbating CP/SOB (hx polysubstance use disorder, hx of UDS+ cocaine and barbituates)  -C/o SOB, cough, cold symptoms, CP rhinorrhea x1week upon ED presentation  -Meets 3 SIRS criteria (tachycardia, elevated white count, SANTY)  -moderate hospitalization risk as per CURB65 criteria, however, given the fact that the patient has multiple comorbidities and required intubation during previous admission (sept 2023), it would be wise to admit this patient for treatment   - CXR on 1/16/24: patchy multifocal opacities suggestive of developing multifocal pneumonia. CXR 01/18/24: slight worsening of pneumonia with new trace b/l pleural effusion  -WBC 16.9 > 11.2 > 13.2 >11  Plan:  -daily CBC, CMP   - continue Vanc and Zosyn  -consider respiratory steroids if needed   -scheduled mucinex, prn tessalon  - incentive spirometry  - blood culture pending  -PT/OT/CM consul

## 2024-01-19 NOTE — PROGRESS NOTES
[facility]  POST-ROUNDING NOTE  THIS IS A MEDICAL STUDENT NOTE TO BE USED FOR EDUCATIONAL PURPOSES ONLY      Assessment & Plan:  Moiz May is a 63 y.o. year old male with PMH of HFrEF, coronary artery disease s/p stent placement, ICD placement, hx subarachnoid hemorrhage, HTN, HLD, admitted for Community acquired bacterial pneumonia.      Patient seen and evaluated on rounds. Still endorsing fatigue and shortness of breath, but slight improvement in clinical symptoms compared to yesterday. Cardiology has seen the patient. Still waiting for repeat echocardiogram.      Community-acquired pneumonia  Sepsis  Plan:  -daily CBC, CMP  -discontinued CTX, azithro; continue vanc, zosyn given clinical picture as above  -consider respiratory steroids if needed   -scheduled mucinex, prn tessalon, lozenges  -incentive spirometry  -low threshold for repeat blood cx  -follow up on blood cx, narrow abx as needed; NGTD  -PT/OT/CM consulted; appreciate recs    HFrEF, EF 10% (Aug 2023), stable  Hx SVT s/p SVT ablation 2018  CAD s/p PCI Feb 2022 (SHIRA to RCA)  Ischemic Cardiomyopathy  Plan:  -Cardiology consult: Have reached out to [email or web address] representative to have AICD interrogated.  -Recommend to maintain K > 4, Mg > 2 from cardiac standpoint.  Replacement per primary team.   -repeat TTE pending  -ordered telemetry  -continue home medications; holding spironolactone I/s/o low BP   - holding zofran due to QT prolongation  -daily Mg, Phos; cardiac diet    Hypomagnesemia   Plan:  -daily Mg, Phos as above  - f/u repeat mag          See daily progress note for full assessment/plan.

## 2024-01-19 NOTE — CARE COORDINATION
01/19/24 0920   /Social Work Whiteboard Notes   /Social Work Whiteboard RED:1/19/24. dispo home. no cm needs noted at present time.

## 2024-01-19 NOTE — PROGRESS NOTES
[facility] Medicine  DAILY PROGRESS NOTE      Patient:    Moiz May , 63 y.o. male   MRN:  [id number]  Room/Bed:  508/01  Admission Date:   1/16/2024  Code status:  Full Code    Reason for Admission: Moiz May is a 63 y.o. year old male with PMH of HFrEF, coronary artery disease s/p stent placement, ICD placement, hx subarachnoid hemorrhage, HTN, HLD, admitted for Community acquired bacterial pneumonia [J15.9]  Community acquired pneumonia, bilateral [J18.9].   Barriers to Discharge: Clinical improvement of sx, clearance of blood cx      ASSESSMENT AND PLAN:     Community-acquired pneumonia  Sepsis  -DDX: stimulant induced bronchospasm/cardiomyopathy exacerbating CP/SOB (hx polysubstance use disorder, hx of UDS+ cocaine and barbituates)  -C/o SOB, cough, cold symptoms, CP rhinorrhea x1week upon ED presentation  -Meets 3 SIRS criteria (tachycardia, elevated white count, SANTY)  -moderate hospitalization risk as per CURB65 criteria, however, given the fact that the patient has multiple comorbidities and required intubation during previous admission (sept 2023), it would be wise to admit this patient for treatment   -CXR (1/16/24): patchy multifocal opacities suggestive of developing multifocal pneumonia, possible trace left pleural effusion  -CT (1/16/24): extensive bilateral multilobar pneumonia  -repeat CXR (1/18/24): bibasilar opacities, slightly worsened from 1/16 w/ new trace b/l pleural effusions suggestive of worsening PNA  -WBC: 16.9 > 11.2 > 13.2 > 11 1/19  Plan:  -daily CBC, CMP  -discontinued CTX, azithro; continue vanc, zosyn given clinical picture as above  -consider respiratory steroids if needed   -scheduled mucinex, prn tessalon  -incentive spirometry  -low threshold for repeat blood cx  -follow up on blood cx, narrow abx as needed; NGTD  -PT/OT/CM consulted; appreciate recs    HFrEF, EF 10% (Aug 2023), stable  Hx SVT s/p SVT ablation 2018  CAD s/p PCI Feb 2022 ([facility] to RCA)  Ischemic  Phosphorus 3.1 2.5 - 4.9 MG/DL       IMAGING AND PROCEDURES (LAST 24 HOURS)  XR CHEST PORTABLE    Result Date: 1/16/2024  1.  Patchy multifocal opacities suggestive of developing multifocal pneumonia. 2.  Possible trace left pleural effusion.    CT CHEST ABDOMEN PELVIS W CONTRAST Additional Contrast? Radiologist Recommendation    Result Date: 1/16/2024  Extensive bilateral multilobar pneumonia. Tiny left pleural effusion. Mild circumferential urinary bladder wall thickening. Correlate for cystitis.        ================================================================  Further management for Mr. Moiz May will be discussed on rounds with my attending.      Leeanna Mckeon MD, PGY-1  [facility] Family Medicine  January 19, 2024 6:46 AM

## 2024-01-19 NOTE — PROGRESS NOTES
OT order received and chart reviewed. 0901, Pt off the unit, RADHA.  1010, pt back from [facility] however refusing therapy at this time stating \" I had a bad night last night\". Spoke with nurse who reports pt has been refusing things most of the morning. Will continue to follow and see pt when available/as appropriate.          Thank you for this referral,   Izabela Lantigua MS, OTR/L

## 2024-01-20 VITALS
WEIGHT: 137 LBS | BODY MASS INDEX: 18.56 KG/M2 | HEART RATE: 81 BPM | HEIGHT: 72 IN | TEMPERATURE: 97.7 F | DIASTOLIC BLOOD PRESSURE: 67 MMHG | OXYGEN SATURATION: 98 % | SYSTOLIC BLOOD PRESSURE: 100 MMHG | RESPIRATION RATE: 17 BRPM

## 2024-01-20 PROBLEM — Z95.810 HISTORY OF IMPLANTABLE CARDIAC DEFIBRILLATOR (ICD): Status: ACTIVE | Noted: 2024-01-20

## 2024-01-20 PROBLEM — I07.1 MODERATE TRICUSPID REGURGITATION: Status: ACTIVE | Noted: 2024-01-20

## 2024-01-20 LAB
ALBUMIN SERPL-MCNC: 2.5 G/DL (ref 3.4–5)
ALBUMIN/GLOB SERPL: 0.4 (ref 0.8–1.7)
ALP SERPL-CCNC: 143 U/L (ref 45–117)
ALT SERPL-CCNC: 29 U/L (ref 16–61)
ANION GAP SERPL CALC-SCNC: 6 MMOL/L (ref 3–18)
AST SERPL-CCNC: 48 U/L (ref 10–38)
BILIRUB SERPL-MCNC: 1 MG/DL (ref 0.2–1)
BUN SERPL-MCNC: 19 MG/DL (ref 7–18)
BUN/CREAT SERPL: 14 (ref 12–20)
CALCIUM SERPL-MCNC: 7.7 MG/DL (ref 8.5–10.1)
CHLORIDE SERPL-SCNC: 98 MMOL/L (ref 100–111)
CO2 SERPL-SCNC: 29 MMOL/L (ref 21–32)
CREAT SERPL-MCNC: 1.39 MG/DL (ref 0.6–1.3)
ERYTHROCYTE [DISTWIDTH] IN BLOOD BY AUTOMATED COUNT: 15.4 % (ref 11.6–14.5)
GLOBULIN SER CALC-MCNC: 5.7 G/DL (ref 2–4)
GLUCOSE SERPL-MCNC: 92 MG/DL (ref 74–99)
HCT VFR BLD AUTO: 43.1 % (ref 36–48)
HGB BLD-MCNC: 13.4 G/DL (ref 13–16)
MAGNESIUM SERPL-MCNC: 1.9 MG/DL (ref 1.6–2.6)
MCH RBC QN AUTO: 30.3 PG (ref 24–34)
MCHC RBC AUTO-ENTMCNC: 31.1 G/DL (ref 31–37)
MCV RBC AUTO: 97.5 FL (ref 78–100)
NRBC # BLD: 0 K/UL (ref 0–0.01)
NRBC BLD-RTO: 0 PER 100 WBC
PHOSPHATE SERPL-MCNC: 3.5 MG/DL (ref 2.5–4.9)
PLATELET # BLD AUTO: 455 K/UL (ref 135–420)
PMV BLD AUTO: 11 FL (ref 9.2–11.8)
POTASSIUM SERPL-SCNC: 4 MMOL/L (ref 3.5–5.5)
PROT SERPL-MCNC: 8.2 G/DL (ref 6.4–8.2)
RBC # BLD AUTO: 4.42 M/UL (ref 4.35–5.65)
SODIUM SERPL-SCNC: 133 MMOL/L (ref 136–145)
VANCOMYCIN SERPL-MCNC: 20.5 UG/ML (ref 5–40)
WBC # BLD AUTO: 10.7 K/UL (ref 4.6–13.2)

## 2024-01-20 PROCEDURE — 94761 N-INVAS EAR/PLS OXIMETRY MLT: CPT

## 2024-01-20 PROCEDURE — 2580000003 HC RX 258

## 2024-01-20 PROCEDURE — 6360000002 HC RX W HCPCS

## 2024-01-20 PROCEDURE — 6370000000 HC RX 637 (ALT 250 FOR IP)

## 2024-01-20 PROCEDURE — 36415 COLL VENOUS BLD VENIPUNCTURE: CPT

## 2024-01-20 PROCEDURE — 99222 1ST HOSP IP/OBS MODERATE 55: CPT | Performed by: INTERNAL MEDICINE

## 2024-01-20 PROCEDURE — 80053 COMPREHEN METABOLIC PANEL: CPT

## 2024-01-20 PROCEDURE — 85027 COMPLETE CBC AUTOMATED: CPT

## 2024-01-20 PROCEDURE — 80202 ASSAY OF VANCOMYCIN: CPT

## 2024-01-20 PROCEDURE — 84100 ASSAY OF PHOSPHORUS: CPT

## 2024-01-20 PROCEDURE — 83735 ASSAY OF MAGNESIUM: CPT

## 2024-01-20 RX ORDER — 0.9 % SODIUM CHLORIDE 0.9 %
500 INTRAVENOUS SOLUTION INTRAVENOUS ONCE
OUTPATIENT
Start: 2024-01-20 | End: 2024-01-20

## 2024-01-20 RX ORDER — DOXYCYCLINE HYCLATE 100 MG/1
100 CAPSULE ORAL EVERY 12 HOURS SCHEDULED
Status: DISCONTINUED | OUTPATIENT
Start: 2024-01-20 | End: 2024-01-20 | Stop reason: HOSPADM

## 2024-01-20 RX ORDER — CLOPIDOGREL BISULFATE 75 MG/1
75 TABLET ORAL DAILY
Qty: 30 TABLET | Refills: 0 | Status: SHIPPED | OUTPATIENT
Start: 2024-01-20

## 2024-01-20 RX ORDER — GUAIFENESIN 600 MG/1
600 TABLET, EXTENDED RELEASE ORAL 2 TIMES DAILY
Qty: 14 TABLET | Refills: 0 | Status: SHIPPED | OUTPATIENT
Start: 2024-01-20 | End: 2024-01-27

## 2024-01-20 RX ORDER — AMOXICILLIN AND CLAVULANATE POTASSIUM 875; 125 MG/1; MG/1
1 TABLET, FILM COATED ORAL EVERY 12 HOURS SCHEDULED
Status: DISCONTINUED | OUTPATIENT
Start: 2024-01-20 | End: 2024-01-20 | Stop reason: HOSPADM

## 2024-01-20 RX ORDER — AMOXICILLIN AND CLAVULANATE POTASSIUM 875; 125 MG/1; MG/1
1 TABLET, FILM COATED ORAL EVERY 12 HOURS SCHEDULED
Qty: 17 TABLET | Refills: 0 | Status: SHIPPED | OUTPATIENT
Start: 2024-01-21 | End: 2024-01-30

## 2024-01-20 RX ORDER — DOXYCYCLINE HYCLATE 100 MG/1
100 CAPSULE ORAL EVERY 12 HOURS SCHEDULED
Qty: 17 CAPSULE | Refills: 0 | Status: SHIPPED | OUTPATIENT
Start: 2024-01-21 | End: 2024-01-30

## 2024-01-20 RX ORDER — BENZONATATE 100 MG/1
100 CAPSULE ORAL 3 TIMES DAILY PRN
Qty: 21 CAPSULE | Refills: 0 | Status: SHIPPED | OUTPATIENT
Start: 2024-01-20 | End: 2024-01-27

## 2024-01-20 RX ADMIN — AMOXICILLIN AND CLAVULANATE POTASSIUM 1 TABLET: 875; 125 TABLET, FILM COATED ORAL at 13:21

## 2024-01-20 RX ADMIN — BENZONATATE 100 MG: 100 CAPSULE ORAL at 00:25

## 2024-01-20 RX ADMIN — TORSEMIDE 20 MG: 20 TABLET ORAL at 09:00

## 2024-01-20 RX ADMIN — ENOXAPARIN SODIUM 40 MG: 100 INJECTION SUBCUTANEOUS at 09:00

## 2024-01-20 RX ADMIN — GUAIFENESIN 600 MG: 600 TABLET, EXTENDED RELEASE ORAL at 09:00

## 2024-01-20 RX ADMIN — PIPERACILLIN SODIUM AND TAZOBACTAM SODIUM 3375 MG: 3; .375 INJECTION, POWDER, LYOPHILIZED, FOR SOLUTION INTRAVENOUS at 02:27

## 2024-01-20 RX ADMIN — CLOPIDOGREL BISULFATE 75 MG: 75 TABLET ORAL at 09:00

## 2024-01-20 RX ADMIN — DOXYCYCLINE HYCLATE 100 MG: 100 CAPSULE ORAL at 13:21

## 2024-01-20 RX ADMIN — CARVEDILOL 6.25 MG: 6.25 TABLET, FILM COATED ORAL at 09:00

## 2024-01-20 RX ADMIN — PIPERACILLIN SODIUM AND TAZOBACTAM SODIUM 3375 MG: 3; .375 INJECTION, POWDER, LYOPHILIZED, FOR SOLUTION INTRAVENOUS at 10:20

## 2024-01-20 ASSESSMENT — PAIN SCALES - GENERAL
PAINLEVEL_OUTOF10: 0

## 2024-01-20 NOTE — PROGRESS NOTES
[facility] Family Medicine  DAILY PROGRESS NOTE      Patient:    Moiz May , 63 y.o. male   MRN:  [id number]  Room/Bed:  508/  Admission Date:   1/16/2024  Code status:  Full Code    Reason for Admission: Moiz May is a 63 y.o. year old male with PMH of HFrEF, coronary artery disease s/p stent placement, ICD placement, hx subarachnoid hemorrhage, HTN, HLD, admitted for Community acquired bacterial pneumonia [J15.9]  Community acquired pneumonia, bilateral [J18.9].   Barriers to Discharge: Clinical improvement of sx, clearance of blood cx      ASSESSMENT AND PLAN:     Community-acquired pneumonia  Sepsis  -DDX: stimulant induced bronchospasm/cardiomyopathy exacerbating CP/SOB (hx polysubstance use disorder, hx of UDS+ cocaine and barbituates)  -C/o SOB, cough, cold symptoms, CP rhinorrhea x1week upon ED presentation  -Meets 3 SIRS criteria (tachycardia, elevated white count, SANTY)  -moderate hospitalization risk as per CURB65 criteria, however, given the fact that the patient has multiple comorbidities and required intubation during previous admission (sept 2023), it would be wise to admit this patient for treatment   -CXR (1/16/24): patchy multifocal opacities suggestive of developing multifocal pneumonia, possible trace left pleural effusion  -CT (1/16/24): extensive bilateral multilobar pneumonia  -repeat CXR (1/18/24): bibasilar opacities, slightly worsened from 1/16 w/ new trace b/l pleural effusions suggestive of worsening PNA  -WBC: 16.9 > 11.2 > 13.2 > 11 > 10.7 1/20  Plan:  -daily CBC, CMP  -discontinued CTX, azithro; continue vanc, zosyn given clinical picture as above  -consider respiratory steroids if needed   -scheduled mucinex, prn tessalon  -incentive spirometry  -low threshold for repeat blood cx  -follow up on blood cx, narrow abx as needed; NGTD  -PT/OT/CM consulted; appreciate recs    HFrEF, EF 10% (Aug 2023), stable  Hx SVT s/p SVT ablation 2018  CAD s/p PCI Feb 2022 (SHIRA to

## 2024-01-20 NOTE — DISCHARGE SUMMARY
[facility] Family Medicine  DISCHARGE SUMMARY  THIS IS A MEDICAL STUDENT NOTE TO BE USED FOR EDUCATIONAL PURPOSES ONLY        Name:   Moiz May 63 y.o. male  MRN:   [id number]  CSN:   [id number]  Admission Date:  1/16/2024  Discharge Date:  01/20/2024  Attending:             Derrick Simental MD   PCP:              Dee Dee Alves MD   ================================================================  Reason for Admission:  Community acquired bacterial pneumonia [J15.9]  Community acquired pneumonia of right lower lobe of lung [J18.9]  Community acquired pneumonia, bilateral [J18.9]    Discharge Diagnosis:    Community-acquired pneumonia  Sepsis    HFrEF, EF 15-20% (Aug 2023), stable  Hx SVT s/p SVT ablation 2018  CAD s/p PCI Feb 2022 (SHIRA to RCA)  Ischemic Cardiomyopathy  Hypomagnesemia   SANTY, resolved  RLQ abdominal pain      Follow-up studies/evaluations for PCP/Important Notes to PCP:  Ensure patient has a cardiology follow up/plans to attend the visit  Spironolactone was held in patient; re-evaluate blood pressure  Pending labs/investigations to follow up as below  Labs to include: magnesium and CMP  Medication reconciliation:  Discontinued Medications: spironolactone  New Medications: Doxycycline and Augmentin    ANTONY Follow Up Appointment:   Follow-up Information    None          Readmission prevention plan:   Medication compliance  Close follow up with PCP and specialist  Avoid sick contacts    GOALS OF CARE (including Code Status, Advanced Care Plan):   Full measures    Pending labs/ investigations at discharge to follow up:   No pending labs     Operative Procedures:   - Echo   - EKG    Consultants:    Cardiology consultation    Condition at discharge: Stable    Disposition at Discharge:  Home    Functional Status at Discharge: Ambulates without support    Diet: Cardiac diet    Discharge Medications:  ***Use smartphrase \"DISCHARGEMEDS\" on day of discharge after medication reconciliation on day of  PNA  -WBC: 16.9 > 11.2 > 13.2 > 11 > 10.7 1/20  Plan:   - start on oral doxycycline and Augmentin for continued infection treatment     HFrEF, EF 15%, stable  Hx SVT s/p SVT ablation 2018  CAD s/p PCI Feb 2022 (SHIRA to RCA)  Ischemic Cardiomyopathy  -EKG: NSR   -Home meds: spironolactone 12.5mg qd, torsemide 10mg qd, carvedilol 6.25mg BID, clopidogrel 75mg qd, nitroglycerin 0.4mg PRN   -follows with [facility]; lost to follow up    -has been having non-sustained Vtach lasting from 3-14 beats, asx throughout; also has prolonged QT  -Repeat TTE showing mild improvement in EF compared to prior, now 15 to 20%.  Still showing dilated left ventricle, global hypokinesis, grade 3 diastolic dysfunction with increased LAP  - Cardiology consult from their standpoint, no further need workup needed for prolonged QT as this is chronic   -Will need cardiology follow-up outpatient set up     Hypomagnesemia   -Mg 1.3 1/18 > 1.9 > 1.8 1/19  -required 3g Mg x1 1/19  Plan:   - order repeat Mg level during outpatient hospital follow up    SANTY - resolved     RLQ Abdominal Pain  Anorexia and Cachexia most likely 2/2 Nausea/Vomiting; Dizziness, Malaise  Hypoalbuminemia  BMI <19  -CT abdomen/pelvis (1/16): mild circumferential urinary bladder wall thickening; negative for abdominal pathology  -UA: trace protein, elevated urobilinogen   -Albumin 2.4 most likely due to poor nutritional status  Plan:   - outpatient work up for unintentional weight loss and abdominal pain        Other stable chronic conditions:  - Hypertension   - Hyperlipidemia      Pertinent Results:      CURRENT ADMISSION IMAGING RESULTS   MRI Result (most recent):  No results found for this or any previous visit from the past 3650 days.     CT Result (most recent):  CT CHEST ABDOMEN PELVIS W CONTRAST 01/16/2024    Narrative  CT  CHEST, ABDOMEN AND PELVIS WITH CONTRAST    CLINICAL HISTORY/INDICATION:  sepsis, cough, RLQ pain , nausea, vomiting,

## 2024-01-20 NOTE — PROGRESS NOTES
Patient discharged, instructions reviewed, IV removed. No new questions or concerns. Discharged by wheelchair   Pt states that her lower back has been hurting and she thinks its because of her boots that she wears to work.

## 2024-01-20 NOTE — PROGRESS NOTES
Day 3 of attempting PT evaluation. Per chart and patient, amb in room without difficulties. Continues to decline participation in skilled PT evaluation. Discontinuing PT orders.

## 2024-01-20 NOTE — PROGRESS NOTES
Cardiology Progress Note    Admit Date: 1/16/2024  Attending Cardiologist: Dr. Holman    IMPRESSION  History of ICD  Coronary artery disease history of PCI  Chronic heart failure reduced ejection fraction, 2D echo from 1/19/2024 reviewed with patient 15 to 20% ejection fraction grade 3 diastolic dysfunction with valvular abnormalities as below  Prolonged Qtc  Hyperlipidemia  Moderate tricuspid regurgitation  Mild pulmonic regurgitation    PLAN  Monitor fluid status, adjust as necessary, fluid restrication 2L/day, salt intake <2g per day.  On torsemide 20 mg p.o. daily, Aldactone held  Recommend Guideline Directed medical therapy as can tolerate.  Continue Coreg 6.25 mg p.o. twice daily  Recommend correct electrolytes, Potassium >4, Magnesium >2  Statin if can tolerate prior to discharge.  Continue Crestor 20 mg p.o. daily  Continue on Plavix 75 mg p.o. daily    Thank you for this consultation and for allowing us to participate in this patient's care.      Subjective:     Denies chest pain  Denies shortness of breath  Denies abdominal pain      Objective:      Patient Vitals for the past 8 hrs:   Temp Pulse Resp BP SpO2   01/20/24 0900 -- 81 -- -- --   01/20/24 0813 97.2 °F (36.2 °C) 90 18 97/61 97 %   01/20/24 0500 -- (!) 107 -- -- --   01/20/24 0301 -- (!) 108 -- -- --   01/20/24 0300 97 °F (36.1 °C) 93 18 91/67 97 %         Patient Vitals for the past 96 hrs:   Weight   01/19/24 0917 62.1 kg (137 lb)   01/16/24 1704 62.5 kg (137 lb 11.2 oz)           Intake/Output Summary (Last 24 hours) at 1/20/2024 0915  Last data filed at 1/20/2024 0230  Gross per 24 hour   Intake --   Output 1300 ml   Net -1300 ml     EXAMINATION:  General:  Alert, cooperative, no distress  Head:  Normocephalic, without obvious abnormality, atraumatic.  Eyes:  Conjunctivae/corneas clear  Lungs:   Clear to auscultation bilaterally, no wheezes, no rales, no rhonchi  Heart:  Regular rate and rhythm, S1, S2 normal, no murmur, click, rub or

## 2024-01-20 NOTE — PROGRESS NOTES
Occupational Therapy    Orders received, chart reviewed and this patient is declining OT evaluation. He reports that he is fine, he just needs to get some rest. Will discontinue OT orders as per his request. Will be available should his needs change or concerns arise. Carissa Winters, OTR/L

## 2024-01-20 NOTE — PLAN OF CARE
Problem: Discharge Planning  Goal: Discharge to home or other facility with appropriate resources  Outcome: Progressing  Flowsheets (Taken 1/19/2024 1950)  Discharge to home or other facility with appropriate resources: Identify barriers to discharge with patient and caregiver     Problem: Safety - Adult  Goal: Free from fall injury  Outcome: Progressing     Problem: Pain  Goal: Verbalizes/displays adequate comfort level or baseline comfort level  Outcome: Progressing     Problem: Chronic Conditions and Co-morbidities  Goal: Patient's chronic conditions and co-morbidity symptoms are monitored and maintained or improved  Outcome: Progressing  Flowsheets (Taken 1/19/2024 1950)  Care Plan - Patient's Chronic Conditions and Co-Morbidity Symptoms are Monitored and Maintained or Improved: Monitor and assess patient's chronic conditions and comorbid symptoms for stability, deterioration, or improvement

## 2024-01-22 NOTE — DISCHARGE SUMMARY
WBC 10.7 01/20/2024    HGB 13.4 01/20/2024    HCT 43.1 01/20/2024    MCV 97.5 01/20/2024     (H) 01/20/2024       CHEMISTRIES Lab Results   Component Value Date/Time     01/20/2024 02:16 AM    K 4.0 01/20/2024 02:16 AM    CL 98 01/20/2024 02:16 AM    CO2 29 01/20/2024 02:16 AM    BUN 19 01/20/2024 02:16 AM    CREATININE 1.39 01/20/2024 02:16 AM    GLUCOSE 92 01/20/2024 02:16 AM    CALCIUM 7.7 01/20/2024 02:16 AM    LABGLOM 57 01/20/2024 02:16 AM        HEPATIC FUNCTION Lab Results   Component Value Date/Time    ALKPHOS 143 01/20/2024 02:16 AM    ALKPHOS 100 08/11/2022 12:15 PM    ALT 29 01/20/2024 02:16 AM    AST 48 01/20/2024 02:16 AM    PROT 8.2 01/20/2024 02:16 AM    BILITOT 1.0 01/20/2024 02:16 AM    LABALBU 2.5 01/20/2024 02:16 AM      LACTIC ACID Lab Results   Component Value Date    LACTA 1.1 08/13/2022          CARDIAC PANEL Lab Results   Component Value Date    CKTOTAL 136 04/14/2022    CKMB 4.1 (H) 12/30/2020    CKMBINDEX 1.4 12/30/2020    TROPONINI 0.25 (H) 11/19/2021    TROPHS 16 01/16/2024      NT-proBNP Lab Results   Component Value Date    BNP 6,384 (H) 08/10/2022      THYROID Lab Results   Component Value Date    FT3 2.6 09/01/2023    T4FREE 1.0 09/01/2023        Readmission Risk    Risk of Unplanned Readmission: 5.8 %    Leeanna Mckeon MD, PGY-1  [facility] Family Medicine  1/22/2024 8:03 AM

## 2024-04-29 ENCOUNTER — HOSPITAL ENCOUNTER (INPATIENT)
Facility: HOSPITAL | Age: 64
LOS: 3 days | Discharge: HOME | End: 2024-05-03
Attending: STUDENT IN AN ORGANIZED HEALTH CARE EDUCATION/TRAINING PROGRAM | Admitting: STUDENT IN AN ORGANIZED HEALTH CARE EDUCATION/TRAINING PROGRAM
Payer: MEDICARE

## 2024-04-29 DIAGNOSIS — R10.9 ABDOMINAL PAIN, UNSPECIFIED ABDOMINAL LOCATION: ICD-10-CM

## 2024-04-29 DIAGNOSIS — R06.02 SHORTNESS OF BREATH: ICD-10-CM

## 2024-04-29 DIAGNOSIS — I10 ESSENTIAL HYPERTENSION: ICD-10-CM

## 2024-04-29 DIAGNOSIS — I50.21 ACUTE HFREF (HEART FAILURE WITH REDUCED EJECTION FRACTION) (HCC): ICD-10-CM

## 2024-04-29 DIAGNOSIS — I25.10 CORONARY ARTERY DISEASE INVOLVING NATIVE CORONARY ARTERY OF NATIVE HEART WITHOUT ANGINA PECTORIS: ICD-10-CM

## 2024-04-29 DIAGNOSIS — I50.43 CHF (CONGESTIVE HEART FAILURE), NYHA CLASS I, ACUTE ON CHRONIC, COMBINED (HCC): ICD-10-CM

## 2024-04-29 DIAGNOSIS — R11.2 NAUSEA AND VOMITING, UNSPECIFIED VOMITING TYPE: Primary | ICD-10-CM

## 2024-04-29 PROCEDURE — 99285 EMERGENCY DEPT VISIT HI MDM: CPT

## 2024-04-29 ASSESSMENT — PAIN SCALES - GENERAL: PAINLEVEL_OUTOF10: 10

## 2024-04-29 ASSESSMENT — PAIN DESCRIPTION - LOCATION: LOCATION: ABDOMEN

## 2024-04-29 ASSESSMENT — PAIN - FUNCTIONAL ASSESSMENT: PAIN_FUNCTIONAL_ASSESSMENT: 0-10

## 2024-04-29 NOTE — ED TRIAGE NOTES
Patient presented to the Emergency Dept via EMS with a complaint of abdominal pain, nausea and vomiting X yesterday and bilateral leg swelling      2+ pitting edema to lower extremities    Patient rates pain 10/10 on pain scale    Patient alert and oriented x 4, patient breathes freely on room air in nil cardiopulmonary distress

## 2024-04-30 ENCOUNTER — APPOINTMENT (OUTPATIENT)
Facility: HOSPITAL | Age: 64
End: 2024-04-30
Payer: MEDICARE

## 2024-04-30 PROBLEM — R10.9 ABDOMINAL PAIN: Status: ACTIVE | Noted: 2024-04-30

## 2024-04-30 PROBLEM — I50.43 CHF (CONGESTIVE HEART FAILURE), NYHA CLASS I, ACUTE ON CHRONIC, COMBINED (HCC): Status: ACTIVE | Noted: 2024-04-30

## 2024-04-30 PROBLEM — R11.2 NAUSEA AND VOMITING: Status: ACTIVE | Noted: 2024-04-30

## 2024-04-30 LAB
ALBUMIN SERPL-MCNC: 3.7 G/DL (ref 3.4–5)
ALBUMIN/GLOB SERPL: 0.7 (ref 0.8–1.7)
ALP SERPL-CCNC: 174 U/L (ref 45–117)
ALT SERPL-CCNC: 49 U/L (ref 16–61)
AMPHET UR QL SCN: NEGATIVE
ANION GAP SERPL CALC-SCNC: 10 MMOL/L (ref 3–18)
ANION GAP SERPL CALC-SCNC: 11 MMOL/L (ref 3–18)
APPEARANCE UR: CLEAR
AST SERPL-CCNC: 43 U/L (ref 10–38)
BACTERIA URNS QL MICRO: NEGATIVE /HPF
BARBITURATES UR QL SCN: NEGATIVE
BASOPHILS # BLD: 0 K/UL (ref 0–0.1)
BASOPHILS NFR BLD: 0 % (ref 0–2)
BENZODIAZ UR QL: NEGATIVE
BILIRUB DIRECT SERPL-MCNC: 0.6 MG/DL (ref 0–0.2)
BILIRUB SERPL-MCNC: 2.8 MG/DL (ref 0.2–1)
BILIRUB UR QL: ABNORMAL
BUN SERPL-MCNC: 21 MG/DL (ref 7–18)
BUN SERPL-MCNC: 24 MG/DL (ref 7–18)
BUN/CREAT SERPL: 13 (ref 12–20)
BUN/CREAT SERPL: 13 (ref 12–20)
CALCIUM SERPL-MCNC: 7.5 MG/DL (ref 8.5–10.1)
CALCIUM SERPL-MCNC: 8.4 MG/DL (ref 8.5–10.1)
CANNABINOIDS UR QL SCN: NEGATIVE
CHLORIDE SERPL-SCNC: 100 MMOL/L (ref 100–111)
CHLORIDE SERPL-SCNC: 98 MMOL/L (ref 100–111)
CO2 SERPL-SCNC: 21 MMOL/L (ref 21–32)
CO2 SERPL-SCNC: 23 MMOL/L (ref 21–32)
COCAINE UR QL SCN: POSITIVE
COLOR UR: ABNORMAL
CREAT SERPL-MCNC: 1.56 MG/DL (ref 0.6–1.3)
CREAT SERPL-MCNC: 1.81 MG/DL (ref 0.6–1.3)
DIFFERENTIAL METHOD BLD: ABNORMAL
EKG ATRIAL RATE: 102 BPM
EKG DIAGNOSIS: NORMAL
EKG P AXIS: 77 DEGREES
EKG P-R INTERVAL: 178 MS
EKG Q-T INTERVAL: 386 MS
EKG QRS DURATION: 96 MS
EKG QTC CALCULATION (BAZETT): 503 MS
EKG R AXIS: 96 DEGREES
EKG T AXIS: 44 DEGREES
EKG VENTRICULAR RATE: 102 BPM
EOSINOPHIL # BLD: 0 K/UL (ref 0–0.4)
EOSINOPHIL NFR BLD: 0 % (ref 0–5)
EPITH CASTS URNS QL MICRO: NEGATIVE /LPF (ref 0–5)
ERYTHROCYTE [DISTWIDTH] IN BLOOD BY AUTOMATED COUNT: 17.5 % (ref 11.6–14.5)
ETHANOL SERPL-MCNC: <3 MG/DL (ref 0–3)
FLUAV RNA SPEC QL NAA+PROBE: NOT DETECTED
FLUBV RNA SPEC QL NAA+PROBE: NOT DETECTED
GLOBULIN SER CALC-MCNC: 5 G/DL (ref 2–4)
GLUCOSE SERPL-MCNC: 134 MG/DL (ref 74–99)
GLUCOSE SERPL-MCNC: 199 MG/DL (ref 74–99)
GLUCOSE UR STRIP.AUTO-MCNC: NEGATIVE MG/DL
HCT VFR BLD AUTO: 51.2 % (ref 36–48)
HGB BLD-MCNC: 16.5 G/DL (ref 13–16)
HGB UR QL STRIP: ABNORMAL
IMM GRANULOCYTES # BLD AUTO: 0 K/UL (ref 0–0.04)
IMM GRANULOCYTES NFR BLD AUTO: 0 % (ref 0–0.5)
KETONES UR QL STRIP.AUTO: ABNORMAL MG/DL
LACTATE SERPL-SCNC: 4.5 MMOL/L (ref 0.4–2)
LACTATE SERPL-SCNC: 6.1 MMOL/L (ref 0.4–2)
LEUKOCYTE ESTERASE UR QL STRIP.AUTO: NEGATIVE
LIPASE SERPL-CCNC: 38 U/L (ref 13–75)
LYMPHOCYTES # BLD: 1 K/UL (ref 0.9–3.6)
LYMPHOCYTES NFR BLD: 9 % (ref 21–52)
Lab: ABNORMAL
MAGNESIUM SERPL-MCNC: 1.8 MG/DL (ref 1.6–2.6)
MCH RBC QN AUTO: 30.1 PG (ref 24–34)
MCHC RBC AUTO-ENTMCNC: 32.2 G/DL (ref 31–37)
MCV RBC AUTO: 93.4 FL (ref 78–100)
METHADONE UR QL: NEGATIVE
MONOCYTES # BLD: 0.1 K/UL (ref 0.05–1.2)
MONOCYTES NFR BLD: 1 % (ref 3–10)
NEUTS SEG # BLD: 9.7 K/UL (ref 1.8–8)
NEUTS SEG NFR BLD: 90 % (ref 40–73)
NITRITE UR QL STRIP.AUTO: NEGATIVE
NRBC # BLD: 0 K/UL (ref 0–0.01)
NRBC BLD-RTO: 0 PER 100 WBC
NT PRO BNP: ABNORMAL PG/ML (ref 0–900)
OPIATES UR QL: NEGATIVE
PCP UR QL: NEGATIVE
PH UR STRIP: 5 (ref 5–8)
PLATELET # BLD AUTO: 232 K/UL (ref 135–420)
PLATELET COMMENT: ABNORMAL
PMV BLD AUTO: 12.4 FL (ref 9.2–11.8)
POTASSIUM SERPL-SCNC: 4.2 MMOL/L (ref 3.5–5.5)
POTASSIUM SERPL-SCNC: 5 MMOL/L (ref 3.5–5.5)
PROCALCITONIN SERPL-MCNC: <0.05 NG/ML
PROT SERPL-MCNC: 8.7 G/DL (ref 6.4–8.2)
PROT UR STRIP-MCNC: 300 MG/DL
RBC # BLD AUTO: 5.48 M/UL (ref 4.35–5.65)
RBC #/AREA URNS HPF: NEGATIVE /HPF (ref 0–5)
RBC MORPH BLD: ABNORMAL
SARS-COV-2 RNA RESP QL NAA+PROBE: NOT DETECTED
SODIUM SERPL-SCNC: 131 MMOL/L (ref 136–145)
SODIUM SERPL-SCNC: 132 MMOL/L (ref 136–145)
SP GR UR REFRACTOMETRY: 1.03 (ref 1–1.03)
TROPONIN I SERPL HS-MCNC: 16 NG/L (ref 0–78)
TROPONIN I SERPL HS-MCNC: 18 NG/L (ref 0–78)
UROBILINOGEN UR QL STRIP.AUTO: 1 EU/DL (ref 0.2–1)
WBC # BLD AUTO: 10.8 K/UL (ref 4.6–13.2)
WBC URNS QL MICRO: NEGATIVE /HPF (ref 0–4)

## 2024-04-30 PROCEDURE — 2580000003 HC RX 258: Performed by: STUDENT IN AN ORGANIZED HEALTH CARE EDUCATION/TRAINING PROGRAM

## 2024-04-30 PROCEDURE — 85025 COMPLETE CBC W/AUTO DIFF WBC: CPT

## 2024-04-30 PROCEDURE — C9113 INJ PANTOPRAZOLE SODIUM, VIA: HCPCS

## 2024-04-30 PROCEDURE — 6360000002 HC RX W HCPCS: Performed by: PHYSICIAN ASSISTANT

## 2024-04-30 PROCEDURE — 2580000003 HC RX 258

## 2024-04-30 PROCEDURE — 74177 CT ABD & PELVIS W/CONTRAST: CPT

## 2024-04-30 PROCEDURE — 84484 ASSAY OF TROPONIN QUANT: CPT

## 2024-04-30 PROCEDURE — 94761 N-INVAS EAR/PLS OXIMETRY MLT: CPT

## 2024-04-30 PROCEDURE — 87636 SARSCOV2 & INF A&B AMP PRB: CPT

## 2024-04-30 PROCEDURE — 96374 THER/PROPH/DIAG INJ IV PUSH: CPT

## 2024-04-30 PROCEDURE — 83605 ASSAY OF LACTIC ACID: CPT

## 2024-04-30 PROCEDURE — 82077 ASSAY SPEC XCP UR&BREATH IA: CPT

## 2024-04-30 PROCEDURE — 83735 ASSAY OF MAGNESIUM: CPT

## 2024-04-30 PROCEDURE — 93010 ELECTROCARDIOGRAM REPORT: CPT | Performed by: INTERNAL MEDICINE

## 2024-04-30 PROCEDURE — 84145 PROCALCITONIN (PCT): CPT

## 2024-04-30 PROCEDURE — 83690 ASSAY OF LIPASE: CPT

## 2024-04-30 PROCEDURE — 6360000002 HC RX W HCPCS

## 2024-04-30 PROCEDURE — 6360000002 HC RX W HCPCS: Performed by: STUDENT IN AN ORGANIZED HEALTH CARE EDUCATION/TRAINING PROGRAM

## 2024-04-30 PROCEDURE — 83880 ASSAY OF NATRIURETIC PEPTIDE: CPT

## 2024-04-30 PROCEDURE — 2700000000 HC OXYGEN THERAPY PER DAY

## 2024-04-30 PROCEDURE — 82248 BILIRUBIN DIRECT: CPT

## 2024-04-30 PROCEDURE — 36415 COLL VENOUS BLD VENIPUNCTURE: CPT

## 2024-04-30 PROCEDURE — A4216 STERILE WATER/SALINE, 10 ML: HCPCS

## 2024-04-30 PROCEDURE — 81001 URINALYSIS AUTO W/SCOPE: CPT

## 2024-04-30 PROCEDURE — 76705 ECHO EXAM OF ABDOMEN: CPT

## 2024-04-30 PROCEDURE — 6370000000 HC RX 637 (ALT 250 FOR IP): Performed by: STUDENT IN AN ORGANIZED HEALTH CARE EDUCATION/TRAINING PROGRAM

## 2024-04-30 PROCEDURE — 6370000000 HC RX 637 (ALT 250 FOR IP)

## 2024-04-30 PROCEDURE — 1100000003 HC PRIVATE W/ TELEMETRY

## 2024-04-30 PROCEDURE — 6360000004 HC RX CONTRAST MEDICATION: Performed by: PHYSICIAN ASSISTANT

## 2024-04-30 PROCEDURE — 80307 DRUG TEST PRSMV CHEM ANLYZR: CPT

## 2024-04-30 PROCEDURE — 80053 COMPREHEN METABOLIC PANEL: CPT

## 2024-04-30 PROCEDURE — 99223 1ST HOSP IP/OBS HIGH 75: CPT | Performed by: INTERNAL MEDICINE

## 2024-04-30 PROCEDURE — 70450 CT HEAD/BRAIN W/O DYE: CPT

## 2024-04-30 PROCEDURE — 71275 CT ANGIOGRAPHY CHEST: CPT

## 2024-04-30 PROCEDURE — 96375 TX/PRO/DX INJ NEW DRUG ADDON: CPT

## 2024-04-30 PROCEDURE — 93005 ELECTROCARDIOGRAM TRACING: CPT | Performed by: PHYSICIAN ASSISTANT

## 2024-04-30 RX ORDER — FUROSEMIDE 10 MG/ML
40 INJECTION INTRAMUSCULAR; INTRAVENOUS ONCE
Status: COMPLETED | OUTPATIENT
Start: 2024-04-30 | End: 2024-04-30

## 2024-04-30 RX ORDER — ASPIRIN 81 MG/1
81 TABLET, CHEWABLE ORAL DAILY
COMMUNITY

## 2024-04-30 RX ORDER — LOSARTAN POTASSIUM 25 MG/1
25 TABLET ORAL DAILY
Status: ON HOLD | COMMUNITY
End: 2024-05-03 | Stop reason: HOSPADM

## 2024-04-30 RX ORDER — NALOXONE HYDROCHLORIDE 0.4 MG/ML
0.4 INJECTION, SOLUTION INTRAMUSCULAR; INTRAVENOUS; SUBCUTANEOUS PRN
Status: DISCONTINUED | OUTPATIENT
Start: 2024-04-30 | End: 2024-04-30

## 2024-04-30 RX ORDER — HEPARIN SODIUM 5000 [USP'U]/ML
5000 INJECTION, SOLUTION INTRAVENOUS; SUBCUTANEOUS EVERY 8 HOURS SCHEDULED
Status: DISCONTINUED | OUTPATIENT
Start: 2024-04-30 | End: 2024-05-03 | Stop reason: HOSPADM

## 2024-04-30 RX ORDER — AMIODARONE HYDROCHLORIDE 200 MG/1
200 TABLET ORAL DAILY
Status: DISCONTINUED | OUTPATIENT
Start: 2024-04-30 | End: 2024-05-03 | Stop reason: HOSPADM

## 2024-04-30 RX ORDER — ACETAMINOPHEN 650 MG/1
650 SUPPOSITORY RECTAL EVERY 6 HOURS PRN
Status: DISCONTINUED | OUTPATIENT
Start: 2024-04-30 | End: 2024-05-03 | Stop reason: HOSPADM

## 2024-04-30 RX ORDER — NITROGLYCERIN 0.4 MG/1
0.4 TABLET SUBLINGUAL ONCE
Status: COMPLETED | OUTPATIENT
Start: 2024-04-30 | End: 2024-04-30

## 2024-04-30 RX ORDER — DAPAGLIFLOZIN 10 MG/1
10 TABLET, FILM COATED ORAL EVERY MORNING
COMMUNITY

## 2024-04-30 RX ORDER — LANOLIN ALCOHOL/MO/W.PET/CERES
3 CREAM (GRAM) TOPICAL NIGHTLY PRN
Status: DISCONTINUED | OUTPATIENT
Start: 2024-04-30 | End: 2024-05-03 | Stop reason: HOSPADM

## 2024-04-30 RX ORDER — ONDANSETRON 2 MG/ML
4 INJECTION INTRAMUSCULAR; INTRAVENOUS EVERY 6 HOURS PRN
Status: DISCONTINUED | OUTPATIENT
Start: 2024-04-30 | End: 2024-05-01

## 2024-04-30 RX ORDER — SPIRONOLACTONE 25 MG/1
25 TABLET ORAL DAILY
COMMUNITY

## 2024-04-30 RX ORDER — MAGNESIUM HYDROXIDE/ALUMINUM HYDROXICE/SIMETHICONE 120; 1200; 1200 MG/30ML; MG/30ML; MG/30ML
30 SUSPENSION ORAL EVERY 6 HOURS PRN
Status: DISCONTINUED | OUTPATIENT
Start: 2024-04-30 | End: 2024-05-03 | Stop reason: HOSPADM

## 2024-04-30 RX ORDER — SODIUM CHLORIDE 0.9 % (FLUSH) 0.9 %
5-40 SYRINGE (ML) INJECTION PRN
Status: DISCONTINUED | OUTPATIENT
Start: 2024-04-30 | End: 2024-05-03 | Stop reason: HOSPADM

## 2024-04-30 RX ORDER — CARVEDILOL 6.25 MG/1
6.25 TABLET ORAL 2 TIMES DAILY WITH MEALS
Status: DISCONTINUED | OUTPATIENT
Start: 2024-04-30 | End: 2024-04-30

## 2024-04-30 RX ORDER — ASPIRIN 81 MG/1
81 TABLET, CHEWABLE ORAL DAILY
Status: DISCONTINUED | OUTPATIENT
Start: 2024-04-30 | End: 2024-04-30

## 2024-04-30 RX ORDER — ASPIRIN 81 MG/1
81 TABLET, CHEWABLE ORAL DAILY
Status: DISCONTINUED | OUTPATIENT
Start: 2024-04-30 | End: 2024-05-03 | Stop reason: HOSPADM

## 2024-04-30 RX ORDER — CARVEDILOL 6.25 MG/1
6.25 TABLET ORAL 2 TIMES DAILY WITH MEALS
Status: DISCONTINUED | OUTPATIENT
Start: 2024-04-30 | End: 2024-05-03 | Stop reason: HOSPADM

## 2024-04-30 RX ORDER — LORAZEPAM 2 MG/ML
1 INJECTION INTRAMUSCULAR ONCE
Status: COMPLETED | OUTPATIENT
Start: 2024-04-30 | End: 2024-04-30

## 2024-04-30 RX ORDER — SODIUM CHLORIDE, SODIUM LACTATE, POTASSIUM CHLORIDE, AND CALCIUM CHLORIDE .6; .31; .03; .02 G/100ML; G/100ML; G/100ML; G/100ML
250 INJECTION, SOLUTION INTRAVENOUS ONCE
Status: COMPLETED | OUTPATIENT
Start: 2024-04-30 | End: 2024-04-30

## 2024-04-30 RX ORDER — SODIUM CHLORIDE 9 MG/ML
INJECTION, SOLUTION INTRAVENOUS PRN
Status: DISCONTINUED | OUTPATIENT
Start: 2024-04-30 | End: 2024-05-03 | Stop reason: HOSPADM

## 2024-04-30 RX ORDER — AMIODARONE HYDROCHLORIDE 200 MG/1
200 TABLET ORAL DAILY
Status: ON HOLD | COMMUNITY
End: 2024-05-03 | Stop reason: HOSPADM

## 2024-04-30 RX ORDER — FUROSEMIDE 10 MG/ML
60 INJECTION INTRAMUSCULAR; INTRAVENOUS 2 TIMES DAILY
Status: DISCONTINUED | OUTPATIENT
Start: 2024-04-30 | End: 2024-04-30

## 2024-04-30 RX ORDER — ONDANSETRON 4 MG/1
4 TABLET, ORALLY DISINTEGRATING ORAL EVERY 8 HOURS PRN
Status: DISCONTINUED | OUTPATIENT
Start: 2024-04-30 | End: 2024-05-01

## 2024-04-30 RX ORDER — METOPROLOL SUCCINATE 25 MG/1
12.5 TABLET, EXTENDED RELEASE ORAL DAILY
Status: ON HOLD | COMMUNITY
End: 2024-05-03 | Stop reason: HOSPADM

## 2024-04-30 RX ORDER — LANOLIN ALCOHOL/MO/W.PET/CERES
3 CREAM (GRAM) TOPICAL NIGHTLY
Status: DISCONTINUED | OUTPATIENT
Start: 2024-04-30 | End: 2024-04-30

## 2024-04-30 RX ORDER — SPIRONOLACTONE 25 MG/1
25 TABLET ORAL DAILY
Status: DISCONTINUED | OUTPATIENT
Start: 2024-04-30 | End: 2024-05-03 | Stop reason: HOSPADM

## 2024-04-30 RX ORDER — SODIUM CHLORIDE 0.9 % (FLUSH) 0.9 %
5-40 SYRINGE (ML) INJECTION EVERY 12 HOURS SCHEDULED
Status: DISCONTINUED | OUTPATIENT
Start: 2024-04-30 | End: 2024-05-03 | Stop reason: HOSPADM

## 2024-04-30 RX ORDER — POLYETHYLENE GLYCOL 3350 17 G/17G
17 POWDER, FOR SOLUTION ORAL DAILY PRN
Status: DISCONTINUED | OUTPATIENT
Start: 2024-04-30 | End: 2024-05-03 | Stop reason: HOSPADM

## 2024-04-30 RX ORDER — FUROSEMIDE 10 MG/ML
60 INJECTION INTRAMUSCULAR; INTRAVENOUS 2 TIMES DAILY
Status: DISCONTINUED | OUTPATIENT
Start: 2024-05-01 | End: 2024-05-02

## 2024-04-30 RX ORDER — ROSUVASTATIN CALCIUM 20 MG/1
20 TABLET, COATED ORAL NIGHTLY
Status: DISCONTINUED | OUTPATIENT
Start: 2024-04-30 | End: 2024-05-03 | Stop reason: HOSPADM

## 2024-04-30 RX ORDER — ACETAMINOPHEN 325 MG/1
650 TABLET ORAL EVERY 6 HOURS PRN
Status: DISCONTINUED | OUTPATIENT
Start: 2024-04-30 | End: 2024-05-03 | Stop reason: HOSPADM

## 2024-04-30 RX ADMIN — CARVEDILOL 6.25 MG: 6.25 TABLET, FILM COATED ORAL at 14:35

## 2024-04-30 RX ADMIN — HEPARIN SODIUM 5000 UNITS: 5000 INJECTION INTRAVENOUS; SUBCUTANEOUS at 21:55

## 2024-04-30 RX ADMIN — AZITHROMYCIN MONOHYDRATE 500 MG: 500 INJECTION, POWDER, LYOPHILIZED, FOR SOLUTION INTRAVENOUS at 12:23

## 2024-04-30 RX ADMIN — SPIRONOLACTONE 25 MG: 25 TABLET ORAL at 14:35

## 2024-04-30 RX ADMIN — SACUBITRIL AND VALSARTAN 1 TABLET: 24; 26 TABLET, FILM COATED ORAL at 21:55

## 2024-04-30 RX ADMIN — SODIUM CHLORIDE, PRESERVATIVE FREE 10 ML: 5 INJECTION INTRAVENOUS at 22:00

## 2024-04-30 RX ADMIN — NALXONE HYDROCHLORIDE 0.4 MG: 0.4 INJECTION INTRAMUSCULAR; INTRAVENOUS; SUBCUTANEOUS at 00:42

## 2024-04-30 RX ADMIN — SODIUM CHLORIDE, PRESERVATIVE FREE 40 MG: 5 INJECTION INTRAVENOUS at 14:38

## 2024-04-30 RX ADMIN — SODIUM CHLORIDE, POTASSIUM CHLORIDE, SODIUM LACTATE AND CALCIUM CHLORIDE 250 ML: 600; 310; 30; 20 INJECTION, SOLUTION INTRAVENOUS at 17:54

## 2024-04-30 RX ADMIN — WATER 1000 MG: 1 INJECTION INTRAMUSCULAR; INTRAVENOUS; SUBCUTANEOUS at 12:24

## 2024-04-30 RX ADMIN — NITROGLYCERIN 0.4 MG: 0.4 TABLET, ORALLY DISINTEGRATING SUBLINGUAL at 12:21

## 2024-04-30 RX ADMIN — ASPIRIN 81 MG CHEWABLE TABLET 81 MG: 81 TABLET CHEWABLE at 14:35

## 2024-04-30 RX ADMIN — IOPAMIDOL 90 ML: 755 INJECTION, SOLUTION INTRAVENOUS at 05:30

## 2024-04-30 RX ADMIN — EMPAGLIFLOZIN 10 MG: 10 TABLET, FILM COATED ORAL at 14:35

## 2024-04-30 RX ADMIN — ROSUVASTATIN CALCIUM 20 MG: 20 TABLET, COATED ORAL at 21:55

## 2024-04-30 RX ADMIN — FUROSEMIDE 40 MG: 10 INJECTION, SOLUTION INTRAMUSCULAR; INTRAVENOUS at 12:17

## 2024-04-30 RX ADMIN — HEPARIN SODIUM 5000 UNITS: 5000 INJECTION INTRAVENOUS; SUBCUTANEOUS at 14:45

## 2024-04-30 RX ADMIN — LORAZEPAM 1 MG: 2 INJECTION INTRAMUSCULAR; INTRAVENOUS at 02:15

## 2024-04-30 RX ADMIN — ALUMINUM HYDROXIDE AND MAGNESIUM HYDROXIDE 30 ML: 200; 200 SUSPENSION ORAL at 12:16

## 2024-04-30 ASSESSMENT — ENCOUNTER SYMPTOMS
WHEEZING: 0
SORE THROAT: 0
NAUSEA: 1
VOMITING: 1
SHORTNESS OF BREATH: 0
DIARRHEA: 0
STRIDOR: 0
COUGH: 0
BACK PAIN: 0
ABDOMINAL PAIN: 1
CONSTIPATION: 0
EYE REDNESS: 0
EYE DISCHARGE: 0
RHINORRHEA: 0

## 2024-04-30 ASSESSMENT — LIFESTYLE VARIABLES
HOW OFTEN DO YOU HAVE A DRINK CONTAINING ALCOHOL: NEVER
HOW MANY STANDARD DRINKS CONTAINING ALCOHOL DO YOU HAVE ON A TYPICAL DAY: PATIENT DOES NOT DRINK

## 2024-04-30 NOTE — ACP (ADVANCE CARE PLANNING)
Advance Care Planning   Healthcare Decision Maker:    Primary Decision Maker: Ivon Daniel (Sister) - Brother/Sister - [phone]    Click here to complete Healthcare Decision Makers including selection of the Healthcare Decision Maker Relationship (ie \"Primary\").   completed the initial Spiritual Assessment of the patient, and offered Pastoral Care support with the patient in bed 10 of the emergency room where he will waits to see if he will be admitted to the hospital., There is an advance directive on file. Patient does not have any Roman [employer]/cultural needs that will affect patient’s preferences in health care. Chaplains will continue to follow and will provide pastoral care on an as needed/requested basis.    tiesha Richey  Board Certified   Spiritual Care Department  [phone]

## 2024-04-30 NOTE — H&P
daily BMPs while diuresing  -Continue ASA 81 mg with current abdominal pain  -Continue Coreg 6.25 mg twice daily, Entresto 24-26 twice daily  -AICD interrogation  -Cardiology consulted, appreciate recs as above    Acute on chronic kidney injury  CKD - likely stage III  -Creatinine 1.5 on admission. Unclear baseline. Outpatient creatinine has ranged from 1.6-2.0  -Hypovolemia versus cocaine use versus fluid overload.  Concern for intrinsic with BUN/creatinine <20.  Highest concern for fluid overload given elevated BNP  Plan:  -Twice daily BMP while diuresing  -Avoid nephrotoxic medications  -Diuretics as per above    Substance use  -UDS cocaine positive, multiple prior UDS cocaine positive  Plan  -Patient denies cocaine use  -Encourage cessation    Global:  Code: Full  IVF/Drips: None  I/O / Wt: Strict  Diet: GI bland  Bowel Regimen, Last BM: As needed  DVT/AC: SQ heparin  Mobility: per protocol   Disposition: Stepdown  Anticipated LOS: 2 to 3 days     Point of Contact (relationship, number): Ivon Daniel [phone]      SUBJECTIVE:  History of Present Illness:  Moiz May is a 63 y.o.  male with PMHx of HFrEF EF 10%, CAD s/p PCI February 2022, ischemic cardiomyopathy, HTN, HLD, and CKD ?Stage III who presented to [facility] on 4/29/2024 with complaint of abdominal pain. Patient reports abdominal pain for the last 2 to 3 days.  When asked to specify where he points to his epigastric area.  He notes decreased p.o. intake, nausea, vomiting.  Has been intermittently able to take some food and water.  He was drinking water at the time of the interview.  And said it was making his abdominal pain worse.    Additionally, was concerned with his shortness of breath.  He notes this has been worse in the last day.  Unclear as to what the etiology would be.  Denies sick contacts, cough, recent viral symptoms.  Endorses palpitations.  Denies chest pain.     ED Course:  -Afebrile, tachycardic, hypertensive  -Labs: Mild  scan  -QTc okay, Zofran as needed for nausea     #HFrEF EF 10%  HTN-moderately elevated  Fluid overload on exam, dyspneic, new O2 requirement.  -Cardiology consulted, appreciate recs.  Blood pressure and diuretics per their recommendatio  Vitals, labs and imaging reviewed     For additional problem list, assessment, and plan see intern note.    Halley Curiel , PGY-2   [facility] Family Medicine   April 30, 2024, 3:37 PM

## 2024-04-30 NOTE — CONSULTS
[facility] - Consult Note    Cardiology consultation request from Dr. Recinos for evaluation and management/treatment of CHF     Date of  Admission: 4/29/2024  6:42 PM   Primary Care Physician:  Dee Dee Alves MD    Attending Cardiologist:       Assessment:     -Abdominal pain.   -Acute on Chronic HFrEF/cardiomyopathy, primarily nonischemic. Recurrent admissions in setting of non compliance and active drug abuse.   EF 10-15% by echo 8/2023 01/2024 15-20%   02/2024, EF 10%   GDMT: Entresto, Coreg, aldactone, Torsemide.   S/p [employer] AICD 11/2021 for primary prevention  -CAD, distal RCA stent 2/2022; [facility] 02/2024 non obstructive CAD.   -Apical LAD bridging   -SVT ablation 2018  -Prolonged QT, chronic  -CKD stage III  -HTN, uncontrolled.   -Dyslipidemia  -Peripheral neuropathy  -Noncompliance  -Polysubstance abuse, cocaine, fentanyl, heroin. UDS + cocaine this admission.        Primary cardiologist Dr. TAWNYA Madsen     Plan:     -Continue pulse IV diuretics, strict I/O's, +/- inotrope pending response.  -Gradually resume outpatient p.o. GDMT.   -Resume ASA for Hx CAD.   -Interrogate [employer] AICD, denies recent shocks.   -Encourage drug cessation.   -Recent Echo 02/2024 with LVEF, would not repeat an Echo this admission as it will not .      History of Present Illness:     This is a 63 y.o. male admitted for No admission diagnoses are documented for this encounter..    Patient complains of: abdominal pain   Moiz May is a 63 y.o. male, past medical history as stated above, who we are seeing for heart failure.  Patient presented with 1 to 2 days of abdominal pain without nausea or vomiting. He also endorses worsening leg swelling up to his thighs despite taking his diuretic.  He denies increased fluid or sodium intake.  He states he is not urinating as often.  Patient denies recent illicit drug use but UDS is positive for cocaine. Patient patient reportedly did a speedball  aspiration. Trace pleural effusions.    Signed By: Nuzhat Abad MD on 2/7/2024 12:57 PM      Signed By: Marcy Espinal PA-C     April 30, 2024

## 2024-04-30 NOTE — ED NOTES
TRANSFER - OUT REPORT:    Verbal report given to MARNIE Abbott on Moiz May  being transferred to [facility] for routine progression of patient care       Report consisted of patient's Situation, Background, Assessment and   Recommendations(SBAR).     Information from the following report(s) Nurse Handoff Report, ED Encounter Summary, ED SBAR, Recent Results, and Neuro Assessment was reviewed with the receiving nurse.    [place] Fall Assessment:    Presents to emergency department  because of falls (Syncope, seizure, or loss of consciousness): No  Age > 70: No  Altered Mental Status, Intoxication with alcohol or substance confusion (Disorientation, impaired judgment, poor safety awaremess, or inability to follow instructions): Yes  Impaired Mobility: Ambulates or transfers with assistive devices or assistance; Unable to ambulate or transer.: Yes  Nursing Judgement: Yes          Lines:   Peripheral IV 04/30/24 Right (Active)   Site Assessment Clean, dry & intact 04/30/24 0542   Line Status Blood return noted;Flushed 04/30/24 0542   Phlebitis Assessment No symptoms 04/30/24 0542   Infiltration Assessment 0 04/30/24 0542   Dressing Status New dressing applied 04/30/24 0542   Dressing Type Transparent 04/30/24 0542        Opportunity for questions and clarification was provided.      Patient transported with:  Tech

## 2024-04-30 NOTE — ED PROVIDER NOTES
ED Course as of 04/30/24 1258   Tue Apr 30, 2024   0613 Cb to ks 63-year-old male with past medical history of CAD, cardiomyopathy, heart failure with a EF of 15% cc- abdominal pain / chest pain s/p speed ball. Workup- elevated bnp / plan- pending ct chest and abdomen / head? /CTA shows no signs of acute dissection versus acute PE.  Concerns for possible pneumonia.  Will consult with cardiology and have patient admitted. [KS]   1112 CT ABDOMEN PELVIS W IV CONTRAST Additional Contrast? None  IMPRESSION:  1.  Mild patchy opacities in the lung bases which could reflect pneumonia.     2.  No acute pathology appreciated the abdomen/pelvis.        Specimen Collected: 04/30/24 06:25 EDT         [KS]   1112 CTA CHEST W WO CONTRAST  IMPRESSION:  1.  No evidence of pulmonary Lisoretic dissection.     2.  Borderline pulmonary edema with some element of right heart failure.     3.  Patchy opacities which could represent pneumonia.   [KS]   1257 Patient given a dose of Rocephin and azithromycin.  Patient given 40 of Lasix.  Patient given 0.4 mg of nitro.  Due to concern for acute on chronic CHF exacerbation patient is tachycardic and tachypneic.  Patient was admitted to family medicine for higher level of care.  Patient will be admitted to stepdown. [KS]      ED Course User Index  [KS] Giancarlo Recinos MD Sarpong, Keneth, MD  04/30/24 1250

## 2024-04-30 NOTE — DISCHARGE INSTRUCTIONS
You were admitted for heart failure exacerbation. We also did some tests including blood work and CT for your abdominal pain. Your abdominal pain is most likely from an irritated stomach lining. Please take protonix twice daily for 6 weeks and carafate prior meals to help heal your stomach lining. Please follow up with our clinic on 5/9/24 @ 240 PM so we can check on how you are doing.

## 2024-04-30 NOTE — ED NOTES
Pt saturated bedding with urine. Pt gown and bedding changed. Pt provided blue scrub pants and gown.   Pt states he's feeling \"much better\" requested food and ginger ale .  Pt provided dinner tray, which included ensure,  and ginger ale

## 2024-04-30 NOTE — ED PROVIDER NOTES
EMERGENCY DEPARTMENT HISTORY AND PHYSICAL EXAM    Date: 4/29/2024  Patient Name: Moiz May    History of Presenting Illness     Chief Complaint   Patient presents with    Abdominal Pain    Nausea    Emesis         History Provided By: patient     Chief Complaint: abd pain N/V   Duration: 2 days   Timing:  acute   Location:   Quality:   Severity: moderate   Modifying Factors:   Associated Symptoms: abd pain N/V leg swelling       Additional History (Context): Moiz May is a 63 y.o. male with PMH CHF, CAD depression htn hyperlipidemia SAH and cocaine abuse who presents with c/o 2 days of upper abdominal pain and N/V. Pt also noted to have BLE swelling which states is worsened from his baseline. Review of pt's charts shows a hx of CHF with an EF of 15-20%. Pt becoming more somnolent during the exam, denies any illicit substance use tonight. Denies fever and chills. No known sick exposures. No other complaints reported.     PCP: Dee Dee Alves MD    Current Facility-Administered Medications   Medication Dose Route Frequency Provider Last Rate Last Admin    naloxone (NARCAN) injection 0.4 mg  0.4 mg IntraVENous PRN Cori Venegas PA-C   0.4 mg at 04/30/24 0042     Current Outpatient Medications   Medication Sig Dispense Refill    clopidogrel (PLAVIX) 75 MG tablet Take 1 tablet by mouth daily 30 tablet 0    rosuvastatin (CRESTOR) 20 MG tablet Take 1 tablet by mouth nightly 30 tablet 3    carvedilol (COREG) 6.25 MG tablet Take 1 tablet by mouth 2 times daily (with meals) 60 tablet 3    torsemide (DEMADEX) 20 MG tablet Take 1 tablet by mouth daily 30 tablet 3    nitroGLYCERIN (NITROSTAT) 0.4 MG SL tablet Place 1 tablet under the tongue         Past History     Past Medical History:  Past Medical History:   Diagnosis Date    CAD (coronary artery disease)     CHF (congestive heart failure) (HCC)     Depression     Head injury     HLD (hyperlipidemia)     Hypertension     Pulmonary nodule     SAH  Urine TRACE (A) NEG mg/dL    Bilirubin Urine SMALL (A) NEG      Blood, Urine SMALL (A) NEG      Urobilinogen, Urine 1.0 0.2 - 1.0 EU/dL    Nitrite, Urine Negative NEG      Leukocyte Esterase, Urine Negative NEG     Urine Drug Screen    Collection Time: 04/30/24  1:16 AM   Result Value Ref Range    Benzodiazepines, Urine Negative NEG      Barbiturates, Urine Negative NEG      THC, TH-Cannabinol, Urine Negative NEG      Opiates, Urine Negative NEG      PCP, Urine Negative NEG      Cocaine, Urine Positive (A) NEG      Amphetamine, Urine Negative NEG      Methadone, Urine Negative NEG      Comments: (NOTE)    Urinalysis, Micro    Collection Time: 04/30/24  1:16 AM   Result Value Ref Range    WBC, UA Negative 0 - 4 /hpf    RBC, UA Negative 0 - 5 /hpf    Epithelial Cells UA Negative 0 - 5 /lpf    BACTERIA, URINE Negative NEG /hpf       Radiologic Studies -   CT ABDOMEN PELVIS W IV CONTRAST Additional Contrast? None    (Results Pending)   CTA CHEST W WO CONTRAST    (Results Pending)   US GALLBLADDER RUQ    (Results Pending)     [unfilled]  [unfilled]      Medical Decision Making   I am the first provider for this patient.    I reviewed the vital signs, available nursing notes, past medical history, past surgical history, family history and social history.    Vital Signs-Reviewed the patient's vital signs.      Records Reviewed: Cori Venegas PA-C     Procedures:  Procedures    Provider Notes (Medical Decision Making): Impression:  abd pain, cocaine abuse, htn, tachycardia    EKG sinus tachycardia, with occasional PVCs, rate 102, no STEMI reviewed by myself and the ED attending    Pt initially writhing in pain upon arrival to the emergency department however after being placed into main side ED bed the patient became much more somnolent.  He denies any history of substance use however his UDS is positive for cocaine.  Patient was given a dose of Narcan in the ED and he did respond somewhat to this medication.  It is questionable

## 2024-04-30 NOTE — PROGRESS NOTES
1950 Pt arrived on unit via transport team.    1958 Pt vitals taken    2145 Swallow eval done, passed.    2155 Pt medicated per MAR, pt requesting food.    2341 Critical lab reported lactic acid 4.5, MD called to notify awaiting call back .     2345  Pt vitals and reassessment done no pain reported at this time.     0306 Pt mediated per MAR    0440 14 beats of v tach MD made aware no new orders at this time.    0448 Pt vitals and reassessment done no pain reported at this time.     0721 Bedside shift report given to Aleshia DYE by Sangeeta DYE, all question and concerns answered.

## 2024-05-01 LAB
ALBUMIN SERPL-MCNC: 2.5 G/DL (ref 3.4–5)
ALBUMIN/GLOB SERPL: 0.7 (ref 0.8–1.7)
ALP SERPL-CCNC: 123 U/L (ref 45–117)
ALT SERPL-CCNC: 36 U/L (ref 16–61)
ANION GAP SERPL CALC-SCNC: 5 MMOL/L (ref 3–18)
ANION GAP SERPL CALC-SCNC: 8 MMOL/L (ref 3–18)
AST SERPL-CCNC: 32 U/L (ref 10–38)
BASOPHILS # BLD: 0.1 K/UL (ref 0–0.1)
BASOPHILS NFR BLD: 1 % (ref 0–2)
BILIRUB DIRECT SERPL-MCNC: 0.4 MG/DL (ref 0–0.2)
BILIRUB SERPL-MCNC: 1.2 MG/DL (ref 0.2–1)
BUN SERPL-MCNC: 25 MG/DL (ref 7–18)
BUN SERPL-MCNC: 27 MG/DL (ref 7–18)
BUN/CREAT SERPL: 17 (ref 12–20)
BUN/CREAT SERPL: 18 (ref 12–20)
CALCIUM SERPL-MCNC: 7.2 MG/DL (ref 8.5–10.1)
CALCIUM SERPL-MCNC: 7.2 MG/DL (ref 8.5–10.1)
CHLORIDE SERPL-SCNC: 101 MMOL/L (ref 100–111)
CHLORIDE SERPL-SCNC: 102 MMOL/L (ref 100–111)
CO2 SERPL-SCNC: 25 MMOL/L (ref 21–32)
CO2 SERPL-SCNC: 26 MMOL/L (ref 21–32)
CREAT SERPL-MCNC: 1.41 MG/DL (ref 0.6–1.3)
CREAT SERPL-MCNC: 1.55 MG/DL (ref 0.6–1.3)
DIFFERENTIAL METHOD BLD: ABNORMAL
EOSINOPHIL # BLD: 0 K/UL (ref 0–0.4)
EOSINOPHIL NFR BLD: 0 % (ref 0–5)
ERYTHROCYTE [DISTWIDTH] IN BLOOD BY AUTOMATED COUNT: 17.2 % (ref 11.6–14.5)
GLOBULIN SER CALC-MCNC: 3.5 G/DL (ref 2–4)
GLUCOSE SERPL-MCNC: 85 MG/DL (ref 74–99)
GLUCOSE SERPL-MCNC: 96 MG/DL (ref 74–99)
HCT VFR BLD AUTO: 43.6 % (ref 36–48)
HGB BLD-MCNC: 14.2 G/DL (ref 13–16)
IMM GRANULOCYTES # BLD AUTO: 0 K/UL (ref 0–0.04)
IMM GRANULOCYTES NFR BLD AUTO: 0 % (ref 0–0.5)
LACTATE SERPL-SCNC: 1.4 MMOL/L (ref 0.4–2)
LACTATE SERPL-SCNC: 2.6 MMOL/L (ref 0.4–2)
LACTATE SERPL-SCNC: 2.7 MMOL/L (ref 0.4–2)
LYMPHOCYTES # BLD: 1.5 K/UL (ref 0.9–3.6)
LYMPHOCYTES NFR BLD: 14 % (ref 21–52)
MAGNESIUM SERPL-MCNC: 1.7 MG/DL (ref 1.6–2.6)
MCH RBC QN AUTO: 29.5 PG (ref 24–34)
MCHC RBC AUTO-ENTMCNC: 32.6 G/DL (ref 31–37)
MCV RBC AUTO: 90.6 FL (ref 78–100)
MONOCYTES # BLD: 0.7 K/UL (ref 0.05–1.2)
MONOCYTES NFR BLD: 7 % (ref 3–10)
NEUTS SEG # BLD: 8 K/UL (ref 1.8–8)
NEUTS SEG NFR BLD: 77 % (ref 40–73)
NRBC # BLD: 0 K/UL (ref 0–0.01)
NRBC BLD-RTO: 0 PER 100 WBC
PLATELET # BLD AUTO: 189 K/UL (ref 135–420)
PMV BLD AUTO: 12.3 FL (ref 9.2–11.8)
POTASSIUM SERPL-SCNC: 3.6 MMOL/L (ref 3.5–5.5)
POTASSIUM SERPL-SCNC: 4.1 MMOL/L (ref 3.5–5.5)
PROT SERPL-MCNC: 6 G/DL (ref 6.4–8.2)
RBC # BLD AUTO: 4.81 M/UL (ref 4.35–5.65)
SODIUM SERPL-SCNC: 133 MMOL/L (ref 136–145)
SODIUM SERPL-SCNC: 134 MMOL/L (ref 136–145)
WBC # BLD AUTO: 10.3 K/UL (ref 4.6–13.2)

## 2024-05-01 PROCEDURE — 6360000002 HC RX W HCPCS

## 2024-05-01 PROCEDURE — 36415 COLL VENOUS BLD VENIPUNCTURE: CPT

## 2024-05-01 PROCEDURE — 80048 BASIC METABOLIC PNL TOTAL CA: CPT

## 2024-05-01 PROCEDURE — A4216 STERILE WATER/SALINE, 10 ML: HCPCS

## 2024-05-01 PROCEDURE — 6370000000 HC RX 637 (ALT 250 FOR IP)

## 2024-05-01 PROCEDURE — 99232 SBSQ HOSP IP/OBS MODERATE 35: CPT | Performed by: INTERNAL MEDICINE

## 2024-05-01 PROCEDURE — 80076 HEPATIC FUNCTION PANEL: CPT

## 2024-05-01 PROCEDURE — 2580000003 HC RX 258

## 2024-05-01 PROCEDURE — 83605 ASSAY OF LACTIC ACID: CPT

## 2024-05-01 PROCEDURE — 6360000002 HC RX W HCPCS: Performed by: PHYSICIAN ASSISTANT

## 2024-05-01 PROCEDURE — C9113 INJ PANTOPRAZOLE SODIUM, VIA: HCPCS

## 2024-05-01 PROCEDURE — 83735 ASSAY OF MAGNESIUM: CPT

## 2024-05-01 PROCEDURE — 85025 COMPLETE CBC W/AUTO DIFF WBC: CPT

## 2024-05-01 PROCEDURE — 1100000003 HC PRIVATE W/ TELEMETRY

## 2024-05-01 PROCEDURE — 97162 PT EVAL MOD COMPLEX 30 MIN: CPT

## 2024-05-01 PROCEDURE — 97165 OT EVAL LOW COMPLEX 30 MIN: CPT

## 2024-05-01 RX ORDER — ONDANSETRON 4 MG/1
4 TABLET, ORALLY DISINTEGRATING ORAL
Status: DISCONTINUED | OUTPATIENT
Start: 2024-05-01 | End: 2024-05-02

## 2024-05-01 RX ORDER — MAGNESIUM SULFATE IN WATER 40 MG/ML
2000 INJECTION, SOLUTION INTRAVENOUS ONCE
Status: COMPLETED | OUTPATIENT
Start: 2024-05-01 | End: 2024-05-01

## 2024-05-01 RX ORDER — ONDANSETRON 2 MG/ML
4 INJECTION INTRAMUSCULAR; INTRAVENOUS
Status: DISCONTINUED | OUTPATIENT
Start: 2024-05-01 | End: 2024-05-02

## 2024-05-01 RX ADMIN — HEPARIN SODIUM 5000 UNITS: 5000 INJECTION INTRAVENOUS; SUBCUTANEOUS at 14:12

## 2024-05-01 RX ADMIN — MAGNESIUM SULFATE HEPTAHYDRATE 2000 MG: 40 INJECTION, SOLUTION INTRAVENOUS at 14:13

## 2024-05-01 RX ADMIN — ASPIRIN 81 MG CHEWABLE TABLET 81 MG: 81 TABLET CHEWABLE at 09:01

## 2024-05-01 RX ADMIN — HEPARIN SODIUM 5000 UNITS: 5000 INJECTION INTRAVENOUS; SUBCUTANEOUS at 21:45

## 2024-05-01 RX ADMIN — ROSUVASTATIN CALCIUM 20 MG: 20 TABLET, COATED ORAL at 20:37

## 2024-05-01 RX ADMIN — HEPARIN SODIUM 5000 UNITS: 5000 INJECTION INTRAVENOUS; SUBCUTANEOUS at 06:30

## 2024-05-01 RX ADMIN — CARVEDILOL 6.25 MG: 6.25 TABLET, FILM COATED ORAL at 16:13

## 2024-05-01 RX ADMIN — SACUBITRIL AND VALSARTAN 1 TABLET: 24; 26 TABLET, FILM COATED ORAL at 20:37

## 2024-05-01 RX ADMIN — SODIUM CHLORIDE, PRESERVATIVE FREE 10 ML: 5 INJECTION INTRAVENOUS at 20:37

## 2024-05-01 RX ADMIN — ONDANSETRON 4 MG: 4 TABLET, ORALLY DISINTEGRATING ORAL at 16:13

## 2024-05-01 RX ADMIN — SODIUM CHLORIDE, PRESERVATIVE FREE 10 ML: 5 INJECTION INTRAVENOUS at 09:02

## 2024-05-01 RX ADMIN — SPIRONOLACTONE 25 MG: 25 TABLET ORAL at 09:02

## 2024-05-01 RX ADMIN — SACUBITRIL AND VALSARTAN 1 TABLET: 24; 26 TABLET, FILM COATED ORAL at 09:01

## 2024-05-01 RX ADMIN — SODIUM CHLORIDE, PRESERVATIVE FREE 40 MG: 5 INJECTION INTRAVENOUS at 03:06

## 2024-05-01 RX ADMIN — FUROSEMIDE 60 MG: 10 INJECTION, SOLUTION INTRAMUSCULAR; INTRAVENOUS at 16:15

## 2024-05-01 RX ADMIN — CARVEDILOL 6.25 MG: 6.25 TABLET, FILM COATED ORAL at 09:01

## 2024-05-01 RX ADMIN — EMPAGLIFLOZIN 10 MG: 10 TABLET, FILM COATED ORAL at 09:01

## 2024-05-01 RX ADMIN — SODIUM CHLORIDE, PRESERVATIVE FREE 40 MG: 5 INJECTION INTRAVENOUS at 14:13

## 2024-05-01 ASSESSMENT — PAIN SCALES - GENERAL
PAINLEVEL_OUTOF10: 0

## 2024-05-01 NOTE — PROGRESS NOTES
[facility]    Cardiology consultation request from Dr. Recinos for evaluation and management/treatment of CHF     Date of  Admission: 4/29/2024  6:42 PM   Primary Care Physician:  Dee Dee Alves MD    Subjective:  Patient without any chest pain or chest tightness.  Appears fatigued but no distress     Assessment:     -Abdominal pain.   -Acute on Chronic HFrEF/cardiomyopathy, primarily nonischemic. Recurrent admissions in setting of non compliance and active drug abuse.   EF 10-15% by echo 8/2023 01/2024 15-20%   02/2024, EF 10%   GDMT: Entresto, Coreg, aldactone, Torsemide.   S/p [employer] AICD 11/2021 for primary prevention  -CAD, distal RCA stent 2/2022; C 02/2024 non obstructive CAD.   -Apical LAD bridging   -SVT ablation 2018  -Prolonged QT, chronic  -CKD stage III  -HTN, uncontrolled.   -Dyslipidemia  -Peripheral neuropathy  -Noncompliance  -Polysubstance abuse, cocaine, fentanyl, heroin. UDS + cocaine this admission.      Primary cardiologist Dr. TAWNYA Madsen     Plan:     -Continue pulse IV diuretics, strict I/O's  -Sodium, creatinine and lactate is improving.  Will reevaluate regarding diuretics tomorrow  Hemodynamically stable.  Continue aspirin, carvedilol, Jardiance, Entresto and statin, spironolactone  Encourage drug cessation.   May consider changing to oral diuretics tomorrow if continues to improve     History of Present Illness:     This is a 63 y.o. male admitted for Shortness of breath [R06.02]  Essential hypertension [I10]  Abdominal pain [R10.9]  CHF (congestive heart failure), NYHA class I, acute on chronic, combined (HCC) [I50.43]  Abdominal pain, unspecified abdominal location [R10.9]  Coronary artery disease involving native coronary artery of native heart without angina pectoris [I25.10]  Acute HFrEF (heart failure with reduced ejection fraction) (HCC) [I50.21]  Nausea and vomiting, unspecified vomiting type [R11.2].    Patient complains of: abdominal pain   Moiz May is a  Diagnosis      Sinus tachycardia with occasional premature ventricular complexes  Possible Left atrial enlargement  Rightward axis  Septal infarct , age undetermined  Abnormal ECG  When compared with ECG of 16-JAN-2024 16:14,  premature ventricular complexes are now present  Nonspecific T wave abnormality, improved in Lateral leads  Confirmed by William Ram MD, ----- ([id number]) on 4/30/2024 3:06:31 PM       01/16/24    ECHO (TTE) COMPLETE (PRN CONTRAST/BUBBLE/STRAIN/3D) 01/19/2024  6:23 PM (Final)    Interpretation Summary    Left Ventricle: Severely reduced left ventricular systolic function with a visually estimated EF of 15 - 20%. Left ventricle is dilated. Normal wall thickness. Global hypokinesis present. Grade III diastolic dysfunction with increased LAP.    Right Ventricle: Mildly reduced systolic function.    Mitral Valve: Mildly thickened leaflet. Mild paravalvular regurgitation.    Tricuspid Valve: Moderate regurgitation.    Pulmonic Valve: Mild regurgitation with a centrally directed jet.    Left Atrium: Left atrium is dilated.    Right Atrium: Right atrium is dilated.    Image quality is good. Contrast used: Definity.    Signed by: Stephon Holman MD on 1/19/2024  6:23 PM    No results found for this or any previous visit.    No results found for this or any previous visit.    Xray Result (most recent):  XR CHEST PORTABLE 02/07/2024    Narrative  EXAM: CHEST PORTABLE    CLINICAL INDICATION/HISTORY: CHEST PAIN    COMPARISON: 2/5/2024    FINDINGS: Stable mildly enlarged cardiac silhouette with dual-chamber ICD. Pulmonary vascular congestion. Hazy perihilar and more basilar opacities with increase interstitial prominence. Some confluent airspace opacities right base. Trace pleural effusions. No pneumothorax.    Impression  Mildly enlarged cardiac silhouette with vascular congestion and pulmonary edema unchanged from last comparison, cannot exclude superimposed basilar pneumonia or aspiration. Trace pleural

## 2024-05-01 NOTE — PLAN OF CARE
Problem: Discharge Planning  Goal: Discharge to home or other facility with appropriate resources  Outcome: Progressing     Problem: Pain  Goal: Verbalizes/displays adequate comfort level or baseline comfort level  Outcome: Progressing  Flowsheets (Taken 4/30/2024 1958)  Verbalizes/displays adequate comfort level or baseline comfort level:   Encourage patient to monitor pain and request assistance   Assess pain using appropriate pain scale   Administer analgesics based on type and severity of pain and evaluate response   Implement non-pharmacological measures as appropriate and evaluate response   Consider cultural and social influences on pain and pain management     Problem: Skin/Tissue Integrity  Goal: Absence of new skin breakdown  Description: 1.  Monitor for areas of redness and/or skin breakdown  2.  Assess vascular access sites hourly  3.  Every 4-6 hours minimum:  Change oxygen saturation probe site  4.  Every 4-6 hours:  If on nasal continuous positive airway pressure, respiratory therapy assess nares and determine need for appliance change or resting period.  Outcome: Progressing     Problem: ABCDS Injury Assessment  Goal: Absence of physical injury  Outcome: Progressing  Flowsheets (Taken 4/30/2024 2128)  Absence of Physical Injury: Implement safety measures based on patient assessment

## 2024-05-01 NOTE — PROGRESS NOTES
[facility]  POST-ROUNDING NOTE    Assessment & Plan:    -received call from cardiology who strongly recommends diuretic as LA could be 2/2 HF exacerbation  -Strict I&Os, low threshold to hold diuretics tomorrow as PO intake recorded and by history is poor and patient has elevated BUN concerning for dehydration  -again encouraged PO intake this afternoon. Pt unable to take good PO with lunch 2/2 nausea. Scheduled zofran BID prior to meals    The above patient and plan were discussed with my supervising physician.     See daily progress note for full assessment/plan.      Ayaka Humphreys MD, PGY-1  [facility]  5/1/2024, 3:44 PM

## 2024-05-01 NOTE — DISCHARGE SUMMARY
Value Date/Time     05/01/2024 02:29 AM    K 4.1 05/01/2024 02:29 AM     05/01/2024 02:29 AM    CO2 25 05/01/2024 02:29 AM    BUN 25 05/01/2024 02:29 AM    CREATININE 1.41 05/01/2024 02:29 AM    GLUCOSE 96 05/01/2024 02:29 AM    CALCIUM 7.2 05/01/2024 02:29 AM        HEPATIC FUNCTION Lab Results   Component Value Date/Time    ALKPHOS 123 05/01/2024 02:29 AM    ALKPHOS 100 08/11/2022 12:15 PM    ALT 36 05/01/2024 02:29 AM    AST 32 05/01/2024 02:29 AM    BILITOT 1.2 05/01/2024 02:29 AM    BILIDIR 0.4 05/01/2024 02:29 AM      LACTIC ACID Lab Results   Component Value Date    LACTA 1.1 08/13/2022          CARDIAC PANEL Lab Results   Component Value Date    CKTOTAL 136 04/14/2022    CKMB 4.1 (H) 12/30/2020    CKMBINDEX 1.4 12/30/2020    TROPONINI 0.25 (H) 11/19/2021    TROPHS 18 04/30/2024      NT-proBNP Lab Results   Component Value Date    BNP 6,384 (H) 08/10/2022      THYROID Lab Results   Component Value Date    FT3 2.6 09/01/2023    T4FREE 1.0 09/01/2023        Readmission Risk              Risk of Unplanned Readmission:  26       %      ***[REMOVE lab results above that have no entries and/or are out-of-date/irrelevant!]    Williams Villegas MD, PGY-***  [facility]  5/1/2024 12:46 PM    ***[REFRESH and UPDATE Signature, Cosigner, and Date and Time of Service above before signing!]

## 2024-05-01 NOTE — PROGRESS NOTES
as CT abdomen pelvis without acute findings, gallbladder ultrasound without signs of overt acute cholecystitis, and patient without white count  -patient HPI is inconsistent between providers, making it more challenging to diagnose abdominal pain  -Cholecystitis versus cholestasis less likely given ultrasound of the gallbladder without overt acute findings, however there is mild thickening of the gallbladder wall and slightly elevated ALP which could be concerning for this as well as patient with hyperbilirubinemia  -Patient with pain out of proportion on exam, nausea, vomiting, CT abdomen not diagnostic for mesenteric ischemia.  This is less likely, however will monitor for this.  Ordered lactic acid, though suspect LA will be elevated as patient not taking good p.o. and had recent cocaine use  -Albumin 2.4 most likely due to poor nutritional status  -No stool changes, hematemesis, hematochezia, or dark stools  Plan:  -Encourage p.o. intake  -Zofran as needed  -Foard diet  -Protonix twice daily  -Mylanta as needed  -Ensures with meals, fortified gelatin as snack  -Repeat LFTs with bilirubin, trend as needed  -Daily CBC  -Consider GI consult  -Trend lactic acid until normalized x 2.  If trends up or does not improve with p.o. intake, consider IV fluids and CTA abdomen  -recommendations to follow up on 3-month unintentional 20lb weight loss in outpatient setting  -consider nutrition consult        Dyspnea  Acute on chronic heart failure exacerbation  HFrEF, EF 10%, stable  Hx SVT s/p SVT ablation 2018  CAD s/p PCI Feb 2022 (SHIRA to RCA)  Ischemic Cardiomyopathy  HTN  HLD  -Suspect heart failure exacerbation secondary to cocaine use.  proBNP elevated above baseline, patient with lower leg pitting edema, pulmonary edema on CTA, and patchy lower lobe opacities  -Considered pneumonia based on dyspnea, patchy lower lobe opacities seen on CTA and CT abdomen pelvis.  However, Pro-Curry not elevated and patient without white  Q6H PRN Ayaka Humphreys MD        Or    acetaminophen (TYLENOL) suppository 650 mg  650 mg Rectal Q6H PRN Ayaka Humphreys MD        ondansetron (ZOFRAN-ODT) disintegrating tablet 4 mg  4 mg Oral Q8H PRN Ayaka Humphreys MD        Or    ondansetron (ZOFRAN) injection 4 mg  4 mg IntraVENous Q6H PRN Ayaka Humphreys MD        heparin (porcine) injection 5,000 Units  5,000 Units SubCUTAneous 3 times per day Ayaka Humphreys MD   5,000 Units at 05/01/24 0630    pantoprazole (PROTONIX) 40 mg in sodium chloride (PF) 0.9 % 10 mL injection  40 mg IntraVENous Q12H Ayaka Humphreys MD   40 mg at 05/01/24 0306    aluminum & magnesium hydroxide-simethicone (MAALOX) 200-200-20 MG/5ML suspension 30 mL  30 mL Oral Q6H PRN Ayaka Humphreys MD        melatonin tablet 3 mg  3 mg Oral Nightly PRN Ayaka Humphreys MD        .Pharmacy Storing Patient's Home Medications   Other Prior to discharge Sangeeta Ruvalcaba MD           Allergies  No Known Allergies    OBJECTIVE:    Intake/Output Summary (Last 24 hours) at 5/1/2024 0658  Last data filed at 5/1/2024 0311  Gross per 24 hour   Intake 442 ml   Output 1650 ml   Net -1208 ml       /81   Pulse 76   Temp 97.6 °F (36.4 °C) (Oral)   Resp 16   Ht 1.829 m (6')   Wt 62.1 kg (137 lb)   SpO2 97%   BMI 18.58 kg/m²     PHYSICAL EXAM  Gen: NAD, comfortable  HEENT: normocephalic, atraumatic, MMM, no JVD  CV: RRR, S1/S2 present without M/R/G  Pulm: CTAB, no wheezes, no crackles  Abd: S/NT/ND, no rebound, no guarding  MSK: no clubbing, no edema  Skin: warm, dry, intact, no rash  Neuro: no focal deficits appreciated   Psych: appropriate, alert, oriented x3    LABWORK (LAST 24 HOURS)  Recent Results (from the past 24 hour(s))   Lactate, Sepsis    Collection Time: 04/30/24  3:01 PM   Result Value Ref Range    Lactic Acid, Sepsis 6.1 (HH) 0.4 - 2.0 MMOL/L   Basic Metabolic Panel    Collection Time: 04/30/24  3:01 PM   Result Value Ref Range    Sodium 132 (L) 136 - 145 mmol/L

## 2024-05-01 NOTE — PLAN OF CARE
Problem: Occupational Therapy - Adult  Goal: By Discharge: Performs self-care activities at highest level of function for planned discharge setting.  See evaluation for individualized goals.  Description: Occupational Therapy Goals:  Initiated 5/1/2024 to be met within 7-10 days.    1.  Patient will perform grooming with supervision/set-up.   2.  Patient will perform self-feeding with independence.  3.  Patient will perform lower body dressing  with supervision/set-up.  4.  Patient will perform toilet transfers with supervision/set-up.  5.  Patient will perform all aspects of toileting with supervision/set-up.  6.  Patient will participate in upper extremity therapeutic exercise/activities with supervision/set-up for 8-10 minutes to increase strength/endurance for ADLs.    7.  Patient will utilize energy conservation techniques during functional activities with verbal cues.    PLOF: Pt lives alone in a one story home w/ 3 TERRI, tubs/shower w/ shower chair. Pt has RW and cane, reports being independent w/ ADLs and mobility prior to one week ago.     Outcome: Progressing     OCCUPATIONAL THERAPY EVALUATION    Patient: Moiz May (63 y.o. male)  Date: 5/1/2024  Primary Diagnosis: Shortness of breath [R06.02]  Essential hypertension [I10]  Abdominal pain [R10.9]  CHF (congestive heart failure), NYHA class I, acute on chronic, combined ([facility]) [I50.43]  Abdominal pain, unspecified abdominal location [R10.9]  Coronary artery disease involving native coronary artery of native heart without angina pectoris [I25.10]  Acute HFrEF (heart failure with reduced ejection fraction) ([facility]) [I50.21]  Nausea and vomiting, unspecified vomiting type [R11.2]       Precautions: General Precautions, Fall Risk,    ASSESSMENT :  Pt semi supine in bed, reluctant to OT eval but agreeable to encouragement. However question portion of eval, pt refuses mobility, stating \"I'm too weak, I can't move,\" OT educated pt on importance of mobility  assistance  UE Bathing: Moderate assistance  LE Bathing: Maximum assistance  UE Dressing: Moderate assistance  LE Dressing: Maximum assistance  Toileting: Maximum assistance    Pain:  Intensity Pre-treatment: 0/10   Intensity Post-treatment: 0/10  Scale: Numeric Rating Scale    Activity Tolerance:   Activity Tolerance: Patient limited by fatigue  Please refer to the flowsheet for vital signs taken during this treatment.    After treatment:   [] Patient left in no apparent distress sitting up in chair  [x] Patient left in no apparent distress in bed  [x] Call bell left within reach  [] Nursing notified  [] Caregiver present  [x] Bed alarm activated    COMMUNICATION/EDUCATION:   Patient Education  Education Given To: Patient  Education Provided: Plan of Care;Role of Therapy  Education Method: Teach Back;Verbal  Barriers to Learning: None  Education Outcome: Continued education needed    Thank you for this referral.  Jared Rodriguez OT  Minutes: 10    Eval Complexity: Decision Making: Low Complexity

## 2024-05-01 NOTE — CARE COORDINATION
05/01/24 1451   Service Assessment   Patient Orientation Alert and Oriented;Person;Place   Cognition Alert   History Provided By Patient   Primary Caregiver Self   Support Systems Family Members   Patient's Healthcare Decision Maker is: Legal Next of Kin   Prior Functional Level Independent in ADLs/IADLs   Current Functional Level Independent in ADLs/IADLs   Can patient return to prior living arrangement Yes   Ability to make needs known: Good   Family able to assist with home care needs: Yes   Social/Functional History   Lives With Alone   Type of Home House   Home Layout One level   Home Access Stairs to enter with rails   Entrance Stairs - Number of Steps 3   Bathroom Shower/Tub Tub/Shower unit   Bathroom Toilet Standard   Bathroom Equipment Shower chair   Home Equipment Cane;Walker, rolling   ADL Assistance Independent   Homemaking Assistance Independent   Homemaking Responsibilities Yes   Ambulation Assistance Independent   Transfer Assistance Independent   Discharge Planning   Type of Residence House   Living Arrangements Alone   Current Services Prior To Admission None   Potential Assistance Needed Transportation   Patient expects to be discharged to: House   Condition of Participation: Discharge Planning   The Plan for Transition of Care is related to the following treatment goals: Pt dispo. home pending pt/ot recs.     SHANTELL Logan     [phone]

## 2024-05-01 NOTE — PROGRESS NOTES
1103: PT orders received and chart reviewed. Sleeping soundly in dark room. Unable to arouse sufficiently for participation in skilled PT evaluation. Will follow up.
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
## 2024-05-01 NOTE — PROGRESS NOTES
[facility] residents paged for primary RN to inform of lactic acid of 2.7.  Awaiting a response back.     0354-Per Dr. Alpesh Valentino to repeat lactic acid in 3-4 hours

## 2024-05-01 NOTE — PROGRESS NOTES
[facility]  DAILY PROGRESS NOTE      Patient:    Moiz May , 63 y.o. male   MRN:  [id number]  Room/Bed:  355/01  Admission Date:   4/29/2024  Code status:  Full Code    Reason for Admission: Acute on chronic CHF exacerbation  Barriers to Discharge: Volume status  Anticipated discharge: 5/3/2024      ASSESSMENT AND PLAN:     Epigastric abdominal Pain, improved  Nausea/vomiting, resolved  Gastritis  Hyperbilirubinemia, improved  Cachexia  Hypoalbuminemia  BMI <19  -Most likely diagnosis at this point is gastritis versus cocaine withdrawal as CT abdomen pelvis without acute findings, gallbladder ultrasound without signs of overt acute cholecystitis, hyperbilirubinemia improving, absence of white count, and LA now normalized  -Initially lactic acid obtained secondary to concern that patient may have mesenteric ischemia due to his initial presentation.  Suspected lactic acid would be high secondary to dehydration, but since it has been trending down with conservative measures and patient's symptoms have improved, mesenteric ischemia cannot be ruled out  -Lactic acid likely initially elevated secondary to dehydration secondary to abdominal pain, nausea vomiting  -Doing much better symptomatically, feeling hungry  -ALP downtrending, 123 from 170s; bilirubin downtrending now 1.2 from 2.8, suspect secondary to dehydration  Plan:  -Admit to stepdown with continuous telemetry  -Encourage p.o. intake  -Zofran as needed  -[place] diet  -Protonix twice daily  -Mylanta as needed  -Ensures with meals, fortified gelatin as snack  -Stop trending LFTs, bilirubin  -D/C Daily CBC  -Trend lactic acid until normalized x 2        Dyspnea, resolved  Bilateral pitting leg edema  Acute on chronic heart failure exacerbation  HFrEF, EF 10%, stable  Hx SVT s/p SVT ablation 2018  CAD s/p PCI Feb 2022 (SHIRA to RCA)  Ischemic Cardiomyopathy  HTN  HLD  -Suspect heart failure exacerbation secondary to cocaine use.  proBNP elevated  Metabolic Panel    Collection Time: 04/30/24  3:01 PM   Result Value Ref Range    Sodium 132 (L) 136 - 145 mmol/L    Potassium 4.2 3.5 - 5.5 mmol/L    Chloride 100 100 - 111 mmol/L    CO2 21 21 - 32 mmol/L    Anion Gap 11 3.0 - 18 mmol/L    Glucose 199 (H) 74 - 99 mg/dL    BUN 24 (H) 7.0 - 18 MG/DL    Creatinine 1.81 (H) 0.6 - 1.3 MG/DL    Bun/Cre Ratio 13 12 - 20      Est, Glom Filt Rate 41 (L) >60 ml/min/1.73m2    Calcium 7.5 (L) 8.5 - 10.1 MG/DL   Magnesium    Collection Time: 04/30/24  3:01 PM   Result Value Ref Range    Magnesium 1.8 1.6 - 2.6 mg/dL   Lactate, Sepsis    Collection Time: 04/30/24 10:49 PM   Result Value Ref Range    Lactic Acid, Sepsis 4.5 (HH) 0.4 - 2.0 MMOL/L   Lactate, Sepsis    Collection Time: 05/01/24  2:29 AM   Result Value Ref Range    Lactic Acid, Sepsis 2.7 (HH) 0.4 - 2.0 MMOL/L   Basic Metabolic Panel w/ Reflex to MG    Collection Time: 05/01/24  2:29 AM   Result Value Ref Range    Sodium 134 (L) 136 - 145 mmol/L    Potassium 4.1 3.5 - 5.5 mmol/L    Chloride 101 100 - 111 mmol/L    CO2 25 21 - 32 mmol/L    Anion Gap 8 3.0 - 18 mmol/L    Glucose 96 74 - 99 mg/dL    BUN 25 (H) 7.0 - 18 MG/DL    Creatinine 1.41 (H) 0.6 - 1.3 MG/DL    Bun/Cre Ratio 18 12 - 20      Est, Glom Filt Rate 56 (L) >60 ml/min/1.73m2    Calcium 7.2 (L) 8.5 - 10.1 MG/DL   CBC with Auto Differential    Collection Time: 05/01/24  2:29 AM   Result Value Ref Range    WBC 10.3 4.6 - 13.2 K/uL    RBC 4.81 4.35 - 5.65 M/uL    Hemoglobin 14.2 13.0 - 16.0 g/dL    Hematocrit 43.6 36.0 - 48.0 %    MCV 90.6 78.0 - 100.0 FL    MCH 29.5 24.0 - 34.0 PG    MCHC 32.6 31.0 - 37.0 g/dL    RDW 17.2 (H) 11.6 - 14.5 %    Platelets 189 135 - 420 K/uL    MPV 12.3 (H) 9.2 - 11.8 FL    Nucleated RBCs 0.0 0  WBC    nRBC 0.00 0.00 - 0.01 K/uL    Neutrophils % 77 (H) 40 - 73 %    Lymphocytes % 14 (L) 21 - 52 %    Monocytes % 7 3 - 10 %    Eosinophils % 0 0 - 5 %    Basophils % 1 0 - 2 %    Immature Granulocytes % 0 0.0 - 0.5 %

## 2024-05-01 NOTE — PROGRESS NOTES
0730: Bedside and Verbal shift change report given to MARNIE Mokn (oncoming nurse) by MARNIE Rutherford (offgoing nurse). Report included the following information Nurse Handoff Report, Adult Overview, Intake/Output, Recent Results, Cardiac Rhythm NSR, Neuro Assessment, and Event Log.      1545: Pt complaining about being woken up for med pass and would like his medication be passed in consolidation.

## 2024-05-02 LAB
ANION GAP SERPL CALC-SCNC: 8 MMOL/L (ref 3–18)
ANION GAP SERPL CALC-SCNC: 8 MMOL/L (ref 3–18)
BUN SERPL-MCNC: 30 MG/DL (ref 7–18)
BUN SERPL-MCNC: 30 MG/DL (ref 7–18)
BUN/CREAT SERPL: 18 (ref 12–20)
BUN/CREAT SERPL: 21 (ref 12–20)
CALCIUM SERPL-MCNC: 7 MG/DL (ref 8.5–10.1)
CALCIUM SERPL-MCNC: 7 MG/DL (ref 8.5–10.1)
CHLORIDE SERPL-SCNC: 100 MMOL/L (ref 100–111)
CHLORIDE SERPL-SCNC: 100 MMOL/L (ref 100–111)
CO2 SERPL-SCNC: 25 MMOL/L (ref 21–32)
CO2 SERPL-SCNC: 27 MMOL/L (ref 21–32)
CREAT SERPL-MCNC: 1.46 MG/DL (ref 0.6–1.3)
CREAT SERPL-MCNC: 1.63 MG/DL (ref 0.6–1.3)
GLUCOSE SERPL-MCNC: 122 MG/DL (ref 74–99)
GLUCOSE SERPL-MCNC: 82 MG/DL (ref 74–99)
POTASSIUM SERPL-SCNC: 3.7 MMOL/L (ref 3.5–5.5)
POTASSIUM SERPL-SCNC: 4.1 MMOL/L (ref 3.5–5.5)
SODIUM SERPL-SCNC: 133 MMOL/L (ref 136–145)
SODIUM SERPL-SCNC: 135 MMOL/L (ref 136–145)

## 2024-05-02 PROCEDURE — 1100000003 HC PRIVATE W/ TELEMETRY

## 2024-05-02 PROCEDURE — 6360000002 HC RX W HCPCS

## 2024-05-02 PROCEDURE — 2580000003 HC RX 258

## 2024-05-02 PROCEDURE — 36415 COLL VENOUS BLD VENIPUNCTURE: CPT

## 2024-05-02 PROCEDURE — C9113 INJ PANTOPRAZOLE SODIUM, VIA: HCPCS

## 2024-05-02 PROCEDURE — 6370000000 HC RX 637 (ALT 250 FOR IP)

## 2024-05-02 PROCEDURE — 80048 BASIC METABOLIC PNL TOTAL CA: CPT

## 2024-05-02 PROCEDURE — 99232 SBSQ HOSP IP/OBS MODERATE 35: CPT | Performed by: INTERNAL MEDICINE

## 2024-05-02 PROCEDURE — A4216 STERILE WATER/SALINE, 10 ML: HCPCS

## 2024-05-02 RX ORDER — PROMETHAZINE HYDROCHLORIDE 12.5 MG/1
12.5 TABLET ORAL EVERY 4 HOURS PRN
Status: DISCONTINUED | OUTPATIENT
Start: 2024-05-02 | End: 2024-05-03 | Stop reason: HOSPADM

## 2024-05-02 RX ORDER — PANTOPRAZOLE SODIUM 20 MG/1
20 TABLET, DELAYED RELEASE ORAL
Status: DISCONTINUED | OUTPATIENT
Start: 2024-05-02 | End: 2024-05-03 | Stop reason: HOSPADM

## 2024-05-02 RX ORDER — FUROSEMIDE 40 MG/1
40 TABLET ORAL DAILY
Status: DISCONTINUED | OUTPATIENT
Start: 2024-05-02 | End: 2024-05-03 | Stop reason: HOSPADM

## 2024-05-02 RX ORDER — SUCRALFATE 1 G/1
1 TABLET ORAL
Status: DISCONTINUED | OUTPATIENT
Start: 2024-05-02 | End: 2024-05-03 | Stop reason: HOSPADM

## 2024-05-02 RX ORDER — IBUPROFEN 400 MG/1
400 TABLET ORAL ONCE
Status: COMPLETED | OUTPATIENT
Start: 2024-05-02 | End: 2024-05-02

## 2024-05-02 RX ADMIN — EMPAGLIFLOZIN 10 MG: 10 TABLET, FILM COATED ORAL at 09:03

## 2024-05-02 RX ADMIN — SUCRALFATE 1 G: 1 TABLET ORAL at 10:44

## 2024-05-02 RX ADMIN — SPIRONOLACTONE 25 MG: 25 TABLET ORAL at 09:02

## 2024-05-02 RX ADMIN — PANTOPRAZOLE SODIUM 40 MG: 40 INJECTION, POWDER, FOR SOLUTION INTRAVENOUS at 05:58

## 2024-05-02 RX ADMIN — ACETAMINOPHEN 650 MG: 325 TABLET ORAL at 18:38

## 2024-05-02 RX ADMIN — SACUBITRIL AND VALSARTAN 1 TABLET: 24; 26 TABLET, FILM COATED ORAL at 22:28

## 2024-05-02 RX ADMIN — CARVEDILOL 6.25 MG: 6.25 TABLET, FILM COATED ORAL at 16:09

## 2024-05-02 RX ADMIN — CARVEDILOL 6.25 MG: 6.25 TABLET, FILM COATED ORAL at 09:02

## 2024-05-02 RX ADMIN — HEPARIN SODIUM 5000 UNITS: 5000 INJECTION INTRAVENOUS; SUBCUTANEOUS at 13:37

## 2024-05-02 RX ADMIN — IBUPROFEN 400 MG: 400 TABLET, FILM COATED ORAL at 22:34

## 2024-05-02 RX ADMIN — ROSUVASTATIN CALCIUM 20 MG: 20 TABLET, COATED ORAL at 22:28

## 2024-05-02 RX ADMIN — SUCRALFATE 1 G: 1 TABLET ORAL at 16:09

## 2024-05-02 RX ADMIN — FUROSEMIDE 40 MG: 40 TABLET ORAL at 10:44

## 2024-05-02 RX ADMIN — PANTOPRAZOLE SODIUM 20 MG: 20 TABLET, DELAYED RELEASE ORAL at 16:09

## 2024-05-02 RX ADMIN — SODIUM CHLORIDE, PRESERVATIVE FREE 10 ML: 5 INJECTION INTRAVENOUS at 22:35

## 2024-05-02 RX ADMIN — HEPARIN SODIUM 5000 UNITS: 5000 INJECTION INTRAVENOUS; SUBCUTANEOUS at 22:28

## 2024-05-02 RX ADMIN — ACETAMINOPHEN 650 MG: 325 TABLET ORAL at 09:04

## 2024-05-02 RX ADMIN — ASPIRIN 81 MG CHEWABLE TABLET 81 MG: 81 TABLET CHEWABLE at 09:02

## 2024-05-02 RX ADMIN — SODIUM CHLORIDE, PRESERVATIVE FREE 10 ML: 5 INJECTION INTRAVENOUS at 09:03

## 2024-05-02 RX ADMIN — HEPARIN SODIUM 5000 UNITS: 5000 INJECTION INTRAVENOUS; SUBCUTANEOUS at 05:58

## 2024-05-02 ASSESSMENT — PAIN DESCRIPTION - ORIENTATION
ORIENTATION: ANTERIOR

## 2024-05-02 ASSESSMENT — PAIN DESCRIPTION - LOCATION
LOCATION: HEAD

## 2024-05-02 ASSESSMENT — PAIN SCALES - GENERAL
PAINLEVEL_OUTOF10: 0
PAINLEVEL_OUTOF10: 0
PAINLEVEL_OUTOF10: 7
PAINLEVEL_OUTOF10: 8
PAINLEVEL_OUTOF10: 3
PAINLEVEL_OUTOF10: 0
PAINLEVEL_OUTOF10: 8
PAINLEVEL_OUTOF10: 0
PAINLEVEL_OUTOF10: 0
PAINLEVEL_OUTOF10: 8
PAINLEVEL_OUTOF10: 0

## 2024-05-02 ASSESSMENT — PAIN DESCRIPTION - DESCRIPTORS
DESCRIPTORS: ACHING
DESCRIPTORS: PRESSURE
DESCRIPTORS: ACHING
DESCRIPTORS: ACHING;OTHER (COMMENT)

## 2024-05-02 ASSESSMENT — PAIN - FUNCTIONAL ASSESSMENT
PAIN_FUNCTIONAL_ASSESSMENT: ACTIVITIES ARE NOT PREVENTED
PAIN_FUNCTIONAL_ASSESSMENT: PREVENTS OR INTERFERES SOME ACTIVE ACTIVITIES AND ADLS
PAIN_FUNCTIONAL_ASSESSMENT: PREVENTS OR INTERFERES SOME ACTIVE ACTIVITIES AND ADLS
PAIN_FUNCTIONAL_ASSESSMENT: ACTIVITIES ARE NOT PREVENTED

## 2024-05-02 ASSESSMENT — PAIN DESCRIPTION - FREQUENCY
FREQUENCY: INTERMITTENT

## 2024-05-02 ASSESSMENT — PAIN DESCRIPTION - ONSET
ONSET: ON-GOING
ONSET: SUDDEN
ONSET: ON-GOING

## 2024-05-02 ASSESSMENT — PAIN DESCRIPTION - PAIN TYPE
TYPE: ACUTE PAIN

## 2024-05-02 NOTE — PLAN OF CARE
Problem: Discharge Planning  Goal: Discharge to home or other facility with appropriate resources  Outcome: Progressing     Problem: Pain  Goal: Verbalizes/displays adequate comfort level or baseline comfort level  Outcome: Progressing     Problem: Skin/Tissue Integrity  Goal: Absence of new skin breakdown  Description: 1.  Monitor for areas of redness and/or skin breakdown  2.  Assess vascular access sites hourly  3.  Every 4-6 hours minimum:  Change oxygen saturation probe site  4.  Every 4-6 hours:  If on nasal continuous positive airway pressure, respiratory therapy assess nares and determine need for appliance change or resting period.  Outcome: Progressing     Problem: ABCDS Injury Assessment  Goal: Absence of physical injury  Outcome: Progressing     Problem: Safety - Adult  Goal: Free from fall injury  Outcome: Progressing     Problem: Physical Therapy - Adult  Goal: By Discharge: Performs mobility at highest level of function for planned discharge setting.  See evaluation for individualized goals.  Description: Physical Therapy Goals  Initiated 5/1/2024 and to be accomplished within 7 day(s)  1.  Patient will move from supine to sit and sit to supine in bed with modified independence.    2.  Patient will transfer from bed to chair and chair to bed with modified independence using the least restrictive device.  3.  Patient will perform sit to stand with modified independence.  4.  Patient will ambulate with modified independence for 150 feet with the least restrictive device.   5.  Patient will ascend/descend 3 stairs with handrail(s) with modified independence.    PLOF: Lives alone. One story with 3 steps to enter. Independent.   5/1/2024 1404 by Elaine Mckay, PT  Outcome: Progressing     Problem: Occupational Therapy - Adult  Goal: By Discharge: Performs self-care activities at highest level of function for planned discharge setting.  See evaluation for individualized goals.  Description: Occupational

## 2024-05-02 NOTE — PROGRESS NOTES
[facility] - Progress Note  Admit Date: 4/29/2024    Assessment:     -Abdominal pain, improved  -Acute on Chronic HFrEF/cardiomyopathy, primarily nonischemic. Recurrent admissions in setting of non compliance and active drug abuse.   EF 10-15% by echo 8/2023 01/2024 15-20%   02/2024, EF 10%   GDMT: Entresto, Coreg, aldactone, Torsemide.   S/p [employer] AICD 11/2021 for primary prevention  -CAD, distal RCA stent 2/2022; [facility] 02/2024 non obstructive CAD.   -Apical LAD bridging   -SVT ablation 2018  -Prolonged QT, chronic  -CKD stage III  -HTN, uncontrolled.   -Dyslipidemia  -Peripheral neuropathy  -Noncompliance  -Polysubstance abuse, cocaine, fentanyl, heroin. UDS + cocaine this admission.      Primary cardiologist Dr. TAWNYA Madsen      Plan:      Breathing stable today.  Plan oral lasix, 40 mg daily.  Continue coreg, entresto, spironolactone.  Can hold off on restarting amiodarone from my standpoint.  Dispo per primary team, please call if questions.    Subjective:     No new complaints.   Breathing good today, no chest pain.    Objective:      Patient Vitals for the past 8 hrs:   Temp Pulse Resp BP SpO2   05/02/24 0900 97.7 °F (36.5 °C) 82 17 121/66 98 %   05/02/24 0500 -- 72 -- -- --   05/02/24 0355 97.2 °F (36.2 °C) 69 16 131/82 97 %   05/02/24 0300 -- 66 -- -- --         Patient Vitals for the past 96 hrs:   Weight   04/29/24 1839 62.1 kg (137 lb)                    Intake/Output Summary (Last 24 hours) at 5/2/2024 1005  Last data filed at 5/2/2024 0907  Gross per 24 hour   Intake 480 ml   Output 4650 ml   Net -4170 ml       Physical Exam:  General:  alert, appears stated age, and cooperative  Neck:  nontender  Lungs:  clear to auscultation bilaterally  Heart:  regular rate and rhythm, S1, S2 normal, no murmur, click, rub or gallop  Abdomen:  abdomen is soft without significant tenderness, masses, organomegaly or guarding  Extremities:  extremities normal, atraumatic, no cyanosis or edema    Data Review:

## 2024-05-02 NOTE — PROGRESS NOTES
[facility]  POST-ROUNDING NOTE    Assessment & Plan:    -Discussed discharge with patient today, patient feels that he would be more safe if he stayed 1 more day as he is concerned about his abdominal pain.  -He has also had poor p.o. intake during this admission  -Added Carafate 3 times daily with meals, to help with p.o. intake and nausea.  Will reevaluate tomorrow to see if p.o. intake improved.     The above patient and plan were discussed with my supervising physician.     See daily progress note for full assessment/plan.      Ayaka Humphreys MD, PGY-1  [facility]  5/2/2024, 4:39 PM

## 2024-05-02 NOTE — PROGRESS NOTES
[facility]  DAILY PROGRESS NOTE      Patient:    Moiz May , 63 y.o. male   MRN:  [id number]  Room/Bed:  355/01  Admission Date:   4/29/2024  Code status:  Full Code    Reason for Admission: Acute on chronic CHF exacerbation  Barriers to Discharge: Volume status  Anticipated discharge: 5/3/2024      ASSESSMENT AND PLAN:     Epigastric abdominal Pain, improved  Nausea, improved  Gastritis  Hyperbilirubinemia, improved  Cachexia  Hypoalbuminemia  BMI <19  -Most likely diagnosis at this point is gastritis versus cocaine withdrawal as CT abdomen pelvis without acute findings, gallbladder ultrasound without signs of overt acute cholecystitis, hyperbilirubinemia improving, absence of white count, and LA now normalized.  Patient consistently denies cocaine use despite UDS being positive  -Initially lactic acid obtained secondary to concern that patient may have mesenteric ischemia due to his initial presentation.  Suspected lactic acid would be high secondary to dehydration, but since it has been trending down with conservative measures and patient's symptoms have improved, mesenteric ischemia can be ruled out  -ALP downtrending, 123 from 170s; bilirubin downtrending now 1.2 from 2.8, suspect secondary to dehydration  Plan:  -Encourage p.o. intake  -Zofran prior to meals and as needed  -[place] diet  -Protonix twice daily  -Mylanta as needed  -Ensures with meals, fortified gelatin as snack  -Repeat LFTs, bilirubin in outpatient setting        Dyspnea, resolved  Bilateral pitting leg edema, resolved  Acute on chronic heart failure exacerbation  HFrEF, EF 10%, stable  Hx SVT s/p SVT ablation 2018  CAD s/p PCI Feb 2022 (SHIRA to RCA)  Ischemic Cardiomyopathy  HTN  HLD  -Suspect heart failure exacerbation secondary to cocaine use.  proBNP elevated above baseline, patient with lower leg pitting edema, pulmonary edema on CTA, and patchy lower lobe opacities  -EKG: NSR  -Pro- BNP 31 K, troponin WNL  -Pro-Curry less  than 0.5  -Echo (Aug 2022): EF 10%  -Home meds: spironolactone 25 mg qd, Lasix 40 mg qd, rosuvastatin 20mg qd, Toprol 12.5 daily, aspirin 81 mg, amiodarone 200 mg daily, nitroglycerin 0.4mg PRN  -follows with [facility]  -Reported some V. tach overnight, but on telemetry review appears to be artifact  Plan:  -Continuous telemetry   -Resume home Lasix  -Daily BMP  -Continue ASA 81 mg  -Continue Coreg 6.25 mg twice daily, Entresto 24-26 twice daily  -AICD interrogation  -Cardiology consulted, appreciate recs     Acute on chronic kidney injury, resolved  CKD - likely stage III  -Creatinine 1.5 on admission. Unclear baseline. Outpatient creatinine has ranged from 1.6-2.0  -Hypovolemia versus cocaine use versus fluid overload.  Concern for intrinsic with BUN/creatinine <20.  Highest concern for fluid overload given elevated BNP  -Creatinine 1.46 today  Plan:  -Daily BMP  -Avoid nephrotoxic medications  -Hold diuretics as per above     Substance use  -UDS cocaine positive, multiple prior UDS cocaine positive  Plan  -Patient denies cocaine use  -Encourage cessation    Code: Full  Diet: Low-sodium  DVT/AC: SQ heparin, SCDs  Mobility: per protocol  Disposition: Home in 2-3 days    Point of Contact (relationship):  Ivon Daniel [phone]         SUBJECTIVE:   Events of the last 24 hours:  NAEO  Patient seen at beside.  No SOB.  Edema improved.  Says that working with PT/OT went well. Still having abdominal pain.  Feeling nauseous while eating yesterday.  Adamantly denies cocaine use, is very upset that we keep saying he is using cocaine    ROS (positive findings are in BOLD; negative findings are in regular font)  Constitutional: fevers, chills  HEENT: Dizziness  Cardiovascular: chest pain, palpitations, edema  Pulmonary: SOB, cough, wheezing  Gastrointestinal: abdominal pain, nausea/vomiting, diarrhea, constipation, melena, hematochezia    CURRENT INPATIENT MEDICATIONS:  Current

## 2024-05-02 NOTE — PLAN OF CARE
Problem: Discharge Planning  Goal: Discharge to home or other facility with appropriate resources  5/2/2024 1327 by Monica Shields RN  Outcome: Progressing  5/2/2024 0235 by Moreno Conn RN  Outcome: Progressing     Problem: Pain  Goal: Verbalizes/displays adequate comfort level or baseline comfort level  5/2/2024 1327 by Monica Shields RN  Outcome: Progressing  5/2/2024 0235 by Moreno Conn RN  Outcome: Progressing     Problem: Skin/Tissue Integrity  Goal: Absence of new skin breakdown  Description: 1.  Monitor for areas of redness and/or skin breakdown  2.  Assess vascular access sites hourly  3.  Every 4-6 hours minimum:  Change oxygen saturation probe site  4.  Every 4-6 hours:  If on nasal continuous positive airway pressure, respiratory therapy assess nares and determine need for appliance change or resting period.  5/2/2024 1327 by Monica Shields RN  Outcome: Progressing  5/2/2024 0235 by Moreno Conn RN  Outcome: Progressing     Problem: ABCDS Injury Assessment  Goal: Absence of physical injury  5/2/2024 1327 by Monica Shields RN  Outcome: Progressing  5/2/2024 0235 by Moreno Conn RN  Outcome: Progressing     Problem: Safety - Adult  Goal: Free from fall injury  5/2/2024 1327 by Monica Shields RN  Outcome: Progressing  5/2/2024 0235 by Moreno Conn RN  Outcome: Progressing

## 2024-05-02 NOTE — PROGRESS NOTES
Bedside and Verbal shift change report given to MARNIE Anderson (oncoming nurse) by MARNIE Mayer (offgoing nurse). Report included the following information Nurse Handoff Report, Index, Adult Overview, Intake/Output, MAR, Cardiac Rhythm NSR, Neuro Assessment, and Event Log.      1945: Bedside and Verbal shift change report given to MARNIE Negrete (oncoming nurse) by MARNIE Monk (offgoing nurse). Report included the following information Nurse Handoff Report, Index, Adult Overview, Intake/Output, MAR, Cardiac Rhythm NSR, Neuro Assessment, and Event Log.    Pt was in bathroom sitting on commode in front of mirror. Pt was seen shaving head and face with razor. Pt is currently on heparin and was educated on the risk of utilizing razor when on blood thinners. Pt stated he was aware and verbalized understanding of the risk.

## 2024-05-03 VITALS
SYSTOLIC BLOOD PRESSURE: 144 MMHG | DIASTOLIC BLOOD PRESSURE: 84 MMHG | HEART RATE: 77 BPM | WEIGHT: 137 LBS | OXYGEN SATURATION: 95 % | BODY MASS INDEX: 18.56 KG/M2 | RESPIRATION RATE: 18 BRPM | TEMPERATURE: 97.8 F | HEIGHT: 72 IN

## 2024-05-03 LAB
ANION GAP SERPL CALC-SCNC: 6 MMOL/L (ref 3–18)
BUN SERPL-MCNC: 32 MG/DL (ref 7–18)
BUN/CREAT SERPL: 20 (ref 12–20)
CA-I SERPL-SCNC: 1 MMOL/L (ref 1.15–1.33)
CALCIUM SERPL-MCNC: 7.1 MG/DL (ref 8.5–10.1)
CHLORIDE SERPL-SCNC: 100 MMOL/L (ref 100–111)
CO2 SERPL-SCNC: 27 MMOL/L (ref 21–32)
CREAT SERPL-MCNC: 1.6 MG/DL (ref 0.6–1.3)
GLUCOSE SERPL-MCNC: 93 MG/DL (ref 74–99)
POTASSIUM SERPL-SCNC: 3.9 MMOL/L (ref 3.5–5.5)
SODIUM SERPL-SCNC: 133 MMOL/L (ref 136–145)

## 2024-05-03 PROCEDURE — 6370000000 HC RX 637 (ALT 250 FOR IP)

## 2024-05-03 PROCEDURE — 36415 COLL VENOUS BLD VENIPUNCTURE: CPT

## 2024-05-03 PROCEDURE — 82330 ASSAY OF CALCIUM: CPT

## 2024-05-03 PROCEDURE — 6360000002 HC RX W HCPCS

## 2024-05-03 PROCEDURE — 2500000003 HC RX 250 WO HCPCS

## 2024-05-03 PROCEDURE — 80048 BASIC METABOLIC PNL TOTAL CA: CPT

## 2024-05-03 PROCEDURE — 2580000003 HC RX 258

## 2024-05-03 RX ORDER — CALCIUM GLUCONATE 20 MG/ML
1000 INJECTION, SOLUTION INTRAVENOUS ONCE
Status: COMPLETED | OUTPATIENT
Start: 2024-05-03 | End: 2024-05-03

## 2024-05-03 RX ORDER — SUCRALFATE 1 G/1
1 TABLET ORAL
Qty: 120 TABLET | Refills: 0 | Status: SHIPPED | OUTPATIENT
Start: 2024-05-03 | End: 2024-05-03

## 2024-05-03 RX ORDER — CARVEDILOL 6.25 MG/1
6.25 TABLET ORAL 2 TIMES DAILY WITH MEALS
Qty: 60 TABLET | Refills: 0 | Status: SHIPPED | OUTPATIENT
Start: 2024-05-03

## 2024-05-03 RX ORDER — FUROSEMIDE 40 MG/1
40 TABLET ORAL DAILY
Qty: 60 TABLET | Refills: 0 | Status: SHIPPED | OUTPATIENT
Start: 2024-05-04

## 2024-05-03 RX ORDER — CARVEDILOL 6.25 MG/1
6.25 TABLET ORAL 2 TIMES DAILY WITH MEALS
Qty: 60 TABLET | Refills: 0 | Status: SHIPPED | OUTPATIENT
Start: 2024-05-03 | End: 2024-05-03

## 2024-05-03 RX ORDER — PANTOPRAZOLE SODIUM 20 MG/1
20 TABLET, DELAYED RELEASE ORAL
Qty: 30 TABLET | Refills: 0 | Status: SHIPPED | OUTPATIENT
Start: 2024-05-03 | End: 2024-05-03

## 2024-05-03 RX ORDER — PANTOPRAZOLE SODIUM 20 MG/1
20 TABLET, DELAYED RELEASE ORAL
Qty: 30 TABLET | Refills: 0 | Status: SHIPPED | OUTPATIENT
Start: 2024-05-03

## 2024-05-03 RX ORDER — FUROSEMIDE 40 MG/1
40 TABLET ORAL DAILY
Qty: 60 TABLET | Refills: 0 | Status: SHIPPED | OUTPATIENT
Start: 2024-05-04 | End: 2024-05-03

## 2024-05-03 RX ORDER — SUCRALFATE 1 G/1
1 TABLET ORAL
Qty: 120 TABLET | Refills: 0 | Status: SHIPPED | OUTPATIENT
Start: 2024-05-03

## 2024-05-03 RX ADMIN — HEPARIN SODIUM 5000 UNITS: 5000 INJECTION INTRAVENOUS; SUBCUTANEOUS at 14:26

## 2024-05-03 RX ADMIN — ASPIRIN 81 MG CHEWABLE TABLET 81 MG: 81 TABLET CHEWABLE at 08:19

## 2024-05-03 RX ADMIN — SODIUM CHLORIDE, PRESERVATIVE FREE 10 ML: 5 INJECTION INTRAVENOUS at 08:19

## 2024-05-03 RX ADMIN — ACETAMINOPHEN 650 MG: 325 TABLET ORAL at 14:53

## 2024-05-03 RX ADMIN — PANTOPRAZOLE SODIUM 20 MG: 20 TABLET, DELAYED RELEASE ORAL at 15:18

## 2024-05-03 RX ADMIN — PANTOPRAZOLE SODIUM 20 MG: 20 TABLET, DELAYED RELEASE ORAL at 05:08

## 2024-05-03 RX ADMIN — CALCIUM GLUCONATE 1000 MG: 20 INJECTION, SOLUTION INTRAVENOUS at 14:26

## 2024-05-03 RX ADMIN — HEPARIN SODIUM 5000 UNITS: 5000 INJECTION INTRAVENOUS; SUBCUTANEOUS at 05:08

## 2024-05-03 RX ADMIN — FUROSEMIDE 40 MG: 40 TABLET ORAL at 08:19

## 2024-05-03 RX ADMIN — SUCRALFATE 1 G: 1 TABLET ORAL at 10:36

## 2024-05-03 RX ADMIN — EMPAGLIFLOZIN 10 MG: 10 TABLET, FILM COATED ORAL at 08:18

## 2024-05-03 RX ADMIN — SUCRALFATE 1 G: 1 TABLET ORAL at 05:08

## 2024-05-03 RX ADMIN — SPIRONOLACTONE 25 MG: 25 TABLET ORAL at 08:18

## 2024-05-03 RX ADMIN — SUCRALFATE 1 G: 1 TABLET ORAL at 15:18

## 2024-05-03 RX ADMIN — CARVEDILOL 6.25 MG: 6.25 TABLET, FILM COATED ORAL at 08:18

## 2024-05-03 ASSESSMENT — PAIN DESCRIPTION - LOCATION
LOCATION: HEAD
LOCATION: HEAD

## 2024-05-03 ASSESSMENT — PAIN SCALES - GENERAL
PAINLEVEL_OUTOF10: 0
PAINLEVEL_OUTOF10: 3
PAINLEVEL_OUTOF10: 0
PAINLEVEL_OUTOF10: 0
PAINLEVEL_OUTOF10: 2
PAINLEVEL_OUTOF10: 0

## 2024-05-03 ASSESSMENT — PAIN DESCRIPTION - ORIENTATION
ORIENTATION: ANTERIOR
ORIENTATION: ANTERIOR

## 2024-05-03 ASSESSMENT — PAIN - FUNCTIONAL ASSESSMENT: PAIN_FUNCTIONAL_ASSESSMENT: ACTIVITIES ARE NOT PREVENTED

## 2024-05-03 ASSESSMENT — PAIN DESCRIPTION - DESCRIPTORS
DESCRIPTORS: DISCOMFORT
DESCRIPTORS: DULL

## 2024-05-03 NOTE — PROGRESS NOTES
Physical Therapy    Pt not seen for skilled PT due to:    []  Nausea/vomiting  []  Eating  []  Pain  []  Pt lethargic  []  Off Unit  Other: Pt declines to participate as he reports \"I have been up all morning to the bathroom and am just waking up\". Declines to get up despite encouragement.    Will f/u later as schedule allows. Thank you.  Aury Mayer, PT, DPT

## 2024-05-03 NOTE — DISCHARGE SUMMARY
[facility]  DISCHARGE SUMMARY      Name:   Moiz May 63 y.o. male  MRN:   [id number]  CSN:   [id number]  Admission Date:  4/29/2024  Discharge Date:  05/03/24    Attending:             Sangeeta Ruvalcaba MD   PCP:              Dee Dee Alves MD   ================================================================  Reason for Admission:  Acute on Chronic CHF exacerbation      Discharge Diagnosis:    Epigastric abdominal pain  Nausea  Gastritis  Hyperbilirubinemia  Cachexia  Hypoalbuminemia  Anorexia  Dyspnea  Bilateral leg swelling  Acute on chronic heart failure exacerbation  HFrEF  History of SVT status post SVT ablation  CAD status post PCI  Ischemic cardiomyopathy  Hypertension  HLD  CKD - likely stage III  Substance use disorder  Hypocalcemia    Follow-up studies/evaluations for PCP/Important Notes to PCP:  Recommend CMP as ALP and bilirubin was elevated in hospital but trended down  Consider outpatient workup of hypocalcemia  Evaluate volume status as patient came in with CHF exacerbation. Ensure taking lasix 40 daily  Pending labs/investigations to follow up as below: Hypocalcemia workup  Medication reconciliation:  Discontinued Medications: amiodarone, losartan, toprol, torsemide  New Medications: carvedilol, lasix, protonix, entresto, carafate    ANTONY Follow Up Appointment:   Follow-up Information       Follow up With Specialties Details Why Contact Info    [facility]  Go on 5/9/2024 @ 2:40PM [address] [place] [id number]  [phone]             Readmission prevention plan:   Diuresed while in hospital  Treated gastritis with PPIs and Carafate  Repleted calcium via IV    GOALS OF CARE (including Code Status, Advanced Care Plan):   Full    Pending labs/ investigations at discharge to follow up:   Hypocalcemia workup    Operative Procedures:   None    Consultants:    Cardiology    Condition at discharge: Stable    Disposition at Discharge:  Home    Functional  Date/Time    CKMB 4.1 12/30/2020 03:30 AM    BNP 6,384 08/10/2022 05:09 AM      NT-proBNP Lab Results   Component Value Date/Time    BNP 6,384 08/10/2022 05:09 AM    BNP 15,570 08/07/2022 10:47 AM    BNP 3,813 04/18/2022 05:35 PM    BNP 13,398 04/13/2022 01:31 PM    BNP 9,678 04/12/2022 04:25 PM      THYROID Lab Results   Component Value Date/Time    FT3 2.6 09/01/2023 05:28 AM        Readmission Risk              Risk of Unplanned Readmission:  27        %      Ayaka Humphreys MD, PGY-1  [facility]  5/3/2024 1:29 PM

## 2024-05-03 NOTE — PLAN OF CARE
Problem: Pain  Goal: Verbalizes/displays adequate comfort level or baseline comfort level  5/2/2024 2332 by Marcial Mathias RN  Outcome: Progressing  5/2/2024 1327 by Monica Shields RN  Outcome: Progressing     Problem: Skin/Tissue Integrity  Goal: Absence of new skin breakdown  Description: 1.  Monitor for areas of redness and/or skin breakdown  2.  Assess vascular access sites hourly  3.  Every 4-6 hours minimum:  Change oxygen saturation probe site  4.  Every 4-6 hours:  If on nasal continuous positive airway pressure, respiratory therapy assess nares and determine need for appliance change or resting period.  5/2/2024 2332 by Marcial Mathias RN  Outcome: Progressing  5/2/2024 1327 by Monica Shields RN  Outcome: Progressing     Problem: Safety - Adult  Goal: Free from fall injury  5/2/2024 2332 by Marcial Mathias RN  Outcome: Progressing  5/2/2024 1327 by Monica Shields RN  Outcome: Progressing

## 2024-05-03 NOTE — PROGRESS NOTES
2000hrs:  Bedside and Verbal shift change report given to MARNIE Negrete (oncoming nurse) by MARNIE Monk (offgoing nurse). Report included the following information Nurse Handoff Report, Index, ED Encounter Summary, Adult Overview, Surgery Report, Intake/Output, MAR, Recent Results, Cardiac Rhythm NSR, Neuro Assessment, and Event Log.     Pt in bathroom, shaving with razor. RN reminded pt that he is taking SQ Heparin and his bleeding risk is increased.   RN educated pt on medication and using razors.  Pt acknowledged, \"I am aware and I will be careful.\"   Pt continued to shave.  No s/s distress noted.     2220hrs:  Pt c/o 8 out of 10 Headache \"Sinus Pressure\".  Pt with PRN PO Tylenol ordered; however, pt received last dose at 1838hrs and timeline too soon.      Pt stated that Mortin 800mg helps him more than Tylenol.      MD paged.     2226hrs:  Spoke with MD regarding pt's headache.  Per MD, PO Ibuprofen to be added to pt's record and RN to give pt dose of PO Tylenol at 0000hrs.      2235hrs:  One-time dose PO Ibuprofen admin'd per MD order.  Pt requested and provided with 2 vanilla puddings and 1 vanilla ice cream.     0045hrs:  Pt stated that he does not want the PO Tylenol.      0730hrs:  Bedside and Verbal shift change report given to MARNIE Vann (oncoming nurse) by MARNIE Negrete & MARNIE Ceja (offgoing nurse). Report included the following information Nurse Handoff Report, Index, ED Encounter Summary, Adult Overview, Surgery Report, Intake/Output, MAR, Recent Results, Cardiac Rhythm NSR, Neuro Assessment, and Event Log.

## 2024-05-03 NOTE — PROGRESS NOTES
0715: Bedside shift change report given to MARNIE Vann (oncoming nurse) by Caden/MARNIE Ceja (offgoing nurse). Report included the following information Nurse Handoff Report, Adult Overview, Intake/Output, MAR, and Cardiac Rhythm NSR .     0815: Rounding on patient. Patient's alert, stable, and resting securely in bed without any s/s of distress. No issues, will continue to monitor patient remainder of shift.     1035: Patient inquired about being discharged. Will reach out to MD.     1037: MD paged. Awaiting callback.     1040: Spoke with MD and updated about patient request for being discharged. MD to round on patient momentarily.     1120: MD rounding on patient.     1520: AVS reviewed with patient. Education on CHF and prescribed medications provided. No questions.     1540: Transport arrived to unit for patient. Home medications that patient were admitted with got picked up.

## 2024-05-03 NOTE — PROGRESS NOTES
[facility]  DAILY PROGRESS NOTE      Patient:    Moiz May , 63 y.o. male   MRN:  [id number]  Room/Bed:  355/01  Admission Date:   4/29/2024  Code status:  Full Code    Reason for Admission: Acute on chronic CHF exacerbation  Barriers to Discharge: None  Anticipated discharge: 5/3/2024      ASSESSMENT AND PLAN:   Moiz May is a 63 y.o. year old male with PMH of HFrEF EF 10%, CAD s/p PCI February 2022, ischemic cardiomyopathy, HTN, HLD, and CKD ?Stage III, presented for abdominal pain and admitted for acute on chronic CHF exacerbation.  Patient's presenting concern was abdominal pain; workup in the ED for this including CT abdomen, ultrasound of gallbladder, CMP was only remarkable for hyperbilirubinemia and elevated ALP. These markers were trended down without intervention.      For his dyspnea this was most likely related to acute on chronic CHF exacerbation in the setting of elevated BNP, recent cocaine use, edema on exam, and opacities in the lung base as demonstrated on CT abdomen, CTA chest. CHF exacerbation likely brought on by recent cocaine use. He was diuresed this hopsitalization and will be discharged on home lasix dosing.      Epigastric abdominal Pain, improved  Nausea, improved  Gastritis  Hyperbilirubinemia, improved  Cachexia  Hypoalbuminemia  BMI <19  -Most likely diagnosis at this point is gastritis versus cocaine withdrawal as CT abdomen pelvis without acute findings, gallbladder ultrasound without signs of overt acute cholecystitis, hyperbilirubinemia improving, absence of white count, and LA now normalized.  Patient consistently denies cocaine use despite UDS being positive  -Initially lactic acid obtained secondary to concern that patient may have mesenteric ischemia due to his initial presentation.  Suspected lactic acid would be high secondary to dehydration, but since it has been trending down with conservative measures and patient's symptoms have improved,

## 2024-05-03 NOTE — PROGRESS NOTES
1930: Bedside and Verbal shift change report given to MARNIE Negrete (oncoming nurse) by MARNIE Monk (offgoing nurse). Report included the following information Nurse Handoff Report, Index, Adult Overview, Intake/Output, MAR, Cardiac Rhythm NSR, Neuro Assessment, and Event Log.      2000: Assessment completed, Pt shows no s/s of distress at this time.    2200: RN rounding, Pt laying in bed quiet with eyes open.    0705: Bedside and Verbal shift change report given to MARNIE Vann (oncoming nurse) by MARNIE Negrete/ MARNIE Ceja (offgoing nurse). Report included the following information Nurse Handoff Report, Index, Adult Overview, Intake/Output, MAR, Cardiac Rhythm NSR, Neuro Assessment, and Event Log.

## 2024-05-03 NOTE — CARE COORDINATION
Discharge order noted for today. Orders received. No needs identified at this time. Case management remains available as needed.       Patient stated he has someone to transport him home.      Carolin Warren, SHANTELL     [phone]

## 2024-05-03 NOTE — PLAN OF CARE
Problem: Discharge Planning  Goal: Discharge to home or other facility with appropriate resources  Recent Flowsheet Documentation  Taken 5/3/2024 7430 by Soo Sigala RN  Discharge to home or other facility with appropriate resources:   Identify barriers to discharge with patient and caregiver   Arrange for needed discharge resources and transportation as appropriate   Identify discharge learning needs (meds, wound care, etc)  5/2/2024 2332 by Marcial Mathias RN  Outcome: Progressing     Problem: Pain  Goal: Verbalizes/displays adequate comfort level or baseline comfort level  5/2/2024 2332 by Marcial Mathias RN  Outcome: Progressing     Problem: Skin/Tissue Integrity  Goal: Absence of new skin breakdown  Description: 1.  Monitor for areas of redness and/or skin breakdown  2.  Assess vascular access sites hourly  3.  Every 4-6 hours minimum:  Change oxygen saturation probe site  4.  Every 4-6 hours:  If on nasal continuous positive airway pressure, respiratory therapy assess nares and determine need for appliance change or resting period.  5/2/2024 2332 by Marcial Mathias RN  Outcome: Progressing     Problem: ABCDS Injury Assessment  Goal: Absence of physical injury  5/2/2024 2332 by Marcial Mathias RN  Outcome: Progressing     Problem: Safety - Adult  Goal: Free from fall injury  5/2/2024 2332 by Marcial Mathias RN  Outcome: Progressing

## 2024-05-03 NOTE — CARE COORDINATION
05/03/24 1504   IMM Letter   IMM Letter given to Patient/Family/Significant other/Guardian/POA/by: Carolin Warren   IMM Letter date given: 05/03/24   IMM Letter time given: 1500     Carolin Warren, SHANTELL     [phone]

## 2024-10-27 ENCOUNTER — HOSPITAL ENCOUNTER (INPATIENT)
Facility: HOSPITAL | Age: 64
LOS: 8 days | Discharge: HOME HEALTH | DRG: 308 | End: 2024-11-04
Attending: EMERGENCY MEDICINE | Admitting: FAMILY MEDICINE
Payer: MEDICARE

## 2024-10-27 ENCOUNTER — APPOINTMENT (OUTPATIENT)
Facility: HOSPITAL | Age: 64
DRG: 308 | End: 2024-10-27
Payer: MEDICARE

## 2024-10-27 DIAGNOSIS — M79.89 LEFT UPPER EXTREMITY SWELLING: ICD-10-CM

## 2024-10-27 DIAGNOSIS — E87.70 HYPERVOLEMIA, UNSPECIFIED HYPERVOLEMIA TYPE: ICD-10-CM

## 2024-10-27 DIAGNOSIS — I48.91 ATRIAL FIBRILLATION WITH RAPID VENTRICULAR RESPONSE (HCC): ICD-10-CM

## 2024-10-27 DIAGNOSIS — I50.41 ACUTE COMBINED SYSTOLIC AND DIASTOLIC CONGESTIVE HEART FAILURE (HCC): Primary | ICD-10-CM

## 2024-10-27 PROBLEM — N17.9 AKI (ACUTE KIDNEY INJURY) (HCC): Status: ACTIVE | Noted: 2024-10-27

## 2024-10-27 PROBLEM — R74.8 ELEVATED LIVER ENZYMES: Status: ACTIVE | Noted: 2024-10-27

## 2024-10-27 PROBLEM — E83.51 HYPOCALCEMIA: Status: ACTIVE | Noted: 2024-10-27

## 2024-10-27 LAB
ALBUMIN SERPL-MCNC: 3.4 G/DL (ref 3.4–5)
ALBUMIN/GLOB SERPL: 0.9 (ref 0.8–1.7)
ALP SERPL-CCNC: 159 U/L (ref 45–117)
ALT SERPL-CCNC: 61 U/L (ref 16–61)
ANION GAP SERPL CALC-SCNC: 5 MMOL/L (ref 3–18)
ANION GAP SERPL CALC-SCNC: 9 MMOL/L (ref 3–18)
APTT PPP: 28.2 SEC (ref 23–36.4)
AST SERPL-CCNC: 102 U/L (ref 10–38)
BASOPHILS # BLD: 0.1 K/UL (ref 0–0.1)
BASOPHILS NFR BLD: 1 % (ref 0–2)
BILIRUB SERPL-MCNC: 3.6 MG/DL (ref 0.2–1)
BUN SERPL-MCNC: 40 MG/DL (ref 7–18)
BUN SERPL-MCNC: 44 MG/DL (ref 7–18)
BUN/CREAT SERPL: 21 (ref 12–20)
BUN/CREAT SERPL: 21 (ref 12–20)
CALCIUM SERPL-MCNC: 7.8 MG/DL (ref 8.5–10.1)
CALCIUM SERPL-MCNC: 8 MG/DL (ref 8.5–10.1)
CHLORIDE SERPL-SCNC: 102 MMOL/L (ref 100–111)
CHLORIDE SERPL-SCNC: 103 MMOL/L (ref 100–111)
CO2 SERPL-SCNC: 24 MMOL/L (ref 21–32)
CO2 SERPL-SCNC: 27 MMOL/L (ref 21–32)
CREAT SERPL-MCNC: 1.94 MG/DL (ref 0.6–1.3)
CREAT SERPL-MCNC: 2.11 MG/DL (ref 0.6–1.3)
DIFFERENTIAL METHOD BLD: ABNORMAL
EOSINOPHIL # BLD: 0.1 K/UL (ref 0–0.4)
EOSINOPHIL NFR BLD: 1 % (ref 0–5)
ERYTHROCYTE [DISTWIDTH] IN BLOOD BY AUTOMATED COUNT: 14.1 % (ref 11.6–14.5)
GLOBULIN SER CALC-MCNC: 3.6 G/DL (ref 2–4)
GLUCOSE SERPL-MCNC: 125 MG/DL (ref 74–99)
GLUCOSE SERPL-MCNC: 134 MG/DL (ref 74–99)
HCT VFR BLD AUTO: 50.4 % (ref 36–48)
HGB BLD-MCNC: 16.2 G/DL (ref 13–16)
IMM GRANULOCYTES # BLD AUTO: 0 K/UL (ref 0–0.04)
IMM GRANULOCYTES NFR BLD AUTO: 0 % (ref 0–0.5)
INR PPP: 1.1 (ref 0.9–1.1)
LYMPHOCYTES # BLD: 1.4 K/UL (ref 0.9–3.6)
LYMPHOCYTES NFR BLD: 25 % (ref 21–52)
MAGNESIUM SERPL-MCNC: 1.7 MG/DL (ref 1.6–2.6)
MCH RBC QN AUTO: 30.5 PG (ref 24–34)
MCHC RBC AUTO-ENTMCNC: 32.1 G/DL (ref 31–37)
MCV RBC AUTO: 94.7 FL (ref 78–100)
MONOCYTES # BLD: 0.2 K/UL (ref 0.05–1.2)
MONOCYTES NFR BLD: 3 % (ref 3–10)
NEUTS SEG # BLD: 3.9 K/UL (ref 1.8–8)
NEUTS SEG NFR BLD: 70 % (ref 40–73)
NRBC # BLD: 0 K/UL (ref 0–0.01)
NRBC BLD-RTO: 0 PER 100 WBC
NT PRO BNP: ABNORMAL PG/ML (ref 0–900)
PLATELET # BLD AUTO: 170 K/UL (ref 135–420)
PLATELET COMMENT: ABNORMAL
PMV BLD AUTO: 12.5 FL (ref 9.2–11.8)
POTASSIUM SERPL-SCNC: 3.4 MMOL/L (ref 3.5–5.5)
POTASSIUM SERPL-SCNC: 4.8 MMOL/L (ref 3.5–5.5)
PROT SERPL-MCNC: 7 G/DL (ref 6.4–8.2)
PROTHROMBIN TIME: 14.8 SEC (ref 11.9–14.9)
RBC # BLD AUTO: 5.32 M/UL (ref 4.35–5.65)
RBC MORPH BLD: ABNORMAL
SODIUM SERPL-SCNC: 135 MMOL/L (ref 136–145)
SODIUM SERPL-SCNC: 135 MMOL/L (ref 136–145)
T4 FREE SERPL-MCNC: 1.4 NG/DL (ref 0.7–1.5)
TROPONIN I SERPL HS-MCNC: 36 NG/L (ref 0–78)
TROPONIN I SERPL HS-MCNC: 38 NG/L (ref 0–78)
TSH SERPL DL<=0.05 MIU/L-ACNC: 1.53 UIU/ML (ref 0.36–3.74)
WBC # BLD AUTO: 5.7 K/UL (ref 4.6–13.2)

## 2024-10-27 PROCEDURE — 2500000003 HC RX 250 WO HCPCS

## 2024-10-27 PROCEDURE — 6360000002 HC RX W HCPCS

## 2024-10-27 PROCEDURE — 96374 THER/PROPH/DIAG INJ IV PUSH: CPT

## 2024-10-27 PROCEDURE — 6360000002 HC RX W HCPCS: Performed by: EMERGENCY MEDICINE

## 2024-10-27 PROCEDURE — 2500000003 HC RX 250 WO HCPCS: Performed by: EMERGENCY MEDICINE

## 2024-10-27 PROCEDURE — 84443 ASSAY THYROID STIM HORMONE: CPT

## 2024-10-27 PROCEDURE — 84439 ASSAY OF FREE THYROXINE: CPT

## 2024-10-27 PROCEDURE — 85025 COMPLETE CBC W/AUTO DIFF WBC: CPT

## 2024-10-27 PROCEDURE — 85610 PROTHROMBIN TIME: CPT

## 2024-10-27 PROCEDURE — 6370000000 HC RX 637 (ALT 250 FOR IP): Performed by: EMERGENCY MEDICINE

## 2024-10-27 PROCEDURE — 99285 EMERGENCY DEPT VISIT HI MDM: CPT

## 2024-10-27 PROCEDURE — 2140000001 HC CVICU INTERMEDIATE R&B

## 2024-10-27 PROCEDURE — 71045 X-RAY EXAM CHEST 1 VIEW: CPT

## 2024-10-27 PROCEDURE — 6370000000 HC RX 637 (ALT 250 FOR IP)

## 2024-10-27 PROCEDURE — 84484 ASSAY OF TROPONIN QUANT: CPT

## 2024-10-27 PROCEDURE — 36415 COLL VENOUS BLD VENIPUNCTURE: CPT

## 2024-10-27 PROCEDURE — 2580000003 HC RX 258

## 2024-10-27 PROCEDURE — 83735 ASSAY OF MAGNESIUM: CPT

## 2024-10-27 PROCEDURE — 83880 ASSAY OF NATRIURETIC PEPTIDE: CPT

## 2024-10-27 PROCEDURE — 80053 COMPREHEN METABOLIC PANEL: CPT

## 2024-10-27 PROCEDURE — 93005 ELECTROCARDIOGRAM TRACING: CPT | Performed by: EMERGENCY MEDICINE

## 2024-10-27 PROCEDURE — 85730 THROMBOPLASTIN TIME PARTIAL: CPT

## 2024-10-27 PROCEDURE — 93005 ELECTROCARDIOGRAM TRACING: CPT | Performed by: NURSE PRACTITIONER

## 2024-10-27 PROCEDURE — 94761 N-INVAS EAR/PLS OXIMETRY MLT: CPT

## 2024-10-27 RX ORDER — HEPARIN SODIUM 5000 [USP'U]/ML
5000 INJECTION, SOLUTION INTRAVENOUS; SUBCUTANEOUS EVERY 8 HOURS SCHEDULED
Status: DISCONTINUED | OUTPATIENT
Start: 2024-10-27 | End: 2024-10-30

## 2024-10-27 RX ORDER — POTASSIUM CHLORIDE 1500 MG/1
40 TABLET, EXTENDED RELEASE ORAL PRN
Status: DISCONTINUED | OUTPATIENT
Start: 2024-10-27 | End: 2024-11-04 | Stop reason: HOSPADM

## 2024-10-27 RX ORDER — SPIRONOLACTONE 25 MG/1
25 TABLET ORAL DAILY
Status: DISCONTINUED | OUTPATIENT
Start: 2024-10-27 | End: 2024-11-04 | Stop reason: HOSPADM

## 2024-10-27 RX ORDER — POLYETHYLENE GLYCOL 3350 17 G/17G
17 POWDER, FOR SOLUTION ORAL DAILY PRN
Status: DISCONTINUED | OUTPATIENT
Start: 2024-10-27 | End: 2024-10-28

## 2024-10-27 RX ORDER — MAGNESIUM SULFATE IN WATER 40 MG/ML
2000 INJECTION, SOLUTION INTRAVENOUS PRN
Status: DISCONTINUED | OUTPATIENT
Start: 2024-10-27 | End: 2024-11-04 | Stop reason: HOSPADM

## 2024-10-27 RX ORDER — ACETAMINOPHEN 325 MG/1
650 TABLET ORAL EVERY 6 HOURS PRN
Status: DISCONTINUED | OUTPATIENT
Start: 2024-10-27 | End: 2024-11-01

## 2024-10-27 RX ORDER — FUROSEMIDE 10 MG/ML
60 INJECTION INTRAMUSCULAR; INTRAVENOUS
Status: COMPLETED | OUTPATIENT
Start: 2024-10-27 | End: 2024-10-27

## 2024-10-27 RX ORDER — POTASSIUM CHLORIDE 7.45 MG/ML
10 INJECTION INTRAVENOUS PRN
Status: DISCONTINUED | OUTPATIENT
Start: 2024-10-27 | End: 2024-11-04 | Stop reason: HOSPADM

## 2024-10-27 RX ORDER — SODIUM CHLORIDE 0.9 % (FLUSH) 0.9 %
5-40 SYRINGE (ML) INJECTION EVERY 12 HOURS SCHEDULED
Status: DISCONTINUED | OUTPATIENT
Start: 2024-10-27 | End: 2024-11-04 | Stop reason: HOSPADM

## 2024-10-27 RX ORDER — ONDANSETRON 2 MG/ML
4 INJECTION INTRAMUSCULAR; INTRAVENOUS EVERY 6 HOURS PRN
Status: DISCONTINUED | OUTPATIENT
Start: 2024-10-27 | End: 2024-11-04 | Stop reason: HOSPADM

## 2024-10-27 RX ORDER — ONDANSETRON 4 MG/1
4 TABLET, ORALLY DISINTEGRATING ORAL EVERY 8 HOURS PRN
Status: DISCONTINUED | OUTPATIENT
Start: 2024-10-27 | End: 2024-11-04 | Stop reason: HOSPADM

## 2024-10-27 RX ORDER — SODIUM CHLORIDE 0.9 % (FLUSH) 0.9 %
5-40 SYRINGE (ML) INJECTION PRN
Status: DISCONTINUED | OUTPATIENT
Start: 2024-10-27 | End: 2024-11-04 | Stop reason: HOSPADM

## 2024-10-27 RX ORDER — ACETAMINOPHEN 650 MG/1
650 SUPPOSITORY RECTAL EVERY 6 HOURS PRN
Status: DISCONTINUED | OUTPATIENT
Start: 2024-10-27 | End: 2024-11-01

## 2024-10-27 RX ORDER — ROSUVASTATIN CALCIUM 20 MG/1
20 TABLET, COATED ORAL NIGHTLY
Status: DISCONTINUED | OUTPATIENT
Start: 2024-10-27 | End: 2024-11-04 | Stop reason: HOSPADM

## 2024-10-27 RX ORDER — PANTOPRAZOLE SODIUM 20 MG/1
20 TABLET, DELAYED RELEASE ORAL
Status: DISCONTINUED | OUTPATIENT
Start: 2024-10-27 | End: 2024-11-04 | Stop reason: HOSPADM

## 2024-10-27 RX ORDER — SODIUM CHLORIDE 9 MG/ML
INJECTION, SOLUTION INTRAVENOUS PRN
Status: DISCONTINUED | OUTPATIENT
Start: 2024-10-27 | End: 2024-11-04 | Stop reason: HOSPADM

## 2024-10-27 RX ORDER — ASPIRIN 81 MG/1
81 TABLET, CHEWABLE ORAL DAILY
Status: DISCONTINUED | OUTPATIENT
Start: 2024-10-27 | End: 2024-11-04 | Stop reason: HOSPADM

## 2024-10-27 RX ORDER — FUROSEMIDE 40 MG/1
40 TABLET ORAL DAILY
Status: DISCONTINUED | OUTPATIENT
Start: 2024-10-27 | End: 2024-11-04 | Stop reason: HOSPADM

## 2024-10-27 RX ORDER — SUCRALFATE 1 G/1
1 TABLET ORAL 3 TIMES DAILY PRN
Status: DISCONTINUED | OUTPATIENT
Start: 2024-10-27 | End: 2024-11-04 | Stop reason: HOSPADM

## 2024-10-27 RX ORDER — CARVEDILOL 12.5 MG/1
12.5 TABLET ORAL
Status: ACTIVE | OUTPATIENT
Start: 2024-10-27 | End: 2024-10-28

## 2024-10-27 RX ADMIN — HEPARIN SODIUM 5000 UNITS: 5000 INJECTION INTRAVENOUS; SUBCUTANEOUS at 16:07

## 2024-10-27 RX ADMIN — AMIODARONE HYDROCHLORIDE 150 MG: 1.5 INJECTION, SOLUTION INTRAVENOUS at 15:03

## 2024-10-27 RX ADMIN — AMIODARONE HYDROCHLORIDE 1 MG/MIN: 1.8 INJECTION, SOLUTION INTRAVENOUS at 21:27

## 2024-10-27 RX ADMIN — NITROGLYCERIN 1 INCH: 20 OINTMENT TOPICAL at 13:21

## 2024-10-27 RX ADMIN — HEPARIN SODIUM 5000 UNITS: 5000 INJECTION INTRAVENOUS; SUBCUTANEOUS at 21:20

## 2024-10-27 RX ADMIN — EMPAGLIFLOZIN 10 MG: 10 TABLET, FILM COATED ORAL at 16:06

## 2024-10-27 RX ADMIN — ASPIRIN 81 MG CHEWABLE TABLET 81 MG: 81 TABLET CHEWABLE at 16:06

## 2024-10-27 RX ADMIN — SODIUM CHLORIDE, PRESERVATIVE FREE 10 ML: 5 INJECTION INTRAVENOUS at 20:12

## 2024-10-27 RX ADMIN — BUMETANIDE 0.2 MG/HR: 0.25 INJECTION INTRAMUSCULAR; INTRAVENOUS at 20:10

## 2024-10-27 RX ADMIN — FUROSEMIDE 60 MG: 10 INJECTION, SOLUTION INTRAMUSCULAR; INTRAVENOUS at 13:22

## 2024-10-27 RX ADMIN — SACUBITRIL AND VALSARTAN 1 TABLET: 24; 26 TABLET, FILM COATED ORAL at 21:20

## 2024-10-27 RX ADMIN — ROSUVASTATIN CALCIUM 20 MG: 20 TABLET, FILM COATED ORAL at 21:20

## 2024-10-27 RX ADMIN — PANTOPRAZOLE SODIUM 20 MG: 20 TABLET, DELAYED RELEASE ORAL at 16:06

## 2024-10-27 RX ADMIN — AMIODARONE HYDROCHLORIDE 1 MG/MIN: 1.8 INJECTION, SOLUTION INTRAVENOUS at 15:45

## 2024-10-27 ASSESSMENT — PAIN SCALES - GENERAL
PAINLEVEL_OUTOF10: 0

## 2024-10-27 NOTE — ED NOTES
TRANSFER - OUT REPORT:    Verbal report given to MARNIE Petty on Moiz May  being transferred to [facility] for routine progression of patient care       Report consisted of patient's Situation, Background, Assessment and   Recommendations(SBAR).     Information from the following report(s) ED Encounter Summary, ED SBAR, Intake/Output, MAR, Recent Results, and Neuro Assessment was reviewed with the receiving nurse.    [place] Fall Assessment:    Presents to emergency department  because of falls (Syncope, seizure, or loss of consciousness): No  Age > 70: No  Altered Mental Status, Intoxication with alcohol or substance confusion (Disorientation, impaired judgment, poor safety awaremess, or inability to follow instructions): No  Impaired Mobility: Ambulates or transfers with assistive devices or assistance; Unable to ambulate or transer.: No  Nursing Judgement: No          Lines:   Peripheral IV 10/27/24 Left;Proximal Cephalic (Active)       Peripheral IV 10/27/24 Left Antecubital (Active)       Peripheral IV 10/27/24 Left;Anterior Forearm (Active)        Opportunity for questions and clarification was provided.      Patient transported with:  Registered Nurse

## 2024-10-27 NOTE — ED TRIAGE NOTES
Pt from home complaining of Shortness of breath x 1 week, run out of lasix for a while,. Bilateral leg swelling ,

## 2024-10-27 NOTE — ED PROVIDER NOTES
EMERGENCY DEPARTMENT HISTORY AND PHYSICAL EXAM    2:08 PM      Date: 10/27/2024  Patient Name: Moiz May    History of Presenting Illness     Chief Complaint   Patient presents with    Shortness of Breath       History From: Patient    Moiz May is a 64 y.o. male   Patient is a 64-year-old male with a history of ischemic cardiomyopathy, heart failure with reduced ejection fraction heart disease, history of ICD, that presents emergency department with complaint of 1 week of increasing shortness of breath and said he was told to start taking flu medicine twice a day but has been able to take over the last several days.  And was not helping much now he has been feeling increasing shortness of breath, is having leg swelling.  Patient denies any nausea or vomiting but has been worsening to the point he needed to get some help.  Patient says he did bring all of his medications with him and says he typically will take them as directed.  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           Nursing Notes were all reviewed and agreed with or any disagreements were addressed in the HPI.    PCP: Dee Dee Alves MD    Current Facility-Administered Medications   Medication Dose Route Frequency Provider Last Rate Last Admin    carvedilol (COREG) tablet 12.5 mg  12.5 mg Oral NOW Jonas Srinivasan MD         Current Outpatient Medications   Medication Sig Dispense Refill    carvedilol (COREG) 6.25 MG tablet Take 1 tablet by mouth 2 times daily (with meals) 60 tablet 0    furosemide (LASIX) 40 MG tablet Take 1 tablet by mouth daily 60 tablet 0    pantoprazole (PROTONIX) 20 MG tablet Take 1 tablet by mouth 2 times daily (before meals) 30 tablet 0    sucralfate (CARAFATE) 1 GM tablet Take 1 tablet by mouth 3 times daily (before meals) To help with nausea/abdominal pain 120 tablet 0    sacubitril-valsartan (ENTRESTO) 24-26 MG per tablet Take 1 tablet by mouth 2 times daily 60

## 2024-10-28 ENCOUNTER — APPOINTMENT (OUTPATIENT)
Facility: HOSPITAL | Age: 64
DRG: 308 | End: 2024-10-28
Payer: MEDICARE

## 2024-10-28 LAB
ANION GAP SERPL CALC-SCNC: 10 MMOL/L (ref 3–18)
ARTERIAL PATENCY WRIST A: POSITIVE
BASE DEFICIT BLD-SCNC: 5.7 MMOL/L
BDY SITE: ABNORMAL
BUN SERPL-MCNC: 45 MG/DL (ref 7–18)
BUN/CREAT SERPL: 21 (ref 12–20)
CALCIUM SERPL-MCNC: 7.5 MG/DL (ref 8.5–10.1)
CHLORIDE SERPL-SCNC: 101 MMOL/L (ref 100–111)
CHOLEST SERPL-MCNC: 154 MG/DL
CO2 SERPL-SCNC: 19 MMOL/L (ref 21–32)
CREAT SERPL-MCNC: 2.12 MG/DL (ref 0.6–1.3)
ECHO AO ASC DIAM: 3.8 CM
ECHO AO ASCENDING AORTA INDEX: 1.98 CM/M2
ECHO AO ROOT DIAM: 3.2 CM
ECHO AO ROOT INDEX: 1.67 CM/M2
ECHO AV AREA PEAK VELOCITY: 2 CM2
ECHO AV AREA VTI: 1.9 CM2
ECHO AV AREA/BSA PEAK VELOCITY: 1 CM2/M2
ECHO AV AREA/BSA VTI: 1 CM2/M2
ECHO AV MEAN GRADIENT: 1 MMHG
ECHO AV MEAN VELOCITY: 0.5 M/S
ECHO AV PEAK GRADIENT: 3 MMHG
ECHO AV PEAK VELOCITY: 0.8 M/S
ECHO AV VELOCITY RATIO: 0.75
ECHO AV VTI: 12.1 CM
ECHO BSA: 1.9 M2
ECHO EST RA PRESSURE: 15 MMHG
ECHO LA VOL A-L A2C: 65 ML (ref 18–58)
ECHO LA VOL A-L A4C: 51 ML (ref 18–58)
ECHO LA VOL BP: 58 ML (ref 18–58)
ECHO LA VOL MOD A2C: 62 ML (ref 18–58)
ECHO LA VOL MOD A4C: 46 ML (ref 18–58)
ECHO LA VOL/BSA BIPLANE: 30 ML/M2 (ref 16–34)
ECHO LA VOLUME AREA LENGTH: 64 ML
ECHO LA VOLUME INDEX A-L A2C: 34 ML/M2 (ref 16–34)
ECHO LA VOLUME INDEX A-L A4C: 27 ML/M2 (ref 16–34)
ECHO LA VOLUME INDEX AREA LENGTH: 33 ML/M2 (ref 16–34)
ECHO LA VOLUME INDEX MOD A2C: 32 ML/M2 (ref 16–34)
ECHO LA VOLUME INDEX MOD A4C: 24 ML/M2 (ref 16–34)
ECHO LV EDV A2C: 174 ML
ECHO LV EDV A4C: 173 ML
ECHO LV EDV BP: 173 ML (ref 67–155)
ECHO LV EDV INDEX A4C: 90 ML/M2
ECHO LV EDV INDEX BP: 90 ML/M2
ECHO LV EDV NDEX A2C: 91 ML/M2
ECHO LV EJECTION FRACTION A2C: 21 %
ECHO LV EJECTION FRACTION A4C: 5 %
ECHO LV EJECTION FRACTION BIPLANE: 10 % (ref 55–100)
ECHO LV ESV A2C: 137 ML
ECHO LV ESV A4C: 164 ML
ECHO LV ESV BP: 156 ML (ref 22–58)
ECHO LV ESV INDEX A2C: 71 ML/M2
ECHO LV ESV INDEX A4C: 85 ML/M2
ECHO LV ESV INDEX BP: 81 ML/M2
ECHO LV FRACTIONAL SHORTENING: 2 % (ref 28–44)
ECHO LV INTERNAL DIMENSION DIASTOLE INDEX: 3.02 CM/M2
ECHO LV INTERNAL DIMENSION DIASTOLIC: 5.8 CM (ref 4.2–5.9)
ECHO LV INTERNAL DIMENSION SYSTOLIC INDEX: 2.97 CM/M2
ECHO LV INTERNAL DIMENSION SYSTOLIC: 5.7 CM
ECHO LV IVSD: 0.9 CM (ref 0.6–1)
ECHO LV MASS 2D: 189.3 G (ref 88–224)
ECHO LV MASS INDEX 2D: 98.6 G/M2 (ref 49–115)
ECHO LV POSTERIOR WALL DIASTOLIC: 0.8 CM (ref 0.6–1)
ECHO LV RELATIVE WALL THICKNESS RATIO: 0.28
ECHO LVOT AREA: 3.1 CM2
ECHO LVOT AV VTI INDEX: 0.63
ECHO LVOT DIAM: 2 CM
ECHO LVOT MEAN GRADIENT: 1 MMHG
ECHO LVOT PEAK GRADIENT: 1 MMHG
ECHO LVOT PEAK VELOCITY: 0.6 M/S
ECHO LVOT STROKE VOLUME INDEX: 12.4 ML/M2
ECHO LVOT SV: 23.9 ML
ECHO LVOT VTI: 7.6 CM
ECHO MV AREA VTI: 1.1 CM2
ECHO MV E VELOCITY: 1.05 M/S
ECHO MV LVOT VTI INDEX: 2.84
ECHO MV MAX VELOCITY: 1 M/S
ECHO MV MEAN GRADIENT: 1 MMHG
ECHO MV MEAN VELOCITY: 0.5 M/S
ECHO MV PEAK GRADIENT: 4 MMHG
ECHO MV VTI: 21.6 CM
ECHO PV MAX VELOCITY: 0.5 M/S
ECHO PV PEAK GRADIENT: 1 MMHG
ECHO RA AREA 4C: 20.6 CM2
ECHO RA END SYSTOLIC VOLUME APICAL 4 CHAMBER INDEX BSA: 32 ML/M2
ECHO RA VOLUME AREA LENGTH APICAL 4 CHAMBER: 68 ML
ECHO RA VOLUME: 62 ML
ECHO RIGHT VENTRICULAR SYSTOLIC PRESSURE (RVSP): 42 MMHG
ECHO RV BASAL DIMENSION: 4.4 CM
ECHO RV FREE WALL PEAK S': 5.3 CM/S
ECHO RV MID DIMENSION: 2.9 CM
ECHO RV TAPSE: 0.7 CM (ref 1.7–?)
ECHO RVOT PEAK GRADIENT: 0 MMHG
ECHO RVOT PEAK VELOCITY: 0.2 M/S
ECHO TV REGURGITANT MAX VELOCITY: 2.58 M/S
ECHO TV REGURGITANT PEAK GRADIENT: 27 MMHG
EKG DIAGNOSIS: NORMAL
EKG DIAGNOSIS: NORMAL
EKG Q-T INTERVAL: 314 MS
EKG Q-T INTERVAL: 322 MS
EKG QRS DURATION: 86 MS
EKG QRS DURATION: 92 MS
EKG QTC CALCULATION (BAZETT): 480 MS
EKG QTC CALCULATION (BAZETT): 496 MS
EKG R AXIS: 113 DEGREES
EKG R AXIS: 114 DEGREES
EKG T AXIS: 0 DEGREES
EKG T AXIS: 14 DEGREES
EKG VENTRICULAR RATE: 141 BPM
EKG VENTRICULAR RATE: 143 BPM
GAS FLOW.O2 O2 DELIVERY SYS: ABNORMAL
GAS FLOW.O2 SETTING OXYMISER: 20 BPM
GLUCOSE SERPL-MCNC: 156 MG/DL (ref 74–99)
HCO3 BLD-SCNC: 14.6 MMOL/L (ref 21–28)
HDLC SERPL-MCNC: 46 MG/DL (ref 40–60)
HDLC SERPL: 3.3 (ref 0–5)
LDLC SERPL CALC-MCNC: 96.8 MG/DL (ref 0–100)
LIPID PANEL: NORMAL
MAGNESIUM SERPL-MCNC: 1.8 MG/DL (ref 1.6–2.6)
PCO2 BLD: 19.7 MMHG (ref 35–48)
PH BLD: 7.48 (ref 7.35–7.45)
PO2 BLD: 177 MMHG (ref 83–108)
POTASSIUM SERPL-SCNC: 4.4 MMOL/L (ref 3.5–5.5)
PROCALCITONIN SERPL-MCNC: 0.3 NG/ML
RESPIRATORY RATE, POC: 20 (ref 5–40)
SAO2 % BLD: 99.7 % (ref 92–97)
SERVICE CMNT-IMP: ABNORMAL
SODIUM SERPL-SCNC: 130 MMOL/L (ref 136–145)
SPECIMEN TYPE: ABNORMAL
TRIGL SERPL-MCNC: 56 MG/DL
VLDLC SERPL CALC-MCNC: 11.2 MG/DL

## 2024-10-28 PROCEDURE — 93010 ELECTROCARDIOGRAM REPORT: CPT | Performed by: INTERNAL MEDICINE

## 2024-10-28 PROCEDURE — 83735 ASSAY OF MAGNESIUM: CPT

## 2024-10-28 PROCEDURE — 99222 1ST HOSP IP/OBS MODERATE 55: CPT | Performed by: INTERNAL MEDICINE

## 2024-10-28 PROCEDURE — 6370000000 HC RX 637 (ALT 250 FOR IP)

## 2024-10-28 PROCEDURE — 36600 WITHDRAWAL OF ARTERIAL BLOOD: CPT

## 2024-10-28 PROCEDURE — 2140000001 HC CVICU INTERMEDIATE R&B

## 2024-10-28 PROCEDURE — 80048 BASIC METABOLIC PNL TOTAL CA: CPT

## 2024-10-28 PROCEDURE — 80061 LIPID PANEL: CPT

## 2024-10-28 PROCEDURE — 84145 PROCALCITONIN (PCT): CPT

## 2024-10-28 PROCEDURE — C8929 TTE W OR WO FOL WCON,DOPPLER: HCPCS

## 2024-10-28 PROCEDURE — 97535 SELF CARE MNGMENT TRAINING: CPT

## 2024-10-28 PROCEDURE — 2580000003 HC RX 258

## 2024-10-28 PROCEDURE — 2700000000 HC OXYGEN THERAPY PER DAY

## 2024-10-28 PROCEDURE — 2500000003 HC RX 250 WO HCPCS

## 2024-10-28 PROCEDURE — 94761 N-INVAS EAR/PLS OXIMETRY MLT: CPT

## 2024-10-28 PROCEDURE — 93306 TTE W/DOPPLER COMPLETE: CPT | Performed by: INTERNAL MEDICINE

## 2024-10-28 PROCEDURE — 6360000002 HC RX W HCPCS

## 2024-10-28 PROCEDURE — 6360000004 HC RX CONTRAST MEDICATION: Performed by: FAMILY MEDICINE

## 2024-10-28 PROCEDURE — 82803 BLOOD GASES ANY COMBINATION: CPT

## 2024-10-28 PROCEDURE — 97166 OT EVAL MOD COMPLEX 45 MIN: CPT

## 2024-10-28 RX ORDER — POLYETHYLENE GLYCOL 3350 17 G/17G
17 POWDER, FOR SOLUTION ORAL DAILY
Status: DISCONTINUED | OUTPATIENT
Start: 2024-10-29 | End: 2024-10-29

## 2024-10-28 RX ORDER — ACETAMINOPHEN 500 MG
1000 TABLET ORAL ONCE
Status: COMPLETED | OUTPATIENT
Start: 2024-10-28 | End: 2024-10-28

## 2024-10-28 RX ORDER — DOCUSATE SODIUM 100 MG/1
100 CAPSULE, LIQUID FILLED ORAL 2 TIMES DAILY PRN
Status: DISCONTINUED | OUTPATIENT
Start: 2024-10-28 | End: 2024-11-04 | Stop reason: HOSPADM

## 2024-10-28 RX ADMIN — SODIUM CHLORIDE, PRESERVATIVE FREE 10 ML: 5 INJECTION INTRAVENOUS at 20:48

## 2024-10-28 RX ADMIN — HEPARIN SODIUM 5000 UNITS: 5000 INJECTION INTRAVENOUS; SUBCUTANEOUS at 21:32

## 2024-10-28 RX ADMIN — ROSUVASTATIN CALCIUM 20 MG: 20 TABLET, FILM COATED ORAL at 21:32

## 2024-10-28 RX ADMIN — SODIUM CHLORIDE, PRESERVATIVE FREE 10 ML: 5 INJECTION INTRAVENOUS at 08:18

## 2024-10-28 RX ADMIN — PERFLUTREN 2 ML: 6.52 INJECTION, SUSPENSION INTRAVENOUS at 13:48

## 2024-10-28 RX ADMIN — DOCUSATE SODIUM 100 MG: 100 CAPSULE, LIQUID FILLED ORAL at 21:41

## 2024-10-28 RX ADMIN — Medication 600 MG: at 10:40

## 2024-10-28 RX ADMIN — POTASSIUM CHLORIDE 40 MEQ: 1500 TABLET, EXTENDED RELEASE ORAL at 08:17

## 2024-10-28 RX ADMIN — ONDANSETRON 4 MG: 2 INJECTION INTRAMUSCULAR; INTRAVENOUS at 21:32

## 2024-10-28 RX ADMIN — PANTOPRAZOLE SODIUM 20 MG: 20 TABLET, DELAYED RELEASE ORAL at 06:16

## 2024-10-28 RX ADMIN — HEPARIN SODIUM 5000 UNITS: 5000 INJECTION INTRAVENOUS; SUBCUTANEOUS at 06:16

## 2024-10-28 RX ADMIN — ACETAMINOPHEN 1000 MG: 500 TABLET ORAL at 10:37

## 2024-10-28 RX ADMIN — ASPIRIN 81 MG CHEWABLE TABLET 81 MG: 81 TABLET CHEWABLE at 08:17

## 2024-10-28 RX ADMIN — HEPARIN SODIUM 5000 UNITS: 5000 INJECTION INTRAVENOUS; SUBCUTANEOUS at 14:31

## 2024-10-28 RX ADMIN — AMIODARONE HYDROCHLORIDE 1 MG/MIN: 1.8 INJECTION, SOLUTION INTRAVENOUS at 03:39

## 2024-10-28 RX ADMIN — PANTOPRAZOLE SODIUM 20 MG: 20 TABLET, DELAYED RELEASE ORAL at 17:18

## 2024-10-28 RX ADMIN — EMPAGLIFLOZIN 10 MG: 10 TABLET, FILM COATED ORAL at 08:17

## 2024-10-28 RX ADMIN — POLYETHYLENE GLYCOL 3350 17 G: 17 POWDER, FOR SOLUTION ORAL at 14:37

## 2024-10-28 RX ADMIN — ONDANSETRON 4 MG: 2 INJECTION INTRAMUSCULAR; INTRAVENOUS at 03:35

## 2024-10-28 ASSESSMENT — PAIN SCALES - GENERAL
PAINLEVEL_OUTOF10: 0
PAINLEVEL_OUTOF10: 6
PAINLEVEL_OUTOF10: 0
PAINLEVEL_OUTOF10: 0

## 2024-10-28 ASSESSMENT — PAIN DESCRIPTION - DESCRIPTORS: DESCRIPTORS: ACHING

## 2024-10-28 ASSESSMENT — PAIN DESCRIPTION - LOCATION: LOCATION: GENERALIZED

## 2024-10-28 NOTE — CARE COORDINATION
Attempted to see pt multiple times but pt having ECHO done at bedside.            Jesusita Crespo, BSN RN  Care Management
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
## 2024-10-29 LAB
25(OH)D3 SERPL-MCNC: 9.9 NG/ML (ref 30–100)
ANION GAP SERPL CALC-SCNC: 9 MMOL/L (ref 3–18)
BASOPHILS # BLD: 0.1 K/UL (ref 0–0.1)
BASOPHILS NFR BLD: 1 % (ref 0–2)
BUN SERPL-MCNC: 46 MG/DL (ref 7–18)
BUN/CREAT SERPL: 18 (ref 12–20)
CALCIUM SERPL-MCNC: 8 MG/DL (ref 8.5–10.1)
CHLORIDE SERPL-SCNC: 97 MMOL/L (ref 100–111)
CO2 SERPL-SCNC: 25 MMOL/L (ref 21–32)
CREAT SERPL-MCNC: 2.5 MG/DL (ref 0.6–1.3)
DIFFERENTIAL METHOD BLD: ABNORMAL
EOSINOPHIL # BLD: 0.1 K/UL (ref 0–0.4)
EOSINOPHIL NFR BLD: 1 % (ref 0–5)
ERYTHROCYTE [DISTWIDTH] IN BLOOD BY AUTOMATED COUNT: 14.1 % (ref 11.6–14.5)
GLUCOSE SERPL-MCNC: 109 MG/DL (ref 74–99)
HCT VFR BLD AUTO: 53.1 % (ref 36–48)
HGB BLD-MCNC: 17.3 G/DL (ref 13–16)
IMM GRANULOCYTES # BLD AUTO: 0 K/UL (ref 0–0.04)
IMM GRANULOCYTES NFR BLD AUTO: 0 % (ref 0–0.5)
LYMPHOCYTES # BLD: 1.3 K/UL (ref 0.9–3.6)
LYMPHOCYTES NFR BLD: 16 % (ref 21–52)
MAGNESIUM SERPL-MCNC: 1.7 MG/DL (ref 1.6–2.6)
MCH RBC QN AUTO: 29.9 PG (ref 24–34)
MCHC RBC AUTO-ENTMCNC: 32.6 G/DL (ref 31–37)
MCV RBC AUTO: 91.7 FL (ref 78–100)
MONOCYTES # BLD: 0.8 K/UL (ref 0.05–1.2)
MONOCYTES NFR BLD: 9 % (ref 3–10)
NEUTS SEG # BLD: 6.2 K/UL (ref 1.8–8)
NEUTS SEG NFR BLD: 73 % (ref 40–73)
NRBC # BLD: 0 K/UL (ref 0–0.01)
NRBC BLD-RTO: 0 PER 100 WBC
PLATELET # BLD AUTO: 171 K/UL (ref 135–420)
PMV BLD AUTO: 13.4 FL (ref 9.2–11.8)
POTASSIUM SERPL-SCNC: 3.7 MMOL/L (ref 3.5–5.5)
RBC # BLD AUTO: 5.79 M/UL (ref 4.35–5.65)
SODIUM SERPL-SCNC: 131 MMOL/L (ref 136–145)
WBC # BLD AUTO: 8.4 K/UL (ref 4.6–13.2)

## 2024-10-29 PROCEDURE — 6360000002 HC RX W HCPCS

## 2024-10-29 PROCEDURE — 2580000003 HC RX 258

## 2024-10-29 PROCEDURE — 82306 VITAMIN D 25 HYDROXY: CPT

## 2024-10-29 PROCEDURE — 80048 BASIC METABOLIC PNL TOTAL CA: CPT

## 2024-10-29 PROCEDURE — 2500000003 HC RX 250 WO HCPCS: Performed by: PHYSICIAN ASSISTANT

## 2024-10-29 PROCEDURE — 6370000000 HC RX 637 (ALT 250 FOR IP)

## 2024-10-29 PROCEDURE — 93005 ELECTROCARDIOGRAM TRACING: CPT | Performed by: PHYSICIAN ASSISTANT

## 2024-10-29 PROCEDURE — 36415 COLL VENOUS BLD VENIPUNCTURE: CPT

## 2024-10-29 PROCEDURE — 85025 COMPLETE CBC W/AUTO DIFF WBC: CPT

## 2024-10-29 PROCEDURE — 6370000000 HC RX 637 (ALT 250 FOR IP): Performed by: PHYSICIAN ASSISTANT

## 2024-10-29 PROCEDURE — 99232 SBSQ HOSP IP/OBS MODERATE 35: CPT | Performed by: INTERNAL MEDICINE

## 2024-10-29 PROCEDURE — 94761 N-INVAS EAR/PLS OXIMETRY MLT: CPT

## 2024-10-29 PROCEDURE — 83735 ASSAY OF MAGNESIUM: CPT

## 2024-10-29 PROCEDURE — 2140000001 HC CVICU INTERMEDIATE R&B

## 2024-10-29 PROCEDURE — 97530 THERAPEUTIC ACTIVITIES: CPT

## 2024-10-29 PROCEDURE — 97162 PT EVAL MOD COMPLEX 30 MIN: CPT

## 2024-10-29 RX ORDER — ACETAMINOPHEN 325 MG/1
650 TABLET ORAL EVERY 4 HOURS
Status: DISCONTINUED | OUTPATIENT
Start: 2024-10-29 | End: 2024-10-29

## 2024-10-29 RX ORDER — LIDOCAINE 4 G/G
1 PATCH TOPICAL DAILY
Status: DISCONTINUED | OUTPATIENT
Start: 2024-10-29 | End: 2024-11-04 | Stop reason: HOSPADM

## 2024-10-29 RX ORDER — AMIODARONE HYDROCHLORIDE 200 MG/1
200 TABLET ORAL 2 TIMES DAILY
Status: DISCONTINUED | OUTPATIENT
Start: 2024-10-29 | End: 2024-11-04 | Stop reason: HOSPADM

## 2024-10-29 RX ORDER — AMIODARONE HYDROCHLORIDE 200 MG/1
200 TABLET ORAL DAILY
Status: DISCONTINUED | OUTPATIENT
Start: 2024-11-05 | End: 2024-11-04 | Stop reason: HOSPADM

## 2024-10-29 RX ORDER — ACETAMINOPHEN 325 MG/1
325 TABLET ORAL EVERY 4 HOURS
Status: DISCONTINUED | OUTPATIENT
Start: 2024-10-29 | End: 2024-11-01

## 2024-10-29 RX ORDER — POLYETHYLENE GLYCOL 3350 17 G/17G
17 POWDER, FOR SOLUTION ORAL 2 TIMES DAILY
Status: DISCONTINUED | OUTPATIENT
Start: 2024-10-29 | End: 2024-11-04 | Stop reason: HOSPADM

## 2024-10-29 RX ORDER — ERGOCALCIFEROL 1.25 MG/1
50000 CAPSULE, LIQUID FILLED ORAL WEEKLY
Status: DISCONTINUED | OUTPATIENT
Start: 2024-10-29 | End: 2024-11-04 | Stop reason: HOSPADM

## 2024-10-29 RX ADMIN — SODIUM CHLORIDE, PRESERVATIVE FREE 10 ML: 5 INJECTION INTRAVENOUS at 21:07

## 2024-10-29 RX ADMIN — AMIODARONE HYDROCHLORIDE 200 MG: 200 TABLET ORAL at 14:10

## 2024-10-29 RX ADMIN — EMPAGLIFLOZIN 10 MG: 10 TABLET, FILM COATED ORAL at 08:00

## 2024-10-29 RX ADMIN — ACETAMINOPHEN 325MG 325 MG: 325 TABLET ORAL at 20:53

## 2024-10-29 RX ADMIN — Medication 600 MG: at 08:00

## 2024-10-29 RX ADMIN — HEPARIN SODIUM 5000 UNITS: 5000 INJECTION INTRAVENOUS; SUBCUTANEOUS at 06:01

## 2024-10-29 RX ADMIN — ROSUVASTATIN CALCIUM 20 MG: 20 TABLET, FILM COATED ORAL at 20:54

## 2024-10-29 RX ADMIN — ACETAMINOPHEN 325MG 325 MG: 325 TABLET ORAL at 14:10

## 2024-10-29 RX ADMIN — HEPARIN SODIUM 5000 UNITS: 5000 INJECTION INTRAVENOUS; SUBCUTANEOUS at 14:11

## 2024-10-29 RX ADMIN — AMIODARONE HYDROCHLORIDE 200 MG: 200 TABLET ORAL at 20:53

## 2024-10-29 RX ADMIN — POLYETHYLENE GLYCOL 3350 17 G: 17 POWDER, FOR SOLUTION ORAL at 20:52

## 2024-10-29 RX ADMIN — ERGOCALCIFEROL 50000 UNITS: 1.25 CAPSULE ORAL at 14:10

## 2024-10-29 RX ADMIN — SODIUM CHLORIDE, PRESERVATIVE FREE 10 ML: 5 INJECTION INTRAVENOUS at 08:14

## 2024-10-29 RX ADMIN — AMIODARONE HYDROCHLORIDE 0.5 MG/MIN: 1.8 INJECTION, SOLUTION INTRAVENOUS at 01:32

## 2024-10-29 RX ADMIN — PANTOPRAZOLE SODIUM 20 MG: 20 TABLET, DELAYED RELEASE ORAL at 16:16

## 2024-10-29 RX ADMIN — PANTOPRAZOLE SODIUM 20 MG: 20 TABLET, DELAYED RELEASE ORAL at 06:04

## 2024-10-29 RX ADMIN — POLYETHYLENE GLYCOL 3350 17 G: 17 POWDER, FOR SOLUTION ORAL at 07:59

## 2024-10-29 RX ADMIN — ASPIRIN 81 MG CHEWABLE TABLET 81 MG: 81 TABLET CHEWABLE at 08:14

## 2024-10-29 RX ADMIN — ACETAMINOPHEN 325MG 650 MG: 325 TABLET ORAL at 08:05

## 2024-10-29 RX ADMIN — AMIODARONE HYDROCHLORIDE 0.5 MG/MIN: 1.8 INJECTION, SOLUTION INTRAVENOUS at 13:44

## 2024-10-29 RX ADMIN — BUMETANIDE 0.2 MG/HR: 0.25 INJECTION INTRAMUSCULAR; INTRAVENOUS at 01:28

## 2024-10-29 RX ADMIN — ACETAMINOPHEN 325MG 325 MG: 325 TABLET ORAL at 17:49

## 2024-10-29 RX ADMIN — HEPARIN SODIUM 5000 UNITS: 5000 INJECTION INTRAVENOUS; SUBCUTANEOUS at 21:53

## 2024-10-29 ASSESSMENT — PAIN DESCRIPTION - DESCRIPTORS
DESCRIPTORS: SHARP
DESCRIPTORS: ACHING
DESCRIPTORS: ACHING

## 2024-10-29 ASSESSMENT — PAIN SCALES - GENERAL
PAINLEVEL_OUTOF10: 5
PAINLEVEL_OUTOF10: 6
PAINLEVEL_OUTOF10: 0
PAINLEVEL_OUTOF10: 0
PAINLEVEL_OUTOF10: 9
PAINLEVEL_OUTOF10: 2

## 2024-10-29 ASSESSMENT — PAIN DESCRIPTION - ORIENTATION
ORIENTATION: LEFT

## 2024-10-29 ASSESSMENT — PAIN DESCRIPTION - LOCATION
LOCATION: ARM

## 2024-10-29 NOTE — H&P
Loaded in [facility] and referrals send for HH.    Tamra MOREAUN RN  Case Management  [phone]  
  Acute kidney injury, stage 1  CKD Stage III  -Cr 1.94 (1.5-1.6 in May 2024), BUN 40, Bun/Cr ratio of 21  -likely secondary to A-fib with RVR, consider UA, urine sodium, urine osmolality, urine creatinine if worsening  Plan:  -avoid nephrotoxic agents  -strict I&Os  -daily labs     Elevated liver enzymes  Epigastric abdominal tenderness  -, ALT 61. .  -reports RUQ pain, some nausea, feeling better right now  Plan:  -limit hepatotoxic agents  -consider repeat CMP in 2 days     Hypocalcemia  Borderline hyponatremia  -Calcium 8.0 at admission, previous admissions calcium in low 7s  -calcium corrects to 8.5 this admission  -magnesium 1.7  -sodium 135  Plan:  -Daily labs     Hyperlipidemia  -on home Crestor 20 mg  Plan:  -continue home meds    Global:  Code: Full  IVF/Drips: Amiodarone  I/O / Wt: STRICT  Diet: Low sodium  Bowel Regimen, Last BM: miralax  DVT/AC: SQH  Mobility: per protocol   Disposition: home  Anticipated LOS: 3 midnights     Point of Contact (relationship, number): Ivon (sister), [phone]    SUBJECTIVE:  History of Present Illness:  Moiz May is a 64 y.o.  male with PMHx of HFrEF (EF 19%), CAD s/p PCI February 2022, ischemic cardiomyopathy, HTN, HLD, and CKDIII presents to [facility] ER on 10/27/2024 with worsening shortness of breath and extremity swelling & numbness for the past week. Reports running out of medications 1 week ago, and brought in multiple pill bottles all filled in April of this year. He developed headaches starting 3 days ago. Has some chest pain during inspiration with no radiation for the past 2 days. Notes some dizziness that feels like room is spinning and notes vision fading during exertion. Endorses fatigue / general malaise. He has epigastric tenderness. Denies testicular swelling, hearing changes, diet changes, bowel changes, urinary changes. Patient denies alcohol, tobacco, and recreational drug use.    ED Course:  -Afebrile, Elevated BP into

## 2024-10-29 NOTE — CARE COORDINATION
10/29/24 1016   Service Assessment   Patient Orientation Alert and Oriented   Cognition Alert   History Provided By Patient   Primary Caregiver Self   Support Systems Family Members;Friends/Neighbors   Patient's Healthcare Decision Maker is: Legal Next of Kin   PCP Verified by CM Yes   Last Visit to PCP Within last 3 months   Prior Functional Level Assistance with the following:;Housework;Shopping   Current Functional Level Assistance with the following:;Cooking;Housework;Shopping   Can patient return to prior living arrangement Yes   Family able to assist with home care needs: Yes   Would you like for me to discuss the discharge plan with any other family members/significant others, and if so, who? Yes  (Sister Ivon)   Financial Resources Medicaid   Community Resources None   CM/SW Referral Other (see comment)   Social/Functional History   Lives With Alone   Type of Home House   Home Layout One level   Home Access Stairs to enter with rails   Entrance Stairs - Number of Steps 3   Entrance Stairs - Rails Both   Bathroom Shower/Tub Tub/Shower unit   Bathroom Toilet Handicap height   Bathroom Equipment None   Bathroom Accessibility Accessible   Home Equipment None   Receives Help From Family;Friend(s)   ADL Assistance Independent   Homemaking Assistance Independent   Homemaking Responsibilities Yes   Ambulation Assistance Independent   Transfer Assistance Independent   Active  Yes   Mode of Transportation Car   Discharge Planning   Type of Residence House   Living Arrangements Alone   Current Services Prior To Admission None   Potential Assistance Needed Home Care   DME Ordered? No   Potential Assistance Purchasing Medications No   Type of Home Care Services None   Patient expects to be discharged to: House   One/Two Story Residence One story   History of falls? 0   Services At/After Discharge   Transition of Care Consult (CM Consult) Discharge Planning   Services At/After Discharge None    Resource

## 2024-10-29 NOTE — DISCHARGE SUMMARY
Procedures/Testing:  MODALITY RESULTS   EKG Encounter Date: 10/27/24   EKG 12 Lead   Result Value    Ventricular Rate 81    Atrial Rate 81    P-R Interval 166    QRS Duration 102    Q-T Interval 412    QTc Calculation (Bazett) 478    P Axis 107    R Axis 103    T Axis 57    Diagnosis      Sinus rhythm with occasional premature ventricular complexes  Rightward axis  Low voltage QRS  Cannot rule out Anterior infarct (cited on or before 30-APR-2024)  Abnormal ECG  Confirmed by Stephon Holman MD ([id number]) on 10/30/2024 9:30:39 PM        ECHO 10/27/24    ECHO (TTE) COMPLETE (PRN CONTRAST/BUBBLE/STRAIN/3D) 10/28/2024  6:47 PM (Final)    Interpretation Summary    Image quality is good. Contrast used: Definity.    Left Ventricle: Severely reduced left ventricular systolic function with a visually estimated EF of 5 - 10%. EF by 2D Simpsons Biplane is 10%. Left ventricle is dilated. Normal wall thickness. Severe global hypokinesis present. Indeterminate diastolic function due to E-A fusion.    Right Ventricle: Right ventricle is mildly dilated. Lead present in the right ventricle. Reduced systolic function. TAPSE is abnormal. TAPSE is 0.7 cm. RV Peak S' is 5.3 cm/s. TDI systolic excursion is abnormal.    Right Atrium: Lead present in the right atrium. Right atrium is mildly dilated.    Tricuspid Valve: Moderate regurgitation. Reversed hepatic vein systolic flow. Mildly elevated RVSP, consistent with mild pulmonary hypertension. The estimated RVSP is 42 mmHg.    Aorta: Normal sized aortic root. Dilated ascending aorta. Ao ascending diameter is 3.8 cm. Ao Ascending Index is 1.98 cm/m2.    Signed by: Stephon Holman MD on 10/28/2024  6:47 PM            Special Testing/Procedures:  MODALITY RESULTS           UA Lab Results   Component Value Date/Time    APPEARANCE CLEAR 04/30/2024 01:16 AM    COLORU DARK YELLOW 04/30/2024 01:16 AM    NITRU Negative 04/30/2024 01:16 AM    GLUCOSEU Negative 04/30/2024 01:16 AM    KETUA TRACE 04/30/2024

## 2024-10-30 LAB
ANION GAP SERPL CALC-SCNC: 9 MMOL/L (ref 3–18)
BASOPHILS # BLD: 0 K/UL (ref 0–0.1)
BASOPHILS NFR BLD: 0 % (ref 0–2)
BUN SERPL-MCNC: 42 MG/DL (ref 7–18)
BUN/CREAT SERPL: 18 (ref 12–20)
CALCIUM SERPL-MCNC: 7.4 MG/DL (ref 8.5–10.1)
CHLORIDE SERPL-SCNC: 100 MMOL/L (ref 100–111)
CO2 SERPL-SCNC: 25 MMOL/L (ref 21–32)
CREAT SERPL-MCNC: 2.36 MG/DL (ref 0.6–1.3)
DIFFERENTIAL METHOD BLD: ABNORMAL
EKG ATRIAL RATE: 81 BPM
EKG DIAGNOSIS: NORMAL
EKG P AXIS: 107 DEGREES
EKG P-R INTERVAL: 166 MS
EKG Q-T INTERVAL: 412 MS
EKG QRS DURATION: 102 MS
EKG QTC CALCULATION (BAZETT): 478 MS
EKG R AXIS: 103 DEGREES
EKG T AXIS: 57 DEGREES
EKG VENTRICULAR RATE: 81 BPM
EOSINOPHIL # BLD: 0.1 K/UL (ref 0–0.4)
EOSINOPHIL NFR BLD: 1 % (ref 0–5)
ERYTHROCYTE [DISTWIDTH] IN BLOOD BY AUTOMATED COUNT: 13.9 % (ref 11.6–14.5)
GLUCOSE SERPL-MCNC: 103 MG/DL (ref 74–99)
HCT VFR BLD AUTO: 44.1 % (ref 36–48)
HGB BLD-MCNC: 14.7 G/DL (ref 13–16)
IMM GRANULOCYTES # BLD AUTO: 0 K/UL (ref 0–0.04)
IMM GRANULOCYTES NFR BLD AUTO: 0 % (ref 0–0.5)
LYMPHOCYTES # BLD: 1.2 K/UL (ref 0.9–3.6)
LYMPHOCYTES NFR BLD: 11 % (ref 21–52)
MAGNESIUM SERPL-MCNC: 1.7 MG/DL (ref 1.6–2.6)
MCH RBC QN AUTO: 30.4 PG (ref 24–34)
MCHC RBC AUTO-ENTMCNC: 33.3 G/DL (ref 31–37)
MCV RBC AUTO: 91.1 FL (ref 78–100)
MONOCYTES # BLD: 0.8 K/UL (ref 0.05–1.2)
MONOCYTES NFR BLD: 7 % (ref 3–10)
NEUTS SEG # BLD: 9.2 K/UL (ref 1.8–8)
NEUTS SEG NFR BLD: 81 % (ref 40–73)
NRBC # BLD: 0 K/UL (ref 0–0.01)
NRBC BLD-RTO: 0 PER 100 WBC
PLATELET # BLD AUTO: 148 K/UL (ref 135–420)
PMV BLD AUTO: 13.7 FL (ref 9.2–11.8)
POTASSIUM SERPL-SCNC: 3.6 MMOL/L (ref 3.5–5.5)
RBC # BLD AUTO: 4.84 M/UL (ref 4.35–5.65)
SODIUM SERPL-SCNC: 134 MMOL/L (ref 136–145)
WBC # BLD AUTO: 11.4 K/UL (ref 4.6–13.2)

## 2024-10-30 PROCEDURE — 6370000000 HC RX 637 (ALT 250 FOR IP)

## 2024-10-30 PROCEDURE — 6360000002 HC RX W HCPCS

## 2024-10-30 PROCEDURE — 2700000000 HC OXYGEN THERAPY PER DAY

## 2024-10-30 PROCEDURE — 2580000003 HC RX 258

## 2024-10-30 PROCEDURE — 94761 N-INVAS EAR/PLS OXIMETRY MLT: CPT

## 2024-10-30 PROCEDURE — 99232 SBSQ HOSP IP/OBS MODERATE 35: CPT | Performed by: INTERNAL MEDICINE

## 2024-10-30 PROCEDURE — 83735 ASSAY OF MAGNESIUM: CPT

## 2024-10-30 PROCEDURE — 85025 COMPLETE CBC W/AUTO DIFF WBC: CPT

## 2024-10-30 PROCEDURE — 2140000001 HC CVICU INTERMEDIATE R&B

## 2024-10-30 PROCEDURE — 93010 ELECTROCARDIOGRAM REPORT: CPT | Performed by: INTERNAL MEDICINE

## 2024-10-30 PROCEDURE — 80048 BASIC METABOLIC PNL TOTAL CA: CPT

## 2024-10-30 PROCEDURE — 36415 COLL VENOUS BLD VENIPUNCTURE: CPT

## 2024-10-30 PROCEDURE — 6370000000 HC RX 637 (ALT 250 FOR IP): Performed by: PHYSICIAN ASSISTANT

## 2024-10-30 RX ORDER — CARVEDILOL 3.12 MG/1
3.12 TABLET ORAL 2 TIMES DAILY WITH MEALS
Status: DISCONTINUED | OUTPATIENT
Start: 2024-10-30 | End: 2024-11-02

## 2024-10-30 RX ADMIN — ACETAMINOPHEN 325MG 325 MG: 325 TABLET ORAL at 02:28

## 2024-10-30 RX ADMIN — Medication 600 MG: at 09:32

## 2024-10-30 RX ADMIN — APIXABAN 5 MG: 5 TABLET, FILM COATED ORAL at 21:04

## 2024-10-30 RX ADMIN — ACETAMINOPHEN 325MG 325 MG: 325 TABLET ORAL at 09:38

## 2024-10-30 RX ADMIN — PANTOPRAZOLE SODIUM 20 MG: 20 TABLET, DELAYED RELEASE ORAL at 06:07

## 2024-10-30 RX ADMIN — ASPIRIN 81 MG CHEWABLE TABLET 81 MG: 81 TABLET CHEWABLE at 09:32

## 2024-10-30 RX ADMIN — ACETAMINOPHEN 325MG 325 MG: 325 TABLET ORAL at 21:03

## 2024-10-30 RX ADMIN — CARVEDILOL 3.12 MG: 3.12 TABLET, FILM COATED ORAL at 09:34

## 2024-10-30 RX ADMIN — SODIUM CHLORIDE, PRESERVATIVE FREE 10 ML: 5 INJECTION INTRAVENOUS at 21:04

## 2024-10-30 RX ADMIN — AMIODARONE HYDROCHLORIDE 200 MG: 200 TABLET ORAL at 09:34

## 2024-10-30 RX ADMIN — PANTOPRAZOLE SODIUM 20 MG: 20 TABLET, DELAYED RELEASE ORAL at 18:44

## 2024-10-30 RX ADMIN — APIXABAN 5 MG: 5 TABLET, FILM COATED ORAL at 13:06

## 2024-10-30 RX ADMIN — AMIODARONE HYDROCHLORIDE 200 MG: 200 TABLET ORAL at 21:03

## 2024-10-30 RX ADMIN — ROSUVASTATIN CALCIUM 20 MG: 20 TABLET, FILM COATED ORAL at 21:02

## 2024-10-30 RX ADMIN — CARVEDILOL 3.12 MG: 3.12 TABLET, FILM COATED ORAL at 18:44

## 2024-10-30 RX ADMIN — EMPAGLIFLOZIN 10 MG: 10 TABLET, FILM COATED ORAL at 09:32

## 2024-10-30 RX ADMIN — HEPARIN SODIUM 5000 UNITS: 5000 INJECTION INTRAVENOUS; SUBCUTANEOUS at 06:11

## 2024-10-30 RX ADMIN — SODIUM CHLORIDE, PRESERVATIVE FREE 10 ML: 5 INJECTION INTRAVENOUS at 09:32

## 2024-10-30 RX ADMIN — ACETAMINOPHEN 325MG 325 MG: 325 TABLET ORAL at 18:44

## 2024-10-30 ASSESSMENT — PAIN SCALES - GENERAL
PAINLEVEL_OUTOF10: 0
PAINLEVEL_OUTOF10: 2
PAINLEVEL_OUTOF10: 0

## 2024-10-30 ASSESSMENT — PAIN SCALES - WONG BAKER: WONGBAKER_NUMERICALRESPONSE: NO HURT

## 2024-10-30 ASSESSMENT — PAIN DESCRIPTION - PAIN TYPE: TYPE: ACUTE PAIN

## 2024-10-30 ASSESSMENT — PAIN DESCRIPTION - ORIENTATION: ORIENTATION: LEFT

## 2024-10-30 ASSESSMENT — PAIN DESCRIPTION - DESCRIPTORS: DESCRIPTORS: ACHING;SORE

## 2024-10-30 ASSESSMENT — PAIN DESCRIPTION - LOCATION: LOCATION: ARM

## 2024-10-30 ASSESSMENT — PAIN DESCRIPTION - ONSET: ONSET: ON-GOING

## 2024-10-30 ASSESSMENT — PAIN - FUNCTIONAL ASSESSMENT: PAIN_FUNCTIONAL_ASSESSMENT: ACTIVITIES ARE NOT PREVENTED

## 2024-10-30 ASSESSMENT — PAIN DESCRIPTION - FREQUENCY: FREQUENCY: INTERMITTENT

## 2024-10-30 NOTE — CARE COORDINATION
Chart reviewed.  Met with pt to discuss transitional care.  Pt stated he does not want SNF.  He is ok with home with home health.            LIZZETH JarvisN RN  Care Management

## 2024-10-31 ENCOUNTER — APPOINTMENT (OUTPATIENT)
Facility: HOSPITAL | Age: 64
DRG: 308 | End: 2024-10-31
Payer: MEDICARE

## 2024-10-31 LAB
ANION GAP SERPL CALC-SCNC: 9 MMOL/L (ref 3–18)
BASOPHILS # BLD: 0 K/UL (ref 0–0.1)
BASOPHILS NFR BLD: 0 % (ref 0–2)
BUN SERPL-MCNC: 38 MG/DL (ref 7–18)
BUN/CREAT SERPL: 19 (ref 12–20)
CALCIUM SERPL-MCNC: 7.5 MG/DL (ref 8.5–10.1)
CHLORIDE SERPL-SCNC: 100 MMOL/L (ref 100–111)
CO2 SERPL-SCNC: 25 MMOL/L (ref 21–32)
CREAT SERPL-MCNC: 1.96 MG/DL (ref 0.6–1.3)
DIFFERENTIAL METHOD BLD: ABNORMAL
ECHO BSA: 1.9 M2
EOSINOPHIL # BLD: 0 K/UL (ref 0–0.4)
EOSINOPHIL NFR BLD: 0 % (ref 0–5)
ERYTHROCYTE [DISTWIDTH] IN BLOOD BY AUTOMATED COUNT: 14 % (ref 11.6–14.5)
GLUCOSE SERPL-MCNC: 114 MG/DL (ref 74–99)
HCT VFR BLD AUTO: 42.8 % (ref 36–48)
HGB BLD-MCNC: 14.2 G/DL (ref 13–16)
IMM GRANULOCYTES # BLD AUTO: 0 K/UL (ref 0–0.04)
IMM GRANULOCYTES NFR BLD AUTO: 0 % (ref 0–0.5)
LYMPHOCYTES # BLD: 0.9 K/UL (ref 0.9–3.6)
LYMPHOCYTES NFR BLD: 10 % (ref 21–52)
MAGNESIUM SERPL-MCNC: 1.7 MG/DL (ref 1.6–2.6)
MCH RBC QN AUTO: 30.6 PG (ref 24–34)
MCHC RBC AUTO-ENTMCNC: 33.2 G/DL (ref 31–37)
MCV RBC AUTO: 92.2 FL (ref 78–100)
MONOCYTES # BLD: 0.8 K/UL (ref 0.05–1.2)
MONOCYTES NFR BLD: 8 % (ref 3–10)
NEUTS SEG # BLD: 7.8 K/UL (ref 1.8–8)
NEUTS SEG NFR BLD: 81 % (ref 40–73)
NRBC # BLD: 0 K/UL (ref 0–0.01)
NRBC BLD-RTO: 0 PER 100 WBC
PLATELET # BLD AUTO: 143 K/UL (ref 135–420)
PMV BLD AUTO: 13.9 FL (ref 9.2–11.8)
POTASSIUM SERPL-SCNC: 3.6 MMOL/L (ref 3.5–5.5)
RBC # BLD AUTO: 4.64 M/UL (ref 4.35–5.65)
SODIUM SERPL-SCNC: 134 MMOL/L (ref 136–145)
WBC # BLD AUTO: 9.6 K/UL (ref 4.6–13.2)

## 2024-10-31 PROCEDURE — 83735 ASSAY OF MAGNESIUM: CPT

## 2024-10-31 PROCEDURE — 85025 COMPLETE CBC W/AUTO DIFF WBC: CPT

## 2024-10-31 PROCEDURE — 93971 EXTREMITY STUDY: CPT | Performed by: SURGERY

## 2024-10-31 PROCEDURE — 6370000000 HC RX 637 (ALT 250 FOR IP)

## 2024-10-31 PROCEDURE — 2140000001 HC CVICU INTERMEDIATE R&B

## 2024-10-31 PROCEDURE — 93971 EXTREMITY STUDY: CPT

## 2024-10-31 PROCEDURE — 2580000003 HC RX 258

## 2024-10-31 PROCEDURE — 36415 COLL VENOUS BLD VENIPUNCTURE: CPT

## 2024-10-31 PROCEDURE — 6370000000 HC RX 637 (ALT 250 FOR IP): Performed by: PHYSICIAN ASSISTANT

## 2024-10-31 PROCEDURE — 94761 N-INVAS EAR/PLS OXIMETRY MLT: CPT

## 2024-10-31 PROCEDURE — 80048 BASIC METABOLIC PNL TOTAL CA: CPT

## 2024-10-31 RX ORDER — GUAIFENESIN 600 MG/1
600 TABLET, EXTENDED RELEASE ORAL 2 TIMES DAILY
Status: DISCONTINUED | OUTPATIENT
Start: 2024-10-31 | End: 2024-11-04 | Stop reason: HOSPADM

## 2024-10-31 RX ORDER — MECOBALAMIN 5000 MCG
5 TABLET,DISINTEGRATING ORAL NIGHTLY
Status: DISCONTINUED | OUTPATIENT
Start: 2024-11-01 | End: 2024-11-01

## 2024-10-31 RX ADMIN — ACETAMINOPHEN 325MG 325 MG: 325 TABLET ORAL at 02:54

## 2024-10-31 RX ADMIN — PANTOPRAZOLE SODIUM 20 MG: 20 TABLET, DELAYED RELEASE ORAL at 15:15

## 2024-10-31 RX ADMIN — Medication 600 MG: at 09:00

## 2024-10-31 RX ADMIN — GUAIFENESIN 600 MG: 600 TABLET, EXTENDED RELEASE ORAL at 22:25

## 2024-10-31 RX ADMIN — EMPAGLIFLOZIN 10 MG: 10 TABLET, FILM COATED ORAL at 09:01

## 2024-10-31 RX ADMIN — ACETAMINOPHEN 325MG 325 MG: 325 TABLET ORAL at 09:00

## 2024-10-31 RX ADMIN — PANTOPRAZOLE SODIUM 20 MG: 20 TABLET, DELAYED RELEASE ORAL at 09:00

## 2024-10-31 RX ADMIN — Medication 5 MG: at 23:51

## 2024-10-31 RX ADMIN — APIXABAN 5 MG: 5 TABLET, FILM COATED ORAL at 09:01

## 2024-10-31 RX ADMIN — CARVEDILOL 3.12 MG: 3.12 TABLET, FILM COATED ORAL at 09:00

## 2024-10-31 RX ADMIN — AMIODARONE HYDROCHLORIDE 200 MG: 200 TABLET ORAL at 21:16

## 2024-10-31 RX ADMIN — ACETAMINOPHEN 325MG 325 MG: 325 TABLET ORAL at 15:15

## 2024-10-31 RX ADMIN — ACETAMINOPHEN 325MG 325 MG: 325 TABLET ORAL at 17:57

## 2024-10-31 RX ADMIN — FUROSEMIDE 40 MG: 40 TABLET ORAL at 09:00

## 2024-10-31 RX ADMIN — ACETAMINOPHEN 325MG 325 MG: 325 TABLET ORAL at 06:57

## 2024-10-31 RX ADMIN — AMIODARONE HYDROCHLORIDE 200 MG: 200 TABLET ORAL at 09:01

## 2024-10-31 RX ADMIN — ROSUVASTATIN CALCIUM 20 MG: 20 TABLET, FILM COATED ORAL at 21:16

## 2024-10-31 RX ADMIN — SODIUM CHLORIDE, PRESERVATIVE FREE 10 ML: 5 INJECTION INTRAVENOUS at 09:07

## 2024-10-31 RX ADMIN — ASPIRIN 81 MG CHEWABLE TABLET 81 MG: 81 TABLET CHEWABLE at 09:00

## 2024-10-31 RX ADMIN — ACETAMINOPHEN 325MG 325 MG: 325 TABLET ORAL at 22:25

## 2024-10-31 RX ADMIN — APIXABAN 10 MG: 5 TABLET, FILM COATED ORAL at 21:16

## 2024-10-31 RX ADMIN — CARVEDILOL 3.12 MG: 3.12 TABLET, FILM COATED ORAL at 17:57

## 2024-10-31 ASSESSMENT — PAIN DESCRIPTION - ORIENTATION
ORIENTATION: LEFT
ORIENTATION: LEFT

## 2024-10-31 ASSESSMENT — PAIN SCALES - GENERAL
PAINLEVEL_OUTOF10: 7
PAINLEVEL_OUTOF10: 8
PAINLEVEL_OUTOF10: 0
PAINLEVEL_OUTOF10: 0
PAINLEVEL_OUTOF10: 6
PAINLEVEL_OUTOF10: 7

## 2024-10-31 ASSESSMENT — PAIN DESCRIPTION - DESCRIPTORS
DESCRIPTORS: ACHING
DESCRIPTORS: SORE;SHARP

## 2024-10-31 ASSESSMENT — PAIN DESCRIPTION - LOCATION
LOCATION: ARM
LOCATION: ARM

## 2024-10-31 ASSESSMENT — PAIN DESCRIPTION - ONSET: ONSET: ON-GOING

## 2024-10-31 ASSESSMENT — PAIN - FUNCTIONAL ASSESSMENT: PAIN_FUNCTIONAL_ASSESSMENT: ACTIVITIES ARE NOT PREVENTED

## 2024-10-31 ASSESSMENT — PAIN DESCRIPTION - PAIN TYPE: TYPE: ACUTE PAIN

## 2024-10-31 ASSESSMENT — PAIN DESCRIPTION - FREQUENCY: FREQUENCY: INTERMITTENT

## 2024-10-31 NOTE — CARE COORDINATION
Pt's clinicals in [facility] to SNF and home health.            Jesusita Crespo, LIZZETHN RN  Care Management

## 2024-11-01 LAB
ANION GAP SERPL CALC-SCNC: 9 MMOL/L (ref 3–18)
BASOPHILS # BLD: 0 K/UL (ref 0–0.1)
BASOPHILS NFR BLD: 1 % (ref 0–2)
BUN SERPL-MCNC: 36 MG/DL (ref 7–18)
BUN/CREAT SERPL: 18 (ref 12–20)
CALCIUM SERPL-MCNC: 7.6 MG/DL (ref 8.5–10.1)
CHLORIDE SERPL-SCNC: 101 MMOL/L (ref 100–111)
CO2 SERPL-SCNC: 23 MMOL/L (ref 21–32)
CREAT SERPL-MCNC: 1.96 MG/DL (ref 0.6–1.3)
DIFFERENTIAL METHOD BLD: ABNORMAL
EOSINOPHIL # BLD: 0.1 K/UL (ref 0–0.4)
EOSINOPHIL NFR BLD: 1 % (ref 0–5)
ERYTHROCYTE [DISTWIDTH] IN BLOOD BY AUTOMATED COUNT: 13.8 % (ref 11.6–14.5)
GLUCOSE SERPL-MCNC: 101 MG/DL (ref 74–99)
HCT VFR BLD AUTO: 43.4 % (ref 36–48)
HGB BLD-MCNC: 14.3 G/DL (ref 13–16)
IMM GRANULOCYTES # BLD AUTO: 0 K/UL (ref 0–0.04)
IMM GRANULOCYTES NFR BLD AUTO: 0 % (ref 0–0.5)
LYMPHOCYTES # BLD: 1 K/UL (ref 0.9–3.6)
LYMPHOCYTES NFR BLD: 14 % (ref 21–52)
MAGNESIUM SERPL-MCNC: 1.8 MG/DL (ref 1.6–2.6)
MCH RBC QN AUTO: 30.7 PG (ref 24–34)
MCHC RBC AUTO-ENTMCNC: 32.9 G/DL (ref 31–37)
MCV RBC AUTO: 93.1 FL (ref 78–100)
MONOCYTES # BLD: 0.7 K/UL (ref 0.05–1.2)
MONOCYTES NFR BLD: 9 % (ref 3–10)
NEUTS SEG # BLD: 5.5 K/UL (ref 1.8–8)
NEUTS SEG NFR BLD: 75 % (ref 40–73)
NRBC # BLD: 0 K/UL (ref 0–0.01)
NRBC BLD-RTO: 0 PER 100 WBC
NT PRO BNP: 9270 PG/ML (ref 0–900)
PLATELET # BLD AUTO: 148 K/UL (ref 135–420)
PMV BLD AUTO: 14.5 FL (ref 9.2–11.8)
POTASSIUM SERPL-SCNC: 3.8 MMOL/L (ref 3.5–5.5)
RBC # BLD AUTO: 4.66 M/UL (ref 4.35–5.65)
SODIUM SERPL-SCNC: 133 MMOL/L (ref 136–145)
WBC # BLD AUTO: 7.4 K/UL (ref 4.6–13.2)

## 2024-11-01 PROCEDURE — 2140000001 HC CVICU INTERMEDIATE R&B

## 2024-11-01 PROCEDURE — 6370000000 HC RX 637 (ALT 250 FOR IP): Performed by: PHYSICIAN ASSISTANT

## 2024-11-01 PROCEDURE — 36415 COLL VENOUS BLD VENIPUNCTURE: CPT

## 2024-11-01 PROCEDURE — 6370000000 HC RX 637 (ALT 250 FOR IP)

## 2024-11-01 PROCEDURE — 2580000003 HC RX 258

## 2024-11-01 PROCEDURE — 99232 SBSQ HOSP IP/OBS MODERATE 35: CPT | Performed by: INTERNAL MEDICINE

## 2024-11-01 PROCEDURE — 85025 COMPLETE CBC W/AUTO DIFF WBC: CPT

## 2024-11-01 PROCEDURE — 83880 ASSAY OF NATRIURETIC PEPTIDE: CPT

## 2024-11-01 PROCEDURE — 80048 BASIC METABOLIC PNL TOTAL CA: CPT

## 2024-11-01 PROCEDURE — 2700000000 HC OXYGEN THERAPY PER DAY

## 2024-11-01 PROCEDURE — 83735 ASSAY OF MAGNESIUM: CPT

## 2024-11-01 PROCEDURE — 97530 THERAPEUTIC ACTIVITIES: CPT

## 2024-11-01 PROCEDURE — 6360000002 HC RX W HCPCS: Performed by: INTERNAL MEDICINE

## 2024-11-01 PROCEDURE — 94761 N-INVAS EAR/PLS OXIMETRY MLT: CPT

## 2024-11-01 RX ORDER — ACETAMINOPHEN 500 MG
500 TABLET ORAL EVERY 6 HOURS PRN
Status: DISCONTINUED | OUTPATIENT
Start: 2024-11-01 | End: 2024-11-04 | Stop reason: HOSPADM

## 2024-11-01 RX ORDER — FUROSEMIDE 40 MG/1
80 TABLET ORAL ONCE
Status: DISCONTINUED | OUTPATIENT
Start: 2024-11-01 | End: 2024-11-02

## 2024-11-01 RX ORDER — DIGOXIN 125 MCG
125 TABLET ORAL DAILY
Status: DISCONTINUED | OUTPATIENT
Start: 2024-11-02 | End: 2024-11-03

## 2024-11-01 RX ORDER — FUROSEMIDE 10 MG/ML
40 INJECTION INTRAMUSCULAR; INTRAVENOUS ONCE
Status: DISCONTINUED | OUTPATIENT
Start: 2024-11-01 | End: 2024-11-01 | Stop reason: DRUGHIGH

## 2024-11-01 RX ORDER — MECOBALAMIN 5000 MCG
5 TABLET,DISINTEGRATING ORAL NIGHTLY
Status: DISCONTINUED | OUTPATIENT
Start: 2024-11-01 | End: 2024-11-04 | Stop reason: HOSPADM

## 2024-11-01 RX ORDER — ACETAMINOPHEN 650 MG/1
650 SUPPOSITORY RECTAL EVERY 6 HOURS PRN
Status: DISCONTINUED | OUTPATIENT
Start: 2024-11-01 | End: 2024-11-04 | Stop reason: HOSPADM

## 2024-11-01 RX ADMIN — ACETAMINOPHEN 325MG 325 MG: 325 TABLET ORAL at 08:45

## 2024-11-01 RX ADMIN — Medication 5 MG: at 20:35

## 2024-11-01 RX ADMIN — ASPIRIN 81 MG CHEWABLE TABLET 81 MG: 81 TABLET CHEWABLE at 08:42

## 2024-11-01 RX ADMIN — AMIODARONE HYDROCHLORIDE 200 MG: 200 TABLET ORAL at 08:44

## 2024-11-01 RX ADMIN — APIXABAN 10 MG: 5 TABLET, FILM COATED ORAL at 20:35

## 2024-11-01 RX ADMIN — EMPAGLIFLOZIN 10 MG: 10 TABLET, FILM COATED ORAL at 08:42

## 2024-11-01 RX ADMIN — SODIUM CHLORIDE, PRESERVATIVE FREE 10 ML: 5 INJECTION INTRAVENOUS at 08:44

## 2024-11-01 RX ADMIN — Medication 600 MG: at 08:42

## 2024-11-01 RX ADMIN — FUROSEMIDE 40 MG: 40 TABLET ORAL at 08:44

## 2024-11-01 RX ADMIN — GUAIFENESIN 600 MG: 600 TABLET, EXTENDED RELEASE ORAL at 08:42

## 2024-11-01 RX ADMIN — PANTOPRAZOLE SODIUM 20 MG: 20 TABLET, DELAYED RELEASE ORAL at 05:21

## 2024-11-01 RX ADMIN — GUAIFENESIN 600 MG: 600 TABLET, EXTENDED RELEASE ORAL at 20:36

## 2024-11-01 RX ADMIN — ACETAMINOPHEN 325MG 325 MG: 325 TABLET ORAL at 04:09

## 2024-11-01 RX ADMIN — CARVEDILOL 3.12 MG: 3.12 TABLET, FILM COATED ORAL at 08:44

## 2024-11-01 RX ADMIN — ROSUVASTATIN CALCIUM 20 MG: 20 TABLET, FILM COATED ORAL at 20:35

## 2024-11-01 RX ADMIN — AMIODARONE HYDROCHLORIDE 200 MG: 200 TABLET ORAL at 20:36

## 2024-11-01 RX ADMIN — APIXABAN 10 MG: 5 TABLET, FILM COATED ORAL at 08:44

## 2024-11-01 ASSESSMENT — PAIN - FUNCTIONAL ASSESSMENT: PAIN_FUNCTIONAL_ASSESSMENT: PREVENTS OR INTERFERES SOME ACTIVE ACTIVITIES AND ADLS

## 2024-11-01 ASSESSMENT — PAIN DESCRIPTION - FREQUENCY: FREQUENCY: INTERMITTENT

## 2024-11-01 ASSESSMENT — PAIN DESCRIPTION - LOCATION: LOCATION: ARM

## 2024-11-01 ASSESSMENT — PAIN SCALES - GENERAL
PAINLEVEL_OUTOF10: 0
PAINLEVEL_OUTOF10: 0
PAINLEVEL_OUTOF10: 7
PAINLEVEL_OUTOF10: 0

## 2024-11-01 ASSESSMENT — PAIN DESCRIPTION - ORIENTATION: ORIENTATION: RIGHT;LEFT

## 2024-11-01 ASSESSMENT — PAIN DESCRIPTION - DESCRIPTORS: DESCRIPTORS: SORE

## 2024-11-01 ASSESSMENT — PAIN DESCRIPTION - PAIN TYPE: TYPE: ACUTE PAIN

## 2024-11-01 ASSESSMENT — PAIN DESCRIPTION - ONSET: ONSET: ON-GOING

## 2024-11-02 PROBLEM — I50.23 ACUTE ON CHRONIC CLINICAL SYSTOLIC HEART FAILURE (HCC): Status: ACTIVE | Noted: 2020-11-11

## 2024-11-02 PROBLEM — I63.9 ACUTE CVA (CEREBROVASCULAR ACCIDENT) (HCC): Status: ACTIVE | Noted: 2024-11-02

## 2024-11-02 LAB
ANION GAP SERPL CALC-SCNC: 9 MMOL/L (ref 3–18)
BASOPHILS # BLD: 0 K/UL (ref 0–0.1)
BASOPHILS NFR BLD: 1 % (ref 0–2)
BUN SERPL-MCNC: 35 MG/DL (ref 7–18)
BUN/CREAT SERPL: 19 (ref 12–20)
CALCIUM SERPL-MCNC: 7.5 MG/DL (ref 8.5–10.1)
CHLORIDE SERPL-SCNC: 100 MMOL/L (ref 100–111)
CO2 SERPL-SCNC: 22 MMOL/L (ref 21–32)
CREAT SERPL-MCNC: 1.86 MG/DL (ref 0.6–1.3)
DIFFERENTIAL METHOD BLD: ABNORMAL
EOSINOPHIL # BLD: 0.1 K/UL (ref 0–0.4)
EOSINOPHIL NFR BLD: 1 % (ref 0–5)
ERYTHROCYTE [DISTWIDTH] IN BLOOD BY AUTOMATED COUNT: 14.2 % (ref 11.6–14.5)
GLUCOSE SERPL-MCNC: 73 MG/DL (ref 74–99)
HCT VFR BLD AUTO: 45 % (ref 36–48)
HGB BLD-MCNC: 14.1 G/DL (ref 13–16)
IMM GRANULOCYTES # BLD AUTO: 0 K/UL (ref 0–0.04)
IMM GRANULOCYTES NFR BLD AUTO: 0 % (ref 0–0.5)
LYMPHOCYTES # BLD: 1.1 K/UL (ref 0.9–3.6)
LYMPHOCYTES NFR BLD: 16 % (ref 21–52)
MAGNESIUM SERPL-MCNC: 1.8 MG/DL (ref 1.6–2.6)
MCH RBC QN AUTO: 29.7 PG (ref 24–34)
MCHC RBC AUTO-ENTMCNC: 31.3 G/DL (ref 31–37)
MCV RBC AUTO: 94.7 FL (ref 78–100)
MONOCYTES # BLD: 0.8 K/UL (ref 0.05–1.2)
MONOCYTES NFR BLD: 11 % (ref 3–10)
NEUTS SEG # BLD: 4.9 K/UL (ref 1.8–8)
NEUTS SEG NFR BLD: 71 % (ref 40–73)
NRBC # BLD: 0 K/UL (ref 0–0.01)
NRBC BLD-RTO: 0 PER 100 WBC
PLATELET # BLD AUTO: 160 K/UL (ref 135–420)
PMV BLD AUTO: 13.5 FL (ref 9.2–11.8)
POTASSIUM SERPL-SCNC: 3.8 MMOL/L (ref 3.5–5.5)
RBC # BLD AUTO: 4.75 M/UL (ref 4.35–5.65)
SODIUM SERPL-SCNC: 131 MMOL/L (ref 136–145)
WBC # BLD AUTO: 6.9 K/UL (ref 4.6–13.2)

## 2024-11-02 PROCEDURE — 36415 COLL VENOUS BLD VENIPUNCTURE: CPT

## 2024-11-02 PROCEDURE — 94761 N-INVAS EAR/PLS OXIMETRY MLT: CPT

## 2024-11-02 PROCEDURE — 6370000000 HC RX 637 (ALT 250 FOR IP)

## 2024-11-02 PROCEDURE — 85025 COMPLETE CBC W/AUTO DIFF WBC: CPT

## 2024-11-02 PROCEDURE — 6370000000 HC RX 637 (ALT 250 FOR IP): Performed by: PHYSICIAN ASSISTANT

## 2024-11-02 PROCEDURE — 2140000001 HC CVICU INTERMEDIATE R&B

## 2024-11-02 PROCEDURE — 80048 BASIC METABOLIC PNL TOTAL CA: CPT

## 2024-11-02 PROCEDURE — 99232 SBSQ HOSP IP/OBS MODERATE 35: CPT | Performed by: INTERNAL MEDICINE

## 2024-11-02 PROCEDURE — 83735 ASSAY OF MAGNESIUM: CPT

## 2024-11-02 RX ORDER — LIDOCAINE 4 G/G
1 PATCH TOPICAL DAILY
Status: DISCONTINUED | OUTPATIENT
Start: 2024-11-02 | End: 2024-11-02

## 2024-11-02 RX ORDER — OXYCODONE HYDROCHLORIDE 5 MG/1
2.5 TABLET ORAL ONCE
Status: COMPLETED | OUTPATIENT
Start: 2024-11-02 | End: 2024-11-02

## 2024-11-02 RX ORDER — LIDOCAINE 4 G/G
1 PATCH TOPICAL DAILY
Status: DISCONTINUED | OUTPATIENT
Start: 2024-11-02 | End: 2024-11-04 | Stop reason: HOSPADM

## 2024-11-02 RX ORDER — CARVEDILOL 6.25 MG/1
6.25 TABLET ORAL 2 TIMES DAILY WITH MEALS
Status: DISCONTINUED | OUTPATIENT
Start: 2024-11-02 | End: 2024-11-04 | Stop reason: HOSPADM

## 2024-11-02 RX ORDER — OXYCODONE HYDROCHLORIDE 5 MG/1
2.5 TABLET ORAL
Status: COMPLETED | OUTPATIENT
Start: 2024-11-02 | End: 2024-11-02

## 2024-11-02 RX ADMIN — Medication 5 MG: at 21:33

## 2024-11-02 RX ADMIN — CARVEDILOL 6.25 MG: 6.25 TABLET, FILM COATED ORAL at 16:18

## 2024-11-02 RX ADMIN — APIXABAN 10 MG: 5 TABLET, FILM COATED ORAL at 21:33

## 2024-11-02 RX ADMIN — ACETAMINOPHEN 500 MG: 500 TABLET ORAL at 03:38

## 2024-11-02 RX ADMIN — GUAIFENESIN 600 MG: 600 TABLET, EXTENDED RELEASE ORAL at 21:33

## 2024-11-02 RX ADMIN — ASPIRIN 81 MG CHEWABLE TABLET 81 MG: 81 TABLET CHEWABLE at 08:47

## 2024-11-02 RX ADMIN — DICLOFENAC SODIUM 2 G: 10 GEL TOPICAL at 05:30

## 2024-11-02 RX ADMIN — SACUBITRIL AND VALSARTAN 1 TABLET: 24; 26 TABLET, FILM COATED ORAL at 21:33

## 2024-11-02 RX ADMIN — FUROSEMIDE 40 MG: 40 TABLET ORAL at 08:47

## 2024-11-02 RX ADMIN — APIXABAN 10 MG: 5 TABLET, FILM COATED ORAL at 08:48

## 2024-11-02 RX ADMIN — PANTOPRAZOLE SODIUM 20 MG: 20 TABLET, DELAYED RELEASE ORAL at 16:18

## 2024-11-02 RX ADMIN — AMIODARONE HYDROCHLORIDE 200 MG: 200 TABLET ORAL at 08:47

## 2024-11-02 RX ADMIN — DICLOFENAC SODIUM 2 G: 10 GEL TOPICAL at 21:37

## 2024-11-02 RX ADMIN — CARVEDILOL 3.12 MG: 3.12 TABLET, FILM COATED ORAL at 08:47

## 2024-11-02 RX ADMIN — EMPAGLIFLOZIN 10 MG: 10 TABLET, FILM COATED ORAL at 08:47

## 2024-11-02 RX ADMIN — OXYCODONE 2.5 MG: 5 TABLET ORAL at 08:47

## 2024-11-02 RX ADMIN — ROSUVASTATIN CALCIUM 20 MG: 20 TABLET, FILM COATED ORAL at 21:33

## 2024-11-02 RX ADMIN — SACUBITRIL AND VALSARTAN 1 TABLET: 24; 26 TABLET, FILM COATED ORAL at 08:49

## 2024-11-02 RX ADMIN — GUAIFENESIN 600 MG: 600 TABLET, EXTENDED RELEASE ORAL at 08:47

## 2024-11-02 RX ADMIN — OXYCODONE 2.5 MG: 5 TABLET ORAL at 16:18

## 2024-11-02 RX ADMIN — AMIODARONE HYDROCHLORIDE 200 MG: 200 TABLET ORAL at 21:33

## 2024-11-02 RX ADMIN — Medication 600 MG: at 08:47

## 2024-11-02 ASSESSMENT — PAIN - FUNCTIONAL ASSESSMENT
PAIN_FUNCTIONAL_ASSESSMENT: PREVENTS OR INTERFERES SOME ACTIVE ACTIVITIES AND ADLS

## 2024-11-02 ASSESSMENT — PAIN DESCRIPTION - DESCRIPTORS
DESCRIPTORS: ACHING
DESCRIPTORS: ACHING;SHARP;THROBBING
DESCRIPTORS: ACHING;THROBBING;SHARP
DESCRIPTORS: ACHING
DESCRIPTORS: ACHING;THROBBING;SHARP
DESCRIPTORS: ACHING
DESCRIPTORS: ACHING

## 2024-11-02 ASSESSMENT — PAIN DESCRIPTION - LOCATION
LOCATION: TOE (COMMENT WHICH ONE)
LOCATION: FOOT
LOCATION: TOE (COMMENT WHICH ONE)

## 2024-11-02 ASSESSMENT — PAIN DESCRIPTION - ORIENTATION
ORIENTATION: RIGHT;LEFT
ORIENTATION: LEFT;RIGHT
ORIENTATION: RIGHT;LEFT
ORIENTATION: RIGHT;LEFT

## 2024-11-02 ASSESSMENT — PAIN DESCRIPTION - PAIN TYPE
TYPE: ACUTE PAIN

## 2024-11-02 ASSESSMENT — PAIN SCALES - GENERAL
PAINLEVEL_OUTOF10: 7
PAINLEVEL_OUTOF10: 7
PAINLEVEL_OUTOF10: 6
PAINLEVEL_OUTOF10: 0
PAINLEVEL_OUTOF10: 10
PAINLEVEL_OUTOF10: 7
PAINLEVEL_OUTOF10: 10
PAINLEVEL_OUTOF10: 10
PAINLEVEL_OUTOF10: 6

## 2024-11-02 ASSESSMENT — PAIN SCALES - WONG BAKER
WONGBAKER_NUMERICALRESPONSE: NO HURT
WONGBAKER_NUMERICALRESPONSE: HURTS EVEN MORE
WONGBAKER_NUMERICALRESPONSE: NO HURT
WONGBAKER_NUMERICALRESPONSE: NO HURT

## 2024-11-02 ASSESSMENT — PAIN DESCRIPTION - FREQUENCY
FREQUENCY: CONTINUOUS

## 2024-11-02 ASSESSMENT — PAIN DESCRIPTION - ONSET
ONSET: ON-GOING

## 2024-11-03 LAB
ANION GAP SERPL CALC-SCNC: 5 MMOL/L (ref 3–18)
BASOPHILS # BLD: 0 K/UL (ref 0–0.1)
BASOPHILS NFR BLD: 0 % (ref 0–2)
BUN SERPL-MCNC: 37 MG/DL (ref 7–18)
BUN/CREAT SERPL: 21 (ref 12–20)
CALCIUM SERPL-MCNC: 7.4 MG/DL (ref 8.5–10.1)
CHLORIDE SERPL-SCNC: 103 MMOL/L (ref 100–111)
CO2 SERPL-SCNC: 26 MMOL/L (ref 21–32)
CREAT SERPL-MCNC: 1.76 MG/DL (ref 0.6–1.3)
DIFFERENTIAL METHOD BLD: ABNORMAL
EOSINOPHIL # BLD: 0.1 K/UL (ref 0–0.4)
EOSINOPHIL NFR BLD: 1 % (ref 0–5)
ERYTHROCYTE [DISTWIDTH] IN BLOOD BY AUTOMATED COUNT: 14 % (ref 11.6–14.5)
GLUCOSE SERPL-MCNC: 113 MG/DL (ref 74–99)
HCT VFR BLD AUTO: 39.4 % (ref 36–48)
HGB BLD-MCNC: 12.9 G/DL (ref 13–16)
IMM GRANULOCYTES # BLD AUTO: 0.1 K/UL (ref 0–0.04)
IMM GRANULOCYTES NFR BLD AUTO: 1 % (ref 0–0.5)
LYMPHOCYTES # BLD: 1 K/UL (ref 0.9–3.6)
LYMPHOCYTES NFR BLD: 14 % (ref 21–52)
MAGNESIUM SERPL-MCNC: 1.7 MG/DL (ref 1.6–2.6)
MCH RBC QN AUTO: 30.6 PG (ref 24–34)
MCHC RBC AUTO-ENTMCNC: 32.7 G/DL (ref 31–37)
MCV RBC AUTO: 93.6 FL (ref 78–100)
MONOCYTES # BLD: 0.7 K/UL (ref 0.05–1.2)
MONOCYTES NFR BLD: 10 % (ref 3–10)
NEUTS SEG # BLD: 5.2 K/UL (ref 1.8–8)
NEUTS SEG NFR BLD: 73 % (ref 40–73)
NRBC # BLD: 0 K/UL (ref 0–0.01)
NRBC BLD-RTO: 0 PER 100 WBC
PLATELET # BLD AUTO: 157 K/UL (ref 135–420)
PMV BLD AUTO: 13.3 FL (ref 9.2–11.8)
POTASSIUM SERPL-SCNC: 3.8 MMOL/L (ref 3.5–5.5)
RBC # BLD AUTO: 4.21 M/UL (ref 4.35–5.65)
SODIUM SERPL-SCNC: 134 MMOL/L (ref 136–145)
WBC # BLD AUTO: 7.2 K/UL (ref 4.6–13.2)

## 2024-11-03 PROCEDURE — 6370000000 HC RX 637 (ALT 250 FOR IP)

## 2024-11-03 PROCEDURE — 6370000000 HC RX 637 (ALT 250 FOR IP): Performed by: PHYSICIAN ASSISTANT

## 2024-11-03 PROCEDURE — 94761 N-INVAS EAR/PLS OXIMETRY MLT: CPT

## 2024-11-03 PROCEDURE — 99232 SBSQ HOSP IP/OBS MODERATE 35: CPT | Performed by: INTERNAL MEDICINE

## 2024-11-03 PROCEDURE — 83735 ASSAY OF MAGNESIUM: CPT

## 2024-11-03 PROCEDURE — 36415 COLL VENOUS BLD VENIPUNCTURE: CPT

## 2024-11-03 PROCEDURE — 85025 COMPLETE CBC W/AUTO DIFF WBC: CPT

## 2024-11-03 PROCEDURE — 2140000001 HC CVICU INTERMEDIATE R&B

## 2024-11-03 PROCEDURE — 80048 BASIC METABOLIC PNL TOTAL CA: CPT

## 2024-11-03 RX ADMIN — Medication 5 MG: at 21:47

## 2024-11-03 RX ADMIN — GUAIFENESIN 600 MG: 600 TABLET, EXTENDED RELEASE ORAL at 21:48

## 2024-11-03 RX ADMIN — ROSUVASTATIN CALCIUM 20 MG: 20 TABLET, FILM COATED ORAL at 21:48

## 2024-11-03 RX ADMIN — Medication 600 MG: at 08:37

## 2024-11-03 RX ADMIN — DICLOFENAC SODIUM 2 G: 10 GEL TOPICAL at 08:39

## 2024-11-03 RX ADMIN — SACUBITRIL AND VALSARTAN 1 TABLET: 24; 26 TABLET, FILM COATED ORAL at 21:46

## 2024-11-03 RX ADMIN — AMIODARONE HYDROCHLORIDE 200 MG: 200 TABLET ORAL at 08:37

## 2024-11-03 RX ADMIN — EMPAGLIFLOZIN 10 MG: 10 TABLET, FILM COATED ORAL at 08:37

## 2024-11-03 RX ADMIN — SACUBITRIL AND VALSARTAN 1 TABLET: 24; 26 TABLET, FILM COATED ORAL at 08:37

## 2024-11-03 RX ADMIN — DICLOFENAC SODIUM 2 G: 10 GEL TOPICAL at 21:59

## 2024-11-03 RX ADMIN — ASPIRIN 81 MG CHEWABLE TABLET 81 MG: 81 TABLET CHEWABLE at 08:37

## 2024-11-03 RX ADMIN — AMIODARONE HYDROCHLORIDE 200 MG: 200 TABLET ORAL at 21:47

## 2024-11-03 RX ADMIN — CARVEDILOL 6.25 MG: 6.25 TABLET, FILM COATED ORAL at 08:37

## 2024-11-03 RX ADMIN — PANTOPRAZOLE SODIUM 20 MG: 20 TABLET, DELAYED RELEASE ORAL at 16:30

## 2024-11-03 RX ADMIN — PANTOPRAZOLE SODIUM 20 MG: 20 TABLET, DELAYED RELEASE ORAL at 08:37

## 2024-11-03 RX ADMIN — FUROSEMIDE 40 MG: 40 TABLET ORAL at 08:37

## 2024-11-03 RX ADMIN — APIXABAN 10 MG: 5 TABLET, FILM COATED ORAL at 08:37

## 2024-11-03 RX ADMIN — APIXABAN 10 MG: 5 TABLET, FILM COATED ORAL at 21:48

## 2024-11-03 RX ADMIN — CARVEDILOL 6.25 MG: 6.25 TABLET, FILM COATED ORAL at 16:30

## 2024-11-03 RX ADMIN — GUAIFENESIN 600 MG: 600 TABLET, EXTENDED RELEASE ORAL at 08:37

## 2024-11-03 ASSESSMENT — PAIN SCALES - WONG BAKER
WONGBAKER_NUMERICALRESPONSE: NO HURT

## 2024-11-03 ASSESSMENT — PAIN SCALES - GENERAL
PAINLEVEL_OUTOF10: 0

## 2024-11-03 ASSESSMENT — PAIN - FUNCTIONAL ASSESSMENT: PAIN_FUNCTIONAL_ASSESSMENT: ACTIVITIES ARE NOT PREVENTED

## 2024-11-04 VITALS
SYSTOLIC BLOOD PRESSURE: 106 MMHG | HEART RATE: 64 BPM | RESPIRATION RATE: 16 BRPM | DIASTOLIC BLOOD PRESSURE: 78 MMHG | WEIGHT: 182.32 LBS | TEMPERATURE: 97 F | OXYGEN SATURATION: 99 % | BODY MASS INDEX: 24.69 KG/M2 | HEIGHT: 72 IN

## 2024-11-04 PROBLEM — R10.9 ABDOMINAL PAIN: Status: RESOLVED | Noted: 2024-04-30 | Resolved: 2024-11-04

## 2024-11-04 PROBLEM — R74.8 ELEVATED LIVER ENZYMES: Status: RESOLVED | Noted: 2024-10-27 | Resolved: 2024-11-04

## 2024-11-04 PROBLEM — R06.02 SHORTNESS OF BREATH: Status: RESOLVED | Noted: 2021-11-17 | Resolved: 2024-11-04

## 2024-11-04 PROBLEM — R07.9 CHEST PAIN IN ADULT: Status: RESOLVED | Noted: 2022-02-23 | Resolved: 2024-11-04

## 2024-11-04 PROBLEM — R11.2 NAUSEA AND VOMITING: Status: RESOLVED | Noted: 2024-04-30 | Resolved: 2024-11-04

## 2024-11-04 LAB
ANION GAP SERPL CALC-SCNC: 0 MMOL/L (ref 3–18)
BASOPHILS # BLD: 0 K/UL (ref 0–0.1)
BASOPHILS NFR BLD: 0 % (ref 0–2)
BUN SERPL-MCNC: 42 MG/DL (ref 7–18)
BUN/CREAT SERPL: 24 (ref 12–20)
CALCIUM SERPL-MCNC: 7.4 MG/DL (ref 8.5–10.1)
CHLORIDE SERPL-SCNC: 107 MMOL/L (ref 100–111)
CO2 SERPL-SCNC: 26 MMOL/L (ref 21–32)
CREAT SERPL-MCNC: 1.72 MG/DL (ref 0.6–1.3)
DIFFERENTIAL METHOD BLD: ABNORMAL
EOSINOPHIL # BLD: 0.1 K/UL (ref 0–0.4)
EOSINOPHIL NFR BLD: 2 % (ref 0–5)
ERYTHROCYTE [DISTWIDTH] IN BLOOD BY AUTOMATED COUNT: 14 % (ref 11.6–14.5)
GLUCOSE SERPL-MCNC: 106 MG/DL (ref 74–99)
HCT VFR BLD AUTO: 38.7 % (ref 36–48)
HGB BLD-MCNC: 12.6 G/DL (ref 13–16)
IMM GRANULOCYTES # BLD AUTO: 0 K/UL (ref 0–0.04)
IMM GRANULOCYTES NFR BLD AUTO: 0 % (ref 0–0.5)
LYMPHOCYTES # BLD: 1.2 K/UL (ref 0.9–3.6)
LYMPHOCYTES NFR BLD: 17 % (ref 21–52)
MAGNESIUM SERPL-MCNC: 1.7 MG/DL (ref 1.6–2.6)
MCH RBC QN AUTO: 30.5 PG (ref 24–34)
MCHC RBC AUTO-ENTMCNC: 32.6 G/DL (ref 31–37)
MCV RBC AUTO: 93.7 FL (ref 78–100)
MONOCYTES # BLD: 0.6 K/UL (ref 0.05–1.2)
MONOCYTES NFR BLD: 9 % (ref 3–10)
NEUTS SEG # BLD: 5 K/UL (ref 1.8–8)
NEUTS SEG NFR BLD: 72 % (ref 40–73)
NRBC # BLD: 0 K/UL (ref 0–0.01)
NRBC BLD-RTO: 0 PER 100 WBC
PLATELET # BLD AUTO: 157 K/UL (ref 135–420)
PMV BLD AUTO: 13.2 FL (ref 9.2–11.8)
POTASSIUM SERPL-SCNC: 4 MMOL/L (ref 3.5–5.5)
RBC # BLD AUTO: 4.13 M/UL (ref 4.35–5.65)
SODIUM SERPL-SCNC: 133 MMOL/L (ref 136–145)
WBC # BLD AUTO: 6.9 K/UL (ref 4.6–13.2)

## 2024-11-04 PROCEDURE — 6370000000 HC RX 637 (ALT 250 FOR IP)

## 2024-11-04 PROCEDURE — 97164 PT RE-EVAL EST PLAN CARE: CPT

## 2024-11-04 PROCEDURE — 80048 BASIC METABOLIC PNL TOTAL CA: CPT

## 2024-11-04 PROCEDURE — 94761 N-INVAS EAR/PLS OXIMETRY MLT: CPT

## 2024-11-04 PROCEDURE — 6370000000 HC RX 637 (ALT 250 FOR IP): Performed by: PHYSICIAN ASSISTANT

## 2024-11-04 PROCEDURE — 85025 COMPLETE CBC W/AUTO DIFF WBC: CPT

## 2024-11-04 PROCEDURE — 36415 COLL VENOUS BLD VENIPUNCTURE: CPT

## 2024-11-04 PROCEDURE — 83735 ASSAY OF MAGNESIUM: CPT

## 2024-11-04 RX ORDER — LIDOCAINE 4 G/G
1 PATCH TOPICAL DAILY
Qty: 7 EACH | Refills: 0 | Status: SHIPPED | OUTPATIENT
Start: 2024-11-05 | End: 2024-11-12

## 2024-11-04 RX ORDER — AMIODARONE HYDROCHLORIDE 200 MG/1
TABLET ORAL
Qty: 90 TABLET | Refills: 0 | Status: SHIPPED | OUTPATIENT
Start: 2024-11-04 | End: 2024-11-04

## 2024-11-04 RX ORDER — ERGOCALCIFEROL 1.25 MG/1
50000 CAPSULE ORAL WEEKLY
Qty: 7 CAPSULE | Refills: 0 | Status: SHIPPED | OUTPATIENT
Start: 2024-11-05 | End: 2024-12-18

## 2024-11-04 RX ORDER — AMIODARONE HYDROCHLORIDE 200 MG/1
200 TABLET ORAL DAILY
Qty: 90 TABLET | Refills: 0 | Status: SHIPPED | OUTPATIENT
Start: 2024-11-04 | End: 2025-02-02

## 2024-11-04 RX ADMIN — CARVEDILOL 6.25 MG: 6.25 TABLET, FILM COATED ORAL at 09:45

## 2024-11-04 RX ADMIN — PANTOPRAZOLE SODIUM 20 MG: 20 TABLET, DELAYED RELEASE ORAL at 09:45

## 2024-11-04 RX ADMIN — ASPIRIN 81 MG CHEWABLE TABLET 81 MG: 81 TABLET CHEWABLE at 09:45

## 2024-11-04 RX ADMIN — SACUBITRIL AND VALSARTAN 1 TABLET: 24; 26 TABLET, FILM COATED ORAL at 09:45

## 2024-11-04 RX ADMIN — APIXABAN 10 MG: 5 TABLET, FILM COATED ORAL at 09:45

## 2024-11-04 RX ADMIN — FUROSEMIDE 40 MG: 40 TABLET ORAL at 09:45

## 2024-11-04 RX ADMIN — GUAIFENESIN 600 MG: 600 TABLET, EXTENDED RELEASE ORAL at 09:45

## 2024-11-04 RX ADMIN — Medication 600 MG: at 09:45

## 2024-11-04 RX ADMIN — DICLOFENAC SODIUM 2 G: 10 GEL TOPICAL at 09:47

## 2024-11-04 RX ADMIN — EMPAGLIFLOZIN 10 MG: 10 TABLET, FILM COATED ORAL at 09:45

## 2024-11-04 RX ADMIN — AMIODARONE HYDROCHLORIDE 200 MG: 200 TABLET ORAL at 09:45

## 2024-11-04 ASSESSMENT — PAIN SCALES - GENERAL
PAINLEVEL_OUTOF10: 0

## 2024-11-04 ASSESSMENT — PAIN SCALES - WONG BAKER: WONGBAKER_NUMERICALRESPONSE: NO HURT

## 2024-11-04 NOTE — CARE COORDINATION
CM Manager Jesusita VAZQUEZ approved [employer] transportation for pt to return home  Lyft transport arranged and  christina on the way with silver Toyota Corolla hybrid [id number].  Notified pt and Nurse Koffi.            Jesusita Crespo, LIZZETHN RN  Care Management

## 2024-11-04 NOTE — CARE COORDINATION
Requested Case Management specialist to assist with transportation to home.  Address is [address] [place] [address] and phone number is [phone]  Any steps at the residence? Yes and No  Patient will require transport.   Pt requires Cab     Height:6'   Weight:182 Ib   Is the pt ready now? Yes  Requested time: Ready  PCS Faxed:   Insurance verified on face sheet: yes  Auth needed for transport: Yes         JERICHO Jarvis RN  Care Management

## 2024-11-04 NOTE — NURSE NAVIGATOR
Met with Mr. May, patient awaiting discharge to home. Very pleasant gentleman, reinforced education on heart failure. Patient verbalized understanding of education provided. Will follow up with phone call in a few days.

## 2024-11-04 NOTE — CARE COORDINATION
Home health orders sent to [facility] in [facility].            LIZZETH JarvisN RN  Care Management

## 2024-11-04 NOTE — CARE COORDINATION
Discharge order noted for today. Pt has been accepted to [facility]. Met with patient  and are agreeable to the transition plan today. Transport has been arranged through family. Patient's discharge summary and home health  orders have been forwarded to [facility] via [facility]. Updated bedside RN, Koffi ,  to the transition plan.  Discharge information has been documented on the AVS.           JERICHO Jarvis RN  Care Management

## 2024-11-04 NOTE — CARE COORDINATION
11/04/24 0843   IMM Letter   IMM Letter given to Patient/Family/Significant other/Guardian/POA/by: Jesusita Crespo   IMM Letter date given: 11/04/24   IMM Letter time given: 0844     Pt refused to sign Important Message from Medicare.          Jesusita Crespo, LIZZETHN RN  Care Management

## 2024-11-04 NOTE — PROGRESS NOTES
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    sodium chloride flush 0.9 % injection 5-40 mL  5-40 mL IntraVENous PRN Williams Tamayo MD        0.9 % sodium chloride infusion   IntraVENous PRN Williams Tamayo MD        ondansetron (ZOFRAN-ODT) disintegrating tablet 4 mg  4 mg Oral Q8H PRN Williams Tamayo MD        Or    ondansetron (ZOFRAN) injection 4 mg  4 mg IntraVENous Q6H PRN Williams Tamayo MD   4 mg 
Williams Tamayo MD   10 mL at 10/30/24 2104    sodium chloride flush 0.9 % injection 5-40 mL  5-40 mL IntraVENous PRN Williams Tamayo MD        0.9 % sodium chloride infusion   IntraVENous PRN Williams Tamayo MD        ondansetron (ZOFRAN-ODT) disintegrating tablet 4 mg  4 mg Oral Q8H PRN Williams Tamayo MD        Or    ondansetron (ZOFRAN) injection 4 mg  4 mg IntraVENous Q6H PRN Williams Tamayo MD   4 mg at 10/28/24 2132    [Held by provider] acetaminophen (TYLENOL) tablet 650 mg  650 mg Oral Q6H PRN Williams Tamayo MD   650 mg at 10/29/24 0805    Or    [Held by provider] acetaminophen (TYLENOL) suppository 650 mg  650 mg Rectal Q6H PRN Williams Tamayo MD        potassium chloride (KLOR-CON M) extended release tablet 40 mEq  40 mEq Oral PRN Willaims Tamayo MD   40 mEq at 10/28/24 0817    Or    potassium bicarb-citric acid (EFFER-K) effervescent tablet 40 mEq  40 mEq Oral PRN Williams Tamayo MD        Or    potassium chloride 10 mEq/100 mL IVPB (Peripheral Line)  10 mEq IntraVENous PRN Williams Tamayo MD        magnesium sulfate 2000 mg in 50 mL IVPB premix  2,000 mg IntraVENous PRN Williams Tamayo MD         OBJECTIVE:    Intake/Output Summary (Last 24 hours) at 10/31/2024 0731  Last data filed at 10/31/2024 0548  Gross per 24 hour   Intake 960 ml   Output 875 ml   Net 85 ml     /80   Pulse 72   Temp 97.6 °F (36.4 °C) (Oral)   Resp 19   Ht 1.829 m (6')   Wt 79.4 kg (175 lb 0.7 oz)   SpO2 96%   BMI 23.74 kg/m²     PHYSICAL EXAM   Gen: NAD, comfortable, sitting up in bed  HEENT: normocephalic, atraumatic  CV: RRR, S1/S2 present without M/R/G  Pulm: CTAB  Abd: S/NT/ND, no rebound, no guarding  MSK: no clubbing, edema to bilateral feet up to the level of the ankles slightly improved from yesterday. Swelling to bilateral hands slightly improved up to the level of the wrists. No TTP to left arm. Warmth, erythema, swelling, and TTP to LUE  Skin: warm, dry, intact, no rash  Neuro: CN II-XII grossly intact, no 
IVPB (Peripheral Line)  10 mEq IntraVENous PRN Williams Tamayo MD        magnesium sulfate 2000 mg in 50 mL IVPB premix  2,000 mg IntraVENous PRN Williams Tamayo MD         OBJECTIVE:    Intake/Output Summary (Last 24 hours) at 11/1/2024 0736  Last data filed at 11/1/2024 0300  Gross per 24 hour   Intake 1738 ml   Output 1650 ml   Net 88 ml     BP (!) 132/93   Pulse 72   Temp 97.6 °F (36.4 °C) (Oral)   Resp 25   Ht 1.829 m (6')   Wt 80.1 kg (176 lb 9.4 oz)   SpO2 98%   BMI 23.95 kg/m²     PHYSICAL EXAM   Gen: NAD, uncomfortable, lying in bed, tearing up as he talks about his condition  HEENT: normocephalic, atraumatic  CV: RRR, S1/S2 present without M/R/G  Pulm: CTAB  Abd: S/NT/ND, no rebound, no guarding  MSK: no clubbing, edema to bilateral feet up to the level of the ankles slightly improved from yesterday. Swelling to bilateral hands slightly improved up to the level of the wrists. TTP to left arm. Warmth, erythema, swelling, and TTP to LUE  Skin: warm, dry, intact, no rash  Neuro: CN II-XII grossly intact, no focal deficits appreciated   Psych: appropriate, alert, oriented x3    LABWORK (LAST 24 HOURS)  Recent Results (from the past 24 hour(s))   Vascular duplex upper extremity venous left    Collection Time: 10/31/24  1:50 PM   Result Value Ref Range    Body Surface Area 1.9 m2   Basic Metabolic Panel    Collection Time: 11/01/24  3:58 AM   Result Value Ref Range    Sodium 133 (L) 136 - 145 mmol/L    Potassium 3.8 3.5 - 5.5 mmol/L    Chloride 101 100 - 111 mmol/L    CO2 23 21 - 32 mmol/L    Anion Gap 9 3.0 - 18 mmol/L    Glucose 101 (H) 74 - 99 mg/dL    BUN 36 (H) 7.0 - 18 MG/DL    Creatinine 1.96 (H) 0.6 - 1.3 MG/DL    BUN/Creatinine Ratio 18 12 - 20      Est, Glom Filt Rate 37 (L) >60 ml/min/1.73m2    Calcium 7.6 (L) 8.5 - 10.1 MG/DL   Magnesium    Collection Time: 11/01/24  3:58 AM   Result Value Ref Range    Magnesium 1.8 1.6 - 2.6 mg/dL   CBC with Auto Differential    Collection Time: 11/01/24  
chloride (KLOR-CON M) extended release tablet 40 mEq  40 mEq Oral PRN Williams Tamayo MD   40 mEq at 10/28/24 0817    Or    potassium bicarb-citric acid (EFFER-K) effervescent tablet 40 mEq  40 mEq Oral PRN Williams Tamayo MD        Or    potassium chloride 10 mEq/100 mL IVPB (Peripheral Line)  10 mEq IntraVENous PRN Williams Tamayo MD        magnesium sulfate 2000 mg in 50 mL IVPB premix  2,000 mg IntraVENous PRN Williams Tamayo MD         OBJECTIVE:    Intake/Output Summary (Last 24 hours) at 11/4/2024 0608  Last data filed at 11/4/2024 0450  Gross per 24 hour   Intake 1744 ml   Output 2160 ml   Net -416 ml     /71   Pulse 70   Temp 97.5 °F (36.4 °C) (Axillary)   Resp 23   Ht 1.829 m (6')   Wt 79.3 kg (174 lb 13.2 oz)   SpO2 99%   BMI 23.71 kg/m²     PHYSICAL EXAM   Gen: NAD, uncomfortable, lying in edge of bed  HEENT: normocephalic, atraumatic  CV: RRR, S1/S2 present without M/R/G  Pulm: CTAB  Abd: S/NT/ND  MSK: no clubbing, edema to bilateral feet up to the level of the ankles, significant edema to all toes with improving redness on plantar surfaces, TTP  Skin: warm, dry, intact, no rash  Neuro: no focal deficits appreciated   Psych: appropriate, alert, oriented x3    LABWORK (LAST 24 HOURS)  Recent Results (from the past 24 hour(s))   Basic Metabolic Panel    Collection Time: 11/04/24  3:43 AM   Result Value Ref Range    Sodium 133 (L) 136 - 145 mmol/L    Potassium 4.0 3.5 - 5.5 mmol/L    Chloride 107 100 - 111 mmol/L    CO2 26 21 - 32 mmol/L    Anion Gap 0 (L) 3.0 - 18 mmol/L    Glucose 106 (H) 74 - 99 mg/dL    BUN 42 (H) 7.0 - 18 MG/DL    Creatinine 1.72 (H) 0.6 - 1.3 MG/DL    BUN/Creatinine Ratio 24 (H) 12 - 20      Est, Glom Filt Rate 44 (L) >60 ml/min/1.73m2    Calcium 7.4 (L) 8.5 - 10.1 MG/DL   Magnesium    Collection Time: 11/04/24  3:43 AM   Result Value Ref Range    Magnesium 1.7 1.6 - 2.6 mg/dL   CBC with Auto Differential    Collection Time: 11/04/24  3:43 AM   Result Value Ref Range    
4.6 - 13.2 K/uL    RBC 4.21 (L) 4.35 - 5.65 M/uL    Hemoglobin 12.9 (L) 13.0 - 16.0 g/dL    Hematocrit 39.4 36.0 - 48.0 %    MCV 93.6 78.0 - 100.0 FL    MCH 30.6 24.0 - 34.0 PG    MCHC 32.7 31.0 - 37.0 g/dL    RDW 14.0 11.6 - 14.5 %    Platelets 157 135 - 420 K/uL    MPV 13.3 (H) 9.2 - 11.8 FL    Nucleated RBCs 0.0 0  WBC    nRBC 0.00 0.00 - 0.01 K/uL    Neutrophils % 73 40 - 73 %    Lymphocytes % 14 (L) 21 - 52 %    Monocytes % 10 3 - 10 %    Eosinophils % 1 0 - 5 %    Basophils % 0 0 - 2 %    Immature Granulocytes % 1 (H) 0.0 - 0.5 %    Neutrophils Absolute 5.2 1.8 - 8.0 K/UL    Lymphocytes Absolute 1.0 0.9 - 3.6 K/UL    Monocytes Absolute 0.7 0.05 - 1.2 K/UL    Eosinophils Absolute 0.1 0.0 - 0.4 K/UL    Basophils Absolute 0.0 0.0 - 0.1 K/UL    Immature Granulocytes Absolute 0.1 (H) 0.00 - 0.04 K/UL    Differential Type AUTOMATED       Vascular duplex upper extremity venous left   Final Result      XR CHEST 1 VIEW   Final Result   Cardiomegaly with pulmonary vascular congestion and interstitial opacities,   greater at the lung bases and small pleural effusions. Imaging findings suggest   heart failure. Clinical correlation is recommended.               Electronically signed by Alexandro Esparza        ================================================================  Further management for Mr. Moiz May will be discussed on rounds with my attending.    Williams Tamayo MD, PGY-1  [facility]  November 3, 2024 6:11 AM

## 2024-11-04 NOTE — DISCHARGE INSTR - DIET

## 2024-11-04 NOTE — PLAN OF CARE
Problem: Chronic Conditions and Co-morbidities  Goal: Patient's chronic conditions and co-morbidity symptoms are monitored and maintained or improved  10/27/2024 2054 by Adeline Goins RN  Outcome: Progressing  Flowsheets (Taken 10/27/2024 1639 by Malinda Hughes, RN)  Care Plan - Patient's Chronic Conditions and Co-Morbidity Symptoms are Monitored and Maintained or Improved:   Monitor and assess patient's chronic conditions and comorbid symptoms for stability, deterioration, or improvement   Collaborate with multidisciplinary team to address chronic and comorbid conditions and prevent exacerbation or deterioration   Update acute care plan with appropriate goals if chronic or comorbid symptoms are exacerbated and prevent overall improvement and discharge  Note: Patient is tachypneic, dyspneic on exertion, and has bilateral lower extremity edema. Patient is receiving amiodarone and bumex infusions. Patient is utilizing supplemental oxygen a 4 lpm with SpO2 >98%. Will continue to monitor.   10/27/2024 1643 by Malinda Hughes RN  Outcome: Progressing  Flowsheets (Taken 10/27/2024 1639)  Care Plan - Patient's Chronic Conditions and Co-Morbidity Symptoms are Monitored and Maintained or Improved:   Monitor and assess patient's chronic conditions and comorbid symptoms for stability, deterioration, or improvement   Collaborate with multidisciplinary team to address chronic and comorbid conditions and prevent exacerbation or deterioration   Update acute care plan with appropriate goals if chronic or comorbid symptoms are exacerbated and prevent overall improvement and discharge     
  Problem: Chronic Conditions and Co-morbidities  Goal: Patient's chronic conditions and co-morbidity symptoms are monitored and maintained or improved  10/31/2024 1024 by Norma Alonzo RN  Flowsheets (Taken 10/31/2024 0800)  Care Plan - Patient's Chronic Conditions and Co-Morbidity Symptoms are Monitored and Maintained or Improved:   Monitor and assess patient's chronic conditions and comorbid symptoms for stability, deterioration, or improvement   Collaborate with multidisciplinary team to address chronic and comorbid conditions and prevent exacerbation or deterioration   Update acute care plan with appropriate goals if chronic or comorbid symptoms are exacerbated and prevent overall improvement and discharge  10/30/2024 2216 by Kacy Sen RN  Outcome: Progressing  Flowsheets (Taken 10/30/2024 2000)  Care Plan - Patient's Chronic Conditions and Co-Morbidity Symptoms are Monitored and Maintained or Improved:   Monitor and assess patient's chronic conditions and comorbid symptoms for stability, deterioration, or improvement   Collaborate with multidisciplinary team to address chronic and comorbid conditions and prevent exacerbation or deterioration   Update acute care plan with appropriate goals if chronic or comorbid symptoms are exacerbated and prevent overall improvement and discharge     Problem: Discharge Planning  Goal: Discharge to home or other facility with appropriate resources  10/31/2024 1024 by Norma Alonzo RN  Flowsheets (Taken 10/31/2024 0800)  Discharge to home or other facility with appropriate resources:   Identify barriers to discharge with patient and caregiver   Identify discharge learning needs (meds, wound care, etc)  10/30/2024 2216 by Kacy Sen RN  Outcome: Progressing  Flowsheets (Taken 10/30/2024 2000)  Discharge to home or other facility with appropriate resources: Identify barriers to discharge with patient and caregiver     Problem: Safety - 
  Problem: Chronic Conditions and Co-morbidities  Goal: Patient's chronic conditions and co-morbidity symptoms are monitored and maintained or improved  11/1/2024 1824 by Koffi Rodriguez, RN  Outcome: Not Progressing  Flowsheets (Taken 11/1/2024 1824)  Care Plan - Patient's Chronic Conditions and Co-Morbidity Symptoms are Monitored and Maintained or Improved:   Monitor and assess patient's chronic conditions and comorbid symptoms for stability, deterioration, or improvement   Collaborate with multidisciplinary team to address chronic and comorbid conditions and prevent exacerbation or deterioration  Note: Patient remain short of breath with exertion . Refusing medicines .   11/1/2024 1823 by Koffi Rodriguez, RN  Outcome: Not Progressing     
  Problem: Chronic Conditions and Co-morbidities  Goal: Patient's chronic conditions and co-morbidity symptoms are monitored and maintained or improved  11/2/2024 0206 by Joseph Verdin RN  Outcome: Progressing  Flowsheets (Taken 11/1/2024 1945)  Care Plan - Patient's Chronic Conditions and Co-Morbidity Symptoms are Monitored and Maintained or Improved:   Monitor and assess patient's chronic conditions and comorbid symptoms for stability, deterioration, or improvement   Collaborate with multidisciplinary team to address chronic and comorbid conditions and prevent exacerbation or deterioration   Update acute care plan with appropriate goals if chronic or comorbid symptoms are exacerbated and prevent overall improvement and discharge  11/1/2024 1824 by Koffi Rodriguez, RN  Outcome: Not Progressing  Flowsheets (Taken 11/1/2024 1824)  Care Plan - Patient's Chronic Conditions and Co-Morbidity Symptoms are Monitored and Maintained or Improved:   Monitor and assess patient's chronic conditions and comorbid symptoms for stability, deterioration, or improvement   Collaborate with multidisciplinary team to address chronic and comorbid conditions and prevent exacerbation or deterioration  Note: Patient remain short of breath with exertion . Refusing medicines .   11/1/2024 1823 by Koffi Rodriguez, RN  Outcome: Not Progressing     Problem: Discharge Planning  Goal: Discharge to home or other facility with appropriate resources  Outcome: Progressing  Flowsheets (Taken 11/1/2024 1945)  Discharge to home or other facility with appropriate resources: Identify barriers to discharge with patient and caregiver     Problem: Safety - Adult  Goal: Free from fall injury  Outcome: Progressing  Flowsheets (Taken 11/1/2024 0822 by Koffi Rodriguez, RN)  Free From Fall Injury:   Instruct family/caregiver on patient safety   Based on caregiver fall risk screen, instruct family/caregiver to ask for assistance with transferring infant if 
  Problem: Chronic Conditions and Co-morbidities  Goal: Patient's chronic conditions and co-morbidity symptoms are monitored and maintained or improved  11/2/2024 1014 by Malinda Hughes, RN  Outcome: Progressing  Flowsheets (Taken 11/2/2024 0800)  Care Plan - Patient's Chronic Conditions and Co-Morbidity Symptoms are Monitored and Maintained or Improved:   Monitor and assess patient's chronic conditions and comorbid symptoms for stability, deterioration, or improvement   Collaborate with multidisciplinary team to address chronic and comorbid conditions and prevent exacerbation or deterioration   Update acute care plan with appropriate goals if chronic or comorbid symptoms are exacerbated and prevent overall improvement and discharge  11/2/2024 0206 by Joseph Verdin RN  Outcome: Progressing  Flowsheets (Taken 11/1/2024 1945)  Care Plan - Patient's Chronic Conditions and Co-Morbidity Symptoms are Monitored and Maintained or Improved:   Monitor and assess patient's chronic conditions and comorbid symptoms for stability, deterioration, or improvement   Collaborate with multidisciplinary team to address chronic and comorbid conditions and prevent exacerbation or deterioration   Update acute care plan with appropriate goals if chronic or comorbid symptoms are exacerbated and prevent overall improvement and discharge     Problem: Discharge Planning  Goal: Discharge to home or other facility with appropriate resources  11/2/2024 1014 by Malinda Hughes, RN  Outcome: Progressing  Flowsheets (Taken 11/2/2024 0800)  Discharge to home or other facility with appropriate resources:   Identify barriers to discharge with patient and caregiver   Arrange for needed discharge resources and transportation as appropriate   Identify discharge learning needs (meds, wound care, etc)   Arrange for interpreters to assist at discharge as needed   Refer to discharge planning if patient needs post-hospital services based on physician order or 
  Problem: Chronic Conditions and Co-morbidities  Goal: Patient's chronic conditions and co-morbidity symptoms are monitored and maintained or improved  11/3/2024 1000 by Malinda Hughes RN  Outcome: Progressing  Flowsheets (Taken 11/3/2024 0800)  Care Plan - Patient's Chronic Conditions and Co-Morbidity Symptoms are Monitored and Maintained or Improved:   Monitor and assess patient's chronic conditions and comorbid symptoms for stability, deterioration, or improvement   Collaborate with multidisciplinary team to address chronic and comorbid conditions and prevent exacerbation or deterioration   Update acute care plan with appropriate goals if chronic or comorbid symptoms are exacerbated and prevent overall improvement and discharge  11/3/2024 0640 by Joseph Verdin RN  Outcome: Progressing  Flowsheets (Taken 11/2/2024 1945)  Care Plan - Patient's Chronic Conditions and Co-Morbidity Symptoms are Monitored and Maintained or Improved:   Monitor and assess patient's chronic conditions and comorbid symptoms for stability, deterioration, or improvement   Collaborate with multidisciplinary team to address chronic and comorbid conditions and prevent exacerbation or deterioration   Update acute care plan with appropriate goals if chronic or comorbid symptoms are exacerbated and prevent overall improvement and discharge     Problem: Discharge Planning  Goal: Discharge to home or other facility with appropriate resources  11/3/2024 1000 by Malinda Hughes, RN  Outcome: Progressing  Flowsheets (Taken 11/3/2024 0800)  Discharge to home or other facility with appropriate resources:   Identify barriers to discharge with patient and caregiver   Arrange for needed discharge resources and transportation as appropriate   Identify discharge learning needs (meds, wound care, etc)   Arrange for interpreters to assist at discharge as needed   Refer to discharge planning if patient needs post-hospital services based on physician order or 
  Problem: Chronic Conditions and Co-morbidities  Goal: Patient's chronic conditions and co-morbidity symptoms are monitored and maintained or improved  Outcome: Progressing  Flowsheets (Taken 10/27/2024 3164)  Care Plan - Patient's Chronic Conditions and Co-Morbidity Symptoms are Monitored and Maintained or Improved:   Monitor and assess patient's chronic conditions and comorbid symptoms for stability, deterioration, or improvement   Collaborate with multidisciplinary team to address chronic and comorbid conditions and prevent exacerbation or deterioration   Update acute care plan with appropriate goals if chronic or comorbid symptoms are exacerbated and prevent overall improvement and discharge     Problem: Discharge Planning  Goal: Discharge to home or other facility with appropriate resources  Outcome: Progressing  Flowsheets (Taken 10/27/2024 2002)  Discharge to home or other facility with appropriate resources:   Identify barriers to discharge with patient and caregiver   Arrange for needed discharge resources and transportation as appropriate   Identify discharge learning needs (meds, wound care, etc)   Arrange for interpreters to assist at discharge as needed   Refer to discharge planning if patient needs post-hospital services based on physician order or complex needs related to functional status, cognitive ability or social support system     Problem: Safety - Adult  Goal: Free from fall injury  Outcome: Progressing     Problem: Pain  Goal: Verbalizes/displays adequate comfort level or baseline comfort level  Outcome: Progressing     
  Problem: Chronic Conditions and Co-morbidities  Goal: Patient's chronic conditions and co-morbidity symptoms are monitored and maintained or improved  Outcome: Progressing  Flowsheets (Taken 10/28/2024 1110)  Care Plan - Patient's Chronic Conditions and Co-Morbidity Symptoms are Monitored and Maintained or Improved:   Monitor and assess patient's chronic conditions and comorbid symptoms for stability, deterioration, or improvement   Collaborate with multidisciplinary team to address chronic and comorbid conditions and prevent exacerbation or deterioration     Problem: Safety - Adult  Goal: Free from fall injury  Outcome: Progressing  Flowsheets (Taken 10/28/2024 1110)  Free From Fall Injury:   Instruct family/caregiver on patient safety   Based on caregiver fall risk screen, instruct family/caregiver to ask for assistance with transferring infant if caregiver noted to have fall risk factors     Problem: Cardiovascular - Adult  Goal: Maintains optimal cardiac output and hemodynamic stability  Outcome: Progressing  Flowsheets (Taken 10/28/2024 1111)  Maintains optimal cardiac output and hemodynamic stability:   Monitor blood pressure and heart rate   Monitor urine output and notify Licensed Independent Practitioner for values outside of normal range   Assess for signs of decreased cardiac output     Problem: Cardiovascular - Adult  Goal: Absence of cardiac dysrhythmias or at baseline  Outcome: Progressing  Flowsheets (Taken 10/28/2024 1111)  Absence of cardiac dysrhythmias or at baseline:   Monitor cardiac rate and rhythm   Assess for signs of decreased cardiac output     
  Problem: Chronic Conditions and Co-morbidities  Goal: Patient's chronic conditions and co-morbidity symptoms are monitored and maintained or improved  Outcome: Progressing  Flowsheets (Taken 11/2/2024 1945)  Care Plan - Patient's Chronic Conditions and Co-Morbidity Symptoms are Monitored and Maintained or Improved:   Monitor and assess patient's chronic conditions and comorbid symptoms for stability, deterioration, or improvement   Collaborate with multidisciplinary team to address chronic and comorbid conditions and prevent exacerbation or deterioration   Update acute care plan with appropriate goals if chronic or comorbid symptoms are exacerbated and prevent overall improvement and discharge     Problem: Discharge Planning  Goal: Discharge to home or other facility with appropriate resources  Outcome: Progressing  Flowsheets (Taken 11/2/2024 1945)  Discharge to home or other facility with appropriate resources: Identify barriers to discharge with patient and caregiver     Problem: Safety - Adult  Goal: Free from fall injury  Outcome: Progressing  Flowsheets (Taken 11/1/2024 0822 by Koffi Rodriugez, RN)  Free From Fall Injury:   Instruct family/caregiver on patient safety   Based on caregiver fall risk screen, instruct family/caregiver to ask for assistance with transferring infant if caregiver noted to have fall risk factors     Problem: Pain  Goal: Verbalizes/displays adequate comfort level or baseline comfort level  Outcome: Progressing  Flowsheets (Taken 11/2/2024 1618 by Malinda Hughes, RN)  Verbalizes/displays adequate comfort level or baseline comfort level:   Encourage patient to monitor pain and request assistance   Assess pain using appropriate pain scale     Problem: Cardiovascular - Adult  Goal: Maintains optimal cardiac output and hemodynamic stability  Outcome: Progressing  Flowsheets (Taken 11/2/2024 1945)  Maintains optimal cardiac output and hemodynamic stability:   Monitor blood pressure and 
  Problem: Chronic Conditions and Co-morbidities  Goal: Patient's chronic conditions and co-morbidity symptoms are monitored and maintained or improved  Outcome: Progressing  Monitor and assess patient's chronic conditions and comorbid symptoms for stability, deterioration, or improvement    Problem: Pain  Goal: Verbalizes/displays adequate comfort level or baseline comfort level  Outcome: Progressing   Encourage patient to monitor pain and request assistance   Assess pain using appropriate pain scale    Problem: Cardiovascular - Adult  Goal: Absence of cardiac dysrhythmias or at baseline  Outcome: Progressing  Monitor cardiac rate and rhythm    Problem: Cardiovascular - Adult  Goal: Maintains optimal cardiac output and hemodynamic stability  Outcome: Progressing    
  Problem: Metabolic/Fluid and Electrolytes - Adult  Goal: Electrolytes maintained within normal limits  10/31/2024 2235 by Aleshia Giles RN  Outcome: Progressing     Problem: Cardiovascular - Adult  Goal: Maintains optimal cardiac output and hemodynamic stability  10/31/2024 2235 by Aleshia Giles RN  Outcome: Progressing  Goal: Absence of cardiac dysrhythmias or at baseline  10/31/2024 2235 by Aleshia Giles RN  Outcome: Progressing     Problem: Pain  Goal: Verbalizes/displays adequate comfort level or baseline comfort level  10/31/2024 2235 by Aleshia Giles RN  Outcome: Progressing     Problem: Safety - Adult  Goal: Free from fall injury  10/31/2024 2235 by Aleshia Giles RN  Outcome: Progressing     Problem: Discharge Planning  Goal: Discharge to home or other facility with appropriate resources  10/31/2024 2235 by Aleshia Giles RN  Outcome: Progressing     Problem: Chronic Conditions and Co-morbidities  Goal: Patient's chronic conditions and co-morbidity symptoms are monitored and maintained or improved  10/31/2024 2235 by Aleshia Giles RN  Outcome: Progressing     
  Problem: Physical Therapy - Adult  Goal: By Discharge: Performs mobility at highest level of function for planned discharge setting.  See evaluation for individualized goals.  Description: Physical Therapy Goals:  Initiated 10/29/2024, revised 11/4/24 to be met within 7-10 days.    1.  Patient will move from supine to sit and sit to supine , scoot up and down, and roll side to side in bed with modified independence in order to safely get into/out of bed.  2.  Patient will transfer from bed to chair and chair to bed with independence.   3.  Patient will perform sit to stand with independence.  4.  Patient will ambulate with independence for 300 feet for progression to community level mobility  5.  Patient will ascend/descend 3 stairs with B handrail(s) with supervision/set-up in order to safely enter/exit home environment.    PLOF: lives alone in 1 level house 3 TERRI B handrails, independent at baseline      11/4/2024 1145 by Carissa Keyes, PT  Outcome: Progressing     
  Problem: Physical Therapy - Adult  Goal: By Discharge: Performs mobility at highest level of function for planned discharge setting.  See evaluation for individualized goals.  Description: Physical Therapy Goals:  Initiated 10/29/2024, revised 11/4/24 to be met within 7-10 days.    1.  Patient will move from supine to sit and sit to supine , scoot up and down, and roll side to side in bed with modified independence in order to safely get into/out of bed.  2.  Patient will transfer from bed to chair and chair to bed with independence.   3.  Patient will perform sit to stand with independence.  4.  Patient will ambulate with independence for 300 feet for progression to community level mobility  5.  Patient will ascend/descend 3 stairs with B handrail(s) with supervision/set-up in order to safely enter/exit home environment.    PLOF: lives alone in 1 level house 3 TERRI B handrails, independent at baseline      Outcome: Progressing   PHYSICAL THERAPY RE-EVALUATION    Patient: Moiz May (64 y.o. male)  Date: 11/4/2024  Primary Diagnosis: Acute combined systolic and diastolic congestive heart failure (HCC) [I50.41]  Atrial fibrillation with rapid ventricular response (HCC) [I48.91]  Atrial fibrillation with RVR (HCC) [I48.91]       Precautions: Fall Risk    ASSESSMENT :  Pt is sitting up in the recliner and agreeable to PT treatment session.  Pt stood from the recliner mod I and ambulated 160 feet with supervision and no assistive device with path deviations and decreased step clearance but no loss of balance.  Pt declined bed mobility but states that he is able to get himself in and out of the bed.  Pt was educated on activity pacing and ways to decreased fall risk.  Pt was left sitting in the recliner with needs in reach.    DEFICITS/IMPAIRMENTS:    , Body Structures, Functions, Activity Limitations Requiring Skilled Therapeutic Intervention: Decreased functional mobility ;Decreased tolerance to work 
  Problem: Safety - Adult  Goal: Free from fall injury  10/28/2024 2353 by Kacy Sen RN  Outcome: Progressing  10/28/2024 1110 by Jd Escoto RN  Outcome: Progressing  Flowsheets (Taken 10/28/2024 1110)  Free From Fall Injury:   Instruct family/caregiver on patient safety   Based on caregiver fall risk screen, instruct family/caregiver to ask for assistance with transferring infant if caregiver noted to have fall risk factors     Problem: Pain  Goal: Verbalizes/displays adequate comfort level or baseline comfort level  Outcome: Progressing     Problem: Chronic Conditions and Co-morbidities  Goal: Patient's chronic conditions and co-morbidity symptoms are monitored and maintained or improved  10/28/2024 2353 by Kacy Sen RN  Outcome: Progressing  10/28/2024 1110 by Jd Escoto RN  Outcome: Progressing  Flowsheets (Taken 10/28/2024 1110)  Care Plan - Patient's Chronic Conditions and Co-Morbidity Symptoms are Monitored and Maintained or Improved:   Monitor and assess patient's chronic conditions and comorbid symptoms for stability, deterioration, or improvement   Collaborate with multidisciplinary team to address chronic and comorbid conditions and prevent exacerbation or deterioration     Problem: Discharge Planning  Goal: Discharge to home or other facility with appropriate resources  Outcome: Progressing     
  Problem: Safety - Adult  Goal: Free from fall injury  10/29/2024 1127 by Jd Escoto RN  Outcome: Progressing  Flowsheets (Taken 10/29/2024 1127)  Free From Fall Injury: Instruct family/caregiver on patient safety  10/28/2024 2353 by Kacy Sen RN  Outcome: Progressing     Problem: Pain  Goal: Verbalizes/displays adequate comfort level or baseline comfort level  10/29/2024 1127 by Jd Escoto RN  Outcome: Progressing  Flowsheets (Taken 10/29/2024 1127)  Verbalizes/displays adequate comfort level or baseline comfort level:   Assess pain using appropriate pain scale   Administer analgesics based on type and severity of pain and evaluate response   Encourage patient to monitor pain and request assistance  10/28/2024 2353 by Kacy Sen RN  Outcome: Progressing     Problem: Cardiovascular - Adult  Goal: Maintains optimal cardiac output and hemodynamic stability  Outcome: Progressing  Flowsheets (Taken 10/29/2024 1127)  Maintains optimal cardiac output and hemodynamic stability:   Monitor blood pressure and heart rate   Monitor urine output and notify Licensed Independent Practitioner for values outside of normal range   Assess for signs of decreased cardiac output     Problem: Cardiovascular - Adult  Goal: Absence of cardiac dysrhythmias or at baseline  Outcome: Progressing  Flowsheets (Taken 10/29/2024 1127)  Absence of cardiac dysrhythmias or at baseline:   Assess for signs of decreased cardiac output   Monitor cardiac rate and rhythm     Problem: Metabolic/Fluid and Electrolytes - Adult  Goal: Electrolytes maintained within normal limits  Outcome: Progressing  Flowsheets (Taken 10/29/2024 1127)  Electrolytes maintained within normal limits:   Administer electrolyte replacement as ordered   Monitor response to electrolyte replacements, including repeat lab results as appropriate   Monitor labs and assess patient for signs and symptoms of electrolyte imbalances     
  Problem: Safety - Adult  Goal: Free from fall injury  10/29/2024 2153 by Kacy Sen RN  Outcome: Progressing  10/29/2024 1127 by Jd Escoto RN  Outcome: Progressing  Flowsheets (Taken 10/29/2024 1127)  Free From Fall Injury: Instruct family/caregiver on patient safety     Problem: Pain  Goal: Verbalizes/displays adequate comfort level or baseline comfort level  10/29/2024 2153 by Kacy Sen RN  Outcome: Progressing  10/29/2024 1127 by Jd Escoto RN  Outcome: Progressing  Flowsheets (Taken 10/29/2024 1127)  Verbalizes/displays adequate comfort level or baseline comfort level:   Assess pain using appropriate pain scale   Administer analgesics based on type and severity of pain and evaluate response   Encourage patient to monitor pain and request assistance     Problem: Chronic Conditions and Co-morbidities  Goal: Patient's chronic conditions and co-morbidity symptoms are monitored and maintained or improved  Outcome: Progressing     Problem: Discharge Planning  Goal: Discharge to home or other facility with appropriate resources  Outcome: Progressing     Problem: Cardiovascular - Adult  Goal: Maintains optimal cardiac output and hemodynamic stability  10/29/2024 2153 by Kacy Sen RN  Outcome: Progressing  10/29/2024 1127 by Jd Escoto RN  Outcome: Progressing  Flowsheets (Taken 10/29/2024 1127)  Maintains optimal cardiac output and hemodynamic stability:   Monitor blood pressure and heart rate   Monitor urine output and notify Licensed Independent Practitioner for values outside of normal range   Assess for signs of decreased cardiac output  Goal: Absence of cardiac dysrhythmias or at baseline  10/29/2024 2153 by Kacy Sen RN  Outcome: Progressing  10/29/2024 1127 by Jd Escoto RN  Outcome: Progressing  Flowsheets (Taken 10/29/2024 1127)  Absence of cardiac dysrhythmias or at baseline:   Assess for signs of decreased cardiac output   Monitor cardiac rate and 
  Problem: Safety - Adult  Goal: Free from fall injury  10/30/2024 2216 by Kacy Sen RN  Outcome: Progressing  10/30/2024 0947 by Jared Waldron RN  Outcome: Progressing  Flowsheets (Taken 10/30/2024 0800)  Free From Fall Injury: Instruct family/caregiver on patient safety     Problem: Pain  Goal: Verbalizes/displays adequate comfort level or baseline comfort level  10/30/2024 2216 by Kacy Sen RN  Outcome: Progressing  10/30/2024 0947 by Jared Waldron RN  Outcome: Progressing  Flowsheets (Taken 10/30/2024 0800)  Verbalizes/displays adequate comfort level or baseline comfort level:   Encourage patient to monitor pain and request assistance   Assess pain using appropriate pain scale   Administer analgesics based on type and severity of pain and evaluate response     Problem: Chronic Conditions and Co-morbidities  Goal: Patient's chronic conditions and co-morbidity symptoms are monitored and maintained or improved  10/30/2024 2216 by Kacy Sen RN  Outcome: Progressing  10/30/2024 0947 by Jared Waldron RN  Outcome: Progressing  Flowsheets (Taken 10/30/2024 0800)  Care Plan - Patient's Chronic Conditions and Co-Morbidity Symptoms are Monitored and Maintained or Improved:   Monitor and assess patient's chronic conditions and comorbid symptoms for stability, deterioration, or improvement   Collaborate with multidisciplinary team to address chronic and comorbid conditions and prevent exacerbation or deterioration   Update acute care plan with appropriate goals if chronic or comorbid symptoms are exacerbated and prevent overall improvement and discharge     Problem: Discharge Planning  Goal: Discharge to home or other facility with appropriate resources  10/30/2024 2216 by Kacy Sen RN  Outcome: Progressing  10/30/2024 0947 by Jared Waldron RN  Outcome: Progressing  Flowsheets (Taken 10/30/2024 0800)  Discharge to home or other facility with appropriate resources:   Identify 
Problem: Occupational Therapy - Adult  Goal: By Discharge: Performs self-care activities at highest level of function for planned discharge setting.  See evaluation for individualized goals.  Description: Occupational Therapy Goals:  Initiated 10/28/2024 to be met within 7-10 days.    1.  Patient will perform grooming with supervision/set-up standing > 3 minutes, F+ balance.   2.  Patient will perform upper body dressing with supervision/set-up.  3.  Patient will perform lower body dressing  with supervision/set-up.  4.  Patient will perform toilet transfers with supervision/set-up  5.  Patient will perform all aspects of toileting with supervision/set-up.  6.  Patient will participate in upper extremity therapeutic exercise/activities with supervision/set-up for 8-10 minutes to increase strength/endurance for ADLs.    7.  Patient will utilize energy conservation techniques during functional activities with verbal cues.      PLOF: Pt was modified independent with self-care and functional mobility.  Outcome: Progressing      OCCUPATIONAL THERAPY EVALUATION    Patient: Moiz May (64 y.o. male)  Date: 10/28/2024  Primary Diagnosis: Acute combined systolic and diastolic congestive heart failure (HCC) [I50.41]  Atrial fibrillation with rapid ventricular response (HCC) [I48.91]  Atrial fibrillation with RVR (HCC) [I48.91]  Precautions: Fall Risk, General Precautions    ASSESSMENT :  Pt cleared to participate in OT evaluation by RN. Pt slumped down in bed leaning to right side with 4L NC donned. Patient reported feeling overall \"bad\", tearful, unable to tolerate more than bed mobility limiting evaluation this date. Patient HR ranged from 88 bpm - 108 bpm this session and RR from 17 - 33 highest. O2 maintained > 96% despite pt reports of SOB. Patient educated on proper positioning, energy conservation techniques, pursed lip breathing, and self pacing during daily activities. Patient stand by assist for rolling and 
Patient also instructed on benefits of incentive spirometer to assist with strengthening of lungs/improvement of breathing-- patient reports he would appreciate having one; nurse provided patient with spirometer; LPTA provided training to ensure correct usage-- able to perform x 5 reps without any difficulty; requiring addtl cuing to focus on exhalation with correct technique-- good return demonstration noted. Post training, patient declined walking or performing exercises, stating that he has not slept for 3 days, reporting nursing is aware and he was given meds yesterday to assist, however, unsuccessful. Patient educated on importance of participating with therapy to optimize max functional potential/reduce risk for deconditioning-- verbalized understanding, stating he will do more next time. LPTA assisted patient with elevation of Les-- requiring min A for repositioning of legs. Call bell within reach and all needs met at conclusion of session.    Progression toward goals:   []      Improving appropriately and progressing toward goals  [x]      Improving slowly and progressing toward goals  []      Not making progress toward goals and plan of care will be adjusted     PLAN:  Patient continues to benefit from skilled intervention to address the above impairments.  Continue treatment per established plan of care.    Further Equipment Recommendations for Discharge: rolling walker    [facility]: AM-PAC Inpatient Mobility Raw Score : 16      Current research shows that an AM-PAC score of 17 (13 without stairs) or less is not associated with a discharge to the patient's home setting and this patient demonstrates the potential endurance and/or tolerance for 3 hours of therapy each day at discharge.    This AMPA score should be considered in conjunction with interdisciplinary team recommendations to determine the most appropriate discharge setting. Patient's social support, diagnosis, medical stability, and prior level of 
by Chivo, Jared, RN  Outcome: Progressing  Flowsheets (Taken 10/30/2024 0800)  Absence of cardiac dysrhythmias or at baseline:   Monitor cardiac rate and rhythm   Assess for signs of decreased cardiac output  10/29/2024 2153 by Kacy Sen RN  Outcome: Progressing  Flowsheets (Taken 10/29/2024 2000)  Absence of cardiac dysrhythmias or at baseline:   Monitor cardiac rate and rhythm   Assess for signs of decreased cardiac output     Problem: Metabolic/Fluid and Electrolytes - Adult  Goal: Electrolytes maintained within normal limits  10/30/2024 0947 by Jared Waldron RN  Outcome: Progressing  Flowsheets (Taken 10/30/2024 0800)  Electrolytes maintained within normal limits:   Monitor labs and assess patient for signs and symptoms of electrolyte imbalances   Administer electrolyte replacement as ordered   Monitor response to electrolyte replacements, including repeat lab results as appropriate  10/29/2024 2153 by Kacy Sen RN  Outcome: Progressing  Flowsheets (Taken 10/29/2024 2000)  Electrolytes maintained within normal limits: Monitor labs and assess patient for signs and symptoms of electrolyte imbalances     
elevated      Activity Tolerance:   Activity Tolerance: Patient limited by fatigue;Patient limited by pain;Patient limited by endurance  Please refer to the flowsheet for vital signs taken during this treatment.    After treatment:   []         Patient left in no apparent distress sitting up in chair  [x]         Patient left in no apparent distress in bed  [x]         Call bell left within reach  [x]         Nursing notified  []         Caregiver present  [x]         Bed alarm activated  []         SCDs applied    COMMUNICATION/EDUCATION:   Patient Education  Education Given To: Patient  Education Provided: Role of Therapy;Plan of Care (importance of OOB mobility)  Education Method: Verbal;Teach Back  Barriers to Learning: None  Education Outcome: Verbalized understanding;Continued education needed    Thank you for this referral.  Harper Hogan, PT  Minutes: 31      Eval Complexity: Decision Making: Medium Complexity

## 2024-11-04 NOTE — CARE COORDINATION
Per Dr. Tamayo, pt is ready for d/c and refusing rehab placement.  Met with pt.  He is still declining rehab.  He stated \"I got a great big home.\"  Pt stated his friend is arranging caregivers for him at home and we can go ahead and arrange home health.  He stated he was going to go to [facility] when he leaves here.  CM informed him that we want to be sure he is safe going back home and he stated he has a lot of people that check up on him everyday and he is safe.  He stated he will get someone to pick him up when he is discharged and if he cannot find anyone, he will get [employer].  Updated Dr. Tamayo and requested for home health orders.        Jesusita Crespo, LIZZETHN RN  Care Management

## 2025-03-07 ENCOUNTER — APPOINTMENT (OUTPATIENT)
Facility: HOSPITAL | Age: 65
DRG: 321 | End: 2025-03-07
Payer: MEDICARE

## 2025-03-07 ENCOUNTER — HOSPITAL ENCOUNTER (INPATIENT)
Facility: HOSPITAL | Age: 65
LOS: 6 days | Discharge: HOME HEALTH | DRG: 321 | End: 2025-03-13
Attending: STUDENT IN AN ORGANIZED HEALTH CARE EDUCATION/TRAINING PROGRAM | Admitting: FAMILY MEDICINE
Payer: MEDICARE

## 2025-03-07 DIAGNOSIS — R06.03 RESPIRATORY DISTRESS: ICD-10-CM

## 2025-03-07 DIAGNOSIS — E83.42 HYPOMAGNESEMIA: ICD-10-CM

## 2025-03-07 DIAGNOSIS — I50.43 CHF (CONGESTIVE HEART FAILURE), NYHA CLASS I, ACUTE ON CHRONIC, COMBINED (HCC): ICD-10-CM

## 2025-03-07 DIAGNOSIS — I50.21 ACUTE HFREF (HEART FAILURE WITH REDUCED EJECTION FRACTION) (HCC): ICD-10-CM

## 2025-03-07 DIAGNOSIS — I25.10 CAD (CORONARY ARTERY DISEASE): ICD-10-CM

## 2025-03-07 DIAGNOSIS — I50.20 HFREF (HEART FAILURE WITH REDUCED EJECTION FRACTION) (HCC): ICD-10-CM

## 2025-03-07 DIAGNOSIS — I50.43 ACUTE ON CHRONIC COMBINED SYSTOLIC AND DIASTOLIC CONGESTIVE HEART FAILURE (HCC): Primary | ICD-10-CM

## 2025-03-07 PROBLEM — I50.9 ACUTE EXACERBATION OF CHRONIC HEART FAILURE (HCC): Status: ACTIVE | Noted: 2025-03-07

## 2025-03-07 LAB
ALBUMIN SERPL-MCNC: 2.7 G/DL (ref 3.4–5)
ALBUMIN/GLOB SERPL: 0.7 (ref 0.8–1.7)
ALP SERPL-CCNC: 143 U/L (ref 45–117)
ALT SERPL-CCNC: 34 U/L (ref 16–61)
AMPHET UR QL SCN: NEGATIVE
ANION GAP SERPL CALC-SCNC: 8 MMOL/L (ref 3–18)
APPEARANCE UR: CLEAR
AST SERPL-CCNC: 61 U/L (ref 10–38)
BARBITURATES UR QL SCN: NEGATIVE
BASOPHILS # BLD: 0.05 K/UL (ref 0–0.1)
BASOPHILS NFR BLD: 0.5 % (ref 0–2)
BENZODIAZ UR QL: NEGATIVE
BILIRUB SERPL-MCNC: 2.8 MG/DL (ref 0.2–1)
BILIRUB UR QL: NEGATIVE
BUN SERPL-MCNC: 30 MG/DL (ref 7–18)
BUN/CREAT SERPL: 18 (ref 12–20)
CALCIUM SERPL-MCNC: 7.2 MG/DL (ref 8.5–10.1)
CANNABINOIDS UR QL SCN: NEGATIVE
CHLORIDE SERPL-SCNC: 105 MMOL/L (ref 100–111)
CO2 SERPL-SCNC: 23 MMOL/L (ref 21–32)
COCAINE UR QL SCN: POSITIVE
COLOR UR: YELLOW
CREAT SERPL-MCNC: 1.65 MG/DL (ref 0.6–1.3)
D DIMER PPP FEU-MCNC: 3.44 UG/ML(FEU)
DIFFERENTIAL METHOD BLD: ABNORMAL
EOSINOPHIL # BLD: 0.27 K/UL (ref 0–0.4)
EOSINOPHIL NFR BLD: 2.8 % (ref 0–5)
ERYTHROCYTE [DISTWIDTH] IN BLOOD BY AUTOMATED COUNT: 14.6 % (ref 11.6–14.5)
FLUAV RNA SPEC QL NAA+PROBE: NOT DETECTED
FLUBV RNA SPEC QL NAA+PROBE: NOT DETECTED
GLOBULIN SER CALC-MCNC: 3.8 G/DL (ref 2–4)
GLUCOSE SERPL-MCNC: 90 MG/DL (ref 74–99)
GLUCOSE UR STRIP.AUTO-MCNC: NEGATIVE MG/DL
HCT VFR BLD AUTO: 45.7 % (ref 36–48)
HGB BLD-MCNC: 14.3 G/DL (ref 13–16)
HGB UR QL STRIP: NEGATIVE
IMM GRANULOCYTES # BLD AUTO: 0.03 K/UL (ref 0–0.04)
IMM GRANULOCYTES NFR BLD AUTO: 0.3 % (ref 0–0.5)
KETONES UR QL STRIP.AUTO: NEGATIVE MG/DL
LEUKOCYTE ESTERASE UR QL STRIP.AUTO: NEGATIVE
LYMPHOCYTES # BLD: 1.06 K/UL (ref 0.9–3.6)
LYMPHOCYTES NFR BLD: 11 % (ref 21–52)
Lab: ABNORMAL
MAGNESIUM SERPL-MCNC: 1.4 MG/DL (ref 1.6–2.6)
MCH RBC QN AUTO: 29.5 PG (ref 24–34)
MCHC RBC AUTO-ENTMCNC: 31.3 G/DL (ref 31–37)
MCV RBC AUTO: 94.4 FL (ref 78–100)
METHADONE UR QL: NEGATIVE
MONOCYTES # BLD: 0.88 K/UL (ref 0.05–1.2)
MONOCYTES NFR BLD: 9.1 % (ref 3–10)
NEUTS SEG # BLD: 7.33 K/UL (ref 1.8–8)
NEUTS SEG NFR BLD: 76.3 % (ref 40–73)
NITRITE UR QL STRIP.AUTO: NEGATIVE
NRBC # BLD: 0 K/UL (ref 0–0.01)
NRBC BLD-RTO: 0 PER 100 WBC
NT PRO BNP: ABNORMAL PG/ML (ref 0–900)
OPIATES UR QL: NEGATIVE
PCP UR QL: NEGATIVE
PH UR STRIP: 5.5 (ref 5–8)
PLATELET # BLD AUTO: 241 K/UL (ref 135–420)
PMV BLD AUTO: 11.9 FL (ref 9.2–11.8)
POTASSIUM SERPL-SCNC: 4.8 MMOL/L (ref 3.5–5.5)
PROT SERPL-MCNC: 6.5 G/DL (ref 6.4–8.2)
PROT UR STRIP-MCNC: NEGATIVE MG/DL
RBC # BLD AUTO: 4.84 M/UL (ref 4.35–5.65)
SARS-COV-2 RNA RESP QL NAA+PROBE: NOT DETECTED
SODIUM SERPL-SCNC: 136 MMOL/L (ref 136–145)
SOURCE: NORMAL
SP GR UR REFRACTOMETRY: 1.01 (ref 1–1.03)
TROPONIN I SERPL HS-MCNC: 23 NG/L (ref 0–78)
TROPONIN I SERPL HS-MCNC: 24 NG/L (ref 0–78)
UROBILINOGEN UR QL STRIP.AUTO: 0.2 EU/DL (ref 0.2–1)
WBC # BLD AUTO: 9.6 K/UL (ref 4.6–13.2)

## 2025-03-07 PROCEDURE — 93005 ELECTROCARDIOGRAM TRACING: CPT | Performed by: STUDENT IN AN ORGANIZED HEALTH CARE EDUCATION/TRAINING PROGRAM

## 2025-03-07 PROCEDURE — 80053 COMPREHEN METABOLIC PANEL: CPT

## 2025-03-07 PROCEDURE — 2500000003 HC RX 250 WO HCPCS

## 2025-03-07 PROCEDURE — 5A09357 ASSISTANCE WITH RESPIRATORY VENTILATION, LESS THAN 24 CONSECUTIVE HOURS, CONTINUOUS POSITIVE AIRWAY PRESSURE: ICD-10-PCS | Performed by: FAMILY MEDICINE

## 2025-03-07 PROCEDURE — 6360000002 HC RX W HCPCS: Performed by: STUDENT IN AN ORGANIZED HEALTH CARE EDUCATION/TRAINING PROGRAM

## 2025-03-07 PROCEDURE — 94761 N-INVAS EAR/PLS OXIMETRY MLT: CPT

## 2025-03-07 PROCEDURE — 81003 URINALYSIS AUTO W/O SCOPE: CPT

## 2025-03-07 PROCEDURE — 80307 DRUG TEST PRSMV CHEM ANLYZR: CPT

## 2025-03-07 PROCEDURE — 87636 SARSCOV2 & INF A&B AMP PRB: CPT

## 2025-03-07 PROCEDURE — 6370000000 HC RX 637 (ALT 250 FOR IP): Performed by: STUDENT IN AN ORGANIZED HEALTH CARE EDUCATION/TRAINING PROGRAM

## 2025-03-07 PROCEDURE — 6370000000 HC RX 637 (ALT 250 FOR IP)

## 2025-03-07 PROCEDURE — 94762 N-INVAS EAR/PLS OXIMTRY CONT: CPT

## 2025-03-07 PROCEDURE — 83735 ASSAY OF MAGNESIUM: CPT

## 2025-03-07 PROCEDURE — 96375 TX/PRO/DX INJ NEW DRUG ADDON: CPT

## 2025-03-07 PROCEDURE — 96374 THER/PROPH/DIAG INJ IV PUSH: CPT

## 2025-03-07 PROCEDURE — 94660 CPAP INITIATION&MGMT: CPT

## 2025-03-07 PROCEDURE — 83880 ASSAY OF NATRIURETIC PEPTIDE: CPT

## 2025-03-07 PROCEDURE — 99285 EMERGENCY DEPT VISIT HI MDM: CPT

## 2025-03-07 PROCEDURE — 71045 X-RAY EXAM CHEST 1 VIEW: CPT

## 2025-03-07 PROCEDURE — 85025 COMPLETE CBC W/AUTO DIFF WBC: CPT

## 2025-03-07 PROCEDURE — 2140000001 HC CVICU INTERMEDIATE R&B

## 2025-03-07 PROCEDURE — 85379 FIBRIN DEGRADATION QUANT: CPT

## 2025-03-07 PROCEDURE — 2700000000 HC OXYGEN THERAPY PER DAY

## 2025-03-07 PROCEDURE — 84484 ASSAY OF TROPONIN QUANT: CPT

## 2025-03-07 RX ORDER — POTASSIUM CHLORIDE 7.45 MG/ML
10 INJECTION INTRAVENOUS PRN
Status: DISCONTINUED | OUTPATIENT
Start: 2025-03-07 | End: 2025-03-08

## 2025-03-07 RX ORDER — SODIUM CHLORIDE 9 MG/ML
INJECTION, SOLUTION INTRAVENOUS PRN
Status: DISCONTINUED | OUTPATIENT
Start: 2025-03-07 | End: 2025-03-13 | Stop reason: HOSPADM

## 2025-03-07 RX ORDER — PANTOPRAZOLE SODIUM 20 MG/1
20 TABLET, DELAYED RELEASE ORAL
Status: DISCONTINUED | OUTPATIENT
Start: 2025-03-08 | End: 2025-03-13 | Stop reason: HOSPADM

## 2025-03-07 RX ORDER — FUROSEMIDE 10 MG/ML
40 INJECTION INTRAMUSCULAR; INTRAVENOUS 2 TIMES DAILY
Status: DISCONTINUED | OUTPATIENT
Start: 2025-03-08 | End: 2025-03-10

## 2025-03-07 RX ORDER — AMIODARONE HYDROCHLORIDE 200 MG/1
200 TABLET ORAL DAILY
Status: DISCONTINUED | OUTPATIENT
Start: 2025-03-08 | End: 2025-03-13 | Stop reason: HOSPADM

## 2025-03-07 RX ORDER — POTASSIUM CHLORIDE 1500 MG/1
40 TABLET, EXTENDED RELEASE ORAL PRN
Status: DISCONTINUED | OUTPATIENT
Start: 2025-03-07 | End: 2025-03-08

## 2025-03-07 RX ORDER — KETOROLAC TROMETHAMINE 15 MG/ML
15 INJECTION, SOLUTION INTRAMUSCULAR; INTRAVENOUS
Status: COMPLETED | OUTPATIENT
Start: 2025-03-07 | End: 2025-03-07

## 2025-03-07 RX ORDER — ROSUVASTATIN CALCIUM 20 MG/1
20 TABLET, COATED ORAL NIGHTLY
Status: DISCONTINUED | OUTPATIENT
Start: 2025-03-07 | End: 2025-03-13 | Stop reason: HOSPADM

## 2025-03-07 RX ORDER — CARVEDILOL 6.25 MG/1
6.25 TABLET ORAL 2 TIMES DAILY WITH MEALS
Status: DISCONTINUED | OUTPATIENT
Start: 2025-03-07 | End: 2025-03-07

## 2025-03-07 RX ORDER — ACETAMINOPHEN 325 MG/1
650 TABLET ORAL EVERY 6 HOURS PRN
Status: DISCONTINUED | OUTPATIENT
Start: 2025-03-07 | End: 2025-03-11 | Stop reason: SDUPTHER

## 2025-03-07 RX ORDER — NITROGLYCERIN 20 MG/100ML
5-200 INJECTION INTRAVENOUS CONTINUOUS
Status: DISCONTINUED | OUTPATIENT
Start: 2025-03-07 | End: 2025-03-08

## 2025-03-07 RX ORDER — NITROGLYCERIN 0.4 MG/1
0.4 TABLET SUBLINGUAL ONCE
Status: COMPLETED | OUTPATIENT
Start: 2025-03-07 | End: 2025-03-07

## 2025-03-07 RX ORDER — SPIRONOLACTONE 25 MG/1
25 TABLET ORAL DAILY
Status: DISCONTINUED | OUTPATIENT
Start: 2025-03-08 | End: 2025-03-09

## 2025-03-07 RX ORDER — FUROSEMIDE 10 MG/ML
80 INJECTION INTRAMUSCULAR; INTRAVENOUS ONCE
Status: COMPLETED | OUTPATIENT
Start: 2025-03-07 | End: 2025-03-07

## 2025-03-07 RX ORDER — ASPIRIN 81 MG/1
81 TABLET, CHEWABLE ORAL DAILY
Status: DISCONTINUED | OUTPATIENT
Start: 2025-03-08 | End: 2025-03-13 | Stop reason: HOSPADM

## 2025-03-07 RX ORDER — MAGNESIUM SULFATE IN WATER 40 MG/ML
2000 INJECTION, SOLUTION INTRAVENOUS PRN
Status: DISCONTINUED | OUTPATIENT
Start: 2025-03-07 | End: 2025-03-08

## 2025-03-07 RX ORDER — SODIUM CHLORIDE 0.9 % (FLUSH) 0.9 %
5-40 SYRINGE (ML) INJECTION EVERY 12 HOURS SCHEDULED
Status: DISCONTINUED | OUTPATIENT
Start: 2025-03-07 | End: 2025-03-13 | Stop reason: HOSPADM

## 2025-03-07 RX ORDER — SACUBITRIL AND VALSARTAN 24; 26 MG/1; MG/1
1 TABLET, FILM COATED ORAL 2 TIMES DAILY
Status: DISCONTINUED | OUTPATIENT
Start: 2025-03-07 | End: 2025-03-13 | Stop reason: HOSPADM

## 2025-03-07 RX ORDER — SODIUM CHLORIDE 0.9 % (FLUSH) 0.9 %
5-40 SYRINGE (ML) INJECTION PRN
Status: DISCONTINUED | OUTPATIENT
Start: 2025-03-07 | End: 2025-03-13 | Stop reason: HOSPADM

## 2025-03-07 RX ORDER — FENTANYL CITRATE 50 UG/ML
50 INJECTION, SOLUTION INTRAMUSCULAR; INTRAVENOUS
Status: COMPLETED | OUTPATIENT
Start: 2025-03-07 | End: 2025-03-07

## 2025-03-07 RX ORDER — NITROGLYCERIN 40 MG/100ML
5-200 INJECTION INTRAVENOUS CONTINUOUS
Status: DISCONTINUED | OUTPATIENT
Start: 2025-03-07 | End: 2025-03-07

## 2025-03-07 RX ORDER — ACETAMINOPHEN 650 MG/1
650 SUPPOSITORY RECTAL EVERY 6 HOURS PRN
Status: DISCONTINUED | OUTPATIENT
Start: 2025-03-07 | End: 2025-03-11 | Stop reason: SDUPTHER

## 2025-03-07 RX ORDER — CARVEDILOL 3.12 MG/1
3.12 TABLET ORAL 2 TIMES DAILY WITH MEALS
Status: DISCONTINUED | OUTPATIENT
Start: 2025-03-08 | End: 2025-03-13 | Stop reason: HOSPADM

## 2025-03-07 RX ORDER — POLYETHYLENE GLYCOL 3350 17 G/17G
17 POWDER, FOR SOLUTION ORAL DAILY PRN
Status: DISCONTINUED | OUTPATIENT
Start: 2025-03-07 | End: 2025-03-13 | Stop reason: HOSPADM

## 2025-03-07 RX ADMIN — SACUBITRIL AND VALSARTAN 1 TABLET: 24; 26 TABLET, FILM COATED ORAL at 22:45

## 2025-03-07 RX ADMIN — APIXABAN 5 MG: 5 TABLET, FILM COATED ORAL at 22:45

## 2025-03-07 RX ADMIN — FENTANYL CITRATE 50 MCG: 50 INJECTION, SOLUTION INTRAMUSCULAR; INTRAVENOUS at 16:39

## 2025-03-07 RX ADMIN — NITROGLYCERIN 0.4 MG: 0.4 TABLET SUBLINGUAL at 15:47

## 2025-03-07 RX ADMIN — KETOROLAC TROMETHAMINE 15 MG: 15 INJECTION, SOLUTION INTRAMUSCULAR; INTRAVENOUS at 16:32

## 2025-03-07 RX ADMIN — SODIUM CHLORIDE, PRESERVATIVE FREE 10 ML: 5 INJECTION INTRAVENOUS at 22:46

## 2025-03-07 RX ADMIN — FUROSEMIDE 80 MG: 10 INJECTION, SOLUTION INTRAMUSCULAR; INTRAVENOUS at 15:43

## 2025-03-07 RX ADMIN — ROSUVASTATIN CALCIUM 20 MG: 20 TABLET, FILM COATED ORAL at 22:45

## 2025-03-07 ASSESSMENT — PAIN DESCRIPTION - LOCATION
LOCATION: HIP
LOCATION: CHEST
LOCATION: HIP
LOCATION: CHEST

## 2025-03-07 ASSESSMENT — PAIN SCALES - GENERAL
PAINLEVEL_OUTOF10: 0
PAINLEVEL_OUTOF10: 6
PAINLEVEL_OUTOF10: 8
PAINLEVEL_OUTOF10: 7
PAINLEVEL_OUTOF10: 7

## 2025-03-07 ASSESSMENT — PAIN DESCRIPTION - DESCRIPTORS
DESCRIPTORS: SHARP

## 2025-03-07 ASSESSMENT — PAIN - FUNCTIONAL ASSESSMENT
PAIN_FUNCTIONAL_ASSESSMENT: ACTIVITIES ARE NOT PREVENTED
PAIN_FUNCTIONAL_ASSESSMENT: 0-10
PAIN_FUNCTIONAL_ASSESSMENT: 0-10
PAIN_FUNCTIONAL_ASSESSMENT: ACTIVITIES ARE NOT PREVENTED

## 2025-03-07 ASSESSMENT — PAIN DESCRIPTION - ORIENTATION
ORIENTATION: MID
ORIENTATION: MID

## 2025-03-07 ASSESSMENT — PAIN DESCRIPTION - PAIN TYPE: TYPE: ACUTE PAIN

## 2025-03-07 NOTE — ED TRIAGE NOTES
Pt presented to ED via [facility] transport; per medic pt picked up from home; pt had swelling in legs; pt wasw ambulatory and outside on arrival; pt c/o SOB and had crackles in lower bases;   Hx -HTN CHF    Per medic pt was given Nitro 0.4 SL; and placed on CPAP.  Pt takes Eliquis.  Pt Aox4; placed on monitor.

## 2025-03-07 NOTE — ED PROVIDER NOTES
EMERGENCY DEPARTMENT HISTORY AND PHYSICAL EXAM      Date: 3/7/2025  Patient Name: Moiz May    History of Presenting Illness     Chief Complaint   Patient presents with    Respiratory Distress       History (Context): Moiz May is a 64 y.o. male with past medical history notable for CHF (ejection fraction 5 to 10%), atrial fibrillation on Eliquis, hypertension, hyperlipidemia, and prior subarachnoid hemorrhage who presents to the ED today with chief complaint of shortness of breath.  He says that he was at home when he felt as though he had worsening shortness of breath which is going on for the past few weeks but he no longer was able to tolerate it as of today.  This prompted him to call EMS and he was subsequently transferred here.  He has chest pain over the middle of his chest which he describes as being 8 out of 10.  He also endorsing weight gain with swelling in his lower extremities.  Patient does not use home oxygen.  He arrives wearing CPAP which he said improved some of his symptoms.  He says that he has been compliant with his home medications including GDMT.  No fevers or chills over this time.  No abdominal pain, no changes to bowel bladder habits.      PCP: Alden Betts, DO    Current Facility-Administered Medications   Medication Dose Route Frequency Provider Last Rate Last Admin    nitroGLYCERIN 200 mcg/mL in dextrose 5%  5-200 mcg/min IntraVENous Continuous Giancarlo Recinos MD        sodium chloride flush 0.9 % injection 5-40 mL  5-40 mL IntraVENous 2 times per day Norma Walton MD        sodium chloride flush 0.9 % injection 5-40 mL  5-40 mL IntraVENous PRN Norma Walton MD        0.9 % sodium chloride infusion   IntraVENous PRN Norma Walton MD        potassium chloride (KLOR-CON M) extended release tablet 40 mEq  40 mEq Oral PRN Norma Walton MD        Or    potassium bicarb-citric acid (EFFER-K) effervescent tablet 40 mEq  40 mEq Oral PRN

## 2025-03-08 ENCOUNTER — APPOINTMENT (OUTPATIENT)
Facility: HOSPITAL | Age: 65
DRG: 321 | End: 2025-03-08
Payer: MEDICARE

## 2025-03-08 LAB
ANION GAP SERPL CALC-SCNC: 5 MMOL/L (ref 3–18)
ANION GAP SERPL CALC-SCNC: 8 MMOL/L (ref 3–18)
BASOPHILS # BLD: 0.05 K/UL (ref 0–0.1)
BASOPHILS NFR BLD: 0.6 % (ref 0–2)
BUN SERPL-MCNC: 27 MG/DL (ref 7–18)
BUN SERPL-MCNC: 29 MG/DL (ref 7–18)
BUN/CREAT SERPL: 15 (ref 12–20)
BUN/CREAT SERPL: 17 (ref 12–20)
CALCIUM SERPL-MCNC: 6.8 MG/DL (ref 8.5–10.1)
CALCIUM SERPL-MCNC: 6.9 MG/DL (ref 8.5–10.1)
CHLORIDE SERPL-SCNC: 102 MMOL/L (ref 100–111)
CHLORIDE SERPL-SCNC: 103 MMOL/L (ref 100–111)
CO2 SERPL-SCNC: 26 MMOL/L (ref 21–32)
CO2 SERPL-SCNC: 27 MMOL/L (ref 21–32)
CREAT SERPL-MCNC: 1.67 MG/DL (ref 0.6–1.3)
CREAT SERPL-MCNC: 1.77 MG/DL (ref 0.6–1.3)
DIFFERENTIAL METHOD BLD: ABNORMAL
ECHO AO ASC DIAM: 3.7 CM
ECHO AO ASCENDING AORTA INDEX: 1.82 CM/M2
ECHO AO ROOT DIAM: 3.5 CM
ECHO AO ROOT INDEX: 1.72 CM/M2
ECHO AV PEAK GRADIENT: 3 MMHG
ECHO AV PEAK VELOCITY: 0.9 M/S
ECHO AV VELOCITY RATIO: 0.67
ECHO BSA: 2.03 M2
ECHO EST RA PRESSURE: 8 MMHG
ECHO LA DIAMETER INDEX: 2.32 CM/M2
ECHO LA DIAMETER: 4.7 CM
ECHO LA TO AORTIC ROOT RATIO: 1.34
ECHO LA VOL A-L A2C: 66 ML (ref 18–58)
ECHO LA VOL A-L A4C: 90 ML (ref 18–58)
ECHO LA VOL BP: 79 ML (ref 18–58)
ECHO LA VOL MOD A2C: 62 ML (ref 18–58)
ECHO LA VOL MOD A4C: 85 ML (ref 18–58)
ECHO LA VOL/BSA BIPLANE: 39 ML/M2 (ref 16–34)
ECHO LA VOLUME AREA LENGTH: 84 ML
ECHO LA VOLUME INDEX A-L A2C: 33 ML/M2 (ref 16–34)
ECHO LA VOLUME INDEX A-L A4C: 44 ML/M2 (ref 16–34)
ECHO LA VOLUME INDEX AREA LENGTH: 41 ML/M2 (ref 16–34)
ECHO LA VOLUME INDEX MOD A2C: 31 ML/M2 (ref 16–34)
ECHO LA VOLUME INDEX MOD A4C: 42 ML/M2 (ref 16–34)
ECHO LV E' LATERAL VELOCITY: 4.51 CM/S
ECHO LV E' SEPTAL VELOCITY: 4.43 CM/S
ECHO LV EDV A2C: 181 ML
ECHO LV EDV A4C: 173 ML
ECHO LV EDV BP: 178 ML (ref 67–155)
ECHO LV EDV INDEX A4C: 85 ML/M2
ECHO LV EDV INDEX BP: 88 ML/M2
ECHO LV EDV NDEX A2C: 89 ML/M2
ECHO LV EF PHYSICIAN: 15 %
ECHO LV EJECTION FRACTION A2C: 14 %
ECHO LV EJECTION FRACTION A4C: 13 %
ECHO LV EJECTION FRACTION BIPLANE: 13 % (ref 55–100)
ECHO LV ESV A2C: 156 ML
ECHO LV ESV A4C: 150 ML
ECHO LV ESV BP: 154 ML (ref 22–58)
ECHO LV ESV INDEX A2C: 77 ML/M2
ECHO LV ESV INDEX A4C: 74 ML/M2
ECHO LV ESV INDEX BP: 76 ML/M2
ECHO LV FRACTIONAL SHORTENING: 3 % (ref 28–44)
ECHO LV INTERNAL DIMENSION DIASTOLE INDEX: 3 CM/M2
ECHO LV INTERNAL DIMENSION DIASTOLIC: 6.1 CM (ref 4.2–5.9)
ECHO LV INTERNAL DIMENSION SYSTOLIC INDEX: 2.91 CM/M2
ECHO LV INTERNAL DIMENSION SYSTOLIC: 5.9 CM
ECHO LV IVSD: 0.9 CM (ref 0.6–1)
ECHO LV MASS 2D: 237.7 G (ref 88–224)
ECHO LV MASS INDEX 2D: 117.1 G/M2 (ref 49–115)
ECHO LV POSTERIOR WALL DIASTOLIC: 1 CM (ref 0.6–1)
ECHO LV RELATIVE WALL THICKNESS RATIO: 0.33
ECHO LVOT PEAK GRADIENT: 1 MMHG
ECHO LVOT PEAK VELOCITY: 0.6 M/S
ECHO MV A VELOCITY: 0.49 M/S
ECHO MV E DECELERATION TIME (DT): 113.1 MS
ECHO MV E VELOCITY: 0.92 M/S
ECHO MV E/A RATIO: 1.88
ECHO MV E/E' LATERAL: 20.4
ECHO MV E/E' RATIO (AVERAGED): 20.58
ECHO MV E/E' SEPTAL: 20.77
ECHO PULMONARY ARTERY END DIASTOLIC PRESSURE: 14 MMHG
ECHO PV MAX VELOCITY: 0.6 M/S
ECHO PV PEAK GRADIENT: 1 MMHG
ECHO PV REGURGITANT MAX VELOCITY: 1.9 M/S
ECHO RA VOLUME BIPLANE METHOD OF DISKS: 72 ML
ECHO RA VOLUME INDEX BP: 35 ML/M2
ECHO RIGHT VENTRICULAR SYSTOLIC PRESSURE (RVSP): 49 MMHG
ECHO RV FREE WALL PEAK S': 10.3 CM/S
ECHO RV TAPSE: 1.4 CM (ref 1.7–?)
ECHO TV REGURGITANT MAX VELOCITY: 3.2 M/S
ECHO TV REGURGITANT PEAK GRADIENT: 41 MMHG
EKG ATRIAL RATE: 86 BPM
EKG ATRIAL RATE: 91 BPM
EKG DIAGNOSIS: NORMAL
EKG DIAGNOSIS: NORMAL
EKG P AXIS: 61 DEGREES
EKG P AXIS: 70 DEGREES
EKG P-R INTERVAL: 116 MS
EKG P-R INTERVAL: 170 MS
EKG Q-T INTERVAL: 386 MS
EKG Q-T INTERVAL: 392 MS
EKG QRS DURATION: 92 MS
EKG QRS DURATION: 94 MS
EKG QTC CALCULATION (BAZETT): 469 MS
EKG QTC CALCULATION (BAZETT): 474 MS
EKG R AXIS: 80 DEGREES
EKG R AXIS: 95 DEGREES
EKG T AXIS: 46 DEGREES
EKG T AXIS: 55 DEGREES
EKG VENTRICULAR RATE: 86 BPM
EKG VENTRICULAR RATE: 91 BPM
EOSINOPHIL # BLD: 0.35 K/UL (ref 0–0.4)
EOSINOPHIL NFR BLD: 4.1 % (ref 0–5)
ERYTHROCYTE [DISTWIDTH] IN BLOOD BY AUTOMATED COUNT: 14.7 % (ref 11.6–14.5)
GLUCOSE SERPL-MCNC: 127 MG/DL (ref 74–99)
GLUCOSE SERPL-MCNC: 87 MG/DL (ref 74–99)
HCT VFR BLD AUTO: 44.1 % (ref 36–48)
HGB BLD-MCNC: 13.9 G/DL (ref 13–16)
IMM GRANULOCYTES # BLD AUTO: 0.02 K/UL (ref 0–0.04)
IMM GRANULOCYTES NFR BLD AUTO: 0.2 % (ref 0–0.5)
LYMPHOCYTES # BLD: 1.08 K/UL (ref 0.9–3.6)
LYMPHOCYTES NFR BLD: 12.7 % (ref 21–52)
MAGNESIUM SERPL-MCNC: 1.7 MG/DL (ref 1.6–2.6)
MCH RBC QN AUTO: 30.3 PG (ref 24–34)
MCHC RBC AUTO-ENTMCNC: 31.5 G/DL (ref 31–37)
MCV RBC AUTO: 96.1 FL (ref 78–100)
MONOCYTES # BLD: 0.76 K/UL (ref 0.05–1.2)
MONOCYTES NFR BLD: 9 % (ref 3–10)
NEUTS SEG # BLD: 6.22 K/UL (ref 1.8–8)
NEUTS SEG NFR BLD: 73.4 % (ref 40–73)
NRBC # BLD: 0 K/UL (ref 0–0.01)
NRBC BLD-RTO: 0 PER 100 WBC
PLATELET # BLD AUTO: 225 K/UL (ref 135–420)
PMV BLD AUTO: 12.3 FL (ref 9.2–11.8)
POTASSIUM SERPL-SCNC: 3.6 MMOL/L (ref 3.5–5.5)
POTASSIUM SERPL-SCNC: 3.6 MMOL/L (ref 3.5–5.5)
RBC # BLD AUTO: 4.59 M/UL (ref 4.35–5.65)
SODIUM SERPL-SCNC: 134 MMOL/L (ref 136–145)
SODIUM SERPL-SCNC: 137 MMOL/L (ref 136–145)
WBC # BLD AUTO: 8.5 K/UL (ref 4.6–13.2)

## 2025-03-08 PROCEDURE — 93306 TTE W/DOPPLER COMPLETE: CPT

## 2025-03-08 PROCEDURE — 93010 ELECTROCARDIOGRAM REPORT: CPT | Performed by: INTERNAL MEDICINE

## 2025-03-08 PROCEDURE — 36415 COLL VENOUS BLD VENIPUNCTURE: CPT

## 2025-03-08 PROCEDURE — 6360000002 HC RX W HCPCS

## 2025-03-08 PROCEDURE — 94761 N-INVAS EAR/PLS OXIMETRY MLT: CPT

## 2025-03-08 PROCEDURE — 6370000000 HC RX 637 (ALT 250 FOR IP)

## 2025-03-08 PROCEDURE — 83735 ASSAY OF MAGNESIUM: CPT

## 2025-03-08 PROCEDURE — 99223 1ST HOSP IP/OBS HIGH 75: CPT | Performed by: INTERNAL MEDICINE

## 2025-03-08 PROCEDURE — 85025 COMPLETE CBC W/AUTO DIFF WBC: CPT

## 2025-03-08 PROCEDURE — 80048 BASIC METABOLIC PNL TOTAL CA: CPT

## 2025-03-08 PROCEDURE — 2500000003 HC RX 250 WO HCPCS

## 2025-03-08 PROCEDURE — 93306 TTE W/DOPPLER COMPLETE: CPT | Performed by: INTERNAL MEDICINE

## 2025-03-08 PROCEDURE — 2700000000 HC OXYGEN THERAPY PER DAY

## 2025-03-08 PROCEDURE — 93005 ELECTROCARDIOGRAM TRACING: CPT | Performed by: FAMILY MEDICINE

## 2025-03-08 PROCEDURE — 2140000001 HC CVICU INTERMEDIATE R&B

## 2025-03-08 RX ORDER — TRIAMCINOLONE ACETONIDE 1 MG/G
CREAM TOPICAL 2 TIMES DAILY
Status: DISCONTINUED | OUTPATIENT
Start: 2025-03-08 | End: 2025-03-13 | Stop reason: HOSPADM

## 2025-03-08 RX ORDER — MILRINONE LACTATE 0.2 MG/ML
0.25 INJECTION, SOLUTION INTRAVENOUS CONTINUOUS
Status: DISCONTINUED | OUTPATIENT
Start: 2025-03-08 | End: 2025-03-13 | Stop reason: HOSPADM

## 2025-03-08 RX ORDER — LIDOCAINE 4 G/G
1 PATCH TOPICAL DAILY
Status: DISCONTINUED | OUTPATIENT
Start: 2025-03-08 | End: 2025-03-13 | Stop reason: HOSPADM

## 2025-03-08 RX ORDER — POTASSIUM CHLORIDE 1500 MG/1
40 TABLET, EXTENDED RELEASE ORAL ONCE
Status: COMPLETED | OUTPATIENT
Start: 2025-03-08 | End: 2025-03-08

## 2025-03-08 RX ORDER — MAGNESIUM SULFATE IN WATER 40 MG/ML
2000 INJECTION, SOLUTION INTRAVENOUS PRN
Status: DISCONTINUED | OUTPATIENT
Start: 2025-03-08 | End: 2025-03-08

## 2025-03-08 RX ADMIN — EMPAGLIFLOZIN 10 MG: 10 TABLET, FILM COATED ORAL at 09:19

## 2025-03-08 RX ADMIN — SODIUM CHLORIDE, PRESERVATIVE FREE 10 ML: 5 INJECTION INTRAVENOUS at 09:21

## 2025-03-08 RX ADMIN — TRIAMCINOLONE ACETONIDE: 1 CREAM TOPICAL at 09:19

## 2025-03-08 RX ADMIN — PANTOPRAZOLE SODIUM 20 MG: 40 TABLET, DELAYED RELEASE ORAL at 16:21

## 2025-03-08 RX ADMIN — CARVEDILOL 3.12 MG: 3.12 TABLET, FILM COATED ORAL at 09:19

## 2025-03-08 RX ADMIN — FUROSEMIDE 40 MG: 10 INJECTION, SOLUTION INTRAMUSCULAR; INTRAVENOUS at 17:36

## 2025-03-08 RX ADMIN — ACETAMINOPHEN 650 MG: 325 TABLET ORAL at 09:18

## 2025-03-08 RX ADMIN — PANTOPRAZOLE SODIUM 20 MG: 40 TABLET, DELAYED RELEASE ORAL at 06:46

## 2025-03-08 RX ADMIN — ASPIRIN 81 MG CHEWABLE TABLET 81 MG: 81 TABLET CHEWABLE at 09:18

## 2025-03-08 RX ADMIN — SACUBITRIL AND VALSARTAN 1 TABLET: 24; 26 TABLET, FILM COATED ORAL at 09:19

## 2025-03-08 RX ADMIN — APIXABAN 5 MG: 5 TABLET, FILM COATED ORAL at 20:18

## 2025-03-08 RX ADMIN — SACUBITRIL AND VALSARTAN 1 TABLET: 24; 26 TABLET, FILM COATED ORAL at 20:18

## 2025-03-08 RX ADMIN — POTASSIUM CHLORIDE 40 MEQ: 1500 TABLET, EXTENDED RELEASE ORAL at 20:18

## 2025-03-08 RX ADMIN — FUROSEMIDE 40 MG: 10 INJECTION, SOLUTION INTRAMUSCULAR; INTRAVENOUS at 09:18

## 2025-03-08 RX ADMIN — SODIUM CHLORIDE, PRESERVATIVE FREE 10 ML: 5 INJECTION INTRAVENOUS at 23:20

## 2025-03-08 RX ADMIN — SPIRONOLACTONE 25 MG: 25 TABLET, FILM COATED ORAL at 09:19

## 2025-03-08 RX ADMIN — AMIODARONE HYDROCHLORIDE 200 MG: 200 TABLET ORAL at 09:18

## 2025-03-08 RX ADMIN — POTASSIUM BICARBONATE 50 MEQ: 978 TABLET, EFFERVESCENT ORAL at 09:33

## 2025-03-08 RX ADMIN — ROSUVASTATIN CALCIUM 20 MG: 20 TABLET, FILM COATED ORAL at 20:18

## 2025-03-08 RX ADMIN — APIXABAN 5 MG: 5 TABLET, FILM COATED ORAL at 09:19

## 2025-03-08 RX ADMIN — MILRINONE LACTATE 0.25 MCG/KG/MIN: 0.2 INJECTION, SOLUTION INTRAVENOUS at 14:12

## 2025-03-08 RX ADMIN — MAGNESIUM SULFATE HEPTAHYDRATE 2000 MG: 40 INJECTION, SOLUTION INTRAVENOUS at 01:25

## 2025-03-08 RX ADMIN — CARVEDILOL 3.12 MG: 3.12 TABLET, FILM COATED ORAL at 17:36

## 2025-03-08 ASSESSMENT — PAIN DESCRIPTION - ORIENTATION
ORIENTATION: LEFT

## 2025-03-08 ASSESSMENT — PAIN - FUNCTIONAL ASSESSMENT
PAIN_FUNCTIONAL_ASSESSMENT: ACTIVITIES ARE NOT PREVENTED

## 2025-03-08 ASSESSMENT — PAIN DESCRIPTION - LOCATION
LOCATION: RIB CAGE

## 2025-03-08 ASSESSMENT — PAIN DESCRIPTION - PAIN TYPE
TYPE: ACUTE PAIN

## 2025-03-08 ASSESSMENT — PAIN DESCRIPTION - FREQUENCY
FREQUENCY: INTERMITTENT
FREQUENCY: CONTINUOUS
FREQUENCY: CONTINUOUS

## 2025-03-08 ASSESSMENT — PAIN DESCRIPTION - ONSET
ONSET: ON-GOING

## 2025-03-08 ASSESSMENT — PAIN SCALES - GENERAL
PAINLEVEL_OUTOF10: 7
PAINLEVEL_OUTOF10: 0
PAINLEVEL_OUTOF10: 7
PAINLEVEL_OUTOF10: 0
PAINLEVEL_OUTOF10: 8
PAINLEVEL_OUTOF10: 7

## 2025-03-08 ASSESSMENT — PAIN DESCRIPTION - DESCRIPTORS
DESCRIPTORS: SHARP

## 2025-03-08 NOTE — H&P
Diagnosis       Sinus rhythm with occasional premature ventricular complexes  Rightward axis  Low voltage QRS  Cannot rule out Anterior infarct (cited on or before 30-APR-2024)  Abnormal ECG  Confirmed by Stephon Holman MD ([id number]) on 10/30/2024 9:30:39 PM           ASSESSMENT AND PLAN  Moiz May is a 64 y.o. year old male with PMH of HFrEF (EF of 5 to 10%) w/ AICD, ICMO, CAD s/p PCI,LUE DVT, pafib, HTN, ckd3b, cocaine use admitted for Acute exacerbation of chronic heart failure (HCC) [I50.9].         Acute decompensated systolic and diastolic HF  Chest pain   Ischemic cardiomyopathy w/ AICD   CAD s/p PCI, HLD, HTN  -current symptoms likely due to acute exacerbation of heart failure due to being without diuretics and several other meds for several days. Only has carvedilol, losartan,statin, and farixga with him. Chest pain could be secondary to cocaine use, is positive on UDS, with troponin wnl and reassuring EKG, likely not ACS, is reproducible with palpation, could be a component of MSK pain   -chronically non-adherent with medication regimen  - last recommended regimen at last hospital discharge (11/2024) as follows:      - gdmt: spironolactone 25 mg, carvedilol 3.125 BID, Entresto 24-26 daily, Farxiga 10 mg, lasix 40 mg BID     - icmo: ASA 81 mg daily, Rosuvastatin 20 mg daily   -ECHO (10/24): EF 5-10%, indeterminate diastolic dysfunction (previous was grade III), severe global hypokinesis   - CXR: increased basilar interstitial thickening, possible edema. No consolidation. Pulmonary vascular congestion and cardiomegaly. AICD  - EKG: SR, HR 91   - BNP 19,206, troponin 24 --> 23  -given nitro 0.4 SL by EMS, fentayl and toradol for pain, Lasix 80 mg IV x1     Plan:  -admit stepdown w/ tele  -cardiology consulted in ED, appreciate recommendations  -restart reccommended GDMT regimen   -spironolactone, carvedilol, entresto, Jardiance (substitute for Farxiga)  -restart asa and statin   -acetaminophen PRN for pain

## 2025-03-08 NOTE — PLAN OF CARE
Problem: Chronic Conditions and Co-morbidities  Goal: Patient's chronic conditions and co-morbidity symptoms are monitored and maintained or improved  3/8/2025 1054 by Anne Thomas RN  Outcome: Progressing  Flowsheets (Taken 3/8/2025 0915)  Care Plan - Patient's Chronic Conditions and Co-Morbidity Symptoms are Monitored and Maintained or Improved:   Monitor and assess patient's chronic conditions and comorbid symptoms for stability, deterioration, or improvement   Collaborate with multidisciplinary team to address chronic and comorbid conditions and prevent exacerbation or deterioration   Update acute care plan with appropriate goals if chronic or comorbid symptoms are exacerbated and prevent overall improvement and discharge  3/8/2025 0334 by Princess Vira Chisholm, MARNIE  Outcome: Progressing  Flowsheets (Taken 3/7/2025 2230)  Care Plan - Patient's Chronic Conditions and Co-Morbidity Symptoms are Monitored and Maintained or Improved: Monitor and assess patient's chronic conditions and comorbid symptoms for stability, deterioration, or improvement     Problem: Discharge Planning  Goal: Discharge to home or other facility with appropriate resources  3/8/2025 1054 by Anne Thomas RN  Outcome: Progressing  Flowsheets (Taken 3/8/2025 0915)  Discharge to home or other facility with appropriate resources: Identify barriers to discharge with patient and caregiver  3/8/2025 0334 by Princess Vira Chisholm, MARNIE  Outcome: Progressing  Flowsheets (Taken 3/7/2025 2230)  Discharge to home or other facility with appropriate resources:   Identify barriers to discharge with patient and caregiver   Arrange for needed discharge resources and transportation as appropriate     Problem: Safety - Adult  Goal: Free from fall injury  3/8/2025 1054 by Anne Thomas RN  Outcome: Progressing  Flowsheets (Taken 3/8/2025 1054)  Free From Fall Injury:   Instruct family/caregiver on patient safety   Based on caregiver fall risk screen, instruct

## 2025-03-08 NOTE — CONSULTS
found for this or any previous visit.    No results found for this or any previous visit.    Xray Result (most recent):  XR CHEST PORTABLE 03/07/2025    Narrative  EXAM: CHEST  CPT CODE: 60543    CLINICAL INDICATION/HISTORY: Shortness of breath.    COMPARISON: 27 October 2024.    TECHNIQUE: Single AP portable view of chest at 1550.    FINDINGS: There is a left-sided defibrillating pacemaker which obscures a  portion of the lateral mid left chest. Dual leads are present and intact.    There may be some subtle increased interstitial opacities in both lung bases.  This is similar but possibly slightly less prominent than seen previously. There  is also mild prominence of the pulmonary hilar vessels. The mediastinum is not  widened but the heart is enlarged.  The bones and soft tissues are unremarkable.  There is somewhat improved aeration over the prior study    Impression  Increased basilar interstitial thickening, possible edema. No consolidations.  Pulmonary vascular congestion and cardiomegaly. AICD.        Electronically signed by Ron Fernandez      Signed By: Aury Carrington, APRN - NP     March 8, 2025

## 2025-03-09 LAB
ANION GAP SERPL CALC-SCNC: 6 MMOL/L (ref 3–18)
ANION GAP SERPL CALC-SCNC: 8 MMOL/L (ref 3–18)
BASOPHILS # BLD: 0.06 K/UL (ref 0–0.1)
BASOPHILS NFR BLD: 0.7 % (ref 0–2)
BUN SERPL-MCNC: 26 MG/DL (ref 7–18)
BUN SERPL-MCNC: 27 MG/DL (ref 7–18)
BUN/CREAT SERPL: 15 (ref 12–20)
BUN/CREAT SERPL: 15 (ref 12–20)
CA-I SERPL-SCNC: 0.96 MMOL/L (ref 1.15–1.33)
CALCIUM SERPL-MCNC: 7 MG/DL (ref 8.5–10.1)
CALCIUM SERPL-MCNC: 7.6 MG/DL (ref 8.5–10.1)
CHLORIDE SERPL-SCNC: 101 MMOL/L (ref 100–111)
CHLORIDE SERPL-SCNC: 99 MMOL/L (ref 100–111)
CO2 SERPL-SCNC: 27 MMOL/L (ref 21–32)
CO2 SERPL-SCNC: 28 MMOL/L (ref 21–32)
CREAT SERPL-MCNC: 1.7 MG/DL (ref 0.6–1.3)
CREAT SERPL-MCNC: 1.75 MG/DL (ref 0.6–1.3)
DIFFERENTIAL METHOD BLD: ABNORMAL
EKG ATRIAL RATE: 79 BPM
EKG DIAGNOSIS: NORMAL
EKG P AXIS: 49 DEGREES
EKG P-R INTERVAL: 126 MS
EKG Q-T INTERVAL: 428 MS
EKG QRS DURATION: 94 MS
EKG QTC CALCULATION (BAZETT): 490 MS
EKG R AXIS: 79 DEGREES
EKG T AXIS: 48 DEGREES
EKG VENTRICULAR RATE: 79 BPM
EOSINOPHIL # BLD: 0.26 K/UL (ref 0–0.4)
EOSINOPHIL NFR BLD: 3 % (ref 0–5)
ERYTHROCYTE [DISTWIDTH] IN BLOOD BY AUTOMATED COUNT: 14.5 % (ref 11.6–14.5)
GLUCOSE SERPL-MCNC: 144 MG/DL (ref 74–99)
GLUCOSE SERPL-MCNC: 92 MG/DL (ref 74–99)
HCT VFR BLD AUTO: 45.8 % (ref 36–48)
HGB BLD-MCNC: 14.5 G/DL (ref 13–16)
IMM GRANULOCYTES # BLD AUTO: 0.02 K/UL (ref 0–0.04)
IMM GRANULOCYTES NFR BLD AUTO: 0.2 % (ref 0–0.5)
LYMPHOCYTES # BLD: 0.93 K/UL (ref 0.9–3.6)
LYMPHOCYTES NFR BLD: 10.8 % (ref 21–52)
MAGNESIUM SERPL-MCNC: 1.5 MG/DL (ref 1.6–2.6)
MCH RBC QN AUTO: 29.5 PG (ref 24–34)
MCHC RBC AUTO-ENTMCNC: 31.7 G/DL (ref 31–37)
MCV RBC AUTO: 93.1 FL (ref 78–100)
MONOCYTES # BLD: 0.87 K/UL (ref 0.05–1.2)
MONOCYTES NFR BLD: 10.1 % (ref 3–10)
NEUTS SEG # BLD: 6.49 K/UL (ref 1.8–8)
NEUTS SEG NFR BLD: 75.2 % (ref 40–73)
NRBC # BLD: 0 K/UL (ref 0–0.01)
NRBC BLD-RTO: 0 PER 100 WBC
PLATELET # BLD AUTO: 251 K/UL (ref 135–420)
PMV BLD AUTO: 12.2 FL (ref 9.2–11.8)
POTASSIUM SERPL-SCNC: 3.4 MMOL/L (ref 3.5–5.5)
POTASSIUM SERPL-SCNC: 3.9 MMOL/L (ref 3.5–5.5)
RBC # BLD AUTO: 4.92 M/UL (ref 4.35–5.65)
SODIUM SERPL-SCNC: 134 MMOL/L (ref 136–145)
SODIUM SERPL-SCNC: 135 MMOL/L (ref 136–145)
TROPONIN I SERPL HS-MCNC: 18 NG/L (ref 0–78)
WBC # BLD AUTO: 8.6 K/UL (ref 4.6–13.2)

## 2025-03-09 PROCEDURE — 2500000003 HC RX 250 WO HCPCS

## 2025-03-09 PROCEDURE — 6370000000 HC RX 637 (ALT 250 FOR IP)

## 2025-03-09 PROCEDURE — 82330 ASSAY OF CALCIUM: CPT

## 2025-03-09 PROCEDURE — 6360000002 HC RX W HCPCS

## 2025-03-09 PROCEDURE — 36415 COLL VENOUS BLD VENIPUNCTURE: CPT

## 2025-03-09 PROCEDURE — 84484 ASSAY OF TROPONIN QUANT: CPT

## 2025-03-09 PROCEDURE — 99232 SBSQ HOSP IP/OBS MODERATE 35: CPT | Performed by: INTERNAL MEDICINE

## 2025-03-09 PROCEDURE — 80048 BASIC METABOLIC PNL TOTAL CA: CPT

## 2025-03-09 PROCEDURE — 85025 COMPLETE CBC W/AUTO DIFF WBC: CPT

## 2025-03-09 PROCEDURE — 2140000001 HC CVICU INTERMEDIATE R&B

## 2025-03-09 PROCEDURE — 93005 ELECTROCARDIOGRAM TRACING: CPT

## 2025-03-09 PROCEDURE — 93010 ELECTROCARDIOGRAM REPORT: CPT | Performed by: INTERNAL MEDICINE

## 2025-03-09 PROCEDURE — 83735 ASSAY OF MAGNESIUM: CPT

## 2025-03-09 PROCEDURE — 94761 N-INVAS EAR/PLS OXIMETRY MLT: CPT

## 2025-03-09 RX ORDER — SPIRONOLACTONE 25 MG/1
12.5 TABLET ORAL DAILY
Status: DISCONTINUED | OUTPATIENT
Start: 2025-03-10 | End: 2025-03-12

## 2025-03-09 RX ORDER — CALCIUM GLUCONATE 20 MG/ML
1000 INJECTION, SOLUTION INTRAVENOUS
Status: COMPLETED | OUTPATIENT
Start: 2025-03-09 | End: 2025-03-09

## 2025-03-09 RX ORDER — MAGNESIUM SULFATE HEPTAHYDRATE 40 MG/ML
2000 INJECTION, SOLUTION INTRAVENOUS ONCE
Status: COMPLETED | OUTPATIENT
Start: 2025-03-09 | End: 2025-03-09

## 2025-03-09 RX ADMIN — CARVEDILOL 3.12 MG: 3.12 TABLET, FILM COATED ORAL at 08:49

## 2025-03-09 RX ADMIN — TRIAMCINOLONE ACETONIDE: 1 CREAM TOPICAL at 03:51

## 2025-03-09 RX ADMIN — TRIAMCINOLONE ACETONIDE: 1 CREAM TOPICAL at 21:56

## 2025-03-09 RX ADMIN — SACUBITRIL AND VALSARTAN 1 TABLET: 24; 26 TABLET, FILM COATED ORAL at 21:53

## 2025-03-09 RX ADMIN — PANTOPRAZOLE SODIUM 20 MG: 40 TABLET, DELAYED RELEASE ORAL at 15:58

## 2025-03-09 RX ADMIN — PANTOPRAZOLE SODIUM 20 MG: 40 TABLET, DELAYED RELEASE ORAL at 06:31

## 2025-03-09 RX ADMIN — ACETAMINOPHEN 650 MG: 325 TABLET ORAL at 16:54

## 2025-03-09 RX ADMIN — FUROSEMIDE 40 MG: 10 INJECTION, SOLUTION INTRAMUSCULAR; INTRAVENOUS at 08:49

## 2025-03-09 RX ADMIN — APIXABAN 5 MG: 5 TABLET, FILM COATED ORAL at 08:49

## 2025-03-09 RX ADMIN — APIXABAN 5 MG: 5 TABLET, FILM COATED ORAL at 21:52

## 2025-03-09 RX ADMIN — SODIUM CHLORIDE, PRESERVATIVE FREE 10 ML: 5 INJECTION INTRAVENOUS at 08:50

## 2025-03-09 RX ADMIN — MAGNESIUM SULFATE HEPTAHYDRATE 2000 MG: 40 INJECTION, SOLUTION INTRAVENOUS at 13:33

## 2025-03-09 RX ADMIN — LIDOCAINE HYDROCHLORIDE 40 ML: 20 SOLUTION ORAL at 12:19

## 2025-03-09 RX ADMIN — ASPIRIN 81 MG CHEWABLE TABLET 81 MG: 81 TABLET CHEWABLE at 08:49

## 2025-03-09 RX ADMIN — SODIUM CHLORIDE, PRESERVATIVE FREE 10 ML: 5 INJECTION INTRAVENOUS at 21:53

## 2025-03-09 RX ADMIN — AMIODARONE HYDROCHLORIDE 200 MG: 200 TABLET ORAL at 08:49

## 2025-03-09 RX ADMIN — CALCIUM GLUCONATE 1000 MG: 20 INJECTION, SOLUTION INTRAVENOUS at 15:54

## 2025-03-09 RX ADMIN — TRIAMCINOLONE ACETONIDE: 1 CREAM TOPICAL at 08:59

## 2025-03-09 RX ADMIN — CARVEDILOL 3.12 MG: 3.12 TABLET, FILM COATED ORAL at 15:58

## 2025-03-09 RX ADMIN — ROSUVASTATIN CALCIUM 20 MG: 20 TABLET, FILM COATED ORAL at 21:53

## 2025-03-09 RX ADMIN — FUROSEMIDE 40 MG: 10 INJECTION, SOLUTION INTRAMUSCULAR; INTRAVENOUS at 16:54

## 2025-03-09 RX ADMIN — CALCIUM GLUCONATE 1000 MG: 20 INJECTION, SOLUTION INTRAVENOUS at 16:54

## 2025-03-09 RX ADMIN — EMPAGLIFLOZIN 10 MG: 10 TABLET, FILM COATED ORAL at 08:49

## 2025-03-09 RX ADMIN — SACUBITRIL AND VALSARTAN 1 TABLET: 24; 26 TABLET, FILM COATED ORAL at 08:49

## 2025-03-09 RX ADMIN — ACETAMINOPHEN 650 MG: 325 TABLET ORAL at 03:50

## 2025-03-09 ASSESSMENT — PAIN DESCRIPTION - PAIN TYPE
TYPE: ACUTE PAIN
TYPE: ACUTE PAIN

## 2025-03-09 ASSESSMENT — PAIN DESCRIPTION - LOCATION
LOCATION: HEAD
LOCATION: RIB CAGE
LOCATION: HEAD

## 2025-03-09 ASSESSMENT — PAIN DESCRIPTION - DESCRIPTORS
DESCRIPTORS: ACHING
DESCRIPTORS: SHARP

## 2025-03-09 ASSESSMENT — PAIN DESCRIPTION - ORIENTATION
ORIENTATION: ANTERIOR
ORIENTATION: LEFT

## 2025-03-09 ASSESSMENT — PAIN SCALES - GENERAL
PAINLEVEL_OUTOF10: 0
PAINLEVEL_OUTOF10: 7
PAINLEVEL_OUTOF10: 9
PAINLEVEL_OUTOF10: 9
PAINLEVEL_OUTOF10: 0
PAINLEVEL_OUTOF10: 0

## 2025-03-09 ASSESSMENT — PAIN DESCRIPTION - FREQUENCY
FREQUENCY: CONTINUOUS
FREQUENCY: CONTINUOUS

## 2025-03-09 ASSESSMENT — PAIN DESCRIPTION - ONSET
ONSET: ON-GOING
ONSET: GRADUAL

## 2025-03-09 ASSESSMENT — PAIN - FUNCTIONAL ASSESSMENT
PAIN_FUNCTIONAL_ASSESSMENT: PREVENTS OR INTERFERES SOME ACTIVE ACTIVITIES AND ADLS
PAIN_FUNCTIONAL_ASSESSMENT: PREVENTS OR INTERFERES SOME ACTIVE ACTIVITIES AND ADLS

## 2025-03-09 NOTE — PLAN OF CARE
Problem: Pain  Goal: Verbalizes/displays adequate comfort level or baseline comfort level  3/8/2025 2231 by Aleshia Giles RN  Outcome: Progressing  3/8/2025 1054 by Anne Thomas RN  Outcome: Progressing     Problem: Safety - Adult  Goal: Free from fall injury  3/8/2025 2231 by Aleshia Giles RN  Outcome: Progressing  3/8/2025 1054 by Anne Thomas RN  Outcome: Progressing     Problem: Discharge Planning  Goal: Discharge to home or other facility with appropriate resources  3/8/2025 2231 by Aleshia Giles RN  Outcome: Progressing  3/8/2025 1054 by Anne Thomas RN  Outcome: Progressing     Problem: Chronic Conditions and Co-morbidities  Goal: Patient's chronic conditions and co-morbidity symptoms are monitored and maintained or improved  3/8/2025 2231 by Aleshia Giles RN  Outcome: Progressing  3/8/2025 1054 by Anne Thomas RN  Outcome: Progressing

## 2025-03-09 NOTE — DISCHARGE SUMMARY
[facility]  DISCHARGE SUMMARY      Name:   Moiz May 64 y.o. male  MRN:   [id number]  CSN:   [id number]  Admission Date:  3/7/2025  Discharge Date:  ***  Attending:             No att. 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  Verified patient compliant with GDMT and other cardiac meds described below  Hx poor compliance with medication  Pending labs/investigations to follow up as below  Medication reconciliation:  Discontinued Medications: ***  New Medications: ***    [facility] Follow Up Appointment:   Follow-up Information    None          Readmission prevention plan:   ***    GOALS OF CARE (including Code Status, Advanced Care Plan):   ie Full measures, DNR/DNO, Comfort Care, etc***    Pending labs/ investigations at discharge to follow up:   ***    Operative Procedures:   ***    Consultants:    ***    Condition at discharge: {CONDITION ON DISCHARGE:20012::\"Afebrile\",\"Ambulating\",\"Eating, Drinking, Voiding\",\"Stable\"}    Disposition at Discharge:  {Discharge Destination:27899::\"Home\"}    Functional Status at Discharge: Ambulates with cane, rolling walker, etc ***    Diet: Regular diet    Discharge Medications:     Medication List        ASK your doctor about these medications      amiodarone 200 MG tablet  Commonly known as: CORDARONE  Take 1 tablet by mouth daily     apixaban 5 MG Tabs tablet  Commonly known as: Eliquis  Take 2 tablets

## 2025-03-09 NOTE — SIGNIFICANT EVENT
[facility]  BRIEF NOTE    Assessment & Plan:  -two short runs of v-tach at approximately 0000 and 0100. One run SVT at approx 0500. Will pause milrinone and replete electrolytes as needed pending return of morning labs. Day team will reassess and modify plan as appropriate.    The above patient and plan were discussed with my supervising physician.     See daily progress note for full assessment/plan.      Corwin Rawls MD, PGY-1  [facility]  3/9/2025, 5:01 AM

## 2025-03-09 NOTE — PLAN OF CARE
Problem: Chronic Conditions and Co-morbidities  Goal: Patient's chronic conditions and co-morbidity symptoms are monitored and maintained or improved  3/9/2025 0952 by Kathleen Parker RN  Outcome: Progressing  Flowsheets (Taken 3/9/2025 0900)  Care Plan - Patient's Chronic Conditions and Co-Morbidity Symptoms are Monitored and Maintained or Improved: Monitor and assess patient's chronic conditions and comorbid symptoms for stability, deterioration, or improvement     Problem: Safety - Adult  Goal: Free from fall injury  3/9/2025 0952 by Kathleen Parker, RN  Outcome: Progressing  3/8/2025 2231 by Aleshia Giles RN  Outcome: Progressing     Problem: Pain  Goal: Verbalizes/displays adequate comfort level or baseline comfort level  3/9/2025 0952 by Kathleen Parker, RN  Outcome: Progressing  Flowsheets (Taken 3/9/2025 0900)  Verbalizes/displays adequate comfort level or baseline comfort level:   Encourage patient to monitor pain and request assistance   Assess pain using appropriate pain scale

## 2025-03-10 ENCOUNTER — APPOINTMENT (OUTPATIENT)
Facility: HOSPITAL | Age: 65
DRG: 321 | End: 2025-03-10
Payer: MEDICARE

## 2025-03-10 PROBLEM — I27.20 PULMONARY HYPERTENSION (HCC): Status: ACTIVE | Noted: 2025-03-10

## 2025-03-10 PROBLEM — R07.2 PRECORDIAL PAIN: Status: ACTIVE | Noted: 2025-03-10

## 2025-03-10 PROBLEM — I25.83 CORONARY ARTERY DISEASE DUE TO LIPID RICH PLAQUE: Status: ACTIVE | Noted: 2020-11-11

## 2025-03-10 PROBLEM — I25.10 CORONARY ARTERY DISEASE DUE TO LIPID RICH PLAQUE: Status: ACTIVE | Noted: 2020-11-11

## 2025-03-10 PROBLEM — I48.0 PAF (PAROXYSMAL ATRIAL FIBRILLATION) (HCC): Status: ACTIVE | Noted: 2025-03-10

## 2025-03-10 LAB
ACT BLD: 285 SECS (ref 79–138)
ACT BLD: 302 SECS (ref 79–138)
ANION GAP SERPL CALC-SCNC: 8 MMOL/L (ref 3–18)
ANION GAP SERPL CALC-SCNC: 8 MMOL/L (ref 3–18)
BASOPHILS # BLD: 0.05 K/UL (ref 0–0.1)
BASOPHILS # BLD: 0.05 K/UL (ref 0–0.1)
BASOPHILS NFR BLD: 0.5 % (ref 0–2)
BASOPHILS NFR BLD: 0.6 % (ref 0–2)
BUN SERPL-MCNC: 24 MG/DL (ref 7–18)
BUN SERPL-MCNC: 25 MG/DL (ref 7–18)
BUN/CREAT SERPL: 14 (ref 12–20)
BUN/CREAT SERPL: 16 (ref 12–20)
CA-I SERPL-SCNC: 1 MMOL/L (ref 1.15–1.33)
CALCIUM SERPL-MCNC: 7.1 MG/DL (ref 8.5–10.1)
CALCIUM SERPL-MCNC: 7.5 MG/DL (ref 8.5–10.1)
CHLORIDE SERPL-SCNC: 98 MMOL/L (ref 100–111)
CHLORIDE SERPL-SCNC: 99 MMOL/L (ref 100–111)
CO2 SERPL-SCNC: 27 MMOL/L (ref 21–32)
CO2 SERPL-SCNC: 29 MMOL/L (ref 21–32)
CREAT SERPL-MCNC: 1.55 MG/DL (ref 0.6–1.3)
CREAT SERPL-MCNC: 1.71 MG/DL (ref 0.6–1.3)
DIFFERENTIAL METHOD BLD: ABNORMAL
DIFFERENTIAL METHOD BLD: ABNORMAL
ECHO BSA: 2.03 M2
EOSINOPHIL # BLD: 0.17 K/UL (ref 0–0.4)
EOSINOPHIL # BLD: 0.28 K/UL (ref 0–0.4)
EOSINOPHIL NFR BLD: 1.8 % (ref 0–5)
EOSINOPHIL NFR BLD: 3.3 % (ref 0–5)
ERYTHROCYTE [DISTWIDTH] IN BLOOD BY AUTOMATED COUNT: 14.2 % (ref 11.6–14.5)
ERYTHROCYTE [DISTWIDTH] IN BLOOD BY AUTOMATED COUNT: 14.3 % (ref 11.6–14.5)
GLUCOSE BLD STRIP.AUTO-MCNC: 97 MG/DL (ref 70–110)
GLUCOSE SERPL-MCNC: 118 MG/DL (ref 74–99)
GLUCOSE SERPL-MCNC: 87 MG/DL (ref 74–99)
HCT VFR BLD AUTO: 46.3 % (ref 36–48)
HCT VFR BLD AUTO: 48.1 % (ref 36–48)
HGB BLD-MCNC: 14.8 G/DL (ref 13–16)
HGB BLD-MCNC: 15.8 G/DL (ref 13–16)
IMM GRANULOCYTES # BLD AUTO: 0.02 K/UL (ref 0–0.04)
IMM GRANULOCYTES # BLD AUTO: 0.05 K/UL (ref 0–0.04)
IMM GRANULOCYTES NFR BLD AUTO: 0.2 % (ref 0–0.5)
IMM GRANULOCYTES NFR BLD AUTO: 0.5 % (ref 0–0.5)
LYMPHOCYTES # BLD: 0.81 K/UL (ref 0.9–3.6)
LYMPHOCYTES # BLD: 1.18 K/UL (ref 0.9–3.6)
LYMPHOCYTES NFR BLD: 13.8 % (ref 21–52)
LYMPHOCYTES NFR BLD: 8.4 % (ref 21–52)
MAGNESIUM SERPL-MCNC: 1.9 MG/DL (ref 1.6–2.6)
MCH RBC QN AUTO: 29.6 PG (ref 24–34)
MCH RBC QN AUTO: 30.7 PG (ref 24–34)
MCHC RBC AUTO-ENTMCNC: 32 G/DL (ref 31–37)
MCHC RBC AUTO-ENTMCNC: 32.8 G/DL (ref 31–37)
MCV RBC AUTO: 92.6 FL (ref 78–100)
MCV RBC AUTO: 93.6 FL (ref 78–100)
MONOCYTES # BLD: 0.92 K/UL (ref 0.05–1.2)
MONOCYTES # BLD: 1.01 K/UL (ref 0.05–1.2)
MONOCYTES NFR BLD: 10.5 % (ref 3–10)
MONOCYTES NFR BLD: 10.8 % (ref 3–10)
NEUTS SEG # BLD: 6.1 K/UL (ref 1.8–8)
NEUTS SEG # BLD: 7.55 K/UL (ref 1.8–8)
NEUTS SEG NFR BLD: 71.3 % (ref 40–73)
NEUTS SEG NFR BLD: 78.3 % (ref 40–73)
NRBC # BLD: 0 K/UL (ref 0–0.01)
NRBC # BLD: 0 K/UL (ref 0–0.01)
NRBC BLD-RTO: 0 PER 100 WBC
NRBC BLD-RTO: 0 PER 100 WBC
PLATELET # BLD AUTO: 239 K/UL (ref 135–420)
PLATELET # BLD AUTO: 259 K/UL (ref 135–420)
PMV BLD AUTO: 11.6 FL (ref 9.2–11.8)
PMV BLD AUTO: 12.2 FL (ref 9.2–11.8)
POTASSIUM SERPL-SCNC: 3.5 MMOL/L (ref 3.5–5.5)
POTASSIUM SERPL-SCNC: 3.6 MMOL/L (ref 3.5–5.5)
RBC # BLD AUTO: 5 M/UL (ref 4.35–5.65)
RBC # BLD AUTO: 5.14 M/UL (ref 4.35–5.65)
SODIUM SERPL-SCNC: 133 MMOL/L (ref 136–145)
SODIUM SERPL-SCNC: 136 MMOL/L (ref 136–145)
WBC # BLD AUTO: 8.6 K/UL (ref 4.6–13.2)
WBC # BLD AUTO: 9.6 K/UL (ref 4.6–13.2)

## 2025-03-10 PROCEDURE — 2580000003 HC RX 258: Performed by: INTERNAL MEDICINE

## 2025-03-10 PROCEDURE — 82330 ASSAY OF CALCIUM: CPT

## 2025-03-10 PROCEDURE — C1713 ANCHOR/SCREW BN/BN,TIS/BN: HCPCS | Performed by: INTERNAL MEDICINE

## 2025-03-10 PROCEDURE — 6360000004 HC RX CONTRAST MEDICATION: Performed by: INTERNAL MEDICINE

## 2025-03-10 PROCEDURE — 2500000003 HC RX 250 WO HCPCS

## 2025-03-10 PROCEDURE — 83735 ASSAY OF MAGNESIUM: CPT

## 2025-03-10 PROCEDURE — 027034Z DILATION OF CORONARY ARTERY, ONE ARTERY WITH DRUG-ELUTING INTRALUMINAL DEVICE, PERCUTANEOUS APPROACH: ICD-10-PCS | Performed by: INTERNAL MEDICINE

## 2025-03-10 PROCEDURE — 6370000000 HC RX 637 (ALT 250 FOR IP)

## 2025-03-10 PROCEDURE — 2580000003 HC RX 258

## 2025-03-10 PROCEDURE — C1874 STENT, COATED/COV W/DEL SYS: HCPCS | Performed by: INTERNAL MEDICINE

## 2025-03-10 PROCEDURE — 99232 SBSQ HOSP IP/OBS MODERATE 35: CPT | Performed by: INTERNAL MEDICINE

## 2025-03-10 PROCEDURE — 6360000002 HC RX W HCPCS

## 2025-03-10 PROCEDURE — 85347 COAGULATION TIME ACTIVATED: CPT

## 2025-03-10 PROCEDURE — 6370000000 HC RX 637 (ALT 250 FOR IP): Performed by: INTERNAL MEDICINE

## 2025-03-10 PROCEDURE — C1894 INTRO/SHEATH, NON-LASER: HCPCS | Performed by: INTERNAL MEDICINE

## 2025-03-10 PROCEDURE — 82962 GLUCOSE BLOOD TEST: CPT

## 2025-03-10 PROCEDURE — 97530 THERAPEUTIC ACTIVITIES: CPT

## 2025-03-10 PROCEDURE — 97535 SELF CARE MNGMENT TRAINING: CPT

## 2025-03-10 PROCEDURE — 85025 COMPLETE CBC W/AUTO DIFF WBC: CPT

## 2025-03-10 PROCEDURE — 93005 ELECTROCARDIOGRAM TRACING: CPT | Performed by: INTERNAL MEDICINE

## 2025-03-10 PROCEDURE — 2140000001 HC CVICU INTERMEDIATE R&B

## 2025-03-10 PROCEDURE — C1769 GUIDE WIRE: HCPCS | Performed by: INTERNAL MEDICINE

## 2025-03-10 PROCEDURE — 92978 ENDOLUMINL IVUS OCT C 1ST: CPT | Performed by: INTERNAL MEDICINE

## 2025-03-10 PROCEDURE — 71045 X-RAY EXAM CHEST 1 VIEW: CPT

## 2025-03-10 PROCEDURE — C1753 CATH, INTRAVAS ULTRASOUND: HCPCS | Performed by: INTERNAL MEDICINE

## 2025-03-10 PROCEDURE — 76000 FLUOROSCOPY <1 HR PHYS/QHP: CPT | Performed by: INTERNAL MEDICINE

## 2025-03-10 PROCEDURE — 93458 L HRT ARTERY/VENTRICLE ANGIO: CPT | Performed by: INTERNAL MEDICINE

## 2025-03-10 PROCEDURE — 2709999900 HC NON-CHARGEABLE SUPPLY: Performed by: INTERNAL MEDICINE

## 2025-03-10 PROCEDURE — 93005 ELECTROCARDIOGRAM TRACING: CPT

## 2025-03-10 PROCEDURE — 97162 PT EVAL MOD COMPLEX 30 MIN: CPT

## 2025-03-10 PROCEDURE — 36415 COLL VENOUS BLD VENIPUNCTURE: CPT

## 2025-03-10 PROCEDURE — 7100000010 HC PHASE II RECOVERY - FIRST 15 MIN: Performed by: INTERNAL MEDICINE

## 2025-03-10 PROCEDURE — C1725 CATH, TRANSLUMIN NON-LASER: HCPCS | Performed by: INTERNAL MEDICINE

## 2025-03-10 PROCEDURE — B2111ZZ FLUOROSCOPY OF MULTIPLE CORONARY ARTERIES USING LOW OSMOLAR CONTRAST: ICD-10-PCS | Performed by: INTERNAL MEDICINE

## 2025-03-10 PROCEDURE — 6360000002 HC RX W HCPCS: Performed by: INTERNAL MEDICINE

## 2025-03-10 PROCEDURE — 97166 OT EVAL MOD COMPLEX 45 MIN: CPT

## 2025-03-10 PROCEDURE — 85347 COAGULATION TIME ACTIVATED: CPT | Performed by: INTERNAL MEDICINE

## 2025-03-10 PROCEDURE — 4A023N7 MEASUREMENT OF CARDIAC SAMPLING AND PRESSURE, LEFT HEART, PERCUTANEOUS APPROACH: ICD-10-PCS | Performed by: INTERNAL MEDICINE

## 2025-03-10 PROCEDURE — C9600 PERC DRUG-EL COR STENT SING: HCPCS | Performed by: INTERNAL MEDICINE

## 2025-03-10 PROCEDURE — 94761 N-INVAS EAR/PLS OXIMETRY MLT: CPT

## 2025-03-10 PROCEDURE — 80048 BASIC METABOLIC PNL TOTAL CA: CPT

## 2025-03-10 PROCEDURE — C1887 CATHETER, GUIDING: HCPCS | Performed by: INTERNAL MEDICINE

## 2025-03-10 PROCEDURE — 2500000003 HC RX 250 WO HCPCS: Performed by: INTERNAL MEDICINE

## 2025-03-10 DEVICE — XIENCE SKYPOINT™ EVEROLIMUS ELUTING CORONARY STENT SYSTEM 3.25 MM X 15 MM / RAPID-EXCHANGE
Type: IMPLANTABLE DEVICE | Status: FUNCTIONAL
Brand: XIENCE SKYPOINT™

## 2025-03-10 RX ORDER — PHENYLEPHRINE HCL IN 0.9% NACL 1 MG/10 ML
SYRINGE (ML) INTRAVENOUS PRN
Status: DISCONTINUED | OUTPATIENT
Start: 2025-03-10 | End: 2025-03-10 | Stop reason: HOSPADM

## 2025-03-10 RX ORDER — FENTANYL CITRATE 50 UG/ML
INJECTION, SOLUTION INTRAMUSCULAR; INTRAVENOUS PRN
Status: DISCONTINUED | OUTPATIENT
Start: 2025-03-10 | End: 2025-03-10 | Stop reason: HOSPADM

## 2025-03-10 RX ORDER — SODIUM CHLORIDE 0.9 % (FLUSH) 0.9 %
5-40 SYRINGE (ML) INJECTION PRN
Status: DISCONTINUED | OUTPATIENT
Start: 2025-03-10 | End: 2025-03-13 | Stop reason: HOSPADM

## 2025-03-10 RX ORDER — CLOPIDOGREL BISULFATE 75 MG/1
75 TABLET ORAL DAILY
Status: DISCONTINUED | OUTPATIENT
Start: 2025-03-11 | End: 2025-03-13 | Stop reason: HOSPADM

## 2025-03-10 RX ORDER — 0.9 % SODIUM CHLORIDE 0.9 %
250 INTRAVENOUS SOLUTION INTRAVENOUS ONCE
Status: COMPLETED | OUTPATIENT
Start: 2025-03-10 | End: 2025-03-10

## 2025-03-10 RX ORDER — SODIUM CHLORIDE 0.9 % (FLUSH) 0.9 %
5-40 SYRINGE (ML) INJECTION EVERY 12 HOURS SCHEDULED
Status: DISCONTINUED | OUTPATIENT
Start: 2025-03-10 | End: 2025-03-13 | Stop reason: HOSPADM

## 2025-03-10 RX ORDER — MIDAZOLAM HYDROCHLORIDE 1 MG/ML
INJECTION, SOLUTION INTRAMUSCULAR; INTRAVENOUS PRN
Status: DISCONTINUED | OUTPATIENT
Start: 2025-03-10 | End: 2025-03-10 | Stop reason: HOSPADM

## 2025-03-10 RX ORDER — FUROSEMIDE 40 MG/1
40 TABLET ORAL 2 TIMES DAILY
Status: DISCONTINUED | OUTPATIENT
Start: 2025-03-10 | End: 2025-03-12

## 2025-03-10 RX ORDER — HEPARIN SODIUM 1000 [USP'U]/ML
INJECTION, SOLUTION INTRAVENOUS; SUBCUTANEOUS PRN
Status: DISCONTINUED | OUTPATIENT
Start: 2025-03-10 | End: 2025-03-10 | Stop reason: HOSPADM

## 2025-03-10 RX ORDER — ONDANSETRON 4 MG/1
4 TABLET, ORALLY DISINTEGRATING ORAL ONCE
Status: DISCONTINUED | OUTPATIENT
Start: 2025-03-10 | End: 2025-03-13 | Stop reason: HOSPADM

## 2025-03-10 RX ORDER — ACETAMINOPHEN 325 MG/1
650 TABLET ORAL EVERY 4 HOURS PRN
Status: DISCONTINUED | OUTPATIENT
Start: 2025-03-10 | End: 2025-03-13 | Stop reason: HOSPADM

## 2025-03-10 RX ORDER — SCOPOLAMINE 1 MG/3D
1 PATCH, EXTENDED RELEASE TRANSDERMAL
Status: DISCONTINUED | OUTPATIENT
Start: 2025-03-10 | End: 2025-03-10

## 2025-03-10 RX ORDER — IODIXANOL 320 MG/ML
INJECTION, SOLUTION INTRAVASCULAR PRN
Status: DISCONTINUED | OUTPATIENT
Start: 2025-03-10 | End: 2025-03-10 | Stop reason: HOSPADM

## 2025-03-10 RX ORDER — SODIUM CHLORIDE 9 MG/ML
INJECTION, SOLUTION INTRAVENOUS PRN
Status: DISCONTINUED | OUTPATIENT
Start: 2025-03-10 | End: 2025-03-13 | Stop reason: HOSPADM

## 2025-03-10 RX ORDER — SODIUM CHLORIDE 9 MG/ML
INJECTION, SOLUTION INTRAVENOUS CONTINUOUS
Status: DISCONTINUED | OUTPATIENT
Start: 2025-03-10 | End: 2025-03-10 | Stop reason: HOSPADM

## 2025-03-10 RX ORDER — 0.9 % SODIUM CHLORIDE 0.9 %
INTRAVENOUS SOLUTION INTRAVENOUS CONTINUOUS PRN
Status: COMPLETED | OUTPATIENT
Start: 2025-03-10 | End: 2025-03-10

## 2025-03-10 RX ADMIN — SPIRONOLACTONE 12.5 MG: 25 TABLET ORAL at 07:47

## 2025-03-10 RX ADMIN — APIXABAN 5 MG: 5 TABLET, FILM COATED ORAL at 21:39

## 2025-03-10 RX ADMIN — FUROSEMIDE 40 MG: 10 INJECTION, SOLUTION INTRAMUSCULAR; INTRAVENOUS at 07:47

## 2025-03-10 RX ADMIN — CARVEDILOL 3.12 MG: 3.12 TABLET, FILM COATED ORAL at 07:47

## 2025-03-10 RX ADMIN — SODIUM CHLORIDE, PRESERVATIVE FREE 10 ML: 5 INJECTION INTRAVENOUS at 21:40

## 2025-03-10 RX ADMIN — PANTOPRAZOLE SODIUM 20 MG: 40 TABLET, DELAYED RELEASE ORAL at 17:21

## 2025-03-10 RX ADMIN — SACUBITRIL AND VALSARTAN 1 TABLET: 24; 26 TABLET, FILM COATED ORAL at 07:47

## 2025-03-10 RX ADMIN — AMIODARONE HYDROCHLORIDE 200 MG: 200 TABLET ORAL at 07:48

## 2025-03-10 RX ADMIN — CARVEDILOL 3.12 MG: 3.12 TABLET, FILM COATED ORAL at 17:21

## 2025-03-10 RX ADMIN — ASPIRIN 81 MG CHEWABLE TABLET 81 MG: 81 TABLET CHEWABLE at 07:47

## 2025-03-10 RX ADMIN — EMPAGLIFLOZIN 10 MG: 10 TABLET, FILM COATED ORAL at 07:47

## 2025-03-10 RX ADMIN — SODIUM CHLORIDE 250 ML: 9 INJECTION, SOLUTION INTRAVENOUS at 19:17

## 2025-03-10 RX ADMIN — ROSUVASTATIN CALCIUM 20 MG: 20 TABLET, FILM COATED ORAL at 21:39

## 2025-03-10 RX ADMIN — SODIUM CHLORIDE, PRESERVATIVE FREE 10 ML: 5 INJECTION INTRAVENOUS at 07:48

## 2025-03-10 RX ADMIN — TRIAMCINOLONE ACETONIDE: 1 CREAM TOPICAL at 21:41

## 2025-03-10 RX ADMIN — APIXABAN 5 MG: 5 TABLET, FILM COATED ORAL at 07:47

## 2025-03-10 RX ADMIN — PANTOPRAZOLE SODIUM 20 MG: 40 TABLET, DELAYED RELEASE ORAL at 06:52

## 2025-03-10 RX ADMIN — TRIAMCINOLONE ACETONIDE: 1 CREAM TOPICAL at 07:48

## 2025-03-10 ASSESSMENT — PAIN SCALES - GENERAL
PAINLEVEL_OUTOF10: 7
PAINLEVEL_OUTOF10: 0

## 2025-03-10 ASSESSMENT — PAIN DESCRIPTION - FREQUENCY: FREQUENCY: CONTINUOUS

## 2025-03-10 ASSESSMENT — PAIN DESCRIPTION - PAIN TYPE: TYPE: ACUTE PAIN

## 2025-03-10 ASSESSMENT — PAIN DESCRIPTION - LOCATION: LOCATION: STERNUM

## 2025-03-10 ASSESSMENT — PAIN DESCRIPTION - DESCRIPTORS: DESCRIPTORS: ACHING

## 2025-03-10 ASSESSMENT — PAIN DESCRIPTION - ORIENTATION: ORIENTATION: RIGHT

## 2025-03-10 ASSESSMENT — PAIN - FUNCTIONAL ASSESSMENT: PAIN_FUNCTIONAL_ASSESSMENT: ACTIVITIES ARE NOT PREVENTED

## 2025-03-10 ASSESSMENT — PAIN DESCRIPTION - ONSET: ONSET: GRADUAL

## 2025-03-10 NOTE — ACP (ADVANCE CARE PLANNING)
Advance Care Planning   Healthcare Decision Maker:    Primary Decision Maker: Ivon Daniel - Brother/Sister - [phone]     conducted an initial consultation and Spiritual Assessment for Moiz May, who is a 64 y.o.,male. Patient’s Primary Language is: English.   According to the patient’s EMR Christianity Affiliation is: Taoist.     The  provided the following Interventions:  Initiated a relationship of care and support to the patient  where he has been for the last in bed  2312 of the stepdown unit, due to acute exacerbation of his CHF  Explored issues of claudia, belief, spirituality and Methodist/ritual needs while hospitalized.  Listened empathically. There is an advance directive on file for he is active in a local [employer].  Offered prayer and assurance of continued prayers on patients behalf.     The following outcomes were achieved:  Patient shared limited information about his medical narrative and spiritual journey/beliefs.  Patient processed feeling about current hospitalization.  Patient expressed gratitude for pastoral care visit.    Spiritual Health History and Assessment/Progress Note  [facility]     ,  ,  ,      Name: Moiz May MRN: [id number]    Age: 64 y.o.     Sex: male   Language: English   Yarsanism: Taoist   Acute exacerbation of chronic heart failure (HCC)     Date: 3/10/2025                           Spiritual Assessment began in [facility] 2 CV STEPDOWN                    Claudia, Belief, Meaning:   Patient is connected with a claudia tradition or spiritual practice  Family/Friends No family/friends present      Importance and Influence:  Patient has spiritual/personal beliefs that influence decisions regarding their health  Family/Friends No family/friends present    Community:  Patient feels well-supported. Support system includes: Extended family  Family/Friends No family/friends present    Assessment and Plan of Care:     Patient Interventions include:

## 2025-03-10 NOTE — PLAN OF CARE
Problem: Occupational Therapy - Adult  Goal: By Discharge: Performs self-care activities at highest level of function for planned discharge setting.  See evaluation for individualized goals.  Description: Occupational Therapy Goals:  Initiated 3/10/2025 to be met within 7-10 days.    1.  Patient will perform grooming with modified independence standing > 2 minutes at sink.   2.  Patient will perform upper body dressing with modified independence.  3.  Patient will perform lower body dressing  with modified independence.  4.  Patient will perform toilet transfers with supervision/set-up  5.  Patient will perform all aspects of toileting with modified independence.  6.  Patient will participate in upper extremity therapeutic exercise/activities with modified independence for 8-10 minutes to increase strength/endurance for ADLs.    7.  Patient will utilize energy conservation techniques during functional activities with verbal cues.        PLOF: Pt lives in 1SH, 3 TERRI and was independent with self-care. Pt reports using a cane for functional mobility until recently, misplaced DME in store.  Outcome: Progressing         OCCUPATIONAL THERAPY EVALUATION    Patient: Moiz May (64 y.o. male)  Date: 3/10/2025  Primary Diagnosis: Hypomagnesemia [E83.42]  Respiratory distress [R06.03]  Acute on chronic combined systolic and diastolic congestive heart failure (HCC) [I50.43]  Acute exacerbation of chronic heart failure (HCC) [I50.9]  Precautions: Fall Risk, General Precautions    ASSESSMENT :  Pt cleared to participate in OT evaluation by RN, BP elevated bed level. Pt supine in bed, alert with c/o pain and difficulty breathing. 2L Nasal cannula doffed next to patient, O2 assessed, 100% on room air. Patient requires max encouragement for minimal participation. Patient participated in objective assessment, demonstrating ability to self feed independently and perform grooming tasks with stand by assistance. Patient with

## 2025-03-10 NOTE — PLAN OF CARE
Problem: Chronic Conditions and Co-morbidities  Goal: Patient's chronic conditions and co-morbidity symptoms are monitored and maintained or improved  Outcome: Progressing  Flowsheets (Taken 3/9/2025 1920 by Sp Nolen, RN)  Care Plan - Patient's Chronic Conditions and Co-Morbidity Symptoms are Monitored and Maintained or Improved:   Monitor and assess patient's chronic conditions and comorbid symptoms for stability, deterioration, or improvement   Collaborate with multidisciplinary team to address chronic and comorbid conditions and prevent exacerbation or deterioration   Update acute care plan with appropriate goals if chronic or comorbid symptoms are exacerbated and prevent overall improvement and discharge     Problem: Discharge Planning  Goal: Discharge to home or other facility with appropriate resources  Outcome: Progressing  Flowsheets (Taken 3/9/2025 1920 by Sp Nolen, RN)  Discharge to home or other facility with appropriate resources:   Identify barriers to discharge with patient and caregiver   Arrange for needed discharge resources and transportation as appropriate   Identify discharge learning needs (meds, wound care, etc)   Arrange for interpreters to assist at discharge as needed  Note: Transportation will be arranged upon discharge.     Problem: Safety - Adult  Goal: Free from fall injury  Outcome: Progressing  Flowsheets (Taken 3/8/2025 1054 by Anne Thomas, RN)  Free From Fall Injury:   Instruct family/caregiver on patient safety   Based on caregiver fall risk screen, instruct family/caregiver to ask for assistance with transferring infant if caregiver noted to have fall risk factors  Note: Patient will remain free from falls, skid socks in place, bed in low position, call bell in place      Problem: Pain  Goal: Verbalizes/displays adequate comfort level or baseline comfort level  Outcome: Progressing  Flowsheets  Taken 3/10/2025 0300 by Sp Nolen, RN  Verbalizes/displays

## 2025-03-10 NOTE — PLAN OF CARE
Problem: Physical Therapy - Adult  Goal: By Discharge: Performs mobility at highest level of function for planned discharge setting.  See evaluation for individualized goals.  Description: Initiated  3/10/25  to be met within 7-10 days.    1.  Patient will move from supine to sit and sit to supine , scoot up and down, and roll side to side in bed with modified independence.    2.  Patient will transfer from bed to chair and chair to bed with modified independence using the least restrictive device.  3.  Patient will perform sit to stand with modified independence.  4.  Patient will ambulate with modified independence for 75 feet with the least restrictive device.   5.  Patient will ascend/descend 4 stairs with 1 handrail(s) with supervision/set-up.    PLOF: pt lives alone in a 1 SH with 4 TERRI, uses cane PRN    Outcome: Progressing     PHYSICAL THERAPY EVALUATION    Patient: Moiz May (64 y.o. male)  Date: 3/10/2025  Primary Diagnosis: Hypomagnesemia [E83.42]  Respiratory distress [R06.03]  Acute on chronic combined systolic and diastolic congestive heart failure (HCC) [I50.43]  Acute exacerbation of chronic heart failure (HCC) [I50.9]  Procedure(s) (LRB):  Left heart cath / coronary angiography (N/A) * Day of Surgery *   Precautions: Fall Risk, General Precautions      ASSESSMENT :  Pt received sitting EOB, endorses being hungry, RN working on getting NPO status removed. Pt reporting B thigh \"soreness\" and pain, rubbing throughout and asked for massage cream. Pt stands x 1 trial to RW with CGA, unable to progress to taking steps as pt sits self back down after approx 15\" of standing. Pt reports having a hard time taking care of self at home and wanting an aide to assist. Pt educated on role of PT in helping to maximize functional mobility. Verbalized understanding. Will continue to follow per POC.   Recommend for next PT session:  Further progression of gait with existing device    DEFICITS/IMPAIRMENTS:

## 2025-03-11 LAB
ANION GAP SERPL CALC-SCNC: 6 MMOL/L (ref 3–18)
ANION GAP SERPL CALC-SCNC: 9 MMOL/L (ref 3–18)
BASOPHILS # BLD: 0.06 K/UL (ref 0–0.1)
BASOPHILS NFR BLD: 0.6 % (ref 0–2)
BUN SERPL-MCNC: 27 MG/DL (ref 7–18)
BUN SERPL-MCNC: 29 MG/DL (ref 7–18)
BUN/CREAT SERPL: 15 (ref 12–20)
BUN/CREAT SERPL: 16 (ref 12–20)
CA-I SERPL-SCNC: 0.99 MMOL/L (ref 1.15–1.33)
CALCIUM SERPL-MCNC: 7.1 MG/DL (ref 8.5–10.1)
CALCIUM SERPL-MCNC: 7.4 MG/DL (ref 8.5–10.1)
CHLORIDE SERPL-SCNC: 101 MMOL/L (ref 100–111)
CHLORIDE SERPL-SCNC: 103 MMOL/L (ref 100–111)
CO2 SERPL-SCNC: 25 MMOL/L (ref 21–32)
CO2 SERPL-SCNC: 26 MMOL/L (ref 21–32)
CREAT SERPL-MCNC: 1.76 MG/DL (ref 0.6–1.3)
CREAT SERPL-MCNC: 1.86 MG/DL (ref 0.6–1.3)
DIFFERENTIAL METHOD BLD: ABNORMAL
EOSINOPHIL # BLD: 0.18 K/UL (ref 0–0.4)
EOSINOPHIL NFR BLD: 1.9 % (ref 0–5)
ERYTHROCYTE [DISTWIDTH] IN BLOOD BY AUTOMATED COUNT: 14.5 % (ref 11.6–14.5)
GLUCOSE SERPL-MCNC: 94 MG/DL (ref 74–99)
GLUCOSE SERPL-MCNC: 96 MG/DL (ref 74–99)
HCT VFR BLD AUTO: 48.5 % (ref 36–48)
HGB BLD-MCNC: 15.3 G/DL (ref 13–16)
IMM GRANULOCYTES # BLD AUTO: 0.03 K/UL (ref 0–0.04)
IMM GRANULOCYTES NFR BLD AUTO: 0.3 % (ref 0–0.5)
LYMPHOCYTES # BLD: 0.85 K/UL (ref 0.9–3.6)
LYMPHOCYTES NFR BLD: 8.7 % (ref 21–52)
MAGNESIUM SERPL-MCNC: 1.9 MG/DL (ref 1.6–2.6)
MCH RBC QN AUTO: 29.5 PG (ref 24–34)
MCHC RBC AUTO-ENTMCNC: 31.5 G/DL (ref 31–37)
MCV RBC AUTO: 93.6 FL (ref 78–100)
MONOCYTES # BLD: 1.03 K/UL (ref 0.05–1.2)
MONOCYTES NFR BLD: 10.6 % (ref 3–10)
NEUTS SEG # BLD: 7.57 K/UL (ref 1.8–8)
NEUTS SEG NFR BLD: 77.9 % (ref 40–73)
NRBC # BLD: 0 K/UL (ref 0–0.01)
NRBC BLD-RTO: 0 PER 100 WBC
PLATELET # BLD AUTO: 265 K/UL (ref 135–420)
PMV BLD AUTO: 12.4 FL (ref 9.2–11.8)
POTASSIUM SERPL-SCNC: 3.8 MMOL/L (ref 3.5–5.5)
POTASSIUM SERPL-SCNC: 3.9 MMOL/L (ref 3.5–5.5)
RBC # BLD AUTO: 5.18 M/UL (ref 4.35–5.65)
SODIUM SERPL-SCNC: 135 MMOL/L (ref 136–145)
SODIUM SERPL-SCNC: 135 MMOL/L (ref 136–145)
WBC # BLD AUTO: 9.7 K/UL (ref 4.6–13.2)

## 2025-03-11 PROCEDURE — 36415 COLL VENOUS BLD VENIPUNCTURE: CPT

## 2025-03-11 PROCEDURE — 99232 SBSQ HOSP IP/OBS MODERATE 35: CPT | Performed by: INTERNAL MEDICINE

## 2025-03-11 PROCEDURE — 2140000001 HC CVICU INTERMEDIATE R&B

## 2025-03-11 PROCEDURE — 86803 HEPATITIS C AB TEST: CPT

## 2025-03-11 PROCEDURE — 94761 N-INVAS EAR/PLS OXIMETRY MLT: CPT

## 2025-03-11 PROCEDURE — 6370000000 HC RX 637 (ALT 250 FOR IP): Performed by: INTERNAL MEDICINE

## 2025-03-11 PROCEDURE — 82330 ASSAY OF CALCIUM: CPT

## 2025-03-11 PROCEDURE — 85025 COMPLETE CBC W/AUTO DIFF WBC: CPT

## 2025-03-11 PROCEDURE — 2500000003 HC RX 250 WO HCPCS

## 2025-03-11 PROCEDURE — 2500000003 HC RX 250 WO HCPCS: Performed by: INTERNAL MEDICINE

## 2025-03-11 PROCEDURE — 6370000000 HC RX 637 (ALT 250 FOR IP)

## 2025-03-11 PROCEDURE — 97535 SELF CARE MNGMENT TRAINING: CPT

## 2025-03-11 PROCEDURE — 80048 BASIC METABOLIC PNL TOTAL CA: CPT

## 2025-03-11 PROCEDURE — 83735 ASSAY OF MAGNESIUM: CPT

## 2025-03-11 RX ORDER — ACETAMINOPHEN 650 MG/1
650 SUPPOSITORY RECTAL EVERY 6 HOURS PRN
Status: DISCONTINUED | OUTPATIENT
Start: 2025-03-11 | End: 2025-03-13 | Stop reason: HOSPADM

## 2025-03-11 RX ADMIN — SODIUM CHLORIDE, PRESERVATIVE FREE 10 ML: 5 INJECTION INTRAVENOUS at 20:06

## 2025-03-11 RX ADMIN — FUROSEMIDE 40 MG: 40 TABLET ORAL at 20:05

## 2025-03-11 RX ADMIN — TRIAMCINOLONE ACETONIDE: 1 CREAM TOPICAL at 21:44

## 2025-03-11 RX ADMIN — CARVEDILOL 3.12 MG: 3.12 TABLET, FILM COATED ORAL at 16:01

## 2025-03-11 RX ADMIN — TRIAMCINOLONE ACETONIDE: 1 CREAM TOPICAL at 07:49

## 2025-03-11 RX ADMIN — PANTOPRAZOLE SODIUM 20 MG: 40 TABLET, DELAYED RELEASE ORAL at 05:30

## 2025-03-11 RX ADMIN — ASPIRIN 81 MG CHEWABLE TABLET 81 MG: 81 TABLET CHEWABLE at 07:48

## 2025-03-11 RX ADMIN — ROSUVASTATIN CALCIUM 20 MG: 20 TABLET, FILM COATED ORAL at 20:05

## 2025-03-11 RX ADMIN — CLOPIDOGREL BISULFATE 75 MG: 75 TABLET, FILM COATED ORAL at 07:48

## 2025-03-11 RX ADMIN — SODIUM CHLORIDE, PRESERVATIVE FREE 10 ML: 5 INJECTION INTRAVENOUS at 07:57

## 2025-03-11 RX ADMIN — AMIODARONE HYDROCHLORIDE 200 MG: 200 TABLET ORAL at 07:48

## 2025-03-11 RX ADMIN — APIXABAN 5 MG: 5 TABLET, FILM COATED ORAL at 07:48

## 2025-03-11 RX ADMIN — APIXABAN 5 MG: 5 TABLET, FILM COATED ORAL at 20:06

## 2025-03-11 RX ADMIN — PANTOPRAZOLE SODIUM 20 MG: 40 TABLET, DELAYED RELEASE ORAL at 16:01

## 2025-03-11 ASSESSMENT — PAIN SCALES - GENERAL
PAINLEVEL_OUTOF10: 0

## 2025-03-11 NOTE — CARE COORDINATION
03/11/25 1510   IMM Letter   IMM Letter given to Patient/Family/Significant other/Guardian/POA/by: Bibiana Rockwell   IMM Letter date given: 03/11/25   IMM Letter time given: 1350       Medicare pt has received, reviewed, and signed 2nd IM letter informing them of their right to appeal the discharge. Signed copy has been placed in pt bedside chart.    Bibiana Rockwell RN  Care Management

## 2025-03-11 NOTE — PLAN OF CARE
Problem: Chronic Conditions and Co-morbidities  Goal: Patient's chronic conditions and co-morbidity symptoms are monitored and maintained or improved  3/11/2025 1045 by Shelby Vogt RN  Outcome: Progressing  3/11/2025 1044 by Shelby Vogt RN  Outcome: Progressing  3/11/2025 0100 by Zulema Arroyo RN  Outcome: Progressing  Flowsheets (Taken 3/11/2025 0100)  Care Plan - Patient's Chronic Conditions and Co-Morbidity Symptoms are Monitored and Maintained or Improved:   Monitor and assess patient's chronic conditions and comorbid symptoms for stability, deterioration, or improvement   Collaborate with multidisciplinary team to address chronic and comorbid conditions and prevent exacerbation or deterioration   Update acute care plan with appropriate goals if chronic or comorbid symptoms are exacerbated and prevent overall improvement and discharge     Problem: Discharge Planning  Goal: Discharge to home or other facility with appropriate resources  Description: Transportation will be arranged upon discharge.  3/11/2025 1045 by Shelby Vogt RN  Outcome: Progressing  3/11/2025 1044 by Shelby Vogt RN  Outcome: Progressing     Problem: Safety - Adult  Goal: Free from fall injury  Description: Skid socks in place, call bell within reach,  3/11/2025 1045 by Shelby Vogt RN  Outcome: Progressing  3/11/2025 1044 by Shelby Vogt RN  Outcome: Progressing  3/11/2025 0100 by Zulema Arroyo RN  Outcome: Progressing  Flowsheets (Taken 3/11/2025 0100)  Free From Fall Injury: Instruct family/caregiver on patient safety  Note: Pt. Educated on call bell when in need of help and assistance.  Pt. Verbalized understanding.        Problem: Pain  Goal: Verbalizes/displays adequate comfort level or baseline comfort level  3/11/2025 1045 by Shelby Vogt RN  Outcome: Progressing  3/11/2025 1044 by Shelby Vogt RN  Outcome: Progressing  3/11/2025 0100 by Zulema Arroyo RN  Outcome:

## 2025-03-11 NOTE — PLAN OF CARE
Problem: Chronic Conditions and Co-morbidities  Goal: Patient's chronic conditions and co-morbidity symptoms are monitored and maintained or improved  Outcome: Progressing  Flowsheets (Taken 3/11/2025 0100)  Care Plan - Patient's Chronic Conditions and Co-Morbidity Symptoms are Monitored and Maintained or Improved:   Monitor and assess patient's chronic conditions and comorbid symptoms for stability, deterioration, or improvement   Collaborate with multidisciplinary team to address chronic and comorbid conditions and prevent exacerbation or deterioration   Update acute care plan with appropriate goals if chronic or comorbid symptoms are exacerbated and prevent overall improvement and discharge     Problem: Safety - Adult  Goal: Free from fall injury  Outcome: Progressing  Flowsheets (Taken 3/11/2025 0100)  Free From Fall Injury: Instruct family/caregiver on patient safety  Note: Pt. Educated on call bell when in need of help and assistance.  Pt. Verbalized understanding.        Problem: Pain  Goal: Verbalizes/displays adequate comfort level or baseline comfort level  Outcome: Progressing  Flowsheets (Taken 3/11/2025 0100)  Verbalizes/displays adequate comfort level or baseline comfort level:   Encourage patient to monitor pain and request assistance   Assess pain using appropriate pain scale   Administer analgesics based on type and severity of pain and evaluate response   Implement non-pharmacological measures as appropriate and evaluate response  Note: Pt. Educated on pain medication availability per MAR for pain management.  Pt. Verbalized understanding.

## 2025-03-11 NOTE — CARE COORDINATION
Met with pt and discussed transitional care.  Pt stated he does not want to go to SNF but return home and he will accept home health.            LIZZETH JarvisN RN  Care Management

## 2025-03-11 NOTE — PLAN OF CARE
Problem: Occupational Therapy - Adult  Goal: By Discharge: Performs self-care activities at highest level of function for planned discharge setting.  See evaluation for individualized goals.  Description: Occupational Therapy Goals:  Initiated 3/10/2025 to be met within 7-10 days.    1.  Patient will perform grooming with modified independence standing > 2 minutes at sink.   2.  Patient will perform upper body dressing with modified independence.  3.  Patient will perform lower body dressing  with modified independence.  4.  Patient will perform toilet transfers with supervision/set-up  5.  Patient will perform all aspects of toileting with modified independence.  6.  Patient will participate in upper extremity therapeutic exercise/activities with modified independence for 8-10 minutes to increase strength/endurance for ADLs.    7.  Patient will utilize energy conservation techniques during functional activities with verbal cues.    PLOF: Pt lives in 1SH, 3 TERRI and was independent with self-care. Pt reports using a cane for functional mobility until recently, misplaced DME in store.  Outcome: Progressing   OCCUPATIONAL THERAPY TREATMENT    Patient: Moiz May (64 y.o. male)  Date: 3/11/2025  Diagnosis: Hypomagnesemia [E83.42]  Respiratory distress [R06.03]  Acute on chronic combined systolic and diastolic congestive heart failure (HCC) [I50.43]  Acute exacerbation of chronic heart failure (HCC) [I50.9] Acute exacerbation of chronic heart failure (HCC)  Procedure(s) (LRB):  Left heart cath / coronary angiography (N/A)  Insert stent flash coronary (N/A)  Instantaneous wave-free ratio (iFR) (N/A)  Intravascular ultrasound (N/A) 1 Day Post-Op  Precautions: Fall Risk, General Precautions    Chart, occupational therapy assessment, plan of care, and goals were reviewed.  ASSESSMENT:  Pt is pleasant and cooperative. Anxious for OOB activity. Pt on RA. SpO2% 96 throughout session. Pt tolerates standing ~ 5 minutes and

## 2025-03-12 LAB
ANION GAP SERPL CALC-SCNC: 7 MMOL/L (ref 3–18)
ANION GAP SERPL CALC-SCNC: 9 MMOL/L (ref 3–18)
BASOPHILS # BLD: 0.05 K/UL (ref 0–0.1)
BASOPHILS NFR BLD: 0.6 % (ref 0–2)
BUN SERPL-MCNC: 26 MG/DL (ref 7–18)
BUN SERPL-MCNC: 28 MG/DL (ref 7–18)
BUN/CREAT SERPL: 15 (ref 12–20)
BUN/CREAT SERPL: 15 (ref 12–20)
CALCIUM SERPL-MCNC: 7.3 MG/DL (ref 8.5–10.1)
CALCIUM SERPL-MCNC: 7.4 MG/DL (ref 8.5–10.1)
CHLORIDE SERPL-SCNC: 104 MMOL/L (ref 100–111)
CHLORIDE SERPL-SCNC: 105 MMOL/L (ref 100–111)
CO2 SERPL-SCNC: 23 MMOL/L (ref 21–32)
CO2 SERPL-SCNC: 24 MMOL/L (ref 21–32)
CREAT SERPL-MCNC: 1.78 MG/DL (ref 0.6–1.3)
CREAT SERPL-MCNC: 1.91 MG/DL (ref 0.6–1.3)
DIFFERENTIAL METHOD BLD: ABNORMAL
EKG ATRIAL RATE: 75 BPM
EKG ATRIAL RATE: 75 BPM
EKG DIAGNOSIS: NORMAL
EKG DIAGNOSIS: NORMAL
EKG P AXIS: 79 DEGREES
EKG P AXIS: 81 DEGREES
EKG P-R INTERVAL: 160 MS
EKG P-R INTERVAL: 160 MS
EKG Q-T INTERVAL: 436 MS
EKG Q-T INTERVAL: 500 MS
EKG QRS DURATION: 104 MS
EKG QRS DURATION: 96 MS
EKG QTC CALCULATION (BAZETT): 486 MS
EKG QTC CALCULATION (BAZETT): 558 MS
EKG R AXIS: 84 DEGREES
EKG R AXIS: 93 DEGREES
EKG T AXIS: 112 DEGREES
EKG T AXIS: 43 DEGREES
EKG VENTRICULAR RATE: 75 BPM
EKG VENTRICULAR RATE: 75 BPM
EOSINOPHIL # BLD: 0.27 K/UL (ref 0–0.4)
EOSINOPHIL NFR BLD: 3 % (ref 0–5)
ERYTHROCYTE [DISTWIDTH] IN BLOOD BY AUTOMATED COUNT: 14.6 % (ref 11.6–14.5)
GLUCOSE SERPL-MCNC: 95 MG/DL (ref 74–99)
GLUCOSE SERPL-MCNC: 96 MG/DL (ref 74–99)
HCT VFR BLD AUTO: 44 % (ref 36–48)
HGB BLD-MCNC: 14.1 G/DL (ref 13–16)
IMM GRANULOCYTES # BLD AUTO: 0.03 K/UL (ref 0–0.04)
IMM GRANULOCYTES NFR BLD AUTO: 0.3 % (ref 0–0.5)
LYMPHOCYTES # BLD: 1.09 K/UL (ref 0.9–3.6)
LYMPHOCYTES NFR BLD: 12.2 % (ref 21–52)
MAGNESIUM SERPL-MCNC: 1.7 MG/DL (ref 1.6–2.6)
MCH RBC QN AUTO: 30.5 PG (ref 24–34)
MCHC RBC AUTO-ENTMCNC: 32 G/DL (ref 31–37)
MCV RBC AUTO: 95.2 FL (ref 78–100)
MONOCYTES # BLD: 0.84 K/UL (ref 0.05–1.2)
MONOCYTES NFR BLD: 9.4 % (ref 3–10)
NEUTS SEG # BLD: 6.64 K/UL (ref 1.8–8)
NEUTS SEG NFR BLD: 74.5 % (ref 40–73)
NRBC # BLD: 0 K/UL (ref 0–0.01)
NRBC BLD-RTO: 0 PER 100 WBC
PLATELET # BLD AUTO: 227 K/UL (ref 135–420)
PMV BLD AUTO: 12.3 FL (ref 9.2–11.8)
POTASSIUM SERPL-SCNC: 3.8 MMOL/L (ref 3.5–5.5)
POTASSIUM SERPL-SCNC: 4.1 MMOL/L (ref 3.5–5.5)
RBC # BLD AUTO: 4.62 M/UL (ref 4.35–5.65)
SODIUM SERPL-SCNC: 136 MMOL/L (ref 136–145)
SODIUM SERPL-SCNC: 136 MMOL/L (ref 136–145)
WBC # BLD AUTO: 8.9 K/UL (ref 4.6–13.2)

## 2025-03-12 PROCEDURE — 83735 ASSAY OF MAGNESIUM: CPT

## 2025-03-12 PROCEDURE — 85025 COMPLETE CBC W/AUTO DIFF WBC: CPT

## 2025-03-12 PROCEDURE — 6370000000 HC RX 637 (ALT 250 FOR IP)

## 2025-03-12 PROCEDURE — 6370000000 HC RX 637 (ALT 250 FOR IP): Performed by: INTERNAL MEDICINE

## 2025-03-12 PROCEDURE — 94761 N-INVAS EAR/PLS OXIMETRY MLT: CPT

## 2025-03-12 PROCEDURE — 2500000003 HC RX 250 WO HCPCS: Performed by: INTERNAL MEDICINE

## 2025-03-12 PROCEDURE — 99232 SBSQ HOSP IP/OBS MODERATE 35: CPT | Performed by: INTERNAL MEDICINE

## 2025-03-12 PROCEDURE — 36415 COLL VENOUS BLD VENIPUNCTURE: CPT

## 2025-03-12 PROCEDURE — 2140000001 HC CVICU INTERMEDIATE R&B

## 2025-03-12 PROCEDURE — 2500000003 HC RX 250 WO HCPCS

## 2025-03-12 PROCEDURE — 80048 BASIC METABOLIC PNL TOTAL CA: CPT

## 2025-03-12 RX ORDER — SPIRONOLACTONE 25 MG/1
12.5 TABLET ORAL DAILY
Status: DISCONTINUED | OUTPATIENT
Start: 2025-03-13 | End: 2025-03-13 | Stop reason: HOSPADM

## 2025-03-12 RX ORDER — FUROSEMIDE 40 MG/1
40 TABLET ORAL DAILY
Status: DISCONTINUED | OUTPATIENT
Start: 2025-03-12 | End: 2025-03-13 | Stop reason: HOSPADM

## 2025-03-12 RX ADMIN — SACUBITRIL AND VALSARTAN 1 TABLET: 24; 26 TABLET, FILM COATED ORAL at 20:59

## 2025-03-12 RX ADMIN — ASPIRIN 81 MG CHEWABLE TABLET 81 MG: 81 TABLET CHEWABLE at 09:55

## 2025-03-12 RX ADMIN — CARVEDILOL 3.12 MG: 3.12 TABLET, FILM COATED ORAL at 17:42

## 2025-03-12 RX ADMIN — AMIODARONE HYDROCHLORIDE 200 MG: 200 TABLET ORAL at 09:55

## 2025-03-12 RX ADMIN — PANTOPRAZOLE SODIUM 20 MG: 40 TABLET, DELAYED RELEASE ORAL at 16:34

## 2025-03-12 RX ADMIN — SODIUM CHLORIDE, PRESERVATIVE FREE 5 ML: 5 INJECTION INTRAVENOUS at 09:57

## 2025-03-12 RX ADMIN — CLOPIDOGREL BISULFATE 75 MG: 75 TABLET, FILM COATED ORAL at 09:55

## 2025-03-12 RX ADMIN — SODIUM CHLORIDE, PRESERVATIVE FREE 10 ML: 5 INJECTION INTRAVENOUS at 21:03

## 2025-03-12 RX ADMIN — CARVEDILOL 3.12 MG: 3.12 TABLET, FILM COATED ORAL at 09:55

## 2025-03-12 RX ADMIN — TRIAMCINOLONE ACETONIDE: 1 CREAM TOPICAL at 09:59

## 2025-03-12 RX ADMIN — ROSUVASTATIN CALCIUM 20 MG: 20 TABLET, FILM COATED ORAL at 20:59

## 2025-03-12 RX ADMIN — PANTOPRAZOLE SODIUM 20 MG: 40 TABLET, DELAYED RELEASE ORAL at 06:50

## 2025-03-12 RX ADMIN — EMPAGLIFLOZIN 10 MG: 10 TABLET, FILM COATED ORAL at 09:55

## 2025-03-12 RX ADMIN — TRIAMCINOLONE ACETONIDE: 1 CREAM TOPICAL at 21:01

## 2025-03-12 RX ADMIN — APIXABAN 5 MG: 5 TABLET, FILM COATED ORAL at 20:59

## 2025-03-12 RX ADMIN — APIXABAN 5 MG: 5 TABLET, FILM COATED ORAL at 09:55

## 2025-03-12 ASSESSMENT — PAIN SCALES - GENERAL: PAINLEVEL_OUTOF10: 0

## 2025-03-12 NOTE — PLAN OF CARE
Problem: Chronic Conditions and Co-morbidities  Goal: Patient's chronic conditions and co-morbidity symptoms are monitored and maintained or improved  3/12/2025 1212 by Anne Thomas RN  Outcome: Progressing  Flowsheets (Taken 3/12/2025 0955)  Care Plan - Patient's Chronic Conditions and Co-Morbidity Symptoms are Monitored and Maintained or Improved:   Monitor and assess patient's chronic conditions and comorbid symptoms for stability, deterioration, or improvement   Collaborate with multidisciplinary team to address chronic and comorbid conditions and prevent exacerbation or deterioration   Update acute care plan with appropriate goals if chronic or comorbid symptoms are exacerbated and prevent overall improvement and discharge  3/12/2025 0030 by Aleshia Giles RN  Outcome: Progressing     Problem: Discharge Planning  Goal: Discharge to home or other facility with appropriate resources  Description: Transportation will be arranged upon discharge.  3/12/2025 1212 by Anne Thomas RN  Outcome: Progressing  Flowsheets (Taken 3/12/2025 0955)  Discharge to home or other facility with appropriate resources: Identify barriers to discharge with patient and caregiver  3/12/2025 0030 by Aleshia Giles RN  Outcome: Progressing     Problem: Safety - Adult  Goal: Free from fall injury  Description: Skid socks in place, call bell within reach,  3/12/2025 1212 by Anne Thomas RN  Outcome: Progressing  Flowsheets (Taken 3/11/2025 0100 by Zulema Arroyo, RN)  Free From Fall Injury: Instruct family/caregiver on patient safety  3/12/2025 0030 by Aleshia Giles RN  Outcome: Progressing     Problem: Pain  Goal: Verbalizes/displays adequate comfort level or baseline comfort level  3/12/2025 1212 by Anne Thomas RN  Outcome: Progressing  Flowsheets (Taken 3/11/2025 0100 by Zulema Arroyo, RN)  Verbalizes/displays adequate comfort level or baseline comfort level:   Encourage patient to monitor pain and request assistance

## 2025-03-12 NOTE — PLAN OF CARE
Problem: Occupational Therapy - Adult  Goal: By Discharge: Performs self-care activities at highest level of function for planned discharge setting.  See evaluation for individualized goals.  Description: Occupational Therapy Goals:  Initiated 3/10/2025 to be met within 7-10 days.    1.  Patient will perform grooming with modified independence standing > 2 minutes at sink.   2.  Patient will perform upper body dressing with modified independence.  3.  Patient will perform lower body dressing  with modified independence.  4.  Patient will perform toilet transfers with supervision/set-up  5.  Patient will perform all aspects of toileting with modified independence.  6.  Patient will participate in upper extremity therapeutic exercise/activities with modified independence for 8-10 minutes to increase strength/endurance for ADLs.    7.  Patient will utilize energy conservation techniques during functional activities with verbal cues.        PLOF: Pt lives in 1SH, 3 TERRI and was independent with self-care. Pt reports using a cane for functional mobility until recently, misplaced DME in store.  3/11/2025 1202 by Yane Sosa OTA  Outcome: Progressing     Problem: Pain  Description: Will assess patient every 4 hours an implement interventions as necessary   Goal: Verbalizes/displays adequate comfort level or baseline comfort level  3/12/2025 0030 by Aleshia Giles RN  Outcome: Progressing  3/11/2025 1045 by Shelby Vogt RN  Outcome: Progressing  3/11/2025 1044 by Shelby Vogt RN  Outcome: Progressing     Problem: Safety - Adult  Description:  Patient will remain free from falls, skid socks in place, bed in low position, call bell in place            Goal: Free from fall injury  Description: Skid socks in place, call bell within reach,  3/12/2025 0030 by Aleshia Giles RN  Outcome: Progressing  3/11/2025 1045 by Shelby Vogt RN  Outcome: Progressing  3/11/2025 1044 by Shelby Vogt RN  Outcome:

## 2025-03-13 VITALS
HEIGHT: 72 IN | OXYGEN SATURATION: 98 % | SYSTOLIC BLOOD PRESSURE: 94 MMHG | BODY MASS INDEX: 24.11 KG/M2 | DIASTOLIC BLOOD PRESSURE: 62 MMHG | WEIGHT: 178 LBS | RESPIRATION RATE: 26 BRPM | HEART RATE: 81 BPM | TEMPERATURE: 97.5 F

## 2025-03-13 LAB
ANION GAP SERPL CALC-SCNC: 8 MMOL/L (ref 3–18)
BASOPHILS # BLD: 0.06 K/UL (ref 0–0.1)
BASOPHILS NFR BLD: 0.7 % (ref 0–2)
BUN SERPL-MCNC: 29 MG/DL (ref 7–18)
BUN/CREAT SERPL: 18 (ref 12–20)
CALCIUM SERPL-MCNC: 7.5 MG/DL (ref 8.5–10.1)
CHLORIDE SERPL-SCNC: 104 MMOL/L (ref 100–111)
CO2 SERPL-SCNC: 23 MMOL/L (ref 21–32)
CREAT SERPL-MCNC: 1.6 MG/DL (ref 0.6–1.3)
DIFFERENTIAL METHOD BLD: ABNORMAL
EOSINOPHIL # BLD: 0.19 K/UL (ref 0–0.4)
EOSINOPHIL NFR BLD: 2.2 % (ref 0–5)
ERYTHROCYTE [DISTWIDTH] IN BLOOD BY AUTOMATED COUNT: 14.4 % (ref 11.6–14.5)
GLUCOSE BLD STRIP.AUTO-MCNC: 93 MG/DL (ref 70–110)
GLUCOSE SERPL-MCNC: 89 MG/DL (ref 74–99)
HCT VFR BLD AUTO: 43.3 % (ref 36–48)
HGB BLD-MCNC: 14 G/DL (ref 13–16)
IMM GRANULOCYTES # BLD AUTO: 0.03 K/UL (ref 0–0.04)
IMM GRANULOCYTES NFR BLD AUTO: 0.3 % (ref 0–0.5)
LYMPHOCYTES # BLD: 1.06 K/UL (ref 0.9–3.6)
LYMPHOCYTES NFR BLD: 12.1 % (ref 21–52)
MAGNESIUM SERPL-MCNC: 1.7 MG/DL (ref 1.6–2.6)
MCH RBC QN AUTO: 30.2 PG (ref 24–34)
MCHC RBC AUTO-ENTMCNC: 32.3 G/DL (ref 31–37)
MCV RBC AUTO: 93.5 FL (ref 78–100)
MONOCYTES # BLD: 0.93 K/UL (ref 0.05–1.2)
MONOCYTES NFR BLD: 10.6 % (ref 3–10)
NEUTS SEG # BLD: 6.51 K/UL (ref 1.8–8)
NEUTS SEG NFR BLD: 74.1 % (ref 40–73)
NRBC # BLD: 0 K/UL (ref 0–0.01)
NRBC BLD-RTO: 0 PER 100 WBC
PLATELET # BLD AUTO: 212 K/UL (ref 135–420)
PMV BLD AUTO: 12.2 FL (ref 9.2–11.8)
POTASSIUM SERPL-SCNC: 4.2 MMOL/L (ref 3.5–5.5)
RBC # BLD AUTO: 4.63 M/UL (ref 4.35–5.65)
SODIUM SERPL-SCNC: 135 MMOL/L (ref 136–145)
WBC # BLD AUTO: 8.8 K/UL (ref 4.6–13.2)

## 2025-03-13 PROCEDURE — 6370000000 HC RX 637 (ALT 250 FOR IP)

## 2025-03-13 PROCEDURE — 85025 COMPLETE CBC W/AUTO DIFF WBC: CPT

## 2025-03-13 PROCEDURE — 94761 N-INVAS EAR/PLS OXIMETRY MLT: CPT

## 2025-03-13 PROCEDURE — 80048 BASIC METABOLIC PNL TOTAL CA: CPT

## 2025-03-13 PROCEDURE — 2580000003 HC RX 258

## 2025-03-13 PROCEDURE — 6370000000 HC RX 637 (ALT 250 FOR IP): Performed by: INTERNAL MEDICINE

## 2025-03-13 PROCEDURE — 2500000003 HC RX 250 WO HCPCS: Performed by: INTERNAL MEDICINE

## 2025-03-13 PROCEDURE — 36415 COLL VENOUS BLD VENIPUNCTURE: CPT

## 2025-03-13 PROCEDURE — 2500000003 HC RX 250 WO HCPCS

## 2025-03-13 PROCEDURE — 82962 GLUCOSE BLOOD TEST: CPT

## 2025-03-13 PROCEDURE — 83735 ASSAY OF MAGNESIUM: CPT

## 2025-03-13 RX ORDER — SPIRONOLACTONE 25 MG/1
12.5 TABLET ORAL DAILY
Qty: 30 TABLET | Refills: 3 | Status: SHIPPED | OUTPATIENT
Start: 2025-03-14

## 2025-03-13 RX ORDER — SACUBITRIL AND VALSARTAN 24; 26 MG/1; MG/1
1 TABLET, FILM COATED ORAL 2 TIMES DAILY
Qty: 60 TABLET | Refills: 1 | Status: SHIPPED | OUTPATIENT
Start: 2025-03-13

## 2025-03-13 RX ORDER — CARVEDILOL 3.12 MG/1
3.12 TABLET ORAL 2 TIMES DAILY WITH MEALS
Qty: 60 TABLET | Refills: 3 | Status: SHIPPED | OUTPATIENT
Start: 2025-03-13

## 2025-03-13 RX ORDER — FUROSEMIDE 40 MG/1
40 TABLET ORAL DAILY
Qty: 60 TABLET | Refills: 0 | Status: SHIPPED | OUTPATIENT
Start: 2025-03-13

## 2025-03-13 RX ORDER — CLOPIDOGREL BISULFATE 75 MG/1
75 TABLET ORAL DAILY
Qty: 30 TABLET | Refills: 2 | Status: SHIPPED | OUTPATIENT
Start: 2025-03-14

## 2025-03-13 RX ORDER — LIDOCAINE 4 G/G
1 PATCH TOPICAL DAILY
Qty: 30 EACH | Refills: 0 | Status: SHIPPED | OUTPATIENT
Start: 2025-03-14

## 2025-03-13 RX ORDER — SODIUM CHLORIDE, SODIUM LACTATE, POTASSIUM CHLORIDE, AND CALCIUM CHLORIDE .6; .31; .03; .02 G/100ML; G/100ML; G/100ML; G/100ML
250 INJECTION, SOLUTION INTRAVENOUS ONCE
Status: COMPLETED | OUTPATIENT
Start: 2025-03-13 | End: 2025-03-13

## 2025-03-13 RX ORDER — DAPAGLIFLOZIN 10 MG/1
10 TABLET, FILM COATED ORAL EVERY MORNING
Qty: 30 TABLET | Refills: 3 | Status: SHIPPED | OUTPATIENT
Start: 2025-03-13

## 2025-03-13 RX ORDER — TRIAMCINOLONE ACETONIDE 1 MG/G
CREAM TOPICAL
Qty: 45 G | Refills: 1 | Status: SHIPPED | OUTPATIENT
Start: 2025-03-13

## 2025-03-13 RX ORDER — PANTOPRAZOLE SODIUM 20 MG/1
20 TABLET, DELAYED RELEASE ORAL
Qty: 30 TABLET | Refills: 3 | Status: SHIPPED | OUTPATIENT
Start: 2025-03-13

## 2025-03-13 RX ORDER — ROSUVASTATIN CALCIUM 20 MG/1
20 TABLET, COATED ORAL NIGHTLY
Qty: 30 TABLET | Refills: 3 | Status: SHIPPED | OUTPATIENT
Start: 2025-03-13

## 2025-03-13 RX ADMIN — ASPIRIN 81 MG CHEWABLE TABLET 81 MG: 81 TABLET CHEWABLE at 08:34

## 2025-03-13 RX ADMIN — PANTOPRAZOLE SODIUM 20 MG: 40 TABLET, DELAYED RELEASE ORAL at 08:34

## 2025-03-13 RX ADMIN — CLOPIDOGREL BISULFATE 75 MG: 75 TABLET, FILM COATED ORAL at 08:35

## 2025-03-13 RX ADMIN — SODIUM CHLORIDE, PRESERVATIVE FREE 10 ML: 5 INJECTION INTRAVENOUS at 08:42

## 2025-03-13 RX ADMIN — CARVEDILOL 3.12 MG: 3.12 TABLET, FILM COATED ORAL at 08:35

## 2025-03-13 RX ADMIN — APIXABAN 5 MG: 5 TABLET, FILM COATED ORAL at 08:35

## 2025-03-13 RX ADMIN — SACUBITRIL AND VALSARTAN 1 TABLET: 24; 26 TABLET, FILM COATED ORAL at 08:35

## 2025-03-13 RX ADMIN — AMIODARONE HYDROCHLORIDE 200 MG: 200 TABLET ORAL at 08:35

## 2025-03-13 RX ADMIN — ACETAMINOPHEN 650 MG: 325 TABLET ORAL at 08:34

## 2025-03-13 RX ADMIN — EMPAGLIFLOZIN 10 MG: 10 TABLET, FILM COATED ORAL at 08:35

## 2025-03-13 RX ADMIN — SODIUM CHLORIDE, POTASSIUM CHLORIDE, SODIUM LACTATE AND CALCIUM CHLORIDE 250 ML: 600; 310; 30; 20 INJECTION, SOLUTION INTRAVENOUS at 08:40

## 2025-03-13 RX ADMIN — FUROSEMIDE 40 MG: 40 TABLET ORAL at 08:34

## 2025-03-13 RX ADMIN — SPIRONOLACTONE 12.5 MG: 25 TABLET ORAL at 08:35

## 2025-03-13 ASSESSMENT — PAIN DESCRIPTION - ORIENTATION: ORIENTATION: MID

## 2025-03-13 ASSESSMENT — PAIN SCALES - GENERAL
PAINLEVEL_OUTOF10: 7
PAINLEVEL_OUTOF10: 7

## 2025-03-13 ASSESSMENT — PAIN DESCRIPTION - LOCATION: LOCATION: BACK

## 2025-03-13 NOTE — DISCHARGE INSTR - DIET

## 2025-03-13 NOTE — CARE COORDINATION
Discussed with Nurse Maier.  She confirmed pt is ok for d/c but to give her some minutes to complete paperwork .    [employer] arranged for 5:30pm and  will send communication to Nurse cell phone.        Discharge order noted for today. Pt has been accepted to [facility] agency. Met with patient  and are agreeable to the transition plan today. Transport has been arranged through [employer]. Patient's discharge summary and home health  orders have been forwarded to [facility] via [facility]. Updated bedside RN, Livier,  to the transition plan.  Discharge information has been documented on the AVS.             JERICHO Jarvis RN  Care Management

## 2025-03-13 NOTE — DISCHARGE INSTRUCTIONS
Continue with triple therapy 81 mg daily aspirin, Eliquis 5 mg twice daily, Plavix 75 mg daily for 4 weeks followed by Plavix 75 mg daily and Eliquis 5 mg twice daily after that for 5 months followed by aspirin 81 mg daily and Eliquis 5 mg twice daily.

## 2025-03-13 NOTE — CARE COORDINATION
Chitra arranged for  in 8 min. [place] [facility] [id number].  number [phone]. [place]  will send text communication to nurse cell phone.

## 2025-03-13 NOTE — PLAN OF CARE
Problem: Chronic Conditions and Co-morbidities  Goal: Patient's chronic conditions and co-morbidity symptoms are monitored and maintained or improved  Outcome: Adequate for Discharge     Problem: Discharge Planning  Goal: Discharge to home or other facility with appropriate resources  Description: Transportation will be arranged upon discharge.  Outcome: Adequate for Discharge     Problem: Safety - Adult  Goal: Free from fall injury  Description: Skid socks in place, call bell within reach,  Outcome: Adequate for Discharge     Problem: Pain  Goal: Verbalizes/displays adequate comfort level or baseline comfort level  Outcome: Adequate for Discharge

## 2025-03-13 NOTE — PROGRESS NOTES
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 78 kg (172 lb)   12/01/21 82.1 kg (181 lb)   06/02/21 82.1 kg (181 lb)   04/29/21 79.8 kg (176 lb)     NFPE: unable to perform NFPE. Food Allergies: [facility]    Nutrition Assessment:    Admitted for management of acute exacerbation of chronic heart failure. Pt seen for - LOS. Per flow sheets, meal intake %, with 4/5 entries >75%. S/p left heart cath 3/10/25. Visited pt- seemed to be sleeping, woke easily to voice. Reported to be eating well, finishing most meals. Bed scale not zeroed and unable to obtain bed scale measurement. No preferences reported, no questions or concerns at end of visit. Continue to monitor pt per 
Initiate Bipap fro SOB and CHF.   03/07/25 1530   NIV Type   $NIV $Daily Charge   NIV Started/Stopped On   Equipment Type V60   Mode Bilevel   Mask Type Full face mask   Mask Size Medium   Assessment   Level of Consciousness 0   Using Accessory Muscles Yes   Mask Compliance Good   Skin Assessment Clean, dry, & intact   Breath Sounds   Respiratory Pattern Tachypneic   Settings/Measurements   PIP Observed 12 cm H20   IPAP 12 cmH20   CPAP/EPAP 6 cmH2O   Vt (Measured) 518 mL   Rate Ordered 26   Insp Rise Time (%) 3 %   FiO2  100 %   Minute Volume (L/min) 11.9 Liters   Mask Leak (lpm) 53 lpm   Patient's Home Machine No   Alarm Settings   Alarms On Y   Low Pressure (cmH2O) 8 cmH2O   High Pressure (cmH2O) 40 cmH2O   RR Low (bpm) 8   RR High (bpm) 40 br/min       
Occupational Therapy    Orders received, chart reviewed.RN cleared pt for OT evaluation. Pt semi supine in bed. Pt declines OT evaluation at this time, reporting pain in chest, ribs, and legs, but not quantify pain with a number. RN made aware. OT to re-attempt at a later time as scheduling allows.     Thank you for this referral.   Jared Rodriguez MS, OTR/L  
PFM paged regarding elevated Bps they will review his chart and place orders as needed.   
Patient left floor via wheelchair, all IV's removed and patient belongings with patient.   
Physical Therapy    Pt not seen for skilled PT due to:    []  Nausea/vomiting  []  Eating  []  Pain  []  Pt lethargic  []  Off Unit  Other: Orders received, chart reviewed.RN cleared pt for PT evaluation. Pt semi supine in bed. Pt declines PT evaluation at this time, reporting pain in chest, ribs, and legs, but not quantify pain with a number. RN made aware.     Will f/u later as schedule allows. Thank you.  Aury Mayer, PT, DPT     
Physical Therapy  Pt not seen for skilled PT due to:    AM attempt: Patient on phone. Requesting therapist follow back.     PM attempt: Patient declining all mobility due to back pain. Encouragement and education regarding benefits of participation as well as importance of mobility assessment for appropriate discharge recommendations; however, patient continued to adamantly decline.     Thank you.  LYNNE GarciaT       
Physical Therapy  Pt not seen for skilled PT due to:    Politely but firmly declining any/all mobility or PT interventions this date, stating, \"not today.\" No clear reasoning. Education provided on importance of participation, but patient continuing to refuse. RN made aware.    Thank you.  LYNNE GarciaT       
Received from cath lab, A+Ox4, denied any complaints, good respiratory effort. Right wrist band intact, no bleeding or swelling. Normal distal circulation and neuro check.  
Right wrist band removed. Sterile hemostatic pad and occlusive dressing applied. No bleeding or swelling. Safety splint applied. Normal radial pulse, normal distal circulation and neuro check.  
Sp stent to the mRCA 70% to 0%  dLM 30-50% with iFR of 0.96 across LM not significant  
TRANSFER - OUT REPORT:    Verbal report given to MARNIE Ryan on Moiz May  being transferred to [facility] for routine progression of patient care       Report consisted of patient's Situation, Background, Assessment and   Recommendations(SBAR).     Information from the following report(s) Nurse Handoff Report was reviewed with the receiving nurse.           Lines:   Peripheral IV 03/09/25 Left;Anterior Forearm (Active)   Site Assessment Clean, dry & intact 03/10/25 1300   Line Status Blood return noted;Flushed 03/10/25 1300   Line Care Connections checked and tightened 03/10/25 1300   Phlebitis Assessment No symptoms 03/10/25 1300   Infiltration Assessment 0 03/10/25 1300   Alcohol Cap Used Yes 03/10/25 1300   Dressing Status Clean, dry & intact 03/10/25 1300   Dressing Type Transparent 03/10/25 1300       Peripheral IV 03/10/25 Proximal;Right;Anterior Forearm (Active)   Site Assessment Clean, dry & intact 03/10/25 1300   Line Status Blood return noted;Flushed 03/10/25 1300   Line Care Connections checked and tightened 03/10/25 1300   Phlebitis Assessment No symptoms 03/10/25 1300   Infiltration Assessment 0 03/10/25 1300   Alcohol Cap Used Yes 03/10/25 1300   Dressing Status Clean, dry & intact 03/10/25 1300   Dressing Type Transparent 03/10/25 1300        Opportunity for questions and clarification was provided.      Patient transported with:  Tech: Transporter       
03/11/25  0418   WBC 8.6 9.6 9.7   RBC 5.00 5.14 5.18   HGB 14.8 15.8 15.3   HCT 46.3 48.1* 48.5*    259 265      Cardiac Enzymes @[id number](CPK:*,CK:*,CPKMB:*,CKRMB:*,CKMMB:*,CKMB:*,RCK3:*,CKMBT:*,CKMBPC:*,CKSMB:*,CKNDX:*,CKND1:*,HAYLEE:*,TROQR:*,TROPT:*,TROIQ:*,TROIP:*,KISHA:*,TROPIT:*,TROPT:*,TRPOIT:*,ITNL:*,TNIPOC:*,BNP:*,BNPP:*,PBNP:*)@   Coagulation No results for input(s): \"INR\", \"APTT\" in the last 72 hours.    Invalid input(s): \"PTP\"    Lipid Panel Lab Results   Component Value Date/Time    CHOL 154 10/28/2024 10:50 AM    HDL 46 10/28/2024 10:50 AM    LDL 96.8 10/28/2024 10:50 AM    LDL 91 02/24/2022 04:58 AM    VLDL 11.2 10/28/2024 10:50 AM      BNP Lab Results   Component Value Date/Time    BNP 6,384 08/10/2022 05:09 AM    BNP 15,570 08/07/2022 10:47 AM    BNP 3,813 04/18/2022 05:35 PM    BNP 13,398 04/13/2022 01:31 PM    BNP 9,678 04/12/2022 04:25 PM      Liver Enzymes No results for input(s): \"TP\" in the last 72 hours.    Invalid input(s): \"ALB\", \"TBIL\", \"AP\", \"SGOT\", \"GPT\", \"DBIL\"   Thyroid Studies Lab Results   Component Value Date/Time    TSH 1.53 10/27/2024 12:56 PM          Signed By: PRESLEY BACK MD     March 11, 2025      
recently 97% on RA  Plan:  -Cardiology following, appreciate recommendations  -Restart reccommended GDMT regimen              -spironolactone, carvedilol, entresto, Jardiance (substitute for Farxiga)  -Restart ASA and statin   -APAP PRN for pain   -Lasix 40 mg IV BID with plan to transition to PO today per cardiology rec  -Cardiac diet, 1200 mL fluid restriction  -Strict I/O and daily weight  -Mg >2, K >4  -Wean O2 as tolerated  -ECHO ordered   -PT/OT/CM    Dry prurigo nodularis   -On his neck and scalp   Plan:     -Kenalog 0.1 cream BID     -Consider HIV testing        pAfib, hx afib RVR  HX of left brachial vein DVT   -No A.fib present on EKG or tele monitoring   -Home meds: supposed to be on amiodarone 200 mg, coreg 3.125 BID, and eliquis 5 mg but history of noncompliance  Plan:  -Restarted amiodarone, coreg, and Eliquis  -Monitor tele     CKD3b  -Cr 1.56 (baseline appears to be about 1.4-1.5), BUN 30   Plan:  -avoid nephrotoxic agents  -daily BMP      Substance use   -UDS positive for cocaine, has been positive on several previous admissions   Plan:  -encourage cessation         Global:  Code: Full  IVF/Drips: None  I/O / Wt: 86 kg > 80.7 kg  Diet: Cardiac  Bowel Regimen: miralax   DVT/AC: Eliquis  Mobility: per protocol   Disposition: Home  Anticipated LOS: 2-3 days      Point of Contact (relationship, number): Ivon (sister): [phone]    ================================================================  Further management for . Moiz May will be discussed on rounds with my attending.    Christina Marsh DO  Family Medicine, PGY-1  March 10, 2025 7:57 AM   
-Kenalog 0.1 cream BID     -Consider HIV testing        pAfib, hx afib RVR  HX of left brachial vein DVT   -No A.fib present on EKG or tele monitoring   -Home meds: supposed to be on amiodarone 200 mg, coreg 3.125 BID, and eliquis 5 mg but history of noncompliance  Plan:  -Restarted amiodarone, coreg, and Eliquis  -Monitor tele     CKD3b  -Cr 1.76 this AM (3/11) (baseline appears to be about 1.4-1.5), BUN 29   Plan:  -avoid nephrotoxic agents  -daily BMP      Substance use   -UDS positive for cocaine, has been positive on several previous admissions   - Denies cocaine use  Plan:  -encourage cessation         Global:  Code: Full  IVF/Drips: None  I/O / Wt: 86 kg > 80.7 kg  Diet: Cardiac  Bowel Regimen: miralax   DVT/AC: Eliquis  Mobility: per protocol   Disposition: Home  Anticipated LOS: 2-3 days      Point of Contact (relationship, number): Ivon (sister): [phone]    ================================================================  Further management for Mr. Moiz May will be discussed on rounds with my attending.    Christina Marsh,   Family Medicine, PGY-1  March 11, 2025 8:14 AM

## 2025-03-13 NOTE — CARE COORDINATION
Pt has discharge orders  Met with him.  He stated he does not want to leave.  He stated \"I don't feel safe going home.\"  CM asked him why he feels he is not safe.  He stated his back is hurting and he was told they will do a biopsy on his head.  CM informed him that he declined to go to rehab and PTOT came to see him today and he declined.  Pt stated \"I will go home tomorrow.  I just wanna stay here for today.\"    CM called [facility] and was told they do not want to keep pt another day that he is stable for d/c and biopsy of his head can be done outpatient and they communicated that to him.    1525:    CM went back to pt and he is insisting that he will leave tomorrow.  CM verified his address and informed him transport will be arranged for him to go home and he stated \"What if I tell you there is no one at home.\"  Advised that pt call [facility] and appeal his discharge.    Pt's nurse Livier off the floor with another pt at this time.  Updated Charge Nurse Hilary.    1605:    Met with pt and pt now ready to be discharged.  He stated he can get in a cab.    Pt's nurse not available at this time.          Jesusita Crespo, LIZZETHN RN  Care Management

## 2025-03-13 NOTE — CARE COORDINATION
Pt requesting for UAI so he can get services from [facility].        1121:    LTSS/UAI Screening ID # : [id number] completed in Medicaid Web site and submitted for processing.    1314:    Sent message to Dr. Marsh for UAI signing.      1440:    Pt's UAI signed and authorized  Copy of UAI faxed to [place] Department of  [phone].      Jesusita Crespo, LIZZETHN RN  Care Management

## 2025-03-14 LAB
HCV AB SERPL QL IA: NORMAL
HCV IGG SERPL QL IA: NON REACTIVE S/CO RATIO

## 2025-03-26 DIAGNOSIS — Z95.5 STENTED CORONARY ARTERY: Primary | ICD-10-CM

## 2025-03-27 ENCOUNTER — HOSPITAL ENCOUNTER (INPATIENT)
Facility: HOSPITAL | Age: 65
LOS: 4 days | Discharge: HOME HEALTH | DRG: 291 | End: 2025-03-31
Attending: EMERGENCY MEDICINE | Admitting: FAMILY MEDICINE
Payer: MEDICARE

## 2025-03-27 ENCOUNTER — APPOINTMENT (OUTPATIENT)
Facility: HOSPITAL | Age: 65
DRG: 291 | End: 2025-03-27
Payer: MEDICARE

## 2025-03-27 DIAGNOSIS — I50.21 ACUTE HFREF (HEART FAILURE WITH REDUCED EJECTION FRACTION) (HCC): ICD-10-CM

## 2025-03-27 DIAGNOSIS — J96.01 ACUTE RESPIRATORY FAILURE WITH HYPOXIA: ICD-10-CM

## 2025-03-27 DIAGNOSIS — I50.43 ACUTE ON CHRONIC COMBINED SYSTOLIC AND DIASTOLIC CHF (CONGESTIVE HEART FAILURE) (HCC): Primary | ICD-10-CM

## 2025-03-27 PROBLEM — I50.23 ACUTE ON CHRONIC HEART FAILURE WITH REDUCED EJECTION FRACTION (HFREF, <= 40%) (HCC): Status: ACTIVE | Noted: 2025-03-27

## 2025-03-27 LAB
ALBUMIN SERPL-MCNC: 3.1 G/DL (ref 3.4–5)
ALBUMIN/GLOB SERPL: 0.7 (ref 0.8–1.7)
ALP SERPL-CCNC: 197 U/L (ref 45–117)
ALT SERPL-CCNC: 57 U/L (ref 16–61)
AMPHET UR QL SCN: NEGATIVE
ANION GAP SERPL CALC-SCNC: 9 MMOL/L (ref 3–18)
AST SERPL-CCNC: 86 U/L (ref 10–38)
B PERT DNA SPEC QL NAA+PROBE: NOT DETECTED
BARBITURATES UR QL SCN: NEGATIVE
BASOPHILS # BLD: 0.05 K/UL (ref 0–0.1)
BASOPHILS NFR BLD: 0.5 % (ref 0–2)
BENZODIAZ UR QL: NEGATIVE
BILIRUB SERPL-MCNC: 1.3 MG/DL (ref 0.2–1)
BORDETELLA PARAPERTUSSIS BY PCR: NOT DETECTED
BUN SERPL-MCNC: 26 MG/DL (ref 7–18)
BUN/CREAT SERPL: 14 (ref 12–20)
C PNEUM DNA SPEC QL NAA+PROBE: NOT DETECTED
CALCIUM SERPL-MCNC: 8.1 MG/DL (ref 8.5–10.1)
CANNABINOIDS UR QL SCN: NEGATIVE
CHLORIDE SERPL-SCNC: 105 MMOL/L (ref 100–111)
CO2 SERPL-SCNC: 22 MMOL/L (ref 21–32)
COCAINE UR QL SCN: POSITIVE
CREAT SERPL-MCNC: 1.83 MG/DL (ref 0.6–1.3)
DIFFERENTIAL METHOD BLD: ABNORMAL
EOSINOPHIL # BLD: 0.35 K/UL (ref 0–0.4)
EOSINOPHIL NFR BLD: 3.6 % (ref 0–5)
ERYTHROCYTE [DISTWIDTH] IN BLOOD BY AUTOMATED COUNT: 14.9 % (ref 11.6–14.5)
FLUAV SUBTYP SPEC NAA+PROBE: NOT DETECTED
FLUBV RNA SPEC QL NAA+PROBE: NOT DETECTED
GLOBULIN SER CALC-MCNC: 4.4 G/DL (ref 2–4)
GLUCOSE SERPL-MCNC: 117 MG/DL (ref 74–99)
HADV DNA SPEC QL NAA+PROBE: NOT DETECTED
HCOV 229E RNA SPEC QL NAA+PROBE: NOT DETECTED
HCOV HKU1 RNA SPEC QL NAA+PROBE: NOT DETECTED
HCOV NL63 RNA SPEC QL NAA+PROBE: NOT DETECTED
HCOV OC43 RNA SPEC QL NAA+PROBE: NOT DETECTED
HCT VFR BLD AUTO: 43.2 % (ref 36–48)
HGB BLD-MCNC: 13.1 G/DL (ref 13–16)
HMPV RNA SPEC QL NAA+PROBE: NOT DETECTED
HPIV1 RNA SPEC QL NAA+PROBE: NOT DETECTED
HPIV2 RNA SPEC QL NAA+PROBE: NOT DETECTED
HPIV3 RNA SPEC QL NAA+PROBE: NOT DETECTED
HPIV4 RNA SPEC QL NAA+PROBE: NOT DETECTED
IMM GRANULOCYTES # BLD AUTO: 0.1 K/UL (ref 0–0.04)
IMM GRANULOCYTES NFR BLD AUTO: 1 % (ref 0–0.5)
LYMPHOCYTES # BLD: 1.11 K/UL (ref 0.9–3.6)
LYMPHOCYTES NFR BLD: 11.6 % (ref 21–52)
Lab: ABNORMAL
M PNEUMO DNA SPEC QL NAA+PROBE: NOT DETECTED
MAGNESIUM SERPL-MCNC: 1.6 MG/DL (ref 1.6–2.6)
MCH RBC QN AUTO: 29.9 PG (ref 24–34)
MCHC RBC AUTO-ENTMCNC: 30.3 G/DL (ref 31–37)
MCV RBC AUTO: 98.6 FL (ref 78–100)
METHADONE UR QL: NEGATIVE
MONOCYTES # BLD: 0.75 K/UL (ref 0.05–1.2)
MONOCYTES NFR BLD: 7.8 % (ref 3–10)
NEUTS SEG # BLD: 7.23 K/UL (ref 1.8–8)
NEUTS SEG NFR BLD: 75.5 % (ref 40–73)
NRBC # BLD: 0 K/UL (ref 0–0.01)
NRBC BLD-RTO: 0 PER 100 WBC
NT PRO BNP: ABNORMAL PG/ML (ref 0–900)
OPIATES UR QL: NEGATIVE
PCP UR QL: NEGATIVE
PLATELET # BLD AUTO: 296 K/UL (ref 135–420)
PMV BLD AUTO: 12.4 FL (ref 9.2–11.8)
POTASSIUM SERPL-SCNC: 4.2 MMOL/L (ref 3.5–5.5)
PROT SERPL-MCNC: 7.5 G/DL (ref 6.4–8.2)
RBC # BLD AUTO: 4.38 M/UL (ref 4.35–5.65)
RSV RNA SPEC QL NAA+PROBE: NOT DETECTED
RV+EV RNA SPEC QL NAA+PROBE: NOT DETECTED
SARS-COV-2 RNA RESP QL NAA+PROBE: NOT DETECTED
SODIUM SERPL-SCNC: 136 MMOL/L (ref 136–145)
TROPONIN I SERPL HS-MCNC: 21 NG/L (ref 0–78)
WBC # BLD AUTO: 9.6 K/UL (ref 4.6–13.2)

## 2025-03-27 PROCEDURE — 93005 ELECTROCARDIOGRAM TRACING: CPT | Performed by: EMERGENCY MEDICINE

## 2025-03-27 PROCEDURE — 83735 ASSAY OF MAGNESIUM: CPT

## 2025-03-27 PROCEDURE — 6360000002 HC RX W HCPCS

## 2025-03-27 PROCEDURE — 80053 COMPREHEN METABOLIC PANEL: CPT

## 2025-03-27 PROCEDURE — 84484 ASSAY OF TROPONIN QUANT: CPT

## 2025-03-27 PROCEDURE — 71045 X-RAY EXAM CHEST 1 VIEW: CPT

## 2025-03-27 PROCEDURE — 2500000003 HC RX 250 WO HCPCS

## 2025-03-27 PROCEDURE — 6370000000 HC RX 637 (ALT 250 FOR IP)

## 2025-03-27 PROCEDURE — 2700000000 HC OXYGEN THERAPY PER DAY

## 2025-03-27 PROCEDURE — 6360000002 HC RX W HCPCS: Performed by: EMERGENCY MEDICINE

## 2025-03-27 PROCEDURE — 80307 DRUG TEST PRSMV CHEM ANLYZR: CPT

## 2025-03-27 PROCEDURE — 96374 THER/PROPH/DIAG INJ IV PUSH: CPT

## 2025-03-27 PROCEDURE — 85025 COMPLETE CBC W/AUTO DIFF WBC: CPT

## 2025-03-27 PROCEDURE — 0202U NFCT DS 22 TRGT SARS-COV-2: CPT

## 2025-03-27 PROCEDURE — 1100000003 HC PRIVATE W/ TELEMETRY

## 2025-03-27 PROCEDURE — 96375 TX/PRO/DX INJ NEW DRUG ADDON: CPT

## 2025-03-27 PROCEDURE — 94761 N-INVAS EAR/PLS OXIMETRY MLT: CPT

## 2025-03-27 PROCEDURE — 83880 ASSAY OF NATRIURETIC PEPTIDE: CPT

## 2025-03-27 PROCEDURE — 99285 EMERGENCY DEPT VISIT HI MDM: CPT

## 2025-03-27 RX ORDER — FUROSEMIDE 10 MG/ML
40 INJECTION INTRAMUSCULAR; INTRAVENOUS 2 TIMES DAILY
Status: DISCONTINUED | OUTPATIENT
Start: 2025-03-27 | End: 2025-03-31 | Stop reason: HOSPADM

## 2025-03-27 RX ORDER — POLYETHYLENE GLYCOL 3350 17 G/17G
17 POWDER, FOR SOLUTION ORAL DAILY PRN
Status: DISCONTINUED | OUTPATIENT
Start: 2025-03-27 | End: 2025-03-31 | Stop reason: HOSPADM

## 2025-03-27 RX ORDER — ROSUVASTATIN CALCIUM 10 MG/1
20 TABLET, COATED ORAL NIGHTLY
Status: DISCONTINUED | OUTPATIENT
Start: 2025-03-27 | End: 2025-03-31 | Stop reason: HOSPADM

## 2025-03-27 RX ORDER — TRIAMCINOLONE ACETONIDE 1 MG/G
CREAM TOPICAL 2 TIMES DAILY PRN
Status: DISCONTINUED | OUTPATIENT
Start: 2025-03-27 | End: 2025-03-31 | Stop reason: HOSPADM

## 2025-03-27 RX ORDER — AMIODARONE HYDROCHLORIDE 200 MG/1
200 TABLET ORAL DAILY
Status: DISCONTINUED | OUTPATIENT
Start: 2025-03-27 | End: 2025-03-31 | Stop reason: HOSPADM

## 2025-03-27 RX ORDER — ASPIRIN 81 MG/1
81 TABLET, CHEWABLE ORAL DAILY
Status: DISCONTINUED | OUTPATIENT
Start: 2025-03-27 | End: 2025-03-31 | Stop reason: HOSPADM

## 2025-03-27 RX ORDER — FUROSEMIDE 10 MG/ML
40 INJECTION INTRAMUSCULAR; INTRAVENOUS ONCE
Status: COMPLETED | OUTPATIENT
Start: 2025-03-27 | End: 2025-03-27

## 2025-03-27 RX ORDER — CLOPIDOGREL BISULFATE 75 MG/1
75 TABLET ORAL DAILY
Status: DISCONTINUED | OUTPATIENT
Start: 2025-03-27 | End: 2025-03-31 | Stop reason: HOSPADM

## 2025-03-27 RX ORDER — SUCRALFATE 1 G/1
1 TABLET ORAL
Status: DISCONTINUED | OUTPATIENT
Start: 2025-03-27 | End: 2025-03-31 | Stop reason: HOSPADM

## 2025-03-27 RX ORDER — ONDANSETRON 2 MG/ML
4 INJECTION INTRAMUSCULAR; INTRAVENOUS
Status: COMPLETED | OUTPATIENT
Start: 2025-03-27 | End: 2025-03-27

## 2025-03-27 RX ORDER — LIDOCAINE 4 G/G
1 PATCH TOPICAL DAILY
Status: DISCONTINUED | OUTPATIENT
Start: 2025-03-27 | End: 2025-03-31 | Stop reason: HOSPADM

## 2025-03-27 RX ORDER — ACETAMINOPHEN 650 MG/1
650 SUPPOSITORY RECTAL EVERY 6 HOURS PRN
Status: DISCONTINUED | OUTPATIENT
Start: 2025-03-27 | End: 2025-03-31 | Stop reason: HOSPADM

## 2025-03-27 RX ORDER — SODIUM CHLORIDE 9 MG/ML
INJECTION, SOLUTION INTRAVENOUS PRN
Status: DISCONTINUED | OUTPATIENT
Start: 2025-03-27 | End: 2025-03-31 | Stop reason: HOSPADM

## 2025-03-27 RX ORDER — ACETAMINOPHEN 325 MG/1
650 TABLET ORAL EVERY 6 HOURS PRN
Status: DISCONTINUED | OUTPATIENT
Start: 2025-03-27 | End: 2025-03-31 | Stop reason: HOSPADM

## 2025-03-27 RX ORDER — PANTOPRAZOLE SODIUM 20 MG/1
20 TABLET, DELAYED RELEASE ORAL
Status: DISCONTINUED | OUTPATIENT
Start: 2025-03-27 | End: 2025-03-31 | Stop reason: HOSPADM

## 2025-03-27 RX ORDER — SPIRONOLACTONE 25 MG/1
12.5 TABLET ORAL DAILY
Status: DISCONTINUED | OUTPATIENT
Start: 2025-03-27 | End: 2025-03-31 | Stop reason: HOSPADM

## 2025-03-27 RX ORDER — CARVEDILOL 3.12 MG/1
3.12 TABLET ORAL 2 TIMES DAILY WITH MEALS
Status: DISCONTINUED | OUTPATIENT
Start: 2025-03-27 | End: 2025-03-31 | Stop reason: HOSPADM

## 2025-03-27 RX ORDER — TRIAMCINOLONE ACETONIDE 1 MG/G
CREAM TOPICAL 2 TIMES DAILY
Status: DISCONTINUED | OUTPATIENT
Start: 2025-03-27 | End: 2025-03-27

## 2025-03-27 RX ORDER — SODIUM CHLORIDE 0.9 % (FLUSH) 0.9 %
5-40 SYRINGE (ML) INJECTION PRN
Status: DISCONTINUED | OUTPATIENT
Start: 2025-03-27 | End: 2025-03-31 | Stop reason: HOSPADM

## 2025-03-27 RX ORDER — SODIUM CHLORIDE 0.9 % (FLUSH) 0.9 %
5-40 SYRINGE (ML) INJECTION EVERY 12 HOURS SCHEDULED
Status: DISCONTINUED | OUTPATIENT
Start: 2025-03-27 | End: 2025-03-31 | Stop reason: HOSPADM

## 2025-03-27 RX ADMIN — PANTOPRAZOLE SODIUM 20 MG: 20 TABLET, DELAYED RELEASE ORAL at 17:23

## 2025-03-27 RX ADMIN — CARVEDILOL 3.12 MG: 3.12 TABLET, FILM COATED ORAL at 17:23

## 2025-03-27 RX ADMIN — Medication 600 MG: at 13:04

## 2025-03-27 RX ADMIN — SODIUM CHLORIDE, PRESERVATIVE FREE 10 ML: 5 INJECTION INTRAVENOUS at 10:15

## 2025-03-27 RX ADMIN — ACETAMINOPHEN 650 MG: 325 TABLET ORAL at 13:31

## 2025-03-27 RX ADMIN — ROSUVASTATIN CALCIUM 20 MG: 10 TABLET, FILM COATED ORAL at 21:16

## 2025-03-27 RX ADMIN — SACUBITRIL AND VALSARTAN 1 TABLET: 24; 26 TABLET, FILM COATED ORAL at 21:16

## 2025-03-27 RX ADMIN — APIXABAN 5 MG: 5 TABLET, FILM COATED ORAL at 13:03

## 2025-03-27 RX ADMIN — FUROSEMIDE 40 MG: 10 INJECTION, SOLUTION INTRAMUSCULAR; INTRAVENOUS at 17:23

## 2025-03-27 RX ADMIN — SUCRALFATE 1 G: 1 TABLET ORAL at 17:23

## 2025-03-27 RX ADMIN — ONDANSETRON 4 MG: 2 INJECTION, SOLUTION INTRAMUSCULAR; INTRAVENOUS at 08:20

## 2025-03-27 RX ADMIN — ASPIRIN 81 MG CHEWABLE TABLET 81 MG: 81 TABLET CHEWABLE at 13:04

## 2025-03-27 RX ADMIN — SUCRALFATE 1 G: 1 TABLET ORAL at 13:03

## 2025-03-27 RX ADMIN — APIXABAN 5 MG: 5 TABLET, FILM COATED ORAL at 21:16

## 2025-03-27 RX ADMIN — CARVEDILOL 3.12 MG: 3.12 TABLET, FILM COATED ORAL at 13:04

## 2025-03-27 RX ADMIN — EMPAGLIFLOZIN 10 MG: 10 TABLET, FILM COATED ORAL at 13:04

## 2025-03-27 RX ADMIN — SPIRONOLACTONE 12.5 MG: 25 TABLET ORAL at 13:04

## 2025-03-27 RX ADMIN — SODIUM CHLORIDE, PRESERVATIVE FREE 10 ML: 5 INJECTION INTRAVENOUS at 21:17

## 2025-03-27 RX ADMIN — FUROSEMIDE 40 MG: 10 INJECTION, SOLUTION INTRAMUSCULAR; INTRAVENOUS at 08:20

## 2025-03-27 RX ADMIN — AMIODARONE HYDROCHLORIDE 200 MG: 200 TABLET ORAL at 13:04

## 2025-03-27 ASSESSMENT — PAIN DESCRIPTION - ORIENTATION: ORIENTATION: RIGHT;LEFT

## 2025-03-27 ASSESSMENT — PAIN SCALES - GENERAL
PAINLEVEL_OUTOF10: 0
PAINLEVEL_OUTOF10: 7
PAINLEVEL_OUTOF10: 0
PAINLEVEL_OUTOF10: 7
PAINLEVEL_OUTOF10: 4

## 2025-03-27 ASSESSMENT — PAIN DESCRIPTION - PAIN TYPE: TYPE: ACUTE PAIN

## 2025-03-27 ASSESSMENT — PAIN DESCRIPTION - LOCATION
LOCATION: BACK
LOCATION: CHEST

## 2025-03-27 ASSESSMENT — PAIN - FUNCTIONAL ASSESSMENT: PAIN_FUNCTIONAL_ASSESSMENT: ACTIVITIES ARE NOT PREVENTED

## 2025-03-27 ASSESSMENT — LIFESTYLE VARIABLES
HOW MANY STANDARD DRINKS CONTAINING ALCOHOL DO YOU HAVE ON A TYPICAL DAY: PATIENT DOES NOT DRINK
HOW OFTEN DO YOU HAVE A DRINK CONTAINING ALCOHOL: NEVER

## 2025-03-27 ASSESSMENT — PAIN DESCRIPTION - DESCRIPTORS: DESCRIPTORS: ACHING

## 2025-03-27 NOTE — ED TRIAGE NOTES
Patient arrived EMS from home with c/o SOB since Thursday and chest pain. Patient recently discharged from [facility] 3/20/2025. Hx of CHF, has an AICD to left chest.  
97
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
## 2025-03-27 NOTE — ED NOTES
Pt is currently asleep on the ED stretcher in [facility]. Bed is at the lowest position with brakes on and x2 rails up. Call light is within reach.

## 2025-03-27 NOTE — CARE COORDINATION
03/27/25 1107   Service Assessment   Patient Orientation Alert and Oriented;Person;Place;Situation   Cognition Alert   History Provided By Patient   Primary Caregiver Self   Accompanied By/Relationship No family at bedside   Support Systems Friends/Neighbors;Family Members   Patient's Healthcare Decision Maker is: Patient Declined (Legal Next of Kin Remains as Decision Maker)   PCP Verified by CM Yes   Last Visit to PCP Within last 6 months   Prior Functional Level Independent in ADLs/IADLs   Current Functional Level Independent in ADLs/IADLs   Can patient return to prior living arrangement Yes   Ability to make needs known: Good   Family able to assist with home care needs: Yes   Would you like for me to discuss the discharge plan with any other family members/significant others, and if so, who? No   Financial Resources Medicaid;Medicare   Community Resources None   CM/SW Referral Other (see comment)   Social/Functional History   Lives With Alone   Type of Home House   Home Layout One level   Home Access Stairs to enter with rails   Entrance Stairs - Rails Both   Bathroom Shower/Tub Walk-in shower   Bathroom Toilet Standard   Bathroom Equipment Built-in shower seat   Bathroom Accessibility Accessible   Home Equipment None   Receives Help From Friend(s)   Prior Level of Assist for ADLs Independent   Prior Level of Assist for Homemaking Independent  (Reports he needs help with housekeeping and personal care)   Homemaking Responsibilities No   Ambulation Assistance Independent   Prior Level of Assist for Transfers Independent   Active  Yes   Mode of Transportation Car  (States he hasn't driven in awhile)   Occupation Retired   Discharge Planning   Type of Residence House   Living Arrangements Alone   Current Services Prior To Admission Home Care   Potential Assistance Needed Home Care   DME Ordered? No   Potential Assistance Purchasing Medications Yes   Type of Home Care Services Aide Services;Nursing
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
## 2025-03-27 NOTE — ED NOTES
TRANSFER - OUT REPORT:    Verbal report given to Yazmin DYE on Moiz May  being transferred to Telemetry for routine progression of patient care       Report consisted of patient's Situation, Background, Assessment and   Recommendations(SBAR).     Information from the following report(s) Nurse Handoff Report, ED SBAR, and Med Rec Status was reviewed with the receiving nurse.    Hailey Fall Assessment:    Presents to emergency department  because of falls (Syncope, seizure, or loss of consciousness): No  Age > 70: No  Altered Mental Status, Intoxication with alcohol or substance confusion (Disorientation, impaired judgment, poor safety awaremess, or inability to follow instructions): No  Impaired Mobility: Ambulates or transfers with assistive devices or assistance; Unable to ambulate or transer.: No  Nursing Judgement: No          Lines:   Peripheral IV 03/27/25 Right Antecubital (Active)   Site Assessment Clean, dry & intact 03/27/25 0455   Line Status Blood return noted;Flushed 03/27/25 0455   Phlebitis Assessment No symptoms 03/27/25 0455   Infiltration Assessment 0 03/27/25 0455   Dressing Status New dressing applied 03/27/25 0455   Dressing Type Transparent 03/27/25 0455        Opportunity for questions and clarification was provided.      Patient transported with:  Registered Nurse

## 2025-03-27 NOTE — ED PROVIDER NOTES
EMERGENCY DEPARTMENT HISTORY AND PHYSICAL EXAM    6:04 AM EDT seen at this time in room 12        Date: 3/27/2025  Patient Name: Moiz May    History of Presenting Illness     Chief Complaint   Patient presents with    Shortness of Breath         History Provided By: patient    Additional History (Context): Moiz May is a 64 y.o. male presents with reports CHF, recently discharged from this facility without any medications.  So he has not had any medications in several days.  Chest pain shortness of breath and feeling poorly for 3 days.  When I examined him he is tachypneic he is on nasal cannula oxygen with a few low pulse oxes documented..    PCP: Alden Betts DO    Chief Complaint:   Duration:    Timing:    Location:   Quality:   Severity:   Modifying Factors:   Associated Symptoms:       No current facility-administered medications for this encounter.     Current Outpatient Medications   Medication Sig Dispense Refill    clopidogrel (PLAVIX) 75 MG tablet Take 1 tablet by mouth daily 30 tablet 2    apixaban (ELIQUIS) 5 MG TABS tablet Take 1 tablet by mouth 2 times daily for 166 doses 60 tablet 2    carvedilol (COREG) 3.125 MG tablet Take 1 tablet by mouth 2 times daily (with meals) 60 tablet 3    sacubitril-valsartan (ENTRESTO) 24-26 MG per tablet Take 1 tablet by mouth 2 times daily 60 tablet 1    lidocaine 4 % external patch Place 1 patch onto the skin daily 30 each 0    triamcinolone (KENALOG) 0.1 % cream Apply topically 2 times daily. 45 g 1    furosemide (LASIX) 40 MG tablet Take 1 tablet by mouth daily 60 tablet 0    spironolactone (ALDACTONE) 25 MG tablet Take 0.5 tablets by mouth daily 30 tablet 3    pantoprazole (PROTONIX) 20 MG tablet Take 1 tablet by mouth 2 times daily (before meals) 30 tablet 3    dapagliflozin (FARXIGA) 10 MG tablet Take 1 tablet by mouth every morning 30 tablet 3    rosuvastatin (CRESTOR) 20 MG tablet Take 1 tablet by mouth nightly 30 tablet 3    calcium

## 2025-03-28 PROBLEM — E44.0 MODERATE PROTEIN-CALORIE MALNUTRITION: Status: ACTIVE | Noted: 2025-03-28

## 2025-03-28 LAB
ANION GAP SERPL CALC-SCNC: 11 MMOL/L (ref 3–18)
BASOPHILS # BLD: 0.05 K/UL (ref 0–0.1)
BASOPHILS NFR BLD: 0.7 % (ref 0–2)
BUN SERPL-MCNC: 31 MG/DL (ref 7–18)
BUN/CREAT SERPL: 16 (ref 12–20)
CALCIUM SERPL-MCNC: 7.6 MG/DL (ref 8.5–10.1)
CHLORIDE SERPL-SCNC: 104 MMOL/L (ref 100–111)
CO2 SERPL-SCNC: 19 MMOL/L (ref 21–32)
CREAT SERPL-MCNC: 1.98 MG/DL (ref 0.6–1.3)
DIFFERENTIAL METHOD BLD: ABNORMAL
EKG ATRIAL RATE: 79 BPM
EKG DIAGNOSIS: NORMAL
EKG P AXIS: 69 DEGREES
EKG P-R INTERVAL: 174 MS
EKG Q-T INTERVAL: 434 MS
EKG QRS DURATION: 90 MS
EKG QTC CALCULATION (BAZETT): 497 MS
EKG R AXIS: 77 DEGREES
EKG T AXIS: 83 DEGREES
EKG VENTRICULAR RATE: 79 BPM
EOSINOPHIL # BLD: 0.27 K/UL (ref 0–0.4)
EOSINOPHIL NFR BLD: 3.7 % (ref 0–5)
ERYTHROCYTE [DISTWIDTH] IN BLOOD BY AUTOMATED COUNT: 14.7 % (ref 11.6–14.5)
GLUCOSE SERPL-MCNC: 114 MG/DL (ref 74–99)
HCT VFR BLD AUTO: 39 % (ref 36–48)
HGB BLD-MCNC: 12.6 G/DL (ref 13–16)
IMM GRANULOCYTES # BLD AUTO: 0.03 K/UL (ref 0–0.04)
IMM GRANULOCYTES NFR BLD AUTO: 0.4 % (ref 0–0.5)
LYMPHOCYTES # BLD: 1.49 K/UL (ref 0.9–3.6)
LYMPHOCYTES NFR BLD: 20.5 % (ref 21–52)
MAGNESIUM SERPL-MCNC: 1.5 MG/DL (ref 1.6–2.6)
MCH RBC QN AUTO: 30.8 PG (ref 24–34)
MCHC RBC AUTO-ENTMCNC: 32.3 G/DL (ref 31–37)
MCV RBC AUTO: 95.4 FL (ref 78–100)
MONOCYTES # BLD: 0.59 K/UL (ref 0.05–1.2)
MONOCYTES NFR BLD: 8.1 % (ref 3–10)
NEUTS SEG # BLD: 4.85 K/UL (ref 1.8–8)
NEUTS SEG NFR BLD: 66.6 % (ref 40–73)
NRBC # BLD: 0 K/UL (ref 0–0.01)
NRBC BLD-RTO: 0 PER 100 WBC
PLATELET # BLD AUTO: 285 K/UL (ref 135–420)
PMV BLD AUTO: 12.5 FL (ref 9.2–11.8)
POTASSIUM SERPL-SCNC: 3.7 MMOL/L (ref 3.5–5.5)
RBC # BLD AUTO: 4.09 M/UL (ref 4.35–5.65)
SODIUM SERPL-SCNC: 134 MMOL/L (ref 136–145)
WBC # BLD AUTO: 7.3 K/UL (ref 4.6–13.2)

## 2025-03-28 PROCEDURE — 80048 BASIC METABOLIC PNL TOTAL CA: CPT

## 2025-03-28 PROCEDURE — 6370000000 HC RX 637 (ALT 250 FOR IP)

## 2025-03-28 PROCEDURE — 97162 PT EVAL MOD COMPLEX 30 MIN: CPT

## 2025-03-28 PROCEDURE — 85025 COMPLETE CBC W/AUTO DIFF WBC: CPT

## 2025-03-28 PROCEDURE — 97535 SELF CARE MNGMENT TRAINING: CPT

## 2025-03-28 PROCEDURE — 2700000000 HC OXYGEN THERAPY PER DAY

## 2025-03-28 PROCEDURE — 1100000003 HC PRIVATE W/ TELEMETRY

## 2025-03-28 PROCEDURE — 83735 ASSAY OF MAGNESIUM: CPT

## 2025-03-28 PROCEDURE — 97166 OT EVAL MOD COMPLEX 45 MIN: CPT

## 2025-03-28 PROCEDURE — 36415 COLL VENOUS BLD VENIPUNCTURE: CPT

## 2025-03-28 PROCEDURE — 97110 THERAPEUTIC EXERCISES: CPT

## 2025-03-28 PROCEDURE — 2500000003 HC RX 250 WO HCPCS

## 2025-03-28 PROCEDURE — 6360000002 HC RX W HCPCS

## 2025-03-28 PROCEDURE — 94761 N-INVAS EAR/PLS OXIMETRY MLT: CPT

## 2025-03-28 PROCEDURE — 93010 ELECTROCARDIOGRAM REPORT: CPT | Performed by: INTERNAL MEDICINE

## 2025-03-28 PROCEDURE — 6370000000 HC RX 637 (ALT 250 FOR IP): Performed by: FAMILY MEDICINE

## 2025-03-28 RX ORDER — CLOPIDOGREL BISULFATE 75 MG/1
75 TABLET ORAL DAILY
Qty: 90 TABLET | Refills: 0 | Status: SHIPPED | OUTPATIENT
Start: 2025-03-28 | End: 2025-03-30

## 2025-03-28 RX ORDER — MECOBALAMIN 5000 MCG
5 TABLET,DISINTEGRATING ORAL NIGHTLY
Status: DISCONTINUED | OUTPATIENT
Start: 2025-03-28 | End: 2025-03-29

## 2025-03-28 RX ORDER — FUROSEMIDE 40 MG/1
40 TABLET ORAL DAILY
Qty: 90 TABLET | Refills: 0 | Status: SHIPPED | OUTPATIENT
Start: 2025-03-28 | End: 2025-03-30

## 2025-03-28 RX ORDER — CARVEDILOL 3.12 MG/1
3.12 TABLET ORAL 2 TIMES DAILY WITH MEALS
Qty: 60 TABLET | Refills: 0 | Status: SHIPPED | OUTPATIENT
Start: 2025-03-28 | End: 2025-03-30

## 2025-03-28 RX ORDER — ROSUVASTATIN CALCIUM 20 MG/1
20 TABLET, COATED ORAL NIGHTLY
Qty: 30 TABLET | Refills: 0 | Status: SHIPPED | OUTPATIENT
Start: 2025-03-28 | End: 2025-03-30

## 2025-03-28 RX ORDER — DAPAGLIFLOZIN 10 MG/1
10 TABLET, FILM COATED ORAL EVERY MORNING
Qty: 30 TABLET | Refills: 0 | Status: SHIPPED | OUTPATIENT
Start: 2025-03-28 | End: 2025-03-30

## 2025-03-28 RX ORDER — PANTOPRAZOLE SODIUM 20 MG/1
20 TABLET, DELAYED RELEASE ORAL
Qty: 30 TABLET | Refills: 0 | Status: SHIPPED | OUTPATIENT
Start: 2025-03-28 | End: 2025-03-30

## 2025-03-28 RX ORDER — ASPIRIN 81 MG/1
81 TABLET, CHEWABLE ORAL DAILY
Qty: 30 TABLET | Refills: 0 | Status: SHIPPED | OUTPATIENT
Start: 2025-03-28 | End: 2025-03-30

## 2025-03-28 RX ORDER — NEPHROCAP 1 MG
1 CAP ORAL DAILY
Status: DISCONTINUED | OUTPATIENT
Start: 2025-03-28 | End: 2025-03-31 | Stop reason: HOSPADM

## 2025-03-28 RX ORDER — LANOLIN ALCOHOL/MO/W.PET/CERES
400 CREAM (GRAM) TOPICAL ONCE
Status: COMPLETED | OUTPATIENT
Start: 2025-03-28 | End: 2025-03-28

## 2025-03-28 RX ORDER — SPIRONOLACTONE 25 MG/1
12.5 TABLET ORAL DAILY
Qty: 30 TABLET | Refills: 0 | Status: SHIPPED | OUTPATIENT
Start: 2025-03-28 | End: 2025-03-30

## 2025-03-28 RX ORDER — LIDOCAINE 4 G/G
1 PATCH TOPICAL DAILY
Qty: 30 EACH | Refills: 0 | Status: SHIPPED | OUTPATIENT
Start: 2025-03-28 | End: 2025-03-30

## 2025-03-28 RX ORDER — ONDANSETRON 4 MG/1
4 TABLET, FILM COATED ORAL EVERY 8 HOURS PRN
Status: DISCONTINUED | OUTPATIENT
Start: 2025-03-28 | End: 2025-03-31 | Stop reason: HOSPADM

## 2025-03-28 RX ORDER — AMIODARONE HYDROCHLORIDE 200 MG/1
200 TABLET ORAL DAILY
Qty: 90 TABLET | Refills: 0 | Status: SHIPPED | OUTPATIENT
Start: 2025-03-28 | End: 2025-03-30

## 2025-03-28 RX ORDER — POTASSIUM CHLORIDE 1500 MG/1
40 TABLET, EXTENDED RELEASE ORAL ONCE
Status: COMPLETED | OUTPATIENT
Start: 2025-03-28 | End: 2025-03-28

## 2025-03-28 RX ORDER — TRIAMCINOLONE ACETONIDE 1 MG/G
CREAM TOPICAL
Qty: 45 G | Refills: 1 | Status: SHIPPED | OUTPATIENT
Start: 2025-03-28 | End: 2025-03-30

## 2025-03-28 RX ORDER — LANOLIN ALCOHOL/MO/W.PET/CERES
400 CREAM (GRAM) TOPICAL DAILY
Status: DISCONTINUED | OUTPATIENT
Start: 2025-03-28 | End: 2025-03-28

## 2025-03-28 RX ORDER — SUCRALFATE 1 G/1
1 TABLET ORAL
Qty: 120 TABLET | Refills: 0 | Status: SHIPPED | OUTPATIENT
Start: 2025-03-28 | End: 2025-03-30

## 2025-03-28 RX ADMIN — SODIUM CHLORIDE, PRESERVATIVE FREE 10 ML: 5 INJECTION INTRAVENOUS at 16:44

## 2025-03-28 RX ADMIN — PANTOPRAZOLE SODIUM 20 MG: 20 TABLET, DELAYED RELEASE ORAL at 16:43

## 2025-03-28 RX ADMIN — Medication 5 MG: at 21:10

## 2025-03-28 RX ADMIN — ONDANSETRON HYDROCHLORIDE 4 MG: 4 TABLET, FILM COATED ORAL at 01:41

## 2025-03-28 RX ADMIN — ROSUVASTATIN CALCIUM 20 MG: 10 TABLET, FILM COATED ORAL at 21:10

## 2025-03-28 RX ADMIN — APIXABAN 5 MG: 5 TABLET, FILM COATED ORAL at 09:33

## 2025-03-28 RX ADMIN — SACUBITRIL AND VALSARTAN 1 TABLET: 24; 26 TABLET, FILM COATED ORAL at 21:10

## 2025-03-28 RX ADMIN — Medication 400 MG: at 06:40

## 2025-03-28 RX ADMIN — SACUBITRIL AND VALSARTAN 1 TABLET: 24; 26 TABLET, FILM COATED ORAL at 09:34

## 2025-03-28 RX ADMIN — Medication 1 MG: at 21:10

## 2025-03-28 RX ADMIN — SPIRONOLACTONE 12.5 MG: 25 TABLET ORAL at 09:33

## 2025-03-28 RX ADMIN — SODIUM CHLORIDE, PRESERVATIVE FREE 5 ML: 5 INJECTION INTRAVENOUS at 21:13

## 2025-03-28 RX ADMIN — POTASSIUM CHLORIDE 40 MEQ: 1500 TABLET, EXTENDED RELEASE ORAL at 06:40

## 2025-03-28 RX ADMIN — AMIODARONE HYDROCHLORIDE 200 MG: 200 TABLET ORAL at 09:34

## 2025-03-28 RX ADMIN — FUROSEMIDE 40 MG: 10 INJECTION, SOLUTION INTRAMUSCULAR; INTRAVENOUS at 16:43

## 2025-03-28 RX ADMIN — Medication 600 MG: at 09:33

## 2025-03-28 RX ADMIN — CARVEDILOL 3.12 MG: 3.12 TABLET, FILM COATED ORAL at 16:43

## 2025-03-28 RX ADMIN — SUCRALFATE 1 G: 1 TABLET ORAL at 16:43

## 2025-03-28 RX ADMIN — CARVEDILOL 3.12 MG: 3.12 TABLET, FILM COATED ORAL at 09:33

## 2025-03-28 RX ADMIN — SUCRALFATE 1 G: 1 TABLET ORAL at 06:40

## 2025-03-28 RX ADMIN — APIXABAN 5 MG: 5 TABLET, FILM COATED ORAL at 21:10

## 2025-03-28 RX ADMIN — ASPIRIN 81 MG CHEWABLE TABLET 81 MG: 81 TABLET CHEWABLE at 09:33

## 2025-03-28 RX ADMIN — FUROSEMIDE 40 MG: 10 INJECTION, SOLUTION INTRAMUSCULAR; INTRAVENOUS at 09:10

## 2025-03-28 RX ADMIN — PANTOPRAZOLE SODIUM 20 MG: 20 TABLET, DELAYED RELEASE ORAL at 06:40

## 2025-03-28 RX ADMIN — CLOPIDOGREL BISULFATE 75 MG: 75 TABLET, FILM COATED ORAL at 09:34

## 2025-03-28 RX ADMIN — EMPAGLIFLOZIN 10 MG: 10 TABLET, FILM COATED ORAL at 09:33

## 2025-03-28 RX ADMIN — TRIAMCINOLONE ACETONIDE: 1 CREAM TOPICAL at 20:08

## 2025-03-28 ASSESSMENT — PAIN SCALES - GENERAL
PAINLEVEL_OUTOF10: 0

## 2025-03-28 NOTE — PROGRESS NOTES
Advance Care Planning   Healthcare Decision Maker:    Primary Decision Maker: MaríaIvon - Brother/Sister - [phone]    Today we documented Decision Maker(s) consistent with ACP documents on file.         Spiritual Health History and Assessment/Progress Note  [facility]    Spiritual/Emotional Needs, Advance Care Planning,  ,  ,      Name: Moiz May MRN: [id number]    Age: 64 y.o.     Sex: male   Language: English   Presybeterian: Moravian   CHF (congestive heart failure), NYHA class I, acute on chronic, combined (HCC)     Date: 3/28/2025            Total Time Calculated: 7 min              Spiritual Assessment began in [facility] [place] [facility]        Referral/Consult From: Multi-disciplinary team   Encounter Overview/Reason: Spiritual/Emotional Needs, Advance Care Planning  Service Provided For: Patient    Claudia, Belief, Meaning:   Patient has beliefs or practices that help with coping during difficult times  Family/Friends No family/friends present      Importance and Influence:  Patient has spiritual/personal beliefs that influence decisions regarding their health  Family/Friends No family/friends present    Community:  Patient is connected with a spiritual community  Family/Friends No family/friends present    Assessment and Plan of Care:     Patient Interventions include: Facilitated expression of thoughts and feelings  Family/Friends Interventions include: No family/friends present    Patient Plan of Care: Spiritual Care available upon further referral  Family/Friends Plan of Care: No family/friends present    Electronically signed by ROBY Pimentel on 3/28/2025 at 2:23 PM

## 2025-03-28 NOTE — PROGRESS NOTES
4 Eyes Skin Assessment     NAME:  Moiz May  YOB: 1960  MEDICAL RECORD NUMBER:  [id number]    The patient is being assessed for  Admission    I agree that at least one RN has performed a thorough Head to Toe Skin Assessment on the patient. ALL assessment sites listed below have been assessed.      Areas assessed by both nurses:    Head, Face, Ears, Shoulders, Back, Chest, Arms, Elbows, Hands, Sacrum. Buttock, Coccyx, Ischium, Legs. Feet and Heels, and Under Medical Devices         Does the Patient have a Wound? No noted wound(s)       Cody Prevention initiated by RN: No  Wound Care Orders initiated by RN: No    Pressure Injury (Stage 3,4, Unstageable, DTI, NWPT, and Complex wounds) if present, place Wound referral order by RN under : No    New Ostomies, if present place, Ostomy referral order under : No     Nurse 1 eSignature: Electronically signed by CONCEPCION TOBIAS RN on 3/28/25 at 8:42 AM EDT    **SHARE this note so that the co-signing nurse can place an eSignature**    Nurse 2 eSignature: Electronically signed by SANDIP RANDOLPH RN on 3/28/25 at 8:43 AM EDT

## 2025-03-28 NOTE — PROGRESS NOTES
[facility]  DAILY PROGRESS NOTE      Patient:    Moiz May , 64 y.o. male   MRN:  [id number]  Room/Bed:  466/01  Admission Date:   3/27/2025  Code status:  Full Code    Reason for Admission: CHF exacerbation  Barriers to Discharge: IV diuresis  Anticipated Date of Discharge: 3/31/2025      SUBJECTIVE:   Events of the last 24 hours:  Patient seen at beside. Reports improvement in SOB since admission. Had significant nausea overnight which improved with Zofran. Discussed results of UDS and he continues to adamantly deny cocaine use. Had a long discussion about the risk of cocaine in patients with CHF.      ROS (positive findings are in BOLD; negative findings are in regular font)  Constitutional: fevers, chills, fatigue  HEENT: changes in vision  Cardiovascular: chest pain, palpitations, PND, orthopnea, edema, dizziness  Pulmonary: SOB, cough, sputum production, wheezing, chest tightness  Gastrointestinal: abdominal pain, nausea/vomiting, diarrhea, constipation  Genitourinary: dysuria  Musculoskeletal: arthralgias, myalgias  Skin: rash, itching  Neurological: sensory changes, motor changes, headache  Heme: easy bruising/easy bleeding, LAD    CURRENT INPATIENT MEDICATIONS:  Current Facility-Administered Medications   Medication Dose Route Frequency Provider Last Rate Last Admin    ondansetron (ZOFRAN) tablet 4 mg  4 mg Oral Q8H PRN Jade Green MD   4 mg at 03/28/25 0141    amiodarone (CORDARONE) tablet 200 mg  200 mg Oral Daily Pillow, Christina, DO   200 mg at 03/27/25 1304    apixaban (ELIQUIS) tablet 5 mg  5 mg Oral BID Pillow Christina, DO   5 mg at 03/27/25 2116    aspirin chewable tablet 81 mg  81 mg Oral Daily Pillow, Christina, DO   81 mg at 03/27/25 1304    calcium carbonate tablet 600 mg  600 mg Oral Daily Pillow, Christina, DO   600 mg at 03/27/25 1304    carvedilol (COREG) tablet 3.125 mg  3.125 mg Oral BID WC Pillow, Christina, DO   3.125 mg at 03/27/25 1723    clopidogrel (PLAVIX) tablet 75 mg  75 mg

## 2025-03-28 NOTE — NURSE NAVIGATOR
Remember talking to him about CHF. Pt stated has a copy of CHF Booklet in his back pack. He remembers me going to his room for education several times.  Went through the CHF booklet again.  Met with patient provided education on living with heart failure, reinforcing low sodium diet, fluid restriction, heart failure zones, will continue to follow.  Pt stated does not own a telephone  He stated [facility] did not want to refill his meds.  He also stated home health were aware that he does not have any medicine.  HF Navigator discussed [facility] information with pt:    [    ] Pt does plan on attnding via   [   ] inperson          [   ] zoom  [   x ] Pt does not wish to attend- does not own a phone and no transportation.    Neelima Dickerson RN  3/28/2025     Neelima Dickerson RN  3/28/2025
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
## 2025-03-29 ENCOUNTER — APPOINTMENT (OUTPATIENT)
Facility: HOSPITAL | Age: 65
DRG: 291 | End: 2025-03-29
Payer: MEDICARE

## 2025-03-29 LAB
ANION GAP SERPL CALC-SCNC: 10 MMOL/L (ref 3–18)
BASOPHILS # BLD: 0.05 K/UL (ref 0–0.1)
BASOPHILS NFR BLD: 0.7 % (ref 0–2)
BUN SERPL-MCNC: 30 MG/DL (ref 7–18)
BUN/CREAT SERPL: 16 (ref 12–20)
CALCIUM SERPL-MCNC: 7.3 MG/DL (ref 8.5–10.1)
CHLORIDE SERPL-SCNC: 101 MMOL/L (ref 100–111)
CO2 SERPL-SCNC: 25 MMOL/L (ref 21–32)
CREAT SERPL-MCNC: 1.83 MG/DL (ref 0.6–1.3)
DIFFERENTIAL METHOD BLD: ABNORMAL
EOSINOPHIL # BLD: 0.3 K/UL (ref 0–0.4)
EOSINOPHIL NFR BLD: 3.9 % (ref 0–5)
ERYTHROCYTE [DISTWIDTH] IN BLOOD BY AUTOMATED COUNT: 14.6 % (ref 11.6–14.5)
GLUCOSE SERPL-MCNC: 96 MG/DL (ref 74–99)
HCT VFR BLD AUTO: 40.6 % (ref 36–48)
HGB BLD-MCNC: 13.2 G/DL (ref 13–16)
IMM GRANULOCYTES # BLD AUTO: 0.03 K/UL (ref 0–0.04)
IMM GRANULOCYTES NFR BLD AUTO: 0.4 % (ref 0–0.5)
LYMPHOCYTES # BLD: 1.44 K/UL (ref 0.9–3.6)
LYMPHOCYTES NFR BLD: 18.8 % (ref 21–52)
MAGNESIUM SERPL-MCNC: 1.4 MG/DL (ref 1.6–2.6)
MCH RBC QN AUTO: 30.4 PG (ref 24–34)
MCHC RBC AUTO-ENTMCNC: 32.5 G/DL (ref 31–37)
MCV RBC AUTO: 93.5 FL (ref 78–100)
MONOCYTES # BLD: 0.64 K/UL (ref 0.05–1.2)
MONOCYTES NFR BLD: 8.4 % (ref 3–10)
NEUTS SEG # BLD: 5.2 K/UL (ref 1.8–8)
NEUTS SEG NFR BLD: 67.8 % (ref 40–73)
NRBC # BLD: 0 K/UL (ref 0–0.01)
NRBC BLD-RTO: 0 PER 100 WBC
PLATELET # BLD AUTO: 315 K/UL (ref 135–420)
PMV BLD AUTO: 12.3 FL (ref 9.2–11.8)
POTASSIUM SERPL-SCNC: 3.6 MMOL/L (ref 3.5–5.5)
RBC # BLD AUTO: 4.34 M/UL (ref 4.35–5.65)
SODIUM SERPL-SCNC: 136 MMOL/L (ref 136–145)
WBC # BLD AUTO: 7.7 K/UL (ref 4.6–13.2)

## 2025-03-29 PROCEDURE — 36415 COLL VENOUS BLD VENIPUNCTURE: CPT

## 2025-03-29 PROCEDURE — 80048 BASIC METABOLIC PNL TOTAL CA: CPT

## 2025-03-29 PROCEDURE — 83735 ASSAY OF MAGNESIUM: CPT

## 2025-03-29 PROCEDURE — 6360000002 HC RX W HCPCS

## 2025-03-29 PROCEDURE — 6370000000 HC RX 637 (ALT 250 FOR IP): Performed by: FAMILY MEDICINE

## 2025-03-29 PROCEDURE — 6370000000 HC RX 637 (ALT 250 FOR IP)

## 2025-03-29 PROCEDURE — 85025 COMPLETE CBC W/AUTO DIFF WBC: CPT

## 2025-03-29 PROCEDURE — 2500000003 HC RX 250 WO HCPCS

## 2025-03-29 PROCEDURE — 71046 X-RAY EXAM CHEST 2 VIEWS: CPT

## 2025-03-29 PROCEDURE — 94761 N-INVAS EAR/PLS OXIMETRY MLT: CPT

## 2025-03-29 PROCEDURE — 1100000003 HC PRIVATE W/ TELEMETRY

## 2025-03-29 RX ORDER — MECOBALAMIN 5000 MCG
5 TABLET,DISINTEGRATING ORAL NIGHTLY PRN
Status: DISCONTINUED | OUTPATIENT
Start: 2025-03-29 | End: 2025-03-31 | Stop reason: HOSPADM

## 2025-03-29 RX ORDER — MAGNESIUM SULFATE IN WATER 40 MG/ML
2000 INJECTION, SOLUTION INTRAVENOUS ONCE
Status: COMPLETED | OUTPATIENT
Start: 2025-03-29 | End: 2025-03-29

## 2025-03-29 RX ORDER — POTASSIUM CHLORIDE 1500 MG/1
40 TABLET, EXTENDED RELEASE ORAL ONCE
Status: COMPLETED | OUTPATIENT
Start: 2025-03-29 | End: 2025-03-29

## 2025-03-29 RX ORDER — GUAIFENESIN 600 MG/1
600 TABLET, EXTENDED RELEASE ORAL 2 TIMES DAILY
Status: DISCONTINUED | OUTPATIENT
Start: 2025-03-29 | End: 2025-03-29

## 2025-03-29 RX ORDER — GUAIFENESIN 600 MG/1
600 TABLET, EXTENDED RELEASE ORAL 2 TIMES DAILY
Status: DISCONTINUED | OUTPATIENT
Start: 2025-03-29 | End: 2025-03-31 | Stop reason: HOSPADM

## 2025-03-29 RX ADMIN — CARVEDILOL 3.12 MG: 3.12 TABLET, FILM COATED ORAL at 16:13

## 2025-03-29 RX ADMIN — SPIRONOLACTONE 12.5 MG: 25 TABLET ORAL at 09:11

## 2025-03-29 RX ADMIN — SODIUM CHLORIDE, PRESERVATIVE FREE 10 ML: 5 INJECTION INTRAVENOUS at 09:15

## 2025-03-29 RX ADMIN — Medication 5 MG: at 21:02

## 2025-03-29 RX ADMIN — Medication 1 MG: at 09:10

## 2025-03-29 RX ADMIN — PANTOPRAZOLE SODIUM 20 MG: 20 TABLET, DELAYED RELEASE ORAL at 07:00

## 2025-03-29 RX ADMIN — ROSUVASTATIN CALCIUM 20 MG: 10 TABLET, FILM COATED ORAL at 21:02

## 2025-03-29 RX ADMIN — SACUBITRIL AND VALSARTAN 1 TABLET: 24; 26 TABLET, FILM COATED ORAL at 21:02

## 2025-03-29 RX ADMIN — SODIUM CHLORIDE, PRESERVATIVE FREE 5 ML: 5 INJECTION INTRAVENOUS at 19:56

## 2025-03-29 RX ADMIN — PANTOPRAZOLE SODIUM 20 MG: 20 TABLET, DELAYED RELEASE ORAL at 16:14

## 2025-03-29 RX ADMIN — MAGNESIUM SULFATE HEPTAHYDRATE 2000 MG: 40 INJECTION, SOLUTION INTRAVENOUS at 07:05

## 2025-03-29 RX ADMIN — APIXABAN 5 MG: 5 TABLET, FILM COATED ORAL at 09:14

## 2025-03-29 RX ADMIN — Medication 600 MG: at 09:11

## 2025-03-29 RX ADMIN — CARVEDILOL 3.12 MG: 3.12 TABLET, FILM COATED ORAL at 09:13

## 2025-03-29 RX ADMIN — POTASSIUM CHLORIDE 40 MEQ: 1500 TABLET, EXTENDED RELEASE ORAL at 07:00

## 2025-03-29 RX ADMIN — CLOPIDOGREL BISULFATE 75 MG: 75 TABLET, FILM COATED ORAL at 09:11

## 2025-03-29 RX ADMIN — AMIODARONE HYDROCHLORIDE 200 MG: 200 TABLET ORAL at 09:11

## 2025-03-29 RX ADMIN — GUAIFENESIN 600 MG: 600 TABLET ORAL at 19:53

## 2025-03-29 RX ADMIN — EMPAGLIFLOZIN 10 MG: 10 TABLET, FILM COATED ORAL at 09:13

## 2025-03-29 RX ADMIN — SUCRALFATE 1 G: 1 TABLET ORAL at 10:29

## 2025-03-29 RX ADMIN — FUROSEMIDE 40 MG: 10 INJECTION, SOLUTION INTRAMUSCULAR; INTRAVENOUS at 09:14

## 2025-03-29 RX ADMIN — SUCRALFATE 1 G: 1 TABLET ORAL at 07:00

## 2025-03-29 RX ADMIN — APIXABAN 5 MG: 5 TABLET, FILM COATED ORAL at 21:02

## 2025-03-29 RX ADMIN — TRIAMCINOLONE ACETONIDE: 1 CREAM TOPICAL at 09:44

## 2025-03-29 RX ADMIN — ASPIRIN 81 MG CHEWABLE TABLET 81 MG: 81 TABLET CHEWABLE at 09:13

## 2025-03-29 RX ADMIN — SACUBITRIL AND VALSARTAN 1 TABLET: 24; 26 TABLET, FILM COATED ORAL at 09:11

## 2025-03-29 RX ADMIN — SUCRALFATE 1 G: 1 TABLET ORAL at 16:13

## 2025-03-29 RX ADMIN — FUROSEMIDE 40 MG: 10 INJECTION, SOLUTION INTRAMUSCULAR; INTRAVENOUS at 16:14

## 2025-03-29 ASSESSMENT — PAIN SCALES - GENERAL
PAINLEVEL_OUTOF10: 0

## 2025-03-29 NOTE — DISCHARGE SUMMARY
[facility]  DISCHARGE SUMMARY      Name:   Moiz May 64 y.o. male  MRN:   [id number]  CSN:   [id number]  Admission Date:  3/27/2025  Discharge Date:  3/31/2025  Attending:             Mohan Bates MD   PCP:              Alden Betts DO   ================================================================  Reason for Admission:  Acute respiratory failure with hypoxia [J96.01]  Acute on chronic combined systolic and diastolic CHF (congestive heart failure) (HCC) [I50.43]  CHF (congestive heart failure), NYHA class I, acute on chronic, combined (HCC) [I50.43]  Acute on chronic heart failure with reduced ejection fraction (HFrEF, <= 40%) ([facility]) [I50.23]    Discharge Diagnosis:    Acute decompensated systolic and diastolic HF   Ischemic cardiomyopathy w/ AICD  CAD  HLD  HTN  Moderate malnutrition  Hyperpigmented scalp lesions  Paroxysmal A-fib  CKD 3b    Follow-up studies/evaluations for PCP/Important Notes to PCP:  Ensure patient has received medications from pharmacy  Pending labs/investigations to follow up as below  Medication reconciliation:  Discontinued Medications: None  New Medications: None but restarted medications as below in discharge meds   per discharge instructions from cardiology on previous admission, discharged 3/13/25:   triple therapy 81 mg p.o. daily aspirin, Eliquis 5 mg p.o. twice daily, Plavix 75 mg p.o. daily for 4 weeks (ends on 4/10/25)  followed by Plavix 75 mg p.o. daily and Eliquis 5 mg p.o. twice daily after that for 5 months   followed by aspirin 81 mg p.o. daily and Eliquis 5 mg p.o. twice daily.       ANTON[facility] Follow Up Appointment:   Follow-up Information       Follow up With Specialties Details Why Contact Info    [facility] ([facility] Follow up Your preferred agency [address] [place] [address], [address]  [place] [address]  [phone]             Readmission prevention plan:   Medication compliance counseling    GOALS OF

## 2025-03-29 NOTE — PROGRESS NOTES
DR. Garcia is at bedside. She is informed of patient having 5 beats of ventricular ectopy. Patient denies having any pain. No new order is received.   102.2

## 2025-03-29 NOTE — PROGRESS NOTES
[facility]  DAILY PROGRESS NOTE      Patient:    Moiz May , 64 y.o. male   MRN:  [id number]  Room/Bed:  466/01  Admission Date:   3/27/2025  Code status:  Full Code    Reason for Admission: CHF exacerbation  Barriers to Discharge: IV diuresis  Anticipated Date of Discharge: 3/31/2025      SUBJECTIVE:   Events of the last 24 hours:  Patient seen at beside. Reports feeling \"a little better today\". Breathing improving, however patient still feels short of breath. He was able to get sleep last night with the melatonin.     Reviewed PT/OT notes from yesterday (3/28), they state he reported he was unable to ambulate at that time. When patient asked about PT/OT working with him he states they absolutely did not come to see him.     Patient reports that he would like increased fluid allowance because he is very thirsty and that \"I had allowance of 2000 ml last time\". Discussed that if we increase his fluid intake, his overload will worsen. Patient states \"If I do not get more fluid, I will drink from the sink\". I advised that he not do that for his health, but agreed to increase allowance from 1200 to 1500.     ROS (positive findings are in BOLD; negative findings are in regular font)  Patient denies chest pain, abdominal pain.   Endorses shortness of breath.    CURRENT INPATIENT MEDICATIONS:  Current Facility-Administered Medications   Medication Dose Route Frequency Provider Last Rate Last Admin    magnesium sulfate 2000 mg in 50 mL IVPB premix  2,000 mg IntraVENous Once Jannette Garcia MD 25 mL/hr at 03/29/25 0705 2,000 mg at 03/29/25 0705    ondansetron (ZOFRAN) tablet 4 mg  4 mg Oral Q8H PRN Jade Green MD   4 mg at 03/28/25 0141    Virt-Caps 1 mg  1 capsule Oral Daily Bereket Delatorre MD   1 mg at 03/28/25 2110    melatonin disintegrating tablet 5 mg  5 mg Oral Nightly Ken Nava MD   5 mg at 03/28/25 2110    amiodarone (CORDARONE) tablet 200 mg  200 mg Oral Daily Pillow,

## 2025-03-29 NOTE — PROGRESS NOTES
[facility]  POST-ROUNDING NOTE    Assessment & Plan:    HFrEF exacerbation   - patient improving however still has rales   - repeated chest XR today: per my read no new consolidations/pleural effusions, still appears to have cardiomegaly and some vascular congestion   Plan:   - will recheck BNP tomorrow AM   - continue 40 IV lasix BID today, will titrate up tomorrow if needed according to exam/labs   - for home medications, per nursing patient was given option to pay $40 copay and get all his medications for outpatient today from [facility] outpatient pharmacy, however patient declines and wishes to get his medications from his usual pharmacy  - will resend all medications to his usual pharmacy once closer to discharge     For Cardiac medications, will add to discharge summary:   - per discharge instructions from cardiology on previous admission, discharged 3/13/25:   -triple therapy 81 mg p.o. daily aspirin, Eliquis 5 mg p.o. twice daily, Plavix 75 mg p.o. daily for 4 weeks (ends on 4/10/25)  -followed by Plavix 75 mg p.o. daily and Eliquis 5 mg p.o. twice daily after that for 5 months   -followed by aspirin 81 mg p.o. daily and Eliquis 5 mg p.o. twice daily.       The above patient and plan were discussed with my supervising physician.     See daily progress note for full assessment/plan.      Jannette Garcia MD, PGY-2  [facility]  3/29/2025, 5:15 PM

## 2025-03-30 LAB
ANION GAP SERPL CALC-SCNC: 5 MMOL/L (ref 3–18)
BASOPHILS # BLD: 0.04 K/UL (ref 0–0.1)
BASOPHILS NFR BLD: 0.6 % (ref 0–2)
BUN SERPL-MCNC: 38 MG/DL (ref 7–18)
BUN/CREAT SERPL: 18 (ref 12–20)
CALCIUM SERPL-MCNC: 7.6 MG/DL (ref 8.5–10.1)
CHLORIDE SERPL-SCNC: 99 MMOL/L (ref 100–111)
CO2 SERPL-SCNC: 29 MMOL/L (ref 21–32)
CREAT SERPL-MCNC: 2.11 MG/DL (ref 0.6–1.3)
DIFFERENTIAL METHOD BLD: ABNORMAL
EOSINOPHIL # BLD: 0.29 K/UL (ref 0–0.4)
EOSINOPHIL NFR BLD: 4.2 % (ref 0–5)
ERYTHROCYTE [DISTWIDTH] IN BLOOD BY AUTOMATED COUNT: 14.5 % (ref 11.6–14.5)
GLUCOSE SERPL-MCNC: 87 MG/DL (ref 74–99)
HCT VFR BLD AUTO: 42.2 % (ref 36–48)
HGB BLD-MCNC: 13.5 G/DL (ref 13–16)
IMM GRANULOCYTES # BLD AUTO: 0.03 K/UL (ref 0–0.04)
IMM GRANULOCYTES NFR BLD AUTO: 0.4 % (ref 0–0.5)
LYMPHOCYTES # BLD: 1.12 K/UL (ref 0.9–3.6)
LYMPHOCYTES NFR BLD: 16.3 % (ref 21–52)
MAGNESIUM SERPL-MCNC: 1.8 MG/DL (ref 1.6–2.6)
MCH RBC QN AUTO: 30.1 PG (ref 24–34)
MCHC RBC AUTO-ENTMCNC: 32 G/DL (ref 31–37)
MCV RBC AUTO: 94.2 FL (ref 78–100)
MONOCYTES # BLD: 0.39 K/UL (ref 0.05–1.2)
MONOCYTES NFR BLD: 5.7 % (ref 3–10)
NEUTS SEG # BLD: 4.99 K/UL (ref 1.8–8)
NEUTS SEG NFR BLD: 72.8 % (ref 40–73)
NRBC # BLD: 0 K/UL (ref 0–0.01)
NRBC BLD-RTO: 0 PER 100 WBC
NT PRO BNP: 3679 PG/ML (ref 0–900)
PLATELET # BLD AUTO: 344 K/UL (ref 135–420)
PMV BLD AUTO: 12.2 FL (ref 9.2–11.8)
POTASSIUM SERPL-SCNC: 3.8 MMOL/L (ref 3.5–5.5)
RBC # BLD AUTO: 4.48 M/UL (ref 4.35–5.65)
SODIUM SERPL-SCNC: 133 MMOL/L (ref 136–145)
WBC # BLD AUTO: 6.9 K/UL (ref 4.6–13.2)

## 2025-03-30 PROCEDURE — 6370000000 HC RX 637 (ALT 250 FOR IP): Performed by: FAMILY MEDICINE

## 2025-03-30 PROCEDURE — 85025 COMPLETE CBC W/AUTO DIFF WBC: CPT

## 2025-03-30 PROCEDURE — 6370000000 HC RX 637 (ALT 250 FOR IP)

## 2025-03-30 PROCEDURE — 83880 ASSAY OF NATRIURETIC PEPTIDE: CPT

## 2025-03-30 PROCEDURE — 94761 N-INVAS EAR/PLS OXIMETRY MLT: CPT

## 2025-03-30 PROCEDURE — 2500000003 HC RX 250 WO HCPCS

## 2025-03-30 PROCEDURE — 36415 COLL VENOUS BLD VENIPUNCTURE: CPT

## 2025-03-30 PROCEDURE — 1100000003 HC PRIVATE W/ TELEMETRY

## 2025-03-30 PROCEDURE — 80048 BASIC METABOLIC PNL TOTAL CA: CPT

## 2025-03-30 PROCEDURE — 83735 ASSAY OF MAGNESIUM: CPT

## 2025-03-30 PROCEDURE — 6360000002 HC RX W HCPCS

## 2025-03-30 RX ORDER — SUCRALFATE 1 G/1
1 TABLET ORAL
Qty: 120 TABLET | Refills: 0 | Status: SHIPPED | OUTPATIENT
Start: 2025-03-30

## 2025-03-30 RX ORDER — ROSUVASTATIN CALCIUM 20 MG/1
20 TABLET, COATED ORAL NIGHTLY
Qty: 30 TABLET | Refills: 0 | Status: SHIPPED | OUTPATIENT
Start: 2025-03-30

## 2025-03-30 RX ORDER — CLOPIDOGREL BISULFATE 75 MG/1
75 TABLET ORAL DAILY
Qty: 90 TABLET | Refills: 0 | Status: SHIPPED | OUTPATIENT
Start: 2025-03-30

## 2025-03-30 RX ORDER — SPIRONOLACTONE 25 MG/1
12.5 TABLET ORAL DAILY
Qty: 30 TABLET | Refills: 0 | Status: SHIPPED | OUTPATIENT
Start: 2025-03-30

## 2025-03-30 RX ORDER — LANOLIN ALCOHOL/MO/W.PET/CERES
400 CREAM (GRAM) TOPICAL ONCE
Status: COMPLETED | OUTPATIENT
Start: 2025-03-30 | End: 2025-03-30

## 2025-03-30 RX ORDER — AMIODARONE HYDROCHLORIDE 200 MG/1
200 TABLET ORAL DAILY
Qty: 90 TABLET | Refills: 0 | Status: SHIPPED | OUTPATIENT
Start: 2025-03-30 | End: 2025-06-28

## 2025-03-30 RX ORDER — CARVEDILOL 3.12 MG/1
3.12 TABLET ORAL 2 TIMES DAILY WITH MEALS
Qty: 60 TABLET | Refills: 0 | Status: SHIPPED | OUTPATIENT
Start: 2025-03-30

## 2025-03-30 RX ORDER — DAPAGLIFLOZIN 10 MG/1
10 TABLET, FILM COATED ORAL EVERY MORNING
Qty: 30 TABLET | Refills: 0 | Status: SHIPPED | OUTPATIENT
Start: 2025-03-30

## 2025-03-30 RX ORDER — TRIAMCINOLONE ACETONIDE 1 MG/G
CREAM TOPICAL
Qty: 45 G | Refills: 1 | Status: SHIPPED | OUTPATIENT
Start: 2025-03-30

## 2025-03-30 RX ORDER — POTASSIUM CHLORIDE 1500 MG/1
40 TABLET, EXTENDED RELEASE ORAL ONCE
Status: COMPLETED | OUTPATIENT
Start: 2025-03-30 | End: 2025-03-30

## 2025-03-30 RX ORDER — ASPIRIN 81 MG/1
81 TABLET, CHEWABLE ORAL DAILY
Qty: 30 TABLET | Refills: 0 | Status: SHIPPED | OUTPATIENT
Start: 2025-03-30

## 2025-03-30 RX ORDER — FUROSEMIDE 40 MG/1
40 TABLET ORAL DAILY
Qty: 90 TABLET | Refills: 0 | Status: SHIPPED | OUTPATIENT
Start: 2025-03-30

## 2025-03-30 RX ORDER — PANTOPRAZOLE SODIUM 20 MG/1
20 TABLET, DELAYED RELEASE ORAL
Qty: 30 TABLET | Refills: 0 | Status: SHIPPED | OUTPATIENT
Start: 2025-03-30

## 2025-03-30 RX ORDER — LIDOCAINE 4 G/G
1 PATCH TOPICAL DAILY
Qty: 30 EACH | Refills: 0 | Status: SHIPPED | OUTPATIENT
Start: 2025-03-30

## 2025-03-30 RX ADMIN — SUCRALFATE 1 G: 1 TABLET ORAL at 10:48

## 2025-03-30 RX ADMIN — CLOPIDOGREL BISULFATE 75 MG: 75 TABLET, FILM COATED ORAL at 08:56

## 2025-03-30 RX ADMIN — EMPAGLIFLOZIN 10 MG: 10 TABLET, FILM COATED ORAL at 08:59

## 2025-03-30 RX ADMIN — CARVEDILOL 3.12 MG: 3.12 TABLET, FILM COATED ORAL at 08:57

## 2025-03-30 RX ADMIN — PANTOPRAZOLE SODIUM 20 MG: 20 TABLET, DELAYED RELEASE ORAL at 05:53

## 2025-03-30 RX ADMIN — SODIUM CHLORIDE, PRESERVATIVE FREE 10 ML: 5 INJECTION INTRAVENOUS at 09:01

## 2025-03-30 RX ADMIN — ROSUVASTATIN CALCIUM 20 MG: 10 TABLET, FILM COATED ORAL at 21:07

## 2025-03-30 RX ADMIN — SUCRALFATE 1 G: 1 TABLET ORAL at 15:31

## 2025-03-30 RX ADMIN — Medication 5 MG: at 21:07

## 2025-03-30 RX ADMIN — Medication 400 MG: at 05:53

## 2025-03-30 RX ADMIN — POTASSIUM CHLORIDE 40 MEQ: 1500 TABLET, EXTENDED RELEASE ORAL at 05:53

## 2025-03-30 RX ADMIN — Medication 600 MG: at 08:56

## 2025-03-30 RX ADMIN — SODIUM CHLORIDE, PRESERVATIVE FREE 10 ML: 5 INJECTION INTRAVENOUS at 21:08

## 2025-03-30 RX ADMIN — CARVEDILOL 3.12 MG: 3.12 TABLET, FILM COATED ORAL at 15:31

## 2025-03-30 RX ADMIN — SACUBITRIL AND VALSARTAN 1 TABLET: 24; 26 TABLET, FILM COATED ORAL at 21:07

## 2025-03-30 RX ADMIN — APIXABAN 5 MG: 5 TABLET, FILM COATED ORAL at 08:59

## 2025-03-30 RX ADMIN — APIXABAN 5 MG: 5 TABLET, FILM COATED ORAL at 21:07

## 2025-03-30 RX ADMIN — Medication 1 MG: at 08:57

## 2025-03-30 RX ADMIN — SUCRALFATE 1 G: 1 TABLET ORAL at 05:53

## 2025-03-30 RX ADMIN — AMIODARONE HYDROCHLORIDE 200 MG: 200 TABLET ORAL at 08:59

## 2025-03-30 RX ADMIN — FUROSEMIDE 40 MG: 10 INJECTION, SOLUTION INTRAMUSCULAR; INTRAVENOUS at 16:42

## 2025-03-30 RX ADMIN — GUAIFENESIN 600 MG: 600 TABLET ORAL at 21:07

## 2025-03-30 RX ADMIN — PANTOPRAZOLE SODIUM 20 MG: 20 TABLET, DELAYED RELEASE ORAL at 15:31

## 2025-03-30 RX ADMIN — GUAIFENESIN 600 MG: 600 TABLET ORAL at 08:57

## 2025-03-30 RX ADMIN — SACUBITRIL AND VALSARTAN 1 TABLET: 24; 26 TABLET, FILM COATED ORAL at 08:57

## 2025-03-30 RX ADMIN — FUROSEMIDE 40 MG: 10 INJECTION, SOLUTION INTRAMUSCULAR; INTRAVENOUS at 08:59

## 2025-03-30 RX ADMIN — ASPIRIN 81 MG CHEWABLE TABLET 81 MG: 81 TABLET CHEWABLE at 08:56

## 2025-03-30 RX ADMIN — SPIRONOLACTONE 12.5 MG: 25 TABLET ORAL at 08:58

## 2025-03-30 ASSESSMENT — PAIN SCALES - GENERAL
PAINLEVEL_OUTOF10: 0

## 2025-03-30 NOTE — PROGRESS NOTES
[facility]  DAILY PROGRESS NOTE      Patient:    Moiz May , 64 y.o. male   MRN:  [id number]  Room/Bed:  466/01  Admission Date:   3/27/2025  Code status:  Full Code    Reason for Admission: CHF exacerbation  Barriers to Discharge: IV diuresis  Anticipated Date of Discharge: 3/31/2025      SUBJECTIVE:   Events of the last 24 hours:  Patient seen at beside. Reports feeling significantly better this AM. Reports resolution of his CP, dizziness, and nausea. Reports significant improvement in his SOB     ROS (positive findings are in BOLD; negative findings are in regular font)  Patient denies chest pain, abdominal pain, nausea.   Endorses mild shortness of breath.    CURRENT INPATIENT MEDICATIONS:  Current Facility-Administered Medications   Medication Dose Route Frequency Provider Last Rate Last Admin    melatonin disintegrating tablet 5 mg  5 mg Oral Nightly PRN Jannette Garcia MD   5 mg at 03/29/25 2102    guaiFENesin (MUCINEX) extended release tablet 600 mg  600 mg Oral BID Jade Green MD   600 mg at 03/29/25 1953    ondansetron (ZOFRAN) tablet 4 mg  4 mg Oral Q8H PRN Jade Green MD   4 mg at 03/28/25 0141    Virt-Caps 1 mg  1 capsule Oral Daily Bereket Delatorre MD   1 mg at 03/29/25 0910    amiodarone (CORDARONE) tablet 200 mg  200 mg Oral Daily Pillow, Christina, DO   200 mg at 03/29/25 0911    apixaban (ELIQUIS) tablet 5 mg  5 mg Oral BID PillowChristina, DO   5 mg at 03/29/25 2102    aspirin chewable tablet 81 mg  81 mg Oral Daily Pillow, Christina, DO   81 mg at 03/29/25 0913    calcium carbonate tablet 600 mg  600 mg Oral Daily Pillow, Christina, DO   600 mg at 03/29/25 0911    carvedilol (COREG) tablet 3.125 mg  3.125 mg Oral BID WC Pillow, Christina, DO   3.125 mg at 03/29/25 1613    clopidogrel (PLAVIX) tablet 75 mg  75 mg Oral Daily Pillow, Christina, DO   75 mg at 03/29/25 0911    empagliflozin (JARDIANCE) tablet 10 mg  10 mg Oral Daily Pillow, Christina, DO   10 mg at 03/29/25 0973

## 2025-03-31 VITALS
OXYGEN SATURATION: 97 % | WEIGHT: 153.88 LBS | HEIGHT: 72 IN | TEMPERATURE: 97.9 F | HEART RATE: 80 BPM | DIASTOLIC BLOOD PRESSURE: 74 MMHG | SYSTOLIC BLOOD PRESSURE: 90 MMHG | RESPIRATION RATE: 16 BRPM | BODY MASS INDEX: 20.84 KG/M2

## 2025-03-31 LAB
ANION GAP SERPL CALC-SCNC: 8 MMOL/L (ref 3–18)
BASOPHILS # BLD: 0.04 K/UL (ref 0–0.1)
BASOPHILS NFR BLD: 0.4 % (ref 0–2)
BUN SERPL-MCNC: 42 MG/DL (ref 7–18)
BUN/CREAT SERPL: 21 (ref 12–20)
CALCIUM SERPL-MCNC: 7.9 MG/DL (ref 8.5–10.1)
CHLORIDE SERPL-SCNC: 97 MMOL/L (ref 100–111)
CO2 SERPL-SCNC: 27 MMOL/L (ref 21–32)
CREAT SERPL-MCNC: 1.97 MG/DL (ref 0.6–1.3)
DIFFERENTIAL METHOD BLD: ABNORMAL
EOSINOPHIL # BLD: 0.29 K/UL (ref 0–0.4)
EOSINOPHIL NFR BLD: 2.6 % (ref 0–5)
ERYTHROCYTE [DISTWIDTH] IN BLOOD BY AUTOMATED COUNT: 14.4 % (ref 11.6–14.5)
GLUCOSE SERPL-MCNC: 116 MG/DL (ref 74–99)
HCT VFR BLD AUTO: 43.2 % (ref 36–48)
HGB BLD-MCNC: 13.8 G/DL (ref 13–16)
IMM GRANULOCYTES # BLD AUTO: 0.06 K/UL (ref 0–0.04)
IMM GRANULOCYTES NFR BLD AUTO: 0.5 % (ref 0–0.5)
LYMPHOCYTES # BLD: 1.28 K/UL (ref 0.9–3.6)
LYMPHOCYTES NFR BLD: 11.6 % (ref 21–52)
MAGNESIUM SERPL-MCNC: 1.7 MG/DL (ref 1.6–2.6)
MCH RBC QN AUTO: 29.7 PG (ref 24–34)
MCHC RBC AUTO-ENTMCNC: 31.9 G/DL (ref 31–37)
MCV RBC AUTO: 93.1 FL (ref 78–100)
MONOCYTES # BLD: 0.9 K/UL (ref 0.05–1.2)
MONOCYTES NFR BLD: 8.2 % (ref 3–10)
NEUTS SEG # BLD: 8.42 K/UL (ref 1.8–8)
NEUTS SEG NFR BLD: 76.7 % (ref 40–73)
NRBC # BLD: 0 K/UL (ref 0–0.01)
NRBC BLD-RTO: 0 PER 100 WBC
PLATELET # BLD AUTO: 317 K/UL (ref 135–420)
PMV BLD AUTO: 11.6 FL (ref 9.2–11.8)
POTASSIUM SERPL-SCNC: 3.6 MMOL/L (ref 3.5–5.5)
RBC # BLD AUTO: 4.64 M/UL (ref 4.35–5.65)
SODIUM SERPL-SCNC: 132 MMOL/L (ref 136–145)
WBC # BLD AUTO: 11 K/UL (ref 4.6–13.2)

## 2025-03-31 PROCEDURE — 85025 COMPLETE CBC W/AUTO DIFF WBC: CPT

## 2025-03-31 PROCEDURE — 97116 GAIT TRAINING THERAPY: CPT

## 2025-03-31 PROCEDURE — 80048 BASIC METABOLIC PNL TOTAL CA: CPT

## 2025-03-31 PROCEDURE — 36415 COLL VENOUS BLD VENIPUNCTURE: CPT

## 2025-03-31 PROCEDURE — 6370000000 HC RX 637 (ALT 250 FOR IP)

## 2025-03-31 PROCEDURE — 97530 THERAPEUTIC ACTIVITIES: CPT

## 2025-03-31 PROCEDURE — 6370000000 HC RX 637 (ALT 250 FOR IP): Performed by: FAMILY MEDICINE

## 2025-03-31 PROCEDURE — 94761 N-INVAS EAR/PLS OXIMETRY MLT: CPT

## 2025-03-31 PROCEDURE — 2500000003 HC RX 250 WO HCPCS

## 2025-03-31 PROCEDURE — 83735 ASSAY OF MAGNESIUM: CPT

## 2025-03-31 RX ORDER — LANOLIN ALCOHOL/MO/W.PET/CERES
400 CREAM (GRAM) TOPICAL ONCE
Status: COMPLETED | OUTPATIENT
Start: 2025-03-31 | End: 2025-03-31

## 2025-03-31 RX ORDER — POTASSIUM CHLORIDE 1500 MG/1
40 TABLET, EXTENDED RELEASE ORAL ONCE
Status: COMPLETED | OUTPATIENT
Start: 2025-03-31 | End: 2025-03-31

## 2025-03-31 RX ADMIN — APIXABAN 5 MG: 5 TABLET, FILM COATED ORAL at 09:14

## 2025-03-31 RX ADMIN — SUCRALFATE 1 G: 1 TABLET ORAL at 12:34

## 2025-03-31 RX ADMIN — SODIUM CHLORIDE, PRESERVATIVE FREE 10 ML: 5 INJECTION INTRAVENOUS at 09:22

## 2025-03-31 RX ADMIN — CLOPIDOGREL BISULFATE 75 MG: 75 TABLET, FILM COATED ORAL at 09:14

## 2025-03-31 RX ADMIN — Medication 600 MG: at 09:14

## 2025-03-31 RX ADMIN — Medication 1 MG: at 09:14

## 2025-03-31 RX ADMIN — SPIRONOLACTONE 12.5 MG: 25 TABLET ORAL at 09:15

## 2025-03-31 RX ADMIN — POTASSIUM CHLORIDE 40 MEQ: 1500 TABLET, EXTENDED RELEASE ORAL at 09:16

## 2025-03-31 RX ADMIN — PANTOPRAZOLE SODIUM 20 MG: 20 TABLET, DELAYED RELEASE ORAL at 05:40

## 2025-03-31 RX ADMIN — EMPAGLIFLOZIN 10 MG: 10 TABLET, FILM COATED ORAL at 09:15

## 2025-03-31 RX ADMIN — Medication 400 MG: at 06:05

## 2025-03-31 RX ADMIN — ASPIRIN 81 MG CHEWABLE TABLET 81 MG: 81 TABLET CHEWABLE at 09:14

## 2025-03-31 RX ADMIN — SUCRALFATE 1 G: 1 TABLET ORAL at 05:40

## 2025-03-31 ASSESSMENT — PAIN SCALES - GENERAL
PAINLEVEL_OUTOF10: 0
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
## 2025-03-31 NOTE — PROGRESS NOTES
Bedside and Verbal shift change report given to MARNIE Marr (oncoming nurse) by MARNIE Marr (offgoing nurse). Report included the following information Nurse Handoff Report, Index, ED Encounter Summary, ED SBAR, Adult Overview, Surgery Report, Intake/Output, MAR, Recent Results, and Med Rec Status.

## 2025-03-31 NOTE — PROGRESS NOTES
[facility]  DAILY PROGRESS NOTE      Patient:    Moiz May , 64 y.o. male   MRN:  [id number]  Room/Bed:  466/01  Admission Date:   3/27/2025  Code status:  Full Code    Reason for Admission: CHF exacerbation  Barriers to Discharge: Medication pickup  Anticipated Date of Discharge: 3/31/2025      SUBJECTIVE:   Events of the last 24 hours:  Patient seen at beside. Reports no change in his symptoms this morning. Reports continuation of his CP which was present on admission. Denies nausea and abdominal pain.     ROS (positive findings are in BOLD; negative findings are in regular font)  Patient denies abdominal pain, nausea.   Endorses mild shortness of breath and CP.    CURRENT INPATIENT MEDICATIONS:  Current Facility-Administered Medications   Medication Dose Route Frequency Provider Last Rate Last Admin    potassium chloride (KLOR-CON M) extended release tablet 40 mEq  40 mEq Oral Once Jade Green MD        melatonin disintegrating tablet 5 mg  5 mg Oral Nightly PRN Jannette Garcia MD   5 mg at 03/30/25 2107    guaiFENesin (MUCINEX) extended release tablet 600 mg  600 mg Oral BID Jade Green MD   600 mg at 03/30/25 2107    ondansetron (ZOFRAN) tablet 4 mg  4 mg Oral Q8H PRN Jade Green MD   4 mg at 03/28/25 0141    Virt-Caps 1 mg  1 capsule Oral Daily Bereket Delatorre MD   1 mg at 03/30/25 0857    amiodarone (CORDARONE) tablet 200 mg  200 mg Oral Daily Pillow, Christina, DO   200 mg at 03/30/25 0859    apixaban (ELIQUIS) tablet 5 mg  5 mg Oral BID Pillow, Christina, DO   5 mg at 03/30/25 2107    aspirin chewable tablet 81 mg  81 mg Oral Daily Pillow, Christina, DO   81 mg at 03/30/25 0856    calcium carbonate tablet 600 mg  600 mg Oral Daily Pillow, Christina, DO   600 mg at 03/30/25 0856    carvedilol (COREG) tablet 3.125 mg  3.125 mg Oral BID WC Pillow, Christina, DO   3.125 mg at 03/30/25 1531    clopidogrel (PLAVIX) tablet 75 mg  75 mg Oral Daily Pillow, Christina, DO   75 mg at

## 2025-03-31 NOTE — PLAN OF CARE
Problem: Chronic Conditions and Co-morbidities  Goal: Patient's chronic conditions and co-morbidity symptoms are monitored and maintained or improved  3/29/2025 1040 by Adilson Greenwood RN  Outcome: Progressing  Flowsheets (Taken 3/29/2025 0755)  Care Plan - Patient's Chronic Conditions and Co-Morbidity Symptoms are Monitored and Maintained or Improved: Monitor and assess patient's chronic conditions and comorbid symptoms for stability, deterioration, or improvement  3/29/2025 0259 by Moiz Montes De Oca, RN  Outcome: Progressing  Flowsheets (Taken 3/28/2025 1920)  Care Plan - Patient's Chronic Conditions and Co-Morbidity Symptoms are Monitored and Maintained or Improved:   Monitor and assess patient's chronic conditions and comorbid symptoms for stability, deterioration, or improvement   Collaborate with multidisciplinary team to address chronic and comorbid conditions and prevent exacerbation or deterioration   Update acute care plan with appropriate goals if chronic or comorbid symptoms are exacerbated and prevent overall improvement and discharge  Note:  Educated patient on importance of monitoring vital signs every 4 hours.Educated patient on importance of adhering to prescribed medications and treatment including on an outpatient basis. Educated patient on importance of keeping heart monitor on at all times.      Problem: Pain  Goal: Verbalizes/displays adequate comfort level or baseline comfort level  3/29/2025 1040 by Adilson Greenwood RN  Outcome: Progressing  Flowsheets (Taken 3/29/2025 0259 by Moiz Montes De Oca, RN)  Verbalizes/displays adequate comfort level or baseline comfort level:   Encourage patient to monitor pain and request assistance   Assess pain using appropriate pain scale   Administer analgesics based on type and severity of pain and evaluate response   Implement non-pharmacological measures as appropriate and evaluate response  3/29/2025 0259 by Moiz Montes De Oca, RN  Outcome: 
  Problem: Chronic Conditions and Co-morbidities  Goal: Patient's chronic conditions and co-morbidity symptoms are monitored and maintained or improved  3/29/2025 2247 by Moiz Montes De Oca, RN  Outcome: Progressing  Flowsheets (Taken 3/29/2025 1947)  Care Plan - Patient's Chronic Conditions and Co-Morbidity Symptoms are Monitored and Maintained or Improved:   Monitor and assess patient's chronic conditions and comorbid symptoms for stability, deterioration, or improvement   Collaborate with multidisciplinary team to address chronic and comorbid conditions and prevent exacerbation or deterioration   Update acute care plan with appropriate goals if chronic or comorbid symptoms are exacerbated and prevent overall improvement and discharge  Note:  Educated patient on importance of monitoring vital signs every 4 hours.Educated patient on importance of adhering to prescribed medications and treatment including on an outpatient basis. Educated patient on importance of keeping heart monitor on at all times.   3/29/2025 2231 by Moiz Montes De Oca, RN  Outcome: Progressing  Flowsheets (Taken 3/29/2025 1947)  Care Plan - Patient's Chronic Conditions and Co-Morbidity Symptoms are Monitored and Maintained or Improved:   Monitor and assess patient's chronic conditions and comorbid symptoms for stability, deterioration, or improvement   Collaborate with multidisciplinary team to address chronic and comorbid conditions and prevent exacerbation or deterioration   Update acute care plan with appropriate goals if chronic or comorbid symptoms are exacerbated and prevent overall improvement and discharge  3/29/2025 1040 by Adilson Greenwood RN  Outcome: Progressing  Flowsheets (Taken 3/29/2025 0755)  Care Plan - Patient's Chronic Conditions and Co-Morbidity Symptoms are Monitored and Maintained or Improved: Monitor and assess patient's chronic conditions and comorbid symptoms for stability, deterioration, or improvement     Problem: 
  Problem: Chronic Conditions and Co-morbidities  Goal: Patient's chronic conditions and co-morbidity symptoms are monitored and maintained or improved  3/30/2025 1057 by Adilson Greenwood RN  Outcome: Progressing  Flowsheets (Taken 3/30/2025 0720)  Care Plan - Patient's Chronic Conditions and Co-Morbidity Symptoms are Monitored and Maintained or Improved: Monitor and assess patient's chronic conditions and comorbid symptoms for stability, deterioration, or improvement  3/29/2025 2247 by Moiz Montes De Oca, RN  Outcome: Progressing  Flowsheets (Taken 3/29/2025 1947)  Care Plan - Patient's Chronic Conditions and Co-Morbidity Symptoms are Monitored and Maintained or Improved:   Monitor and assess patient's chronic conditions and comorbid symptoms for stability, deterioration, or improvement   Collaborate with multidisciplinary team to address chronic and comorbid conditions and prevent exacerbation or deterioration   Update acute care plan with appropriate goals if chronic or comorbid symptoms are exacerbated and prevent overall improvement and discharge  Note:  Educated patient on importance of monitoring vital signs every 4 hours.Educated patient on importance of adhering to prescribed medications and treatment including on an outpatient basis. Educated patient on importance of keeping heart monitor on at all times.   3/29/2025 2231 by Moiz Montes De Oca, RN  Outcome: Progressing  Flowsheets (Taken 3/29/2025 1947)  Care Plan - Patient's Chronic Conditions and Co-Morbidity Symptoms are Monitored and Maintained or Improved:   Monitor and assess patient's chronic conditions and comorbid symptoms for stability, deterioration, or improvement   Collaborate with multidisciplinary team to address chronic and comorbid conditions and prevent exacerbation or deterioration   Update acute care plan with appropriate goals if chronic or comorbid symptoms are exacerbated and prevent overall improvement and discharge     Problem: 
  Problem: Chronic Conditions and Co-morbidities  Goal: Patient's chronic conditions and co-morbidity symptoms are monitored and maintained or improved  Outcome: Progressing  Flowsheets (Taken 3/27/2025 2130)  Care Plan - Patient's Chronic Conditions and Co-Morbidity Symptoms are Monitored and Maintained or Improved: Monitor and assess patient's chronic conditions and comorbid symptoms for stability, deterioration, or improvement     Problem: Pain  Goal: Verbalizes/displays adequate comfort level or baseline comfort level  Flowsheets (Taken 3/28/2025 1013)  Verbalizes/displays adequate comfort level or baseline comfort level:   Encourage patient to monitor pain and request assistance   Assess pain using appropriate pain scale     Problem: Safety - Adult  Goal: Free from fall injury  Flowsheets (Taken 3/28/2025 1013)  Free From Fall Injury: Instruct family/caregiver on patient safety     
  Problem: Chronic Conditions and Co-morbidities  Goal: Patient's chronic conditions and co-morbidity symptoms are monitored and maintained or improved  Outcome: Progressing  Flowsheets (Taken 3/28/2025 1920)  Care Plan - Patient's Chronic Conditions and Co-Morbidity Symptoms are Monitored and Maintained or Improved:   Monitor and assess patient's chronic conditions and comorbid symptoms for stability, deterioration, or improvement   Collaborate with multidisciplinary team to address chronic and comorbid conditions and prevent exacerbation or deterioration   Update acute care plan with appropriate goals if chronic or comorbid symptoms are exacerbated and prevent overall improvement and discharge  Note:  Educated patient on importance of monitoring vital signs every 4 hours.Educated patient on importance of adhering to prescribed medications and treatment including on an outpatient basis. Educated patient on importance of keeping heart monitor on at all times.      Problem: Pain  Goal: Verbalizes/displays adequate comfort level or baseline comfort level  Outcome: Progressing  Flowsheets (Taken 3/29/2025 0259)  Verbalizes/displays adequate comfort level or baseline comfort level:   Encourage patient to monitor pain and request assistance   Assess pain using appropriate pain scale   Administer analgesics based on type and severity of pain and evaluate response   Implement non-pharmacological measures as appropriate and evaluate response  Note: Educated patient on dosage and frequency of prescribed pain medication.     Problem: Safety - Adult  Goal: Free from fall injury  Outcome: Progressing  Flowsheets (Taken 3/28/2025 1013 by Magdalena Garcia, RN)  Free From Fall Injury: Instruct family/caregiver on patient safety  Note: Educated patient on use of call bell for assistance as needed before getting out of bed.     Problem: Nutrition Deficit:  Goal: Optimize nutritional status  Outcome: Progressing  Flowsheets (Taken 
  Problem: Occupational Therapy - Adult  Goal: By Discharge: Performs self-care activities at highest level of function for planned discharge setting.  See evaluation for individualized goals.  Description: Occupational Therapy Goals:  Initiated 3/28/2025 to be met within 7-10 days.    1.  Patient will perform grooming with modified independence standing at sink > 5 minutes, F+ balance.   2.  Patient will perform lower body dressing with modified independence standing.  3.  Patient will perform upper body dressing  with modified independence.  4.  Patient will perform toilet transfers with supervision/set-up  5.  Patient will perform all aspects of toileting with modified independence.  6.  Patient will participate in upper extremity therapeutic exercise/activities with modified independence for 8-10 minutes to increase strength/endurance for ADLs.    7.  Patient will utilize energy conservation techniques during functional activities with verbal cues.      PLOF: Pt lives alone in 1 , 3 TER[place] and was modified independent with self-care, additional time needed and functional mobility. Pt reports receiving  services, therapy included.  Outcome: Progressing   OCCUPATIONAL THERAPY TREATMENT    Patient: Moiz May (64 y.o. male)  Date: 3/31/2025  Diagnosis: Acute respiratory failure with hypoxia [J96.01]  Acute on chronic combined systolic and diastolic CHF (congestive heart failure) ([facility]) [I50.43]  CHF (congestive heart failure), NYHA class I, acute on chronic, combined ([facility]) [I50.43]  Acute on chronic heart failure with reduced ejection fraction (HFrEF, <= 40%) ([facility]) [I50.23] CHF (congestive heart failure), NYHA class I, acute on chronic, combined ([facility])      Precautions: Fall Risk,  ,  ,  ,  ,  ,  ,      Chart, occupational therapy assessment, plan of care, and goals were reviewed.  ASSESSMENT:  Pt presents in bed agreeable to OT tx with min encouragement. Pt is mod I for bed mobility and functional mobility 
Problem: Occupational Therapy - Adult  Goal: By Discharge: Performs self-care activities at highest level of function for planned discharge setting.  See evaluation for individualized goals.  Description: Occupational Therapy Goals:  Initiated 3/28/2025 to be met within 7-10 days.    1.  Patient will perform grooming with modified independence standing at sink > 5 minutes, F+ balance.   2.  Patient will perform lower body dressing with modified independence standing.  3.  Patient will perform upper body dressing  with modified independence.  4.  Patient will perform toilet transfers with supervision/set-up  5.  Patient will perform all aspects of toileting with modified independence.  6.  Patient will participate in upper extremity therapeutic exercise/activities with modified independence for 8-10 minutes to increase strength/endurance for ADLs.    7.  Patient will utilize energy conservation techniques during functional activities with verbal cues.    PLOF: Pt lives alone in 1 , [address] and was modified independent with self-care, additional time needed and functional mobility. Pt reports receiving HH services, therapy included.  Outcome: Progressing    OCCUPATIONAL THERAPY EVALUATION    Patient: Moiz May (64 y.o. male)  Date: 3/28/2025  Primary Diagnosis: Acute respiratory failure with hypoxia [J96.01]  Acute on chronic combined systolic and diastolic CHF (congestive heart failure) ([facility]) [I50.43]  CHF (congestive heart failure), NYHA class I, acute on chronic, combined ([facility]) [I50.43]  Acute on chronic heart failure with reduced ejection fraction (HFrEF, <= 40%) ([facility]) [I50.23]  Precautions: Fall Risk    ASSESSMENT :  Pt cleared to participate in OT evaluation by RN. Pt supine in bed on room air with 2L NC donned onto wall eating breakfast. Patient reporting he \"does not need O2\". Patient known by therapy services, requires maximal encouragement for minimal effort. Patient supervision for supine > sit, 
Problem: Physical Therapy - Adult  Goal: By Discharge: Performs mobility at highest level of function for planned discharge setting.  See evaluation for individualized goals.  Description: Physical Therapy Goals  Initiated 3/28/2025 and to be accomplished within 7 day(s)  1.  Patient will move from supine to sit and sit to supine in bed with modified independence in preparation for seated tasks.    2.  Patient will transfer from bed to chair and chair to bed with modified independence using the least restrictive device in preparation for out of bed.  3.  Patient will perform sit to stand with modified independence in preparation for out of bed tasks.  4.  Patient will ambulate with modified independence for 50 feet with the least restrictive device in preparation for home setting.   5.  Patient will ascend/descend 3 stairs with handrail(s) with modified independence in preparation for negotiation of home set-up.    PLOF: Lives alone. One story with 3 steps to enter. Denies AD use.   Outcome: Adequate for Discharge   PHYSICAL THERAPY TREATMENT/DISCHARGE    Patient: Moiz May (64 y.o. male)  Date: 3/31/2025  Diagnosis: Acute respiratory failure with hypoxia [J96.01]  Acute on chronic combined systolic and diastolic CHF (congestive heart failure) ([facility]) [I50.43]  CHF (congestive heart failure), NYHA class I, acute on chronic, combined ([facility]) [I50.43]  Acute on chronic heart failure with reduced ejection fraction (HFrEF, <= 40%) ([facility]) [I50.23] CHF (congestive heart failure), NYHA class I, acute on chronic, combined ([facility])      Precautions: Fall Risk    ASSESSMENT:  Patient completes all bed mobility and transfers with complete independence. Ambulation x400ft without AD with Mod Indep, general unsteadiness though no overt LOB noted. Ambulates with narrow HANY and path deviation though ultimately no fall risk. No skilled PT services indicated, will sign off.        PLAN:  Maximum therapeutic gains met at current level 
(Taken 3/30/2025 2143)  Free From Fall Injury: Instruct family/caregiver on patient safety  3/30/2025 1057 by Adilson Greenwood RN  Outcome: Progressing  Flowsheets (Taken 3/30/2025 1056)  Free From Fall Injury: Instruct family/caregiver on patient safety     Problem: Nutrition Deficit:  Goal: Optimize nutritional status  3/30/2025 2143 by Justa Rodriguez RN  Outcome: Progressing  Flowsheets (Taken 3/28/2025 1740 by Tanesha Rome RD)  Nutrient intake appropriate for improving, restoring, or maintaining nutritional needs:   Assess nutritional status and recommend course of action   Monitor oral intake, labs, and treatment plans   Recommend appropriate diets, oral nutritional supplements, and vitamin/mineral supplements  3/30/2025 1057 by Adilson Greenwood RN  Outcome: Progressing     Problem: Discharge Planning  Goal: Discharge to home or other facility with appropriate resources  3/30/2025 1057 by Adilson Greenwood RN  Outcome: Progressing  Flowsheets (Taken 3/30/2025 0720)  Discharge to home or other facility with appropriate resources: Identify barriers to discharge with patient and caregiver     
Training  Transfer Training: Yes  Sit to Stand: Contact guard assistance  Stand to Sit: Contact guard assistance  Balance:   Balance  Sitting: Intact  Standing: Impaired;With support  Standing - Static: Fair (cues for upright posture)  Pain:  Intensity Pre-treatment: 0/10   Intensity Post-treatment: 0/10  Scale: Numeric Rating Scale    Activity Tolerance:   Activity Tolerance: Patient tolerated evaluation without incident    After treatment:   []         Patient left in no apparent distress sitting up in chair  [x]         Patient left in no apparent distress in bed  [x]         Call bell left within reach  [x]         Nursing notified  []         Caregiver present  []         Bed alarm activated- not on upon entry  []         Chair alarm activated  []         SCDs applied    COMMUNICATION/EDUCATION:   Patient Education  Education Given To: Patient  Education Provided: Role of Therapy;Plan of Care  Education Method: Demonstration;Verbal;Teach Back  Barriers to Learning: Readiness to Learn  Education Outcome: Verbalized understanding;Demonstrated understanding;Continued education needed    Thank you for this referral.  Elaine Mckay, PT  Minutes: 16    Eval Complexity: Decision Making: Medium Complexity

## 2025-03-31 NOTE — DISCHARGE INSTRUCTIONS
DISCHARGE SUMMARY from Nurse    PATIENT INSTRUCTIONS:    After general anesthesia or intravenous sedation, for 24 hours or while taking prescription Narcotics:  Limit your activities  Do not drive and operate hazardous machinery  Do not make important personal or business decisions  Do  not drink alcoholic beverages  If you have not urinated within 8 hours after discharge, please contact your surgeon on call.    Report the following to your surgeon:  Excessive pain, swelling, redness or odor of or around the surgical area  Temperature over 100.5  Nausea and vomiting lasting longer than 4 hours or if unable to take medications  Any signs of decreased circulation or nerve impairment to extremity: change in color, persistent  numbness, tingling, coldness or increase pain  Any questions    What to do at Home:  Recommended activity: activity as tolerated,     If you experience any of the following symptoms chest pain, shortness of breath, pain unrelieved by medication, return to ER if needed, please follow up with Dr. Betts and [facility].    *  Please give a list of your current medications to your Primary Care Provider.    *  Please update this list whenever your medications are discontinued, doses are      changed, or new medications (including over-the-counter products) are added.    *  Please carry medication information at all times in case of emergency situations.    These are general instructions for a healthy lifestyle:    No smoking/ No tobacco products/ Avoid exposure to second hand smoke  Surgeon General's Warning:  Quitting smoking now greatly reduces serious risk to your health.    Obesity, smoking, and sedentary lifestyle greatly increases your risk for illness    A healthy diet, regular physical exercise & weight monitoring are important for maintaining a healthy lifestyle    You may be retaining fluid if you have a history of heart failure or if you experience any of the following symptoms:  Weight

## 2025-03-31 NOTE — PROGRESS NOTES
Patient discharge instructions and education provided and reviewed with patient. Patient verbalizes understanding of cardiac diet-restricting fluids and sodium. All medications at [facility] confirmed and ready for . Lyft provided to patient for transportation home. PIV and tele removed.

## 2025-04-01 NOTE — PROGRESS NOTES
Called for his doctors appointment. Called his sister's phone number. Reported pt does not own a phone. Appointment given:  PCP- DR Betts                 April 4 at 2pm  Cardiology: [facility]----April 14, 2025 at 2 pm  [address] [id number]

## 2025-05-20 ENCOUNTER — RESULTS FOLLOW-UP (OUTPATIENT)
Age: 65
End: 2025-05-20

## 2025-05-20 ENCOUNTER — CLINICAL SUPPORT (OUTPATIENT)
Age: 65
End: 2025-05-20
Payer: MEDICARE

## 2025-05-20 DIAGNOSIS — I50.21 ACUTE HFREF (HEART FAILURE WITH REDUCED EJECTION FRACTION) (HCC): Primary | ICD-10-CM

## 2025-05-20 PROCEDURE — 93296 REM INTERROG EVL PM/IDS: CPT | Performed by: INTERNAL MEDICINE

## 2025-05-20 PROCEDURE — 93295 DEV INTERROG REMOTE 1/2/MLT: CPT | Performed by: INTERNAL MEDICINE

## 2025-05-20 PROCEDURE — 93297 REM INTERROG DEV EVAL ICPMS: CPT | Performed by: INTERNAL MEDICINE

## 2025-05-20 NOTE — RESULT ENCOUNTER NOTE
Agree with the findings.  Pacemaker/ICD function is normal.  Continue monitoring.  Please send the EGM of VT-NS episode

## 2025-05-21 NOTE — TELEPHONE ENCOUNTER
----- Message from Dr. William Ram MD sent at 5/20/2025  3:25 PM EDT -----  Agree with the findings.  Pacemaker/ICD function is normal.  Continue monitoring.  Please send the EGM of VT-NS episode

## 2025-07-29 NOTE — PROGRESS NOTES
facility-administered medications for this visit.       Past Surgical History:   Procedure Laterality Date    CARDIAC PROCEDURE N/A 3/10/2025    Left heart cath / coronary angiography performed by Stephon Holman MD at [facility] CARDIAC CATH LAB    CARDIAC PROCEDURE N/A 3/10/2025    Insert stent flash coronary performed by Stephon Holman MD at [facility] CARDIAC CATH LAB    CARDIAC PROCEDURE N/A 3/10/2025    Instantaneous wave-free ratio (iFR) performed by Stephon Holman MD at [facility] CARDIAC CATH LAB    CARDIAC PROCEDURE N/A 3/10/2025    Intravascular ultrasound performed by Stephon Holman MD at [facility] CARDIAC CATH LAB    OTHER SURGICAL HISTORY      Torn Cartiledge Left Knee    TONSILLECTOMY         Allergies and Sensitivities:  No Known Allergies    Family History:  Family History   Problem Relation Age of Onset    Osteoarthritis Mother        Social History:  Social History     Tobacco Use    Smoking status: Never    Smokeless tobacco: Never   Vaping Use    Vaping status: Never Used   Substance Use Topics    Alcohol use: No    Drug use: No     He  reports that he has never smoked. He has never used smokeless tobacco.  He  reports no history of alcohol use.    Physical Exam:  BP Readings from Last 3 Encounters:   08/01/25 (!) 130/95   03/31/25 90/74   03/13/25 94/62         Pulse Readings from Last 3 Encounters:   08/01/25 68   03/31/25 80   03/13/25 81          Wt Readings from Last 3 Encounters:   08/01/25 74.8 kg (165 lb)   03/31/25 69.8 kg (153 lb 14.1 oz)   03/08/25 80.7 kg (178 lb)       Constitutional: Oriented to person, place, and time.   Neck: No JVD present. Carotid bruit is not appreciated.   Cardiovascular: Regular rhythm.   No murmur, gallop or rubs appreciated.  Lung: Breath sounds normal. No respiratory distress. No rhonchi or rales appreciated.  Abdominal: No tenderness.    Musculoskeletal: There is trace lower extremity edema. No cyanosis.    LABS:   @  Lab Results   Component Value Date/Time    WBC 11.0

## 2025-08-01 ENCOUNTER — OFFICE VISIT (OUTPATIENT)
Age: 65
End: 2025-08-01
Payer: MEDICARE

## 2025-08-01 ENCOUNTER — TELEPHONE (OUTPATIENT)
Dept: CARDIOTHORACIC SURGERY | Age: 65
End: 2025-08-01

## 2025-08-01 ENCOUNTER — TELEPHONE (OUTPATIENT)
Dept: CARDIOLOGY | Facility: HOSPITAL | Age: 65
End: 2025-08-01

## 2025-08-01 VITALS
SYSTOLIC BLOOD PRESSURE: 130 MMHG | HEIGHT: 72 IN | OXYGEN SATURATION: 100 % | DIASTOLIC BLOOD PRESSURE: 95 MMHG | HEART RATE: 68 BPM | WEIGHT: 165 LBS | BODY MASS INDEX: 22.35 KG/M2

## 2025-08-01 DIAGNOSIS — I50.20 HFREF (HEART FAILURE WITH REDUCED EJECTION FRACTION) (HCC): ICD-10-CM

## 2025-08-01 DIAGNOSIS — Z98.61 H/O CORONARY ANGIOPLASTY: ICD-10-CM

## 2025-08-01 DIAGNOSIS — I25.83 CORONARY ARTERY DISEASE DUE TO LIPID RICH PLAQUE: ICD-10-CM

## 2025-08-01 DIAGNOSIS — I10 ESSENTIAL HYPERTENSION: Primary | ICD-10-CM

## 2025-08-01 DIAGNOSIS — E78.49 OTHER HYPERLIPIDEMIA: ICD-10-CM

## 2025-08-01 DIAGNOSIS — Z95.810 AICD (AUTOMATIC CARDIOVERTER/DEFIBRILLATOR) PRESENT: ICD-10-CM

## 2025-08-01 DIAGNOSIS — I50.21 ACUTE HFREF (HEART FAILURE WITH REDUCED EJECTION FRACTION) (HCC): ICD-10-CM

## 2025-08-01 DIAGNOSIS — I25.10 CORONARY ARTERY DISEASE DUE TO LIPID RICH PLAQUE: ICD-10-CM

## 2025-08-01 DIAGNOSIS — I48.0 PAROXYSMAL A-FIB (HCC): ICD-10-CM

## 2025-08-01 PROCEDURE — 1123F ACP DISCUSS/DSCN MKR DOCD: CPT | Performed by: INTERNAL MEDICINE

## 2025-08-01 PROCEDURE — 99214 OFFICE O/P EST MOD 30 MIN: CPT | Performed by: INTERNAL MEDICINE

## 2025-08-01 PROCEDURE — 93000 ELECTROCARDIOGRAM COMPLETE: CPT | Performed by: INTERNAL MEDICINE

## 2025-08-01 PROCEDURE — 3080F DIAST BP >= 90 MM HG: CPT | Performed by: INTERNAL MEDICINE

## 2025-08-01 PROCEDURE — 3075F SYST BP GE 130 - 139MM HG: CPT | Performed by: INTERNAL MEDICINE

## 2025-08-01 RX ORDER — ASPIRIN 81 MG/1
81 TABLET, CHEWABLE ORAL DAILY
Qty: 90 TABLET | Refills: 0 | Status: SHIPPED | OUTPATIENT
Start: 2025-08-01

## 2025-08-01 RX ORDER — SACUBITRIL AND VALSARTAN 24; 26 MG/1; MG/1
1 TABLET, FILM COATED ORAL 2 TIMES DAILY
Qty: 180 TABLET | Refills: 0 | Status: SHIPPED | OUTPATIENT
Start: 2025-08-01

## 2025-08-01 RX ORDER — ROSUVASTATIN CALCIUM 20 MG/1
20 TABLET, COATED ORAL NIGHTLY
Qty: 90 TABLET | Refills: 0 | Status: SHIPPED | OUTPATIENT
Start: 2025-08-01

## 2025-08-01 RX ORDER — CLOPIDOGREL BISULFATE 75 MG/1
75 TABLET ORAL DAILY
Qty: 90 TABLET | Refills: 0 | Status: SHIPPED | OUTPATIENT
Start: 2025-08-01

## 2025-08-01 RX ORDER — CARVEDILOL 3.12 MG/1
3.12 TABLET ORAL 2 TIMES DAILY WITH MEALS
Qty: 180 TABLET | Refills: 0 | Status: SHIPPED | OUTPATIENT
Start: 2025-08-01

## 2025-08-01 RX ORDER — SPIRONOLACTONE 25 MG/1
12.5 TABLET ORAL DAILY
Qty: 90 TABLET | Refills: 0 | Status: SHIPPED | OUTPATIENT
Start: 2025-08-01

## 2025-08-01 RX ORDER — FUROSEMIDE 40 MG/1
40 TABLET ORAL DAILY
Qty: 90 TABLET | Refills: 0 | Status: SHIPPED | OUTPATIENT
Start: 2025-08-01

## 2025-08-01 ASSESSMENT — PATIENT HEALTH QUESTIONNAIRE - PHQ9
SUM OF ALL RESPONSES TO PHQ QUESTIONS 1-9: 7
1. LITTLE INTEREST OR PLEASURE IN DOING THINGS: NOT AT ALL
10. IF YOU CHECKED OFF ANY PROBLEMS, HOW DIFFICULT HAVE THESE PROBLEMS MADE IT FOR YOU TO DO YOUR WORK, TAKE CARE OF THINGS AT HOME, OR GET ALONG WITH OTHER PEOPLE: EXTREMELY DIFFICULT
6. FEELING BAD ABOUT YOURSELF - OR THAT YOU ARE A FAILURE OR HAVE LET YOURSELF OR YOUR FAMILY DOWN: NOT AT ALL
SUM OF ALL RESPONSES TO PHQ QUESTIONS 1-9: 7
5. POOR APPETITE OR OVEREATING: NEARLY EVERY DAY
3. TROUBLE FALLING OR STAYING ASLEEP: SEVERAL DAYS
8. MOVING OR SPEAKING SO SLOWLY THAT OTHER PEOPLE COULD HAVE NOTICED. OR THE OPPOSITE, BEING SO FIGETY OR RESTLESS THAT YOU HAVE BEEN MOVING AROUND A LOT MORE THAN USUAL: NOT AT ALL
7. TROUBLE CONCENTRATING ON THINGS, SUCH AS READING THE NEWSPAPER OR WATCHING TELEVISION: NOT AT ALL
SUM OF ALL RESPONSES TO PHQ QUESTIONS 1-9: 7
SUM OF ALL RESPONSES TO PHQ QUESTIONS 1-9: 7
9. THOUGHTS THAT YOU WOULD BE BETTER OFF DEAD, OR OF HURTING YOURSELF: NOT AT ALL
2. FEELING DOWN, DEPRESSED OR HOPELESS: NOT AT ALL
4. FEELING TIRED OR HAVING LITTLE ENERGY: NEARLY EVERY DAY

## 2025-08-01 NOTE — TELEPHONE ENCOUNTER
Discussed with Dr. Trevor bishop.  Patient states \"hearing beeping\".  Will plan to have patient checked to make sure device ok.

## 2025-08-04 ENCOUNTER — TELEPHONE (OUTPATIENT)
Age: 65
End: 2025-08-04

## (undated) DEVICE — PENCIL ES CRD L10FT ROCK SWCH S STL HEX LOK BLDE ELECTRD

## (undated) DEVICE — DRAPE,ANGIO,BRACH,STERILE,38X44: Brand: MEDLINE

## (undated) DEVICE — TOWEL,OR,DSP,ST,BLUE,STD,4/PK,20PK/CS: Brand: MEDLINE

## (undated) DEVICE — MEDI-TRACE CADENCE ADULT, DEFIBRILLATION ELECTRODE -RTS  (10 PR/PK) - PHYSIO-CONTROL: Brand: MEDI-TRACE CADENCE

## (undated) DEVICE — CATHETER ANGIO 5FR L100CM GRY S STL NYL JR4 3 SEG BRAID L

## (undated) DEVICE — CABLE PACE ALGTR CLP SAF 12FT --

## (undated) DEVICE — DRESSING FOAM 4X6 DISP POSTOP MEPILEX BORD AG

## (undated) DEVICE — CATHETER GUID 6FR L100CM DIA0.071IN NYL SHFT EBU3.5

## (undated) DEVICE — SUTURE MCRYL SZ 4 0 L18IN ABSRB VLT PS 1 L24MM 3 8 CIR REV Y682H

## (undated) DEVICE — CATHETER ANGIO 5FR L100CM GRY S STL NYL JL3.5 3 SEG BRAID L

## (undated) DEVICE — SUTURE PERMA HND SZ 0 L18IN NONABSORBABLE BLK L30MM PSL REV 580H

## (undated) DEVICE — RUNTHROUGH NS EXTRA FLOPPY PTCA GUIDEWIRE: Brand: RUNTHROUGH

## (undated) DEVICE — Device

## (undated) DEVICE — REM POLYHESIVE ADULT PATIENT RETURN ELECTRODE: Brand: VALLEYLAB

## (undated) DEVICE — DECANTER BAG 9": Brand: MEDLINE INDUSTRIES, INC.

## (undated) DEVICE — EXTENSION SET, MALE LUER LOCK ADAPTER

## (undated) DEVICE — BAG WST COLL CLR DISP PVC W/ ROTICULATING LUER L BOR TBNG

## (undated) DEVICE — GLIDESHEATH SLENDER STAINLESS STEEL KIT: Brand: GLIDESHEATH SLENDER

## (undated) DEVICE — PADS  DEFIB  ADULT

## (undated) DEVICE — CATHETER DIAG 5FR L100CM LUMN ID0.047IN JL3.5 CRV 0 SIDE H

## (undated) DEVICE — INTRO SHTH 7FR 13X20CM -- TEARAWAY

## (undated) DEVICE — Device: Brand: OMNIWIRE PRESSURE GUIDE WIRE

## (undated) DEVICE — CATH BLLN ANGIO 3.25X12MM NC EUPHORIA RX

## (undated) DEVICE — SET FLD ADMIN 3 W STPCOCK FIX FEM L BOR 1IN

## (undated) DEVICE — SHEATH RAIN RAD INTRO SHTH W/ BARE WIRE 10 CM 506610S

## (undated) DEVICE — BAND HEMOSTAT DRY D-STAT RAD --

## (undated) DEVICE — PROCEDURE KIT FLUID MGMT 10 FR CUST MAINFOLD

## (undated) DEVICE — HI-TORQUE BALANCE GUIDE WIRE W/HYDROCOAT .014 STRAIGHT TIP 3.0 CM X 190 CM: Brand: HI-TORQUE BALANCE

## (undated) DEVICE — Z DISCONTINUED PER MEDLINE DRESSING ALG W9XL10CM SIL CVR WTRPRF VIR BACT BARR ANTIMIC

## (undated) DEVICE — GUIDEWIRE VASC L260CM DIA0035IN TIP L3MM PTFE J STD TAPR FIX

## (undated) DEVICE — PRESSURE MONITORING SET: Brand: TRUWAVE

## (undated) DEVICE — COVER US PRB W15XL120CM W/ GEL RUBBERBAND TAPE STRP FLD GEN

## (undated) DEVICE — SUTURE ABSORBABLE BRAIDED 2-0 CT-1 27 IN UD VICRYL J259H

## (undated) DEVICE — GAUZE,SPONGE,4"X4",16PLY,STRL,LF,10/TRAY: Brand: MEDLINE

## (undated) DEVICE — ANGIOGRAPHY KIT CUST VASC

## (undated) DEVICE — LIMB HOLDER, WRIST/ANKLE: Brand: DEROYAL

## (undated) DEVICE — LUER-LOK 360°: Brand: CONNECTA, LUER-LOK

## (undated) DEVICE — KENDALL RADIOLUCENT FOAM MONITORING ELECTRODE RECTANGULAR SHAPE: Brand: KENDALL

## (undated) DEVICE — RADIFOCUS OPTITORQUE ANGIOGRAPHIC CATHETER: Brand: OPTITORQUE

## (undated) DEVICE — BAND COMPR L24CM REG CLR PLAS HEMSTAT EXT HK AND LOOP RETEN

## (undated) DEVICE — DEVICE INFL W ACCS + HEMSTAS VLV INSRT TOOL AND TORQ BASIX

## (undated) DEVICE — CATHETER GUID 6FR L100CM GRN PTFE JR4 TRUELUMEN HYBRID

## (undated) DEVICE — COPILOT BLEEDBACK CONTROL VALVE: Brand: COPILOT

## (undated) DEVICE — PACK PROCEDURE SURG VASC CATH 161 MMC LF

## (undated) DEVICE — CATHETER GUID 6FR DIA0.071IN SHFT NYL STD L JR 4 CRV ENH

## (undated) DEVICE — ADAPTER ANGIO CLR BODY POLYCARB AIRLESS ROT M 2ND FEM LUER

## (undated) DEVICE — GUIDEWIRE VASC L260CM DIA0.035IN RAD 3MM J TIP L7CM PTFE

## (undated) DEVICE — CATH BLLN DIL 2.5 X12MM RX -- EUPHORA

## (undated) DEVICE — INTRO SHTH 9FR 13X20CM -- USE ITEM# 341577

## (undated) DEVICE — Device: Brand: EAGLE EYE PLATINUM RX DIGITAL IVUS CATHETER

## (undated) DEVICE — SUPPORT WRST COMPR W/ HK MBRACE